# Patient Record
Sex: MALE | Race: BLACK OR AFRICAN AMERICAN | NOT HISPANIC OR LATINO | Employment: OTHER | URBAN - METROPOLITAN AREA
[De-identification: names, ages, dates, MRNs, and addresses within clinical notes are randomized per-mention and may not be internally consistent; named-entity substitution may affect disease eponyms.]

---

## 2017-02-13 ENCOUNTER — LAB VISIT (OUTPATIENT)
Dept: LAB | Facility: HOSPITAL | Age: 67
End: 2017-02-13
Attending: FAMILY MEDICINE
Payer: MEDICARE

## 2017-02-13 ENCOUNTER — OFFICE VISIT (OUTPATIENT)
Dept: FAMILY MEDICINE | Facility: CLINIC | Age: 67
End: 2017-02-13
Payer: MEDICARE

## 2017-02-13 VITALS
WEIGHT: 214.5 LBS | RESPIRATION RATE: 16 BRPM | OXYGEN SATURATION: 97 % | BODY MASS INDEX: 31.77 KG/M2 | SYSTOLIC BLOOD PRESSURE: 140 MMHG | HEART RATE: 53 BPM | HEIGHT: 69 IN | DIASTOLIC BLOOD PRESSURE: 82 MMHG | TEMPERATURE: 98 F

## 2017-02-13 DIAGNOSIS — I10 ESSENTIAL HYPERTENSION: Chronic | ICD-10-CM

## 2017-02-13 DIAGNOSIS — S46.002A ROTATOR CUFF INJURY, LEFT, INITIAL ENCOUNTER: Primary | ICD-10-CM

## 2017-02-13 DIAGNOSIS — Z11.59 NEED FOR HEPATITIS C SCREENING TEST: ICD-10-CM

## 2017-02-13 DIAGNOSIS — Z23 NEED FOR IMMUNIZATION AGAINST INFLUENZA: ICD-10-CM

## 2017-02-13 DIAGNOSIS — Z23 NEED FOR PROPHYLACTIC VACCINATION WITH STREPTOCOCCUS PNEUMONIAE (PNEUMOCOCCUS) AND INFLUENZA VACCINES: ICD-10-CM

## 2017-02-13 DIAGNOSIS — I48.0 PAROXYSMAL ATRIAL FIBRILLATION: Chronic | ICD-10-CM

## 2017-02-13 LAB
ALBUMIN SERPL BCP-MCNC: 4 G/DL
ALP SERPL-CCNC: 55 U/L
ALT SERPL W/O P-5'-P-CCNC: 29 U/L
ANION GAP SERPL CALC-SCNC: 8 MMOL/L
AST SERPL-CCNC: 32 U/L
BILIRUB SERPL-MCNC: 0.4 MG/DL
BUN SERPL-MCNC: 15 MG/DL
CALCIUM SERPL-MCNC: 9.2 MG/DL
CHLORIDE SERPL-SCNC: 105 MMOL/L
CHOLEST/HDLC SERPL: 2.9 {RATIO}
CO2 SERPL-SCNC: 28 MMOL/L
CREAT SERPL-MCNC: 1 MG/DL
EST. GFR  (AFRICAN AMERICAN): >60 ML/MIN/1.73 M^2
EST. GFR  (NON AFRICAN AMERICAN): >60 ML/MIN/1.73 M^2
GLUCOSE SERPL-MCNC: 102 MG/DL
HDL/CHOLESTEROL RATIO: 34.9 %
HDLC SERPL-MCNC: 172 MG/DL
HDLC SERPL-MCNC: 60 MG/DL
LDLC SERPL CALC-MCNC: 98.6 MG/DL
NONHDLC SERPL-MCNC: 112 MG/DL
POTASSIUM SERPL-SCNC: 4 MMOL/L
PROT SERPL-MCNC: 7.3 G/DL
SODIUM SERPL-SCNC: 141 MMOL/L
TRIGL SERPL-MCNC: 67 MG/DL

## 2017-02-13 PROCEDURE — 80053 COMPREHEN METABOLIC PANEL: CPT

## 2017-02-13 PROCEDURE — 99214 OFFICE O/P EST MOD 30 MIN: CPT | Mod: S$PBB,,, | Performed by: FAMILY MEDICINE

## 2017-02-13 PROCEDURE — 36415 COLL VENOUS BLD VENIPUNCTURE: CPT | Mod: PO

## 2017-02-13 PROCEDURE — 80061 LIPID PANEL: CPT

## 2017-02-13 PROCEDURE — 99999 PR PBB SHADOW E&M-EST. PATIENT-LVL III: CPT | Mod: PBBFAC,,, | Performed by: FAMILY MEDICINE

## 2017-02-13 PROCEDURE — 86803 HEPATITIS C AB TEST: CPT

## 2017-02-13 RX ORDER — FLECAINIDE ACETATE 100 MG/1
TABLET ORAL
COMMUNITY
Start: 2017-01-23 | End: 2017-02-13

## 2017-02-13 RX ORDER — HYDROCODONE BITARTRATE AND ACETAMINOPHEN 5; 325 MG/1; MG/1
1 TABLET ORAL EVERY 6 HOURS PRN
Qty: 45 TABLET | Refills: 0 | Status: SHIPPED | OUTPATIENT
Start: 2017-02-13 | End: 2017-08-09

## 2017-02-13 NOTE — PROGRESS NOTES
Flu given &.VIS given. Tolerated injection well.Patient instructed to wait 15 minutes for monitoring. Pneumococcal vaccine 13 given tolerated well VIS given instructed to wait x 15 min for monitoring.

## 2017-02-13 NOTE — PROGRESS NOTES
Chief Complaint   Patient presents with    Shoulder Pain     L shoulder x 4 months       HPI  Omar Pacheco is a 66 y.o. male with multiple medical diagnoses as listed in the medical history and problem list that presents for evaluation of 6 mos of left shoulder pain. No known injury but he did hear a click and feel a sharp pain while reaching during some housework. He was seen in December by another provider at this clinic and had x rays done. No numbness or tingling. He takes ultracet but this does not help the pain.     PAST MEDICAL HISTORY:  Past Medical History   Diagnosis Date    *Atrial fibrillation     Benign hypertrophy of prostate     Degenerative disc disease     GERD (gastroesophageal reflux disease)     Hx of colonic polyps     Hypertension     Pilonidal cyst 1971       PAST SURGICAL HISTORY:  Past Surgical History   Procedure Laterality Date    Cyst removal  1971     coccyx    Knee surgery  1989     right    Colonoscopy N/A 12/7/2016     Procedure: COLONOSCOPY;  Surgeon: Sumeet Lundy MD;  Location: [address];  Service: Endoscopy;  Laterality: N/A;  OK to hold Pradaxa 2 days prior to procedure per Dr Jones/see note dated 11/15/16/svn       SOCIAL HISTORY:  Social History     Social History    Marital status:      Spouse name: N/A    Number of children: N/A    Years of education: N/A     Occupational History    Not on file.     Social History Main Topics    Smoking status: Former Smoker     Years: 2.00     Types: Cigarettes     Quit date: 9/26/1975    Smokeless tobacco: Never Used    Alcohol use 1.8 oz/week     3 Glasses of wine, 0 Standard drinks or equivalent per week    Drug use: No    Sexual activity: Not on file     Other Topics Concern    Not on file     Social History Narrative       FAMILY HISTORY:  No family history on file.    ALLERGIES AND MEDICATIONS: updated and reviewed.  Review of patient's allergies indicates:  No Known Allergies  Current  "Outpatient Prescriptions   Medication Sig Dispense Refill    ammonium lactate (LAC-HYDRIN) 12 % lotion Apply topically 2 (two) times daily. 225 g 3    dabigatran etexilate (PRADAXA) 150 mg Cap Take 1 capsule (150 mg total) by mouth 2 (two) times daily. "Do NOT break, chew, or open capsules." 180 capsule 3    flecainide (TAMBOCOR) 50 MG Tab Take 100 mg by mouth every 12 (twelve) hours.      hydrochlorothiazide (HYDRODIURIL) 25 MG tablet Take 1 tablet (25 mg total) by mouth once daily. 90 tablet 3    methocarbamol (ROBAXIN) 500 MG Tab Take 500 mg by mouth 4 (four) times daily as needed.      mirtazapine (REMERON) 30 MG tablet Take 30 mg by mouth every evening.      omeprazole (PRILOSEC) 20 MG capsule Take 1 capsule (20 mg total) by mouth once daily. 90 capsule 3    terazosin (HYTRIN) 10 MG capsule Take 1 capsule (10 mg total) by mouth every evening. 90 capsule 3    VITAMIN B COMPLEX (B COMPLETE ORAL) Take by mouth once daily.      hydrocodone-acetaminophen 5-325mg (NORCO) 5-325 mg per tablet Take 1 tablet by mouth every 6 (six) hours as needed. 45 tablet 0    metoprolol succinate (TOPROL-XL) 50 MG 24 hr tablet Take 1 tablet (50 mg total) by mouth once daily. 90 tablet 3     No current facility-administered medications for this visit.        ROS  Review of Systems   Constitutional: Negative for chills, fatigue, fever and unexpected weight change.   HENT: Negative for ear pain, postnasal drip, rhinorrhea, sinus pressure and sore throat.    Eyes: Negative for photophobia and visual disturbance.   Respiratory: Negative for apnea, cough, chest tightness, shortness of breath and wheezing.    Cardiovascular: Negative for chest pain and palpitations.   Gastrointestinal: Negative for abdominal pain, blood in stool, constipation, diarrhea, nausea and vomiting.   Genitourinary: Negative for difficulty urinating.   Musculoskeletal: Positive for arthralgias and myalgias. Negative for joint swelling.   Skin: Negative for " "rash.   Neurological: Negative for facial asymmetry, speech difficulty, weakness, numbness and headaches.   Psychiatric/Behavioral: Negative for dysphoric mood.       Physical Exam  Vitals:    02/13/17 1024   BP: (!) 140/82   Pulse: (!) 53   Resp: 16   Temp: 97.5 °F (36.4 °C)    Body mass index is 31.68 kg/(m^2).  Weight: 97.3 kg (214 lb 8.1 oz)   Height: 5' 9" (175.3 cm)     Physical Exam   Constitutional: He is oriented to person, place, and time. He appears well-developed and well-nourished.   HENT:   Head: Normocephalic and atraumatic.   Eyes: EOM are normal.   Musculoskeletal:   C-spine exam, no bony TTP, no limit ROM, spurling negative    Right shoulder-pain over AC joint, positive hanson-monica test, pain with abduction past 90 degrees  DTRs symmetric   Neurological: He is alert and oriented to person, place, and time.   Skin: Skin is warm and dry. No rash noted.   Psychiatric: He has a normal mood and affect. His behavior is normal.   Nursing note and vitals reviewed.      Health Maintenance       Date Due Completion Date    Hepatitis C Screening 1950 ---    TETANUS VACCINE 12/13/1966 ---    Zoster Vaccine 12/13/2008 ---    Lipid Panel 6/14/2011 6/14/2006    Pneumococcal (65+) (1 of 2 - PCV13) 12/13/2013 ---    Abdominal Aortic Aneurysm Screening 12/13/2013 6/18/2012 (Done)    Override on 6/18/2012: Done ([facility]-CT Abdomen Pelvis With/Without-The abdominal aorta is normal in course and calibur without significant atherosclerotic calcifications.)    Influenza Vaccine 8/1/2016 ---    Colonoscopy 12/7/2019 12/7/2016            ASSESSMENT     1. Rotator cuff injury, left, initial encounter    2. Paroxysmal atrial fibrillation    3. Essential hypertension    4. Need for hepatitis C screening test    5. Need for immunization against influenza    6. Need for prophylactic vaccination with Streptococcus pneumoniae (Pneumococcus) and Influenza vaccines        PLAN:     Rotator cuff injury, left, initial " encounter  -increase to norco as he states the ultracet does not relieve his pain, discussed that this is not intended for long term use  Recommend orthopedic eval, may need MRI vs PT  -     Ambulatory referral to Orthopedics  -     hydrocodone-acetaminophen 5-325mg (NORCO) 5-325 mg per tablet; Take 1 tablet by mouth every 6 (six) hours as needed.  Dispense: 45 tablet; Refill: 0    Paroxysmal atrial fibrillation  -This is a chronic medical condition that is stable under the current regimen. Continue to monitor and we will make medication adjustments as needed.       Essential hypertension  -likely elevated due to pain  -     Lipid panel; Future; Expected date: 2/13/17  -     Comprehensive metabolic panel; Future; Expected date: 2/13/17    Need for hepatitis C screening test  -     Hepatitis C antibody; Future; Expected date: 2/13/17    Need for immunization against influenza  -     Influenza - High Dose (65+) (PF) (IM)    Need for prophylactic vaccination with Streptococcus pneumoniae (Pneumococcus) and Influenza vaccines  -     Pneumococcal Conjugate Vaccine (13 Valent) (IM)              Donya Goode MD  02/13/2017 11:07 AM        Return in about 3 months (around 5/13/2017) for Follow up.

## 2017-02-13 NOTE — MR AVS SNAPSHOT
"    [facility]  [address]  [place] [address]  Phone: [phone]  Fax: [phone]                  Omar Pacheco   2017 10:20 AM   Office Visit    Description:  Male : 1950   Provider:  Donya Goode MD   Department:  [facility]           Reason for Visit     Shoulder Pain           Diagnoses this Visit        Comments    Rotator cuff injury, left, initial encounter    -  Primary     Paroxysmal atrial fibrillation         Essential hypertension         Need for hepatitis C screening test         Need for immunization against influenza         Need for prophylactic vaccination with Streptococcus pneumoniae (Pneumococcus) and Influenza vaccines                To Do List           Goals (5 Years of Data)     None      Follow-Up and Disposition     Return in about 3 months (around 2017) for Follow up.      [facility] On [facility]     [facility] On Call Nurse Care Line -  Assistance  Registered nurses in the [facility] On Call Center provide clinical advisement, health education, appointment booking, and other advisory services.  Call for this free service at [phone].             Medications           Message regarding Medications     Verify the changes and/or additions to your medication regime listed below are the same as discussed with your clinician today.  If any of these changes or additions are incorrect, please notify your healthcare provider.             Verify that the below list of medications is an accurate representation of the medications you are currently taking.  If none reported, the list may be blank. If incorrect, please contact your healthcare provider. Carry this list with you in case of emergency.           Current Medications     ammonium lactate (LAC-HYDRIN) 12 % lotion Apply topically 2 (two) times daily.    dabigatran etexilate (PRADAXA) 150 mg Cap Take 1 capsule (150 mg total) by mouth 2 (two) times daily. "Do NOT break, chew, or " "open capsules."    flecainide (TAMBOCOR) 50 MG Tab Take 100 mg by mouth every 12 (twelve) hours.    hydrochlorothiazide (HYDRODIURIL) 25 MG tablet Take 1 tablet (25 mg total) by mouth once daily.    methocarbamol (ROBAXIN) 500 MG Tab Take 500 mg by mouth 4 (four) times daily as needed.    mirtazapine (REMERON) 30 MG tablet Take 30 mg by mouth every evening.    omeprazole (PRILOSEC) 20 MG capsule Take 1 capsule (20 mg total) by mouth once daily.    terazosin (HYTRIN) 10 MG capsule Take 1 capsule (10 mg total) by mouth every evening.    tramadol-acetaminophen 37.5-325 mg (ULTRACET) 37.5-325 mg Tab TAKE ONE TABLET BY MOUTH EVERY SIX HOURS AS NEEDED FOR PAIN    VITAMIN B COMPLEX (B COMPLETE ORAL) Take by mouth once daily.    metoprolol succinate (TOPROL-XL) 50 MG 24 hr tablet Take 1 tablet (50 mg total) by mouth once daily.           Clinical Reference Information           Your Vitals Were     BP Pulse Temp Resp Height Weight    140/82 (BP Location: Left arm, Patient Position: Sitting, BP Method: Manual) 53 97.5 °F (36.4 °C) (Oral) 16 5' 9" (1.753 m) 97.3 kg (214 lb 8.1 oz)    SpO2 BMI             97% 31.68 kg/m2         Blood Pressure          Most Recent Value    BP  (!)  140/82      Allergies as of 2/13/2017     No Known Allergies      Immunizations Administered on Date of Encounter - 2/13/2017     Name Date Dose VIS Date Route    Influenza - High Dose  Incomplete 0.5 mL 8/7/2015 Intramuscular    Pneumococcal Conjugate - 13 Valent  Incomplete 0.5 mL 11/5/2015 Intramuscular      Orders Placed During Today's Visit      Normal Orders This Visit    Ambulatory referral to Orthopedics     Influenza - High Dose (65+) (PF) (IM)     Pneumococcal Conjugate Vaccine (13 Valent) (IM)     Future Labs/Procedures Expected by Expires    Comprehensive metabolic panel  2/13/2017 2/13/2018    Hepatitis C antibody  2/13/2017 4/14/2018    Lipid panel  2/13/2017 4/14/2018      Language Assistance Services     ATTENTION: Language " assistance services are available, free of charge. Please call [phone].      ATENCIÓN: Si habla anibal, tiene a jackson disposición servicios gratuitos de asistencia lingüística. Llame al [phone].     CHÚ Ý: N?u b?n nói Ti?ng Vi?t, có các d?ch v? h? tr? ngôn ng? mi?n phí dành cho b?n. G?i s? [phone].         [facility] complies with applicable Federal civil rights laws and does not discriminate on the basis of race, color, national origin, age, disability, or sex.

## 2017-02-14 LAB — HCV AB SERPL QL IA: NEGATIVE

## 2017-02-15 ENCOUNTER — TELEPHONE (OUTPATIENT)
Dept: FAMILY MEDICINE | Facility: CLINIC | Age: 67
End: 2017-02-15

## 2017-02-16 ENCOUNTER — PATIENT MESSAGE (OUTPATIENT)
Dept: FAMILY MEDICINE | Facility: CLINIC | Age: 67
End: 2017-02-16

## 2017-02-16 DIAGNOSIS — I10 ESSENTIAL HYPERTENSION: ICD-10-CM

## 2017-02-16 DIAGNOSIS — I48.20 CHRONIC ATRIAL FIBRILLATION: ICD-10-CM

## 2017-02-16 RX ORDER — METOPROLOL SUCCINATE 50 MG/1
50 TABLET, EXTENDED RELEASE ORAL DAILY
Qty: 90 TABLET | Refills: 2 | Status: SHIPPED | OUTPATIENT
Start: 2017-02-16 | End: 2017-02-20 | Stop reason: SDUPTHER

## 2017-02-16 RX ORDER — DABIGATRAN ETEXILATE 150 MG/1
150 CAPSULE ORAL 2 TIMES DAILY
Qty: 180 CAPSULE | Refills: 2 | Status: SHIPPED | OUTPATIENT
Start: 2017-02-16 | End: 2017-02-20 | Stop reason: SDUPTHER

## 2017-02-16 NOTE — TELEPHONE ENCOUNTER
----- Message from Carisa Vega sent at 2/16/2017  9:27 AM CST -----  Contact: self  Refill : dabigatran etexilate (PRADAXA) 150 mg Cap             metoprolol succinate (TOPROL-XL) 50 MG 24 hr tablet        Please call [phone]

## 2017-02-20 DIAGNOSIS — I48.20 CHRONIC ATRIAL FIBRILLATION: ICD-10-CM

## 2017-02-20 DIAGNOSIS — I10 ESSENTIAL HYPERTENSION: ICD-10-CM

## 2017-02-20 DIAGNOSIS — Z87.19 HISTORY OF GASTROESOPHAGEAL REFLUX (GERD): ICD-10-CM

## 2017-02-20 RX ORDER — DABIGATRAN ETEXILATE 150 MG/1
150 CAPSULE ORAL 2 TIMES DAILY
Qty: 180 CAPSULE | Refills: 2 | Status: SHIPPED | OUTPATIENT
Start: 2017-02-20 | End: 2018-01-25 | Stop reason: SDUPTHER

## 2017-02-20 RX ORDER — OMEPRAZOLE 20 MG/1
20 CAPSULE, DELAYED RELEASE ORAL DAILY
Qty: 90 CAPSULE | Refills: 3 | Status: SHIPPED | OUTPATIENT
Start: 2017-02-20 | End: 2017-10-16 | Stop reason: SDUPTHER

## 2017-02-20 RX ORDER — METOPROLOL SUCCINATE 50 MG/1
50 TABLET, EXTENDED RELEASE ORAL DAILY
Qty: 90 TABLET | Refills: 2 | Status: SHIPPED | OUTPATIENT
Start: 2017-02-20 | End: 2017-12-01 | Stop reason: SDUPTHER

## 2017-02-20 NOTE — TELEPHONE ENCOUNTER
----- Message from Jannet Estes sent at 2/20/2017 10:55 AM CST -----  Contact: self  Refill:    omeprazole (PRILOSEC) 20 MG capsule  metoprolol succinate (TOPROL-XL) 50 MG 24 hr tablet  dabigatran etexilate (PRADAXA) 150 mg Cap    Pt is requesting paper scripts for medications.  Please call pt at  once ready for .  Pt states he has requested this 3 times.    Thanks

## 2017-02-21 ENCOUNTER — OFFICE VISIT (OUTPATIENT)
Dept: ORTHOPEDICS | Facility: CLINIC | Age: 67
End: 2017-02-21
Payer: MEDICARE

## 2017-02-21 ENCOUNTER — PATIENT MESSAGE (OUTPATIENT)
Dept: FAMILY MEDICINE | Facility: CLINIC | Age: 67
End: 2017-02-21

## 2017-02-21 VITALS
BODY MASS INDEX: 31.77 KG/M2 | DIASTOLIC BLOOD PRESSURE: 80 MMHG | RESPIRATION RATE: 16 BRPM | SYSTOLIC BLOOD PRESSURE: 120 MMHG | HEIGHT: 69 IN | HEART RATE: 60 BPM | WEIGHT: 214.5 LBS

## 2017-02-21 DIAGNOSIS — M25.511 CHRONIC RIGHT SHOULDER PAIN: Primary | ICD-10-CM

## 2017-02-21 DIAGNOSIS — G89.29 CHRONIC RIGHT SHOULDER PAIN: Primary | ICD-10-CM

## 2017-02-21 PROCEDURE — 20610 DRAIN/INJ JOINT/BURSA W/O US: CPT | Mod: S$PBB,RT,, | Performed by: PHYSICIAN ASSISTANT

## 2017-02-21 PROCEDURE — 99999 PR PBB SHADOW E&M-EST. PATIENT-LVL IV: CPT | Mod: PBBFAC,,, | Performed by: PHYSICIAN ASSISTANT

## 2017-02-21 PROCEDURE — 20610 DRAIN/INJ JOINT/BURSA W/O US: CPT | Mod: PBBFAC | Performed by: PHYSICIAN ASSISTANT

## 2017-02-21 PROCEDURE — 99213 OFFICE O/P EST LOW 20 MIN: CPT | Mod: S$PBB,25,, | Performed by: PHYSICIAN ASSISTANT

## 2017-02-21 PROCEDURE — 99214 OFFICE O/P EST MOD 30 MIN: CPT | Mod: PBBFAC | Performed by: PHYSICIAN ASSISTANT

## 2017-02-21 RX ORDER — BETAMETHASONE SODIUM PHOSPHATE AND BETAMETHASONE ACETATE 3; 3 MG/ML; MG/ML
6 INJECTION, SUSPENSION INTRA-ARTICULAR; INTRALESIONAL; INTRAMUSCULAR; SOFT TISSUE
Status: COMPLETED | OUTPATIENT
Start: 2017-02-21 | End: 2017-02-21

## 2017-02-21 RX ADMIN — BETAMETHASONE ACETATE AND BETAMETHASONE SODIUM PHOSPHATE 6 MG: 3; 3 INJECTION, SUSPENSION INTRA-ARTICULAR; INTRALESIONAL; INTRAMUSCULAR; SOFT TISSUE at 10:02

## 2017-02-21 NOTE — LETTER
February 21, 2017      Donya Goode MD  [address] [facility] [address]  [place] [address]           [facility]  [address]  [place] [address]  Phone: [phone]          Patient: Omar Pacheco   MR Number: [id number]   YOB: 1950   Date of Visit: 2/21/2017       Dear Dr. Donya Godoe:    Thank you for referring Omar Pacheco to me for evaluation. Attached you will find relevant portions of my assessment and plan of care.    If you have questions, please do not hesitate to call me. I look forward to following Omar Pacheco along with you.    Sincerely,    Manny Posada PA-C    Enclosure  CC:  No Recipients    If you would like to receive this communication electronically, please contact [email or web address] or [phone] to request more information on [facility] access.    For providers and/or their staff who would like to refer a patient to [facility], please contact us through our one-stop-shop provider referral line, Juan Hartman, at [phone].    If you feel you have received this communication in error or would no longer like to receive these types of communications, please e-mail [email or web address]

## 2017-02-21 NOTE — PROGRESS NOTES
"  SUBJECTIVE:     Chief Complaint & History of Present Illness:  Omar Pacheco is a  Established  patient 66 y.o. male who is seen here today with a complaint of    Chief Complaint   Patient presents with    Left Shoulder - Pain, Injury    .  He is patient well known to us was last seen treated in the clinic 9/26/2016 by Dr. Johnson for right knee arthritic pain.  His concerns today revolve around pain and tenderness in the left shoulder for the past 6 months.  He does not remember a specific trauma or injury to the shoulder but was involved in some overhead lifting and work.  He has not taken any specific medications or treatments for his shoulder and feels that the symptoms have progressed the point he has great difficulty in turning his steering we'll or any activities higher than shoulder height  On a scale of 1-10, with 10 being worst pain imaginable, he rates this pain as 5 on good days and 7 on bad days.  he describes the pain as tender and sore.    Review of patient's allergies indicates:  No Known Allergies      Current Outpatient Prescriptions   Medication Sig Dispense Refill    ammonium lactate (LAC-HYDRIN) 12 % lotion Apply topically 2 (two) times daily. 225 g 3    dabigatran etexilate (PRADAXA) 150 mg Cap Take 1 capsule (150 mg total) by mouth 2 (two) times daily. "Do NOT break, chew, or open capsules." 180 capsule 2    flecainide (TAMBOCOR) 50 MG Tab Take 100 mg by mouth every 12 (twelve) hours.      hydrochlorothiazide (HYDRODIURIL) 25 MG tablet Take 1 tablet (25 mg total) by mouth once daily. 90 tablet 3    hydrocodone-acetaminophen 5-325mg (NORCO) 5-325 mg per tablet Take 1 tablet by mouth every 6 (six) hours as needed. 45 tablet 0    methocarbamol (ROBAXIN) 500 MG Tab Take 500 mg by mouth 4 (four) times daily as needed.      metoprolol succinate (TOPROL-XL) 50 MG 24 hr tablet Take 1 tablet (50 mg total) by mouth once daily. 90 tablet 2    mirtazapine (REMERON) 30 MG tablet Take 30 mg " by mouth every evening.      omeprazole (PRILOSEC) 20 MG capsule Take 1 capsule (20 mg total) by mouth once daily. 90 capsule 3    terazosin (HYTRIN) 10 MG capsule Take 1 capsule (10 mg total) by mouth every evening. 90 capsule 3    VITAMIN B COMPLEX (B COMPLETE ORAL) Take by mouth once daily.       Current Facility-Administered Medications   Medication Dose Route Frequency Provider Last Rate Last Dose    betamethasone acetate-betamethasone sodium phosphate injection 6 mg  6 mg Intra-articular 1 time in Clinic/HOD Manny Posada PA-C           Past Medical History   Diagnosis Date    *Atrial fibrillation     Benign hypertrophy of prostate     Degenerative disc disease     GERD (gastroesophageal reflux disease)     Hx of colonic polyps     Hypertension     Pilonidal cyst 1971       Past Surgical History   Procedure Laterality Date    Cyst removal  1971     coccyx    Knee surgery  1989     right    Colonoscopy N/A 12/7/2016     Procedure: COLONOSCOPY;  Surgeon: Sumeet Lundy MD;  Location: [facility] ([address] [place] [address]);  Service: Endoscopy;  Laterality: N/A;  OK to hold Pradaxa 2 days prior to procedure per Dr Jones/see note dated 11/15/16/svn       Vital Signs (Most Recent)  Vitals:    02/21/17 0911   BP: 120/80   Pulse: 60   Resp: 16       Review of Systems:  ROS:  Constitutional: no fever or chills  Eyes: no visual changes  ENT: no nasal congestion or sore throat  Respiratory: no cough or shortness of breath  Cardiovascular: no chest pain or palpitations, Positive for A. fib  Gastrointestinal: no nausea or vomiting, tolerating diet, Positive for GERD  Genitourinary: no hematuria or dysuria  Integument/Breast: no rash or pruritis  Hematologic/Lymphatic: no easy bruising or lymphadenopathy  Musculoskeletal: positive for arthralgias, back pain, neck pain and stiff joints, negative for bone pain and muscle weakness  Neurological: no seizures or tremors  Behavioral/Psych: no auditory or visual  "hallucinations  Endocrine: no heat or cold intolerance      OBJECTIVE:     PHYSICAL EXAM:  Height: 5' 9" (175.3 cm) Weight: 97.3 kg (214 lb 8.1 oz), General Appearance: Well nourished, well developed, in no acute distress.  Neurological: Mood & affect are normal.  Shoulder exam: left  Tenderness: AC joint, lateral acromial  ROM: forward flexion 100 / 100, extension 90/90, full abduction 100 / 100, abduction - glenohumeral 70 / 70, external rotation 50/50, internal rotation mid sagittal line  Shoulder Strength: biceps 5/5, triceps 5/5, abduction 5/5 with pain, adduction 5/5, external rotation 5/5 with shoulder at side, flexion 5/5, and extension 5/5 with pain  negative for tenderness about the glenohumeral joint, positive for tenderness over the acromioclavicular joint and positive for impingement sign  Stability tests: anterior apprehension test negative and posterior apprehension test negative  Special Tests:Arreguin' test: negative, Cross-chest abduction: negative and Speed's test: negative                     RADIOGRAPHS:  X-rays from previous visit reviewed by me today demonstrate mild arthritic changes throughout the shoulder gently} joint with some early osteophytic spurring noted    ASSESSMENT/PLAN:     Plan: We discussed with the patient at length all the different treatment options available for his left shoulder including anti-inflammatories, acetaminophen, rest, ice, Physical therapy to include strengthening exercise, occasional cortisone injections for temporary relief, arthroscopic surgical repair, and finally shoulder arthroplasty.   We'll proceed with therapeutic diagnostic cortisone injection shoulder  Therapy for strength and range of motion, dry needling      The injection site was identified and the skin was prepared with an ETOH solution. The    left  shoulder was injected with 1 ml of Celestone and 5 ml Lidocaine under sterile technique. Omar Pacheco tolerated the procedure well, he was " advised to rest the  shoulder  today, ice and support. he did receive immediate relief of the pain in and about his  shoulder  he was told this would be short lived and is secondary to the lidocaine. he may have an increase in his discomfort tonight followed by steady improvement over the next several days. It may take 1-3 weeks following the injection to get the full benefit of the medication.  I will see him back in 3- weeks. Sooner if he has any problems or concerns.

## 2017-02-22 ENCOUNTER — TELEPHONE (OUTPATIENT)
Dept: UROLOGY | Facility: CLINIC | Age: 67
End: 2017-02-22

## 2017-02-22 NOTE — TELEPHONE ENCOUNTER
----- Message from Kaycee Riley sent at 2/22/2017  7:57 AM CST -----  Contact: self  Appointment Request From: Omar Pacheco         With Provider: Earnest Martinez Jr, MD [[facility]]        Would Accept With:Only the person I've selected        Preferred Date Range: From 2/20/2017 To 3/20/2017        Preferred Times: Any        Reason for visit: Request an Appt        Comments:    Blood work for PSA was completed at [facility] on 2/20/2017 my PSA was elevated=4.45 it was recommended that I see a urologist. My last [facility] was [facility] in 2015.

## 2017-03-11 ENCOUNTER — PATIENT MESSAGE (OUTPATIENT)
Dept: ORTHOPEDICS | Facility: CLINIC | Age: 67
End: 2017-03-11

## 2017-03-13 ENCOUNTER — OFFICE VISIT (OUTPATIENT)
Dept: UROLOGY | Facility: CLINIC | Age: 67
End: 2017-03-13
Payer: MEDICARE

## 2017-03-13 VITALS
HEIGHT: 69 IN | WEIGHT: 213.88 LBS | HEART RATE: 63 BPM | DIASTOLIC BLOOD PRESSURE: 90 MMHG | BODY MASS INDEX: 31.68 KG/M2 | SYSTOLIC BLOOD PRESSURE: 152 MMHG

## 2017-03-13 DIAGNOSIS — N13.8 BPH WITH URINARY OBSTRUCTION: Primary | ICD-10-CM

## 2017-03-13 DIAGNOSIS — N40.1 BPH WITH URINARY OBSTRUCTION: Primary | ICD-10-CM

## 2017-03-13 DIAGNOSIS — R97.20 ELEVATED PSA: ICD-10-CM

## 2017-03-13 PROCEDURE — 99999 PR PBB SHADOW E&M-EST. PATIENT-LVL III: CPT | Mod: PBBFAC,,, | Performed by: UROLOGY

## 2017-03-13 PROCEDURE — 99213 OFFICE O/P EST LOW 20 MIN: CPT | Mod: S$PBB,,, | Performed by: UROLOGY

## 2017-03-13 PROCEDURE — 99213 OFFICE O/P EST LOW 20 MIN: CPT | Mod: PBBFAC | Performed by: UROLOGY

## 2017-03-13 NOTE — PROGRESS NOTES
Subjective:       Patient ID: Omar Pacheco is a 66 y.o. male.    Chief Complaint: elevated psa (pt come to clinic today with a outside report of psa level 4.45 done @[facility].)    HPI patient is to PSA of 4.4 from CVA.  He has lower urinary tract symptoms no irritative symptoms.  He's had an elevated PSA in the past and a negative truss with biopsy past.  He takes terrors Zosyn.  He is not taking finasteride    Past Medical History:   Diagnosis Date    *Atrial fibrillation     Benign hypertrophy of prostate     Degenerative disc disease     GERD (gastroesophageal reflux disease)     Hx of colonic polyps     Hypertension     Pilonidal cyst 1971       Past Surgical History:   Procedure Laterality Date    COLONOSCOPY N/A 12/7/2016    Procedure: COLONOSCOPY;  Surgeon: Sumeet Lundy MD;  Location: [facility] ([address] [place] [address]);  Service: Endoscopy;  Laterality: N/A;  OK to hold Pradaxa 2 days prior to procedure per Dr Jones/see note dated 11/15/16/svn    CYST REMOVAL  1971    coccyx    KNEE SURGERY  1989    right       History reviewed. No pertinent family history.    Social History     Social History    Marital status:      Spouse name: N/A    Number of children: N/A    Years of education: N/A     Occupational History    Not on file.     Social History Main Topics    Smoking status: Former Smoker     Years: 2.00     Types: Cigarettes     Quit date: 9/26/1975    Smokeless tobacco: Never Used    Alcohol use 1.8 oz/week     3 Glasses of wine, 0 Standard drinks or equivalent per week    Drug use: No    Sexual activity: Not on file     Other Topics Concern    Not on file     Social History Narrative       Allergies:  Review of patient's allergies indicates no known allergies.    Medications:    Current Outpatient Prescriptions:     ammonium lactate (LAC-HYDRIN) 12 % lotion, Apply topically 2 (two) times daily., Disp: 225 g, Rfl: 3    dabigatran etexilate (PRADAXA)  "150 mg Cap, Take 1 capsule (150 mg total) by mouth 2 (two) times daily. "Do NOT break, chew, or open capsules.", Disp: 180 capsule, Rfl: 2    flecainide (TAMBOCOR) 50 MG Tab, Take 100 mg by mouth every 12 (twelve) hours., Disp: , Rfl:     hydrochlorothiazide (HYDRODIURIL) 25 MG tablet, Take 1 tablet (25 mg total) by mouth once daily., Disp: 90 tablet, Rfl: 3    hydrocodone-acetaminophen 5-325mg (NORCO) 5-325 mg per tablet, Take 1 tablet by mouth every 6 (six) hours as needed., Disp: 45 tablet, Rfl: 0    methocarbamol (ROBAXIN) 500 MG Tab, Take 500 mg by mouth 4 (four) times daily as needed., Disp: , Rfl:     mirtazapine (REMERON) 30 MG tablet, Take 30 mg by mouth every evening., Disp: , Rfl:     omeprazole (PRILOSEC) 20 MG capsule, Take 1 capsule (20 mg total) by mouth once daily., Disp: 90 capsule, Rfl: 3    terazosin (HYTRIN) 10 MG capsule, Take 1 capsule (10 mg total) by mouth every evening., Disp: 90 capsule, Rfl: 3    VITAMIN B COMPLEX (B COMPLETE ORAL), Take by mouth once daily., Disp: , Rfl:     metoprolol succinate (TOPROL-XL) 50 MG 24 hr tablet, Take 1 tablet (50 mg total) by mouth once daily., Disp: 90 tablet, Rfl: 2    Review of Systems   Constitutional: Negative for activity change, appetite change, chills, diaphoresis, fatigue, fever and unexpected weight change.   HENT: Negative for congestion, dental problem, hearing loss, mouth sores, postnasal drip, rhinorrhea, sinus pressure and trouble swallowing.    Eyes: Negative for pain, discharge and itching.   Respiratory: Negative for apnea, cough, choking, chest tightness, shortness of breath and wheezing.    Cardiovascular: Negative for chest pain, palpitations and leg swelling.   Gastrointestinal: Negative for abdominal distention, abdominal pain, anal bleeding, blood in stool, constipation, diarrhea, nausea, rectal pain and vomiting.   Endocrine: Negative for polydipsia and polyuria.   Genitourinary: Positive for decreased urine volume. " Negative for difficulty urinating, discharge, dysuria, enuresis, flank pain, frequency, genital sores, hematuria, penile pain, penile swelling, scrotal swelling, testicular pain and urgency.   Musculoskeletal: Negative for arthralgias, back pain and myalgias.   Skin: Negative for color change, rash and wound.   Neurological: Negative for dizziness, syncope, speech difficulty, light-headedness and headaches.   Hematological: Negative for adenopathy. Does not bruise/bleed easily.   Psychiatric/Behavioral: Negative for behavioral problems, confusion, hallucinations and sleep disturbance.       Objective:      Physical Exam   Constitutional: He appears well-developed.   HENT:   Head: Normocephalic.   Cardiovascular: Normal rate.    Pulmonary/Chest: Effort normal.   Abdominal: Soft.   Genitourinary: Prostate normal.   Genitourinary Comments: 35 g b9   Neurological: He is alert.   Skin: Skin is warm.     Psychiatric: He has a normal mood and affect.       Assessment:       1. BPH with urinary obstruction    2. Elevated PSA        Plan:       Omar was seen today for elevated psa.    Diagnoses and all orders for this visit:    BPH with urinary obstruction  -     Prostate Specific Antigen, Diagnostic; Future    Elevated PSA     return to clinic 6 months with PSA

## 2017-03-13 NOTE — MR AVS SNAPSHOT
"    [facility] Urolog 4th Floor  [address]  [facility] [address]  Phone: [phone]                  Omar Pacheco   3/13/2017 9:00 AM   Office Visit    Description:  Male : 1950   Provider:  Earnest Martinez Jr., MD   Department:  [facility] - Urolog 4th Floor           Reason for Visit     elevated psa           Diagnoses this Visit        Comments    BPH with urinary obstruction    -  Primary     Elevated PSA                To Do List           Future Appointments        Provider Department Dept Phone    3/14/2017 9:00 AM Manny Posada PA-C [facility] Orthoped[facility] [phone]    3/22/2017 9:00 AM Matthew Hernandez MD [facility] Cardiology [phone]      Goals (5 Years of Data)     None      [facility]     [facility] Nurse Care Line -  Assistance  Registered nurses in the [facility] Center provide clinical advisement, health education, appointment booking, and other advisory services.  Call for this free service at [phone].             Medications           Message regarding Medications     Verify the changes and/or additions to your medication regime listed below are the same as discussed with your clinician today.  If any of these changes or additions are incorrect, please notify your healthcare provider.             Verify that the below list of medications is an accurate representation of the medications you are currently taking.  If none reported, the list may be blank. If incorrect, please contact your healthcare provider. Carry this list with you in case of emergency.           Current Medications     ammonium lactate (LAC-HYDRIN) 12 % lotion Apply topically 2 (two) times daily.    dabigatran etexilate (PRADAXA) 150 mg Cap Take 1 capsule (150 mg total) by mouth 2 (two) times daily. "Do NOT break, chew, or open capsules."    flecainide (TAMBOCOR) 50 MG Tab Take 100 mg by mouth every 12 (twelve) hours.    hydrochlorothiazide (HYDRODIURIL) 25 MG " "tablet Take 1 tablet (25 mg total) by mouth once daily.    hydrocodone-acetaminophen 5-325mg (NORCO) 5-325 mg per tablet Take 1 tablet by mouth every 6 (six) hours as needed.    methocarbamol (ROBAXIN) 500 MG Tab Take 500 mg by mouth 4 (four) times daily as needed.    mirtazapine (REMERON) 30 MG tablet Take 30 mg by mouth every evening.    omeprazole (PRILOSEC) 20 MG capsule Take 1 capsule (20 mg total) by mouth once daily.    terazosin (HYTRIN) 10 MG capsule Take 1 capsule (10 mg total) by mouth every evening.    VITAMIN B COMPLEX (B COMPLETE ORAL) Take by mouth once daily.    metoprolol succinate (TOPROL-XL) 50 MG 24 hr tablet Take 1 tablet (50 mg total) by mouth once daily.           Clinical Reference Information           Your Vitals Were     BP Pulse Height Weight BMI    152/90 63 5' 9" (1.753 m) 97 kg (213 lb 13.5 oz) 31.58 kg/m2      Blood Pressure          Most Recent Value    BP  (!)  152/90      Allergies as of 3/13/2017     No Known Allergies      Immunizations Administered on Date of Encounter - 3/13/2017     None      Orders Placed During Today's Visit     Future Labs/Procedures Expected by Expires    Prostate Specific Antigen, Diagnostic  3/13/2018 3/13/2018      Language Assistance Services     ATTENTION: Language assistance services are available, free of charge. Please call [phone].      ATENCIÓN: Si habla español, tiene a jackson disposición servicios gratuitos de asistencia lingüística. Llame al [phone].     ESTELA Ý: N?u b?n nói Ti?ng Vi?t, có các d?ch v? h? tr? ngôn ng? mi?n phí dành cho b?n. G?i s? [phone].         [facility] - Urology 4th Floor complies with applicable Federal civil rights laws and does not discriminate on the basis of race, color, national origin, age, disability, or sex.        "

## 2017-03-22 ENCOUNTER — OFFICE VISIT (OUTPATIENT)
Dept: CARDIOLOGY | Facility: CLINIC | Age: 67
End: 2017-03-22
Payer: MEDICARE

## 2017-03-22 VITALS
BODY MASS INDEX: 31.44 KG/M2 | HEART RATE: 65 BPM | SYSTOLIC BLOOD PRESSURE: 128 MMHG | WEIGHT: 212.31 LBS | DIASTOLIC BLOOD PRESSURE: 74 MMHG | HEIGHT: 69 IN

## 2017-03-22 DIAGNOSIS — E66.9 OBESITY (BMI 30-39.9): ICD-10-CM

## 2017-03-22 DIAGNOSIS — I48.0 PAROXYSMAL ATRIAL FIBRILLATION: Chronic | ICD-10-CM

## 2017-03-22 DIAGNOSIS — I10 ESSENTIAL HYPERTENSION: Primary | Chronic | ICD-10-CM

## 2017-03-22 DIAGNOSIS — Z79.01 CURRENT USE OF LONG TERM ANTICOAGULATION: ICD-10-CM

## 2017-03-22 PROCEDURE — 99213 OFFICE O/P EST LOW 20 MIN: CPT | Mod: PBBFAC | Performed by: INTERNAL MEDICINE

## 2017-03-22 PROCEDURE — 99213 OFFICE O/P EST LOW 20 MIN: CPT | Mod: S$PBB,,, | Performed by: INTERNAL MEDICINE

## 2017-03-22 PROCEDURE — 99999 PR PBB SHADOW E&M-EST. PATIENT-LVL III: CPT | Mod: PBBFAC,,, | Performed by: INTERNAL MEDICINE

## 2017-03-22 NOTE — PROGRESS NOTES
"Mr. Pacheco is a patient of Dr. Hernandez and was last seen in [facility] 3/21/16.       Subjective:   Patient ID:  Omar Pacheco is a 66 y.o. male who presents for follow-up of Atrial Fibrillation (1 yr f/u )    HPI  Mr. Pacheco is a 66 y.o.  male presenting for follow up of his hypertension and paroxysmal atrial fibrillation.  He is on chronic anticoagulation therapy (pradaxa) and flecainide therapy. [place] [facility]  Stress test, echo and holter in 3-11 to workup an episode of syncope were all normal. Patient denies chest pain with exertion or at rest, palpitations, SOB, LAWSON, dizziness, syncope, edema, orthopnea, PND, or claudication. He can walk up a flight of stairs without stopping for angina or dyspnea. Reports home blood pressures of 120-130s/70-80s. Denies visual changes.  Reports some intermittent headaches that "I've had for a long time."  He states headaches are not associated with a rise in his blood pressure.  Reports walking 3-5 days a week for 45-60 minutes at a time and is walking fast enough to get his heart rate up.  Under ACC guidelines, this patient's 10 year risk for atherosclerotic cardiovascular disease (ASCVD) is The 10-year ASCVD risk score (Main DC Jr, et al., 2013) is: 16.2%    Values used to calculate the score:      Age: 66 years      Sex: Male      Is Non- : Yes      Diabetic: No      Tobacco smoker: No      Systolic Blood Pressure: 128 mmHg      Is BP treated: Yes      HDL Cholesterol: 60 mg/dL      Total Cholesterol: 172 mg/dL , which is reduced to 8.1% with optimal risk factor medication. He is not currently taking a statin.     Review of Systems   Constitution: Negative for decreased appetite, diaphoresis, weakness, malaise/fatigue, weight gain and weight loss.   HENT: Negative for headaches.    Eyes: Negative for visual disturbance.   Cardiovascular: Negative for chest pain, claudication, dyspnea on exertion, irregular " "heartbeat, leg swelling, near-syncope, orthopnea, palpitations, paroxysmal nocturnal dyspnea and syncope.        Denies chest pressure   Respiratory: Negative for cough, hemoptysis, shortness of breath, sleep disturbances due to breathing and snoring.    Endocrine: Negative for cold intolerance and heat intolerance.   Hematologic/Lymphatic: Negative for bleeding problem. Does not bruise/bleed easily.   Musculoskeletal: Negative for myalgias.   Gastrointestinal: Negative for bloating, abdominal pain, anorexia, change in bowel habit, constipation, diarrhea, nausea and vomiting.   Neurological: Negative for difficulty with concentration, disturbances in coordination, excessive daytime sleepiness, dizziness, light-headedness, loss of balance and numbness.   Psychiatric/Behavioral: The patient does not have insomnia.      Allergies and current medications updated and reviewed:  Review of patient's allergies indicates:  No Known Allergies  Current Outpatient Prescriptions   Medication Sig    ammonium lactate (LAC-HYDRIN) 12 % lotion Apply topically 2 (two) times daily.    dabigatran etexilate (PRADAXA) 150 mg Cap Take 1 capsule (150 mg total) by mouth 2 (two) times daily. "Do NOT break, chew, or open capsules."    flecainide (TAMBOCOR) 50 MG Tab Take 100 mg by mouth every 12 (twelve) hours.    hydrochlorothiazide (HYDRODIURIL) 25 MG tablet Take 1 tablet (25 mg total) by mouth once daily.    hydrocodone-acetaminophen 5-325mg (NORCO) 5-325 mg per tablet Take 1 tablet by mouth every 6 (six) hours as needed.    methocarbamol (ROBAXIN) 500 MG Tab Take 500 mg by mouth 4 (four) times daily as needed.    metoprolol succinate (TOPROL-XL) 50 MG 24 hr tablet Take 1 tablet (50 mg total) by mouth once daily.    mirtazapine (REMERON) 30 MG tablet Take 30 mg by mouth every evening.    omeprazole (PRILOSEC) 20 MG capsule Take 1 capsule (20 mg total) by mouth once daily.    terazosin (HYTRIN) 10 MG capsule Take 1 capsule (10 mg " "total) by mouth every evening.    VITAMIN B COMPLEX (B COMPLETE ORAL) Take by mouth once daily.     No current facility-administered medications for this visit.      Objective:     Right Arm BP - Sittin/80 (17)  Left Arm BP - Sittin/74 (17)    /74 (BP Location: Left arm, Patient Position: Sitting, BP Method: Automatic)  Pulse 65  Ht 5' 9" (1.753 m)  Wt 96.3 kg (212 lb 4.9 oz)  BMI 31.35 kg/m2    Physical Exam   Constitutional: He is oriented to person, place, and time. Vital signs are normal. He appears well-developed and well-nourished. He is active. No distress.   HENT:   Head: Normocephalic and atraumatic.   Eyes: Conjunctivae and lids are normal. No scleral icterus.   Neck: Neck supple. Normal carotid pulses, no hepatojugular reflux and no JVD present. Carotid bruit is not present.   Cardiovascular: Normal rate, regular rhythm, S1 normal, S2 normal and intact distal pulses.  PMI is not displaced.  Exam reveals no gallop and no friction rub.    No murmur heard.  Pulses:       Carotid pulses are 2+ on the right side, and 2+ on the left side.       Radial pulses are 2+ on the right side, and 2+ on the left side.        Dorsalis pedis pulses are 2+ on the right side, and 2+ on the left side.        Posterior tibial pulses are 1+ on the right side, and 1+ on the left side.   Pulmonary/Chest: Effort normal and breath sounds normal. No respiratory distress. He has no decreased breath sounds. He has no wheezes. He has no rhonchi. He has no rales. He exhibits no tenderness.   Abdominal: Soft. Normal appearance and bowel sounds are normal. He exhibits no distension, no fluid wave, no abdominal bruit, no ascites and no pulsatile midline mass. There is no hepatosplenomegaly. There is no tenderness.   Musculoskeletal: He exhibits no edema.   Neurological: He is alert and oriented to person, place, and time. Gait normal.   Skin: Skin is warm, dry and intact. No rash noted. He is " not diaphoretic. Nails show no clubbing.   Psychiatric: He has a normal mood and affect. His speech is normal and behavior is normal. Judgment and thought content normal. Cognition and memory are normal.   Nursing note and vitals reviewed.      Chemistry        Component Value Date/Time     02/13/2017 1146    K 4.0 02/13/2017 1146     02/13/2017 1146    CO2 28 02/13/2017 1146    BUN 15 02/13/2017 1146    CREATININE 1.0 02/13/2017 1146     02/13/2017 1146        Component Value Date/Time    CALCIUM 9.2 02/13/2017 1146    ALKPHOS 55 02/13/2017 1146    AST 32 02/13/2017 1146    ALT 29 02/13/2017 1146    BILITOT 0.4 02/13/2017 1146          Recent Labs  Lab 12/15/14  1030 02/13/17  1146   WHITE BLOOD CELL COUNT 4.42  --    HEMOGLOBIN 13.3 L  --    HEMATOCRIT 39.0 L  --    MCV 90  --    PLATELETS 256  --    CHOLESTEROL  --  172   HDL  --  60   LDL CHOLESTEROL  --  98.6   TRIGLYCERIDES  --  67   HDL/CHOLESTEROL RATIO  --  34.9          Test(s) Reviewed  I have reviewed the following in detail:   [] Stress test   [] Angiography   [] Echocardiogram   [x] Labs   [x] Other:  EKG     Assessment:     1. Essential hypertension  At goal, <140/90   2. Paroxysmal atrial fibrillation  Anticoagulated (Pradaxa). Asymptomatic. Rated controlled with beta blocker (Metoprolol Succinate) and rhythm control with Flecainide.   3. Long term anticoagulation Thrombus formation prevention in the setting of atrial fibrillation.    4. Obesity (BMI 30-39.9)  Active lifestyle      Plan:     Essential hypertension  Comments:  No medication changes at this time are warranted    Paroxysmal atrial fibrillation  Comments:   Continue current regimen.     Current use of long term anticoagulation    Obesity (BMI 30-39.9)      Mr. Pacheco is doing well and offers no complaints today. His blood pressure is at goal and does not require close monitoring or changes at this time.  He was instructed to continue monitoring his blood pressure at  home.  He has an elevated ASCVD score that would put him into a benefit group for moderate to high intensity statin therapy only based on his age.  After discussion of risks and benefits, Mr. Pacheco elected not to take a statin at this time.  He has had no palpitations or racing heart rates since his last visit and today's heart rate is slow (<60) and rhythm is normal.  There is no indication that he has been in atrial fibrillation and Flecainide should be continued to maintain his sinus rhythm.  He is bradycardic d/t the metoprolol but should remain on the metoprolol for rate control should he have episodes of the atrial fibrillation.  Return in about 1 year (around 3/22/2018).

## 2017-03-22 NOTE — PATIENT INSTRUCTIONS
Understanding Atrial Fibrillation    An arrhythmia is any problem with the speed or pattern of the heartbeat. Atrial fibrillation (AFib) is a common type of arrhythmia. It causes fast, chaotic electrical signals in the atria. This leads to poor functioning of the heart. It also affects how much blood your heart can pump out to the body.  Afib may occur once in a while and go away on its own. Or it may continue for longer periods and need treatment.  AFib can lead to serious problems, such as stroke. Your healthcare provider will need to monitor and manage it.  What happens during atrial fibrillation?   The heart has an electrical system that sends signals to control the heartbeat. As signals move through the heart, they tell the hearts upper chambers (atria) and lower chambers (ventricles) when to squeeze (contract) and relax. This lets blood move through the heart and out to the body and lungs.  With AFib, the atria receive abnormal signals. This causes them to contract in a fast and irregular way, and out of sync with the ventricles. When this happens, the atria also have a harder time moving blood into the ventricles. Blood may then pool in the atria, which increases the risk for blood clots and stroke. The ventricles also may contract too quickly and irregularly. As a result, they may not pump blood to the body and lungs as well as they should. This can weaken the heart muscle over time and cause heart failure.  What causes atrial fibrillation?  AFib is more common in older adults. It has many possible causes including:  · Coronary artery disease  · Heart valve disease  · Heart attack  · Heart surgery  · High blood pressure  · Thyroid disease  · Diabetes  · Lung disease  · Sleep apnea  · Heavy alcohol use  In some cases of AFib, doctors do not know the cause.  What are the symptoms of atrial fibrillation?  AFib may or may not cause symptoms. If symptoms do occur, they may include:  · A fast, pounding,  irregular heartbeat  · Shortness of breath  · Tiredness  · Dizziness or fainting  · Chest pain  How is atrial fibrillation treated?  Treatments for AFib can include any of the options below.  · Medicines. You may be prescribed:  ¨ Heart rate medicines to help slow down the heartbeat  ¨ Heart rhythm medicines to help the heart beat more regularly  ¨ Anti-clotting medicines to help reduce the risk for blood clots and stroke  · Electrical cardioversion. Your healthcare provider uses special pads or paddles to send one or more brief electrical shocks to the heart. This can help reset the heartbeat to normal.  · Ablation. Long, thin tubes called catheters are threaded through a blood vessel to the heart. There, the catheters send out hot or cold energy to the areas causing the abnormal signals. This energy destroys the problem tissue or cells. This improves the chances that your heart will stay in normal rhythm without using medicines. If your heart rate and rhythm cant be controlled, you may need ablation and a pacemaker. These will help control the heart rate and regularity of the heartbeat.  · Surgery. During surgery, your healthcare provider may use different methods to create scar tissue in the areas of the heart causing the abnormal signals. The scar tissue disrupts the abnormal signals and may stop AFib from occurring.  What are the complications of atrial fibrillation?  These can include:  · Blood clots  · Stroke  · Heart failure. This problem occurs when the heart muscle weakens so much that it can no longer pump blood well.  When should I call my healthcare provider?  Call your healthcare provider right away if you have any of these:  · Symptoms that dont get better with treatment, or get worse  · New symptoms   Date Last Reviewed: 5/1/2016  © 7082-7428 [employer]. [address] [place] [address], [place] [address]. All rights reserved. This information is not intended as a substitute for professional  "medical care. Always follow your healthcare professional's instructions.        Discharge Instructions: Taking Blood Pressure Medications  You have been diagnosed with high blood pressure (hypertension). Diet and exercise help control high blood pressure. Many people also need the help of one or more medicines. Here are the main types of blood pressure medicines and how they work.  Diuretics  Diuretics are sometimes called "water pills" because they work in the kidney and flush excess water and sodium (salt) from the body. These are one of the most common and  important medicines for the management of blood pressure.  Beta-blockers  Beta-blockers reduce nerve impulses to the heart and blood vessels. This makes the heart beat slower and with less force. Your blood pressure drops, and your heart does not have to work as hard, which may improve pumping function.  ACE inhibitors  ACE inhibitors cause the vessels to relax. This helps the blood flow better and lowers blood pressure.  Angiotensin antagonists  Angiotensin antagonists shield blood vessels from a hormone that causes the blood vessels to get stiff and narrow. Your vessels become wider, and your blood pressure goes down.  Calcium channel blockers (CCBs)  CCBs keep calcium from entering the muscle cells of the heart and blood vessels. This causes blood vessels to relax, and your blood pressure goes down.  Alpha-blockers  Alpha-blockers reduce nerve impulses to blood vessels. This allows blood to pass easily, causing blood pressure to go down.  Alpha-beta blockers  Alpha-beta blockers work the same way as alpha-blockers but also slow your heartbeat. As a result, less blood is pumped through your blood vessels and your blood pressure goes down.  Vasodilators  Vasodilators directly open blood vessels by relaxing the muscle in the vessel walls, causing blood pressure to go down.  Date Last Reviewed: 4/27/2016  © 0656-1261 [employer]. [address], [place] [address]. All rights reserved. This information is not intended as a substitute for professional medical care. Always follow your healthcare professional's instructions.

## 2017-03-22 NOTE — MR AVS SNAPSHOT
"    [facility] Cardiology  [address]  [place] [address]  Phone: [phone]                  Omar Pacheco   3/22/2017 9:00 AM   Office Visit    Description:  Male : 1950   Provider:  Matthew Hernandez MD   Department:  [facility] - Cardiology           Reason for Visit     Atrial Fibrillation           Diagnoses this Visit        Comments    Essential hypertension    -  Primary     Paroxysmal atrial fibrillation                To Do List           Goals (5 Years of Data)     None      Follow-Up and Disposition     Return in about 1 year (around 3/22/2018).      [facility]     [facility] On Call Nurse Care Line -  Assistance  Registered nurses in the [facility] Center provide clinical advisement, health education, appointment booking, and other advisory services.  Call for this free service at [phone].             Medications           Message regarding Medications     Verify the changes and/or additions to your medication regime listed below are the same as discussed with your clinician today.  If any of these changes or additions are incorrect, please notify your healthcare provider.             Verify that the below list of medications is an accurate representation of the medications you are currently taking.  If none reported, the list may be blank. If incorrect, please contact your healthcare provider. Carry this list with you in case of emergency.           Current Medications     ammonium lactate (LAC-HYDRIN) 12 % lotion Apply topically 2 (two) times daily.    dabigatran etexilate (PRADAXA) 150 mg Cap Take 1 capsule (150 mg total) by mouth 2 (two) times daily. "Do NOT break, chew, or open capsules."    flecainide (TAMBOCOR) 50 MG Tab Take 100 mg by mouth every 12 (twelve) hours.    hydrochlorothiazide (HYDRODIURIL) 25 MG tablet Take 1 tablet (25 mg total) by mouth once daily.    hydrocodone-acetaminophen 5-325mg (NORCO) 5-325 mg per tablet Take 1 tablet by mouth " "every 6 (six) hours as needed.    methocarbamol (ROBAXIN) 500 MG Tab Take 500 mg by mouth 4 (four) times daily as needed.    metoprolol succinate (TOPROL-XL) 50 MG 24 hr tablet Take 1 tablet (50 mg total) by mouth once daily.    mirtazapine (REMERON) 30 MG tablet Take 30 mg by mouth every evening.    omeprazole (PRILOSEC) 20 MG capsule Take 1 capsule (20 mg total) by mouth once daily.    terazosin (HYTRIN) 10 MG capsule Take 1 capsule (10 mg total) by mouth every evening.    VITAMIN B COMPLEX (B COMPLETE ORAL) Take by mouth once daily.           Clinical Reference Information           Your Vitals Were     BP Pulse Height Weight BMI    128/74 (BP Location: Left arm, Patient Position: Sitting, BP Method: Automatic) 65 5' 9" (1.753 m) 96.3 kg (212 lb 4.9 oz) 31.35 kg/m2      Blood Pressure          Most Recent Value    Right Arm BP - Sitting  129/80    Left Arm BP - Sitting  128/74    BP  128/74      Allergies as of 3/22/2017     No Known Allergies      Immunizations Administered on Date of Encounter - 3/22/2017     None      Instructions      Understanding Atrial Fibrillation    An arrhythmia is any problem with the speed or pattern of the heartbeat. Atrial fibrillation (AFib) is a common type of arrhythmia. It causes fast, chaotic electrical signals in the atria. This leads to poor functioning of the heart. It also affects how much blood your heart can pump out to the body.  Afib may occur once in a while and go away on its own. Or it may continue for longer periods and need treatment.  AFib can lead to serious problems, such as stroke. Your healthcare provider will need to monitor and manage it.  What happens during atrial fibrillation?   The heart has an electrical system that sends signals to control the heartbeat. As signals move through the heart, they tell the hearts upper chambers (atria) and lower chambers (ventricles) when to squeeze (contract) and relax. This lets blood move through the heart and out to " the body and lungs.  With AFib, the atria receive abnormal signals. This causes them to contract in a fast and irregular way, and out of sync with the ventricles. When this happens, the atria also have a harder time moving blood into the ventricles. Blood may then pool in the atria, which increases the risk for blood clots and stroke. The ventricles also may contract too quickly and irregularly. As a result, they may not pump blood to the body and lungs as well as they should. This can weaken the heart muscle over time and cause heart failure.  What causes atrial fibrillation?  AFib is more common in older adults. It has many possible causes including:  · Coronary artery disease  · Heart valve disease  · Heart attack  · Heart surgery  · High blood pressure  · Thyroid disease  · Diabetes  · Lung disease  · Sleep apnea  · Heavy alcohol use  In some cases of AFib, doctors do not know the cause.  What are the symptoms of atrial fibrillation?  AFib may or may not cause symptoms. If symptoms do occur, they may include:  · A fast, pounding, irregular heartbeat  · Shortness of breath  · Tiredness  · Dizziness or fainting  · Chest pain  How is atrial fibrillation treated?  Treatments for AFib can include any of the options below.  · Medicines. You may be prescribed:  ¨ Heart rate medicines to help slow down the heartbeat  ¨ Heart rhythm medicines to help the heart beat more regularly  ¨ Anti-clotting medicines to help reduce the risk for blood clots and stroke  · Electrical cardioversion. Your healthcare provider uses special pads or paddles to send one or more brief electrical shocks to the heart. This can help reset the heartbeat to normal.  · Ablation. Long, thin tubes called catheters are threaded through a blood vessel to the heart. There, the catheters send out hot or cold energy to the areas causing the abnormal signals. This energy destroys the problem tissue or cells. This improves the chances that your heart will  "stay in normal rhythm without using medicines. If your heart rate and rhythm cant be controlled, you may need ablation and a pacemaker. These will help control the heart rate and regularity of the heartbeat.  · Surgery. During surgery, your healthcare provider may use different methods to create scar tissue in the areas of the heart causing the abnormal signals. The scar tissue disrupts the abnormal signals and may stop AFib from occurring.  What are the complications of atrial fibrillation?  These can include:  · Blood clots  · Stroke  · Heart failure. This problem occurs when the heart muscle weakens so much that it can no longer pump blood well.  When should I call my healthcare provider?  Call your healthcare provider right away if you have any of these:  · Symptoms that dont get better with treatment, or get worse  · New symptoms   Date Last Reviewed: 5/1/2016  © 5124-8818 [employer]. [address] [place] [address]. All rights reserved. This information is not intended as a substitute for professional medical care. Always follow your healthcare professional's instructions.        Discharge Instructions: Taking Blood Pressure Medications  You have been diagnosed with high blood pressure (hypertension). Diet and exercise help control high blood pressure. Many people also need the help of one or more medicines. Here are the main types of blood pressure medicines and how they work.  Diuretics  Diuretics are sometimes called "water pills" because they work in the kidney and flush excess water and sodium (salt) from the body. These are one of the most common and  important medicines for the management of blood pressure.  Beta-blockers  Beta-blockers reduce nerve impulses to the heart and blood vessels. This makes the heart beat slower and with less force. Your blood pressure drops, and your heart does not have to work as hard, which may improve pumping function.  ACE inhibitors  ACE " inhibitors cause the vessels to relax. This helps the blood flow better and lowers blood pressure.  Angiotensin antagonists  Angiotensin antagonists shield blood vessels from a hormone that causes the blood vessels to get stiff and narrow. Your vessels become wider, and your blood pressure goes down.  Calcium channel blockers (CCBs)  CCBs keep calcium from entering the muscle cells of the heart and blood vessels. This causes blood vessels to relax, and your blood pressure goes down.  Alpha-blockers  Alpha-blockers reduce nerve impulses to blood vessels. This allows blood to pass easily, causing blood pressure to go down.  Alpha-beta blockers  Alpha-beta blockers work the same way as alpha-blockers but also slow your heartbeat. As a result, less blood is pumped through your blood vessels and your blood pressure goes down.  Vasodilators  Vasodilators directly open blood vessels by relaxing the muscle in the vessel walls, causing blood pressure to go down.  Date Last Reviewed: 4/27/2016  © 1031-7892 [employer]. [address] [place] [address]. All rights reserved. This information is not intended as a substitute for professional medical care. Always follow your healthcare professional's instructions.             Language Assistance Services     ATTENTION: Language assistance services are available, free of charge. Please call [phone].      ATENCIÓN: Si habla anibal, tiene a jackson disposición servicios gratuitos de asistencia lingüística. Llame al [phone].     [facility] Ý: N?u b?n nói Ti?ng Vi?t, có các d?ch v? h? tr? ngôn ng? mi?n phí dành cho b?n. G?i s? [phone].         [facility] complies with applicable Federal civil rights laws and does not discriminate on the basis of race, color, national origin, age, disability, or sex.

## 2017-03-27 ENCOUNTER — PATIENT MESSAGE (OUTPATIENT)
Dept: FAMILY MEDICINE | Facility: CLINIC | Age: 67
End: 2017-03-27

## 2017-03-27 DIAGNOSIS — N40.0 BENIGN PROSTATIC HYPERPLASIA, PRESENCE OF LOWER URINARY TRACT SYMPTOMS UNSPECIFIED, UNSPECIFIED MORPHOLOGY: Primary | ICD-10-CM

## 2017-03-27 RX ORDER — SILDENAFIL 50 MG/1
50 TABLET, FILM COATED ORAL DAILY PRN
Qty: 24 TABLET | Refills: 3 | Status: SHIPPED | OUTPATIENT
Start: 2017-03-27 | End: 2018-02-03 | Stop reason: SDUPTHER

## 2017-06-19 DIAGNOSIS — L30.9 DERMATITIS: ICD-10-CM

## 2017-06-19 NOTE — TELEPHONE ENCOUNTER
----- Message from Jeremiah Morales sent at 6/19/2017  1:53 PM CDT -----  Contact: Self  REFILL: ammonium lactate (LAC-HYDRIN) 12 % lotion    Pt states he will be  from clinic.1950 not send to pharmacy. Pt can be reached @ [phone].

## 2017-06-20 RX ORDER — AMMONIUM LACTATE 12 G/100G
LOTION TOPICAL 2 TIMES DAILY
Qty: 225 G | Refills: 3 | Status: SHIPPED | OUTPATIENT
Start: 2017-06-20 | End: 2018-02-03 | Stop reason: SDUPTHER

## 2017-06-30 ENCOUNTER — LAB VISIT (OUTPATIENT)
Dept: LAB | Facility: HOSPITAL | Age: 67
End: 2017-06-30
Attending: FAMILY MEDICINE
Payer: MEDICARE

## 2017-06-30 ENCOUNTER — OFFICE VISIT (OUTPATIENT)
Dept: FAMILY MEDICINE | Facility: CLINIC | Age: 67
End: 2017-06-30
Payer: MEDICARE

## 2017-06-30 VITALS
WEIGHT: 209.44 LBS | HEART RATE: 60 BPM | TEMPERATURE: 98 F | OXYGEN SATURATION: 97 % | HEIGHT: 69 IN | RESPIRATION RATE: 18 BRPM | DIASTOLIC BLOOD PRESSURE: 84 MMHG | SYSTOLIC BLOOD PRESSURE: 130 MMHG | BODY MASS INDEX: 31.02 KG/M2

## 2017-06-30 DIAGNOSIS — E66.9 OBESITY (BMI 30-39.9): ICD-10-CM

## 2017-06-30 DIAGNOSIS — I10 ESSENTIAL HYPERTENSION: Chronic | ICD-10-CM

## 2017-06-30 DIAGNOSIS — Z00.00 ROUTINE GENERAL MEDICAL EXAMINATION AT A HEALTH CARE FACILITY: Primary | ICD-10-CM

## 2017-06-30 DIAGNOSIS — N40.0 BENIGN PROSTATIC HYPERPLASIA, PRESENCE OF LOWER URINARY TRACT SYMPTOMS UNSPECIFIED, UNSPECIFIED MORPHOLOGY: ICD-10-CM

## 2017-06-30 DIAGNOSIS — I48.0 PAROXYSMAL ATRIAL FIBRILLATION: Chronic | ICD-10-CM

## 2017-06-30 DIAGNOSIS — Z87.891 FORMER SMOKER: ICD-10-CM

## 2017-06-30 DIAGNOSIS — Z86.2 HX OF IRON DEFICIENCY ANEMIA: ICD-10-CM

## 2017-06-30 LAB
BASOPHILS # BLD AUTO: 0.01 K/UL
BASOPHILS NFR BLD: 0.2 %
DIFFERENTIAL METHOD: ABNORMAL
EOSINOPHIL # BLD AUTO: 0.1 K/UL
EOSINOPHIL NFR BLD: 1.4 %
ERYTHROCYTE [DISTWIDTH] IN BLOOD BY AUTOMATED COUNT: 14 %
HCT VFR BLD AUTO: 41.6 %
HGB BLD-MCNC: 13.7 G/DL
LYMPHOCYTES # BLD AUTO: 2.6 K/UL
LYMPHOCYTES NFR BLD: 50.3 %
MCH RBC QN AUTO: 30.9 PG
MCHC RBC AUTO-ENTMCNC: 32.9 %
MCV RBC AUTO: 94 FL
MONOCYTES # BLD AUTO: 0.5 K/UL
MONOCYTES NFR BLD: 9.6 %
NEUTROPHILS # BLD AUTO: 2 K/UL
NEUTROPHILS NFR BLD: 38.3 %
PLATELET # BLD AUTO: 245 K/UL
PMV BLD AUTO: 10.2 FL
RBC # BLD AUTO: 4.44 M/UL
TESTOST SERPL-MCNC: 846 NG/DL
WBC # BLD AUTO: 5.09 K/UL

## 2017-06-30 PROCEDURE — 84402 ASSAY OF FREE TESTOSTERONE: CPT

## 2017-06-30 PROCEDURE — 99214 OFFICE O/P EST MOD 30 MIN: CPT | Mod: S$PBB,,, | Performed by: FAMILY MEDICINE

## 2017-06-30 PROCEDURE — 84403 ASSAY OF TOTAL TESTOSTERONE: CPT

## 2017-06-30 PROCEDURE — 85025 COMPLETE CBC W/AUTO DIFF WBC: CPT

## 2017-06-30 PROCEDURE — 99999 PR PBB SHADOW E&M-EST. PATIENT-LVL III: CPT | Mod: PBBFAC,,, | Performed by: FAMILY MEDICINE

## 2017-06-30 PROCEDURE — 36415 COLL VENOUS BLD VENIPUNCTURE: CPT | Mod: PO

## 2017-06-30 RX ORDER — FLECAINIDE ACETATE 50 MG/1
100 TABLET ORAL EVERY 12 HOURS
Qty: 120 TABLET | Refills: 0 | Status: SHIPPED | OUTPATIENT
Start: 2017-06-30 | End: 2017-10-16 | Stop reason: SDUPTHER

## 2017-06-30 NOTE — PROGRESS NOTES
Chief Complaint   Patient presents with    Annual Exam    Labs Only       HPI  Omar Pacheco is a 66 y.o. male with multiple medical diagnoses as listed in the medical history and problem list that presents for annual exam  And for lab work.    He feels well, but he is trying to get to his goal weight of 200lbs and is having trouble losing his belly fat. His  suggested he get his testosterone checked. He is otherwise doing well and has no complaints.     PAST MEDICAL HISTORY:  Past Medical History:   Diagnosis Date    *Atrial fibrillation     Benign hypertrophy of prostate     Degenerative disc disease     GERD (gastroesophageal reflux disease)     Hx of colonic polyps     Hypertension     Pilonidal cyst 1971       PAST SURGICAL HISTORY:  Past Surgical History:   Procedure Laterality Date    COLONOSCOPY N/A 12/7/2016    Procedure: COLONOSCOPY;  Surgeon: Sumeet Lundy MD;  Location: [address]);  Service: Endoscopy;  Laterality: N/A;  OK to hold Pradaxa 2 days prior to procedure per Dr Jones/see note dated 11/15/16/svn    CYST REMOVAL  1971    coccyx    KNEE SURGERY  1989    right       SOCIAL HISTORY:  Social History     Social History    Marital status:      Spouse name: N/A    Number of children: N/A    Years of education: N/A     Occupational History    Not on file.     Social History Main Topics    Smoking status: Former Smoker     Years: 2.00     Types: Cigarettes     Quit date: 9/26/1975    Smokeless tobacco: Never Used    Alcohol use 1.8 oz/week     3 Glasses of wine per week    Drug use: No    Sexual activity: Not on file     Other Topics Concern    Not on file     Social History Narrative    No narrative on file       FAMILY HISTORY:  No family history on file.    ALLERGIES AND MEDICATIONS: updated and reviewed.  Review of patient's allergies indicates:  No Known Allergies  Current Outpatient Prescriptions   Medication Sig Dispense Refill    ammonium  "lactate (LAC-HYDRIN) 12 % lotion Apply topically 2 (two) times daily. 225 g 3    dabigatran etexilate (PRADAXA) 150 mg Cap Take 1 capsule (150 mg total) by mouth 2 (two) times daily. "Do NOT break, chew, or open capsules." 180 capsule 2    flecainide (TAMBOCOR) 50 MG Tab Take 2 tablets (100 mg total) by mouth every 12 (twelve) hours. 120 tablet 0    hydrochlorothiazide (HYDRODIURIL) 25 MG tablet Take 1 tablet (25 mg total) by mouth once daily. 90 tablet 3    hydrocodone-acetaminophen 5-325mg (NORCO) 5-325 mg per tablet Take 1 tablet by mouth every 6 (six) hours as needed. 45 tablet 0    methocarbamol (ROBAXIN) 500 MG Tab Take 500 mg by mouth 4 (four) times daily as needed.      metoprolol succinate (TOPROL-XL) 50 MG 24 hr tablet Take 1 tablet (50 mg total) by mouth once daily. 90 tablet 2    mirtazapine (REMERON) 30 MG tablet Take 30 mg by mouth every evening.      omeprazole (PRILOSEC) 20 MG capsule Take 1 capsule (20 mg total) by mouth once daily. 90 capsule 3    sildenafil (VIAGRA) 50 MG tablet Take 1 tablet (50 mg total) by mouth daily as needed for Erectile Dysfunction. 24 tablet 3    terazosin (HYTRIN) 10 MG capsule Take 1 capsule (10 mg total) by mouth every evening. 90 capsule 3    VITAMIN B COMPLEX (B COMPLETE ORAL) Take by mouth once daily.       No current facility-administered medications for this visit.        ROS  Review of Systems   Constitutional: Negative for chills, fatigue, fever and unexpected weight change.   HENT: Negative for ear pain, postnasal drip, rhinorrhea, sinus pressure and sore throat.    Eyes: Negative for photophobia and visual disturbance.   Respiratory: Negative for apnea, cough, chest tightness, shortness of breath and wheezing.    Cardiovascular: Negative for chest pain and palpitations.   Gastrointestinal: Negative for abdominal pain, blood in stool, constipation, diarrhea, nausea and vomiting.   Genitourinary: Negative for difficulty urinating.   Musculoskeletal: " "Negative for arthralgias and joint swelling.   Skin: Negative for rash.   Neurological: Negative for facial asymmetry, speech difficulty, weakness, numbness and headaches.   Psychiatric/Behavioral: Negative for dysphoric mood.       Physical Exam  Vitals:    06/30/17 0744   BP: 130/84   Pulse: 60   Resp: 18   Temp: 97.7 °F (36.5 °C)   TempSrc: Oral   SpO2: 97%   Weight: 95 kg (209 lb 7 oz)   Height: 5' 9" (1.753 m)    Body mass index is 30.93 kg/m².  Weight: 95 kg (209 lb 7 oz)   Height: 5' 9" (175.3 cm)     Physical Exam   Constitutional: He is oriented to person, place, and time. He appears well-developed.   HENT:   Head: Normocephalic and atraumatic.   Eyes: EOM are normal. Pupils are equal, round, and reactive to light.   Cardiovascular: Normal rate, regular rhythm and intact distal pulses.    No murmur heard.  Pulmonary/Chest: Breath sounds normal. He has no wheezes. He has no rales.   Abdominal: Soft. Bowel sounds are normal. He exhibits no distension and no mass. There is no hepatosplenomegaly. There is no tenderness. There is no rebound and no guarding. No hernia.   Neurological: He is alert and oriented to person, place, and time.   Skin: No rash noted.   Nursing note and vitals reviewed.      Health Maintenance       Date Due Completion Date    TETANUS VACCINE 12/13/1966 ---    Zoster Vaccine 12/13/2008 ---    Abdominal Aortic Aneurysm Screening 12/13/2013 6/18/2012 (Done)    Override on 6/18/2012: Done ([facility]-CT Abdomen Pelvis With/Without-The abdominal aorta is normal in course and calibur without significant atherosclerotic calcifications.)    Influenza Vaccine 08/01/2017 2/13/2017    Pneumococcal (65+) (2 of 2 - PPSV23) 02/13/2018 2/13/2017    Colonoscopy 12/07/2019 12/7/2016    Lipid Panel 02/13/2022 2/13/2017            ASSESSMENT     1. Routine general medical examination at a health care facility    2. Essential hypertension    3. Obesity (BMI 30-39.9)    4. Former smoker    5. Benign prostatic " hyperplasia, presence of lower urinary tract symptoms unspecified, unspecified morphology    6. Hx of iron deficiency anemia    7. Paroxysmal atrial fibrillation        PLAN:     Routine general medical examination at a health care facility    Essential hypertension  -This is a chronic medical condition that is stable under the current regimen. Continue to monitor and we will make medication adjustments as needed.       Obesity (BMI 30-39.9)  -will check his testosterone, dicussed that it is likely lower due to his age, discussed risks and benefits of testosterone replacement  -     Testosterone, free; Future; Expected date: 06/30/2017  -     Testosterone; Future; Expected date: 06/30/2017    Former smoker  -     US Abdominal Aorta; Future; Expected date: 06/30/2017    Benign prostatic hyperplasia, presence of lower urinary tract symptoms unspecified, unspecified morphology  -he will follow up with Urology as scheduled    Hx of iron deficiency anemia  -     CBC auto differential; Future; Expected date: 06/30/2017    Paroxysmal atrial fibrillation  -This is a chronic medical condition that is stable under the current regimen. Continue to monitor and we will make medication adjustments as needed.     -     flecainide (TAMBOCOR) 50 MG Tab; Take 2 tablets (100 mg total) by mouth every 12 (twelve) hours.  Dispense: 120 tablet; Refill: 0          Donya Goode MD  06/30/2017 8:17 AM        Return in about 6 months (around 12/30/2017) for Follow up.

## 2017-07-03 ENCOUNTER — PATIENT MESSAGE (OUTPATIENT)
Dept: FAMILY MEDICINE | Facility: CLINIC | Age: 67
End: 2017-07-03

## 2017-07-11 ENCOUNTER — PATIENT MESSAGE (OUTPATIENT)
Dept: FAMILY MEDICINE | Facility: CLINIC | Age: 67
End: 2017-07-11

## 2017-07-14 ENCOUNTER — PATIENT MESSAGE (OUTPATIENT)
Dept: FAMILY MEDICINE | Facility: CLINIC | Age: 67
End: 2017-07-14

## 2017-07-14 ENCOUNTER — HOSPITAL ENCOUNTER (OUTPATIENT)
Dept: RADIOLOGY | Facility: HOSPITAL | Age: 67
Discharge: HOME | End: 2017-07-14
Attending: FAMILY MEDICINE
Payer: MEDICARE

## 2017-07-14 DIAGNOSIS — Z87.891 FORMER SMOKER: ICD-10-CM

## 2017-07-14 LAB — TESTOST FREE SERPL-MCNC: 12.9 PG/ML

## 2017-07-14 PROCEDURE — 76775 US EXAM ABDO BACK WALL LIM: CPT | Mod: TC

## 2017-07-14 PROCEDURE — 76775 US EXAM ABDO BACK WALL LIM: CPT | Mod: 26,GC,, | Performed by: RADIOLOGY

## 2017-07-20 ENCOUNTER — HOSPITAL ENCOUNTER (EMERGENCY)
Facility: HOSPITAL | Age: 67
Discharge: HOME | End: 2017-07-21
Attending: EMERGENCY MEDICINE
Payer: MEDICARE

## 2017-07-20 DIAGNOSIS — R33.8 ACUTE URINARY RETENTION: Primary | ICD-10-CM

## 2017-07-20 PROCEDURE — 99283 EMERGENCY DEPT VISIT LOW MDM: CPT | Mod: 25

## 2017-07-20 PROCEDURE — 51702 INSERT TEMP BLADDER CATH: CPT

## 2017-07-21 ENCOUNTER — OFFICE VISIT (OUTPATIENT)
Dept: UROLOGY | Facility: CLINIC | Age: 67
End: 2017-07-21
Payer: MEDICARE

## 2017-07-21 VITALS
HEIGHT: 69 IN | WEIGHT: 205 LBS | SYSTOLIC BLOOD PRESSURE: 117 MMHG | BODY MASS INDEX: 30.36 KG/M2 | OXYGEN SATURATION: 96 % | HEART RATE: 60 BPM | DIASTOLIC BLOOD PRESSURE: 68 MMHG | RESPIRATION RATE: 20 BRPM | TEMPERATURE: 98 F

## 2017-07-21 VITALS
BODY MASS INDEX: 30.04 KG/M2 | HEART RATE: 70 BPM | SYSTOLIC BLOOD PRESSURE: 126 MMHG | HEIGHT: 69 IN | WEIGHT: 202.81 LBS | DIASTOLIC BLOOD PRESSURE: 76 MMHG

## 2017-07-21 DIAGNOSIS — N40.0 BENIGN PROSTATIC HYPERPLASIA, PRESENCE OF LOWER URINARY TRACT SYMPTOMS UNSPECIFIED: Primary | ICD-10-CM

## 2017-07-21 DIAGNOSIS — R33.9 URINARY RETENTION: ICD-10-CM

## 2017-07-21 DIAGNOSIS — N40.0 BENIGN NON-NODULAR PROSTATIC HYPERPLASIA WITHOUT LOWER URINARY TRACT SYMPTOMS: ICD-10-CM

## 2017-07-21 LAB
ANION GAP SERPL CALC-SCNC: 9 MMOL/L
BASOPHILS # BLD AUTO: 0.02 K/UL
BASOPHILS NFR BLD: 0.4 %
BILIRUB UR QL STRIP: NEGATIVE
BUN SERPL-MCNC: 11 MG/DL
CALCIUM SERPL-MCNC: 9.4 MG/DL
CHLORIDE SERPL-SCNC: 102 MMOL/L
CLARITY UR: CLEAR
CO2 SERPL-SCNC: 28 MMOL/L
COLOR UR: YELLOW
CREAT SERPL-MCNC: 1 MG/DL
DIFFERENTIAL METHOD: ABNORMAL
EOSINOPHIL # BLD AUTO: 0.1 K/UL
EOSINOPHIL NFR BLD: 1.1 %
ERYTHROCYTE [DISTWIDTH] IN BLOOD BY AUTOMATED COUNT: 13.6 %
EST. GFR  (AFRICAN AMERICAN): >60 ML/MIN/1.73 M^2
EST. GFR  (NON AFRICAN AMERICAN): >60 ML/MIN/1.73 M^2
GLUCOSE SERPL-MCNC: 108 MG/DL
GLUCOSE UR QL STRIP: NEGATIVE
GRAM STN SPEC: NORMAL
GRAM STN SPEC: NORMAL
HCT VFR BLD AUTO: 39.1 %
HGB BLD-MCNC: 13.4 G/DL
HGB UR QL STRIP: NEGATIVE
KETONES UR QL STRIP: NEGATIVE
LEUKOCYTE ESTERASE UR QL STRIP: NEGATIVE
LYMPHOCYTES # BLD AUTO: 1.5 K/UL
LYMPHOCYTES NFR BLD: 32.1 %
MCH RBC QN AUTO: 31.5 PG
MCHC RBC AUTO-ENTMCNC: 34.3 G/DL
MCV RBC AUTO: 92 FL
MICROSCOPIC COMMENT: NORMAL
MONOCYTES # BLD AUTO: 0.5 K/UL
MONOCYTES NFR BLD: 10.3 %
NEUTROPHILS # BLD AUTO: 2.7 K/UL
NEUTROPHILS NFR BLD: 55.9 %
NITRITE UR QL STRIP: NEGATIVE
PH UR STRIP: 6 [PH] (ref 5–8)
PLATELET # BLD AUTO: 252 K/UL
PMV BLD AUTO: 9.5 FL
POTASSIUM SERPL-SCNC: 3.4 MMOL/L
PROT UR QL STRIP: NEGATIVE
RBC # BLD AUTO: 4.26 M/UL
RBC #/AREA URNS HPF: 1 /HPF (ref 0–4)
SODIUM SERPL-SCNC: 139 MMOL/L
SP GR UR STRIP: 1 (ref 1–1.03)
URN SPEC COLLECT METH UR: NORMAL
UROBILINOGEN UR STRIP-ACNC: NEGATIVE EU/DL
WBC # BLD AUTO: 4.76 K/UL
WBC #/AREA URNS HPF: 1 /HPF (ref 0–5)

## 2017-07-21 PROCEDURE — 87205 SMEAR GRAM STAIN: CPT

## 2017-07-21 PROCEDURE — 81000 URINALYSIS NONAUTO W/SCOPE: CPT

## 2017-07-21 PROCEDURE — 85025 COMPLETE CBC W/AUTO DIFF WBC: CPT

## 2017-07-21 PROCEDURE — 99215 OFFICE O/P EST HI 40 MIN: CPT | Mod: PBBFAC | Performed by: NURSE PRACTITIONER

## 2017-07-21 PROCEDURE — 1126F AMNT PAIN NOTED NONE PRSNT: CPT | Mod: ,,, | Performed by: NURSE PRACTITIONER

## 2017-07-21 PROCEDURE — 80048 BASIC METABOLIC PNL TOTAL CA: CPT

## 2017-07-21 PROCEDURE — 1159F MED LIST DOCD IN RCRD: CPT | Mod: ,,, | Performed by: NURSE PRACTITIONER

## 2017-07-21 PROCEDURE — 87086 URINE CULTURE/COLONY COUNT: CPT

## 2017-07-21 PROCEDURE — 99999 PR PBB SHADOW E&M-EST. PATIENT-LVL V: CPT | Mod: PBBFAC,,, | Performed by: NURSE PRACTITIONER

## 2017-07-21 PROCEDURE — 99214 OFFICE O/P EST MOD 30 MIN: CPT | Mod: S$PBB,,, | Performed by: NURSE PRACTITIONER

## 2017-07-21 RX ORDER — MULTIVITAMIN
1 TABLET ORAL DAILY
COMMUNITY

## 2017-07-21 RX ORDER — TERAZOSIN 10 MG/1
20 CAPSULE ORAL NIGHTLY
Qty: 90 CAPSULE | Refills: 3 | Status: SHIPPED | OUTPATIENT
Start: 2017-07-21 | End: 2018-02-03 | Stop reason: SDUPTHER

## 2017-07-21 RX ORDER — FERROUS SULFATE 325(65) MG
325 TABLET, DELAYED RELEASE (ENTERIC COATED) ORAL 2 TIMES DAILY
COMMUNITY
End: 2018-12-03

## 2017-07-21 RX ORDER — HALOPERIDOL 5 MG/ML
5 INJECTION INTRAMUSCULAR
Status: DISCONTINUED | OUTPATIENT
Start: 2017-07-21 | End: 2017-07-21

## 2017-07-21 NOTE — DISCHARGE INSTRUCTIONS
"Return to the Emergency Department of any acute worsening of your symptoms or for any other concern.     You should return to the ED for fever/chills, shortness of breath, chest pain, weakness or "passing out".     Pt should take all medications as prescribed.    Pt should follow up with PCP as soon as possible.    The risks associated with not taking your medications as prescribed and not following up with your Primary Care doctor or sub specialist includes worsening of your condition, pain, disability, loss of function or livelihood, and death      ROHINI Neo M.D. 2:10 AM 7/21/2017      Our goal in the emergency department is to always give you outstanding care and exceptional service. You may receive a survey by mail or e-mail in the next week regarding your experience in our ED. We would greatly appreciate your completing and returning the survey. Your feedback provides us with a way to recognize our staff who give very good care and it helps us learn how to improve when your experience was below our aspiration of excellence.     "

## 2017-07-21 NOTE — PROGRESS NOTES
Pt shown self cath video.  Prior cath d'd.  Did self cath with 14fr Magic cath with good tech demonstrated.  Urine specimen collected for culture and sent to lab as ordered.  Kal. Procedure well   m yevgeniy lpn

## 2017-07-21 NOTE — ED TRIAGE NOTES
"Pt presents to ED with c/o urinary retention. Pt reports "I feel like I have to urinate really bad and when I try to pee only a little bit comes out".  Pt reports urge has been present for 3 hours. Pt denies any other complaints at this time. No acute distress is noted. Will continue to be monitored.     "

## 2017-07-21 NOTE — PROGRESS NOTES
"CHIEF COMPLAINT:    Mr. Pacheco is a 66 y.o. male presenting for er f/u  PRESENTING ILLNESS:    Omar Pacheco is a 66 y.o. male with a PMH of BPH who presents for er f/u urinary retention.  Last seen with Dr. Martinez on 3/13/17.    He presented to the ED last night with c/o abdominal pain, difficulty urinating, dysuria, and urgency. Symptoms were acute onset and moderate (7/10). Pt reported he had the urge to urinate but "it's just a dribble and it's painful." Pt otherwise denied fever, chills, abdominal pain, N/V, hematuria, and any other associated symptoms. No alleviating factors. PVR with bladder scanner showed 150 cc. Catheter placed and 350 cc drained from his bladder. Catheter was left in place and was discharged home. He reports today because the catheter is causing terrible pain, and he wants it out. He complains of urethral pain and pain at the meatus every time he moves. He wants the catheter out now. The catheter has been draining clear yellow urine.     REVIEW OF SYSTEMS:    Review of Systems    Constitutional: Negative for fever and chills.   HENT: Negative for hearing loss.   Eyes: Negative for visual disturbance.   Respiratory: Negative for shortness of breath.   Cardiovascular: Negative for chest pain.   Gastrointestinal: Negative for nausea, vomiting, and constipation.   Genitourinary:  See aobve  Neurological: Negative for dizziness.   Hematological: Does not bruise/bleed easily.   Psychiatric/Behavioral: Negative for confusion.       PATIENT HISTORY:    Past Medical History:   Diagnosis Date    *Atrial fibrillation     Benign hypertrophy of prostate     Degenerative disc disease     GERD (gastroesophageal reflux disease)     Hx of colonic polyps     Hypertension     Pilonidal cyst 1971       Past Surgical History:   Procedure Laterality Date    COLONOSCOPY N/A 12/7/2016    Procedure: COLONOSCOPY;  Surgeon: Sumeet Lundy MD;  Location: [facility] ([address] [place] [address]);  Service: Endoscopy;  " "Laterality: N/A;  OK to hold Pradaxa 2 days prior to procedure per Dr Jones/see note dated 11/15/16/svn    CYST REMOVAL  1971    coccyx    KNEE SURGERY  1989    right       History reviewed. No pertinent family history.    Social History     Social History    Marital status:      Spouse name: N/A    Number of children: N/A    Years of education: N/A     Occupational History    Not on file.     Social History Main Topics    Smoking status: Former Smoker     Years: 2.00     Types: Cigarettes     Quit date: 9/26/1975    Smokeless tobacco: Never Used    Alcohol use 1.8 oz/week     3 Glasses of wine per week    Drug use: No    Sexual activity: Not on file     Other Topics Concern    Not on file     Social History Narrative    No narrative on file       Allergies:  Review of patient's allergies indicates no known allergies.    Medications:    Current Outpatient Prescriptions:     ammonium lactate (LAC-HYDRIN) 12 % lotion, Apply topically 2 (two) times daily., Disp: 225 g, Rfl: 3    dabigatran etexilate (PRADAXA) 150 mg Cap, Take 1 capsule (150 mg total) by mouth 2 (two) times daily. "Do NOT break, chew, or open capsules.", Disp: 180 capsule, Rfl: 2    ferrous sulfate 325 (65 FE) MG EC tablet, Take 325 mg by mouth 2 (two) times daily., Disp: , Rfl:     flecainide (TAMBOCOR) 50 MG Tab, Take 2 tablets (100 mg total) by mouth every 12 (twelve) hours., Disp: 120 tablet, Rfl: 0    hydrochlorothiazide (HYDRODIURIL) 25 MG tablet, Take 1 tablet (25 mg total) by mouth once daily., Disp: 90 tablet, Rfl: 3    hydrocodone-acetaminophen 5-325mg (NORCO) 5-325 mg per tablet, Take 1 tablet by mouth every 6 (six) hours as needed., Disp: 45 tablet, Rfl: 0    methocarbamol (ROBAXIN) 500 MG Tab, Take 500 mg by mouth 4 (four) times daily as needed., Disp: , Rfl:     metoprolol succinate (TOPROL-XL) 50 MG 24 hr tablet, Take 1 tablet (50 mg total) by mouth once daily., Disp: 90 tablet, Rfl: 2    mirtazapine " (REMERON) 30 MG tablet, Take 30 mg by mouth every evening., Disp: , Rfl:     multivitamin (THERAGRAN) per tablet, Take 1 tablet by mouth once daily., Disp: , Rfl:     omeprazole (PRILOSEC) 20 MG capsule, Take 1 capsule (20 mg total) by mouth once daily., Disp: 90 capsule, Rfl: 3    sildenafil (VIAGRA) 50 MG tablet, Take 1 tablet (50 mg total) by mouth daily as needed for Erectile Dysfunction., Disp: 24 tablet, Rfl: 3    terazosin (HYTRIN) 10 MG capsule, Take 2 capsules (20 mg total) by mouth every evening., Disp: 90 capsule, Rfl: 3    VITAMIN B COMPLEX (B COMPLETE ORAL), Take by mouth once daily., Disp: , Rfl:   No current facility-administered medications for this visit.     PHYSICAL EXAMINATION:    Constitutional: He is oriented to person, place, and time. He appears well-developed and well-nourished.  He is in no apparent distress.    Neck: No tracheal deviation present.     Cardiovascular: Normal rate.      Pulmonary/Chest: Effort normal. No respiratory distress.     Abdominal:  He exhibits no distension.  There is no CVA tenderness.     Lymphadenopathy:        Right: No supraclavicular adenopathy present.        Left: No supraclavicular adenopathy present.     Neurological: He is alert and oriented to person, place, and time.     Skin: Skin is warm and dry.     Extremities: No pitting edema noted in lower extremities bilaterally    Psych: Cooperative with normal affect.    Physical Exam      LABS:    Lab Results   Component Value Date    PSA 4.09 (H) 01/07/2013    PSA 1.8 02/05/2007    PSADIAG 3.2 06/17/2015    PSADIAG 4.6 (H) 12/15/2014       IMPRESSION:    Encounter Diagnoses   Name Primary?    Benign prostatic hyperplasia, presence of lower urinary tract symptoms unspecified Yes    Urinary retention     Benign non-nodular prostatic hyperplasia without lower urinary tract symptoms          PLAN:  -Discussed urinary retention and causes.   -Discussed the importance of keeping the catheter intact or  doing CIC daily. He states he wants the catheter out and will do CIC.   -Catheter removed with Nurse Ryan.   -Educated him on CIC and he demonstrated without difficulties for Nurse Ryan.   -Continue CIC 4 x daily w/ 14 fr straight tip and prn indefinitely.   -Increased Hytrin to 20 mg daily.   -RTC for f/u with Dr. Martinez on 8/9/17 or prn.     I encouraged him or any of his family members to call or email me with questions and/or concerns.    I spent 25 minutes with the patient of which more than half was spent in coordinating the patient's care as well as in direct consultation with the patient in regards to our treatment and plan.      DESTINY KingC

## 2017-07-21 NOTE — PATIENT INSTRUCTIONS
Azo for burning with urination.       Discharge Instructions: Self-Catheterization for Men  Your doctor has prescribed self-catheterization for you because you are having trouble urinating naturally. This problem can be caused by injury, disease, infection, or other conditions. Self-catheterization simply means inserting a clean catheter (a thin, flexible tube) into the bladder to empty urine. This helps you empty your bladder when it wont empty by itself or empty all the way. You were shown in the hospital how to perform self-catheterization. The steps below should help you remember how to do it properly.     Lubricate catheter       Insert catheter       Empty urine      Gather Your Supplies  You will need the following:  · Soap and warm water or a moist towelette  · Clean catheter  · Water-soluble lubricating jelly (not Vaseline or other petroleum jelly)  · Toilet or basin  Get Ready  · Wash your hands and your penis. Use warm soapy water. You can also use a moist towelette.  · Lubricate the catheter with the water-soluble lubricating jelly.  ¨ Lubricate 2 to 4 inches of the catheter tip.  ¨ Place the other end of the catheter over the toilet or basin.  Empty Your Bladder  · Insert the catheter.  ¨ Grasp the tip of your penis, holding your penis horizontally from your body..  ¨ Slowly insert the catheter into your urethra (urinary tract). If it doesnt go in, do this: Take a deep breath and bear down as if you're trying to urinate.  ¨ If you feel a sharp pain, remove the catheter; then try again.  · Empty your bladder.  ¨ When the urine starts to flow, stop inserting the catheter.  ¨ Slightly lower your penis.  ¨ When the urine stops flowing, slowly remove the catheter.  Clean Up  · Wash the catheter in mild soap and water.  · Rinse the catheter well.  · Run water through the catheter. Then let it air-dry.  · Wash your hands. If you used a basin, wash it out.  Follow-Up  Make a follow-up appointment as  directed by our staff.  When to Call Your Doctor  Call your doctor right away if you have any of the following:  · Fever above 100.4°F (38°C) or chills  · Burning in the urinary tract, penis, or pubic area  · Nausea and vomiting  · Aching in the lower back  · Cloudy urine; sediment or mucus in the urine  · Bloody (pink or red) or foul-smelling urine   Date Last Reviewed: 9/29/2014 © 2000-2016 [employer]. [address] [place] [address]. All rights reserved. This information is not intended as a substitute for professional medical care. Always follow your healthcare professional's instructions.

## 2017-07-21 NOTE — ED PROVIDER NOTES
"Encounter Date: 7/20/2017    SCRIBE #1 NOTE: I, Cookie Adilson, am scribing for, and in the presence of,  Guillermo Noe MD. I have scribed the following portions of the note - Other sections scribed: HPI, ROS.       History     Chief Complaint   Patient presents with    Urinary Retention     "Only a little bit of urine coming out and its painful" x 3 hrs.     CC: Difficulty Urinating     HPI: 66 year old male with BPH, A-fib, DDD, GERD, HTN, and hx of colonic polyps presents to the ED c/o difficulty urinating, dysuria, and urgency x 3 hours. Symptoms are acute onset and moderate (7/10). Pt reports he has the urge to urinate but "it's just a dribble and it's painful." Pt is scheduled to see urologist on 8/9/17 for routine follow-up of BPH. Pt otherwise denies fever, chills, abdominal pain, N/V, hematuria, and any other associated symptoms. No alleviating factors.      The history is provided by the patient. No  was used.     Review of patient's allergies indicates:  No Known Allergies  Past Medical History:   Diagnosis Date    *Atrial fibrillation     Benign hypertrophy of prostate     Degenerative disc disease     GERD (gastroesophageal reflux disease)     Hx of colonic polyps     Hypertension     Pilonidal cyst 1971     Past Surgical History:   Procedure Laterality Date    COLONOSCOPY N/A 12/7/2016    Procedure: COLONOSCOPY;  Surgeon: Sumeet Lundy MD;  Location: [address];  Service: Endoscopy;  Laterality: N/A;  OK to hold Pradaxa 2 days prior to procedure per Dr Jones/see note dated 11/15/16/svn    CYST REMOVAL  1971    coccyx    KNEE SURGERY  1989    right     History reviewed. No pertinent family history.  Social History   Substance Use Topics    Smoking status: Former Smoker     Years: 2.00     Types: Cigarettes     Quit date: 9/26/1975    Smokeless tobacco: Never Used    Alcohol use 1.8 oz/week     3 Glasses of wine per week     Review of Systems "   Constitutional: Negative for chills, diaphoresis and fever.   HENT: Negative for ear pain and sore throat.    Eyes: Negative for photophobia and visual disturbance.   Respiratory: Negative for cough and shortness of breath.    Cardiovascular: Negative for chest pain.   Gastrointestinal: Negative for abdominal pain, diarrhea, nausea and vomiting.   Genitourinary: Positive for difficulty urinating, dysuria and urgency. Negative for hematuria.   Musculoskeletal: Negative for back pain.   Skin: Negative for rash.   Neurological: Negative for headaches.       Physical Exam     Initial Vitals [07/20/17 2340]   BP Pulse Resp Temp SpO2   119/76 63 18 98.4 °F (36.9 °C) 96 %      MAP       90.33         Physical Exam    Vitals reviewed.  Constitutional: He appears well-developed and well-nourished.   HENT:   Head: Normocephalic and atraumatic.   Eyes: EOM are normal. Pupils are equal, round, and reactive to light.   Neck: Normal range of motion. Neck supple.   Cardiovascular: Normal rate, regular rhythm, normal heart sounds and intact distal pulses.   Pulmonary/Chest: Breath sounds normal. No respiratory distress. He has no wheezes. He has no rhonchi. He has no rales.   Abdominal: Soft. Bowel sounds are normal. There is tenderness in the suprapubic area. There is no rigidity, no rebound, no guarding and no CVA tenderness.   Musculoskeletal: Normal range of motion.   Neurological: He is alert and oriented to person, place, and time.   Skin: Skin is warm and dry.   Psychiatric: He has a normal mood and affect.         ED Course   Procedures  Labs Reviewed   BASIC METABOLIC PANEL - Abnormal; Notable for the following:        Result Value    Potassium 3.4 (*)     All other components within normal limits   CBC W/ AUTO DIFFERENTIAL - Abnormal; Notable for the following:     RBC 4.26 (*)     Hemoglobin 13.4 (*)     Hematocrit 39.1 (*)     MCH 31.5 (*)     All other components within normal limits   GRAM STAIN   URINALYSIS    URINALYSIS MICROSCOPIC             Medical Decision Making:   History:   Old Medical Records: I decided to obtain old medical records.  Initial Assessment:   Medical decision-making:    The patient received a medical screening exam. If performed, the EKG was independently evaluated by me and is pending final cardiology evaluation.  If performed, all radiographic studies were independently evaluated by me and are pending final radiology evaluation. If labs were ordered, they were reviewed. Vital signs are independently assessed by me.  If performed, the pulse oximetry was independently evaluated by me.  I decided to obtain the patient's past medical record.  If available, I reviewed the patient's past medical record, including most recent labs and radiology reports.     the patient presented with inability to urinate and dysuria.  Urinalysis does not reveal sign of urinary tract infection or pyelonephritis.  Patient has good renal function.  No fever or leukocytosis to suggest infectious process or sepsis.    Initial bladder scan revealed over 150 cc of urine.  This is a post void residual.  Patient was able to produce maybe 5 cc of urine.  A Watkins catheter was placed and over 350 cc of clear urine was collected and the Watkins catheter.  Patient has a history of BPH.  We will keep the Watkins catheter in place and have the patient follow-up with urology.  Care instructions were given as well as return precautions.    The results and physical exam findings were reviewed with the patient. Pt agrees with assessment, disposition and treatment plan and has no further questions or complaints at this time.    ROHINI Noe M.D. 2:14 AM 7/21/2017              Scribe Attestation:   Scribe #1: I performed the above scribed service and the documentation accurately describes the services I performed. I attest to the accuracy of the note.    Attending Attestation:           Physician Attestation for Scribe:  Physician  Attestation Statement for Scribe #1: I, Guillermo Noe MD, reviewed documentation, as scribed by Cookie De Anda in my presence, and it is both accurate and complete.                 ED Course     Clinical Impression:   The encounter diagnosis was Acute urinary retention.                           Guillermo Noe MD  07/21/17 0214

## 2017-07-21 NOTE — ED NOTES
Patient educated on ricardo care, patient states understanding on how to empty and care for ricardo cath.

## 2017-07-22 LAB — BACTERIA UR CULT: NO GROWTH

## 2017-07-25 ENCOUNTER — TELEPHONE (OUTPATIENT)
Dept: UROLOGY | Facility: CLINIC | Age: 67
End: 2017-07-25

## 2017-07-25 NOTE — TELEPHONE ENCOUNTER
Pt states he is urinating on his own and does not want [facility] to send anymore catheters. Called placed to [facility]

## 2017-07-25 NOTE — TELEPHONE ENCOUNTER
----- Message from Sangeeta Richard sent at 7/25/2017  8:44 AM CDT -----  Contact: pt [phone]  Pt is asking to speak with the nurse regarding him not needing catheters. Please call pt.

## 2017-08-01 ENCOUNTER — LAB VISIT (OUTPATIENT)
Dept: LAB | Facility: HOSPITAL | Age: 67
End: 2017-08-01
Attending: UROLOGY
Payer: MEDICARE

## 2017-08-01 DIAGNOSIS — N13.8 BPH WITH URINARY OBSTRUCTION: ICD-10-CM

## 2017-08-01 DIAGNOSIS — N40.1 BPH WITH URINARY OBSTRUCTION: ICD-10-CM

## 2017-08-01 LAB — COMPLEXED PSA SERPL-MCNC: 4.4 NG/ML

## 2017-08-01 PROCEDURE — 36415 COLL VENOUS BLD VENIPUNCTURE: CPT | Mod: PO

## 2017-08-01 PROCEDURE — 84153 ASSAY OF PSA TOTAL: CPT

## 2017-08-03 ENCOUNTER — TELEPHONE (OUTPATIENT)
Dept: FAMILY MEDICINE | Facility: CLINIC | Age: 67
End: 2017-08-03

## 2017-08-03 NOTE — TELEPHONE ENCOUNTER
Advised pt that Dr Goode did change the coding on the previous bill & to call back if he has anymore questions.

## 2017-08-03 NOTE — TELEPHONE ENCOUNTER
----- Message from Ashlyn Quintero sent at 8/3/2017 11:38 AM CDT -----  Contact: self  Patient is calling regarding coding from visit - 6-30-17 . He states he spoke with billing & was told it was how the visit was coded .     [phone]    LL

## 2017-08-03 NOTE — TELEPHONE ENCOUNTER
Patient called to say that he received a bill for $104.46 for his office visit on 6/30/17 because it was coded wrong the code that was put in was #11417 which was a prevented code. Wanted to see if it could be changed

## 2017-08-08 ENCOUNTER — PATIENT MESSAGE (OUTPATIENT)
Dept: FAMILY MEDICINE | Facility: CLINIC | Age: 67
End: 2017-08-08

## 2017-08-08 DIAGNOSIS — N52.9 ERECTILE DYSFUNCTION, UNSPECIFIED ERECTILE DYSFUNCTION TYPE: Primary | ICD-10-CM

## 2017-08-09 ENCOUNTER — OFFICE VISIT (OUTPATIENT)
Dept: UROLOGY | Facility: CLINIC | Age: 67
End: 2017-08-09
Payer: MEDICARE

## 2017-08-09 ENCOUNTER — PATIENT MESSAGE (OUTPATIENT)
Dept: FAMILY MEDICINE | Facility: CLINIC | Age: 67
End: 2017-08-09

## 2017-08-09 VITALS
DIASTOLIC BLOOD PRESSURE: 72 MMHG | SYSTOLIC BLOOD PRESSURE: 110 MMHG | BODY MASS INDEX: 30.08 KG/M2 | WEIGHT: 203.06 LBS | HEIGHT: 69 IN | HEART RATE: 65 BPM

## 2017-08-09 DIAGNOSIS — N13.8 BPH WITH URINARY OBSTRUCTION: Primary | ICD-10-CM

## 2017-08-09 DIAGNOSIS — N40.1 BPH WITH URINARY OBSTRUCTION: Primary | ICD-10-CM

## 2017-08-09 PROCEDURE — 3074F SYST BP LT 130 MM HG: CPT | Mod: ,,, | Performed by: UROLOGY

## 2017-08-09 PROCEDURE — 99999 PR PBB SHADOW E&M-EST. PATIENT-LVL III: CPT | Mod: PBBFAC,,, | Performed by: UROLOGY

## 2017-08-09 PROCEDURE — 1126F AMNT PAIN NOTED NONE PRSNT: CPT | Mod: ,,, | Performed by: UROLOGY

## 2017-08-09 PROCEDURE — 99213 OFFICE O/P EST LOW 20 MIN: CPT | Mod: S$PBB,,, | Performed by: UROLOGY

## 2017-08-09 PROCEDURE — 1159F MED LIST DOCD IN RCRD: CPT | Mod: ,,, | Performed by: UROLOGY

## 2017-08-09 PROCEDURE — 99213 OFFICE O/P EST LOW 20 MIN: CPT | Mod: PBBFAC | Performed by: UROLOGY

## 2017-08-09 PROCEDURE — 3078F DIAST BP <80 MM HG: CPT | Mod: ,,, | Performed by: UROLOGY

## 2017-08-09 RX ORDER — TADALAFIL 20 MG/1
20 TABLET ORAL DAILY
Qty: 24 TABLET | Refills: 3 | Status: SHIPPED | OUTPATIENT
Start: 2017-08-09 | End: 2018-01-31

## 2017-08-09 NOTE — PROGRESS NOTES
Subjective:       Patient ID: Omar Pacheco is a 66 y.o. male.    Chief Complaint: bph with urinary obstruction. (pt state he follow up [facility]  2 weeks ago urinary retention on today vist he better last psa was 08/01/2017 4.4. they increase dose on prostate medication.)    HPI patient was seen recently by NORMAN Ness for BPH and urinary retention.  He was doing CIC but is terrors Zosyn was increased to 20 mg a day and he is voiding well on his own.  His postvoid residual is 0 and urine is clear unfortunately he has periods of sounds like postural hypotension and weakness.  His blood pressure is 110/70 today and he feels well but I'm going to back him off of the terrors Zosyn.  He is in the  and evidently they will give him tamsulosin.  We discussed pros and cons of reason therapy versus laser etc. and he is interested in that.    Past Medical History:   Diagnosis Date    *Atrial fibrillation     Benign hypertrophy of prostate     Degenerative disc disease     GERD (gastroesophageal reflux disease)     Hx of colonic polyps     Hypertension     Pilonidal cyst 1971       Past Surgical History:   Procedure Laterality Date    COLONOSCOPY N/A 12/7/2016    Procedure: COLONOSCOPY;  Surgeon: Sumeet Lundy MD;  Location: [address]);  Service: Endoscopy;  Laterality: N/A;  OK to hold Pradaxa 2 days prior to procedure per Dr Jones/see note dated 11/15/16/svn    CYST REMOVAL  1971    coccyx    KNEE SURGERY  1989    right       History reviewed. No pertinent family history.    Social History     Social History    Marital status:      Spouse name: N/A    Number of children: N/A    Years of education: N/A     Occupational History    Not on file.     Social History Main Topics    Smoking status: Former Smoker     Years: 2.00     Types: Cigarettes     Quit date: 9/26/1975    Smokeless tobacco: Never Used    Alcohol use 1.8 oz/week     3 Glasses of wine per week    Drug use: No     "Sexual activity: Not on file     Other Topics Concern    Not on file     Social History Narrative    No narrative on file       Allergies:  Review of patient's allergies indicates no known allergies.    Medications:    Current Outpatient Prescriptions:     ammonium lactate (LAC-HYDRIN) 12 % lotion, Apply topically 2 (two) times daily., Disp: 225 g, Rfl: 3    ascorbic acid, vitamin C, (VITAMIN C) 100 MG tablet, Take 100 mg by mouth once daily., Disp: , Rfl:     dabigatran etexilate (PRADAXA) 150 mg Cap, Take 1 capsule (150 mg total) by mouth 2 (two) times daily. "Do NOT break, chew, or open capsules.", Disp: 180 capsule, Rfl: 2    ferrous sulfate 325 (65 FE) MG EC tablet, Take 325 mg by mouth 2 (two) times daily., Disp: , Rfl:     hydrochlorothiazide (HYDRODIURIL) 25 MG tablet, Take 1 tablet (25 mg total) by mouth once daily., Disp: 90 tablet, Rfl: 3    methocarbamol (ROBAXIN) 500 MG Tab, Take 500 mg by mouth 4 (four) times daily as needed., Disp: , Rfl:     metoprolol succinate (TOPROL-XL) 50 MG 24 hr tablet, Take 1 tablet (50 mg total) by mouth once daily., Disp: 90 tablet, Rfl: 2    mirtazapine (REMERON) 30 MG tablet, Take 30 mg by mouth every evening., Disp: , Rfl:     multivitamin (THERAGRAN) per tablet, Take 1 tablet by mouth once daily., Disp: , Rfl:     omeprazole (PRILOSEC) 20 MG capsule, Take 1 capsule (20 mg total) by mouth once daily., Disp: 90 capsule, Rfl: 3    terazosin (HYTRIN) 10 MG capsule, Take 2 capsules (20 mg total) by mouth every evening., Disp: 90 capsule, Rfl: 3    VITAMIN B COMPLEX (B COMPLETE ORAL), Take by mouth once daily., Disp: , Rfl:     flecainide (TAMBOCOR) 50 MG Tab, Take 2 tablets (100 mg total) by mouth every 12 (twelve) hours., Disp: 120 tablet, Rfl: 0    sildenafil (VIAGRA) 50 MG tablet, Take 1 tablet (50 mg total) by mouth daily as needed for Erectile Dysfunction., Disp: 24 tablet, Rfl: 3    Review of Systems   Constitutional: Negative for activity change, " appetite change, chills, diaphoresis, fatigue, fever and unexpected weight change.   HENT: Negative for congestion, dental problem, hearing loss, mouth sores, postnasal drip, rhinorrhea, sinus pressure and trouble swallowing.    Eyes: Negative for pain, discharge and itching.   Respiratory: Negative for apnea, cough, choking, chest tightness, shortness of breath and wheezing.    Cardiovascular: Negative for chest pain, palpitations and leg swelling.   Gastrointestinal: Negative for abdominal distention, abdominal pain, anal bleeding, blood in stool, constipation, diarrhea, nausea, rectal pain and vomiting.   Endocrine: Negative for polydipsia and polyuria.   Genitourinary: Positive for decreased urine volume. Negative for discharge, dysuria, enuresis, flank pain, frequency, genital sores, hematuria, penile pain, penile swelling, scrotal swelling, testicular pain and urgency.   Musculoskeletal: Negative for arthralgias, back pain and myalgias.   Skin: Negative for color change, rash and wound.   Neurological: Negative for dizziness, syncope, speech difficulty, light-headedness and headaches.   Hematological: Negative for adenopathy. Does not bruise/bleed easily.   Psychiatric/Behavioral: Negative for behavioral problems, confusion, hallucinations and sleep disturbance.       Objective:      Physical Exam   Genitourinary:   Genitourinary Comments: 40 g benign PSA is normal age-adjusted in stable       Assessment:       1. BPH with urinary obstruction        Plan:       Omar was seen today for bph with urinary obstruction..    Diagnoses and all orders for this visit:    BPH with urinary obstruction  -     US Retroperitoneal Complete (Kidney and; Future  -     Cystoscopy; Future  -     Transrectal ultrasound; Future        consider reason therapy or other appropriate therapy depending on results of above

## 2017-08-14 ENCOUNTER — PATIENT MESSAGE (OUTPATIENT)
Dept: FAMILY MEDICINE | Facility: CLINIC | Age: 67
End: 2017-08-14

## 2017-08-16 ENCOUNTER — PATIENT MESSAGE (OUTPATIENT)
Dept: FAMILY MEDICINE | Facility: CLINIC | Age: 67
End: 2017-08-16

## 2017-08-16 ENCOUNTER — TELEPHONE (OUTPATIENT)
Dept: FAMILY MEDICINE | Facility: CLINIC | Age: 67
End: 2017-08-16

## 2017-08-24 ENCOUNTER — PATIENT MESSAGE (OUTPATIENT)
Dept: FAMILY MEDICINE | Facility: CLINIC | Age: 67
End: 2017-08-24

## 2017-09-05 ENCOUNTER — PATIENT MESSAGE (OUTPATIENT)
Dept: FAMILY MEDICINE | Facility: CLINIC | Age: 67
End: 2017-09-05

## 2017-09-13 NOTE — TELEPHONE ENCOUNTER
Patient notified of information in Dr. Goode's [employer] message dated 9/5/2017 and that his information would be re-faxed in the meantime.  Patient also informed that his forms are available for pickup when he comes back.  Verbalized understanding and states that he will likely schedule an OV to discuss the Flomax with Dr. Goode when he returns to [facility].

## 2017-10-13 ENCOUNTER — PATIENT MESSAGE (OUTPATIENT)
Dept: FAMILY MEDICINE | Facility: CLINIC | Age: 67
End: 2017-10-13

## 2017-10-13 DIAGNOSIS — N52.9 ERECTILE DYSFUNCTION, UNSPECIFIED ERECTILE DYSFUNCTION TYPE: Primary | ICD-10-CM

## 2017-10-15 ENCOUNTER — PATIENT MESSAGE (OUTPATIENT)
Dept: FAMILY MEDICINE | Facility: CLINIC | Age: 67
End: 2017-10-15

## 2017-10-16 ENCOUNTER — TELEPHONE (OUTPATIENT)
Dept: UROLOGY | Facility: CLINIC | Age: 67
End: 2017-10-16

## 2017-10-16 DIAGNOSIS — I48.0 PAROXYSMAL ATRIAL FIBRILLATION: Chronic | ICD-10-CM

## 2017-10-16 DIAGNOSIS — Z87.19 HISTORY OF GASTROESOPHAGEAL REFLUX (GERD): ICD-10-CM

## 2017-10-16 DIAGNOSIS — N40.0 BENIGN NON-NODULAR PROSTATIC HYPERPLASIA WITHOUT LOWER URINARY TRACT SYMPTOMS: ICD-10-CM

## 2017-10-16 RX ORDER — FLECAINIDE ACETATE 50 MG/1
100 TABLET ORAL EVERY 12 HOURS
Qty: 120 TABLET | Refills: 0 | Status: SHIPPED | OUTPATIENT
Start: 2017-10-16 | End: 2017-10-17 | Stop reason: SDUPTHER

## 2017-10-16 RX ORDER — HYDROCHLOROTHIAZIDE 25 MG/1
25 TABLET ORAL DAILY
Qty: 90 TABLET | Refills: 3 | Status: SHIPPED | OUTPATIENT
Start: 2017-10-16 | End: 2017-10-17 | Stop reason: SDUPTHER

## 2017-10-16 RX ORDER — OMEPRAZOLE 20 MG/1
20 CAPSULE, DELAYED RELEASE ORAL DAILY
Qty: 90 CAPSULE | Refills: 3 | Status: SHIPPED | OUTPATIENT
Start: 2017-10-16 | End: 2017-10-17 | Stop reason: SDUPTHER

## 2017-10-16 NOTE — TELEPHONE ENCOUNTER
Called to notify him that rx for terzosin would be refilled. He wants to pick it up from Dr. Goode in [facility]. I offered to send it to a pharmacy but he wants to  from Dr. Goode. No order sent to the pharmacy. He states he will call Dr. Goode's office.

## 2017-10-17 ENCOUNTER — TELEPHONE (OUTPATIENT)
Dept: FAMILY MEDICINE | Facility: CLINIC | Age: 67
End: 2017-10-17

## 2017-10-17 DIAGNOSIS — I48.0 PAROXYSMAL ATRIAL FIBRILLATION: Chronic | ICD-10-CM

## 2017-10-17 DIAGNOSIS — Z87.19 HISTORY OF GASTROESOPHAGEAL REFLUX (GERD): ICD-10-CM

## 2017-10-17 DIAGNOSIS — N40.0 BENIGN NON-NODULAR PROSTATIC HYPERPLASIA WITHOUT LOWER URINARY TRACT SYMPTOMS: ICD-10-CM

## 2017-10-17 RX ORDER — OMEPRAZOLE 20 MG/1
20 CAPSULE, DELAYED RELEASE ORAL DAILY
Qty: 90 CAPSULE | Refills: 1 | Status: SHIPPED | OUTPATIENT
Start: 2017-10-17 | End: 2018-12-03 | Stop reason: SDUPTHER

## 2017-10-17 RX ORDER — TERAZOSIN 10 MG/1
20 CAPSULE ORAL NIGHTLY
Qty: 90 CAPSULE | Refills: 1 | OUTPATIENT
Start: 2017-10-17

## 2017-10-17 RX ORDER — HYDROCHLOROTHIAZIDE 25 MG/1
25 TABLET ORAL DAILY
Qty: 90 TABLET | Refills: 1 | Status: SHIPPED | OUTPATIENT
Start: 2017-10-17 | End: 2018-01-31

## 2017-10-17 RX ORDER — FLECAINIDE ACETATE 50 MG/1
100 TABLET ORAL EVERY 12 HOURS
Qty: 120 TABLET | Refills: 1 | Status: SHIPPED | OUTPATIENT
Start: 2017-10-17 | End: 2017-12-05 | Stop reason: SDUPTHER

## 2017-10-17 NOTE — TELEPHONE ENCOUNTER
----- Message from Anne Beal sent at 10/17/2017  9:15 AM CDT -----  Contact: self  Pt called in regards to:  flecainide (TAMBOCOR) 50 MG Tab  hydrochlorothiazide (HYDRODIURIL) 25 MG tablet  omeprazole (PRILOSEC) 20 MG capsule  terazosin (HYTRIN) 10 MG capsule    Please call and advise at [phone] when scripts are ready for pickup.    Thanks-

## 2017-10-17 NOTE — TELEPHONE ENCOUNTER
He needs to get the terazosin from his urologist, the other medications have been refilled and are ready for

## 2017-10-23 ENCOUNTER — HOSPITAL ENCOUNTER (OUTPATIENT)
Dept: UROLOGY | Facility: HOSPITAL | Age: 67
Discharge: HOME | End: 2017-10-23
Attending: UROLOGY
Payer: MEDICARE

## 2017-10-23 ENCOUNTER — HOSPITAL ENCOUNTER (OUTPATIENT)
Dept: RADIOLOGY | Facility: HOSPITAL | Age: 67
Discharge: HOME | End: 2017-10-23
Attending: UROLOGY
Payer: MEDICARE

## 2017-10-23 ENCOUNTER — HOSPITAL ENCOUNTER (OUTPATIENT)
Dept: UROLOGY | Facility: HOSPITAL | Age: 67
Discharge: HOME | End: 2017-10-23
Attending: UROLOGY | Admitting: UROLOGY
Payer: MEDICARE

## 2017-10-23 ENCOUNTER — PATIENT MESSAGE (OUTPATIENT)
Dept: UROLOGY | Facility: CLINIC | Age: 67
End: 2017-10-23

## 2017-10-23 VITALS
HEIGHT: 69 IN | HEART RATE: 55 BPM | WEIGHT: 206.56 LBS | DIASTOLIC BLOOD PRESSURE: 80 MMHG | TEMPERATURE: 98 F | SYSTOLIC BLOOD PRESSURE: 157 MMHG | RESPIRATION RATE: 18 BRPM | BODY MASS INDEX: 30.59 KG/M2

## 2017-10-23 DIAGNOSIS — N40.0 BENIGN PROSTATIC HYPERPLASIA, UNSPECIFIED WHETHER LOWER URINARY TRACT SYMPTOMS PRESENT: Primary | ICD-10-CM

## 2017-10-23 DIAGNOSIS — N40.1 BPH WITH URINARY OBSTRUCTION: ICD-10-CM

## 2017-10-23 DIAGNOSIS — N13.8 BPH WITH URINARY OBSTRUCTION: ICD-10-CM

## 2017-10-23 DIAGNOSIS — N40.0 BENIGN NON-NODULAR PROSTATIC HYPERPLASIA WITHOUT LOWER URINARY TRACT SYMPTOMS: ICD-10-CM

## 2017-10-23 PROCEDURE — 76872 US TRANSRECTAL: CPT | Mod: 26,,, | Performed by: UROLOGY

## 2017-10-23 PROCEDURE — 99499 UNLISTED E&M SERVICE: CPT | Mod: ,,, | Performed by: UROLOGY

## 2017-10-23 PROCEDURE — 52000 CYSTOURETHROSCOPY: CPT | Mod: ,,, | Performed by: UROLOGY

## 2017-10-23 PROCEDURE — 52000 CYSTOURETHROSCOPY: CPT

## 2017-10-23 PROCEDURE — 76770 US EXAM ABDO BACK WALL COMP: CPT | Mod: TC

## 2017-10-23 PROCEDURE — 76872 US TRANSRECTAL: CPT

## 2017-10-23 PROCEDURE — 76770 US EXAM ABDO BACK WALL COMP: CPT | Mod: 26,GC,, | Performed by: RADIOLOGY

## 2017-10-23 RX ORDER — LIDOCAINE HYDROCHLORIDE 20 MG/ML
JELLY TOPICAL ONCE
Status: COMPLETED | OUTPATIENT
Start: 2017-10-23 | End: 2017-10-23

## 2017-10-23 RX ORDER — TERAZOSIN 10 MG/1
10 CAPSULE ORAL NIGHTLY
Qty: 30 CAPSULE | Refills: 11 | Status: SHIPPED | OUTPATIENT
Start: 2017-10-23 | End: 2018-02-03 | Stop reason: SDUPTHER

## 2017-10-23 RX ORDER — TERAZOSIN 10 MG/1
10 CAPSULE ORAL NIGHTLY
Qty: 30 CAPSULE | Refills: 11 | Status: SHIPPED | OUTPATIENT
Start: 2017-10-23 | End: 2018-09-24 | Stop reason: SDUPTHER

## 2017-10-23 RX ADMIN — LIDOCAINE HYDROCHLORIDE 20 ML: 20 JELLY TOPICAL at 02:10

## 2017-10-23 RX ADMIN — LIDOCAINE HYDROCHLORIDE 10 ML: 20 JELLY TOPICAL at 02:10

## 2017-10-23 NOTE — PATIENT INSTRUCTIONS
What to Expect After a Cystoscopy  For the next 24-48 hours, you may feel a mild burning when you urinate. This burning is normal and expected. Drink plenty of water to dilute the urine to help relieve the burning sensation. You may also see a small amount of blood in your urine after the procedure.    Unless you are already taking antibiotics, you may be given an antibiotic after the test to prevent infection.    Signs and Symptoms to Report  Call the [facility] at [phone] if you develop any of the following:  · Fever of 101 degrees or higher  · Chills or persistent bleeding  · Inability to urinate

## 2017-10-24 RX ORDER — TERAZOSIN 10 MG/1
10 CAPSULE ORAL NIGHTLY
Qty: 30 CAPSULE | Refills: 11 | Status: SHIPPED | OUTPATIENT
Start: 2017-10-24 | End: 2018-02-03 | Stop reason: SDUPTHER

## 2017-10-24 NOTE — OP NOTE
DATE OF PROCEDURE:  10/23/2017    PREOPERATIVE DIAGNOSIS:  BPH with outlet obstruction.    POSTOPERATIVE DIAGNOSIS:  BPH with outlet obstruction.    OPERATION PERFORMED:  Flexible cystoscopy.    PROCEDURE IN DETAIL:  The patient was prepped and draped in supine position.  He   had normal upper tracts.  Xylocaine jelly was instilled per urethra.  The   anterior urethra was normal.  Prostatic urethra was 4.5 cm with lateral lobe   enlargement and a high riding median bar, but no evidence of a ball valve median   lobe.  There were grade II trabeculations.  Both ureteral orifices were normal   in size, shape, and position with clear efflux.  There were no stones, tumors,   foreign bodies, or active infections noted.    IMPRESSION:  BPH with outlet obstruction.    RECOMMENDATIONS:  The patient is voiding well on Hytrin 10 mg p.o. daily.  He is   emptying his bladder and he is very happy with his stream.  I canceled the TRUS   without biopsy today and the patient will continue taking Hytrin.  We will see   him back in six months with a PVR.      SARWAT  dd: 10/23/2017 15:10:53 (CDT)  td: 10/23/2017 15:34:33 (CDT)  Doc ID   #[id number]  Job ID #[id number]    CC:

## 2017-10-24 NOTE — OP NOTE
DATE OF PROCEDURE:  10/23/2017    PREOPERATIVE DIAGNOSIS:  BPH with outlet obstruction.    POSTOPERATIVE DIAGNOSIS:  BPH with outlet obstruction.    OPERATION PERFORMED:  Canceled prostate ultrasound.    PROCEDURE IN DETAIL:  This patient underwent cystoscopy, demonstrating BPH with   outlet obstruction.  He has been voiding well on Hytrin 10 mg a day.  Therefore,   it was felt that TRUS without biopsy was not indicated.  This will be   rescheduled at a later date on a p.r.n. basis.      SARWAT  dd: 10/23/2017 15:03:23 (CDT)  td: 10/23/2017 15:49:37 (CDT)  Doc ID   #[id number]  Job ID #[id number]    CC:

## 2017-10-28 ENCOUNTER — PATIENT MESSAGE (OUTPATIENT)
Dept: FAMILY MEDICINE | Facility: CLINIC | Age: 67
End: 2017-10-28

## 2017-12-01 ENCOUNTER — PATIENT MESSAGE (OUTPATIENT)
Dept: FAMILY MEDICINE | Facility: CLINIC | Age: 67
End: 2017-12-01

## 2017-12-01 DIAGNOSIS — I10 ESSENTIAL HYPERTENSION: ICD-10-CM

## 2017-12-01 RX ORDER — METOPROLOL SUCCINATE 50 MG/1
50 TABLET, EXTENDED RELEASE ORAL DAILY
Qty: 90 TABLET | Refills: 2 | Status: SHIPPED | OUTPATIENT
Start: 2017-12-01 | End: 2017-12-12 | Stop reason: SDUPTHER

## 2017-12-05 DIAGNOSIS — I48.0 PAROXYSMAL ATRIAL FIBRILLATION: Chronic | ICD-10-CM

## 2017-12-05 RX ORDER — FLECAINIDE ACETATE 50 MG/1
100 TABLET ORAL EVERY 12 HOURS
Qty: 120 TABLET | Refills: 1 | Status: SHIPPED | OUTPATIENT
Start: 2017-12-05 | End: 2018-02-16

## 2017-12-12 DIAGNOSIS — I10 ESSENTIAL HYPERTENSION: ICD-10-CM

## 2017-12-12 RX ORDER — METOPROLOL SUCCINATE 50 MG/1
50 TABLET, EXTENDED RELEASE ORAL DAILY
Qty: 90 TABLET | Refills: 2 | Status: SHIPPED | OUTPATIENT
Start: 2017-12-12 | End: 2018-02-03 | Stop reason: SDUPTHER

## 2018-01-24 ENCOUNTER — OFFICE VISIT (OUTPATIENT)
Dept: URGENT CARE | Facility: CLINIC | Age: 68
End: 2018-01-24
Payer: MEDICARE

## 2018-01-24 VITALS
DIASTOLIC BLOOD PRESSURE: 73 MMHG | HEART RATE: 66 BPM | SYSTOLIC BLOOD PRESSURE: 126 MMHG | OXYGEN SATURATION: 96 % | HEIGHT: 69 IN | BODY MASS INDEX: 29.62 KG/M2 | TEMPERATURE: 97 F | WEIGHT: 200 LBS

## 2018-01-24 DIAGNOSIS — M54.42 LEFT-SIDED LOW BACK PAIN WITH LEFT-SIDED SCIATICA, UNSPECIFIED CHRONICITY: Primary | ICD-10-CM

## 2018-01-24 PROCEDURE — 99214 OFFICE O/P EST MOD 30 MIN: CPT | Mod: 25,S$GLB,, | Performed by: NURSE PRACTITIONER

## 2018-01-24 PROCEDURE — 96372 THER/PROPH/DIAG INJ SC/IM: CPT | Mod: S$GLB,,, | Performed by: FAMILY MEDICINE

## 2018-01-24 RX ORDER — KETOROLAC TROMETHAMINE 30 MG/ML
30 INJECTION, SOLUTION INTRAMUSCULAR; INTRAVENOUS ONCE
Qty: 1 ML | Refills: 0
Start: 2018-01-24 | End: 2018-01-24

## 2018-01-24 RX ORDER — BETAMETHASONE SODIUM PHOSPHATE AND BETAMETHASONE ACETATE 3; 3 MG/ML; MG/ML
6 INJECTION, SUSPENSION INTRA-ARTICULAR; INTRALESIONAL; INTRAMUSCULAR; SOFT TISSUE
Status: COMPLETED | OUTPATIENT
Start: 2018-01-24 | End: 2018-01-24

## 2018-01-24 RX ORDER — METHYLPREDNISOLONE 4 MG/1
4 TABLET ORAL DAILY
Qty: 1 PACKAGE | Refills: 0 | Status: SHIPPED | OUTPATIENT
Start: 2018-01-24 | End: 2018-01-30

## 2018-01-24 RX ORDER — TIZANIDINE 4 MG/1
4 TABLET ORAL 2 TIMES DAILY PRN
Qty: 20 TABLET | Refills: 0 | Status: SHIPPED | OUTPATIENT
Start: 2018-01-24 | End: 2018-01-31

## 2018-01-24 RX ORDER — NAPROXEN 500 MG/1
500 TABLET ORAL 2 TIMES DAILY WITH MEALS
Qty: 30 TABLET | Refills: 0 | Status: SHIPPED | OUTPATIENT
Start: 2018-01-24 | End: 2018-12-03

## 2018-01-24 RX ADMIN — BETAMETHASONE SODIUM PHOSPHATE AND BETAMETHASONE ACETATE 6 MG: 3; 3 INJECTION, SUSPENSION INTRA-ARTICULAR; INTRALESIONAL; INTRAMUSCULAR; SOFT TISSUE at 01:01

## 2018-01-24 NOTE — PROGRESS NOTES
"Subjective:       Patient ID: Omar Pacheco is a 67 y.o. male.    Vitals:  height is 5' 9" (1.753 m) and weight is 90.7 kg (200 lb). His temperature is 97.1 °F (36.2 °C). His blood pressure is 126/73 and his pulse is 66. His oxygen saturation is 96%.     Chief Complaint: Flank Pain    Flank Pain   This is a new problem. The current episode started in the past 7 days. The problem occurs constantly. The pain is present in the sacro-iliac and gluteal. The quality of the pain is described as shooting and stabbing. The pain radiates to the left knee and left thigh. The pain is at a severity of 7/10. The pain is moderate. The pain is the same all the time. The symptoms are aggravated by sitting and standing. Stiffness is present all day. Pertinent negatives include no abdominal pain, bladder incontinence, bowel incontinence, dysuria or numbness. Treatments tried: tylenol 1000 mg. The treatment provided no relief.     Review of Systems   Constitution: Negative for malaise/fatigue.   Skin: Negative for color change and rash.   Musculoskeletal: Positive for back pain and muscle cramps. Negative for muscle weakness and stiffness.        Left side siatica pain   Gastrointestinal: Negative for abdominal pain and bowel incontinence.   Genitourinary: Positive for flank pain. Negative for bladder incontinence, dysuria, hematuria and urgency.   Neurological: Negative for disturbances in coordination and numbness.   All other systems reviewed and are negative.      Objective:      Physical Exam   Constitutional: He is oriented to person, place, and time. Vital signs are normal. He appears well-developed and well-nourished. He is active and cooperative. No distress.   HENT:   Head: Normocephalic and atraumatic.   Mouth/Throat: Mucous membranes are normal.   Eyes: Conjunctivae and lids are normal.   Neck: Trachea normal, normal range of motion, full passive range of motion without pain and phonation normal. Neck supple. "   Cardiovascular: Normal rate, regular rhythm and normal pulses.    Pulmonary/Chest: Effort normal.   Abdominal: Soft. Normal appearance and bowel sounds are normal. He exhibits no abdominal bruit, no pulsatile midline mass and no mass.   Musculoskeletal: He exhibits no edema or deformity.        Lumbar back: He exhibits pain and spasm.        Back:    Neurological: He is alert and oriented to person, place, and time. He has normal strength and normal reflexes. No sensory deficit.   Skin: Skin is warm, dry and intact. He is not diaphoretic.   Psychiatric: He has a normal mood and affect. His speech is normal and behavior is normal. Judgment and thought content normal. Cognition and memory are normal.   Nursing note and vitals reviewed.      Assessment:       1. Left-sided low back pain with left-sided sciatica, unspecified chronicity        Plan:       Patient Instructions     Follow up with Orthopedics if symptoms do not improve      Sciatica    Sciatica is a condition that causes pain in the lower back that spreads down into the buttock, hip, and leg. Sometimes the leg pain can happen without any back pain. Sciatica happens when a spinal nerve is irritated or has pressure put on it as comes out of the spinal canal in the lower back. This most often happens when a bulge or rupture of a nearby spinal disk presses on the nerve. Sciatica can also be caused by a narrowing of the spinal canal (spinal stenosis) or spasm of the muscle in the buttocks that the sciatic nerve passes through (pyriform muscle). Sciatica is also called lumbar radiculopathy.  Sciatica may begin after a sudden twisting or bending force, such as in a car accident. Or it can happen after a simple awkward movement. In either case, muscle spasm often also happens. Muscle spasm makes the pain worse.  A healthcare provider makes a diagnosis of sciatica from your symptoms and a physical exam. Unless you had an injury from a car accident or fall, you  usually wont have X-rays taken at this time. This is because the nerves and disks in your back cant be seen on an X-ray. If the provider sees signs of a compressed nerve, you will need to schedule an MRI scan as an outpatient. Signs of a compressed nerve include loss of strength in a leg.  Most sciatica gets better with medicine, exercise, and physical therapy. If your symptoms continue after at least 3 months of medical treatment, you may need surgery or injections to your lower back.  Home care  Follow these tips when caring for yourself at home:  · You may need to stay in bed the first few days. But as soon as possible, begin sitting up or walking. This will help you avoid problems that come from staying in bed for long periods.  · When in bed, try to find a position that is comfortable. A firm mattress is best. Try lying flat on your back with pillows under your knees. You can also try lying on your side with your knees bent up toward your chest and a pillow between your knees.  · Avoid sitting for long periods. This puts more stress on your lower back than standing or walking.  · Use heat from a hot shower, hot bath, or heating pad to help ease pain. Massage can also help. You can also try using an ice pack. You can make your own ice pack by putting ice cubes in a plastic bag. Wrap the bag in a thin towel. Try both heat and cold to see which works best. Use the method that feels best for 20 minutes several times a day.  · You may use acetaminophen or ibuprofen to ease pain, unless another pain medicine was prescribed. Note: If you have chronic liver or kidney disease, talk with your healthcare provider before taking these medicines. Also talk with your provider if youve had a stomach ulcer or gastrointestinal bleeding.  · Use safe lifting methods. Dont lift anything heavier than 15 pounds until all of the pain is gone.  Follow-up care  Follow up with your healthcare provider, or as advised. You may need  physical therapy or additional tests.  If X-rays were taken, a radiologist will look at them. You will be told of any new findings that may affect your care.  When to seek medical advice  Call your healthcare provider right away if any of these occur:  · Pain gets worse even after taking prescribed medicine  · Weakness or numbness in 1 or both legs or hips  · Numbness in your groin or genital area  · You cant control your bowel or bladder  · Fever  · Redness or swelling over your back or spine   Date Last Reviewed: 8/1/2016 © 2000-2017 [employer]. [address] [place] [id number]. All rights reserved. This information is not intended as a substitute for professional medical care. Always follow your healthcare professional's instructions.              Left-sided low back pain with left-sided sciatica, unspecified chronicity    Other orders  -     ketorolac (TORADOL) 30 mg/mL (1 mL) injection; Inject 30 mg into the muscle once.  Dispense: 1 mL; Refill: 0  -     betamethasone acetate-betamethasone sodium phosphate injection 6 mg; Inject 1 mL (6 mg total) into the muscle one time.  -     methylPREDNISolone (MEDROL DOSEPACK) 4 mg tablet; Take 1 tablet (4 mg total) by mouth once daily. use as directed  Dispense: 1 Package; Refill: 0  -     naproxen (NAPROSYN) 500 MG tablet; Take 1 tablet (500 mg total) by mouth 2 (two) times daily with meals.  Dispense: 30 tablet; Refill: 0  -     tiZANidine (ZANAFLEX) 4 MG tablet; Take 1 tablet (4 mg total) by mouth 2 (two) times daily as needed.  Dispense: 20 tablet; Refill: 0

## 2018-01-24 NOTE — PATIENT INSTRUCTIONS
Follow up with Orthopedics if symptoms do not improve      Sciatica    Sciatica is a condition that causes pain in the lower back that spreads down into the buttock, hip, and leg. Sometimes the leg pain can happen without any back pain. Sciatica happens when a spinal nerve is irritated or has pressure put on it as comes out of the spinal canal in the lower back. This most often happens when a bulge or rupture of a nearby spinal disk presses on the nerve. Sciatica can also be caused by a narrowing of the spinal canal (spinal stenosis) or spasm of the muscle in the buttocks that the sciatic nerve passes through (pyriform muscle). Sciatica is also called lumbar radiculopathy.  Sciatica may begin after a sudden twisting or bending force, such as in a car accident. Or it can happen after a simple awkward movement. In either case, muscle spasm often also happens. Muscle spasm makes the pain worse.  A healthcare provider makes a diagnosis of sciatica from your symptoms and a physical exam. Unless you had an injury from a car accident or fall, you usually wont have X-rays taken at this time. This is because the nerves and disks in your back cant be seen on an X-ray. If the provider sees signs of a compressed nerve, you will need to schedule an MRI scan as an outpatient. Signs of a compressed nerve include loss of strength in a leg.  Most sciatica gets better with medicine, exercise, and physical therapy. If your symptoms continue after at least 3 months of medical treatment, you may need surgery or injections to your lower back.  Home care  Follow these tips when caring for yourself at home:  · You may need to stay in bed the first few days. But as soon as possible, begin sitting up or walking. This will help you avoid problems that come from staying in bed for long periods.  · When in bed, try to find a position that is comfortable. A firm mattress is best. Try lying flat on your back with pillows under your knees. You  can also try lying on your side with your knees bent up toward your chest and a pillow between your knees.  · Avoid sitting for long periods. This puts more stress on your lower back than standing or walking.  · Use heat from a hot shower, hot bath, or heating pad to help ease pain. Massage can also help. You can also try using an ice pack. You can make your own ice pack by putting ice cubes in a plastic bag. Wrap the bag in a thin towel. Try both heat and cold to see which works best. Use the method that feels best for 20 minutes several times a day.  · You may use acetaminophen or ibuprofen to ease pain, unless another pain medicine was prescribed. Note: If you have chronic liver or kidney disease, talk with your healthcare provider before taking these medicines. Also talk with your provider if youve had a stomach ulcer or gastrointestinal bleeding.  · Use safe lifting methods. Dont lift anything heavier than 15 pounds until all of the pain is gone.  Follow-up care  Follow up with your healthcare provider, or as advised. You may need physical therapy or additional tests.  If X-rays were taken, a radiologist will look at them. You will be told of any new findings that may affect your care.  When to seek medical advice  Call your healthcare provider right away if any of these occur:  · Pain gets worse even after taking prescribed medicine  · Weakness or numbness in 1 or both legs or hips  · Numbness in your groin or genital area  · You cant control your bowel or bladder  · Fever  · Redness or swelling over your back or spine   Date Last Reviewed: 8/1/2016  © 6438-5616 [employer]. [address] [place] [address], [place] [address]. All rights reserved. This information is not intended as a substitute for professional medical care. Always follow your healthcare professional's instructions.

## 2018-01-25 DIAGNOSIS — I48.20 CHRONIC ATRIAL FIBRILLATION: ICD-10-CM

## 2018-01-25 NOTE — TELEPHONE ENCOUNTER
----- Message from Savita Squires sent at 1/24/2018 11:20 AM CST -----  Contact: [phone]  Refill:dabigatran etexilate (PRADAXA) 150 mg Cap Please call pt at your earliest convenience.  Thanks !

## 2018-01-26 RX ORDER — DABIGATRAN ETEXILATE 150 MG/1
150 CAPSULE ORAL 2 TIMES DAILY
Qty: 180 CAPSULE | Refills: 2 | Status: SHIPPED | OUTPATIENT
Start: 2018-01-26 | End: 2018-01-29 | Stop reason: SDUPTHER

## 2018-01-29 DIAGNOSIS — I48.20 CHRONIC ATRIAL FIBRILLATION: ICD-10-CM

## 2018-01-30 ENCOUNTER — PATIENT MESSAGE (OUTPATIENT)
Dept: CARDIOLOGY | Facility: CLINIC | Age: 68
End: 2018-01-30

## 2018-01-30 ENCOUNTER — TELEPHONE (OUTPATIENT)
Dept: FAMILY MEDICINE | Facility: CLINIC | Age: 68
End: 2018-01-30

## 2018-01-30 DIAGNOSIS — I48.20 CHRONIC ATRIAL FIBRILLATION: ICD-10-CM

## 2018-01-30 RX ORDER — DABIGATRAN ETEXILATE 150 MG/1
150 CAPSULE ORAL 2 TIMES DAILY
Qty: 180 CAPSULE | Refills: 2 | Status: SHIPPED | OUTPATIENT
Start: 2018-01-30 | End: 2019-11-08 | Stop reason: SDUPTHER

## 2018-01-30 RX ORDER — DABIGATRAN ETEXILATE 150 MG/1
150 CAPSULE ORAL 2 TIMES DAILY
Qty: 180 CAPSULE | Refills: 2 | Status: CANCELLED | OUTPATIENT
Start: 2018-01-30

## 2018-01-30 NOTE — TELEPHONE ENCOUNTER
----- Message from Anne Beal sent at 1/30/2018 11:20 AM CST -----  Contact: self  Contact pt and advise if script is ready for . dabigatran etexilate (PRADAXA) 150 mg Cap. Contact pt at [phone].    Thanks-

## 2018-01-31 ENCOUNTER — OFFICE VISIT (OUTPATIENT)
Dept: UROLOGY | Facility: CLINIC | Age: 68
End: 2018-01-31
Payer: MEDICARE

## 2018-01-31 ENCOUNTER — TELEPHONE (OUTPATIENT)
Dept: UROLOGY | Facility: CLINIC | Age: 68
End: 2018-01-31

## 2018-01-31 VITALS
SYSTOLIC BLOOD PRESSURE: 141 MMHG | DIASTOLIC BLOOD PRESSURE: 75 MMHG | BODY MASS INDEX: 30.96 KG/M2 | HEART RATE: 56 BPM | HEIGHT: 69 IN | WEIGHT: 209 LBS

## 2018-01-31 DIAGNOSIS — N13.8 BPH WITH URINARY OBSTRUCTION: Primary | ICD-10-CM

## 2018-01-31 DIAGNOSIS — N40.1 BPH WITH URINARY OBSTRUCTION: Primary | ICD-10-CM

## 2018-01-31 DIAGNOSIS — N40.0 BENIGN PROSTATIC HYPERPLASIA, UNSPECIFIED WHETHER LOWER URINARY TRACT SYMPTOMS PRESENT: Primary | ICD-10-CM

## 2018-01-31 PROCEDURE — 99213 OFFICE O/P EST LOW 20 MIN: CPT | Mod: S$PBB,,, | Performed by: UROLOGY

## 2018-01-31 PROCEDURE — 99213 OFFICE O/P EST LOW 20 MIN: CPT | Mod: PBBFAC | Performed by: UROLOGY

## 2018-01-31 PROCEDURE — 1159F MED LIST DOCD IN RCRD: CPT | Mod: ,,, | Performed by: UROLOGY

## 2018-01-31 PROCEDURE — 99999 PR PBB SHADOW E&M-EST. PATIENT-LVL III: CPT | Mod: PBBFAC,,, | Performed by: UROLOGY

## 2018-01-31 PROCEDURE — 1126F AMNT PAIN NOTED NONE PRSNT: CPT | Mod: ,,, | Performed by: UROLOGY

## 2018-01-31 NOTE — PROGRESS NOTES
Subjective:       Patient ID: Omar Pacheco is a 67 y.o. male.    Chief Complaint: bph with urinary obstruction (pt rtc today to discuss poss surgery with dr.prats wilson.)    HPI patient is here to schedule reason.  He has a 4.5 cm obstructing prostate with lateral lobes.  Risks and benefits were carefully discussed.  He understands that he may have no improvement.  For several weeks and that it may take several months to see full improvement.  He understands the risks of the other procedures including erectile dysfunction and incontinence however these have not been reported with reason.  He understands that he will need to have a Watkins catheter for several days after the procedure    Past Medical History:   Diagnosis Date    *Atrial fibrillation     Benign hypertrophy of prostate     Degenerative disc disease     GERD (gastroesophageal reflux disease)     Hx of colonic polyps     Hypertension     Pilonidal cyst 1971       Past Surgical History:   Procedure Laterality Date    COLONOSCOPY N/A 12/7/2016    Procedure: COLONOSCOPY;  Surgeon: Sumeet Lundy MD;  Location: [address]);  Service: Endoscopy;  Laterality: N/A;  OK to hold Pradaxa 2 days prior to procedure per Dr Jones/see note dated 11/15/16/svn    CYST REMOVAL  1971    coccyx    KNEE SURGERY  1989    right       History reviewed. No pertinent family history.    Social History     Social History    Marital status:      Spouse name: N/A    Number of children: N/A    Years of education: N/A     Occupational History    Not on file.     Social History Main Topics    Smoking status: Former Smoker     Years: 2.00     Types: Cigarettes     Quit date: 9/26/1975    Smokeless tobacco: Never Used    Alcohol use 1.8 oz/week     3 Glasses of wine per week    Drug use: No    Sexual activity: Not on file     Other Topics Concern    Not on file     Social History Narrative    No narrative on file       Allergies:  Patient has no  "known allergies.    Medications:    Current Outpatient Prescriptions:     ammonium lactate (LAC-HYDRIN) 12 % lotion, Apply topically 2 (two) times daily., Disp: 225 g, Rfl: 3    ascorbic acid, vitamin C, (VITAMIN C) 100 MG tablet, Take 100 mg by mouth once daily., Disp: , Rfl:     dabigatran etexilate (PRADAXA) 150 mg Cap, Take 1 capsule (150 mg total) by mouth 2 (two) times daily. "Do NOT break, chew, or open capsules.", Disp: 180 capsule, Rfl: 2    ferrous sulfate 325 (65 FE) MG EC tablet, Take 325 mg by mouth 2 (two) times daily., Disp: , Rfl:     methocarbamol (ROBAXIN) 500 MG Tab, Take 500 mg by mouth 4 (four) times daily as needed., Disp: , Rfl:     metoprolol succinate (TOPROL-XL) 50 MG 24 hr tablet, Take 1 tablet (50 mg total) by mouth once daily., Disp: 90 tablet, Rfl: 2    mirtazapine (REMERON) 30 MG tablet, Take 30 mg by mouth every evening., Disp: , Rfl:     multivitamin (THERAGRAN) per tablet, Take 1 tablet by mouth once daily., Disp: , Rfl:     omeprazole (PRILOSEC) 20 MG capsule, Take 1 capsule (20 mg total) by mouth once daily., Disp: 90 capsule, Rfl: 1    sildenafil (VIAGRA) 50 MG tablet, Take 1 tablet (50 mg total) by mouth daily as needed for Erectile Dysfunction., Disp: 24 tablet, Rfl: 3    terazosin (HYTRIN) 10 MG capsule, Take 2 capsules (20 mg total) by mouth every evening., Disp: 90 capsule, Rfl: 3    terazosin (HYTRIN) 10 MG capsule, Take 1 capsule (10 mg total) by mouth every evening., Disp: 30 capsule, Rfl: 11    flecainide (TAMBOCOR) 50 MG Tab, Take 2 tablets (100 mg total) by mouth every 12 (twelve) hours., Disp: 120 tablet, Rfl: 1    naproxen (NAPROSYN) 500 MG tablet, Take 1 tablet (500 mg total) by mouth 2 (two) times daily with meals., Disp: 30 tablet, Rfl: 0    terazosin (HYTRIN) 10 MG capsule, Take 1 capsule (10 mg total) by mouth every evening., Disp: 30 capsule, Rfl: 11    terazosin (HYTRIN) 10 MG capsule, Take 1 capsule (10 mg total) by mouth every evening., Disp: " 30 capsule, Rfl: 11    VITAMIN B COMPLEX (B COMPLETE ORAL), Take by mouth once daily., Disp: , Rfl:     Review of Systems   Constitutional: Negative for activity change, appetite change, chills, diaphoresis, fatigue, fever and unexpected weight change.   HENT: Negative for congestion, dental problem, hearing loss, mouth sores, postnasal drip, rhinorrhea, sinus pressure and trouble swallowing.    Eyes: Negative for pain, discharge and itching.   Respiratory: Negative for apnea, cough, choking, chest tightness, shortness of breath and wheezing.    Cardiovascular: Negative for chest pain, palpitations and leg swelling.   Gastrointestinal: Negative for abdominal distention, abdominal pain, anal bleeding, blood in stool, constipation, diarrhea, nausea, rectal pain and vomiting.   Endocrine: Negative for polydipsia and polyuria.   Genitourinary: Positive for decreased urine volume, difficulty urinating and urgency. Negative for discharge, dysuria, enuresis, flank pain, frequency, genital sores, hematuria, penile pain, penile swelling, scrotal swelling and testicular pain.   Musculoskeletal: Negative for arthralgias, back pain and myalgias.   Skin: Negative for color change, rash and wound.   Neurological: Negative for dizziness, syncope, speech difficulty, light-headedness and headaches.   Hematological: Negative for adenopathy. Does not bruise/bleed easily.   Psychiatric/Behavioral: Negative for behavioral problems, confusion, hallucinations and sleep disturbance.       Objective:      Physical Exam   Constitutional: He appears well-developed.   HENT:   Head: Normocephalic.   Cardiovascular: Normal rate.    Pulmonary/Chest: Effort normal.   Abdominal: Soft.   Genitourinary: Prostate normal.   Neurological: He is alert.   Skin: Skin is warm.     Psychiatric: He has a normal mood and affect.       Assessment:       1. BPH with urinary obstruction        Plan:       Omar was seen today for bph with urinary  obstruction.    Diagnoses and all orders for this visit:    BPH with urinary obstruction     schedule reason

## 2018-02-02 ENCOUNTER — PATIENT MESSAGE (OUTPATIENT)
Dept: UROLOGY | Facility: CLINIC | Age: 68
End: 2018-02-02

## 2018-02-03 ENCOUNTER — OFFICE VISIT (OUTPATIENT)
Dept: FAMILY MEDICINE | Facility: CLINIC | Age: 68
End: 2018-02-03
Payer: MEDICARE

## 2018-02-03 VITALS
BODY MASS INDEX: 30.36 KG/M2 | SYSTOLIC BLOOD PRESSURE: 134 MMHG | TEMPERATURE: 99 F | HEIGHT: 69 IN | OXYGEN SATURATION: 96 % | DIASTOLIC BLOOD PRESSURE: 74 MMHG | HEART RATE: 94 BPM | WEIGHT: 205 LBS

## 2018-02-03 DIAGNOSIS — L30.9 DERMATITIS: ICD-10-CM

## 2018-02-03 DIAGNOSIS — M51.37 DEGENERATION OF LUMBAR OR LUMBOSACRAL INTERVERTEBRAL DISC: ICD-10-CM

## 2018-02-03 DIAGNOSIS — M62.830 MUSCLE SPASM OF BACK: ICD-10-CM

## 2018-02-03 DIAGNOSIS — I10 ESSENTIAL HYPERTENSION: ICD-10-CM

## 2018-02-03 DIAGNOSIS — I48.0 PAROXYSMAL ATRIAL FIBRILLATION: Primary | Chronic | ICD-10-CM

## 2018-02-03 DIAGNOSIS — M48.061 SPINAL STENOSIS OF LUMBAR REGION WITHOUT NEUROGENIC CLAUDICATION: ICD-10-CM

## 2018-02-03 DIAGNOSIS — N52.9 ERECTILE DYSFUNCTION, UNSPECIFIED ERECTILE DYSFUNCTION TYPE: ICD-10-CM

## 2018-02-03 PROCEDURE — 99214 OFFICE O/P EST MOD 30 MIN: CPT | Mod: PBBFAC,PO | Performed by: FAMILY MEDICINE

## 2018-02-03 PROCEDURE — 1125F AMNT PAIN NOTED PAIN PRSNT: CPT | Mod: ,,, | Performed by: FAMILY MEDICINE

## 2018-02-03 PROCEDURE — 1159F MED LIST DOCD IN RCRD: CPT | Mod: ,,, | Performed by: FAMILY MEDICINE

## 2018-02-03 PROCEDURE — 99214 OFFICE O/P EST MOD 30 MIN: CPT | Mod: S$PBB,,, | Performed by: FAMILY MEDICINE

## 2018-02-03 PROCEDURE — 99999 PR PBB SHADOW E&M-EST. PATIENT-LVL IV: CPT | Mod: PBBFAC,,, | Performed by: FAMILY MEDICINE

## 2018-02-03 RX ORDER — SILDENAFIL 50 MG/1
50 TABLET, FILM COATED ORAL DAILY PRN
Qty: 24 TABLET | Refills: 5 | Status: SHIPPED | OUTPATIENT
Start: 2018-02-03 | End: 2018-02-03 | Stop reason: SDUPTHER

## 2018-02-03 RX ORDER — METHOCARBAMOL 500 MG/1
1000 TABLET, FILM COATED ORAL NIGHTLY PRN
Qty: 60 TABLET | Refills: 5 | Status: SHIPPED | OUTPATIENT
Start: 2018-02-03 | End: 2018-02-03 | Stop reason: SDUPTHER

## 2018-02-03 RX ORDER — HYDROCODONE BITARTRATE AND ACETAMINOPHEN 5; 325 MG/1; MG/1
1 TABLET ORAL EVERY 6 HOURS PRN
Qty: 45 TABLET | Refills: 0 | Status: SHIPPED | OUTPATIENT
Start: 2018-02-03 | End: 2018-02-28

## 2018-02-03 RX ORDER — METOPROLOL SUCCINATE 50 MG/1
50 TABLET, EXTENDED RELEASE ORAL DAILY
Qty: 90 TABLET | Refills: 2 | Status: SHIPPED | OUTPATIENT
Start: 2018-02-03 | End: 2019-03-13 | Stop reason: SDUPTHER

## 2018-02-03 RX ORDER — METOPROLOL SUCCINATE 50 MG/1
50 TABLET, EXTENDED RELEASE ORAL DAILY
Qty: 90 TABLET | Refills: 2 | Status: SHIPPED | OUTPATIENT
Start: 2018-02-03 | End: 2018-02-03 | Stop reason: SDUPTHER

## 2018-02-03 RX ORDER — SILDENAFIL 50 MG/1
50 TABLET, FILM COATED ORAL DAILY PRN
Qty: 24 TABLET | Refills: 5 | Status: SHIPPED | OUTPATIENT
Start: 2018-02-03 | End: 2018-10-23

## 2018-02-03 RX ORDER — AMMONIUM LACTATE 12 G/100G
LOTION TOPICAL 2 TIMES DAILY
Qty: 225 G | Refills: 5 | Status: SHIPPED | OUTPATIENT
Start: 2018-02-03 | End: 2018-02-03 | Stop reason: SDUPTHER

## 2018-02-03 RX ORDER — METHOCARBAMOL 500 MG/1
1000 TABLET, FILM COATED ORAL NIGHTLY PRN
Qty: 60 TABLET | Refills: 5 | Status: SHIPPED | OUTPATIENT
Start: 2018-02-03 | End: 2020-03-23 | Stop reason: SDUPTHER

## 2018-02-03 RX ORDER — METHYLPREDNISOLONE 4 MG/1
TABLET ORAL
Qty: 1 PACKAGE | Refills: 0 | Status: SHIPPED | OUTPATIENT
Start: 2018-02-03 | End: 2018-04-02 | Stop reason: ALTCHOICE

## 2018-02-03 RX ORDER — HYDROCODONE BITARTRATE AND ACETAMINOPHEN 5; 325 MG/1; MG/1
1 TABLET ORAL EVERY 6 HOURS PRN
Qty: 45 TABLET | Refills: 0 | Status: SHIPPED | OUTPATIENT
Start: 2018-02-03 | End: 2018-02-03 | Stop reason: SDUPTHER

## 2018-02-03 RX ORDER — AMMONIUM LACTATE 12 G/100G
LOTION TOPICAL 2 TIMES DAILY
Qty: 225 G | Refills: 5 | Status: SHIPPED | OUTPATIENT
Start: 2018-02-03 | End: 2018-12-03 | Stop reason: SDUPTHER

## 2018-02-03 NOTE — PROGRESS NOTES
Chief Complaint   Patient presents with    Follow-up       HPI  Omar Pacheco is a 67 y.o. male with multiple medical diagnoses as listed in the medical history and problem list that presents for follow-up for low back pain that occurred after a trip to [facility]. He was seen in their Urgent Care and given hydrocodone. He was seen 1/24 in our urgent care for recurrence of back pain after fixing a friend's water heater. He was treated with medication, steroids and is set up for an MRI. He is having numbness in his leg but his pain is better. [facility].    PAST MEDICAL HISTORY:  Past Medical History:   Diagnosis Date    *Atrial fibrillation     Benign hypertrophy of prostate     Degenerative disc disease     GERD (gastroesophageal reflux disease)     Hx of colonic polyps     Hypertension     Pilonidal cyst 1971       PAST SURGICAL HISTORY:  Past Surgical History:   Procedure Laterality Date    COLONOSCOPY N/A 12/7/2016    Procedure: COLONOSCOPY;  Surgeon: Sumeet Lundy MD;  Location: [address]);  Service: Endoscopy;  Laterality: N/A;  OK to hold Pradaxa 2 days prior to procedure per Dr Jones/see note dated 11/15/16/svn    CYST REMOVAL  1971    coccyx    KNEE SURGERY  1989    right       SOCIAL HISTORY:  Social History     Social History    Marital status:      Spouse name: N/A    Number of children: N/A    Years of education: N/A     Occupational History    Not on file.     Social History Main Topics    Smoking status: Former Smoker     Years: 2.00     Types: Cigarettes     Quit date: 9/26/1975    Smokeless tobacco: Never Used    Alcohol use 1.8 oz/week     3 Glasses of wine per week    Drug use: No    Sexual activity: Not on file     Other Topics Concern    Not on file     Social History Narrative    No narrative on file       FAMILY HISTORY:  History reviewed. No pertinent family history.    ALLERGIES AND MEDICATIONS: updated and  "reviewed.  Review of patient's allergies indicates:  No Known Allergies  Current Outpatient Prescriptions   Medication Sig Dispense Refill    ammonium lactate (LAC-HYDRIN) 12 % lotion Apply topically 2 (two) times daily. 225 g 5    ascorbic acid, vitamin C, (VITAMIN C) 100 MG tablet Take 100 mg by mouth once daily.      dabigatran etexilate (PRADAXA) 150 mg Cap Take 1 capsule (150 mg total) by mouth 2 (two) times daily. "Do NOT break, chew, or open capsules." 180 capsule 2    ferrous sulfate 325 (65 FE) MG EC tablet Take 325 mg by mouth 2 (two) times daily.      methocarbamol (ROBAXIN) 500 MG Tab Take 2 tablets (1,000 mg total) by mouth nightly as needed. 60 tablet 5    metoprolol succinate (TOPROL-XL) 50 MG 24 hr tablet Take 1 tablet (50 mg total) by mouth once daily. 90 tablet 2    mirtazapine (REMERON) 30 MG tablet Take 30 mg by mouth every evening.      multivitamin (THERAGRAN) per tablet Take 1 tablet by mouth once daily.      omeprazole (PRILOSEC) 20 MG capsule Take 1 capsule (20 mg total) by mouth once daily. 90 capsule 1    sildenafil (VIAGRA) 50 MG tablet Take 1 tablet (50 mg total) by mouth daily as needed for Erectile Dysfunction. 24 tablet 5    terazosin (HYTRIN) 10 MG capsule Take 1 capsule (10 mg total) by mouth every evening. 30 capsule 11    VITAMIN B COMPLEX (B COMPLETE ORAL) Take by mouth once daily.      flecainide (TAMBOCOR) 50 MG Tab Take 2 tablets (100 mg total) by mouth every 12 (twelve) hours. 120 tablet 1    hydrocodone-acetaminophen 5-325mg (NORCO) 5-325 mg per tablet Take 1 tablet by mouth every 6 (six) hours as needed. 45 tablet 0    methylPREDNISolone (MEDROL DOSEPACK) 4 mg tablet use as directed 1 Package 0    naproxen (NAPROSYN) 500 MG tablet Take 1 tablet (500 mg total) by mouth 2 (two) times daily with meals. 30 tablet 0     No current facility-administered medications for this visit.        ROS  Review of Systems   Constitutional: Negative for chills, fatigue, fever " "and unexpected weight change.   HENT: Negative for ear pain, postnasal drip, rhinorrhea, sinus pressure and sore throat.    Eyes: Negative for photophobia and visual disturbance.   Respiratory: Negative for apnea, cough, chest tightness, shortness of breath and wheezing.    Cardiovascular: Negative for chest pain and palpitations.   Gastrointestinal: Negative for abdominal pain, blood in stool, constipation, diarrhea, nausea and vomiting.   Genitourinary: Negative for difficulty urinating.   Musculoskeletal: Positive for arthralgias and back pain. Negative for joint swelling.   Skin: Negative for rash.   Neurological: Negative for facial asymmetry, speech difficulty, weakness, numbness and headaches.   Psychiatric/Behavioral: Negative for dysphoric mood.       Physical Exam  Vitals:    02/03/18 0948   BP: 134/74   Pulse: 94   Temp: 98.7 °F (37.1 °C)   TempSrc: Oral   SpO2: 96%   Weight: 93 kg (205 lb 0.4 oz)   Height: 5' 9" (1.753 m)    Body mass index is 30.28 kg/m².  Weight: 93 kg (205 lb 0.4 oz)   Height: 5' 9" (175.3 cm)     Physical Exam   Constitutional: He is oriented to person, place, and time. He appears well-developed and well-nourished.   HENT:   Head: Normocephalic and atraumatic.   Eyes: EOM are normal.   Musculoskeletal:        Arms:  Neurological: He is alert and oriented to person, place, and time.   Skin: Skin is warm and dry. No rash noted.   Psychiatric: He has a normal mood and affect. His behavior is normal.   Nursing note and vitals reviewed.      Health Maintenance       Date Due Completion Date    TETANUS VACCINE 12/13/1966 ---    Zoster Vaccine 12/13/2008 ---    Influenza Vaccine 08/01/2017 2/13/2017    Pneumococcal (65+) (2 of 2 - PPSV23) 02/13/2018 2/13/2017    Colonoscopy 12/07/2019 12/7/2016    Lipid Panel 02/13/2022 2/13/2017            ASSESSMENT     1. Paroxysmal atrial fibrillation    2. Degeneration of lumbar or lumbosacral intervertebral disc    3. Spinal stenosis of lumbar region " without neurogenic claudication    4. Muscle spasm of back    5. Essential hypertension    6. Dermatitis    7. Erectile dysfunction, unspecified erectile dysfunction type        PLAN:     Problem List Items Addressed This Visit        Neuro    Degeneration of lumbar or lumbosacral intervertebral disc  -he is considering surgery and [facility]    Relevant Medications    methylPREDNISolone (MEDROL DOSEPACK) 4 mg tablet    hydrocodone-acetaminophen 5-325mg (NORCO) 5-325 mg per tablet    methocarbamol (ROBAXIN) 500 MG Tab    Lumbar spinal stenosis  -will give norco and medrol in case of a flare he is still in significant pain    Relevant Medications    methylPREDNISolone (MEDROL DOSEPACK) 4 mg tablet    hydrocodone-acetaminophen 5-325mg (NORCO) 5-325 mg per tablet    methocarbamol (ROBAXIN) 500 MG Tab       Cardiac/Vascular    Atrial fibrillation - paroxysmal - Primary (Chronic)  -This is a chronic medical condition that is stable under the current regimen. Continue to monitor and we will make medication adjustments as needed.       Hypertension (Chronic)  -Bp stable off of HCTZ, continue current regimen    Relevant Medications    metoprolol succinate (TOPROL-XL) 50 MG 24 hr tablet       Orthopedic    Muscle spasm of back  --he is considering surgery and is [facility]    Relevant Medications    methylPREDNISolone (MEDROL DOSEPACK) 4 mg tablet    hydrocodone-acetaminophen 5-325mg (NORCO) 5-325 mg per tablet      Other Visit Diagnoses     Dermatitis      -This is a chronic medical condition that is stable under the current regimen. Continue to monitor and we will make medication adjustments as needed.       Relevant Medications    ammonium lactate (LAC-HYDRIN) 12 % lotion    Erectile dysfunction, unspecified erectile dysfunction type     -This is a chronic medical condition that is stable under the current regimen. Continue to monitor and we will make medication adjustments as  needed.        Relevant Medications    sildenafil (VIAGRA) 50 MG tablet            Donya Goode MD  02/03/2018 10:05 AM        Follow-up in about 6 months (around 8/3/2018) for Follow up.

## 2018-02-05 ENCOUNTER — TELEPHONE (OUTPATIENT)
Dept: UROLOGY | Facility: CLINIC | Age: 68
End: 2018-02-05

## 2018-02-05 ENCOUNTER — ANESTHESIA EVENT (OUTPATIENT)
Dept: SURGERY | Facility: HOSPITAL | Age: 68
End: 2018-02-05
Payer: MEDICARE

## 2018-02-05 DIAGNOSIS — Z01.818 PREOPERATIVE TESTING: Primary | ICD-10-CM

## 2018-02-05 NOTE — PRE ADMISSION SCREENING
Anesthesia Assessment: Preoperative EQUATION    Planned Procedure: Procedure(s) (LRB):  DESTRUCTION-PROSTATE-TRANSURETHRAL (N/A)  Requested Anesthesia Type:General  Surgeon: Earnest Martinez Jr., MD  Service: Urology  Known or anticipated Date of Surgery:2/16/2018    Surgeon notes: reviewed    Electronic QUestionnaire Assessment completed via nurse interview with patient.        NO AQ    Triage considerations:     The patient has no apparent active cardiac condition (No unstable coronary Syndrome such as severe unstable angina or recent [<1 month] myocardial infarction, decompensated CHF, severe valvular   disease or significant arrhythmia)    Previous anesthesia records:GETA, MAC and No problems   2016 colonoscopy  Airway/Jaw/Neck:  Airway Findings: Mouth Opening: Normal Tongue: Normal  General Airway Assessment: Adult  Mallampati: II  Improves to II with phonation.  TM Distance: Normal, at least 6 cm       Last PCP note: within 1 month , within [facility]   Subspecialty notes: Cardiology: General    Other important co-morbidities: HX A fib, HTN, GERD     Tests already available:  Available tests,  6-12 months ago , within [facility] . 7/21/17 CBC, BMP. 2013 EKG.            Instructions given. (See in Nurse's note)    Optimization:  Anesthesia Preop Clinic Assessment  Indicated-not required for this procedure        Plan:    Testing:  Hematology Profile, BMP and EKG     Patient  has previously scheduled Medical Appointment: none    Navigation: Tests Scheduled. Am of surgery                             Straight Line to surgery.

## 2018-02-05 NOTE — TELEPHONE ENCOUNTER
Per Dr Hernandez he states patient can hold blood thinner as necessary for procedure as of 1/31/18.

## 2018-02-05 NOTE — ANESTHESIA PREPROCEDURE EVALUATION
Pre Admission Screening  Vaishali Desir RN      []Hide copied text  Anesthesia Assessment: Preoperative EQUATION     Planned Procedure: Procedure(s) (LRB):  DESTRUCTION-PROSTATE-TRANSURETHRAL (N/A)  Requested Anesthesia Type:General  Surgeon: Earnest Martinez Jr., MD  Service: Urology  Known or anticipated Date of Surgery:2/16/2018     Surgeon notes: reviewed     Electronic QUestionnaire Assessment completed via nurse interview with patient.         NO AQ     Triage considerations:      The patient has no apparent active cardiac condition (No unstable coronary Syndrome such as severe unstable angina or recent [<1 month] myocardial infarction, decompensated CHF, severe valvular   disease or significant arrhythmia)     Previous anesthesia records:GETA, MAC and No problems   2016 colonoscopy  Airway/Jaw/Neck:  Airway Findings: Mouth Opening: Normal Tongue: Normal  General Airway Assessment: Adult  Mallampati: II  Improves to II with phonation.  TM Distance: Normal, at least 6 cm        Last PCP note: within 1 month , within [facility]   Subspecialty notes: Cardiology: General     Other important co-morbidities: HX A fib, HTN, GERD     Tests already available:  Available tests,  6-12 months ago , within [facility] . 7/21/17 CBC, BMP. 2013 EKG.                            Instructions given. (See in Nurse's note)     Optimization:  Anesthesia Preop Clinic Assessment  Indicated-not required for this procedure                   Plan:    Testing:  Hematology Profile, BMP and EKG                           Patient  has previously scheduled Medical Appointment: none     Navigation: Tests Scheduled. Am of surgery                                                  Straight Line to surgery.                             Electronically signed by Vaishali Desir RN at 2/5/2018 11:13 AM        Pre-admit on 2/16/2018            Detailed Report                                                                                                                        02/05/2018  Omar Pacheco is a 67 y.o., male.    Anesthesia Evaluation    I have reviewed the Patient Summary Reports.    I have reviewed the Nursing Notes.   I have reviewed the Medications.   Steroids Taken In Past Year: Prednisone    Review of Systems  Anesthesia Hx:  No problems with previous Anesthesia  History of prior surgery of interest to airway management or planning: Previous anesthesia: MAC  2016 colonoscopy with MAC.  Procedure performed at an [facility] Facility. Denies Family Hx of Anesthesia complications.   Denies Personal Hx of Anesthesia complications.   Social:  Former Smoker    Hematology/Oncology:  Hematology Normal   Oncology Normal     EENT/Dental:EENT/Dental Normal   Cardiovascular:    Denies Angina.  Functional Capacity good / => 4 METS  Hypertension , Well Controlled on Rx  Disorder of Cardiac Rhythm, Atrial Fibrillation, controlled on medical Rx    Pulmonary:  Pulmonary Normal  Denies Shortness of breath.  Denies Recent URI.    Renal/:   BPH    Hepatic/GI:   GERD, well controlled    Musculoskeletal:   Arthritis   Spine Disorders:  Lumbar Spine Disorders, Lumbar Disc Disease   Neurological:  Pain Etiology/Diagnosis, Arthritis, Low Back Pain    Endocrine:  Metabolic Disorders, Obesity / BMI > 30      Physical Exam  General:  Well nourished    Airway/Jaw/Neck:  Airway Findings: Mouth Opening: Normal Tongue: Normal  Limited neck extension 2/2 pain  General Airway Assessment: Possible difficult intubation  Mallampati: III  Improves to II with phonation.  TM Distance: Normal, at least 6 cm  Jaw/Neck Findings:  Micrognathia: Negative Neck ROM: Extension Decreased, Mod.      Dental:  Dental Findings: upper partial dentures   Chest/Lungs:  Chest/Lungs Findings: Clear to auscultation, Normal Respiratory Rate     Heart/Vascular:  Heart Findings: Rate: Normal  Rhythm: Regular Rhythm  Sounds: Normal  Heart murmur: negative    Abdomen:  Abdomen Findings:  Normal, Nontender, Soft        Mental Status:  Mental Status Findings:  Cooperative, Alert and Oriented         Anesthesia Plan  Type of Anesthesia, risks & benefits discussed:  Anesthesia Type:  MAC, general  Patient's Preference:   Intra-op Monitoring Plan:   Intra-op Monitoring Plan Comments:   Post Op Pain Control Plan:   Post Op Pain Control Plan Comments:   Induction:   IV  Beta Blocker:  Patient is not currently on a Beta-Blocker (No further documentation required).       Informed Consent: Patient understands risks and agrees with Anesthesia plan.  Questions answered. Anesthesia consent signed with patient.  ASA Score: 2     Day of Surgery Review of History & Physical:    H&P update referred to the surgeon.         Ready For Surgery From Anesthesia Perspective.

## 2018-02-14 ENCOUNTER — TELEPHONE (OUTPATIENT)
Dept: UROLOGY | Facility: CLINIC | Age: 68
End: 2018-02-14

## 2018-02-14 NOTE — TELEPHONE ENCOUNTER
Called pt to [id number]  arrival time for procedure. Gave pt NPO instructions and gave pt opportunity to ask questions. Pt verbalized understanding.

## 2018-02-16 ENCOUNTER — HOSPITAL ENCOUNTER (OUTPATIENT)
Facility: HOSPITAL | Age: 68
Discharge: HOME | End: 2018-02-16
Attending: UROLOGY | Admitting: UROLOGY
Payer: MEDICARE

## 2018-02-16 ENCOUNTER — ANESTHESIA (OUTPATIENT)
Dept: SURGERY | Facility: HOSPITAL | Age: 68
End: 2018-02-16
Payer: MEDICARE

## 2018-02-16 VITALS
WEIGHT: 205 LBS | HEIGHT: 69 IN | BODY MASS INDEX: 30.36 KG/M2 | RESPIRATION RATE: 18 BRPM | SYSTOLIC BLOOD PRESSURE: 137 MMHG | HEART RATE: 52 BPM | OXYGEN SATURATION: 97 % | DIASTOLIC BLOOD PRESSURE: 68 MMHG | TEMPERATURE: 98 F

## 2018-02-16 DIAGNOSIS — N13.8 BPH WITH OBSTRUCTION/LOWER URINARY TRACT SYMPTOMS: Primary | ICD-10-CM

## 2018-02-16 DIAGNOSIS — N40.1 BPH WITH OBSTRUCTION/LOWER URINARY TRACT SYMPTOMS: Primary | ICD-10-CM

## 2018-02-16 DIAGNOSIS — Z01.818 PREOPERATIVE TESTING: ICD-10-CM

## 2018-02-16 LAB
ANION GAP SERPL CALC-SCNC: 6 MMOL/L
BUN SERPL-MCNC: 10 MG/DL
CALCIUM SERPL-MCNC: 9 MG/DL
CHLORIDE SERPL-SCNC: 105 MMOL/L
CO2 SERPL-SCNC: 28 MMOL/L
CREAT SERPL-MCNC: 0.9 MG/DL
ERYTHROCYTE [DISTWIDTH] IN BLOOD BY AUTOMATED COUNT: 13.2 %
EST. GFR  (AFRICAN AMERICAN): >60 ML/MIN/1.73 M^2
EST. GFR  (NON AFRICAN AMERICAN): >60 ML/MIN/1.73 M^2
GLUCOSE SERPL-MCNC: 109 MG/DL
HCT VFR BLD AUTO: 37.5 %
HGB BLD-MCNC: 12.7 G/DL
MCH RBC QN AUTO: 30.8 PG
MCHC RBC AUTO-ENTMCNC: 33.9 G/DL
MCV RBC AUTO: 91 FL
PLATELET # BLD AUTO: 194 K/UL
PMV BLD AUTO: 9.1 FL
POTASSIUM SERPL-SCNC: 4 MMOL/L
RBC # BLD AUTO: 4.13 M/UL
SODIUM SERPL-SCNC: 139 MMOL/L
WBC # BLD AUTO: 5.51 K/UL

## 2018-02-16 PROCEDURE — 37000009 HC ANESTHESIA EA ADD 15 MINS: Performed by: UROLOGY

## 2018-02-16 PROCEDURE — 25000003 PHARM REV CODE 250: Performed by: STUDENT IN AN ORGANIZED HEALTH CARE EDUCATION/TRAINING PROGRAM

## 2018-02-16 PROCEDURE — 53852 PROSTATIC RF THERMOTX: CPT | Mod: GC,,, | Performed by: UROLOGY

## 2018-02-16 PROCEDURE — D9220A PRA ANESTHESIA: Mod: ANES,,, | Performed by: ANESTHESIOLOGY

## 2018-02-16 PROCEDURE — 93005 ELECTROCARDIOGRAM TRACING: CPT

## 2018-02-16 PROCEDURE — 37000008 HC ANESTHESIA 1ST 15 MINUTES: Performed by: UROLOGY

## 2018-02-16 PROCEDURE — 71000033 HC RECOVERY, INTIAL HOUR: Performed by: UROLOGY

## 2018-02-16 PROCEDURE — 63600175 PHARM REV CODE 636 W HCPCS: Performed by: STUDENT IN AN ORGANIZED HEALTH CARE EDUCATION/TRAINING PROGRAM

## 2018-02-16 PROCEDURE — 71000039 HC RECOVERY, EACH ADD'L HOUR: Performed by: UROLOGY

## 2018-02-16 PROCEDURE — 63600175 PHARM REV CODE 636 W HCPCS: Performed by: ANESTHESIOLOGY

## 2018-02-16 PROCEDURE — 71000015 HC POSTOP RECOV 1ST HR: Performed by: UROLOGY

## 2018-02-16 PROCEDURE — 80048 BASIC METABOLIC PNL TOTAL CA: CPT

## 2018-02-16 PROCEDURE — 85027 COMPLETE CBC AUTOMATED: CPT

## 2018-02-16 PROCEDURE — 93010 ELECTROCARDIOGRAM REPORT: CPT | Mod: ,,, | Performed by: INTERNAL MEDICINE

## 2018-02-16 PROCEDURE — 63600175 PHARM REV CODE 636 W HCPCS: Performed by: NURSE ANESTHETIST, CERTIFIED REGISTERED

## 2018-02-16 PROCEDURE — 27202354

## 2018-02-16 PROCEDURE — 25000003 PHARM REV CODE 250: Performed by: UROLOGY

## 2018-02-16 PROCEDURE — 36000707: Performed by: UROLOGY

## 2018-02-16 PROCEDURE — 36000706: Performed by: UROLOGY

## 2018-02-16 PROCEDURE — 63600175 PHARM REV CODE 636 W HCPCS

## 2018-02-16 PROCEDURE — D9220A PRA ANESTHESIA: Mod: CRNA,,, | Performed by: NURSE ANESTHETIST, CERTIFIED REGISTERED

## 2018-02-16 PROCEDURE — 27202355

## 2018-02-16 RX ORDER — OXYBUTYNIN CHLORIDE 5 MG/1
5 TABLET ORAL 3 TIMES DAILY
Qty: 21 TABLET | Refills: 0 | Status: SHIPPED | OUTPATIENT
Start: 2018-02-16 | End: 2018-04-02

## 2018-02-16 RX ORDER — PHENAZOPYRIDINE HYDROCHLORIDE 200 MG/1
TABLET, FILM COATED ORAL
Status: DISCONTINUED
Start: 2018-02-16 | End: 2018-02-16 | Stop reason: HOSPADM

## 2018-02-16 RX ORDER — LIDOCAINE HCL/PF 100 MG/5ML
SYRINGE (ML) INTRAVENOUS
Status: DISCONTINUED | OUTPATIENT
Start: 2018-02-16 | End: 2018-02-16

## 2018-02-16 RX ORDER — PHENAZOPYRIDINE HYDROCHLORIDE 200 MG/1
200 TABLET, FILM COATED ORAL ONCE
Status: COMPLETED | OUTPATIENT
Start: 2018-02-16 | End: 2018-02-16

## 2018-02-16 RX ORDER — ONDANSETRON 2 MG/ML
INJECTION INTRAMUSCULAR; INTRAVENOUS
Status: DISCONTINUED | OUTPATIENT
Start: 2018-02-16 | End: 2018-02-16

## 2018-02-16 RX ORDER — FENTANYL CITRATE 50 UG/ML
INJECTION, SOLUTION INTRAMUSCULAR; INTRAVENOUS
Status: COMPLETED
Start: 2018-02-16 | End: 2018-02-16

## 2018-02-16 RX ORDER — OXYCODONE AND ACETAMINOPHEN 5; 325 MG/1; MG/1
1 TABLET ORAL EVERY 4 HOURS PRN
Qty: 21 TABLET | Refills: 0 | Status: SHIPPED | OUTPATIENT
Start: 2018-02-16 | End: 2018-04-02

## 2018-02-16 RX ORDER — FENTANYL CITRATE 50 UG/ML
100 INJECTION, SOLUTION INTRAMUSCULAR; INTRAVENOUS ONCE
Status: COMPLETED | OUTPATIENT
Start: 2018-02-16 | End: 2018-02-16

## 2018-02-16 RX ORDER — CEFAZOLIN SODIUM 1 G/3ML
2 INJECTION, POWDER, FOR SOLUTION INTRAMUSCULAR; INTRAVENOUS
Status: COMPLETED | OUTPATIENT
Start: 2018-02-16 | End: 2018-02-16

## 2018-02-16 RX ORDER — SODIUM CHLORIDE 9 MG/ML
INJECTION, SOLUTION INTRAVENOUS CONTINUOUS
Status: DISCONTINUED | OUTPATIENT
Start: 2018-02-16 | End: 2018-02-16 | Stop reason: HOSPADM

## 2018-02-16 RX ORDER — OXYCODONE AND ACETAMINOPHEN 5; 325 MG/1; MG/1
1 TABLET ORAL EVERY 4 HOURS PRN
Status: DISCONTINUED | OUTPATIENT
Start: 2018-02-16 | End: 2018-02-16 | Stop reason: HOSPADM

## 2018-02-16 RX ORDER — PROPOFOL 10 MG/ML
VIAL (ML) INTRAVENOUS
Status: DISCONTINUED | OUTPATIENT
Start: 2018-02-16 | End: 2018-02-16

## 2018-02-16 RX ORDER — OXYBUTYNIN CHLORIDE 5 MG/1
5 TABLET ORAL 3 TIMES DAILY
Status: DISCONTINUED | OUTPATIENT
Start: 2018-02-16 | End: 2018-02-16 | Stop reason: HOSPADM

## 2018-02-16 RX ORDER — LIDOCAINE HYDROCHLORIDE 20 MG/ML
JELLY TOPICAL ONCE
Status: DISCONTINUED | OUTPATIENT
Start: 2018-02-16 | End: 2018-02-16 | Stop reason: HOSPADM

## 2018-02-16 RX ORDER — PHENAZOPYRIDINE HYDROCHLORIDE 100 MG/1
100 TABLET, FILM COATED ORAL 3 TIMES DAILY
Qty: 21 TABLET | Refills: 0 | Status: SHIPPED | OUTPATIENT
Start: 2018-02-16 | End: 2018-02-23

## 2018-02-16 RX ORDER — SULFAMETHOXAZOLE AND TRIMETHOPRIM 400; 80 MG/1; MG/1
1 TABLET ORAL 2 TIMES DAILY
Qty: 10 TABLET | Refills: 0 | Status: SHIPPED | OUTPATIENT
Start: 2018-02-16 | End: 2021-01-27

## 2018-02-16 RX ORDER — FENTANYL CITRATE 50 UG/ML
50 INJECTION, SOLUTION INTRAMUSCULAR; INTRAVENOUS ONCE
Status: COMPLETED | OUTPATIENT
Start: 2018-02-16 | End: 2018-02-16

## 2018-02-16 RX ADMIN — CEFAZOLIN 2 G: 330 INJECTION, POWDER, FOR SOLUTION INTRAMUSCULAR; INTRAVENOUS at 02:02

## 2018-02-16 RX ADMIN — SODIUM CHLORIDE: 0.9 INJECTION, SOLUTION INTRAVENOUS at 01:02

## 2018-02-16 RX ADMIN — OXYCODONE HYDROCHLORIDE AND ACETAMINOPHEN 1 TABLET: 5; 325 TABLET ORAL at 02:02

## 2018-02-16 RX ADMIN — FENTANYL CITRATE 50 MCG: 50 INJECTION, SOLUTION INTRAMUSCULAR; INTRAVENOUS at 02:02

## 2018-02-16 RX ADMIN — LIDOCAINE HYDROCHLORIDE 50 MG: 20 INJECTION, SOLUTION INTRAVENOUS at 02:02

## 2018-02-16 RX ADMIN — ONDANSETRON 4 MG: 2 INJECTION INTRAMUSCULAR; INTRAVENOUS at 02:02

## 2018-02-16 RX ADMIN — OXYBUTYNIN CHLORIDE 5 MG: 5 TABLET ORAL at 02:02

## 2018-02-16 RX ADMIN — PHENAZOPYRIDINE HYDROCHLORIDE 200 MG: 200 TABLET ORAL at 02:02

## 2018-02-16 RX ADMIN — FENTANYL CITRATE 50 MCG: 50 INJECTION, SOLUTION INTRAMUSCULAR; INTRAVENOUS at 03:02

## 2018-02-16 RX ADMIN — PROPOFOL 200 MG: 10 INJECTION, EMULSION INTRAVENOUS at 02:02

## 2018-02-16 NOTE — INTERVAL H&P NOTE
The patient has been examined and the H&P has been reviewed:    I concur with the findings and no changes have occurred since H&P was written.   Urine dip 3+ blood, trace leukocytes, and negative nitrites    Anesthesia/Surgery risks, benefits and alternative options discussed and understood by patient/family.          Active Hospital Problems    Diagnosis  POA    BPH with obstruction/lower urinary tract symptoms [N40.1, N13.8]  Yes      Resolved Hospital Problems    Diagnosis Date Resolved POA   No resolved problems to display.

## 2018-02-16 NOTE — ANESTHESIA POSTPROCEDURE EVALUATION
"Anesthesia Post Evaluation    Patient: Omar Pacheco    Procedure(s) Performed: Procedure(s) (LRB):  DESTRUCTION-PROSTATE-TRANSURETHRAL (N/A)    Final Anesthesia Type: general  Patient location during evaluation: PACU  Patient participation: Yes- Able to Participate  Level of consciousness: awake and alert and oriented  Post-procedure vital signs: reviewed and stable  Pain management: adequate  Airway patency: patent  PONV status at discharge: No PONV  Anesthetic complications: no      Cardiovascular status: blood pressure returned to baseline and hemodynamically stable  Respiratory status: unassisted, spontaneous ventilation and room air  Hydration status: euvolemic  Follow-up not needed.        Visit Vitals  BP (!) 145/73   Pulse (!) 58   Temp 36.6 °C (97.9 °F) (Temporal)   Resp 18   Ht 5' 9" (1.753 m)   Wt 93 kg (205 lb)   SpO2 95%   BMI 30.27 kg/m²       Pain/Robb Score: Pain Assessment Performed: Yes (2/16/2018  2:32 PM)  Presence of Pain: non-verbal indicators absent (2/16/2018  2:32 PM)  Pain Rating Prior to Med Admin: 8 (2/16/2018  3:23 PM)  Robb Score: 10 (2/16/2018  3:15 PM)      "

## 2018-02-16 NOTE — TRANSFER OF CARE
"Anesthesia Transfer of Care Note    Patient: Omar Pacheco    Procedure(s) Performed: Procedure(s) (LRB):  DESTRUCTION-PROSTATE-TRANSURETHRAL (N/A)    Patient location: PACU    Anesthesia Type: general    Transport from OR: Transported from OR on 6-10 L/min O2 by face mask with adequate spontaneous ventilation    Post pain: adequate analgesia    Post assessment: no apparent anesthetic complications    Post vital signs: stable    Level of consciousness: sedated    Nausea/Vomiting: no nausea/vomiting    Complications: none    Transfer of care protocol was followed      Last vitals:   Visit Vitals  BP (!) 153/72 (BP Location: Right arm, Patient Position: Lying)   Pulse (!) 53   Temp 36.6 °C (97.9 °F) (Oral)   Resp 18   Ht 5' 9" (1.753 m)   Wt 93 kg (205 lb)   SpO2 96%   BMI 30.27 kg/m²     "

## 2018-02-16 NOTE — DISCHARGE SUMMARY
"[facility]  Discharge Note  Short Stay    Admit Date: 2/16/2018    Discharge Date and Time: 02/16/2018 2:35 PM     Attending Physician: Earnest Martinez Jr., MD     Discharge Provider: Edmond Santos    Diagnoses:  Active Hospital Problems    Diagnosis  POA    BPH with obstruction/lower urinary tract symptoms [N40.1, N13.8]  Yes    Obesity (BMI 30-39.9) [E66.9]  Yes    Spinal stenosis, lumbar region, without neurogenic claudication [M48.061]  Yes    Atrial fibrillation - paroxysmal [I48.91]  Yes     Chronic    Hypertension [I10]  Yes     Chronic    GERD (gastroesophageal reflux disease) [K21.9]  Yes    BPH (benign prostatic hyperplasia) [N40.0]  Yes      Resolved Hospital Problems    Diagnosis Date Resolved POA   No resolved problems to display.       Discharged Condition: good    Hospital Course: Patient was admitted for a rezum procedure and tolerated the procedure well with no complications.    Final Diagnoses: Same as principal problem.    Disposition: Home or Self Care    Follow up/Patient Instructions:    Medications:  Reconciled Home Medications:   Current Discharge Medication List      START taking these medications    Details   oxybutynin (DITROPAN) 5 MG Tab Take 1 tablet (5 mg total) by mouth 3 (three) times daily.  Qty: 21 tablet, Refills: 0      oxyCODONE-acetaminophen (PERCOCET) 5-325 mg per tablet Take 1 tablet by mouth every 4 (four) hours as needed for Pain.  Qty: 21 tablet, Refills: 0      phenazopyridine (PYRIDIUM) 100 MG tablet Take 1 tablet (100 mg total) by mouth 3 (three) times daily.  Qty: 21 tablet, Refills: 0      sulfamethoxazole-trimethoprim 400-80mg (BACTRIM,SEPTRA) 400-80 mg per tablet Take 1 tablet by mouth 2 (two) times daily.  Qty: 10 tablet, Refills: 0         CONTINUE these medications which have NOT CHANGED    Details   dabigatran etexilate (PRADAXA) 150 mg Cap Take 1 capsule (150 mg total) by mouth 2 (two) times daily. "Do NOT break, chew, or open capsules."  Qty: " 180 capsule, Refills: 2    Associated Diagnoses: Chronic atrial fibrillation      ferrous sulfate 325 (65 FE) MG EC tablet Take 325 mg by mouth 2 (two) times daily.      flecainide (TAMBOCOR) 50 MG Tab Take 2 tablets (100 mg total) by mouth every 12 (twelve) hours.  Qty: 120 tablet, Refills: 1    Associated Diagnoses: Paroxysmal atrial fibrillation      methocarbamol (ROBAXIN) 500 MG Tab Take 2 tablets (1,000 mg total) by mouth nightly as needed.  Qty: 60 tablet, Refills: 5    Associated Diagnoses: Degeneration of lumbar or lumbosacral intervertebral disc; Spinal stenosis of lumbar region without neurogenic claudication      methylPREDNISolone (MEDROL DOSEPACK) 4 mg tablet use as directed  Qty: 1 Package, Refills: 0    Associated Diagnoses: Degeneration of lumbar or lumbosacral intervertebral disc; Spinal stenosis of lumbar region without neurogenic claudication; Muscle spasm of back      metoprolol succinate (TOPROL-XL) 50 MG 24 hr tablet Take 1 tablet (50 mg total) by mouth once daily.  Qty: 90 tablet, Refills: 2    Associated Diagnoses: Essential hypertension      multivitamin (THERAGRAN) per tablet Take 1 tablet by mouth once daily.      naproxen (NAPROSYN) 500 MG tablet Take 1 tablet (500 mg total) by mouth 2 (two) times daily with meals.  Qty: 30 tablet, Refills: 0      omeprazole (PRILOSEC) 20 MG capsule Take 1 capsule (20 mg total) by mouth once daily.  Qty: 90 capsule, Refills: 1    Associated Diagnoses: History of gastroesophageal reflux (GERD)      sildenafil (VIAGRA) 50 MG tablet Take 1 tablet (50 mg total) by mouth daily as needed for Erectile Dysfunction.  Qty: 24 tablet, Refills: 5    Associated Diagnoses: Erectile dysfunction, unspecified erectile dysfunction type      terazosin (HYTRIN) 10 MG capsule Take 1 capsule (10 mg total) by mouth every evening.  Qty: 30 capsule, Refills: 11      VITAMIN B COMPLEX (B COMPLETE ORAL) Take by mouth once daily.      ammonium lactate (LAC-HYDRIN) 12 % lotion Apply  topically 2 (two) times daily.  Qty: 225 g, Refills: 5    Associated Diagnoses: Dermatitis      ascorbic acid, vitamin C, (VITAMIN C) 100 MG tablet Take 100 mg by mouth once daily.      hydrocodone-acetaminophen 5-325mg (NORCO) 5-325 mg per tablet Take 1 tablet by mouth every 6 (six) hours as needed.  Qty: 45 tablet, Refills: 0    Associated Diagnoses: Degeneration of lumbar or lumbosacral intervertebral disc; Spinal stenosis of lumbar region without neurogenic claudication; Muscle spasm of back      mirtazapine (REMERON) 30 MG tablet Take 30 mg by mouth every evening.             Discharge Procedure Orders  Diet Adult Regular     Activity as tolerated     Call MD for:  temperature >100.4     Call MD for:  persistent nausea and vomiting or diarrhea     Call MD for:  severe uncontrolled pain     Call MD for:  difficulty breathing or increased cough     Call MD for:  severe persistent headache     Call MD for:  persistent dizziness, light-headedness, or visual disturbances     Call MD for:  increased confusion or weakness     No dressing needed       Follow-up Information     Earnest Martinez Jr, MD On 2/19/2018.    Specialty:  Urology  Why:  post op rezum, ricardo pull  Contact information:  [address] [facility] [address]  [phone]                   Discharge Procedure Orders (must include Diet, Follow-up, Activity):    Discharge Procedure Orders (must include Diet, Follow-up, Activity)  Diet Adult Regular     Activity as tolerated     Call MD for:  temperature >100.4     Call MD for:  persistent nausea and vomiting or diarrhea     Call MD for:  severe uncontrolled pain     Call MD for:  difficulty breathing or increased cough     Call MD for:  severe persistent headache     Call MD for:  persistent dizziness, light-headedness, or visual disturbances     Call MD for:  increased confusion or weakness     No dressing needed

## 2018-02-16 NOTE — ANESTHESIA RELEASE NOTE
"Anesthesia Release from PACU Note    Patient: Omar Pacheco    Procedure(s) Performed: Procedure(s) (LRB):  DESTRUCTION-PROSTATE-TRANSURETHRAL (N/A)    Anesthesia type: general    Post pain: Adequate analgesia    Post assessment: no apparent anesthetic complications, tolerated procedure well and no evidence of recall    Last Vitals:   Visit Vitals  BP (!) 145/73   Pulse (!) 58   Temp 36.6 °C (97.9 °F) (Temporal)   Resp 18   Ht 5' 9" (1.753 m)   Wt 93 kg (205 lb)   SpO2 95%   BMI 30.27 kg/m²       Post vital signs: stable    Level of consciousness: awake, alert  and oriented    Nausea/Vomiting: no nausea/no vomiting    Complications: none    Airway Patency: patent    Respiratory: unassisted, spontaneous ventilation, room air    Cardiovascular: stable and blood pressure at baseline    Hydration: euvolemic  "

## 2018-02-16 NOTE — PLAN OF CARE
Discharge instructions and prescription given to patient and daughter. Catheter care instructions and supplies provided.  Verbalized understanding. Patient stable, tolerating fluids. No complaints at this time. Patient adequate for discharge.

## 2018-02-16 NOTE — H&P (VIEW-ONLY)
Subjective:       Patient ID: Omar Pacheco is a 67 y.o. male.    Chief Complaint: bph with urinary obstruction (pt rtc today to discuss poss surgery with dr.prats wilson.)    HPI patient is here to schedule reason.  He has a 4.5 cm obstructing prostate with lateral lobes.  Risks and benefits were carefully discussed.  He understands that he may have no improvement.  For several weeks and that it may take several months to see full improvement.  He understands the risks of the other procedures including erectile dysfunction and incontinence however these have not been reported with reason.  He understands that he will need to have a Watkins catheter for several days after the procedure    Past Medical History:   Diagnosis Date    *Atrial fibrillation     Benign hypertrophy of prostate     Degenerative disc disease     GERD (gastroesophageal reflux disease)     Hx of colonic polyps     Hypertension     Pilonidal cyst 1971       Past Surgical History:   Procedure Laterality Date    COLONOSCOPY N/A 12/7/2016    Procedure: COLONOSCOPY;  Surgeon: Sumeet Lundy MD;  Location: [address]);  Service: Endoscopy;  Laterality: N/A;  OK to hold Pradaxa 2 days prior to procedure per Dr Jones/see note dated 11/15/16/svn    CYST REMOVAL  1971    coccyx    KNEE SURGERY  1989    right       History reviewed. No pertinent family history.    Social History     Social History    Marital status:      Spouse name: N/A    Number of children: N/A    Years of education: N/A     Occupational History    Not on file.     Social History Main Topics    Smoking status: Former Smoker     Years: 2.00     Types: Cigarettes     Quit date: 9/26/1975    Smokeless tobacco: Never Used    Alcohol use 1.8 oz/week     3 Glasses of wine per week    Drug use: No    Sexual activity: Not on file     Other Topics Concern    Not on file     Social History Narrative    No narrative on file       Allergies:  Patient has no  "known allergies.    Medications:    Current Outpatient Prescriptions:     ammonium lactate (LAC-HYDRIN) 12 % lotion, Apply topically 2 (two) times daily., Disp: 225 g, Rfl: 3    ascorbic acid, vitamin C, (VITAMIN C) 100 MG tablet, Take 100 mg by mouth once daily., Disp: , Rfl:     dabigatran etexilate (PRADAXA) 150 mg Cap, Take 1 capsule (150 mg total) by mouth 2 (two) times daily. "Do NOT break, chew, or open capsules.", Disp: 180 capsule, Rfl: 2    ferrous sulfate 325 (65 FE) MG EC tablet, Take 325 mg by mouth 2 (two) times daily., Disp: , Rfl:     methocarbamol (ROBAXIN) 500 MG Tab, Take 500 mg by mouth 4 (four) times daily as needed., Disp: , Rfl:     metoprolol succinate (TOPROL-XL) 50 MG 24 hr tablet, Take 1 tablet (50 mg total) by mouth once daily., Disp: 90 tablet, Rfl: 2    mirtazapine (REMERON) 30 MG tablet, Take 30 mg by mouth every evening., Disp: , Rfl:     multivitamin (THERAGRAN) per tablet, Take 1 tablet by mouth once daily., Disp: , Rfl:     omeprazole (PRILOSEC) 20 MG capsule, Take 1 capsule (20 mg total) by mouth once daily., Disp: 90 capsule, Rfl: 1    sildenafil (VIAGRA) 50 MG tablet, Take 1 tablet (50 mg total) by mouth daily as needed for Erectile Dysfunction., Disp: 24 tablet, Rfl: 3    terazosin (HYTRIN) 10 MG capsule, Take 2 capsules (20 mg total) by mouth every evening., Disp: 90 capsule, Rfl: 3    terazosin (HYTRIN) 10 MG capsule, Take 1 capsule (10 mg total) by mouth every evening., Disp: 30 capsule, Rfl: 11    flecainide (TAMBOCOR) 50 MG Tab, Take 2 tablets (100 mg total) by mouth every 12 (twelve) hours., Disp: 120 tablet, Rfl: 1    naproxen (NAPROSYN) 500 MG tablet, Take 1 tablet (500 mg total) by mouth 2 (two) times daily with meals., Disp: 30 tablet, Rfl: 0    terazosin (HYTRIN) 10 MG capsule, Take 1 capsule (10 mg total) by mouth every evening., Disp: 30 capsule, Rfl: 11    terazosin (HYTRIN) 10 MG capsule, Take 1 capsule (10 mg total) by mouth every evening., Disp: " 30 capsule, Rfl: 11    VITAMIN B COMPLEX (B COMPLETE ORAL), Take by mouth once daily., Disp: , Rfl:     Review of Systems   Constitutional: Negative for activity change, appetite change, chills, diaphoresis, fatigue, fever and unexpected weight change.   HENT: Negative for congestion, dental problem, hearing loss, mouth sores, postnasal drip, rhinorrhea, sinus pressure and trouble swallowing.    Eyes: Negative for pain, discharge and itching.   Respiratory: Negative for apnea, cough, choking, chest tightness, shortness of breath and wheezing.    Cardiovascular: Negative for chest pain, palpitations and leg swelling.   Gastrointestinal: Negative for abdominal distention, abdominal pain, anal bleeding, blood in stool, constipation, diarrhea, nausea, rectal pain and vomiting.   Endocrine: Negative for polydipsia and polyuria.   Genitourinary: Positive for decreased urine volume, difficulty urinating and urgency. Negative for discharge, dysuria, enuresis, flank pain, frequency, genital sores, hematuria, penile pain, penile swelling, scrotal swelling and testicular pain.   Musculoskeletal: Negative for arthralgias, back pain and myalgias.   Skin: Negative for color change, rash and wound.   Neurological: Negative for dizziness, syncope, speech difficulty, light-headedness and headaches.   Hematological: Negative for adenopathy. Does not bruise/bleed easily.   Psychiatric/Behavioral: Negative for behavioral problems, confusion, hallucinations and sleep disturbance.       Objective:      Physical Exam   Constitutional: He appears well-developed.   HENT:   Head: Normocephalic.   Cardiovascular: Normal rate.    Pulmonary/Chest: Effort normal.   Abdominal: Soft.   Genitourinary: Prostate normal.   Neurological: He is alert.   Skin: Skin is warm.     Psychiatric: He has a normal mood and affect.       Assessment:       1. BPH with urinary obstruction        Plan:       Omar was seen today for bph with urinary  obstruction.    Diagnoses and all orders for this visit:    BPH with urinary obstruction     schedule reason

## 2018-02-16 NOTE — OP NOTE
[facility]  Operative Note     Date: 02/16/2018     Pre-Op Diagnosis: BPH with obstructive urinary symptoms    Patient Active Problem List   Diagnosis    GERD (gastroesophageal reflux disease)    BPH (benign prostatic hyperplasia)    Atrial fibrillation - paroxysmal    Hypertension    Nuclear sclerosis - Both Eyes    HNP (herniated nucleus pulposus), lumbar    Degeneration of lumbar or lumbosacral intervertebral disc    Spinal stenosis, lumbar region, without neurogenic claudication    Displacement of lumbar intervertebral disc without myelopathy    Muscle spasm of back    Lumbar spinal stenosis    Osteoarthritis of spine with radiculopathy, lumbar region    Obesity (BMI 30-39.9)    BPH with obstruction/lower urinary tract symptoms        Post-Op Diagnosis: same     Procedure(s) Performed:   1. Transurethral destruction of prostate; radiofrequency thermotherapy      Specimen(s): none     Staff Surgeon: Earnest Martinez MD     Assistant Surgeon: Edmond Santos MD     Anesthesia:  General LMA anesthesia     Indications: Omar Pacheco is a 67 y.o. male with BPH presenting for surgical intervention.         Findings:    1. Bilobar hyperplasia with mild median lobe enlargement     Estimated Blood Loss: minimal     Drains:   1.  18 Fr ricardo catheter     Procedure in detail:  After informed consent was obtained and all questions were answered, the patient was brought to the operating suite and placed in the lithotomy position. General anesthesia was administered. SCDs were applied and working prior to induction of anesthesia. That patient was then prepped and draped in the usual sterile fashion. Time out was performed and preoperative antibiotics were confirmed.       We began the case by inserting the Rezum scope into the bladder. No urethral strictures were seen and scope entered easily.     Next, we examined the prostate and found bilobar hyperplasia with mild median lobe enlargement. RF  was then used to create thermal energy to selectively ablate prostate tissue 1 cm from the bladder neck to the proximal end of the veru spaced 1 cm apart.      5 total treatments were given. Specifically 2 treatments were given in each to 3 and 9 o'clock positions, and 1 treatment at 6 o'clock.     At the completion of the procedure the device was removed. There was a careful check that there were no clots in the bladder or injury to the bladder, prostate or urethra.      18 fr ricardo catheter was placed with 10 mL of sterile water in the balloon     The patient tolerated the procedure well and was transferred to the PACU in stable condition.       Disposition:  The patient will be discharged home with 5 days of antibiotic therapy, 1 week of pyridium, 1 week of ditropan, 2 weeks of NSAIDS, and pain medications. He will follow up with Dr. Martinez in clinic in 3 days to have his ricardo catheter removed.

## 2018-02-16 NOTE — DISCHARGE INSTRUCTIONS
Home Care Instructions  SELF-CARE OF  INDWELLING CATHETERS    PATIENT EDUCATION:  1. Wash hands before and after handling the catheter.  2. Wash around urinary opening daily, taking care to avoid pulling on the catheter during cleansing.  3. Drink 8-12 glasses of fluids daily; increase fluid intake if urine becomes dark and concentrated.  4. Wipe all connecting junctions with alcohol or betadine before changing from leg-bag drainage to  overnight bag drainage.  5. Keep the drainage bag at a lower level than the bladder; do not place the bag on your chair.  6. Avoid letting the bag lay on its side as urine may flow back into the drainage tube.  7. Usually the catheter is not changed except when blocked or a malfunction occurs.  **MALE**  If you are uncircumcised, pull skin back over glans (head) of penis. Cleanse from the catheter outward to  include entire glans. After cleansing return foreskin to its normal position. Continue with cleansing the rest  of your genitalia, without returning to this glans area.  BATHING: You may take a shower. Do not soak in a tub.  FOR EMERGENCIES:  If any unusual problems or difficulties occur, contact Dr. Martinez  or the resident at [phone] (page ) or at the Clinic office.

## 2018-02-18 ENCOUNTER — PATIENT MESSAGE (OUTPATIENT)
Dept: UROLOGY | Facility: CLINIC | Age: 68
End: 2018-02-18

## 2018-02-19 ENCOUNTER — OFFICE VISIT (OUTPATIENT)
Dept: UROLOGY | Facility: CLINIC | Age: 68
End: 2018-02-19
Payer: MEDICARE

## 2018-02-19 ENCOUNTER — HOSPITAL ENCOUNTER (EMERGENCY)
Facility: HOSPITAL | Age: 68
Discharge: HOME | End: 2018-02-19
Attending: FAMILY MEDICINE
Payer: MEDICARE

## 2018-02-19 ENCOUNTER — TELEPHONE (OUTPATIENT)
Dept: UROLOGY | Facility: CLINIC | Age: 68
End: 2018-02-19

## 2018-02-19 VITALS
TEMPERATURE: 98 F | WEIGHT: 205 LBS | BODY MASS INDEX: 30.36 KG/M2 | HEART RATE: 48 BPM | SYSTOLIC BLOOD PRESSURE: 128 MMHG | OXYGEN SATURATION: 95 % | HEIGHT: 69 IN | DIASTOLIC BLOOD PRESSURE: 60 MMHG | RESPIRATION RATE: 16 BRPM

## 2018-02-19 VITALS
DIASTOLIC BLOOD PRESSURE: 67 MMHG | HEIGHT: 69 IN | BODY MASS INDEX: 30.33 KG/M2 | SYSTOLIC BLOOD PRESSURE: 116 MMHG | HEART RATE: 54 BPM | WEIGHT: 204.81 LBS

## 2018-02-19 DIAGNOSIS — R33.9 URINARY RETENTION: Primary | ICD-10-CM

## 2018-02-19 DIAGNOSIS — Z98.890 POST-OPERATIVE STATE: Primary | ICD-10-CM

## 2018-02-19 LAB
BACTERIA #/AREA URNS AUTO: ABNORMAL /HPF
BILIRUB UR QL STRIP: NEGATIVE
CLARITY UR REFRACT.AUTO: CLEAR
COLOR UR AUTO: ABNORMAL
GLUCOSE UR QL STRIP: NEGATIVE
HGB UR QL STRIP: ABNORMAL
HYALINE CASTS UR QL AUTO: 1 /LPF
KETONES UR QL STRIP: NEGATIVE
LEUKOCYTE ESTERASE UR QL STRIP: NEGATIVE
MICROSCOPIC COMMENT: ABNORMAL
NITRITE UR QL STRIP: POSITIVE
PH UR STRIP: 7 [PH] (ref 5–8)
PROT UR QL STRIP: NEGATIVE
RBC #/AREA URNS AUTO: 58 /HPF (ref 0–4)
SP GR UR STRIP: 1 (ref 1–1.03)
SQUAMOUS #/AREA URNS AUTO: 0 /HPF
URN SPEC COLLECT METH UR: ABNORMAL
UROBILINOGEN UR STRIP-ACNC: 4 EU/DL
WBC #/AREA URNS AUTO: 1 /HPF (ref 0–5)

## 2018-02-19 PROCEDURE — 81001 URINALYSIS AUTO W/SCOPE: CPT

## 2018-02-19 PROCEDURE — 99024 POSTOP FOLLOW-UP VISIT: CPT | Mod: POP,,, | Performed by: UROLOGY

## 2018-02-19 PROCEDURE — 99283 EMERGENCY DEPT VISIT LOW MDM: CPT | Mod: ,,,

## 2018-02-19 PROCEDURE — 87086 URINE CULTURE/COLONY COUNT: CPT

## 2018-02-19 PROCEDURE — 51702 INSERT TEMP BLADDER CATH: CPT

## 2018-02-19 PROCEDURE — 99999 PR PBB SHADOW E&M-EST. PATIENT-LVL III: CPT | Mod: PBBFAC,,, | Performed by: UROLOGY

## 2018-02-19 PROCEDURE — 99213 OFFICE O/P EST LOW 20 MIN: CPT | Mod: PBBFAC,25 | Performed by: UROLOGY

## 2018-02-19 PROCEDURE — 99283 EMERGENCY DEPT VISIT LOW MDM: CPT | Mod: 25,27

## 2018-02-19 NOTE — TELEPHONE ENCOUNTER
----- Message from Sangeeta Richard sent at 2/19/2018  3:44 PM CST -----  Contact: pt [phone]  Pt states he had a ricardo removed this morning at 8 am and is now unable to void, only drips and painful. Please call pt

## 2018-02-19 NOTE — PROGRESS NOTES
Subjective:       Patient ID: Omar Pacheco is a 67 y.o. male.    Chief Complaint: Post-op Evaluation (f/u to d/c ricardo catheter.)    HPI patient is status post Rezum on last Friday.  He had a good weekend with minimal bladder spasms.  His urine is grossly clear he is here for voiding trial    Past Medical History:   Diagnosis Date    *Atrial fibrillation     Benign hypertrophy of prostate     Degenerative disc disease     GERD (gastroesophageal reflux disease)     Hx of colonic polyps     Hypertension     Pilonidal cyst 1971       Past Surgical History:   Procedure Laterality Date    COLONOSCOPY N/A 12/7/2016    Procedure: COLONOSCOPY;  Surgeon: Sumeet Lundy MD;  Location: [facility] ([address] [place] [address]);  Service: Endoscopy;  Laterality: N/A;  OK to hold Pradaxa 2 days prior to procedure per Dr Jones/see note dated 11/15/16/svn    CYST REMOVAL  1971    coccyx    KNEE SURGERY  1989    right       History reviewed. No pertinent family history.    Social History     Social History    Marital status:      Spouse name: N/A    Number of children: N/A    Years of education: N/A     Occupational History    Not on file.     Social History Main Topics    Smoking status: Former Smoker     Years: 2.00     Types: Cigarettes     Quit date: 9/26/1975    Smokeless tobacco: Never Used    Alcohol use 1.8 oz/week     3 Glasses of wine per week      Comment: weekly    Drug use: No    Sexual activity: Not on file     Other Topics Concern    Not on file     Social History Narrative    No narrative on file       Allergies:  Patient has no known allergies.    Medications:    Current Outpatient Prescriptions:     ammonium lactate (LAC-HYDRIN) 12 % lotion, Apply topically 2 (two) times daily., Disp: 225 g, Rfl: 5    ascorbic acid, vitamin C, (VITAMIN C) 100 MG tablet, Take 100 mg by mouth once daily., Disp: , Rfl:     dabigatran etexilate (PRADAXA) 150 mg Cap, Take 1 capsule (150 mg total) by mouth 2 (two) times  "daily. "Do NOT break, chew, or open capsules.", Disp: 180 capsule, Rfl: 2    ferrous sulfate 325 (65 FE) MG EC tablet, Take 325 mg by mouth 2 (two) times daily., Disp: , Rfl:     hydrocodone-acetaminophen 5-325mg (NORCO) 5-325 mg per tablet, Take 1 tablet by mouth every 6 (six) hours as needed., Disp: 45 tablet, Rfl: 0    methocarbamol (ROBAXIN) 500 MG Tab, Take 2 tablets (1,000 mg total) by mouth nightly as needed., Disp: 60 tablet, Rfl: 5    methylPREDNISolone (MEDROL DOSEPACK) 4 mg tablet, use as directed, Disp: 1 Package, Rfl: 0    metoprolol succinate (TOPROL-XL) 50 MG 24 hr tablet, Take 1 tablet (50 mg total) by mouth once daily., Disp: 90 tablet, Rfl: 2    mirtazapine (REMERON) 30 MG tablet, Take 30 mg by mouth every evening., Disp: , Rfl:     multivitamin (THERAGRAN) per tablet, Take 1 tablet by mouth once daily., Disp: , Rfl:     naproxen (NAPROSYN) 500 MG tablet, Take 1 tablet (500 mg total) by mouth 2 (two) times daily with meals., Disp: 30 tablet, Rfl: 0    omeprazole (PRILOSEC) 20 MG capsule, Take 1 capsule (20 mg total) by mouth once daily., Disp: 90 capsule, Rfl: 1    oxybutynin (DITROPAN) 5 MG Tab, Take 1 tablet (5 mg total) by mouth 3 (three) times daily., Disp: 21 tablet, Rfl: 0    oxyCODONE-acetaminophen (PERCOCET) 5-325 mg per tablet, Take 1 tablet by mouth every 4 (four) hours as needed for Pain., Disp: 21 tablet, Rfl: 0    phenazopyridine (PYRIDIUM) 100 MG tablet, Take 1 tablet (100 mg total) by mouth 3 (three) times daily., Disp: 21 tablet, Rfl: 0    sildenafil (VIAGRA) 50 MG tablet, Take 1 tablet (50 mg total) by mouth daily as needed for Erectile Dysfunction., Disp: 24 tablet, Rfl: 5    sulfamethoxazole-trimethoprim 400-80mg (BACTRIM,SEPTRA) 400-80 mg per tablet, Take 1 tablet by mouth 2 (two) times daily., Disp: 10 tablet, Rfl: 0    terazosin (HYTRIN) 10 MG capsule, Take 1 capsule (10 mg total) by mouth every evening., Disp: 30 capsule, Rfl: 11    VITAMIN B COMPLEX (B " COMPLETE ORAL), Take by mouth once daily., Disp: , Rfl:     flecainide (TAMBOCOR) 50 MG Tab, Take 2 tablets (100 mg total) by mouth every 12 (twelve) hours., Disp: 120 tablet, Rfl: 1    Review of Systems   Constitutional: Negative for activity change, appetite change, chills, diaphoresis, fatigue, fever and unexpected weight change.   HENT: Negative for congestion, dental problem, hearing loss, mouth sores, postnasal drip, rhinorrhea, sinus pressure and trouble swallowing.    Eyes: Negative for pain, discharge and itching.   Respiratory: Negative for apnea, cough, choking, chest tightness, shortness of breath and wheezing.    Cardiovascular: Negative for chest pain, palpitations and leg swelling.   Gastrointestinal: Negative for abdominal distention, abdominal pain, anal bleeding, blood in stool, constipation, diarrhea, nausea, rectal pain and vomiting.   Endocrine: Negative for polydipsia and polyuria.   Genitourinary: Negative for decreased urine volume, difficulty urinating, discharge, dysuria, enuresis, flank pain, frequency, genital sores, hematuria, penile pain, penile swelling, scrotal swelling, testicular pain and urgency.   Musculoskeletal: Negative for arthralgias, back pain and myalgias.   Skin: Negative for color change, rash and wound.   Neurological: Negative for dizziness, syncope, speech difficulty, light-headedness and headaches.   Hematological: Negative for adenopathy. Does not bruise/bleed easily.   Psychiatric/Behavioral: Negative for behavioral problems, confusion, hallucinations and sleep disturbance.       Objective:      Physical Exam   Constitutional: He appears well-developed.   HENT:   Head: Normocephalic.   Cardiovascular: Normal rate.    Pulmonary/Chest: Effort normal.   Abdominal: Soft.   Genitourinary: Prostate normal.   Neurological: He is alert.   Skin: Skin is warm.     Psychiatric: He has a normal mood and affect.       Assessment:       1. Post-operative state        Plan:        Omar was seen today for post-op evaluation.    Diagnoses and all orders for this visit:    Post-operative state    sachin had voided trial which he passed.  He has a good stream while voiding.  He is back on his anticoagulation. E jose call me when necessary clots or any problems.  I will se him back in 1 month ith an AUA symptom score

## 2018-02-19 NOTE — ED NOTES
Watkins catheter secured with a stat lock.  Tolerated well.  At this point the catheter has drained 800ml dark orange urine.

## 2018-02-19 NOTE — ED TRIAGE NOTES
Presents to ER with complaint of urinary retention and pain in his low abdomen from not being able to void since this am.  Bladder scan results equal 841ml in bladder at this time.    Pt identifiers checked and correct  LOC: The patient is awake, alert, aware of environment with an appropriate affect. Oriented x3, speaking appropriately  APPEARANCE: Pt resting comfortably, in no acute distress, pt is clean and well groomed, clothing properly fastened  SKIN: Skin warm, dry and intact, normal skin turgor, moist mucus membranes  RESPIRATORY: Airway is open and patent, respirations are spontaneous, even and unlabored, normal effort and rate  MUSCULOSKELETAL: No obvious deformities.

## 2018-02-19 NOTE — ED PROVIDER NOTES
Encounter Date: 2/19/2018    SCRIBE #1 NOTE: I, Matthew Roman, am scribing for, and in the presence of,  Dr. Gonzalez . I have scribed the following portions of the note - Other sections scribed: Partial MDM.       History     Chief Complaint   Patient presents with    Urinary Retention     States had a urology procedure on Friday and had the cathether removed this AM. States was initially able to urinate but now he states he feels as though he has to go but is not able to.      67-year-old male with medical history of A. fib, on long-term anticoagulation, BPH and hypertension presents to the ED with urinary retention.  Patient states he had a rezum procedure on Friday and discharged with ricardo cath.  He was seen by Dr. Man's office this morning where he had catheter taken out and passed his voiding test.  He states when he got home he was able to void once and then had difficulty.  Patient currently on Pyridium and Bactrim DS..  Patient denies hematuria, passing clots, fever, chills, nausea, vomiting, changes in bowel, weakness, syncope, back pain, flank pain.          Review of patient's allergies indicates:  No Known Allergies  Past Medical History:   Diagnosis Date    *Atrial fibrillation     Benign hypertrophy of prostate     Degenerative disc disease     GERD (gastroesophageal reflux disease)     Hx of colonic polyps     Hypertension     Pilonidal cyst 1971     Past Surgical History:   Procedure Laterality Date    COLONOSCOPY N/A 12/7/2016    Procedure: COLONOSCOPY;  Surgeon: Sumeet Lundy MD;  Location: [address];  Service: Endoscopy;  Laterality: N/A;  OK to hold Pradaxa 2 days prior to procedure per Dr Jones/see note dated 11/15/16/svn    CYST REMOVAL  1971    coccyx    KNEE SURGERY  1989    right     History reviewed. No pertinent family history.  Social History   Substance Use Topics    Smoking status: Former Smoker     Years: 2.00     Types: Cigarettes     Quit date: 9/26/1975     Smokeless tobacco: Never Used    Alcohol use 1.8 oz/week     3 Glasses of wine per week      Comment: weekly     Review of Systems   Constitutional: Negative for diaphoresis, fatigue and fever.   HENT: Negative for congestion and sore throat.    Eyes: Negative for visual disturbance.   Respiratory: Negative for cough and shortness of breath.    Cardiovascular: Negative for chest pain and leg swelling.   Gastrointestinal: Positive for abdominal distention and abdominal pain. Negative for nausea and vomiting.   Genitourinary: Positive for difficulty urinating. Negative for dysuria and hematuria.   Musculoskeletal: Negative for back pain and neck pain.   Skin: Negative for rash.   Neurological: Negative for syncope, weakness, light-headedness and headaches.   Psychiatric/Behavioral: The patient is not nervous/anxious.        Physical Exam     Initial Vitals [02/19/18 1640]   BP Pulse Resp Temp SpO2   (!) 165/76 60 18 98 °F (36.7 °C) 97 %      MAP       105.67         Physical Exam    Vitals reviewed.  Constitutional: He appears well-developed and well-nourished. He is not diaphoretic. No distress.   HENT:   Head: Normocephalic and atraumatic.   Nose: Nose normal.   Mouth/Throat: Oropharynx is clear and moist. No oropharyngeal exudate.   Eyes: Conjunctivae and EOM are normal. Pupils are equal, round, and reactive to light.   Neck: Normal range of motion. Neck supple. No thyromegaly present.   Cardiovascular: Normal rate and regular rhythm.   Pulmonary/Chest: Breath sounds normal. No respiratory distress. He has no wheezes. He exhibits no tenderness.   Abdominal: Soft. Bowel sounds are normal. He exhibits distension. There is tenderness in the suprapubic area. There is no rigidity, no rebound, no guarding and no CVA tenderness. No hernia.   Musculoskeletal: Normal range of motion. He exhibits no tenderness.   Lymphadenopathy:     He has no cervical adenopathy.   Neurological: He is alert and oriented to person, place,  and time. He has normal strength. No sensory deficit.   Skin: Skin is warm and dry. Capillary refill takes less than 2 seconds. No rash noted.   Psychiatric: He has a normal mood and affect.         ED Course   Procedures  Labs Reviewed   URINALYSIS, REFLEX TO URINE CULTURE - Abnormal; Notable for the following:        Result Value    Occult Blood UA 3+ (*)     Nitrite, UA Positive (*)     All other components within normal limits    Narrative:     Preferred Collection Type->Urine, Clean Catch   URINALYSIS MICROSCOPIC - Abnormal; Notable for the following:     RBC, UA 58 (*)     All other components within normal limits    Narrative:     Preferred Collection Type->Urine, Clean Catch   CULTURE, URINE             Medical Decision Making:   History:   Old Medical Records: I decided to obtain old medical records.  Old Records Summarized: records from clinic visits.  Initial Assessment:   67-year-old male with medical history of A. fib, on long-term anticoagulation, BPH and hypertension presents to the ED with urinary retention. rezum procedure on 2/16, ricardo cath placed. Removed ricardo today and passed void test. Around 2p, patient had difficulty voiding.  Differential Diagnosis:   DDX includes but is not limited to UTI, urinary retention, procedure complication, obstruction.  Clinical Tests:   Lab Tests: Ordered and Reviewed  ED Management:  Nurse Priyanka states she had no trouble placing ricardo cath. Will get UA. Suprapubic pain and abdominal pain/tenderness resolved after ricardo placed and draining.    UA is nitrite positive, patient currenlty on bactrim. Will hold off on treating in ED and send off culture for sensitivities. Patient will follow up in urology clinic in the next 1-2 days. Discharged to home in stable condition, return to ED warnings given, follow up and patient care instructions given.      I have discussed the treatment and management of this patient with my supervisory physician, and we agree on the plan  of care.               Scribe Attestation:   Scribe #1: I performed the above scribed service and the documentation accurately describes the services I performed. I attest to the accuracy of the note.               Clinical Impression:   The encounter diagnosis was Urinary retention.    Disposition:   Disposition: Discharged  Condition: Stable                        Kaye Yang PA-C  02/19/18 1821

## 2018-02-19 NOTE — ED NOTES
Catheter drainage bag converted to a leg bag.  Patient instructed on how to empty leg bag along with a demonstration.  He verbalized 100% understanding of instructions.

## 2018-02-20 LAB — BACTERIA UR CULT: NO GROWTH

## 2018-02-20 NOTE — DISCHARGE INSTRUCTIONS
Continue Bactrim as prescribed. Please call urology clinic first thing in the morning for appointment. Return to ED if develop fever, passing clots or greatly diminished urine output.

## 2018-02-21 ENCOUNTER — TELEPHONE (OUTPATIENT)
Dept: UROLOGY | Facility: CLINIC | Age: 68
End: 2018-02-21

## 2018-02-21 NOTE — TELEPHONE ENCOUNTER
"Pt informed that he can do whatever he wants. He was told to stop activity if it causes pain. He was also advised to take care with activities to avoid anything that may cause the catheter to get "caught" and pull the catheter out. Pt verbalizes understanding.    "

## 2018-02-21 NOTE — TELEPHONE ENCOUNTER
----- Message from Marilyn Zamora MA sent at 2/21/2018  1:37 PM CST -----  Contact: self  [phone]  States he has a catheter and needs to know how active can he be.  Can I cut my grass?  Wash my car?

## 2018-02-22 ENCOUNTER — PATIENT MESSAGE (OUTPATIENT)
Dept: UROLOGY | Facility: CLINIC | Age: 68
End: 2018-02-22

## 2018-03-01 ENCOUNTER — OFFICE VISIT (OUTPATIENT)
Dept: UROLOGY | Facility: CLINIC | Age: 68
End: 2018-03-01
Payer: MEDICARE

## 2018-03-01 VITALS
SYSTOLIC BLOOD PRESSURE: 136 MMHG | WEIGHT: 200 LBS | HEIGHT: 69 IN | HEART RATE: 54 BPM | DIASTOLIC BLOOD PRESSURE: 82 MMHG | BODY MASS INDEX: 29.62 KG/M2

## 2018-03-01 DIAGNOSIS — Z98.890 POST-OPERATIVE STATE: Primary | ICD-10-CM

## 2018-03-01 PROCEDURE — 99999 PR PBB SHADOW E&M-EST. PATIENT-LVL III: CPT | Mod: PBBFAC,,, | Performed by: UROLOGY

## 2018-03-01 PROCEDURE — 99024 POSTOP FOLLOW-UP VISIT: CPT | Mod: POP,,, | Performed by: UROLOGY

## 2018-03-01 PROCEDURE — 99213 OFFICE O/P EST LOW 20 MIN: CPT | Mod: PBBFAC | Performed by: UROLOGY

## 2018-03-01 RX ORDER — FLECAINIDE ACETATE 100 MG/1
50 TABLET ORAL EVERY 12 HOURS
COMMUNITY
Start: 2018-02-22 | End: 2018-04-02 | Stop reason: SDUPTHER

## 2018-03-01 NOTE — PROGRESS NOTES
Subjective:       Patient ID: Omar Pacheco is a 67 y.o. male.    Chief Complaint: Post-op Evaluation (voiding trial) and Penis Pain    HPI patient is here for a voiding trial.  He was cleaning his penis with Hibiclens that he has an area on the glans that looks denuded superficially of some epithelium.  It does not look like fixed drug eruption.  He still taking is terrors Zosyn and is back on his Pradaxa.    Past Medical History:   Diagnosis Date    *Atrial fibrillation     Benign hypertrophy of prostate     Degenerative disc disease     GERD (gastroesophageal reflux disease)     Hx of colonic polyps     Hypertension     Pilonidal cyst 1971       Past Surgical History:   Procedure Laterality Date    COLONOSCOPY N/A 12/7/2016    Procedure: COLONOSCOPY;  Surgeon: Sumeet Lundy MD;  Location: [facility] ([address] [place] [address]);  Service: Endoscopy;  Laterality: N/A;  OK to hold Pradaxa 2 days prior to procedure per Dr Jones/see note dated 11/15/16/svn    CYST REMOVAL  1971    coccyx    KNEE SURGERY  1989    right       History reviewed. No pertinent family history.    Social History     Social History    Marital status:      Spouse name: N/A    Number of children: N/A    Years of education: N/A     Occupational History    Not on file.     Social History Main Topics    Smoking status: Former Smoker     Years: 2.00     Types: Cigarettes     Quit date: 9/26/1975    Smokeless tobacco: Never Used    Alcohol use 1.8 oz/week     3 Glasses of wine per week      Comment: weekly    Drug use: No    Sexual activity: Not Currently     Other Topics Concern    Not on file     Social History Narrative    No narrative on file       Allergies:  Patient has no known allergies.    Medications:    Current Outpatient Prescriptions:     ammonium lactate (LAC-HYDRIN) 12 % lotion, Apply topically 2 (two) times daily., Disp: 225 g, Rfl: 5    ascorbic acid, vitamin C, (VITAMIN C) 100 MG tablet, Take 100 mg by mouth once  "daily., Disp: , Rfl:     dabigatran etexilate (PRADAXA) 150 mg Cap, Take 1 capsule (150 mg total) by mouth 2 (two) times daily. "Do NOT break, chew, or open capsules.", Disp: 180 capsule, Rfl: 2    ferrous sulfate 325 (65 FE) MG EC tablet, Take 325 mg by mouth 2 (two) times daily., Disp: , Rfl:     flecainide (TAMBOCOR) 100 MG Tab, , Disp: , Rfl:     methocarbamol (ROBAXIN) 500 MG Tab, Take 2 tablets (1,000 mg total) by mouth nightly as needed., Disp: 60 tablet, Rfl: 5    methylPREDNISolone (MEDROL DOSEPACK) 4 mg tablet, use as directed, Disp: 1 Package, Rfl: 0    metoprolol succinate (TOPROL-XL) 50 MG 24 hr tablet, Take 1 tablet (50 mg total) by mouth once daily., Disp: 90 tablet, Rfl: 2    mirtazapine (REMERON) 30 MG tablet, Take 30 mg by mouth every evening., Disp: , Rfl:     multivitamin (THERAGRAN) per tablet, Take 1 tablet by mouth once daily., Disp: , Rfl:     naproxen (NAPROSYN) 500 MG tablet, Take 1 tablet (500 mg total) by mouth 2 (two) times daily with meals., Disp: 30 tablet, Rfl: 0    omeprazole (PRILOSEC) 20 MG capsule, Take 1 capsule (20 mg total) by mouth once daily., Disp: 90 capsule, Rfl: 1    oxybutynin (DITROPAN) 5 MG Tab, Take 1 tablet (5 mg total) by mouth 3 (three) times daily., Disp: 21 tablet, Rfl: 0    oxyCODONE-acetaminophen (PERCOCET) 5-325 mg per tablet, Take 1 tablet by mouth every 4 (four) hours as needed for Pain., Disp: 21 tablet, Rfl: 0    sildenafil (VIAGRA) 50 MG tablet, Take 1 tablet (50 mg total) by mouth daily as needed for Erectile Dysfunction., Disp: 24 tablet, Rfl: 5    terazosin (HYTRIN) 10 MG capsule, Take 1 capsule (10 mg total) by mouth every evening., Disp: 30 capsule, Rfl: 11    VITAMIN B COMPLEX (B COMPLETE ORAL), Take by mouth once daily., Disp: , Rfl:     Review of Systems   Constitutional: Negative for activity change, appetite change, chills, diaphoresis, fatigue, fever and unexpected weight change.   HENT: Negative for congestion, dental problem, " hearing loss, mouth sores, postnasal drip, rhinorrhea, sinus pressure and trouble swallowing.    Eyes: Negative for pain, discharge and itching.   Respiratory: Negative for apnea, cough, choking, chest tightness, shortness of breath and wheezing.    Cardiovascular: Negative for chest pain, palpitations and leg swelling.   Gastrointestinal: Negative for abdominal distention, abdominal pain, anal bleeding, blood in stool, constipation, diarrhea, nausea, rectal pain and vomiting.   Endocrine: Negative for polydipsia and polyuria.   Genitourinary: Negative for decreased urine volume, difficulty urinating, discharge, dysuria, enuresis, flank pain, frequency, genital sores, hematuria, penile pain, penile swelling, scrotal swelling, testicular pain and urgency.   Musculoskeletal: Negative for arthralgias, back pain and myalgias.   Skin: Negative for color change, rash and wound.   Neurological: Negative for dizziness, syncope, speech difficulty, light-headedness and headaches.   Hematological: Negative for adenopathy. Does not bruise/bleed easily.   Psychiatric/Behavioral: Negative for behavioral problems, confusion, hallucinations and sleep disturbance.       Objective:      Physical Exam   Constitutional: He appears well-developed.   HENT:   Head: Normocephalic.   Cardiovascular: Normal rate.    Pulmonary/Chest: Effort normal.   Abdominal: Soft.   Genitourinary:   Genitourinary Comments: Denuded skin on the glans anteriorly superficial.  No purulent discharge and meatus looks fine along with the rest of his penis.   Neurological: He is alert.   Skin: Skin is warm.     Psychiatric: He has a normal mood and affect.       Assessment:       1. Post-operative state        Plan:       Omar was seen today for post-op evaluation and penis pain.    Diagnoses and all orders for this visit:    Post-operative state     patient passed his voiding trial and will use Neosporin twice a day to the glans.  We'll have him come in  Monday for PVR and then I will see him back in 1 month with a symptom score and PVR

## 2018-03-05 ENCOUNTER — OFFICE VISIT (OUTPATIENT)
Dept: UROLOGY | Facility: CLINIC | Age: 68
End: 2018-03-05
Payer: MEDICARE

## 2018-03-05 VITALS
BODY MASS INDEX: 29.06 KG/M2 | HEART RATE: 50 BPM | DIASTOLIC BLOOD PRESSURE: 80 MMHG | WEIGHT: 196.19 LBS | HEIGHT: 69 IN | SYSTOLIC BLOOD PRESSURE: 149 MMHG

## 2018-03-05 DIAGNOSIS — R31.0 GROSS HEMATURIA: ICD-10-CM

## 2018-03-05 DIAGNOSIS — Z98.890 POST-OPERATIVE STATE: Primary | ICD-10-CM

## 2018-03-05 LAB
BILIRUB SERPL-MCNC: NORMAL MG/DL
BLOOD URINE, POC: 250
COLOR, POC UA: YELLOW
GLUCOSE UR QL STRIP: NORMAL
KETONES UR QL STRIP: NORMAL
LEUKOCYTE ESTERASE URINE, POC: NORMAL
NITRITE, POC UA: NORMAL
PH, POC UA: 5
PROTEIN, POC: NORMAL
SPECIFIC GRAVITY, POC UA: 1.01
UROBILINOGEN, POC UA: NORMAL

## 2018-03-05 PROCEDURE — 81002 URINALYSIS NONAUTO W/O SCOPE: CPT | Mod: PBBFAC | Performed by: NURSE PRACTITIONER

## 2018-03-05 PROCEDURE — 99214 OFFICE O/P EST MOD 30 MIN: CPT | Mod: PBBFAC,25 | Performed by: NURSE PRACTITIONER

## 2018-03-05 PROCEDURE — 99999 PR PBB SHADOW E&M-EST. PATIENT-LVL IV: CPT | Mod: PBBFAC,,, | Performed by: NURSE PRACTITIONER

## 2018-03-05 PROCEDURE — 99024 POSTOP FOLLOW-UP VISIT: CPT | Mod: POP,,, | Performed by: NURSE PRACTITIONER

## 2018-03-05 PROCEDURE — 51798 US URINE CAPACITY MEASURE: CPT | Mod: PBBFAC | Performed by: NURSE PRACTITIONER

## 2018-03-05 NOTE — PROGRESS NOTES
Subjective:       Patient ID: Omar Pacheco is a 67 y.o. male.    Chief Complaint: Post-op Evaluation (s/p Rezum) and Benign Prostatic Hypertrophy    Omar Pacheco is a 67 y.o. male with BPH presenting for surgical intervention.      02/16/2018 Procedure(s) Performed with Dr. Martinez'  :   1. Transurethral destruction of prostate; radiofrequency thermotherapy    Findings:    1. Bilobar hyperplasia with mild median lobe enlargement    Last clinic visit was 03/01/2018 with Dr. Martinez.  Seen for VT after issue with UR and requiring ricardo placement 02/19/2018    Today he states urinating well.  He has concern with seeing blood when he begins urination.  It is clearing; no dysuria or pain.          Past Medical History:  No date: *Atrial fibrillation  No date: Benign hypertrophy of prostate  No date: Degenerative disc disease  No date: GERD (gastroesophageal reflux disease)  No date: Hx of colonic polyps  No date: Hypertension  1971: Pilonidal cyst    Past Surgical History:  12/7/2016: COLONOSCOPY N/A      Comment: Procedure: COLONOSCOPY;  Surgeon: Sumeet Lundy MD;  Location: [address]);                 Service: Endoscopy;  Laterality: N/A;  OK to                hold Pradaxa 2 days prior to procedure per Dr Jones/see note dated 11/15/16/iesha  1971: CYST REMOVAL      Comment: coccyx  1989: KNEE SURGERY      Comment: right    History reviewed.  No pertinent family history.      Social History    Marital status:              Spouse name:                       Years of education:                 Number of children:               Occupational History    None on file    Social History Main Topics    Smoking status: Former Smoker                                                                Packs/day: 0.00      Years: 2.00           Types: Cigarettes       Quit date: 9/26/1975    Smokeless tobacco: Never Used                        Alcohol use: Yes           1.8 oz/week        "Glasses of wine: 3 per week       Comment: weekly    Drug use: No              Sexual activity: Not Currently        Other Topics            Concern    None on file    Social History Narrative    None on file        Allergies:  Patient has no known allergies.    Medications:  Current Outpatient Prescriptions:   ammonium lactate (LAC-HYDRIN) 12 % lotion, Apply topically 2 (two) times daily., Disp: 225 g, Rfl: 5  ascorbic acid, vitamin C, (VITAMIN C) 100 MG tablet, Take 100 mg by mouth once daily., Disp: , Rfl:   dabigatran etexilate (PRADAXA) 150 mg Cap, Take 1 capsule (150 mg total) by mouth 2 (two) times daily. "Do NOT break, chew, or open capsules.", Disp: 180 capsule, Rfl: 2  ferrous sulfate 325 (65 FE) MG EC tablet, Take 325 mg by mouth 2 (two) times daily., Disp: , Rfl:   flecainide (TAMBOCOR) 100 MG Tab, , Disp: , Rfl:   methocarbamol (ROBAXIN) 500 MG Tab, Take 2 tablets (1,000 mg total) by mouth nightly as needed., Disp: 60 tablet, Rfl: 5  methylPREDNISolone (MEDROL DOSEPACK) 4 mg tablet, use as directed, Disp: 1 Package, Rfl: 0  metoprolol succinate (TOPROL-XL) 50 MG 24 hr tablet, Take 1 tablet (50 mg total) by mouth once daily., Disp: 90 tablet, Rfl: 2  mirtazapine (REMERON) 30 MG tablet, Take 30 mg by mouth every evening., Disp: , Rfl:   multivitamin (THERAGRAN) per tablet, Take 1 tablet by mouth once daily., Disp: , Rfl:   naproxen (NAPROSYN) 500 MG tablet, Take 1 tablet (500 mg total) by mouth 2 (two) times daily with meals., Disp: 30 tablet, Rfl: 0  omeprazole (PRILOSEC) 20 MG capsule, Take 1 capsule (20 mg total) by mouth once daily., Disp: 90 capsule, Rfl: 1  oxyCODONE-acetaminophen (PERCOCET) 5-325 mg per tablet, Take 1 tablet by mouth every 4 (four) hours as needed for Pain., Disp: 21 tablet, Rfl: 0  sildenafil (VIAGRA) 50 MG tablet, Take 1 tablet (50 mg total) by mouth daily as needed for Erectile Dysfunction., Disp: 24 tablet, Rfl: 5  terazosin (HYTRIN) 10 MG capsule, Take 1 capsule " (10 mg total) by mouth every evening., Disp: 30 capsule, Rfl: 11  VITAMIN B COMPLEX (B COMPLETE ORAL), Take by mouth once daily., Disp: , Rfl:   oxybutynin (DITROPAN) 5 MG Tab, Take 1 tablet (5 mg total) by mouth 3 (three) times daily., Disp: 21 tablet, Rfl: 0    \      Review of Systems   Constitutional: Negative for chills and fever.   Genitourinary: Positive for hematuria. Negative for difficulty urinating, dysuria, flank pain, penile pain, penile swelling, scrotal swelling and testicular pain.        He is urinating well         Objective:      Physical Exam   Nursing note and vitals reviewed.  Constitutional: He is oriented to person, place, and time. Vital signs are normal. He appears well-developed and well-nourished. He is active and cooperative.  Non-toxic appearance. He does not have a sickly appearance.   Urine dipped clear of infection  PVR in the office today by the nurse was 13.       HENT:   Head: Normocephalic and atraumatic.   Right Ear: External ear normal.   Left Ear: External ear normal.   Nose: Nose normal.   Mouth/Throat: Mucous membranes are normal.   Eyes: Conjunctivae and lids are normal. No scleral icterus.   Neck: Trachea normal, normal range of motion and full passive range of motion without pain. Neck supple. No JVD present. No tracheal deviation present.   Cardiovascular: Normal rate, S1 normal and S2 normal.    Pulmonary/Chest: Effort normal. No respiratory distress. He exhibits no tenderness.   Abdominal: Soft. Normal appearance and bowel sounds are normal. There is no hepatosplenomegaly. There is no tenderness. There is no CVA tenderness.   Musculoskeletal: Normal range of motion.   Neurological: He is alert and oriented to person, place, and time. He has normal strength.   Skin: Skin is warm, dry and intact.     Psychiatric: He has a normal mood and affect. His behavior is normal. Judgment and thought content normal.       Assessment:       1. Post-operative state    2. Gross  hematuria        Plan:         I spent 20 minutes with the patient of which more than half was spent in direct consultation with the patient in regards to our treatment and plan.    Education and recommendations of today's plan of care including home remedies.  Reassurance;  We discussed the post op expectations.  He voiced understanding.

## 2018-03-19 ENCOUNTER — OFFICE VISIT (OUTPATIENT)
Dept: UROLOGY | Facility: CLINIC | Age: 68
End: 2018-03-19
Payer: MEDICARE

## 2018-03-19 VITALS
SYSTOLIC BLOOD PRESSURE: 131 MMHG | WEIGHT: 201.06 LBS | HEART RATE: 54 BPM | DIASTOLIC BLOOD PRESSURE: 76 MMHG | HEIGHT: 69 IN | BODY MASS INDEX: 29.78 KG/M2

## 2018-03-19 DIAGNOSIS — Z98.890 POST-OPERATIVE STATE: Primary | ICD-10-CM

## 2018-03-19 PROCEDURE — 99213 OFFICE O/P EST LOW 20 MIN: CPT | Mod: PBBFAC | Performed by: UROLOGY

## 2018-03-19 PROCEDURE — 99024 POSTOP FOLLOW-UP VISIT: CPT | Mod: POP,,, | Performed by: UROLOGY

## 2018-03-19 PROCEDURE — 99999 PR PBB SHADOW E&M-EST. PATIENT-LVL III: CPT | Mod: PBBFAC,,, | Performed by: UROLOGY

## 2018-03-19 NOTE — PROGRESS NOTES
Subjective:       Patient ID: Omar Pacheco is a 67 y.o. male.    Chief Complaint: Post-op Evaluation (1month follow up pt state he doing well )    HPI patient is one month status post resuming therapy.  He has minimal urgency and  Stream.  His AUA symptom score is 1.  His urine shows a few red blood cells.  He is very happy    Past Medical History:   Diagnosis Date    *Atrial fibrillation     Benign hypertrophy of prostate     Degenerative disc disease     GERD (gastroesophageal reflux disease)     Hx of colonic polyps     Hypertension     Pilonidal cyst 1971       Past Surgical History:   Procedure Laterality Date    COLONOSCOPY N/A 12/7/2016    Procedure: COLONOSCOPY;  Surgeon: Sumeet Lundy MD;  Location: [facility] ([address] [place] [address]);  Service: Endoscopy;  Laterality: N/A;  OK to hold Pradaxa 2 days prior to procedure per Dr Jones/see note dated 11/15/16/svn    CYST REMOVAL  1971    coccyx    KNEE SURGERY  1989    right       History reviewed. No pertinent family history.    Social History     Social History    Marital status:      Spouse name: N/A    Number of children: N/A    Years of education: N/A     Occupational History    Not on file.     Social History Main Topics    Smoking status: Former Smoker     Years: 2.00     Types: Cigarettes     Quit date: 9/26/1975    Smokeless tobacco: Never Used    Alcohol use 1.8 oz/week     3 Glasses of wine per week      Comment: weekly    Drug use: No    Sexual activity: Not Currently     Other Topics Concern    Not on file     Social History Narrative    No narrative on file       Allergies:  Patient has no known allergies.    Medications:    Current Outpatient Prescriptions:     ammonium lactate (LAC-HYDRIN) 12 % lotion, Apply topically 2 (two) times daily., Disp: 225 g, Rfl: 5    ascorbic acid, vitamin C, (VITAMIN C) 100 MG tablet, Take 100 mg by mouth once daily., Disp: , Rfl:     dabigatran etexilate (PRADAXA) 150 mg Cap, Take 1 capsule  "(150 mg total) by mouth 2 (two) times daily. "Do NOT break, chew, or open capsules.", Disp: 180 capsule, Rfl: 2    ferrous sulfate 325 (65 FE) MG EC tablet, Take 325 mg by mouth 2 (two) times daily., Disp: , Rfl:     flecainide (TAMBOCOR) 100 MG Tab, , Disp: , Rfl:     methocarbamol (ROBAXIN) 500 MG Tab, Take 2 tablets (1,000 mg total) by mouth nightly as needed., Disp: 60 tablet, Rfl: 5    methylPREDNISolone (MEDROL DOSEPACK) 4 mg tablet, use as directed, Disp: 1 Package, Rfl: 0    metoprolol succinate (TOPROL-XL) 50 MG 24 hr tablet, Take 1 tablet (50 mg total) by mouth once daily., Disp: 90 tablet, Rfl: 2    mirtazapine (REMERON) 30 MG tablet, Take 30 mg by mouth every evening., Disp: , Rfl:     multivitamin (THERAGRAN) per tablet, Take 1 tablet by mouth once daily., Disp: , Rfl:     naproxen (NAPROSYN) 500 MG tablet, Take 1 tablet (500 mg total) by mouth 2 (two) times daily with meals., Disp: 30 tablet, Rfl: 0    omeprazole (PRILOSEC) 20 MG capsule, Take 1 capsule (20 mg total) by mouth once daily., Disp: 90 capsule, Rfl: 1    oxyCODONE-acetaminophen (PERCOCET) 5-325 mg per tablet, Take 1 tablet by mouth every 4 (four) hours as needed for Pain., Disp: 21 tablet, Rfl: 0    sildenafil (VIAGRA) 50 MG tablet, Take 1 tablet (50 mg total) by mouth daily as needed for Erectile Dysfunction., Disp: 24 tablet, Rfl: 5    terazosin (HYTRIN) 10 MG capsule, Take 1 capsule (10 mg total) by mouth every evening., Disp: 30 capsule, Rfl: 11    VITAMIN B COMPLEX (B COMPLETE ORAL), Take by mouth once daily., Disp: , Rfl:     oxybutynin (DITROPAN) 5 MG Tab, Take 1 tablet (5 mg total) by mouth 3 (three) times daily., Disp: 21 tablet, Rfl: 0    Review of Systems   Constitutional: Negative for activity change, appetite change, chills, diaphoresis, fatigue, fever and unexpected weight change.   HENT: Negative for congestion, dental problem, hearing loss, mouth sores, postnasal drip, rhinorrhea, sinus pressure and trouble " swallowing.    Eyes: Negative for pain, discharge and itching.   Respiratory: Negative for apnea, cough, choking, chest tightness, shortness of breath and wheezing.    Cardiovascular: Negative for chest pain, palpitations and leg swelling.   Gastrointestinal: Negative for abdominal distention, abdominal pain, anal bleeding, blood in stool, constipation, diarrhea, nausea, rectal pain and vomiting.   Endocrine: Negative for polydipsia and polyuria.   Genitourinary: Negative for decreased urine volume, difficulty urinating, discharge, dysuria, enuresis, flank pain, frequency, genital sores, hematuria, penile pain, penile swelling, scrotal swelling, testicular pain and urgency.   Musculoskeletal: Negative for arthralgias, back pain and myalgias.   Skin: Negative for color change, rash and wound.   Neurological: Negative for dizziness, syncope, speech difficulty, light-headedness and headaches.   Hematological: Negative for adenopathy. Does not bruise/bleed easily.   Psychiatric/Behavioral: Negative for behavioral problems, confusion, hallucinations and sleep disturbance.       Objective:      Physical Exam   Constitutional: He appears well-developed.   HENT:   Head: Normocephalic.   Cardiovascular: Normal rate.    Pulmonary/Chest: Effort normal.   Abdominal: Soft.   Neurological: He is alert.   Skin: Skin is warm.     Psychiatric: He has a normal mood and affect.       Assessment:       1. Post-operative state        Plan:       Omar was seen today for post-op evaluation.    Diagnoses and all orders for this visit:    Post-operative state     patient is doing well postop  Resume  Return to clinic 2 months for urinalysis

## 2018-04-02 ENCOUNTER — OFFICE VISIT (OUTPATIENT)
Dept: CARDIOLOGY | Facility: CLINIC | Age: 68
End: 2018-04-02
Payer: MEDICARE

## 2018-04-02 VITALS
DIASTOLIC BLOOD PRESSURE: 67 MMHG | HEART RATE: 54 BPM | BODY MASS INDEX: 30.33 KG/M2 | HEIGHT: 69 IN | WEIGHT: 204.81 LBS | SYSTOLIC BLOOD PRESSURE: 148 MMHG

## 2018-04-02 DIAGNOSIS — I48.0 PAROXYSMAL ATRIAL FIBRILLATION: Primary | Chronic | ICD-10-CM

## 2018-04-02 DIAGNOSIS — I10 ESSENTIAL HYPERTENSION: Chronic | ICD-10-CM

## 2018-04-02 PROCEDURE — 99213 OFFICE O/P EST LOW 20 MIN: CPT | Mod: PBBFAC | Performed by: INTERNAL MEDICINE

## 2018-04-02 PROCEDURE — 99999 PR PBB SHADOW E&M-EST. PATIENT-LVL III: CPT | Mod: PBBFAC,,, | Performed by: INTERNAL MEDICINE

## 2018-04-02 PROCEDURE — 99214 OFFICE O/P EST MOD 30 MIN: CPT | Mod: S$PBB,,, | Performed by: INTERNAL MEDICINE

## 2018-04-02 RX ORDER — FLECAINIDE ACETATE 50 MG/1
50 TABLET ORAL EVERY 12 HOURS
Qty: 180 TABLET | Refills: 3 | Status: SHIPPED | OUTPATIENT
Start: 2018-04-02 | End: 2018-12-03 | Stop reason: SDUPTHER

## 2018-04-02 NOTE — PROGRESS NOTES
"Subjective:   Patient ID:  Omar Pacheco is a 67 y.o. male who presents for follow-up of Essential hypertension (1 yr fu)      HPI:   Mr. Pacheco is here for f/u for HTN and paroxysmal afib on chronic AC (pradaxa) and flecainide therapy.  [facility].  Mr. Pacheco denies any exertional chest discomfort, exertional dyspnea, palpitations or TIA's.  Stress test, echo and holter in 3-11 to workup an episode of syncope were all normal.  He is still exercising ~ 3 days/week but is limited by back/knee arthritis- treadmill 30-40 minutes.  He denies any cardiac sxs and can easily achieve 5 METS without any difficulty.  He denies any abnormal bleeding.    [facility].  CHADS 1.      Home bps 110-130    Vitals:    04/02/18 0849   BP: (!) 148/67   BP Location: Left arm   Patient Position: Sitting   BP Method: X-Large (Automatic)   Pulse: (!) 54   Weight: 92.9 kg (204 lb 12.9 oz)   Height: 5' 9" (1.753 m)     Body mass index is 30.24 kg/m².  CrCl cannot be calculated (Patient's most recent lab result is older than the maximum 7 days allowed.).    Lab Results   Component Value Date     02/16/2018    K 4.0 02/16/2018     02/16/2018    CO2 28 02/16/2018    BUN 10 02/16/2018    CREATININE 0.9 02/16/2018     02/16/2018    MG 2.3 06/14/2006    AST 32 02/13/2017    ALT 29 02/13/2017    ALBUMIN 4.0 02/13/2017    PROT 7.3 02/13/2017    BILITOT 0.4 02/13/2017    WBC 5.51 02/16/2018    HGB 12.7 (L) 02/16/2018    HCT 37.5 (L) 02/16/2018    MCV 91 02/16/2018     02/16/2018    INR 1.2 03/12/2012    INR 2.0 (H) 02/29/2012    PSA 4.09 (H) 01/07/2013    TSH 0.94 06/14/2006    CHOL 172 02/13/2017    HDL 60 02/13/2017    LDLCALC 98.6 02/13/2017    TRIG 67 02/13/2017       Current Outpatient Prescriptions   Medication Sig    ammonium lactate (LAC-HYDRIN) 12 % lotion Apply topically 2 (two) times daily.    ascorbic acid, vitamin C, (VITAMIN C) 100 MG tablet Take 100 mg by " "mouth once daily.    dabigatran etexilate (PRADAXA) 150 mg Cap Take 1 capsule (150 mg total) by mouth 2 (two) times daily. "Do NOT break, chew, or open capsules."    ferrous sulfate 325 (65 FE) MG EC tablet Take 325 mg by mouth 2 (two) times daily.    flecainide (TAMBOCOR) 50 MG Tab Take 1 tablet (50 mg total) by mouth every 12 (twelve) hours.    methocarbamol (ROBAXIN) 500 MG Tab Take 2 tablets (1,000 mg total) by mouth nightly as needed.    metoprolol succinate (TOPROL-XL) 50 MG 24 hr tablet Take 1 tablet (50 mg total) by mouth once daily.    mirtazapine (REMERON) 30 MG tablet Take 30 mg by mouth every evening.    multivitamin (THERAGRAN) per tablet Take 1 tablet by mouth once daily.    naproxen (NAPROSYN) 500 MG tablet Take 1 tablet (500 mg total) by mouth 2 (two) times daily with meals.    omeprazole (PRILOSEC) 20 MG capsule Take 1 capsule (20 mg total) by mouth once daily.    sildenafil (VIAGRA) 50 MG tablet Take 1 tablet (50 mg total) by mouth daily as needed for Erectile Dysfunction.    terazosin (HYTRIN) 10 MG capsule Take 1 capsule (10 mg total) by mouth every evening.    VITAMIN B COMPLEX (B COMPLETE ORAL) Take by mouth once daily.     No current facility-administered medications for this visit.        Review of Systems   Constitution: Negative for decreased appetite, weakness, malaise/fatigue, weight gain and weight loss.   Eyes: Negative for visual disturbance.   Cardiovascular: Negative for chest pain, claudication, dyspnea on exertion, irregular heartbeat, orthopnea, palpitations, paroxysmal nocturnal dyspnea and syncope.   Respiratory: Negative for cough, shortness of breath and snoring.    Skin: Negative for rash.   Musculoskeletal: Negative for arthritis, muscle cramps, muscle weakness and myalgias.   Gastrointestinal: Negative for abdominal pain, anorexia, change in bowel habit and nausea.   Genitourinary: Negative for dysuria and frequency.   Neurological: Negative for excessive " daytime sleepiness, dizziness, headaches, loss of balance and numbness.   Psychiatric/Behavioral: Negative for depression.       Objective:   Physical Exam   Constitutional: He is oriented to person, place, and time. He appears well-developed and well-nourished.   HENT:   Head: Normocephalic and atraumatic.   Eyes: Pupils are equal, round, and reactive to light.   Neck: Normal range of motion. Neck supple.   Cardiovascular: Normal rate, regular rhythm, normal heart sounds, intact distal pulses and normal pulses.  Exam reveals no gallop.    No murmur heard.  Pulmonary/Chest: Effort normal and breath sounds normal.   Abdominal: Soft. Bowel sounds are normal. There is no hepatosplenomegaly. There is no tenderness.   Musculoskeletal: Normal range of motion.   Neurological: He is alert and oriented to person, place, and time.   Skin: Skin is warm and dry.   Psychiatric: He has a normal mood and affect. His speech is normal and behavior is normal. Judgment and thought content normal.       Assessment:     1. Paroxysmal atrial fibrillation    2. Essential hypertension        Plan:   From a cardiac standpoint, pt is doing well and is clinically stable. Pts lipid profile and BP's are at goal. Pt does not require any cardiac testing at this time

## 2018-05-21 ENCOUNTER — OFFICE VISIT (OUTPATIENT)
Dept: UROLOGY | Facility: CLINIC | Age: 68
End: 2018-05-21
Payer: MEDICARE

## 2018-05-21 ENCOUNTER — PATIENT MESSAGE (OUTPATIENT)
Dept: UROLOGY | Facility: CLINIC | Age: 68
End: 2018-05-21

## 2018-05-21 VITALS
HEIGHT: 69 IN | DIASTOLIC BLOOD PRESSURE: 75 MMHG | SYSTOLIC BLOOD PRESSURE: 126 MMHG | HEART RATE: 60 BPM | BODY MASS INDEX: 32.3 KG/M2 | WEIGHT: 218.06 LBS

## 2018-05-21 DIAGNOSIS — Z98.890 POST-OPERATIVE STATE: ICD-10-CM

## 2018-05-21 DIAGNOSIS — N52.9 ERECTILE DYSFUNCTION, UNSPECIFIED ERECTILE DYSFUNCTION TYPE: Primary | ICD-10-CM

## 2018-05-21 PROCEDURE — 99213 OFFICE O/P EST LOW 20 MIN: CPT | Mod: PBBFAC | Performed by: UROLOGY

## 2018-05-21 PROCEDURE — 99999 PR PBB SHADOW E&M-EST. PATIENT-LVL III: CPT | Mod: PBBFAC,,, | Performed by: UROLOGY

## 2018-05-21 PROCEDURE — 99024 POSTOP FOLLOW-UP VISIT: CPT | Mod: POP,,, | Performed by: UROLOGY

## 2018-05-21 RX ORDER — PAPAVERINE HYDROCHLORIDE 30 MG/ML
INJECTION INTRAMUSCULAR; INTRAVENOUS
Qty: 2 ML | Refills: 0 | Status: SHIPPED | OUTPATIENT
Start: 2018-05-21 | End: 2018-05-30 | Stop reason: SDUPTHER

## 2018-05-21 NOTE — PROGRESS NOTES
Subjective:       Patient ID: Omar Pacheco is a 67 y.o. male.    Chief Complaint: Post-op Evaluation (2mth from rezum/no complaints)    HPI patient is 2 months status post rezum therapy.  He has minimal urgency and  Stream.  His AUA symptom score is 2.  His urine dipstick today is negative.  He is very happy. PVR is 0ml    He would like to try ICI for his worsening ED, viagra is no longer working and he is not satisfied with JUVENCIO.    Past Medical History:   Diagnosis Date    *Atrial fibrillation     Benign hypertrophy of prostate     Degenerative disc disease     GERD (gastroesophageal reflux disease)     Hx of colonic polyps     Hypertension     Pilonidal cyst 1971       Past Surgical History:   Procedure Laterality Date    COLONOSCOPY N/A 12/7/2016    Procedure: COLONOSCOPY;  Surgeon: Sumeet Lundy MD;  Location: [facility] ([address] [place] [address]);  Service: Endoscopy;  Laterality: N/A;  OK to hold Pradaxa 2 days prior to procedure per Dr Jones/see note dated 11/15/16/svn    CYST REMOVAL  1971    coccyx    KNEE SURGERY  1989    right       History reviewed. No pertinent family history.    Social History     Social History    Marital status:      Spouse name: N/A    Number of children: N/A    Years of education: N/A     Occupational History    Not on file.     Social History Main Topics    Smoking status: Former Smoker     Years: 2.00     Types: Cigarettes     Quit date: 9/26/1975    Smokeless tobacco: Never Used    Alcohol use 1.8 oz/week     3 Glasses of wine per week      Comment: weekly    Drug use: No    Sexual activity: Not Currently     Other Topics Concern    Not on file     Social History Narrative    No narrative on file       Allergies:  Patient has no known allergies.    Medications:    Current Outpatient Prescriptions:     ammonium lactate (LAC-HYDRIN) 12 % lotion, Apply topically 2 (two) times daily., Disp: 225 g, Rfl: 5    ascorbic acid, vitamin C, (VITAMIN C) 100 MG tablet, Take  "100 mg by mouth once daily., Disp: , Rfl:     dabigatran etexilate (PRADAXA) 150 mg Cap, Take 1 capsule (150 mg total) by mouth 2 (two) times daily. "Do NOT break, chew, or open capsules.", Disp: 180 capsule, Rfl: 2    ferrous sulfate 325 (65 FE) MG EC tablet, Take 325 mg by mouth 2 (two) times daily., Disp: , Rfl:     flecainide (TAMBOCOR) 50 MG Tab, Take 1 tablet (50 mg total) by mouth every 12 (twelve) hours., Disp: 180 tablet, Rfl: 3    methocarbamol (ROBAXIN) 500 MG Tab, Take 2 tablets (1,000 mg total) by mouth nightly as needed., Disp: 60 tablet, Rfl: 5    metoprolol succinate (TOPROL-XL) 50 MG 24 hr tablet, Take 1 tablet (50 mg total) by mouth once daily., Disp: 90 tablet, Rfl: 2    mirtazapine (REMERON) 30 MG tablet, Take 30 mg by mouth every evening., Disp: , Rfl:     multivitamin (THERAGRAN) per tablet, Take 1 tablet by mouth once daily., Disp: , Rfl:     naproxen (NAPROSYN) 500 MG tablet, Take 1 tablet (500 mg total) by mouth 2 (two) times daily with meals., Disp: 30 tablet, Rfl: 0    omeprazole (PRILOSEC) 20 MG capsule, Take 1 capsule (20 mg total) by mouth once daily., Disp: 90 capsule, Rfl: 1    sildenafil (VIAGRA) 50 MG tablet, Take 1 tablet (50 mg total) by mouth daily as needed for Erectile Dysfunction., Disp: 24 tablet, Rfl: 5    terazosin (HYTRIN) 10 MG capsule, Take 1 capsule (10 mg total) by mouth every evening., Disp: 30 capsule, Rfl: 11    VITAMIN B COMPLEX (B COMPLETE ORAL), Take by mouth once daily., Disp: , Rfl:     Review of Systems   Constitutional: Negative for activity change, appetite change, chills, diaphoresis, fatigue, fever and unexpected weight change.   HENT: Negative for congestion, dental problem, hearing loss, mouth sores, postnasal drip, rhinorrhea, sinus pressure and trouble swallowing.    Eyes: Negative for pain, discharge and itching.   Respiratory: Negative for apnea, cough, choking, chest tightness, shortness of breath and wheezing.    Cardiovascular: " Negative for chest pain, palpitations and leg swelling.   Gastrointestinal: Negative for abdominal distention, abdominal pain, anal bleeding, blood in stool, constipation, diarrhea, nausea, rectal pain and vomiting.   Endocrine: Negative for polydipsia and polyuria.   Genitourinary: Negative for decreased urine volume, difficulty urinating, discharge, dysuria, enuresis, flank pain, frequency, genital sores, hematuria, penile pain, penile swelling, scrotal swelling, testicular pain and urgency.   Musculoskeletal: Negative for arthralgias, back pain and myalgias.   Skin: Negative for color change, rash and wound.   Neurological: Negative for dizziness, syncope, speech difficulty, light-headedness and headaches.   Hematological: Negative for adenopathy. Does not bruise/bleed easily.   Psychiatric/Behavioral: Negative for behavioral problems, confusion, hallucinations and sleep disturbance.       Objective:      Physical Exam   Constitutional: He appears well-developed.   HENT:   Head: Normocephalic.   Cardiovascular: Normal rate.    Pulmonary/Chest: Effort normal.   Abdominal: Soft.   Neurological: He is alert.   Skin: Skin is warm.     Psychiatric: He has a normal mood and affect.       Assessment:       No diagnosis found.    Plan:       There are no diagnoses linked to this encounter. patient is doing well postop  Rezum.  Set up ICI dosing with Dominga, Rx for test dose Trimix given.  Return to clinic in 1 year

## 2018-05-30 ENCOUNTER — OFFICE VISIT (OUTPATIENT)
Dept: UROLOGY | Facility: CLINIC | Age: 68
End: 2018-05-30
Payer: MEDICARE

## 2018-05-30 VITALS
DIASTOLIC BLOOD PRESSURE: 83 MMHG | HEART RATE: 69 BPM | HEIGHT: 69 IN | WEIGHT: 206.56 LBS | SYSTOLIC BLOOD PRESSURE: 154 MMHG | BODY MASS INDEX: 30.59 KG/M2

## 2018-05-30 DIAGNOSIS — N52.9 ERECTILE DYSFUNCTION, UNSPECIFIED ERECTILE DYSFUNCTION TYPE: Primary | ICD-10-CM

## 2018-05-30 DIAGNOSIS — I10 ESSENTIAL HYPERTENSION: Chronic | ICD-10-CM

## 2018-05-30 PROCEDURE — 99214 OFFICE O/P EST MOD 30 MIN: CPT | Mod: S$PBB,25,, | Performed by: NURSE PRACTITIONER

## 2018-05-30 PROCEDURE — 54235 NJX CORPORA CAVERNOSA RX AGT: CPT | Mod: PBBFAC | Performed by: NURSE PRACTITIONER

## 2018-05-30 PROCEDURE — 54235 NJX CORPORA CAVERNOSA RX AGT: CPT | Mod: S$PBB,,, | Performed by: NURSE PRACTITIONER

## 2018-05-30 PROCEDURE — 99214 OFFICE O/P EST MOD 30 MIN: CPT | Mod: PBBFAC | Performed by: NURSE PRACTITIONER

## 2018-05-30 PROCEDURE — 99999 PR PBB SHADOW E&M-EST. PATIENT-LVL IV: CPT | Mod: PBBFAC,,, | Performed by: NURSE PRACTITIONER

## 2018-05-30 RX ORDER — PAPAVERINE HYDROCHLORIDE 30 MG/ML
INJECTION INTRAMUSCULAR; INTRAVENOUS
Qty: 5 ML | Refills: 11 | Status: SHIPPED | OUTPATIENT
Start: 2018-05-30 | End: 2018-09-24 | Stop reason: SDUPTHER

## 2018-05-30 RX ORDER — SYRINGE,SAFETY WITH NEEDLE,1ML 25GX1"
SYRINGE (EA) MISCELLANEOUS
Qty: 90 EACH | Refills: 0 | Status: SHIPPED | OUTPATIENT
Start: 2018-05-30 | End: 2022-10-11

## 2018-05-30 NOTE — PROGRESS NOTES
Subjective:       Patient ID: Omar Pacheco is a 67 y.o. male.    Chief Complaint: Other (pep teaching)    HPI patient presents today for ICI teaching. He presents with meds.  Last seen with Dr. Martinez on 5/21/18.    He would like to try ICI for his worsening ED, viagra is no longer working and he is not satisfied with JUVENCIO.     He is status post rezum therapy.  He has minimal urgency and good stream.  His AUA symptom score is 2.  He is very happy with urination.       Past Medical History:   Diagnosis Date    *Atrial fibrillation     Benign hypertrophy of prostate     Degenerative disc disease     GERD (gastroesophageal reflux disease)     Hx of colonic polyps     Hypertension     Pilonidal cyst 1971       Past Surgical History:   Procedure Laterality Date    COLONOSCOPY N/A 12/7/2016    Procedure: COLONOSCOPY;  Surgeon: Sumeet Lundy MD;  Location: [facility] ([address] [place] [address]);  Service: Endoscopy;  Laterality: N/A;  OK to hold Pradaxa 2 days prior to procedure per Dr Jones/see note dated 11/15/16/svn    CYST REMOVAL  1971    coccyx    KNEE SURGERY  1989    right       No family history on file.    Social History     Social History    Marital status:      Spouse name: N/A    Number of children: N/A    Years of education: N/A     Occupational History    Not on file.     Social History Main Topics    Smoking status: Former Smoker     Years: 2.00     Types: Cigarettes     Quit date: 9/26/1975    Smokeless tobacco: Never Used    Alcohol use 1.8 oz/week     3 Glasses of wine per week      Comment: weekly    Drug use: No    Sexual activity: Not Currently     Other Topics Concern    Not on file     Social History Narrative    No narrative on file       Allergies:  Patient has no known allergies.    Medications:    Current Outpatient Prescriptions:     ammonium lactate (LAC-HYDRIN) 12 % lotion, Apply topically 2 (two) times daily., Disp: 225 g, Rfl: 5    ascorbic acid, vitamin C, (VITAMIN C) 100  "MG tablet, Take 100 mg by mouth once daily., Disp: , Rfl:     dabigatran etexilate (PRADAXA) 150 mg Cap, Take 1 capsule (150 mg total) by mouth 2 (two) times daily. "Do NOT break, chew, or open capsules.", Disp: 180 capsule, Rfl: 2    ferrous sulfate 325 (65 FE) MG EC tablet, Take 325 mg by mouth 2 (two) times daily., Disp: , Rfl:     flecainide (TAMBOCOR) 50 MG Tab, Take 1 tablet (50 mg total) by mouth every 12 (twelve) hours., Disp: 180 tablet, Rfl: 3    methocarbamol (ROBAXIN) 500 MG Tab, Take 2 tablets (1,000 mg total) by mouth nightly as needed., Disp: 60 tablet, Rfl: 5    metoprolol succinate (TOPROL-XL) 50 MG 24 hr tablet, Take 1 tablet (50 mg total) by mouth once daily., Disp: 90 tablet, Rfl: 2    mirtazapine (REMERON) 30 MG tablet, Take 30 mg by mouth every evening., Disp: , Rfl:     multivitamin (THERAGRAN) per tablet, Take 1 tablet by mouth once daily., Disp: , Rfl:     naproxen (NAPROSYN) 500 MG tablet, Take 1 tablet (500 mg total) by mouth 2 (two) times daily with meals., Disp: 30 tablet, Rfl: 0    omeprazole (PRILOSEC) 20 MG capsule, Take 1 capsule (20 mg total) by mouth once daily., Disp: 90 capsule, Rfl: 1    papaverine 30 mg/mL injection, ADD: Phentolamine 10mg/ml ADD: PGE1 100 mcg  Sig: Inject as directed into lateral aspect of penis, Disp: 5 mL, Rfl: 11    sildenafil (VIAGRA) 50 MG tablet, Take 1 tablet (50 mg total) by mouth daily as needed for Erectile Dysfunction., Disp: 24 tablet, Rfl: 5    terazosin (HYTRIN) 10 MG capsule, Take 1 capsule (10 mg total) by mouth every evening., Disp: 30 capsule, Rfl: 11    VITAMIN B COMPLEX (B COMPLETE ORAL), Take by mouth once daily., Disp: , Rfl:     insulin syringe-needle U-100 (EASY COMFORT INSULIN SYRINGE) 1 mL 31 gauge x 5/16 Syrg, Inject 1 time daily prn ED, Disp: 90 each, Rfl: 0    Review of Systems   Constitutional: Negative for activity change, appetite change, chills, diaphoresis, fatigue, fever and unexpected weight change.   HENT: " Negative for congestion, dental problem, hearing loss, mouth sores, postnasal drip, rhinorrhea, sinus pressure and trouble swallowing.    Eyes: Negative for pain, discharge and itching.   Respiratory: Negative for apnea, cough, choking, chest tightness, shortness of breath and wheezing.    Cardiovascular: Negative for chest pain, palpitations and leg swelling.   Gastrointestinal: Negative for abdominal distention, abdominal pain, anal bleeding, blood in stool, constipation, diarrhea, nausea, rectal pain and vomiting.   Endocrine: Negative for polydipsia and polyuria.   Genitourinary: Negative for decreased urine volume, difficulty urinating, discharge, dysuria, enuresis, flank pain, frequency, genital sores, hematuria, penile pain, penile swelling, scrotal swelling, testicular pain and urgency.   Musculoskeletal: Negative for arthralgias, back pain and myalgias.   Skin: Negative for color change, rash and wound.   Neurological: Negative for dizziness, syncope, speech difficulty, light-headedness and headaches.   Hematological: Negative for adenopathy. Does not bruise/bleed easily.   Psychiatric/Behavioral: Negative for behavioral problems, confusion, hallucinations and sleep disturbance.       Objective:      Physical Exam   Constitutional: He appears well-developed.   HENT:   Head: Normocephalic.   Cardiovascular: Normal rate.    Pulmonary/Chest: Effort normal.   Abdominal: Soft.   Genitourinary: Circumcised. No phimosis, paraphimosis, hypospadias, penile erythema or penile tenderness. No discharge found.   Neurological: He is alert.   Skin: Skin is warm.     Psychiatric: He has a normal mood and affect.       Assessment:       1. Erectile dysfunction, unspecified erectile dysfunction type    2. Essential hypertension        Plan:       Omar was seen today for other.    Diagnoses and all orders for this visit:    Erectile dysfunction, unspecified erectile dysfunction type    Essential hypertension       We  discussed ED and the contributing factors. We reviewed his personal factors that contribute to ED. Patient was educated on ED treatments. We discussed PDE-5 inhibitors, JUVENCIO, Muse, and IPP.    He is here today for the Intracorporal injection/ICI education and first injection.  Educational materials were supplied.    ICI is an injectable medication that consists of three different medications to produce an erection. It is known as a Trimix Compound or PEP. The medication is a compound medication and is only mixed up at certain pharmacies known as a compound pharmacy. The medication has to be stored in the refrigerator. Improper storage decreases the effectiveness of the medication.     He was educated that it is an injection. With any injection there is risk for possible pain at the injection site as well as risk for an infection. Clean technique must be followed to help prevent infection. Proper needle disposable is necessary. He voices understanding.    The injection is best given when standing up and the penis is positioned perpendicular to the body. The urethral opening should be facing away from the body. Injection is always given on the lateral or side of the penis. Never give the injection to the top of the penis to avoid possible trauma to arteries and veins. Never give the injection to the bottom of the penis to avoid trauma to the urethra. He should alternate the injection sites to avoid the build up of scar tissue due to multiple injections.    This medication can increase the risk of erections lasting longer than 4 hours. This is known as a priapism and is a medical emergency. This requires immediate medical attention. This is main reason we give the first dose in the office today. We start at low dose and see how well the patients reacts. We can increase the medication if needed depending on the reaction the patient receives today. We discussed the fine line between enough medication for penetration and  too much leading to a priapism. He voices understanding.    He witnessed me draw up 15 units. He elena up 15 units and I injected him in the left lateral aspect of the penis. Within 10 minutes he did not achieve an erection. He witnessed me draw up 20 units and I injected him in the right lateral aspect of the penis. Within 20 minutes he reported an erection 7/10.  He was pleased. He was instructed to keep the dosage at 35 units. If noticing a decrease in reaction he can increase the dosage by 5 units or 0.05ml. He was also given a refill of the Rx.     If have any questions, please give me a call. Educational materials were given to patient and all questions were answered. He voiced understanding and left the office without a priapism. Follow up needed once a year.     I spent over 45 minutes with the patient. Over 50% of the visit was spent in counseling.

## 2018-05-30 NOTE — PATIENT INSTRUCTIONS
Penile Self-Injection Procedure  Self-injection is a good option if you have erectile dysfunction (ED). You insert a tiny needle into your penis and inject a medicine. This helps your penis get hard and stay that way long enough for sex. And sex and orgasm will feel as good as always. You may be nervous about doing self-injection at first. But with practice, it will get easier. Your healthcare provider will show you how to do self-injection the first time.  Talk to your doctor about any medicines you take and any medical problems you have.      Preparing for injection  · Wash your hands well with soap and water.  · Prepare the medicine (if needed).  · Sit or  a comfortable position in a warm, well-lit room. If you need to, sit or  front of a mirror.  · Find an injection site on one side of your penis, in a place with no visible veins. (Dont inject into the top, bottom, or head of the penis.)  · Clean the injection site with an alcohol swab. Grasp the head of your penis firmly with your thumb and forefinger (dont just pinch the skin). Stretch the penis so the skin on the shaft is taut.  Injecting the medicine  · Rest your penis against your inner thigh and pull it gently toward your knee. Dont twist or rotate it. This way youll be sure to inject the medicine into the spot you chose and cleaned before.  · Hold the syringe between your thumb and fingers, like youre holding a pen. Rest your forearm on your thigh for support.  · Insert the needle at a 90° angle (perpendicular) to the shaft. Do this quickly to reduce discomfort. (The needle should go in easily. If it doesnt, stop right away.)  · Move your thumb to the plunger. Press down to inject the medicine, counting to 5.  · Remove the needle and dispose of it safely.  Gaining an erection  · Apply pressure to the injection site for a few minutes. This prevents swelling and bruising and helps spread the medicine.   · Stand up. This may help your  erection develop. Foreplay often helps, too.  · Your penis should become firm within 10 to 20 minutes. The erection will last long enough for sex, and maybe longer.  When to seek medical care  An erection that lasts longer than 3 to 4  hours  · Bleeding or bruising  · Severe pain  · Scarring or curvature of the penis   Date Last Reviewed: 1/7/2017  © 4462-8229 [employer]. [address] [place] [address], [place] [address]. All rights reserved. This information is not intended as a substitute for professional medical care. Always follow your healthcare professional's instructions.

## 2018-07-25 ENCOUNTER — PATIENT MESSAGE (OUTPATIENT)
Dept: UROLOGY | Facility: CLINIC | Age: 68
End: 2018-07-25

## 2018-07-26 RX ORDER — PAPAVERINE HYDROCHLORIDE 30 MG/ML
INJECTION INTRAMUSCULAR; INTRAVENOUS
Qty: 2 ML | Refills: 11 | Status: SHIPPED | OUTPATIENT
Start: 2018-07-26 | End: 2018-09-04 | Stop reason: SDUPTHER

## 2018-09-04 DIAGNOSIS — N52.1 TYPE 2 DIABETES MELLITUS WITH CIRCULATORY DISORDER CAUSING ERECTILE DYSFUNCTION: Primary | ICD-10-CM

## 2018-09-04 DIAGNOSIS — E11.59 TYPE 2 DIABETES MELLITUS WITH CIRCULATORY DISORDER CAUSING ERECTILE DYSFUNCTION: Primary | ICD-10-CM

## 2018-09-04 RX ORDER — PAPAVERINE HYDROCHLORIDE 30 MG/ML
INJECTION INTRAMUSCULAR; INTRAVENOUS
Qty: 2 ML | Refills: 11 | Status: SHIPPED | OUTPATIENT
Start: 2018-09-04 | End: 2018-11-09 | Stop reason: SDUPTHER

## 2018-09-23 ENCOUNTER — PATIENT MESSAGE (OUTPATIENT)
Dept: UROLOGY | Facility: CLINIC | Age: 68
End: 2018-09-23

## 2018-09-24 DIAGNOSIS — N52.9 ERECTILE DYSFUNCTION, UNSPECIFIED ERECTILE DYSFUNCTION TYPE: ICD-10-CM

## 2018-09-24 RX ORDER — PAPAVERINE HYDROCHLORIDE 30 MG/ML
INJECTION INTRAMUSCULAR; INTRAVENOUS
Qty: 5 ML | Refills: 11 | Status: SHIPPED | OUTPATIENT
Start: 2018-09-24 | End: 2018-11-09 | Stop reason: SDUPTHER

## 2018-09-24 RX ORDER — TERAZOSIN 10 MG/1
10 CAPSULE ORAL NIGHTLY
Qty: 30 CAPSULE | Refills: 11 | Status: SHIPPED | OUTPATIENT
Start: 2018-09-24 | End: 2018-10-01 | Stop reason: SDUPTHER

## 2018-09-24 NOTE — TELEPHONE ENCOUNTER
Spoke with patient and informed him that all of his meds were refilled except for the Hytrin that needs to be refilled by his Urologist. Patient verbalized understanding. No further questions or concerns at this time.  
Stretcher

## 2018-09-25 ENCOUNTER — PATIENT MESSAGE (OUTPATIENT)
Dept: FAMILY MEDICINE | Facility: CLINIC | Age: 68
End: 2018-09-25

## 2018-09-25 RX ORDER — TADALAFIL 20 MG/1
20 TABLET ORAL DAILY
Qty: 30 TABLET | Refills: 0 | Status: SHIPPED | OUTPATIENT
Start: 2018-09-25 | End: 2018-09-25 | Stop reason: SDUPTHER

## 2018-09-25 RX ORDER — TADALAFIL 20 MG/1
20 TABLET ORAL DAILY
Qty: 30 TABLET | Refills: 0 | Status: SHIPPED | OUTPATIENT
Start: 2018-09-25 | End: 2018-09-26 | Stop reason: SDUPTHER

## 2018-09-25 NOTE — TELEPHONE ENCOUNTER
----- Message from Carisa Vega sent at 9/25/2018  3:56 PM CDT -----  Contact: Self   Pt would like med below to be sent to the [facility] in [place] on [address]. [phone]          tadalafil (CIALIS) 20 MG Tab

## 2018-09-27 ENCOUNTER — TELEPHONE (OUTPATIENT)
Dept: UROLOGY | Facility: CLINIC | Age: 68
End: 2018-09-27

## 2018-09-27 RX ORDER — TADALAFIL 20 MG/1
20 TABLET ORAL DAILY
Qty: 30 TABLET | Refills: 0 | Status: SHIPPED | OUTPATIENT
Start: 2018-09-27 | End: 2018-12-03 | Stop reason: SDUPTHER

## 2018-09-27 NOTE — TELEPHONE ENCOUNTER
----- Message from Awilda Barbour sent at 9/27/2018  9:34 AM CDT -----  Contact: Self  The pt is requesting a call back to discuss his prescription. No other information was provided.

## 2018-09-27 NOTE — TELEPHONE ENCOUNTER
Hellen BRODY Jr Staff   Caller: self [phone] (Today, 10:44 AM)             joana - calling re his rx for terazosin - please call patient at [phone]

## 2018-10-01 ENCOUNTER — TELEPHONE (OUTPATIENT)
Dept: UROLOGY | Facility: CLINIC | Age: 68
End: 2018-10-01

## 2018-10-01 RX ORDER — TERAZOSIN 10 MG/1
10 CAPSULE ORAL NIGHTLY
Qty: 30 CAPSULE | Refills: 11 | Status: SHIPPED | OUTPATIENT
Start: 2018-10-01 | End: 2019-10-16 | Stop reason: SDUPTHER

## 2018-10-01 NOTE — TELEPHONE ENCOUNTER
----- Message from Azucena Cordoba LPN sent at 9/27/2018 11:30 AM CDT -----  Contact: self [phone]  Pt states [place]) he is out of his meds. I called in 15 day supply to [facility]. He would like the hytrin rx printed and sent to [place] address [place]. Please call him on Monday   ----- Message -----  From: Azucena Cordoba LPN  Sent: 9/27/2018  11:30 AM  To: Azucena Cordoba LPN        ----- Message -----  From: Hellen Mcrae  Sent: 9/27/2018  10:44 AM  To: Juan BRODY Jr Staff    juan - calling re his rx for terazosin - please call patient at [phone]

## 2018-10-22 ENCOUNTER — PATIENT MESSAGE (OUTPATIENT)
Dept: FAMILY MEDICINE | Facility: CLINIC | Age: 68
End: 2018-10-22

## 2018-10-23 RX ORDER — SILDENAFIL 100 MG/1
100 TABLET, FILM COATED ORAL DAILY PRN
Qty: 30 TABLET | Refills: 1 | Status: SHIPPED | OUTPATIENT
Start: 2018-10-23 | End: 2019-01-08 | Stop reason: SDUPTHER

## 2018-11-08 ENCOUNTER — PATIENT MESSAGE (OUTPATIENT)
Dept: UROLOGY | Facility: CLINIC | Age: 68
End: 2018-11-08

## 2018-11-09 ENCOUNTER — TELEPHONE (OUTPATIENT)
Dept: UROLOGY | Facility: CLINIC | Age: 68
End: 2018-11-09

## 2018-11-09 DIAGNOSIS — N52.9 ERECTILE DYSFUNCTION, UNSPECIFIED ERECTILE DYSFUNCTION TYPE: ICD-10-CM

## 2018-11-09 RX ORDER — PAPAVERINE HYDROCHLORIDE 30 MG/ML
INJECTION INTRAMUSCULAR; INTRAVENOUS
Qty: 10 ML | Refills: 11 | Status: SHIPPED | OUTPATIENT
Start: 2018-11-09 | End: 2019-01-02 | Stop reason: SDUPTHER

## 2018-11-09 NOTE — TELEPHONE ENCOUNTER
Requesting new mixture of Trimix 30-1-20  Sending to [facility] to compare prices  He aware of risks with mixture

## 2018-12-03 ENCOUNTER — PATIENT MESSAGE (OUTPATIENT)
Dept: ADMINISTRATIVE | Facility: OTHER | Age: 68
End: 2018-12-03

## 2018-12-03 ENCOUNTER — OFFICE VISIT (OUTPATIENT)
Dept: FAMILY MEDICINE | Facility: CLINIC | Age: 68
End: 2018-12-03
Payer: MEDICARE

## 2018-12-03 VITALS
TEMPERATURE: 98 F | WEIGHT: 201 LBS | OXYGEN SATURATION: 96 % | SYSTOLIC BLOOD PRESSURE: 152 MMHG | HEIGHT: 69 IN | BODY MASS INDEX: 29.77 KG/M2 | DIASTOLIC BLOOD PRESSURE: 80 MMHG | HEART RATE: 54 BPM | RESPIRATION RATE: 18 BRPM

## 2018-12-03 DIAGNOSIS — N52.8 OTHER MALE ERECTILE DYSFUNCTION: ICD-10-CM

## 2018-12-03 DIAGNOSIS — I10 ESSENTIAL HYPERTENSION: Chronic | ICD-10-CM

## 2018-12-03 DIAGNOSIS — I48.0 PAROXYSMAL ATRIAL FIBRILLATION: Chronic | ICD-10-CM

## 2018-12-03 DIAGNOSIS — N63.0 BREAST MASS IN MALE: Primary | ICD-10-CM

## 2018-12-03 DIAGNOSIS — L30.9 DERMATITIS: ICD-10-CM

## 2018-12-03 DIAGNOSIS — Z87.19 HISTORY OF GASTROESOPHAGEAL REFLUX (GERD): ICD-10-CM

## 2018-12-03 PROCEDURE — 99214 OFFICE O/P EST MOD 30 MIN: CPT | Mod: S$PBB,,, | Performed by: FAMILY MEDICINE

## 2018-12-03 PROCEDURE — 99214 OFFICE O/P EST MOD 30 MIN: CPT | Mod: PBBFAC,PO | Performed by: FAMILY MEDICINE

## 2018-12-03 PROCEDURE — 99999 PR PBB SHADOW E&M-EST. PATIENT-LVL IV: CPT | Mod: PBBFAC,,, | Performed by: FAMILY MEDICINE

## 2018-12-03 RX ORDER — FLECAINIDE ACETATE 50 MG/1
50 TABLET ORAL EVERY 12 HOURS
Qty: 180 TABLET | Refills: 3 | Status: SHIPPED | OUTPATIENT
Start: 2018-12-03 | End: 2019-08-26 | Stop reason: SDUPTHER

## 2018-12-03 RX ORDER — TADALAFIL 20 MG/1
20 TABLET ORAL DAILY
Qty: 30 TABLET | Refills: 0 | Status: SHIPPED | OUTPATIENT
Start: 2018-12-03 | End: 2019-03-13

## 2018-12-03 RX ORDER — OMEPRAZOLE 20 MG/1
20 CAPSULE, DELAYED RELEASE ORAL DAILY
Qty: 90 CAPSULE | Refills: 3 | Status: SHIPPED | OUTPATIENT
Start: 2018-12-03 | End: 2019-08-26 | Stop reason: SDUPTHER

## 2018-12-03 RX ORDER — AMMONIUM LACTATE 12 G/100G
LOTION TOPICAL 2 TIMES DAILY
Qty: 225 G | Refills: 5 | Status: SHIPPED | OUTPATIENT
Start: 2018-12-03 | End: 2019-06-05 | Stop reason: SDUPTHER

## 2018-12-04 ENCOUNTER — PATIENT MESSAGE (OUTPATIENT)
Dept: ADMINISTRATIVE | Facility: OTHER | Age: 68
End: 2018-12-04

## 2018-12-05 ENCOUNTER — TELEPHONE (OUTPATIENT)
Dept: FAMILY MEDICINE | Facility: CLINIC | Age: 68
End: 2018-12-05

## 2018-12-05 NOTE — TELEPHONE ENCOUNTER
"Attempted to schedule patient with a Breast Surgeon, first available appointment is 2/24/18. A message has been sent to the staff "high priority" to contact the patient for appointment.  "

## 2018-12-13 ENCOUNTER — PES CALL (OUTPATIENT)
Dept: ADMINISTRATIVE | Facility: CLINIC | Age: 68
End: 2018-12-13

## 2018-12-17 ENCOUNTER — PATIENT OUTREACH (OUTPATIENT)
Dept: OTHER | Facility: OTHER | Age: 68
End: 2018-12-17

## 2018-12-17 NOTE — LETTER
Sherrill Tovar, [place]  [address] [place] [address]     Dear Omar Pacheco,    Welcome to the [facility]!           My name is Sherrill Tovar PharmD and I am your dedicated Digital Medicine clinician.  As an expert in medication management, I will help ensure that the medications you are taking continue to provide you with the intended benefits.        I am Karmen Herbert and I will be your health  for the duration of the program.  My  job is to help you identify lifestyle changes to improve your blood pressure control.  We will talk about nutrition, exercise, and other ways that you may be able to adjust your current habits to better your health. Together, we will work to improve your overall health and encourage you to meet your goals for a healthier lifestyle.    What we expect from YOU:    You will need to take blood pressure readings multiple times a week and no less than one reading per week.   It is important that you take your measurements at different times during the day, when possible.     What you should expect from your Digital Medicine Care Team:   We will provide you with education about high blood pressure, including lifestyle changes that could help you to control your blood pressure.   We will review your weekly readings and provide you with monthly blood pressure progress reports after you have been in the program for more than 30 days.   We will send monthly progress reports on your blood pressure control to your physician so they can follow along with your progress as well.    You will be able to reach me by phone at [phone] or through your [facility] account by clicking my name under Care Team on the right side of the home screen.    I look forward to working with you to achieve your blood pressure goals!    Sincerely,  Sherrill Tovar PharmD  Your personal clinician    Please visit  [email or web address] to learn more about high blood pressure and what you can do lower your blood pressure.                                                                                           Omar Pacheco  [address] [facility] [address]

## 2018-12-21 NOTE — PROGRESS NOTES
2nd attempt for enrollment call. Left voicemail. Sent My Chart message.            Last 5 Patient Entered Readings                                      Current 30 Day Average: 146/81     Recent Readings 12/20/2018 12/20/2018 12/20/2018 12/20/2018 12/20/2018    SBP (mmHg) 147 158 159 132 147    DBP (mmHg) 76 80 79 86 86    Pulse 59 54 54 52 55

## 2018-12-30 ENCOUNTER — PATIENT MESSAGE (OUTPATIENT)
Dept: UROLOGY | Facility: CLINIC | Age: 68
End: 2018-12-30

## 2019-01-02 DIAGNOSIS — N52.9 ERECTILE DYSFUNCTION, UNSPECIFIED ERECTILE DYSFUNCTION TYPE: ICD-10-CM

## 2019-01-02 RX ORDER — PAPAVERINE HYDROCHLORIDE 30 MG/ML
INJECTION INTRAMUSCULAR; INTRAVENOUS
Qty: 10 ML | Refills: 11 | Status: SHIPPED | OUTPATIENT
Start: 2019-01-02 | End: 2019-02-13 | Stop reason: SDUPTHER

## 2019-01-02 NOTE — PROGRESS NOTES
Digital Medicine Enrollment Call    Introduced Mr. Omar Pacheco to Digital Medicine.     Discussed program expectations and requirements.    Introduced digital medicine care team.     Reviewed the importance of self-monitoring for digital medicine participation.    Discussed proper blood pressure technique.      Reviewed that the Digital Medicine team is not available for emergencies and instructed the patient to call 911 or [facility] ([phone] or [phone]) if one arises.            Last 5 Patient Entered Readings                                      Current 30 Day Average: 145/79     Recent Readings 1/2/2019 1/1/2019 1/1/2019 1/1/2019 1/1/2019    SBP (mmHg) 147 127 138 99 161    DBP (mmHg) 76 69 66 64 86    Pulse 52 57 59 58 53

## 2019-01-03 ENCOUNTER — PATIENT MESSAGE (OUTPATIENT)
Dept: UROLOGY | Facility: CLINIC | Age: 69
End: 2019-01-03

## 2019-01-03 ENCOUNTER — HOSPITAL ENCOUNTER (OUTPATIENT)
Dept: RADIOLOGY | Facility: HOSPITAL | Age: 69
Discharge: HOME | End: 2019-01-03
Attending: FAMILY MEDICINE
Payer: MEDICARE

## 2019-01-03 ENCOUNTER — PATIENT MESSAGE (OUTPATIENT)
Dept: FAMILY MEDICINE | Facility: CLINIC | Age: 69
End: 2019-01-03

## 2019-01-03 VITALS — HEIGHT: 69 IN | WEIGHT: 201 LBS | BODY MASS INDEX: 29.77 KG/M2

## 2019-01-03 DIAGNOSIS — N63.42 SUBAREOLAR MASS OF LEFT BREAST: ICD-10-CM

## 2019-01-03 DIAGNOSIS — N63.0 BREAST MASS IN MALE: ICD-10-CM

## 2019-01-03 PROCEDURE — 77066 DX MAMMO INCL CAD BI: CPT | Mod: 26,,, | Performed by: RADIOLOGY

## 2019-01-03 PROCEDURE — 76642 ULTRASOUND BREAST LIMITED: CPT | Mod: TC,LT

## 2019-01-03 PROCEDURE — 77062 MAMMO DIGITAL DIAGNOSTIC BILAT WITH TOMOSYNTHESIS_CAD: ICD-10-PCS | Mod: 26,,, | Performed by: RADIOLOGY

## 2019-01-03 PROCEDURE — 76642 US BREAST LEFT LIMITED: ICD-10-PCS | Mod: 26,LT,, | Performed by: RADIOLOGY

## 2019-01-03 PROCEDURE — 77062 BREAST TOMOSYNTHESIS BI: CPT | Mod: 26,,, | Performed by: RADIOLOGY

## 2019-01-03 PROCEDURE — 76642 ULTRASOUND BREAST LIMITED: CPT | Mod: 26,LT,, | Performed by: RADIOLOGY

## 2019-01-03 PROCEDURE — 77066 MAMMO DIGITAL DIAGNOSTIC BILAT WITH TOMOSYNTHESIS_CAD: ICD-10-PCS | Mod: 26,,, | Performed by: RADIOLOGY

## 2019-01-03 PROCEDURE — 77062 BREAST TOMOSYNTHESIS BI: CPT | Mod: TC

## 2019-01-08 RX ORDER — SILDENAFIL 100 MG/1
100 TABLET, FILM COATED ORAL DAILY PRN
Qty: 30 TABLET | Refills: 1 | Status: SHIPPED | OUTPATIENT
Start: 2019-01-08 | End: 2019-01-10 | Stop reason: SDUPTHER

## 2019-01-08 NOTE — TELEPHONE ENCOUNTER
----- Message from Elodia Doyle sent at 1/8/2019 12:11 PM CST -----  Contact: self [phone]  Requesting a refill on sildenafil (VIAGRA) 100 MG tablet  .pt is requesting to  th Rx from the

## 2019-01-10 ENCOUNTER — PATIENT OUTREACH (OUTPATIENT)
Dept: OTHER | Facility: OTHER | Age: 69
End: 2019-01-10

## 2019-01-10 ENCOUNTER — TELEPHONE (OUTPATIENT)
Dept: FAMILY MEDICINE | Facility: CLINIC | Age: 69
End: 2019-01-10

## 2019-01-10 RX ORDER — SILDENAFIL 100 MG/1
100 TABLET, FILM COATED ORAL DAILY PRN
Qty: 30 TABLET | Refills: 1 | Status: SHIPPED | OUTPATIENT
Start: 2019-01-10 | End: 2019-02-12 | Stop reason: SDUPTHER

## 2019-01-10 RX ORDER — SILDENAFIL 100 MG/1
100 TABLET, FILM COATED ORAL DAILY PRN
Qty: 30 TABLET | Refills: 1 | Status: SHIPPED | OUTPATIENT
Start: 2019-01-10 | End: 2019-01-10

## 2019-01-10 NOTE — TELEPHONE ENCOUNTER
----- Message from Marilyn Trejo sent at 1/10/2019 12:09 PM CST -----  Contact: Self/ [phone]  Pt calling upset, says he asked office to print out RX for [sildenafil (VIAGRA) 100 MG tablet] so he may . Does not want any medications sent to any pharmacy. Please call when ready for . Thank you.

## 2019-01-10 NOTE — PROGRESS NOTES
"Last 5 Patient Entered Readings                                      Current 30 Day Average: 141/76     Recent Readings 1/10/2019 1/9/2019 1/9/2019 1/8/2019 1/8/2019    SBP (mmHg) 137 154 131 140 144    DBP (mmHg) 75 76 70 72 75    Pulse 59 59 65 58 56        Digital Medicine: Health  Introduction    Introduced Mr. Omar Pacheco to Digital Medicine. Discussed health  role and recommended lifestyle modifications.    Lifestyle Assessment:  Current Dietary Habits (i.e. low sodium, food labels, dining out): Patient reports that he tries to limit his salt intake. Patient confirms understanding salts effect on his BP. Will continue to work with patient to reduce sodium intake.     Exercise: Patient reports going to the gym to use the treadmill. He was going 5 days a week before the holidays, but is now down to 3 days. He plans to work back up to 5 days. Review exercise goal of 150 minutes per week, as recommended by AHA.     Alcohol/Tobacco: Patient reports drinking alcohol.     Medication Adherence: has been compliant with the medicaiton regimen. Mr. Pacheco feels that he can "trick" his BP by taking a few deep breaths before starting the device. Reviewed proper technique for BP monitoring. Advised patient to wait at least 45 minutes after taking antihypertensive medications before checking his BP. Patient states that he has only been waiting abotu 15, and will wait longer going forward.     Patient states that he feels his BP has been "looking pretty good", other than a few spikes every now and then. He attributes the spikes to having "an extra Chaitanya Neely or extra cup of coffee".     Reviewed AHA/AACE recommendations:  Limit sodium intake to <2000mg/day  Perform 150 minutes of physical activity per week    Reviewed the importance of self-monitoring, medication adherence, and that the health  can be used as a resource for lifestyle modifications to help reduce or maintain a healthy " lifestyle.  Reviewed that the Digital Medicine team is not available for emergencies and instructed the patient to call 911 or [facility] ([phone] or [phone]) if one arises.

## 2019-01-24 ENCOUNTER — PATIENT OUTREACH (OUTPATIENT)
Dept: OTHER | Facility: OTHER | Age: 69
End: 2019-01-24

## 2019-01-24 ENCOUNTER — PATIENT MESSAGE (OUTPATIENT)
Dept: UROLOGY | Facility: CLINIC | Age: 69
End: 2019-01-24

## 2019-01-24 NOTE — PROGRESS NOTES
"Last 5 Patient Entered Readings                                      Current 30 Day Average: 140/73     Recent Readings 1/28/2019 1/27/2019 1/27/2019 1/26/2019 1/26/2019    SBP (mmHg) 179 133 146 144 148    DBP (mmHg) 92 73 79 70 78    Pulse 66 60 59 53 56        Called patient to introduce him to the Hypertension Digital Medicine Program.     Mr. Pacheco' BP average is above goal. His BP started to trend up around 1/19/19. He states that he also noticed an upward trend around the last [place]' game. He has not been eating healthfully and has been drinking a little more as he was in [place] visiting [facility]. He is returning home today and will work harder on low salt diet and exercise to get BP better controlled. Mr. Pacheco stated "I don't want any increase in my medications or any additional medications because I want to get off of these blood pressure medications." Explained my role and the goal of the HDMP to get his BP < 130/80. Encouraged him to work on lifestyle modifications to achieve this, but also explained that if he is doing all he can from that standpoint and BP remains above goal, we will have to discuss medication changes. He verbalized understanding and thanked me for allowing him to work on his goals of reducing BP through lifestyle changes.     Reviewed patient's medications and verified allergies on file.   Hypertension Medications             metoprolol succinate (TOPROL-XL) 50 MG 24 hr tablet Take 1 tablet (50 mg total) by mouth once daily.    terazosin (HYTRIN) 10 MG capsule Take 1 capsule (10 mg total) by mouth every evening.        Explained that we expect him to obtain several blood pressures/week at random times of day. Also asked that the BP be taken at least 1 hour after taking BP medications.     Explained that our goal is to get his BP to consistently below 130/80mmHg.     Patient and I agreed that the patient will take his BP daily to every other day at varying times of the day. "     Emailed patient link to [facility] webpage as well as my direct phone number in case he has any questions.

## 2019-02-07 ENCOUNTER — PATIENT OUTREACH (OUTPATIENT)
Dept: OTHER | Facility: OTHER | Age: 69
End: 2019-02-07

## 2019-02-07 NOTE — PROGRESS NOTES
"Last 5 Patient Entered Readings                                      Current 30 Day Average: 141/74     Recent Readings 2/7/2019 2/7/2019 2/6/2019 2/5/2019 2/5/2019    SBP (mmHg) 151 137 143 139 107    DBP (mmHg) 77 91 63 89 59    Pulse 68 59 66 81 72        Digital Medicine: Health  Follow Up    Lifestyle Modifications:    1.Dietary Modifications (Sodium intake <2,000mg/day, food labels, dining out): Mr. Pacheco is trying to make small changes to his diet. He states that he does not eat bread or drink beer. He will have wine on occasion, and "can't give up chocolate". He prepares most meals at home, and does not add salt to anything. He was following [employer]ystem diet, but stopped about 2 weeks ago. Encouraged patient to start looking for lower sodium ingredients, and be aware of hidden salt. He will try to be more cautious.    2.Physical Activity: Patient reports exercising at the gym during the week.     3.Medication Therapy: Patient has been compliant with the medication regimen.    4.Patient has the following medication side effects/concerns: None  (Frequency/Alleviating factors/Precipitating factors, etc.)     Follow up with Mr. Omar Pacheco completed. Mr. Pacheco is doing okay. He reports that he is trying to maintain healthy lifestyle modifications in order to improve his BP. Patient states that he notices that his BP is lower "after having sex". Encouraged patient to focus on sodium intake to help avoid spikes in BP. He will continue to monitor, and thanks me for calling. No further questions or concerns. Will continue to follow up to achieve health goals.      "

## 2019-02-12 ENCOUNTER — PATIENT MESSAGE (OUTPATIENT)
Dept: UROLOGY | Facility: CLINIC | Age: 69
End: 2019-02-12

## 2019-02-12 ENCOUNTER — PATIENT MESSAGE (OUTPATIENT)
Dept: FAMILY MEDICINE | Facility: CLINIC | Age: 69
End: 2019-02-12

## 2019-02-13 ENCOUNTER — PATIENT MESSAGE (OUTPATIENT)
Dept: CARDIOLOGY | Facility: CLINIC | Age: 69
End: 2019-02-13

## 2019-02-13 ENCOUNTER — PATIENT MESSAGE (OUTPATIENT)
Dept: UROLOGY | Facility: CLINIC | Age: 69
End: 2019-02-13

## 2019-02-13 ENCOUNTER — PES CALL (OUTPATIENT)
Dept: ADMINISTRATIVE | Facility: CLINIC | Age: 69
End: 2019-02-13

## 2019-02-13 DIAGNOSIS — N52.9 ERECTILE DYSFUNCTION, UNSPECIFIED ERECTILE DYSFUNCTION TYPE: ICD-10-CM

## 2019-02-13 RX ORDER — SILDENAFIL 100 MG/1
100 TABLET, FILM COATED ORAL DAILY PRN
Qty: 30 TABLET | Refills: 0 | Status: SHIPPED | OUTPATIENT
Start: 2019-02-13 | End: 2019-06-05 | Stop reason: SDUPTHER

## 2019-02-13 RX ORDER — PAPAVERINE HYDROCHLORIDE 30 MG/ML
INJECTION INTRAMUSCULAR; INTRAVENOUS
Qty: 10 ML | Refills: 11 | Status: SHIPPED | OUTPATIENT
Start: 2019-02-13 | End: 2019-09-11 | Stop reason: SDUPTHER

## 2019-02-26 ENCOUNTER — PATIENT OUTREACH (OUTPATIENT)
Dept: OTHER | Facility: OTHER | Age: 69
End: 2019-02-26

## 2019-02-26 NOTE — PROGRESS NOTES
Last 5 Patient Entered Readings                                      Current 30 Day Average: 139/72     Recent Readings 2/25/2019 2/23/2019 2/22/2019 2/20/2019 2/19/2019    SBP (mmHg) 136 125 127 150 124    DBP (mmHg) 63 77 59 71 68    Pulse 61 70 66 74 74        Mr. Pacheco' BP is above goal. He would like to start paying attention and keeping track to things that may be affecting his BP, like diet and activity. He declines any medication changes today.     Patient's BP average is above goal of <130/80.     Will continue to monitor regularly. Will follow up in 4-6 weeks, sooner if BP begins to trend upward or downward.    Patient has my contact information and knows to call with any concerns or clinical changes.     Current HTN regimen:  Hypertension Medications             metoprolol succinate (TOPROL-XL) 50 MG 24 hr tablet Take 1 tablet (50 mg total) by mouth once daily.    terazosin (HYTRIN) 10 MG capsule Take 1 capsule (10 mg total) by mouth every evening.

## 2019-03-08 ENCOUNTER — PATIENT OUTREACH (OUTPATIENT)
Dept: OTHER | Facility: OTHER | Age: 69
End: 2019-03-08

## 2019-03-08 NOTE — PROGRESS NOTES
Last 5 Patient Entered Readings                                      Current 30 Day Average: 140/69     Recent Readings 3/3/2019 3/1/2019 2/28/2019 2/26/2019 2/25/2019    SBP (mmHg) 145 149 139 144 136    DBP (mmHg) 69 56 66 61 63    Pulse 64 72 67 77 61        Digital Medicine: Health  Follow Up    Lifestyle Modifications:    1.Dietary Modifications (Sodium intake <2,000mg/day, food labels, dining out): Patient reports that he knows what he needs to do, he just need to do. Encouraged patient to start making small, realistic changes to help with limiting his salt intake (I.e. Not adding salt to foods, and avoiding high sodium foods in restaurants). Patient asked if skipping meals could cause elevated BP. Patient has busy days, and will sometimes only eat breakfast and dinner. Explained how sodium effects BP.    2.Physical Activity: Encouraged patient to continue exercise regimen, and walk on days that he is not in the gym.    3.Medication Therapy: Patient has been compliant with the medication regimen.    4.Patient has the following medication side effects/concerns: None  (Frequency/Alleviating factors/Precipitating factors, etc.)     Follow up with Mr. Omar Pacheco completed. Mr. Pacheco is doing okay. He has no further questions or concerns. Will continue to follow up to achieve health goals.

## 2019-03-13 ENCOUNTER — OFFICE VISIT (OUTPATIENT)
Dept: FAMILY MEDICINE | Facility: CLINIC | Age: 69
End: 2019-03-13
Payer: MEDICARE

## 2019-03-13 VITALS
TEMPERATURE: 98 F | HEIGHT: 69 IN | DIASTOLIC BLOOD PRESSURE: 74 MMHG | BODY MASS INDEX: 29.62 KG/M2 | HEART RATE: 60 BPM | OXYGEN SATURATION: 96 % | WEIGHT: 200 LBS | SYSTOLIC BLOOD PRESSURE: 126 MMHG

## 2019-03-13 DIAGNOSIS — K21.9 GASTROESOPHAGEAL REFLUX DISEASE, ESOPHAGITIS PRESENCE NOT SPECIFIED: ICD-10-CM

## 2019-03-13 DIAGNOSIS — J01.90 ACUTE RHINOSINUSITIS: Primary | ICD-10-CM

## 2019-03-13 DIAGNOSIS — I48.0 PAROXYSMAL ATRIAL FIBRILLATION: Chronic | ICD-10-CM

## 2019-03-13 DIAGNOSIS — I10 ESSENTIAL HYPERTENSION: ICD-10-CM

## 2019-03-13 PROCEDURE — 99214 OFFICE O/P EST MOD 30 MIN: CPT | Mod: S$PBB,,, | Performed by: NURSE PRACTITIONER

## 2019-03-13 PROCEDURE — 99999 PR PBB SHADOW E&M-EST. PATIENT-LVL V: CPT | Mod: PBBFAC,,, | Performed by: NURSE PRACTITIONER

## 2019-03-13 PROCEDURE — 99215 OFFICE O/P EST HI 40 MIN: CPT | Mod: PBBFAC,PO | Performed by: NURSE PRACTITIONER

## 2019-03-13 PROCEDURE — 99214 PR OFFICE/OUTPT VISIT, EST, LEVL IV, 30-39 MIN: ICD-10-PCS | Mod: S$PBB,,, | Performed by: NURSE PRACTITIONER

## 2019-03-13 PROCEDURE — 99999 PR PBB SHADOW E&M-EST. PATIENT-LVL V: ICD-10-PCS | Mod: PBBFAC,,, | Performed by: NURSE PRACTITIONER

## 2019-03-13 RX ORDER — AZELASTINE 1 MG/ML
1 SPRAY, METERED NASAL 2 TIMES DAILY
Qty: 30 ML | Refills: 0 | Status: SHIPPED | OUTPATIENT
Start: 2019-03-13 | End: 2019-12-16 | Stop reason: SDUPTHER

## 2019-03-13 RX ORDER — BENZONATATE 200 MG/1
200 CAPSULE ORAL 3 TIMES DAILY PRN
Qty: 30 CAPSULE | Refills: 0 | Status: SHIPPED | OUTPATIENT
Start: 2019-03-13 | End: 2019-03-23

## 2019-03-13 RX ORDER — METOPROLOL SUCCINATE 50 MG/1
50 TABLET, EXTENDED RELEASE ORAL DAILY
Qty: 90 TABLET | Refills: 2 | Status: SHIPPED | OUTPATIENT
Start: 2019-03-13 | End: 2019-08-26 | Stop reason: SDUPTHER

## 2019-03-13 NOTE — PROGRESS NOTES
History of Present Illness   Omar Pacheco is a 68 y.o. man with medical history as listed below who presents today for evaluation of intermittent cough x1 month. He reports a mostly dry cough with some morning production. He has also noticed some nasal congestion with post nasal drip. He denies sinus pressure, ear pain, headaches, wheezing, shortness of breath, or swelling to lower extremities. He has had no fevers or chills. He will drink a hot toddy in the evening which helps to soothe the cough. He has not tried OTC medication for symptoms. He has no additional complaints and is otherwise healthy on today's visit.    Past Medical History:   Diagnosis Date    *Atrial fibrillation     Benign hypertrophy of prostate     Degenerative disc disease     GERD (gastroesophageal reflux disease)     Hx of colonic polyps     Hypertension     Pilonidal cyst 1971       Past Surgical History:   Procedure Laterality Date    COLONOSCOPY N/A 12/7/2016    Performed by [facility]et [facility]y [facility] at [facility] END[facility] (4TH FLR)    COLONOSCOPY N/A 12/11/2013    Performed by Zeke L[facility]pez [facility] at [facility]O    CYST REMOVAL  1971    coccyx    DESTRUCTION-PROSTATE-TRANSURETHRAL N/A 2/16/2018    Performed by Earnest Martinez Jr., MD at [facility] OR 1ST FLR    ESOPHAGOGASTRODUODENOSCOPY (EGD) N/A 3/24/2015    Performed by Seferino Warner MD at [facility] ENDO    KNEE SURGERY  1989    right       Social History     Socioeconomic History    Marital status:      Spouse name: None    Number of children: None    Years of education: None    Highest education level: None   Social Needs    Financial resource strain: None    Food insecurity - worry: None    Food insecurity - inability: None    Transportation needs - medical: None    Transportation needs - non-medical: None   Occupational History    None   Tobacco Use    Smoking status: Former Smoker     Years: 2.00     Types: Cigarettes     Last attempt to quit: 9/26/1975     Years since  "quittin.4    Smokeless tobacco: Never Used   Substance and Sexual Activity    Alcohol use: Yes     Alcohol/week: 1.8 oz     Types: 3 Glasses of wine per week     Comment: weekly    Drug use: No    Sexual activity: Not Currently   Other Topics Concern    None   Social History Narrative    None       Family History   Problem Relation Age of Onset    Breast cancer Mother     Breast cancer Sister     Ovarian cancer Other        Review of Systems  Review of Systems   Constitutional: Negative for chills, fever and malaise/fatigue.   HENT: Positive for congestion and sore throat. Negative for ear discharge, ear pain and sinus pain.    Eyes: Negative for discharge and redness.   Respiratory: Positive for cough. Negative for sputum production, shortness of breath and wheezing.    Cardiovascular: Negative for chest pain, palpitations, orthopnea and leg swelling.   Gastrointestinal: Negative for nausea and vomiting.     A complete review of systems was otherwise negative.    Physical Exam  /74 (BP Location: Right arm, Patient Position: Sitting, BP Method: Medium (Manual))   Pulse 60   Temp 97.9 °F (36.6 °C) (Oral)   Ht 5' 9" (1.753 m)   Wt 90.7 kg (200 lb)   SpO2 96%   BMI 29.53 kg/m²   General appearance: alert, appears stated age, cooperative and no distress  Eyes: negative findings: lids and lashes normal and conjunctivae and sclerae normal  Ears: normal TM's and external ear canals both ears and bilateral middle ear effusion  Nose: clear discharge, mild congestion, turbinates red, swollen, no sinus tenderness  Throat: lips, mucosa, and tongue normal; teeth and gums normal and minimal clear post nasal drainage  Lungs: clear to auscultation bilaterally  Heart: regular rate and rhythm, S1, S2 normal, no murmur, click, rub or gallop  Lymph nodes: Cervical, supraclavicular, and axillary nodes normal.  Neurologic: Grossly normal    Assessment/Plan  Acute rhinosinusitis  Viral vs allergy " mediated.  Astelin BID.  Tessalon PRN cough.  Rest and stay well hydrated.  RTC PRN.  -     azelastine (ASTELIN) 137 mcg (0.1 %) nasal spray; 1 spray (137 mcg total) by Nasal route 2 (two) times daily.  Dispense: 30 mL; Refill: 0  -     benzonatate (TESSALON) 200 MG capsule; Take 1 capsule (200 mg total) by mouth 3 (three) times daily as needed.  Dispense: 30 capsule; Refill: 0    Essential hypertension  The current medical regimen is effective;  continue present plan and medications.  -     metoprolol succinate (TOPROL-XL) 50 MG 24 hr tablet; Take 1 tablet (50 mg total) by mouth once daily.  Dispense: 90 tablet; Refill: 2    Paroxysmal atrial fibrillation  The current medical regimen is effective;  continue present plan and medications.    Gastroesophageal reflux disease, esophagitis presence not specified  The current medical regimen is effective;  continue present plan and medications.    Patient has verbalized understanding and is in agreement with plan of care.    Follow-up in about 3 months (around 6/13/2019), or if symptoms worsen or fail to improve, for follow-up with Dr. Goode.

## 2019-03-13 NOTE — LETTER
March 13, 2019      [facility]  [address] [facility]  [place] [address]  Phone: [phone]  Fax: [phone]       Patient: Omar Pacheco   YOB: 1950  Date of Visit: 03/13/2019    To Whom It May Concern:    Jose Maria Pacheco  was at [facility] on 03/13/2019. She may return to work/school on 03/14/2019 with no restrictions. If you have any questions or concerns, or if I can be of further assistance, please do not hesitate to contact me.    Sincerely,      Arline Evans NP

## 2019-03-28 ENCOUNTER — TELEPHONE (OUTPATIENT)
Dept: FAMILY MEDICINE | Facility: CLINIC | Age: 69
End: 2019-03-28

## 2019-03-28 DIAGNOSIS — R05.3 PERSISTENT COUGH FOR 3 WEEKS OR LONGER: Primary | ICD-10-CM

## 2019-03-28 NOTE — TELEPHONE ENCOUNTER
----- Message from Florindamarine Garcia sent at 3/28/2019  2:20 PM CDT -----  Contact: Self   Type:  Needs Medical Advice    Who Called: Self     Symptoms (please be specific):  Persistent Cough    How long has patient had these symptoms:  2 weeks or longer     Pharmacy name and phone #: patient says he  his scripts    Would the patient rather a call back or a response via [facility]? Call     Best Call Back Number: [phone]    Additional Information: patient says he was in to see Arline and he still has the same cough. He does not want to come in he would like to speak with the doctor.

## 2019-03-28 NOTE — TELEPHONE ENCOUNTER
Spoke with pt,pt stated that his cough is getting worse no change. Seen Cristina 03/13/19 would like to know if he could get some other meds to help get rid of the cough.Please Advise

## 2019-04-01 NOTE — TELEPHONE ENCOUNTER
Recommend that we get a chest x-ray as his cough has been present now >4-6 weeks. Orders placed, please schedule.    Thanks!  AMADOU Bowen

## 2019-04-04 ENCOUNTER — OFFICE VISIT (OUTPATIENT)
Dept: CARDIOLOGY | Facility: CLINIC | Age: 69
End: 2019-04-04
Payer: MEDICARE

## 2019-04-04 VITALS
DIASTOLIC BLOOD PRESSURE: 65 MMHG | HEART RATE: 70 BPM | BODY MASS INDEX: 29.68 KG/M2 | SYSTOLIC BLOOD PRESSURE: 144 MMHG | WEIGHT: 200.38 LBS | HEIGHT: 69 IN

## 2019-04-04 DIAGNOSIS — I48.0 PAROXYSMAL ATRIAL FIBRILLATION: Primary | Chronic | ICD-10-CM

## 2019-04-04 DIAGNOSIS — I10 ESSENTIAL HYPERTENSION: Chronic | ICD-10-CM

## 2019-04-04 PROCEDURE — 99213 OFFICE O/P EST LOW 20 MIN: CPT | Mod: PBBFAC | Performed by: INTERNAL MEDICINE

## 2019-04-04 PROCEDURE — 99999 PR PBB SHADOW E&M-EST. PATIENT-LVL III: CPT | Mod: PBBFAC,,, | Performed by: INTERNAL MEDICINE

## 2019-04-04 PROCEDURE — 99214 OFFICE O/P EST MOD 30 MIN: CPT | Mod: S$PBB,,, | Performed by: INTERNAL MEDICINE

## 2019-04-04 PROCEDURE — 99214 PR OFFICE/OUTPT VISIT, EST, LEVL IV, 30-39 MIN: ICD-10-PCS | Mod: S$PBB,,, | Performed by: INTERNAL MEDICINE

## 2019-04-04 PROCEDURE — 99999 PR PBB SHADOW E&M-EST. PATIENT-LVL III: ICD-10-PCS | Mod: PBBFAC,,, | Performed by: INTERNAL MEDICINE

## 2019-04-04 RX ORDER — HYDROCODONE BITARTRATE AND ACETAMINOPHEN 5; 325 MG/1; MG/1
1 TABLET ORAL EVERY 4 HOURS PRN
COMMUNITY
Start: 2019-04-01 | End: 2020-01-13

## 2019-04-04 RX ORDER — BENZONATATE 100 MG/1
100 CAPSULE ORAL 3 TIMES DAILY PRN
COMMUNITY
Start: 2019-04-01 | End: 2019-06-05

## 2019-04-04 NOTE — PROGRESS NOTES
"Subjective:   Patient ID:  Omar Pacheco is a 68 y.o. male who presents for follow-up of Paroxysmal atrial fibrillation (1 yr fu)      HPI:   Mr. Pacheco is here for f/u for HTN and paroxysmal afib on chronic AC (pradaxa) and flecainide therapy.  [facility].  Mr. Pacheco denies any exertional chest discomfort, exertional dyspnea, palpitations or TIA's.  Stress test, echo and holter in 3-11 to workup an episode of syncope were all normal.  He is still exercising ~ 3 days/week but is limited by back/knee arthritis- treadmill 30-40 minutes.  He denies any cardiac sxs and can easily achieve 5 METS without any difficulty.  He denies any abnormal bleeding.    [facility].  CHADS 1      Mr. Pacheco' BP is above goal. Has ERVIN but not using CPAP.         Vitals:    04/04/19 0928   BP: (!) 144/65   BP Location: Left arm   Patient Position: Sitting   BP Method: X-Large (Automatic)   Pulse: 70   Weight: 90.9 kg (200 lb 6.4 oz)   Height: 5' 9" (1.753 m)     Body mass index is 29.59 kg/m².  CrCl cannot be calculated (Patient's most recent lab result is older than the maximum 7 days allowed.).    Lab Results   Component Value Date     02/16/2018    K 4.0 02/16/2018     02/16/2018    CO2 28 02/16/2018    BUN 10 02/16/2018    CREATININE 0.9 02/16/2018     02/16/2018    MG 2.3 06/14/2006    AST 32 02/13/2017    ALT 29 02/13/2017    ALBUMIN 4.0 02/13/2017    PROT 7.3 02/13/2017    BILITOT 0.4 02/13/2017    WBC 5.51 02/16/2018    HGB 12.7 (L) 02/16/2018    HCT 37.5 (L) 02/16/2018    MCV 91 02/16/2018     02/16/2018    INR 1.2 03/12/2012    INR 2.0 (H) 02/29/2012    PSA 4.09 (H) 01/07/2013    TSH 0.94 06/14/2006    CHOL 172 02/13/2017    HDL 60 02/13/2017    LDLCALC 98.6 02/13/2017    TRIG 67 02/13/2017       Current Outpatient Medications   Medication Sig    ammonium lactate (LAC-HYDRIN) 12 % lotion Apply topically 2 (two) times daily.    azelastine (ASTELIN) " "137 mcg (0.1 %) nasal spray 1 spray (137 mcg total) by Nasal route 2 (two) times daily.    benzonatate (TESSALON) 100 MG capsule 100 mg 3 (three) times daily as needed.     cyanocobalamin (VITAMIN B-12) 1000 MCG tablet Take 100 mcg by mouth once daily.    dabigatran etexilate (PRADAXA) 150 mg Cap Take 1 capsule (150 mg total) by mouth 2 (two) times daily. "Do NOT break, chew, or open capsules."    flecainide (TAMBOCOR) 50 MG Tab Take 1 tablet (50 mg total) by mouth every 12 (twelve) hours.    HYDROcodone-acetaminophen (NORCO) 5-325 mg per tablet 1 tablet every 4 (four) hours as needed.     insulin syringe-needle U-100 (EASY COMFORT INSULIN SYRINGE) 1 mL 31 gauge x 5/16 Syrg Inject 1 time daily prn ED    methocarbamol (ROBAXIN) 500 MG Tab Take 2 tablets (1,000 mg total) by mouth nightly as needed.    metoprolol succinate (TOPROL-XL) 50 MG 24 hr tablet Take 1 tablet (50 mg total) by mouth once daily.    mirtazapine (REMERON) 30 MG tablet Take 30 mg by mouth every evening.    multivitamin (THERAGRAN) per tablet Take 1 tablet by mouth once daily.    omeprazole (PRILOSEC) 20 MG capsule Take 1 capsule (20 mg total) by mouth once daily.    papaverine 30 mg/mL injection Papaverine 60mg  ADD: Phentolamine 20mg/ml   ADD: PGE1 200 mcg   20/60/2 Mixture  Sig: Inject as directed into lateral aspect of penis    sildenafil (VIAGRA) 100 MG tablet Take 1 tablet (100 mg total) by mouth daily as needed for Erectile Dysfunction.    terazosin (HYTRIN) 10 MG capsule Take 1 capsule (10 mg total) by mouth every evening.    VITAMIN B COMPLEX (B COMPLETE ORAL) Take by mouth once daily.     No current facility-administered medications for this visit.        Review of Systems   Constitution: Negative for decreased appetite, malaise/fatigue, weight gain and weight loss.   Eyes: Negative for visual disturbance.   Cardiovascular: Negative for chest pain, claudication, dyspnea on exertion, irregular heartbeat, orthopnea, " palpitations, paroxysmal nocturnal dyspnea and syncope.   Respiratory: Negative for cough, shortness of breath and snoring.    Skin: Negative for rash.   Musculoskeletal: Negative for arthritis, muscle cramps, muscle weakness and myalgias.   Gastrointestinal: Negative for abdominal pain, anorexia, change in bowel habit and nausea.   Genitourinary: Negative for dysuria and frequency.   Neurological: Negative for excessive daytime sleepiness, dizziness, headaches, loss of balance, numbness and weakness.   Psychiatric/Behavioral: Negative for depression.       Objective:   Physical Exam   Constitutional: He is oriented to person, place, and time. He appears well-developed and well-nourished.   HENT:   Head: Normocephalic and atraumatic.   Eyes: Pupils are equal, round, and reactive to light.   Neck: Normal range of motion. Neck supple.   Cardiovascular: Normal rate, regular rhythm, normal heart sounds, intact distal pulses and normal pulses. Exam reveals no gallop and no friction rub.   No murmur heard.  Pulmonary/Chest: Effort normal and breath sounds normal.   Abdominal: Soft. Bowel sounds are normal. There is no hepatosplenomegaly. There is no tenderness.   Musculoskeletal: Normal range of motion.   Neurological: He is alert and oriented to person, place, and time.   Skin: Skin is warm and dry.   Psychiatric: He has a normal mood and affect. His speech is normal and behavior is normal. Judgment and thought content normal.       Assessment:     1. Paroxysmal atrial fibrillation    2. Essential hypertension        Plan:   Has ERVIN but hasn't been using CPAP. Will have pt restart and follow BP through digital medicine.     From a cardiac standpoint, pt is doing well and is clinically stable. Pts lipid profile at goal. Pt does not require any cardiac testing at this time.  [facility].    No orders of the defined types were placed in this encounter.

## 2019-04-10 ENCOUNTER — PATIENT OUTREACH (OUTPATIENT)
Dept: OTHER | Facility: OTHER | Age: 69
End: 2019-04-10

## 2019-04-10 NOTE — PROGRESS NOTES
Last 5 Patient Entered Readings                                      Current 30 Day Average: 138/71     Recent Readings 5/3/2019 5/3/2019 5/2/2019 5/2/2019 4/30/2019    SBP (mmHg) 162 161 108 152 123    DBP (mmHg) 84 81 52 74 61    Pulse 58 57 78 70 62        Mr. Pacheco' BP continues to fluctuate. He states he follows a low salt diet, exercises at the gym 4-5 times/week. He states he has been drinking more lately, but not on a daily basis. Explained that this can cause BP to be higher. He also has ERVIN, but does not wear CPAP due to the mask being uncomfortable. Encouraged he follow up with [facility] to discuss options for CPAP masks as going untreated can cause BP to be higher also. He continues to decline medication changes.     Per 30 day average, 138/71 mmHg, patient's BP is not at goal.     Will continue to monitor regularly. Will follow up in 2-3 weeks, sooner if BP begins to trend upward or downward.    Asked patient to call or message with questions or concerns.     Current HTN regimen:  Hypertension Medications             metoprolol succinate (TOPROL-XL) 50 MG 24 hr tablet Take 1 tablet (50 mg total) by mouth once daily.    terazosin (HYTRIN) 10 MG capsule Take 1 capsule (10 mg total) by mouth every evening.

## 2019-04-17 ENCOUNTER — PATIENT OUTREACH (OUTPATIENT)
Dept: OTHER | Facility: OTHER | Age: 69
End: 2019-04-17

## 2019-04-17 NOTE — PROGRESS NOTES
Last 5 Patient Entered Readings                                      Current 30 Day Average: 135/78     Recent Readings 4/15/2019 4/10/2019 4/2/2019 4/2/2019 3/30/2019    SBP (mmHg) 119 140 130 166 146    DBP (mmHg) 70 79 67 71 81    Pulse 149 77 63 71 65        Digital Medicine: Health  Follow Up    Left voicemail to follow up with Mr. Omar Pacheco.  Current BP average 135/78 mmHg is not at goal, <130/80 mmHg.  Will follow up in 2 weeks.

## 2019-05-02 NOTE — PROGRESS NOTES
Last 5 Patient Entered Readings                                      Current 30 Day Average: 138/70     Recent Readings 4/30/2019 4/30/2019 4/22/2019 4/21/2019 4/17/2019    SBP (mmHg) 123 145 146 160 148    DBP (mmHg) 61 72 69 73 71    Pulse 62 63 78 58 67        Digital Medicine: Health  Follow Up    Unable to leave a voicemail to follow up with Mr. Omar Pacheco.  Current BP average 138/70 mmHg is not at goal, <130/80 mmHg.  Will follow up in 1 week.

## 2019-05-16 NOTE — PROGRESS NOTES
"Last 5 Patient Entered Readings                                      Current 30 Day Average: 143/72     Recent Readings 5/10/2019 5/10/2019 5/9/2019 5/8/2019 5/8/2019    SBP (mmHg) 145 156 144 147 170    DBP (mmHg) 70 80 72 67 89    Pulse 66 60 62 69 61        Digital Medicine: Health  Follow Up    Lifestyle Modifications:    1.Dietary Modifications (Sodium intake <2,000mg/day, food labels, dining out): Patient confirms that he has been following a low salt diet, and has been trying to eat the right foods. He attributes spike in BP on 5/8 to dietary indiscretions when dining out.     2.Physical Activity: Mr. Pacheco is exercising in the gym 4-5 days per week.     3.Medication Therapy: Patient has been compliant with the medication regimen.    4.Patient has the following medication side effects/concerns: None    (Frequency/Alleviating factors/Precipitating factors, etc.)     Follow up with Mr. Omar Pacheco completed. Mr. Pacheco is doing okay. He feels that his BP has been "pretty good" for the most part. Encouraged him to submit a new reading this week. Praise and encouragement provided to the patient for maintaining healthy dietary habits and activity overall. He thanks me for calling. No further questions or concerns. Will continue to follow up to achieve health goals.      "

## 2019-05-23 ENCOUNTER — OFFICE VISIT (OUTPATIENT)
Dept: UROLOGY | Facility: CLINIC | Age: 69
End: 2019-05-23
Payer: MEDICARE

## 2019-05-23 VITALS
HEART RATE: 60 BPM | DIASTOLIC BLOOD PRESSURE: 77 MMHG | WEIGHT: 198.88 LBS | SYSTOLIC BLOOD PRESSURE: 126 MMHG | BODY MASS INDEX: 29.46 KG/M2 | HEIGHT: 69 IN

## 2019-05-23 DIAGNOSIS — N40.1 BPH WITH URINARY OBSTRUCTION: Primary | ICD-10-CM

## 2019-05-23 DIAGNOSIS — N13.8 BPH WITH URINARY OBSTRUCTION: Primary | ICD-10-CM

## 2019-05-23 PROCEDURE — 99213 PR OFFICE/OUTPT VISIT, EST, LEVL III, 20-29 MIN: ICD-10-PCS | Mod: S$PBB,,, | Performed by: UROLOGY

## 2019-05-23 PROCEDURE — 99213 OFFICE O/P EST LOW 20 MIN: CPT | Mod: S$PBB,,, | Performed by: UROLOGY

## 2019-05-23 PROCEDURE — 99999 PR PBB SHADOW E&M-EST. PATIENT-LVL III: CPT | Mod: PBBFAC,,, | Performed by: UROLOGY

## 2019-05-23 PROCEDURE — 99999 PR PBB SHADOW E&M-EST. PATIENT-LVL III: ICD-10-PCS | Mod: PBBFAC,,, | Performed by: UROLOGY

## 2019-05-23 PROCEDURE — 99213 OFFICE O/P EST LOW 20 MIN: CPT | Mod: PBBFAC | Performed by: UROLOGY

## 2019-05-23 NOTE — PROGRESS NOTES
Subjective:       Patient ID: Omar Pacheco is a 68 y.o. male.    Chief Complaint: Erectile Dysfunction    HPI    Omar Pacheco is a 68 y.o. male with PMHx of BPH and HTN who presents today for his annual prostate evaluation. He had REZUM done on 18 and has an improved stream since then. No urological complaints at this time.     Past Medical History:   Diagnosis Date    *Atrial fibrillation     Benign hypertrophy of prostate     Degenerative disc disease     GERD (gastroesophageal reflux disease)     Hx of colonic polyps     Hypertension     Pilonidal cyst        Past Surgical History:   Procedure Laterality Date    COLONOSCOPY N/A 2016    Performed by [facility]et [facility]y [facility] at [facility] ENDO (4TH FLR)    COLONOSCOPY N/A 2013    Performed by Zeke L[facility]pez MD at [facility] ENDO    CYST REMOVAL      coccyx    DESTRUCTION-PROSTATE-TRANSURETHRAL N/A 2018    Performed by Earnest Martinez Jr., MD at [facility] OR 1ST FLR    ESOPHAGOGASTRODUODENOSCOPY (EGD) N/A 3/24/2015    Performed by Seferino Warner MD at [facility] ENDO    KNEE SURGERY      right       Family History   Problem Relation Age of Onset    Breast cancer Mother     Breast cancer Sister     Ovarian cancer Other        Social History     Socioeconomic History    Marital status:      Spouse name: Not on file    Number of children: Not on file    Years of education: Not on file    Highest education level: Not on file   Occupational History    Not on file   Social Needs    Financial resource strain: Not on file    Food insecurity:     Worry: Not on file     Inability: Not on file    Transportation needs:     Medical: Not on file     Non-medical: Not on file   Tobacco Use    Smoking status: Former Smoker     Years: 2.00     Types: Cigarettes     Last attempt to quit: 1975     Years since quittin.6    Smokeless tobacco: Never Used   Substance and Sexual Activity    Alcohol use: Yes     Alcohol/week: 1.8 oz  "    Types: 3 Glasses of wine per week     Comment: weekly    Drug use: No    Sexual activity: Not Currently   Lifestyle    Physical activity:     Days per week: Not on file     Minutes per session: Not on file    Stress: Not on file   Relationships    Social connections:     Talks on phone: Not on file     Gets together: Not on file     Attends Scientology service: Not on file     Active member of club or organization: Not on file     Attends meetings of clubs or organizations: Not on file     Relationship status: Not on file   Other Topics Concern    Not on file   Social History Narrative    Not on file       Allergies:  Patient has no known allergies.    Medications:    Current Outpatient Medications:     ammonium lactate (LAC-HYDRIN) 12 % lotion, Apply topically 2 (two) times daily., Disp: 225 g, Rfl: 5    azelastine (ASTELIN) 137 mcg (0.1 %) nasal spray, 1 spray (137 mcg total) by Nasal route 2 (two) times daily., Disp: 30 mL, Rfl: 0    benzonatate (TESSALON) 100 MG capsule, 100 mg 3 (three) times daily as needed. , Disp: , Rfl:     cyanocobalamin (VITAMIN B-12) 1000 MCG tablet, Take 100 mcg by mouth once daily., Disp: , Rfl:     dabigatran etexilate (PRADAXA) 150 mg Cap, Take 1 capsule (150 mg total) by mouth 2 (two) times daily. "Do NOT break, chew, or open capsules.", Disp: 180 capsule, Rfl: 2    flecainide (TAMBOCOR) 50 MG Tab, Take 1 tablet (50 mg total) by mouth every 12 (twelve) hours., Disp: 180 tablet, Rfl: 3    HYDROcodone-acetaminophen (NORCO) 5-325 mg per tablet, 1 tablet every 4 (four) hours as needed. , Disp: , Rfl:     insulin syringe-needle U-100 (EASY COMFORT INSULIN SYRINGE) 1 mL 31 gauge x 5/16 Syrg, Inject 1 time daily prn ED, Disp: 90 each, Rfl: 0    methocarbamol (ROBAXIN) 500 MG Tab, Take 2 tablets (1,000 mg total) by mouth nightly as needed., Disp: 60 tablet, Rfl: 5    metoprolol succinate (TOPROL-XL) 50 MG 24 hr tablet, Take 1 tablet (50 mg total) by mouth once daily., " Disp: 90 tablet, Rfl: 2    mirtazapine (REMERON) 30 MG tablet, Take 30 mg by mouth every evening., Disp: , Rfl:     multivitamin (THERAGRAN) per tablet, Take 1 tablet by mouth once daily., Disp: , Rfl:     omeprazole (PRILOSEC) 20 MG capsule, Take 1 capsule (20 mg total) by mouth once daily., Disp: 90 capsule, Rfl: 3    papaverine 30 mg/mL injection, Papaverine 60mg ADD: Phentolamine 20mg/ml  ADD: PGE1 200 mcg  20/60/2 Mixture Sig: Inject as directed into lateral aspect of penis, Disp: 10 mL, Rfl: 11    sildenafil (VIAGRA) 100 MG tablet, Take 1 tablet (100 mg total) by mouth daily as needed for Erectile Dysfunction., Disp: 30 tablet, Rfl: 0    terazosin (HYTRIN) 10 MG capsule, Take 1 capsule (10 mg total) by mouth every evening., Disp: 30 capsule, Rfl: 11    VITAMIN B COMPLEX (B COMPLETE ORAL), Take by mouth once daily., Disp: , Rfl:     Review of Systems   Constitutional: Negative for activity change, appetite change, chills, diaphoresis, fatigue, fever and unexpected weight change.   HENT: Negative for congestion, dental problem, hearing loss, mouth sores, postnasal drip, rhinorrhea, sinus pressure and trouble swallowing.    Eyes: Negative for pain, discharge and itching.   Respiratory: Negative for apnea, cough, choking, chest tightness, shortness of breath and wheezing.    Cardiovascular: Negative for chest pain, palpitations and leg swelling.   Gastrointestinal: Negative for abdominal distention, abdominal pain, anal bleeding, blood in stool, constipation, diarrhea, nausea, rectal pain and vomiting.   Endocrine: Negative for polydipsia and polyuria.   Genitourinary: Negative for decreased urine volume, difficulty urinating, discharge, dysuria, enuresis, flank pain, frequency, genital sores, hematuria, penile pain, penile swelling and scrotal swelling.   Musculoskeletal: Negative for arthralgias, back pain and myalgias.   Skin: Negative for color change, rash and wound.   Neurological: Negative for  dizziness, syncope, speech difficulty, light-headedness and headaches.   Hematological: Negative for adenopathy. Does not bruise/bleed easily.   Psychiatric/Behavioral: Negative for behavioral problems, confusion and sleep disturbance.       Objective:      Physical Exam   Constitutional: He appears well-developed.   HENT:   Head: Normocephalic.   Neck: Neck supple.   Cardiovascular: Normal rate.    Pulmonary/Chest: Effort normal.   Abdominal: Soft.   Genitourinary:   Genitourinary Comments: Prostate was smooth without nodularity. No rectal masses. 30 grams. External hemorrhoids were present. Normal perineum.     Neurological: He is alert.   Skin: Skin is warm.     Psychiatric: He has a normal mood and affect.       Assessment:       1. BPH with urinary obstruction        Plan:       Omar was seen today for erectile dysfunction.    Diagnoses and all orders for this visit:    BPH with urinary obstruction          RTC 1 year for SHALOM.    Du CELAYA, am acting as a scribe on this patient encounter in the presence and under the supervision of Dr. Martinez.    05/23/2019 8:32 AM    IDr. Martinez, personally performed the services described in this documentation.   All medical record entries made by the scribe were at my direction and in my presence.   I have reviewed the chart and agree that the record is accurate and complete.   Earnest Martinez MD.  8:34 AM 05/23/2019

## 2019-05-30 ENCOUNTER — PATIENT OUTREACH (OUTPATIENT)
Dept: OTHER | Facility: OTHER | Age: 69
End: 2019-05-30

## 2019-05-30 NOTE — PROGRESS NOTES
Last 5 Patient Entered Readings                                      Current 30 Day Average: 143/73     Recent Readings 5/27/2019 5/26/2019 5/24/2019 5/20/2019 5/18/2019    SBP (mmHg) 138 147 156 145 149    DBP (mmHg) 60 74 86 75 66    Pulse 66 63 64 69 65        Mr. Pacheco continues to exercise regularly. He also says he follows a low salt diet. He continues to decline starting any additional medications, as his goal is to stop all BP medications. Tried to explain to him the benefit of adding a low dose of another medication (valsartan), but he politely refuses. He saw the urologist last week and BP was 126/77. Advised his bring his BP monitor to his upcoming visit with Dr. Goode to confirm home readings are accurate. Will route this note to Dr. Goode.     Per 30 day average, 143/73 mmHg, patient's BP is not at goal.     Will continue to monitor regularly. Will follow up in 2-3 weeks, sooner if BP begins to trend upward or downward.    Asked patient to call or message with questions or concerns.     Current HTN regimen:  Hypertension Medications             metoprolol succinate (TOPROL-XL) 50 MG 24 hr tablet Take 1 tablet (50 mg total) by mouth once daily.    terazosin (HYTRIN) 10 MG capsule Take 1 capsule (10 mg total) by mouth every evening.

## 2019-06-05 ENCOUNTER — OFFICE VISIT (OUTPATIENT)
Dept: FAMILY MEDICINE | Facility: CLINIC | Age: 69
End: 2019-06-05
Payer: MEDICARE

## 2019-06-05 VITALS
RESPIRATION RATE: 18 BRPM | WEIGHT: 201.63 LBS | TEMPERATURE: 98 F | HEART RATE: 58 BPM | HEIGHT: 69 IN | OXYGEN SATURATION: 96 % | SYSTOLIC BLOOD PRESSURE: 118 MMHG | BODY MASS INDEX: 29.86 KG/M2 | DIASTOLIC BLOOD PRESSURE: 78 MMHG

## 2019-06-05 DIAGNOSIS — M48.061 SPINAL STENOSIS, LUMBAR REGION, WITHOUT NEUROGENIC CLAUDICATION: ICD-10-CM

## 2019-06-05 DIAGNOSIS — L30.9 DERMATITIS: ICD-10-CM

## 2019-06-05 DIAGNOSIS — N40.0 BENIGN PROSTATIC HYPERPLASIA, UNSPECIFIED WHETHER LOWER URINARY TRACT SYMPTOMS PRESENT: ICD-10-CM

## 2019-06-05 DIAGNOSIS — I48.0 PAROXYSMAL ATRIAL FIBRILLATION: Chronic | ICD-10-CM

## 2019-06-05 DIAGNOSIS — I10 ESSENTIAL HYPERTENSION: Primary | Chronic | ICD-10-CM

## 2019-06-05 DIAGNOSIS — N52.8 OTHER MALE ERECTILE DYSFUNCTION: ICD-10-CM

## 2019-06-05 PROCEDURE — 99999 PR PBB SHADOW E&M-EST. PATIENT-LVL III: ICD-10-PCS | Mod: PBBFAC,,, | Performed by: FAMILY MEDICINE

## 2019-06-05 PROCEDURE — 99214 PR OFFICE/OUTPT VISIT, EST, LEVL IV, 30-39 MIN: ICD-10-PCS | Mod: S$PBB,,, | Performed by: FAMILY MEDICINE

## 2019-06-05 PROCEDURE — 99999 PR PBB SHADOW E&M-EST. PATIENT-LVL III: CPT | Mod: PBBFAC,,, | Performed by: FAMILY MEDICINE

## 2019-06-05 PROCEDURE — 99213 OFFICE O/P EST LOW 20 MIN: CPT | Mod: PBBFAC,PO | Performed by: FAMILY MEDICINE

## 2019-06-05 PROCEDURE — 99214 OFFICE O/P EST MOD 30 MIN: CPT | Mod: S$PBB,,, | Performed by: FAMILY MEDICINE

## 2019-06-05 RX ORDER — AMMONIUM LACTATE 12 G/100G
LOTION TOPICAL 2 TIMES DAILY
Qty: 225 G | Refills: 5 | OUTPATIENT
Start: 2019-06-05 | End: 2019-06-05 | Stop reason: SDUPTHER

## 2019-06-05 RX ORDER — AMMONIUM LACTATE 12 G/100G
LOTION TOPICAL 2 TIMES DAILY
Qty: 225 G | Refills: 5 | Status: SHIPPED | OUTPATIENT
Start: 2019-06-05 | End: 2019-06-05 | Stop reason: SDUPTHER

## 2019-06-05 RX ORDER — AMMONIUM LACTATE 12 G/100G
LOTION TOPICAL 2 TIMES DAILY
Qty: 225 G | Refills: 11 | Status: SHIPPED | OUTPATIENT
Start: 2019-06-05 | End: 2019-06-05 | Stop reason: SDUPTHER

## 2019-06-05 RX ORDER — AMMONIUM LACTATE 12 G/100G
LOTION TOPICAL 2 TIMES DAILY
Qty: 225 G | Refills: 11 | Status: SHIPPED | OUTPATIENT
Start: 2019-06-05 | End: 2020-03-23 | Stop reason: SDUPTHER

## 2019-06-05 RX ORDER — SILDENAFIL 100 MG/1
100 TABLET, FILM COATED ORAL DAILY PRN
Qty: 30 TABLET | Refills: 11 | Status: SHIPPED | OUTPATIENT
Start: 2019-06-05 | End: 2019-06-05 | Stop reason: SDUPTHER

## 2019-06-05 RX ORDER — SILDENAFIL 100 MG/1
100 TABLET, FILM COATED ORAL DAILY PRN
Qty: 30 TABLET | Refills: 11 | OUTPATIENT
Start: 2019-06-05 | End: 2019-06-05 | Stop reason: SDUPTHER

## 2019-06-05 RX ORDER — SILDENAFIL 100 MG/1
100 TABLET, FILM COATED ORAL DAILY PRN
Qty: 30 TABLET | Refills: 11 | Status: SHIPPED | OUTPATIENT
Start: 2019-06-05 | End: 2020-03-23 | Stop reason: SDUPTHER

## 2019-06-05 NOTE — PROGRESS NOTES
Chief Complaint   Patient presents with    Annual Exam       HPI  Omar Pacheco is a 68 y.o. male with multiple medical diagnoses as listed in the medical history and problem list that presents for annual exam . He is also following up on his HTN and atrial fibrillation.    He has been working w/ digital medicine and does not want to increase his medication. He has been checking his pressure randomly and is not sure if this is before or after he takes his medication. He feels well.     He has been following up w/ pain mgmt and he has been offered epidural injection but he is not sure if he wants to take this.    PAST MEDICAL HISTORY:  Past Medical History:   Diagnosis Date    *Atrial fibrillation     Benign hypertrophy of prostate     Degenerative disc disease     GERD (gastroesophageal reflux disease)     Hx of colonic polyps     Hypertension     Pilonidal cyst 1971       PAST SURGICAL HISTORY:  Past Surgical History:   Procedure Laterality Date    COLONOSCOPY N/A 12/7/2016    Performed by [facility] at [facility] ENDO (4TH FLR)    COLONOSCOPY N/A 12/11/2013    [facility] by Zeke L[facility] at [facility] END[facility]    CYST REMOVAL  1971    coccyx    DESTRUCTION-PROSTATE-TRANSURETHRAL N/A 2/16/2018    Performed by Earnest Martinez Jr., MD at [facility] OR 1ST FLR    ESOPHAGOGASTRODUODENOSCOPY (EGD) N/A 3/24/2015    Performed by Seferino Warner MD at [facility] ENDO    KNEE SURGERY  1989    right       SOCIAL HISTORY:  Social History     Socioeconomic History    Marital status:      Spouse name: Not on file    Number of children: Not on file    Years of education: Not on file    Highest education level: Not on file   Occupational History    Not on file   Social Needs    Financial resource strain: Not on file    Food insecurity:     Worry: Not on file     Inability: Not on file    Transportation needs:     Medical: Not on file     Non-medical: Not on file   Tobacco Use    Smoking status: Former Smoker      "Years: 2.00     Types: Cigarettes     Last attempt to quit: 1975     Years since quittin.7    Smokeless tobacco: Never Used   Substance and Sexual Activity    Alcohol use: Yes     Alcohol/week: 1.8 oz     Types: 3 Glasses of wine per week     Comment: weekly    Drug use: No    Sexual activity: Not Currently   Lifestyle    Physical activity:     Days per week: Not on file     Minutes per session: Not on file    Stress: Not on file   Relationships    Social connections:     Talks on phone: Not on file     Gets together: Not on file     Attends Restorationist service: Not on file     Active member of club or organization: Not on file     Attends meetings of clubs or organizations: Not on file     Relationship status: Not on file   Other Topics Concern    Not on file   Social History Narrative    Not on file       FAMILY HISTORY:  Family History   Problem Relation Age of Onset    Breast cancer Mother     Breast cancer Sister     Ovarian cancer Other        ALLERGIES AND MEDICATIONS: updated and reviewed.  Review of patient's allergies indicates:  No Known Allergies  Current Outpatient Medications   Medication Sig Dispense Refill    ammonium lactate (LAC-HYDRIN) 12 % lotion Apply topically 2 (two) times daily. 225 g 11    azelastine (ASTELIN) 137 mcg (0.1 %) nasal spray 1 spray (137 mcg total) by Nasal route 2 (two) times daily. 30 mL 0    cyanocobalamin (VITAMIN B-12) 1000 MCG tablet Take 100 mcg by mouth once daily.      dabigatran etexilate (PRADAXA) 150 mg Cap Take 1 capsule (150 mg total) by mouth 2 (two) times daily. "Do NOT break, chew, or open capsules." 180 capsule 2    flecainide (TAMBOCOR) 50 MG Tab Take 1 tablet (50 mg total) by mouth every 12 (twelve) hours. 180 tablet 3    insulin syringe-needle U-100 (EASY COMFORT INSULIN SYRINGE) 1 mL 31 gauge x 5/16 Syrg Inject 1 time daily prn ED 90 each 0    methocarbamol (ROBAXIN) 500 MG Tab Take 2 tablets (1,000 mg total) by mouth nightly as " needed. 60 tablet 5    metoprolol succinate (TOPROL-XL) 50 MG 24 hr tablet Take 1 tablet (50 mg total) by mouth once daily. 90 tablet 2    mirtazapine (REMERON) 30 MG tablet Take 30 mg by mouth every evening.      multivitamin (THERAGRAN) per tablet Take 1 tablet by mouth once daily.      omeprazole (PRILOSEC) 20 MG capsule Take 1 capsule (20 mg total) by mouth once daily. 90 capsule 3    papaverine 30 mg/mL injection Papaverine 60mg  ADD: Phentolamine 20mg/ml   ADD: PGE1 200 mcg   20/60/2 Mixture  Sig: Inject as directed into lateral aspect of penis 10 mL 11    sildenafil (VIAGRA) 100 MG tablet Take 1 tablet (100 mg total) by mouth daily as needed for Erectile Dysfunction. 30 tablet 11    terazosin (HYTRIN) 10 MG capsule Take 1 capsule (10 mg total) by mouth every evening. 30 capsule 11    VITAMIN B COMPLEX (B COMPLETE ORAL) Take by mouth once daily.      HYDROcodone-acetaminophen (NORCO) 5-325 mg per tablet 1 tablet every 4 (four) hours as needed.        No current facility-administered medications for this visit.        ROS  Review of Systems   Constitutional: Negative for chills, fatigue, fever and unexpected weight change.   HENT: Negative for ear pain, postnasal drip, rhinorrhea, sinus pressure and sore throat.    Eyes: Negative for photophobia and visual disturbance.   Respiratory: Negative for apnea, cough, chest tightness, shortness of breath and wheezing.    Cardiovascular: Negative for chest pain and palpitations.   Gastrointestinal: Negative for abdominal pain, blood in stool, constipation, diarrhea, nausea and vomiting.   Genitourinary: Negative for difficulty urinating.   Musculoskeletal: Positive for arthralgias and back pain. Negative for joint swelling.   Skin: Negative for rash.   Neurological: Negative for facial asymmetry, speech difficulty, weakness, numbness and headaches.   Psychiatric/Behavioral: Negative for dysphoric mood.       Physical Exam  Vitals:    06/05/19 0759   BP: 118/78  "  Pulse: (!) 58   Resp: 18   Temp: 97.7 °F (36.5 °C)   TempSrc: Oral   SpO2: 96%   Weight: 91.4 kg (201 lb 9.8 oz)   Height: 5' 9" (1.753 m)    Body mass index is 29.77 kg/m².  Weight: 91.4 kg (201 lb 9.8 oz)   Height: 5' 9" (175.3 cm)     Physical Exam   Constitutional: He is oriented to person, place, and time. He appears well-developed.   HENT:   Head: Normocephalic and atraumatic.   Eyes: Pupils are equal, round, and reactive to light. EOM are normal.   Neck: No thyromegaly present.   Cardiovascular: Normal rate, regular rhythm and intact distal pulses.   No murmur heard.  Pulmonary/Chest: Breath sounds normal. He has no wheezes. He has no rales.   Abdominal: Soft. Bowel sounds are normal. He exhibits no distension and no mass. There is no hepatosplenomegaly. There is no tenderness. There is no rebound and no guarding. No hernia.   Lymphadenopathy:     He has no cervical adenopathy.   Neurological: He is alert and oriented to person, place, and time.   Skin: No rash noted.   Nursing note and vitals reviewed.      Health Maintenance       Date Due Completion Date    TETANUS VACCINE 12/13/1966 ---    Shingles Vaccine (1 of 2) 12/13/1998 ---    Pneumococcal Vaccine (65+ Low/Medium Risk) (2 of 2 - PPSV23) 02/13/2018 2/13/2017    Influenza Vaccine 08/01/2019 11/5/2018    Override on 10/10/2017: Done (at [facility])    Colonoscopy 12/07/2019 12/7/2016    Lipid Panel 02/13/2022 2/13/2017          Health maintenance reviewed and addressed as ordered      ASSESSMENT     1. Essential hypertension    2. Dermatitis    3. Spinal stenosis, lumbar region, without neurogenic claudication    4. Paroxysmal atrial fibrillation    5. Benign prostatic hyperplasia, unspecified whether lower urinary tract symptoms present    6. Other male erectile dysfunction        PLAN:     Problem List Items Addressed This Visit        Neuro    Spinal stenosis, lumbar region, without neurogenic claudication  -f/u w/ pain mgmt, recommend they refill his " medication       Cardiac/Vascular    Atrial fibrillation - paroxysmal (Chronic)  -stable on current regimen, on flecainide, [facility] f/[facility]    Hypertension - Primary (Chronic)  -controlled, however digital medicine pressures are much higher  -recommend checking after he takes his medication, at least one hour later    Relevant Orders    CBC auto differential    Comprehensive metabolic panel    Lipid panel       Renal/    BPH (benign prostatic hyperplasia)  -has seen Urology and will f/u with them    Other male erectile dysfunction  -continue f/u w/ urology      Other Visit Diagnoses     Dermatitis      -stable on current regimen    Relevant Medications    ammonium lactate (LAC-HYDRIN) 12 % lotion            Donya Goode MD  06/05/2019 8:12 AM        Follow up in about 1 year (around 6/5/2020) for Follow up.

## 2019-06-21 ENCOUNTER — PATIENT OUTREACH (OUTPATIENT)
Dept: OTHER | Facility: OTHER | Age: 69
End: 2019-06-21

## 2019-06-21 NOTE — PROGRESS NOTES
Last 5 Patient Entered Readings                                      Current 30 Day Average: 144/69     Recent Readings 6/20/2019 6/18/2019 6/15/2019 6/11/2019 6/10/2019    SBP (mmHg) 140 157 142 149 133    DBP (mmHg) 50 80 62 64 77    Pulse 61 61 54 57 70          Digital Medicine: Health  Follow Up    Lifestyle Modifications:    1.Dietary Modifications (Sodium intake <2,000mg/day, food labels, dining out): Patient states that he continues to stay away from salt.     2.Physical Activity: Mr. Pacheco continues to exercise in the gym.     3.Medication Therapy: Patient has been compliant with the medication regimen.    4.Patient has the following medication side effects/concerns: None.   (Frequency/Alleviating factors/Precipitating factors, etc.)     Follow up with Mr. Omar Pacheco completed. Mr. Pacheco is doing okay. He states that he has been feeling under the weather for the past two days (maybe sinus related). He will continue to monitor his blood pressure. No further questions or concerns. Will continue to follow up to achieve health goals.

## 2019-06-24 ENCOUNTER — LAB VISIT (OUTPATIENT)
Dept: LAB | Facility: HOSPITAL | Age: 69
End: 2019-06-24
Attending: FAMILY MEDICINE
Payer: MEDICARE

## 2019-06-24 DIAGNOSIS — I10 ESSENTIAL HYPERTENSION: Chronic | ICD-10-CM

## 2019-06-24 LAB
ALBUMIN SERPL BCP-MCNC: 3.3 G/DL (ref 3.5–5.2)
ALP SERPL-CCNC: 54 U/L (ref 55–135)
ALT SERPL W/O P-5'-P-CCNC: 47 U/L (ref 10–44)
ANION GAP SERPL CALC-SCNC: 5 MMOL/L (ref 8–16)
AST SERPL-CCNC: 37 U/L (ref 10–40)
BASOPHILS # BLD AUTO: 0.03 K/UL (ref 0–0.2)
BASOPHILS NFR BLD: 0.7 % (ref 0–1.9)
BILIRUB SERPL-MCNC: 0.5 MG/DL (ref 0.1–1)
BUN SERPL-MCNC: 11 MG/DL (ref 8–23)
CALCIUM SERPL-MCNC: 9.6 MG/DL (ref 8.7–10.5)
CHLORIDE SERPL-SCNC: 103 MMOL/L (ref 95–110)
CHOLEST SERPL-MCNC: 131 MG/DL (ref 120–199)
CHOLEST/HDLC SERPL: 2.3 {RATIO} (ref 2–5)
CO2 SERPL-SCNC: 32 MMOL/L (ref 23–29)
CREAT SERPL-MCNC: 1.1 MG/DL (ref 0.5–1.4)
DIFFERENTIAL METHOD: ABNORMAL
EOSINOPHIL # BLD AUTO: 0.2 K/UL (ref 0–0.5)
EOSINOPHIL NFR BLD: 3.5 % (ref 0–8)
ERYTHROCYTE [DISTWIDTH] IN BLOOD BY AUTOMATED COUNT: 13.6 % (ref 11.5–14.5)
EST. GFR  (AFRICAN AMERICAN): >60 ML/MIN/1.73 M^2
EST. GFR  (NON AFRICAN AMERICAN): >60 ML/MIN/1.73 M^2
GLUCOSE SERPL-MCNC: 97 MG/DL (ref 70–110)
HCT VFR BLD AUTO: 39.3 % (ref 40–54)
HDLC SERPL-MCNC: 58 MG/DL (ref 40–75)
HDLC SERPL: 44.3 % (ref 20–50)
HGB BLD-MCNC: 12.6 G/DL (ref 14–18)
IMM GRANULOCYTES # BLD AUTO: 0.01 K/UL (ref 0–0.04)
IMM GRANULOCYTES NFR BLD AUTO: 0.2 % (ref 0–0.5)
LDLC SERPL CALC-MCNC: 65 MG/DL (ref 63–159)
LYMPHOCYTES # BLD AUTO: 1.9 K/UL (ref 1–4.8)
LYMPHOCYTES NFR BLD: 41.3 % (ref 18–48)
MCH RBC QN AUTO: 30.7 PG (ref 27–31)
MCHC RBC AUTO-ENTMCNC: 32.1 G/DL (ref 32–36)
MCV RBC AUTO: 96 FL (ref 82–98)
MONOCYTES # BLD AUTO: 0.6 K/UL (ref 0.3–1)
MONOCYTES NFR BLD: 13.6 % (ref 4–15)
NEUTROPHILS # BLD AUTO: 1.9 K/UL (ref 1.8–7.7)
NEUTROPHILS NFR BLD: 40.7 % (ref 38–73)
NONHDLC SERPL-MCNC: 73 MG/DL
NRBC BLD-RTO: 0 /100 WBC
PLATELET # BLD AUTO: 262 K/UL (ref 150–350)
PMV BLD AUTO: 9.9 FL (ref 9.2–12.9)
POTASSIUM SERPL-SCNC: 4.1 MMOL/L (ref 3.5–5.1)
PROT SERPL-MCNC: 6.8 G/DL (ref 6–8.4)
RBC # BLD AUTO: 4.1 M/UL (ref 4.6–6.2)
SODIUM SERPL-SCNC: 140 MMOL/L (ref 136–145)
TRIGL SERPL-MCNC: 40 MG/DL (ref 30–150)
WBC # BLD AUTO: 4.55 K/UL (ref 3.9–12.7)

## 2019-06-24 PROCEDURE — 36415 COLL VENOUS BLD VENIPUNCTURE: CPT | Mod: PO

## 2019-06-24 PROCEDURE — 85025 COMPLETE CBC W/AUTO DIFF WBC: CPT

## 2019-06-24 PROCEDURE — 80053 COMPREHEN METABOLIC PANEL: CPT

## 2019-06-24 PROCEDURE — 80061 LIPID PANEL: CPT

## 2019-07-11 ENCOUNTER — PATIENT OUTREACH (OUTPATIENT)
Dept: OTHER | Facility: OTHER | Age: 69
End: 2019-07-11

## 2019-07-11 NOTE — PROGRESS NOTES
Last 5 Patient Entered Readings                                      Current 30 Day Average: 142/68     Recent Readings 7/5/2019 7/3/2019 7/2/2019 6/27/2019 6/20/2019    SBP (mmHg) 136 116 164 134 140    DBP (mmHg) 73 72 84 56 50    Pulse 69 53 61 69 61        Mr. Pacheco is in [place] and forgot his BP monitor at home. He did have his monitor compared to a manual reading at his visit with Dr. Goode a few weeks ago. He states the readings were similar. He acknowledges that BP is above goal, but still does not want to make any medication changes. He plans to start doing more aerobic exercise. He also wants to lose more weight. Advised he work with his health  on these goals to prevent requiring a medication addition.     Per 30 day average, 142/68 mmHg, patient's BP is not at goal.     Will continue to monitor regularly. Will follow up in 2-3 weeks, sooner if BP begins to trend upward or downward.    Asked patient to call or message with questions or concerns.     Current HTN regimen:  Hypertension Medications             metoprolol succinate (TOPROL-XL) 50 MG 24 hr tablet Take 1 tablet (50 mg total) by mouth once daily.    terazosin (HYTRIN) 10 MG capsule Take 1 capsule (10 mg total) by mouth every evening.

## 2019-07-24 ENCOUNTER — PATIENT OUTREACH (OUTPATIENT)
Dept: OTHER | Facility: OTHER | Age: 69
End: 2019-07-24

## 2019-07-24 NOTE — PROGRESS NOTES
Last 5 Patient Entered Readings                                      Current 30 Day Average: 138/72     Recent Readings 7/24/2019 7/23/2019 7/5/2019 7/3/2019 7/2/2019    SBP (mmHg) 139 139 136 116 164    DBP (mmHg) 76 76 73 72 84    Pulse 64 60 69 53 61        Digital Medicine: Health  Follow Up    Lifestyle Modifications:    1.Dietary Modifications (Sodium intake <2,000mg/day, food labels, dining out): Patient states that he is getting back on track with healthier eating habits. Encouraged sodium restriction.     2.Physical Activity: Patient has resumed exercise in the gym since returning home from [place]. He also did some yard work today. Praise and encouragement provided.     3.Medication Therapy: Patient has been compliant with the medication regimen.    4.Patient has the following medication side effects/concerns: None  (Frequency/Alleviating factors/Precipitating factors, etc.)     Follow up with Mr. Omar Pacheco completed. Mr. Pacheco is doing okay. He was surprised to see his BP readings were the exact same over the past few days. He will monitor more frequently since returning home from [place]. He had a great time while away. No further questions or concerns today. Will continue to follow up to achieve health goals.

## 2019-08-20 DIAGNOSIS — M25.562 LEFT KNEE PAIN, UNSPECIFIED CHRONICITY: Primary | ICD-10-CM

## 2019-08-26 DIAGNOSIS — Z87.19 HISTORY OF GASTROESOPHAGEAL REFLUX (GERD): ICD-10-CM

## 2019-08-26 DIAGNOSIS — I48.0 PAROXYSMAL ATRIAL FIBRILLATION: Chronic | ICD-10-CM

## 2019-08-26 DIAGNOSIS — L30.9 DERMATITIS: ICD-10-CM

## 2019-08-26 DIAGNOSIS — I10 ESSENTIAL HYPERTENSION: ICD-10-CM

## 2019-08-26 RX ORDER — OMEPRAZOLE 20 MG/1
20 CAPSULE, DELAYED RELEASE ORAL DAILY
Qty: 90 CAPSULE | Refills: 3 | Status: SHIPPED | OUTPATIENT
Start: 2019-08-26 | End: 2020-03-23 | Stop reason: SDUPTHER

## 2019-08-26 RX ORDER — OMEPRAZOLE 20 MG/1
20 CAPSULE, DELAYED RELEASE ORAL DAILY
Qty: 90 CAPSULE | Refills: 3 | Status: CANCELLED | OUTPATIENT
Start: 2019-08-26

## 2019-08-26 RX ORDER — SILDENAFIL 100 MG/1
100 TABLET, FILM COATED ORAL DAILY PRN
Qty: 30 TABLET | Refills: 11 | Status: CANCELLED | OUTPATIENT
Start: 2019-08-26 | End: 2020-08-25

## 2019-08-26 RX ORDER — METOPROLOL SUCCINATE 50 MG/1
50 TABLET, EXTENDED RELEASE ORAL DAILY
Qty: 90 TABLET | Refills: 2 | Status: SHIPPED | OUTPATIENT
Start: 2019-08-26 | End: 2020-03-23 | Stop reason: SDUPTHER

## 2019-08-26 RX ORDER — FLECAINIDE ACETATE 50 MG/1
50 TABLET ORAL EVERY 12 HOURS
Qty: 180 TABLET | Refills: 3 | Status: CANCELLED | OUTPATIENT
Start: 2019-08-26

## 2019-08-26 RX ORDER — FLECAINIDE ACETATE 50 MG/1
50 TABLET ORAL EVERY 12 HOURS
Qty: 180 TABLET | Refills: 3 | Status: SHIPPED | OUTPATIENT
Start: 2019-08-26 | End: 2019-10-15

## 2019-08-26 RX ORDER — AMMONIUM LACTATE 12 G/100G
LOTION TOPICAL 2 TIMES DAILY
Qty: 225 G | Refills: 11 | Status: CANCELLED | OUTPATIENT
Start: 2019-08-26

## 2019-08-27 NOTE — TELEPHONE ENCOUNTER
----- Message from Tierney Cheney sent at 8/27/2019 10:01 AM CDT -----  Contact: Patient ph [phone]  Type: Patient Call Back    Who called: Patient    What is the request in detail: Patient is calling to find out if RXs are ready to be picked up? Please call.     Would the patient rather a call back or a response via [facility]? Call back    Best call back number: [phone]

## 2019-08-27 NOTE — TELEPHONE ENCOUNTER
Pt. Notified his prescriptions for 3 of his medications are ready for . 2 of his medications have refills at the pharmacy. Pt. Was notified of this.

## 2019-09-02 ENCOUNTER — PATIENT MESSAGE (OUTPATIENT)
Dept: UROLOGY | Facility: CLINIC | Age: 69
End: 2019-09-02

## 2019-09-03 ENCOUNTER — PATIENT MESSAGE (OUTPATIENT)
Dept: UROLOGY | Facility: CLINIC | Age: 69
End: 2019-09-03

## 2019-09-05 ENCOUNTER — PATIENT OUTREACH (OUTPATIENT)
Dept: OTHER | Facility: OTHER | Age: 69
End: 2019-09-05

## 2019-09-05 NOTE — PROGRESS NOTES
Last 5 Patient Entered Readings                                      Current 30 Day Average: 143/76     Recent Readings 9/4/2019 9/4/2019 8/27/2019 8/22/2019 8/22/2019    SBP (mmHg) 145 143 131 150 160    DBP (mmHg) 73 73 63 71 77    Pulse 68 59 61 56 64        Digital Medicine: Health  Follow Up    Left voicemail to follow up with Mr. Omar Pacheco.  Current BP average 143/76 mmHg is not at goal, <130/80 mmHg.  Will follow up in 1 week.

## 2019-09-11 ENCOUNTER — OFFICE VISIT (OUTPATIENT)
Dept: UROLOGY | Facility: CLINIC | Age: 69
End: 2019-09-11
Payer: MEDICARE

## 2019-09-11 VITALS
WEIGHT: 198.44 LBS | HEART RATE: 53 BPM | BODY MASS INDEX: 29.39 KG/M2 | HEIGHT: 69 IN | DIASTOLIC BLOOD PRESSURE: 70 MMHG | SYSTOLIC BLOOD PRESSURE: 150 MMHG

## 2019-09-11 DIAGNOSIS — N52.01 ERECTILE DYSFUNCTION DUE TO ARTERIAL INSUFFICIENCY: Primary | ICD-10-CM

## 2019-09-11 DIAGNOSIS — N52.9 ERECTILE DYSFUNCTION, UNSPECIFIED ERECTILE DYSFUNCTION TYPE: ICD-10-CM

## 2019-09-11 PROCEDURE — 99214 OFFICE O/P EST MOD 30 MIN: CPT | Mod: S$PBB,,, | Performed by: NURSE PRACTITIONER

## 2019-09-11 PROCEDURE — 99213 OFFICE O/P EST LOW 20 MIN: CPT | Mod: PBBFAC | Performed by: NURSE PRACTITIONER

## 2019-09-11 PROCEDURE — 99999 PR PBB SHADOW E&M-EST. PATIENT-LVL III: CPT | Mod: PBBFAC,,, | Performed by: NURSE PRACTITIONER

## 2019-09-11 PROCEDURE — 99999 PR PBB SHADOW E&M-EST. PATIENT-LVL III: ICD-10-PCS | Mod: PBBFAC,,, | Performed by: NURSE PRACTITIONER

## 2019-09-11 PROCEDURE — 99214 PR OFFICE/OUTPT VISIT, EST, LEVL IV, 30-39 MIN: ICD-10-PCS | Mod: S$PBB,,, | Performed by: NURSE PRACTITIONER

## 2019-09-11 RX ORDER — FLECAINIDE ACETATE 100 MG/1
TABLET ORAL
COMMUNITY
Start: 2019-08-28 | End: 2019-10-15

## 2019-09-11 RX ORDER — PAPAVERINE HYDROCHLORIDE 30 MG/ML
INJECTION INTRAMUSCULAR; INTRAVENOUS
Qty: 10 ML | Refills: 11 | Status: SHIPPED | OUTPATIENT
Start: 2019-09-11 | End: 2019-09-20 | Stop reason: SDUPTHER

## 2019-09-11 NOTE — PROGRESS NOTES
CHIEF COMPLAINT:    Mr. Pacheco is a 68 y.o. male presenting for f/u ED, and refill of Trimix.    PRESENTING ILLNESS:    Omar Pacheco is a 68 y.o. male new patient to me (records of past medical, family and social history personally reviewed by me), w/ h/o HTN, BPH s/p Rezum w/ Dr. Martinez 2/16/18 w/ Dr. Martinez.    Pt last seen in clinic 5/23/19 w/ Dr. Martinez for ED, and BPH.    Today pt returns to clinic requesting refill for Trimix (30mg/ml papaverine/3mg/ml phentolamine/60mcg/ml PGE1) from [facility]. Reports has had current vial for more than 3 months. Having mixed results, currently upt to 90 units. Denies pain w/ injections, development of nodules/lumps/bumps, curvature of penis. Denies irritative/obstructive urinary sxs, GH, dysuria.    REVIEW OF SYSTEMS:    Omar Pacheco denies headache, blurred vision, fever, nausea, vomiting, chills, abdominal pain, pelvic pain, flank pain, bleeding per rectum, cough, SOB, recent loss of consciousness, recent mental status changes, seizures, dizziness, or upper or lower extremity weakness.    PATIENT HISTORY:    Past Medical History:   Diagnosis Date    *Atrial fibrillation     Benign hypertrophy of prostate     Degenerative disc disease     GERD (gastroesophageal reflux disease)     Hx of colonic polyps     Hypertension     Pilonidal cyst 1971       Past Surgical History:   Procedure Laterality Date    COLONOSCOPY N/A 12/7/2016    Performed by [facility]umeet Lundy MD at [facility] ENDO (4TH FLR)    COLONOSCOPY N/A 12/11/2013    Performed by Zeke López MD at [facility] ENDO    CYST REMOVAL  1971    coccyx    DESTRUCTION-PROSTATE-TRANSURETHRAL N/A 2/16/2018    Performed by Earnest Martinez Jr., MD at [facility] OR 1ST FLR    ESOPHAGOGASTRODUODENOSCOPY (EGD) N/A 3/24/2015    Performed by Seferino Warner MD at [facility] ENDO    KNEE SURGERY  1989    right       Family History   Problem Relation Age of Onset    Breast cancer Mother     Breast cancer Sister     Ovarian cancer Other   "      Social History     Socioeconomic History    Marital status:      Spouse name: Not on file    Number of children: Not on file    Years of education: Not on file    Highest education level: Not on file   Occupational History    Not on file   Social Needs    Financial resource strain: Not on file    Food insecurity:     Worry: Not on file     Inability: Not on file    Transportation needs:     Medical: Not on file     Non-medical: Not on file   Tobacco Use    Smoking status: Former Smoker     Years: 2.00     Types: Cigarettes     Last attempt to quit: 1975     Years since quittin.9    Smokeless tobacco: Never Used   Substance and Sexual Activity    Alcohol use: Yes     Alcohol/week: 1.8 oz     Types: 3 Glasses of wine per week     Comment: weekly    Drug use: No    Sexual activity: Not Currently   Lifestyle    Physical activity:     Days per week: Not on file     Minutes per session: Not on file    Stress: Not on file   Relationships    Social connections:     Talks on phone: Not on file     Gets together: Not on file     Attends Cheondoism service: Not on file     Active member of club or organization: Not on file     Attends meetings of clubs or organizations: Not on file     Relationship status: Not on file   Other Topics Concern    Not on file   Social History Narrative    Not on file       Allergies:  Patient has no known allergies.    Medications:    Current Outpatient Medications:     ammonium lactate (LAC-HYDRIN) 12 % lotion, Apply topically 2 (two) times daily., Disp: 225 g, Rfl: 11    azelastine (ASTELIN) 137 mcg (0.1 %) nasal spray, 1 spray (137 mcg total) by Nasal route 2 (two) times daily., Disp: 30 mL, Rfl: 0    cyanocobalamin (VITAMIN B-12) 1000 MCG tablet, Take 100 mcg by mouth once daily., Disp: , Rfl:     dabigatran etexilate (PRADAXA) 150 mg Cap, Take 1 capsule (150 mg total) by mouth 2 (two) times daily. "Do NOT break, chew, or open capsules.", Disp: 180 " capsule, Rfl: 2    flecainide (TAMBOCOR) 100 MG Tab, , Disp: , Rfl:     flecainide (TAMBOCOR) 50 MG Tab, Take 1 tablet (50 mg total) by mouth every 12 (twelve) hours., Disp: 180 tablet, Rfl: 3    HYDROcodone-acetaminophen (NORCO) 5-325 mg per tablet, 1 tablet every 4 (four) hours as needed. , Disp: , Rfl:     insulin syringe-needle U-100 (EASY COMFORT INSULIN SYRINGE) 1 mL 31 gauge x 5/16 Syrg, Inject 1 time daily prn ED, Disp: 90 each, Rfl: 0    methocarbamol (ROBAXIN) 500 MG Tab, Take 2 tablets (1,000 mg total) by mouth nightly as needed., Disp: 60 tablet, Rfl: 5    metoprolol succinate (TOPROL-XL) 50 MG 24 hr tablet, Take 1 tablet (50 mg total) by mouth once daily., Disp: 90 tablet, Rfl: 2    mirtazapine (REMERON) 30 MG tablet, Take 30 mg by mouth every evening., Disp: , Rfl:     multivitamin (THERAGRAN) per tablet, Take 1 tablet by mouth once daily., Disp: , Rfl:     omeprazole (PRILOSEC) 20 MG capsule, Take 1 capsule (20 mg total) by mouth once daily., Disp: 90 capsule, Rfl: 3    papaverine 30 mg/mL injection, ADD: Phentolamine 3 mg/ml ADD: PGE1 60 mcg. 30/3/60. Sig: Inject as directed into lateral aspect of penis, Disp: 10 mL, Rfl: 11    sildenafil (VIAGRA) 100 MG tablet, Take 1 tablet (100 mg total) by mouth daily as needed for Erectile Dysfunction., Disp: 30 tablet, Rfl: 11    terazosin (HYTRIN) 10 MG capsule, Take 1 capsule (10 mg total) by mouth every evening., Disp: 30 capsule, Rfl: 11    VITAMIN B COMPLEX (B COMPLETE ORAL), Take by mouth once daily., Disp: , Rfl:     PHYSICAL EXAMINATION:    The patient generally appears in good health, is appropriately interactive, and is in no apparent distress.     Eyes: anicteric sclerae, moist conjunctivae; no lid-lag; PERRLA     HENT: Atraumatic; oropharynx clear with moist mucous membranes and no mucosal ulcerations;normal hard and soft palate. No evidence of lymphadenopathy.    Neck: Trachea midline.  No thyromegaly.    Musculoskeletal: No abnormal  gait.    Skin: No lesions.    Mental: Cooperative with normal affect.  Is oriented to time, place, and person.    Neuro: Grossly intact.    Chest: Normal inspiratory effort.   No accessory muscles.  No audible wheezes.  Respirations symmetric on inspiration and expiration.    Heart: Regular rhythm.      Abdomen:  Soft, non-tender. No masses or organomegaly. Bladder is not palpable. No evidence of flank discomfort. No evidence of inguinal hernia.    Penis is uncircumcised. No penile discharge. Meatus w/o stenosis or lesions. Phallus unremarkable. No scrotal enlargement/edema/swelling/lesions. Epididymis unremarkable bilaterally. Testicles normal w/o nodules or tenderness. No inguinal hernias or lymphadenopathy.    Extremities: No clubbing, cyanosis, or edema.    LABS:    Lab Results   Component Value Date    CREATININE 1.1 06/24/2019    CREATININE 0.9 02/16/2018    CREATININE 1.0 07/21/2017       Lab Results   Component Value Date    PSA 4.09 (H) 01/07/2013    PSA 1.8 02/05/2007    PSADIAG 4.4 (H) 08/01/2017    PSADIAG 3.2 06/17/2015    PSADIAG 4.6 (H) 12/15/2014       No results found for: HGBA1C    Lab Results   Component Value Date    TOTALTESTOST 846 06/30/2017      Lab Results   Component Value Date    LABURIN No growth 02/19/2018    LABURIN No growth 07/21/2017    LABURIN No growth 12/15/2014     IMPRESSION:     Erectile dysfunction due to arterial insufficiency  -     papaverine 30 mg/mL injection; ADD: Phentolamine 3 mg/ml ADD: PGE1 60 mcg. 30/3/60. Sig: Inject as directed into lateral aspect of penis  Dispense: 10 mL; Refill: 11    Erectile dysfunction, unspecified erectile dysfunction type      PLAN:    I spent 25 minutes with the patient of which more than half was spent in direct consultation with the patient in regards to our treatment and plan.    Discussed and reviewed intracavernosal injections using Trimix (PGE1/papaverine/phentolamine). Refilled Rx. Start w/ 45 units w/ new vial rather than 90  units. Discussed and reviewed proper use and potential adverse effects (bleeding, pain, formation of scar tissue/nodules, development of penile curvature). The medication has to be stored in the refrigerator. Improper storage decreases the effectiveness of the medication. Discussed and asked pt to return demonstrate how he injects w/ capped syringe. Pt demonstrated injecting in ventral aspect of penile shaft rather than lateral aspect. Corrected placement and where to injection, and pt return demonstrated where to and where not to inject including the dorsal and ventral aspects and glans, and avoiding the veins. Discussed may increase/decrease 5 units/24 hrs to desired rigidity (%) initially (within first week), once reached appropriate dose/desired erectile rigidity (enough for intercourse/masturbation), use Trimix 3-4 days/week. Discussed and reviewed the importance of avoiding overdosing due to risk of priapism. Do not exceed maximum of 90 units w/o contacting clinic. Discussed and advised to report to local emergency dept for erections lasting for more than 4 hours.     Education sheets provided.    Follow up in about 2 months (around 11/11/2019) for ED.    Pt expressed understanding and agree w/ plan.

## 2019-09-11 NOTE — PATIENT INSTRUCTIONS
Penile Self-Injection Procedure  Self-injection is a good option if you have erectile dysfunction (ED). You insert a tiny needle into your penis and inject a medicine. This helps your penis get hard and stay that way long enough for sex. And sex and orgasm will feel as good as always. You may be nervous about doing self-injection at first. But with practice, it will get easier. Your healthcare provider will show you how to do self-injection the first time.  Talk to your doctor about any medicines you take and any medical problems you have.      Preparing for injection  · Wash your hands well with soap and water.  · Prepare the medicine (if needed).  · Sit or  a comfortable position in a warm, well-lit room. If you need to, sit or  front of a mirror.  · Find an injection site on one side of your penis, in a place with no visible veins. (Dont inject into the top, bottom, or head of the penis.)  · Clean the injection site with an alcohol swab. Grasp the head of your penis firmly with your thumb and forefinger (dont just pinch the skin). Stretch the penis so the skin on the shaft is taut.  Injecting the medicine  · Rest your penis against your inner thigh and pull it gently toward your knee. Dont twist or rotate it. This way youll be sure to inject the medicine into the spot you chose and cleaned before.  · Hold the syringe between your thumb and fingers, like youre holding a pen. Rest your forearm on your thigh for support.  · Insert the needle at a 90° angle (perpendicular) to the shaft. Do this quickly to reduce discomfort. (The needle should go in easily. If it doesnt, stop right away.)  · Move your thumb to the plunger. Press down to inject the medicine, counting to 5.  · Remove the needle and dispose of it safely.  Gaining an erection  · Apply pressure to the injection site for a few minutes. This prevents swelling and bruising and helps spread the medicine.   · Stand up. This may help your  erection develop. Foreplay often helps, too.  · Your penis should become firm within 10 to 20 minutes. The erection will last long enough for sex, and maybe longer.  When to seek medical care  An erection that lasts longer than 3 to 4  hours  · Bleeding or bruising  · Severe pain  · Scarring or curvature of the penis   Date Last Reviewed: 1/7/2017  © 7318-8442 [employer]. [address] [place] [address], [place] [address]. All rights reserved. This information is not intended as a substitute for professional medical care. Always follow your healthcare professional's instructions.        Penile Self-Injection: Notes and Precautions     Man and healthcare provider sitting across from one another, talking.   Penile self-injection is a simple technique that may improve your sex life. Some men even find that self-injection leads to an increase in natural erections. If you have questions or concerns about self-injection or erectile dysfunction (ED), talk with your healthcare provider. The information on this sheet will help you get the best results.  Notes about penile self-injection  · You may feel a mild burning during injection. This is OK. But if you feel pressure or severe pain, stop the injection. There may be a problem with the injection site.  · Only inject the medicine on the side of your penis. It may not work if injected elsewhere.  · To prevent scarring, inject in a different spot each time.  · Dont use this treatment if you have a bleeding disorder or any risk of infection.  · Get medical help right away if your erection lasts longer than 3 to 4 hours.  Work with your healthcare provider  Ask how often you can safely repeat injections, as well as any other questions you have. You and your healthcare provider will talk about follow-up exams and how to get supplies. If the medicine doesnt work or stops working over time, tell your healthcare provider.     When to call your healthcare provider  Call your  healthcare provider right away if any of these occur:  · An erection that lasts longer than 3 to 4  hours  · Bleeding or bruising  · Severe pain  · Scarring or curvature of the penis   Date Last Reviewed: 1/1/2017 © 2000-2017 [employer]. [address] [place] [address]. All rights reserved. This information is not intended as a substitute for professional medical care. Always follow your healthcare professional's instructions.        Understanding Erectile Dysfunction    Erectile dysfunction (ED) is a problem getting an erection firm enough or keeping it long enough for intercourse. The problem can happen to any man at any age. But health problems that can lead to ED become more common as a man ages. Up to half of men over age 40 experience ED at some point.  Causes of ED  ED can have many causes. Most are physical. Some are emotional issues. Often, a combination of causes is involved. Causes of ED may include:  · Medical conditions such as diabetes or depression  · Smoking tobacco or marijuana  · Drinking too much alcohol  · Side effects of medications  · Injury to nerves or blood vessels  · Emotional issues such as stress or relationship problems  ED can be treated  Prescription medications for ED are available. They help many men who try them. Depending upon the cause of the ED, though, medications may not be enough. In these cases, other treatment options are available. These include erectile aids and surgery. Your health care provider can tell you more about the treatment that is right for you. And new treatments for ED are being studied. No matter what the treatment you decide on, stay in touch with your doctor. If your symptoms persist, he or she may be able to adjust your current treatment or try something new.  Date Last Reviewed: 1/1/2017 © 2000-2017 [employer]. [address] [place] [address]. All rights reserved. This information is not intended as a  substitute for professional medical care. Always follow your healthcare professional's instructions.

## 2019-09-17 ENCOUNTER — PATIENT OUTREACH (OUTPATIENT)
Dept: OTHER | Facility: OTHER | Age: 69
End: 2019-09-17
Payer: MEDICARE

## 2019-09-19 ENCOUNTER — PATIENT MESSAGE (OUTPATIENT)
Dept: UROLOGY | Facility: CLINIC | Age: 69
End: 2019-09-19

## 2019-09-20 DIAGNOSIS — N52.01 ERECTILE DYSFUNCTION DUE TO ARTERIAL INSUFFICIENCY: Primary | ICD-10-CM

## 2019-09-20 RX ORDER — PAPAVERINE HYDROCHLORIDE 30 MG/ML
INJECTION INTRAMUSCULAR; INTRAVENOUS
Qty: 10 ML | Refills: 11 | Status: SHIPPED | OUTPATIENT
Start: 2019-09-20 | End: 2020-01-13 | Stop reason: SDUPTHER

## 2019-09-23 ENCOUNTER — OFFICE VISIT (OUTPATIENT)
Dept: FAMILY MEDICINE | Facility: CLINIC | Age: 69
End: 2019-09-23
Payer: MEDICARE

## 2019-09-23 VITALS
TEMPERATURE: 99 F | OXYGEN SATURATION: 98 % | DIASTOLIC BLOOD PRESSURE: 70 MMHG | BODY MASS INDEX: 29.84 KG/M2 | SYSTOLIC BLOOD PRESSURE: 120 MMHG | HEART RATE: 60 BPM | WEIGHT: 201.5 LBS | HEIGHT: 69 IN

## 2019-09-23 DIAGNOSIS — Z00.00 ENCOUNTER FOR PREVENTIVE HEALTH EXAMINATION: Primary | ICD-10-CM

## 2019-09-23 DIAGNOSIS — I10 ESSENTIAL HYPERTENSION: Chronic | ICD-10-CM

## 2019-09-23 DIAGNOSIS — Z23 NEED FOR PROPHYLACTIC VACCINATION AGAINST STREPTOCOCCUS PNEUMONIAE (PNEUMOCOCCUS) AND INFLUENZA: ICD-10-CM

## 2019-09-23 DIAGNOSIS — Z23 NEED FOR IMMUNIZATION AGAINST INFLUENZA: ICD-10-CM

## 2019-09-23 DIAGNOSIS — I48.0 PAROXYSMAL ATRIAL FIBRILLATION: Chronic | ICD-10-CM

## 2019-09-23 PROCEDURE — 99999 PR PBB SHADOW E&M-EST. PATIENT-LVL V: CPT | Mod: PBBFAC,,, | Performed by: NURSE PRACTITIONER

## 2019-09-23 PROCEDURE — 99999 PR PBB SHADOW E&M-EST. PATIENT-LVL V: ICD-10-PCS | Mod: PBBFAC,,, | Performed by: NURSE PRACTITIONER

## 2019-09-23 PROCEDURE — 90662 IIV NO PRSV INCREASED AG IM: CPT | Mod: PBBFAC,PO

## 2019-09-23 PROCEDURE — G0009 ADMIN PNEUMOCOCCAL VACCINE: HCPCS | Mod: PBBFAC,PO

## 2019-09-23 PROCEDURE — 99215 OFFICE O/P EST HI 40 MIN: CPT | Mod: PBBFAC,PO,25 | Performed by: NURSE PRACTITIONER

## 2019-09-23 PROCEDURE — G0439 PR MEDICARE ANNUAL WELLNESS SUBSEQUENT VISIT: ICD-10-PCS | Mod: S$GLB,,, | Performed by: NURSE PRACTITIONER

## 2019-09-23 PROCEDURE — G0439 PPPS, SUBSEQ VISIT: HCPCS | Mod: S$GLB,,, | Performed by: NURSE PRACTITIONER

## 2019-09-23 RX ORDER — METOPROLOL SUCCINATE 50 MG/1
TABLET, EXTENDED RELEASE ORAL
COMMUNITY
End: 2020-04-07

## 2019-09-23 RX ORDER — DABIGATRAN ETEXILATE 150 MG/1
CAPSULE ORAL
COMMUNITY
End: 2019-11-04 | Stop reason: SDUPTHER

## 2019-09-23 RX ORDER — SILDENAFIL 100 MG/1
TABLET, FILM COATED ORAL
COMMUNITY
End: 2020-04-07

## 2019-09-23 RX ORDER — FLECAINIDE ACETATE 100 MG/1
TABLET ORAL
COMMUNITY
End: 2019-10-15

## 2019-09-23 RX ORDER — AMMONIUM LACTATE 12 G/100G
LOTION TOPICAL
COMMUNITY
End: 2020-07-29 | Stop reason: SDUPTHER

## 2019-09-23 RX ORDER — TADALAFIL 20 MG/1
TABLET ORAL
COMMUNITY
End: 2019-11-15

## 2019-09-23 NOTE — PATIENT INSTRUCTIONS
Counseling and Referral of Other Preventative  (Italic type indicates deductible and co-insurance are waived)    Patient Name: Omar Pacheco  Today's Date: 9/23/2019    Health Maintenance       Date Due Completion Date    TETANUS VACCINE 12/13/1966 ---    Shingles Vaccine (1 of 2) 12/13/1998 ---    Pneumococcal Vaccine (65+ Low/Medium Risk) (2 of 2 - PPSV23) 02/13/2018 2/13/2017    Influenza Vaccine (1) 09/01/2019 11/5/2018    Colonoscopy 12/07/2019 12/7/2016    Lipid Panel 06/24/2024 6/24/2019        No orders of the defined types were placed in this encounter.    The following information is provided to all patients.  This information is to help you find resources for any of the problems found today that may be affecting your health:                Living healthy guide: www.[facility].[place].gov      Understanding Diabetes: www.diabetes.org      Eating healthy: www.cdc.gov/healthyweight      CDC home safety checklist: www.cdc.gov/steadi/patient.html      Agency on Aging: www.goea.[place].gov      Alcoholics anonymous (AA): www.aa.org      Physical Activity: www.john.nih.gov/nm6fhaf      Tobacco use: www.quitwithusla.org

## 2019-09-23 NOTE — PROGRESS NOTES
Injections x 2 administered as ordered.  Tolerated well.  Told to wait in clinic for 15 mins.  Patient verbalized understanding.

## 2019-09-24 NOTE — PROGRESS NOTES
Digital Medicine: Health  Follow-Up    Mr. Pacheco is doing okay. He was in the clinic yesterday and reports his BP was 120/70 mmHg.    The history is provided by the patient.     Follow Up  Follow-up reason(s): reading review and goal follow-up            Diet:   He has the following dietary restrictions: low sodium diet    Mr. Pacheco is limiting his salt intake and trying to eat healthy overall. He does admit to an occasional meal out and a heavier drinking day with friends. He attributes this to spikes in his BP. Encouraged patient to continue to limit salt intake.     Physical Activity:   When asked if exercising, patient responded: yes4 day(s) a week.      Mr. Pacheco is exercising in the gym 3-5 days a week.     SDOH - Deferred    INTERVENTION(S)  encouragement/support    There are no preventive care reminders to display for this patient.    Last 5 Patient Entered Readings                                      Current 30 Day Average: 141/71     Recent Readings 9/16/2019 9/15/2019 9/15/2019 9/7/2019 9/4/2019    SBP (mmHg) 135 143 146 149 145    DBP (mmHg) 49 70 81 85 73    Pulse 60 74 59 49 68

## 2019-09-24 NOTE — PROGRESS NOTES
"Omar Pacheco presented for an initial Medicare AWV today. The following components were reviewed and updated:    · Medical history  · Family History  · Social history  · Allergies and Current Medications  · Health Risk Assessment  · Health Maintenance  · Care Team    **See Completed Assessments for Annual Wellness visit with in the encounter summary    The following assessments were completed:  · Depression Screening  · Cognitive function Screening  · Timed Get Up Test  · Whisper Test    Vitals:    09/23/19 0754   BP: 120/70   BP Location: Right arm   Patient Position: Sitting   BP Method: Medium (Manual)   Pulse: 60   Temp: 98.6 °F (37 °C)   TempSrc: Oral   SpO2: 98%   Weight: 91.4 kg (201 lb 8 oz)   Height: 5' 9" (1.753 m)     Body mass index is 29.76 kg/m².   ]          Diagnoses and health risks identified today and associated recommendations/orders:  1. Encounter for preventive health examination  Education provided about preventive health examinations and procedures; discussed patient's health concerns, holistically addressed patient's health plan.        2. Need for immunization against influenza  - Flu Vaccine - High Dose (PF) (65+)    3. Need for prophylactic vaccination against Streptococcus pneumoniae (pneumococcus) and influenza  - Pneumococcal polysaccharide vaccine 23-valent greater than or equal to 1yo subcutaneous/IM    4. Paroxysmal atrial fibrillation  The current medical regimen is effective;  continue present plan and medications.      5. Essential hypertension  The current medical regimen is effective;  continue present plan and medications.        Provided Omar with a 5-10 year written screening schedule and personal prevention plan. Recommendations were developed using the USPSTF age appropriate recommendations. Education, counseling, and referrals were provided as needed.  After Visit Summary printed and given to patient which includes a list of additional screenings\tests " needed.    No follow-ups on file.      Norah Caldera NP

## 2019-10-10 ENCOUNTER — PATIENT MESSAGE (OUTPATIENT)
Dept: FAMILY MEDICINE | Facility: CLINIC | Age: 69
End: 2019-10-10

## 2019-10-10 DIAGNOSIS — I48.0 PAROXYSMAL ATRIAL FIBRILLATION: Chronic | ICD-10-CM

## 2019-10-11 RX ORDER — FLECAINIDE ACETATE 50 MG/1
50 TABLET ORAL EVERY 12 HOURS
Qty: 180 TABLET | Refills: 3 | OUTPATIENT
Start: 2019-10-11

## 2019-10-15 ENCOUNTER — TELEPHONE (OUTPATIENT)
Dept: CARDIOLOGY | Facility: CLINIC | Age: 69
End: 2019-10-15

## 2019-10-15 ENCOUNTER — PATIENT MESSAGE (OUTPATIENT)
Dept: UROLOGY | Facility: CLINIC | Age: 69
End: 2019-10-15

## 2019-10-15 DIAGNOSIS — I48.0 PAROXYSMAL ATRIAL FIBRILLATION: Chronic | ICD-10-CM

## 2019-10-15 RX ORDER — FLECAINIDE ACETATE 50 MG/1
50 TABLET ORAL EVERY 12 HOURS
Qty: 180 TABLET | Refills: 3 | OUTPATIENT
Start: 2019-10-15 | End: 2019-10-15 | Stop reason: SDUPTHER

## 2019-10-15 RX ORDER — FLECAINIDE ACETATE 100 MG/1
100 TABLET ORAL DAILY
Qty: 30 TABLET | Refills: 3 | Status: CANCELLED | OUTPATIENT
Start: 2019-10-15

## 2019-10-15 RX ORDER — FLECAINIDE ACETATE 50 MG/1
50 TABLET ORAL EVERY 12 HOURS
Qty: 180 TABLET | Refills: 3 | Status: SHIPPED | OUTPATIENT
Start: 2019-10-15 | End: 2019-11-13 | Stop reason: SDUPTHER

## 2019-10-15 RX ORDER — FLECAINIDE ACETATE 50 MG/1
50 TABLET ORAL EVERY 12 HOURS
Qty: 180 TABLET | Refills: 3 | OUTPATIENT
Start: 2019-10-15

## 2019-10-15 NOTE — TELEPHONE ENCOUNTER
----- Message from Ailyn Barber MA sent at 10/15/2019  2:21 PM CDT -----  Contact: self  Pt is calling to get a refill for Amiodarone 100 mg 1 tab bid. Pt.iIs leaving to go out of town on Thurs.10/17. Wants to  [facility]. Please call pt. Last visit 4-4-19

## 2019-10-15 NOTE — TELEPHONE ENCOUNTER
Confirmed with patient currently taking 50mg BID and is requesting Flecainide be refilled. Requests be printed so can take to fill at  [facility]. Patient going out of town Thursday. Med order pended and routed to Dr. Hernandez for review.

## 2019-10-15 NOTE — TELEPHONE ENCOUNTER
----- Message from Lily Avelar MA sent at 10/15/2019  9:45 AM CDT -----  Contact: pt      ----- Message -----  From: Jaci Guido  Sent: 10/15/2019   9:23 AM CDT  To: Mary MENENDE[facility] Staff    Type:  RX Refill Request    Who Called: pt   Refill or New Rx refill  RX Name and Strength:flecanide 100 mg  How is the patient currently taking it? (ex. 1XDay):2xday  Is this a 30 day or 90 day RX:90  Preferred Pharmacy with phone number:  Local or Mail Order  Ordering Provider Dr Hernandez  Would the patient rather a call back or a response via [facility]?   Best Call Back Number:  Additional Information:  Pt wants to come into office to  RX going out town

## 2019-10-15 NOTE — TELEPHONE ENCOUNTER
See previous chart note, I don't prescribe this for him, he needs to call his cardiologist    Donya Goode MD

## 2019-10-15 NOTE — TELEPHONE ENCOUNTER
----- Message from Nohemi Hewitt sent at 10/15/2019  8:25 AM CDT -----  Contact: Self/  [phone]  Type: RX Refill Request    Who Called:   Patient    Refill or New Rx:  Refill    RX Name and Strength:  flecainide (TAMBOCOR) 100 MG Tab    Preferred Pharmacy with phone number:      Local or Mail Order:  Local    Ordering Provider:  Russel    Would the patient rather a call back or a response via [facility]?   Call back     Best Call Back Number:   [phone]    Additional Information:   Patient need to  prescription today.  He's leaving to go out of town Thursday.  Thank you

## 2019-10-18 RX ORDER — TERAZOSIN 10 MG/1
10 CAPSULE ORAL NIGHTLY
Qty: 30 CAPSULE | Refills: 11 | Status: SHIPPED | OUTPATIENT
Start: 2019-10-18 | End: 2020-10-17

## 2019-11-04 RX ORDER — DABIGATRAN ETEXILATE 150 MG/1
CAPSULE ORAL
Qty: 180 CAPSULE | Refills: 3 | OUTPATIENT
Start: 2019-11-04 | End: 2020-04-07

## 2019-11-08 DIAGNOSIS — I48.20 CHRONIC ATRIAL FIBRILLATION: ICD-10-CM

## 2019-11-08 RX ORDER — DABIGATRAN ETEXILATE 150 MG/1
150 CAPSULE ORAL 2 TIMES DAILY
Qty: 180 CAPSULE | Refills: 3 | OUTPATIENT
Start: 2019-11-08 | End: 2019-11-13 | Stop reason: SDUPTHER

## 2019-11-12 ENCOUNTER — PATIENT OUTREACH (OUTPATIENT)
Dept: ADMINISTRATIVE | Facility: OTHER | Age: 69
End: 2019-11-12

## 2019-11-13 DIAGNOSIS — I48.20 CHRONIC ATRIAL FIBRILLATION: ICD-10-CM

## 2019-11-13 DIAGNOSIS — I48.0 PAROXYSMAL ATRIAL FIBRILLATION: Chronic | ICD-10-CM

## 2019-11-14 RX ORDER — FLECAINIDE ACETATE 50 MG/1
50 TABLET ORAL EVERY 12 HOURS
Qty: 180 TABLET | Refills: 3 | Status: SHIPPED | OUTPATIENT
Start: 2019-11-14 | End: 2020-04-01 | Stop reason: SDUPTHER

## 2019-11-14 RX ORDER — DABIGATRAN ETEXILATE 150 MG/1
150 CAPSULE ORAL 2 TIMES DAILY
Qty: 180 CAPSULE | Refills: 3 | Status: SHIPPED | OUTPATIENT
Start: 2019-11-14 | End: 2020-04-01 | Stop reason: SDUPTHER

## 2019-11-14 NOTE — PROGRESS NOTES
"CHIEF COMPLAINT:    Mr. Pacheco is a 68 y.o. male presenting for f/u ED.    PRESENTING ILLNESS:    Omar Pacheco is a 68 y.o. male w/ h/o HTN, BPH, s/p Rezum w/ Dr. Martinez 2/16/18 w/ Dr. Martinez, and ED    Pt last seen in clinic 9/11/19 for ED, and ICI re-education.    Today pt returns to clinic for f/u ED. Reports since renewal of Rx from [facility], and corrected ICI technique has been getting consistent results. Reports "very happy and satisfied." Using Trimix 80 units (30mg/ml papaverine/3mg/ml phentolamine/60mcg/ml PGE1), which lasted ~2 hrs, had mild dull ache after ejaculation. Acknowledges he may have used too much. Denies pain w/ injections, but notes <30 degree penile curvature to R, reports has had for many yrs, denies pain/nodules. Not bothered by mild curvature. Denies irritative/obstructive urinary sxs, GH, dysuria. Continue to take terazosin, despite s/p Rezum.    REVIEW OF SYSTEMS:    Omar Pacheco denies headache, blurred vision, fever, nausea, vomiting, chills, abdominal pain, pelvic pain, flank pain, bleeding per rectum, cough, SOB, recent loss of consciousness, recent mental status changes, seizures, dizziness, or upper or lower extremity weakness.    PATIENT HISTORY:    Past Medical History:   Diagnosis Date    *Atrial fibrillation     Benign hypertrophy of prostate     Degenerative disc disease     GERD (gastroesophageal reflux disease)     Hx of colonic polyps     Hypertension     Pilonidal cyst 1971       Past Surgical History:   Procedure Laterality Date    COLONOSCOPY N/A 12/7/2016    Procedure: COLONOSCOPY;  Surgeon: Sumeet Lundy MD;  Location: [facility] ([address] [place] [address]);  Service: Endoscopy;  Laterality: N/A;  OK to hold Pradaxa 2 days prior to procedure per Dr Jones/see note dated 11/15/16/svn    CYST REMOVAL  1971    coccyx    KNEE SURGERY  1989    right       Family History   Problem Relation Age of Onset    Breast cancer Mother     Breast cancer Sister     Ovarian cancer " Other        Social History     Socioeconomic History    Marital status:      Spouse name: Not on file    Number of children: Not on file    Years of education: Not on file    Highest education level: Not on file   Occupational History    Not on file   Social Needs    Financial resource strain: Not on file    Food insecurity:     Worry: Not on file     Inability: Not on file    Transportation needs:     Medical: Not on file     Non-medical: Not on file   Tobacco Use    Smoking status: Former Smoker     Years: 2.00     Types: Cigarettes     Last attempt to quit: 1975     Years since quittin.1    Smokeless tobacco: Never Used   Substance and Sexual Activity    Alcohol use: Yes     Alcohol/week: 3.0 standard drinks     Types: 3 Glasses of wine per week     Comment: weekly    Drug use: No    Sexual activity: Not Currently   Lifestyle    Physical activity:     Days per week: Not on file     Minutes per session: Not on file    Stress: Not on file   Relationships    Social connections:     Talks on phone: Not on file     Gets together: Not on file     Attends Anabaptism service: Not on file     Active member of club or organization: Not on file     Attends meetings of clubs or organizations: Not on file     Relationship status: Not on file   Other Topics Concern    Not on file   Social History Narrative    Not on file       Allergies:  Patient has no known allergies.    Medications:    Current Outpatient Medications:     ammonium lactate (AMLACTIN) 12 % lotion, AmLactin 12 % lotion, Disp: , Rfl:     ammonium lactate (LAC-HYDRIN) 12 % lotion, Apply topically 2 (two) times daily., Disp: 225 g, Rfl: 11    azelastine (ASTELIN) 137 mcg (0.1 %) nasal spray, 1 spray (137 mcg total) by Nasal route 2 (two) times daily., Disp: 30 mL, Rfl: 0    cyanocobalamin (VITAMIN B-12) 1000 MCG tablet, Take 100 mcg by mouth once daily., Disp: , Rfl:     dabigatran etexilate (PRADAXA) 150 mg Cap, Pradaxa 150  "mg capsule, Disp: 180 capsule, Rfl: 3    dabigatran etexilate (PRADAXA) 150 mg Cap, Take 1 capsule (150 mg total) by mouth 2 (two) times daily. "Do NOT break, chew, or open capsules.", Disp: 180 capsule, Rfl: 3    flecainide (TAMBOCOR) 50 MG Tab, Take 1 tablet (50 mg total) by mouth every 12 (twelve) hours., Disp: 180 tablet, Rfl: 3    HYDROcodone-acetaminophen (NORCO) 5-325 mg per tablet, 1 tablet every 4 (four) hours as needed. , Disp: , Rfl:     insulin syringe-needle U-100 (EASY COMFORT INSULIN SYRINGE) 1 mL 31 gauge x 5/16 Syrg, Inject 1 time daily prn ED, Disp: 90 each, Rfl: 0    methocarbamol (ROBAXIN) 500 MG Tab, Take 2 tablets (1,000 mg total) by mouth nightly as needed., Disp: 60 tablet, Rfl: 5    metoprolol succinate (TOPROL-XL) 50 MG 24 hr tablet, Take 1 tablet (50 mg total) by mouth once daily., Disp: 90 tablet, Rfl: 2    metoprolol succinate (TOPROL-XL) 50 MG 24 hr tablet, metoprolol succinate ER 50 mg tablet,extended release 24 hr, Disp: , Rfl:     mirtazapine (REMERON) 30 MG tablet, Take 30 mg by mouth every evening., Disp: , Rfl:     multivitamin (THERAGRAN) per tablet, Take 1 tablet by mouth once daily., Disp: , Rfl:     omeprazole (PRILOSEC) 20 MG capsule, Take 1 capsule (20 mg total) by mouth once daily., Disp: 90 capsule, Rfl: 3    papaverine 30 mg/mL injection, ADD: Phentolamine 3 mg/ml ADD: PGE1 60 mcg. 30/3/60. Sig: Inject as directed into lateral aspect of penis, Disp: 10 mL, Rfl: 11    sildenafil (VIAGRA) 100 MG tablet, Take 1 tablet (100 mg total) by mouth daily as needed for Erectile Dysfunction., Disp: 30 tablet, Rfl: 11    sildenafil (VIAGRA) 100 MG tablet, Viagra 100 mg tablet, Disp: , Rfl:     terazosin (HYTRIN) 10 MG capsule, Take 1 capsule (10 mg total) by mouth every evening., Disp: 30 capsule, Rfl: 11    VITAMIN B COMPLEX (B COMPLETE ORAL), Take by mouth once daily., Disp: , Rfl:     PHYSICAL EXAMINATION:    The patient generally appears in good health, is " appropriately interactive, and is in no apparent distress.     Eyes: anicteric sclerae, moist conjunctivae; no lid-lag; PERRLA     HENT: Atraumatic; oropharynx clear with moist mucous membranes and no mucosal ulcerations;normal hard and soft palate. No evidence of lymphadenopathy.    Neck: Trachea midline.  No thyromegaly.    Musculoskeletal: No abnormal gait.    Skin: No lesions.    Mental: Cooperative with normal affect.  Is oriented to time, place, and person.    Neuro: Grossly intact.    Chest: Normal inspiratory effort.   No accessory muscles.  No audible wheezes.  Respirations symmetric on inspiration and expiration.    Heart: Regular rhythm.    Extremities: No clubbing, cyanosis, or edema.    LABS:    Lab Results   Component Value Date    CREATININE 1.1 06/24/2019    CREATININE 0.9 02/16/2018    CREATININE 1.0 07/21/2017       Lab Results   Component Value Date    PSA 4.09 (H) 01/07/2013    PSA 1.8 02/05/2007    PSADIAG 4.4 (H) 08/01/2017    PSADIAG 3.2 06/17/2015    PSADIAG 4.6 (H) 12/15/2014       No results found for: HGBA1C    Lab Results   Component Value Date    TOTALTESTOST 846 06/30/2017      Lab Results   Component Value Date    LABURIN No growth 02/19/2018    LABURIN No growth 07/21/2017    LABURIN No growth 12/15/2014     IMPRESSION:     Erectile dysfunction due to arterial insufficiency    Peyronie's disease      PLAN:    I spent 25 minutes with the patient of which more than half was spent in direct consultation with the patient in regards to our treatment and plan.    Discussed and reviewed intracavernosal injections using Trimix (PGE1/papaverine/phentolamine). Discussed importance of not to overdose.  Discussed may decrease dose to 60-70 units or less, just enough rigidity to engage in intercourse and masturbation. Discussed and reviewed proper use and potential adverse effects (bleeding, pain, formation of scar tissue/nodules, development of penile curvature). Reviewed correct placement and  where to inject, and pt return demonstrated where to and where not to inject including the dorsal and ventral aspects and glans, and avoiding the veins. Do not exceed maximum of 80 units w/o contacting clinic. Discussed and advised to report to local emergency dept for erections lasting for more than 4 hours.    Discussed and reviewed Peyronie's. Pt not bothered, and will continue to monitor.    Education sheets provided.    Follow up in about 1 year (around 11/15/2020) for ED.    Pt expressed understanding and agree w/ plan.

## 2019-11-15 ENCOUNTER — OFFICE VISIT (OUTPATIENT)
Dept: UROLOGY | Facility: CLINIC | Age: 69
End: 2019-11-15
Payer: MEDICARE

## 2019-11-15 VITALS
SYSTOLIC BLOOD PRESSURE: 143 MMHG | WEIGHT: 202 LBS | DIASTOLIC BLOOD PRESSURE: 76 MMHG | HEIGHT: 69 IN | HEART RATE: 67 BPM | BODY MASS INDEX: 29.92 KG/M2

## 2019-11-15 DIAGNOSIS — N52.01 ERECTILE DYSFUNCTION DUE TO ARTERIAL INSUFFICIENCY: Primary | ICD-10-CM

## 2019-11-15 DIAGNOSIS — N48.6 PEYRONIE'S DISEASE: ICD-10-CM

## 2019-11-15 PROCEDURE — 99999 PR PBB SHADOW E&M-EST. PATIENT-LVL IV: CPT | Mod: PBBFAC,,, | Performed by: NURSE PRACTITIONER

## 2019-11-15 PROCEDURE — 99214 OFFICE O/P EST MOD 30 MIN: CPT | Mod: PBBFAC | Performed by: NURSE PRACTITIONER

## 2019-11-15 PROCEDURE — 99214 OFFICE O/P EST MOD 30 MIN: CPT | Mod: S$PBB,,, | Performed by: NURSE PRACTITIONER

## 2019-11-15 PROCEDURE — 99214 PR OFFICE/OUTPT VISIT, EST, LEVL IV, 30-39 MIN: ICD-10-PCS | Mod: S$PBB,,, | Performed by: NURSE PRACTITIONER

## 2019-11-15 PROCEDURE — 99999 PR PBB SHADOW E&M-EST. PATIENT-LVL IV: ICD-10-PCS | Mod: PBBFAC,,, | Performed by: NURSE PRACTITIONER

## 2019-11-15 NOTE — PATIENT INSTRUCTIONS
Penile Self-Injection Procedure  Self-injection is a good option if you have erectile dysfunction (ED). You insert a tiny needle into your penis and inject a medicine. This helps your penis get hard and stay that way long enough for sex. And sex and orgasm will feel as good as always. You may be nervous about doing self-injection at first. But with practice, it will get easier. Your healthcare provider will show you how to do self-injection the first time.  Talk to your doctor about any medicines you take and any medical problems you have.      Preparing for injection  · Wash your hands well with soap and water.  · Prepare the medicine (if needed).  · Sit or  a comfortable position in a warm, well-lit room. If you need to, sit or  front of a mirror.  · Find an injection site on one side of your penis, in a place with no visible veins. (Dont inject into the top, bottom, or head of the penis.)  · Clean the injection site with an alcohol swab. Grasp the head of your penis firmly with your thumb and forefinger (dont just pinch the skin). Stretch the penis so the skin on the shaft is taut.  Injecting the medicine  · Rest your penis against your inner thigh and pull it gently toward your knee. Dont twist or rotate it. This way youll be sure to inject the medicine into the spot you chose and cleaned before.  · Hold the syringe between your thumb and fingers, like youre holding a pen. Rest your forearm on your thigh for support.  · Insert the needle at a 90° angle (perpendicular) to the shaft. Do this quickly to reduce discomfort. (The needle should go in easily. If it doesnt, stop right away.)  · Move your thumb to the plunger. Press down to inject the medicine, counting to 5.  · Remove the needle and dispose of it safely.  Gaining an erection  · Apply pressure to the injection site for a few minutes. This prevents swelling and bruising and helps spread the medicine.   · Stand up. This may help your  erection develop. Foreplay often helps, too.  · Your penis should become firm within 10 to 20 minutes. The erection will last long enough for sex, and maybe longer.  When to seek medical care  An erection that lasts longer than 3 to 4  hours  · Bleeding or bruising  · Severe pain  · Scarring or curvature of the penis   Date Last Reviewed: 1/7/2017  © 3063-3422 [employer]. [address] [place] [address], [place] [address]. All rights reserved. This information is not intended as a substitute for professional medical care. Always follow your healthcare professional's instructions.        Penile Self-Injection: Notes and Precautions     Man and healthcare provider sitting across from one another, talking.   Penile self-injection is a simple technique that may improve your sex life. Some men even find that self-injection leads to an increase in natural erections. If you have questions or concerns about self-injection or erectile dysfunction (ED), talk with your healthcare provider. The information on this sheet will help you get the best results.  Notes about penile self-injection  · You may feel a mild burning during injection. This is OK. But if you feel pressure or severe pain, stop the injection. There may be a problem with the injection site.  · Only inject the medicine on the side of your penis. It may not work if injected elsewhere.  · To prevent scarring, inject in a different spot each time.  · Dont use this treatment if you have a bleeding disorder or any risk of infection.  · Get medical help right away if your erection lasts longer than 3 to 4 hours.  Work with your healthcare provider  Ask how often you can safely repeat injections, as well as any other questions you have. You and your healthcare provider will talk about follow-up exams and how to get supplies. If the medicine doesnt work or stops working over time, tell your healthcare provider.     When to call your healthcare provider  Call your  healthcare provider right away if any of these occur:  · An erection that lasts longer than 3 to 4  hours  · Bleeding or bruising  · Severe pain  · Scarring or curvature of the penis   Date Last Reviewed: 1/1/2017 © 2000-2017 [employer]. [address] [place] [address]. All rights reserved. This information is not intended as a substitute for professional medical care. Always follow your healthcare professional's instructions.        Understanding Erectile Dysfunction    Erectile dysfunction (ED) is a problem getting an erection firm enough or keeping it long enough for intercourse. The problem can happen to any man at any age. But health problems that can lead to ED become more common as a man ages. Up to half of men over age 40 experience ED at some point.  Causes of ED  ED can have many causes. Most are physical. Some are emotional issues. Often, a combination of causes is involved. Causes of ED may include:  · Medical conditions such as diabetes or depression  · Smoking tobacco or marijuana  · Drinking too much alcohol  · Side effects of medications  · Injury to nerves or blood vessels  · Emotional issues such as stress or relationship problems  ED can be treated  Prescription medications for ED are available. They help many men who try them. Depending upon the cause of the ED, though, medications may not be enough. In these cases, other treatment options are available. These include erectile aids and surgery. Your health care provider can tell you more about the treatment that is right for you. And new treatments for ED are being studied. No matter what the treatment you decide on, stay in touch with your doctor. If your symptoms persist, he or she may be able to adjust your current treatment or try something new.  Date Last Reviewed: 1/1/2017 © 2000-2017 [employer]. [address] [place] [address]. All rights reserved. This information is not intended as a  substitute for professional medical care. Always follow your healthcare professional's instructions.

## 2019-11-15 NOTE — LETTER
November 15, 2019      DR. Luca Cochran  [address] [place] [id number]           [facility]  [address]  [facility] [address]  Phone: [phone]          Patient: Omar Pacheco   MR Number: [id number]   YOB: 1950   Date of Visit: 11/15/2019       Dear Luca Cochran:    Thank you for referring Omar Pacheco to me for evaluation. Attached you will find relevant portions of my assessment and plan of care.    If you have questions, please do not hesitate to call me. I look forward to following Omar Pacheco along with you.    Sincerely,    Peter Curiel, DNP    Enclosure  CC:  No Recipients    If you would like to receive this communication electronically, please contact [email or web address] or [phone] to request more information on [employer] access.    For providers and/or their staff who would like to refer a patient to [facility], please contact us through our one-stop-shop provider referral line, Juan Hartman, at [phone].    If you feel you have received this communication in error or would no longer like to receive these types of communications, please e-mail [email or web address]

## 2019-11-18 ENCOUNTER — PATIENT MESSAGE (OUTPATIENT)
Dept: CARDIOLOGY | Facility: CLINIC | Age: 69
End: 2019-11-18

## 2019-11-26 ENCOUNTER — PATIENT OUTREACH (OUTPATIENT)
Dept: OTHER | Facility: OTHER | Age: 69
End: 2019-11-26

## 2019-12-16 DIAGNOSIS — J01.90 ACUTE RHINOSINUSITIS: ICD-10-CM

## 2019-12-18 RX ORDER — AZELASTINE 1 MG/ML
1 SPRAY, METERED NASAL 2 TIMES DAILY
Qty: 30 ML | Refills: 0 | Status: SHIPPED | OUTPATIENT
Start: 2019-12-18 | End: 2019-12-30 | Stop reason: SDUPTHER

## 2019-12-30 DIAGNOSIS — J01.90 ACUTE RHINOSINUSITIS: ICD-10-CM

## 2019-12-30 RX ORDER — AZELASTINE 1 MG/ML
1 SPRAY, METERED NASAL 2 TIMES DAILY
Qty: 30 ML | Refills: 0 | Status: SHIPPED | OUTPATIENT
Start: 2019-12-30 | End: 2020-09-11 | Stop reason: SDUPTHER

## 2020-01-08 NOTE — PROGRESS NOTES
Digital Medicine: Health  Follow-Up    Left voicemail to follow up with Mr. Omar Pacheco.  Current BP average 144/75 mmHg is not at goal, <130/80 mmHg.  Will follow up in 1 week.          Intervention/Plan    There are no preventive care reminders to display for this patient.    Last 5 Patient Entered Readings                                      Current 30 Day Average: 144/75     Recent Readings 1/1/2020 1/1/2020 12/22/2019 12/22/2019 12/14/2019    SBP (mmHg) 142 138 150 176 132    DBP (mmHg) 65 83 78 90 63    Pulse 61 60 59 66 60                  Screenings    SDOH

## 2020-01-12 ENCOUNTER — PATIENT OUTREACH (OUTPATIENT)
Dept: ADMINISTRATIVE | Facility: OTHER | Age: 70
End: 2020-01-12

## 2020-01-13 ENCOUNTER — LAB VISIT (OUTPATIENT)
Dept: LAB | Facility: HOSPITAL | Age: 70
End: 2020-01-13
Payer: MEDICARE

## 2020-01-13 ENCOUNTER — TELEPHONE (OUTPATIENT)
Dept: UROLOGY | Facility: CLINIC | Age: 70
End: 2020-01-13

## 2020-01-13 ENCOUNTER — OFFICE VISIT (OUTPATIENT)
Dept: UROLOGY | Facility: CLINIC | Age: 70
End: 2020-01-13
Payer: MEDICARE

## 2020-01-13 VITALS
WEIGHT: 203.25 LBS | BODY MASS INDEX: 30.1 KG/M2 | HEIGHT: 69 IN | HEART RATE: 49 BPM | SYSTOLIC BLOOD PRESSURE: 160 MMHG | DIASTOLIC BLOOD PRESSURE: 87 MMHG

## 2020-01-13 DIAGNOSIS — Z12.5 SCREENING FOR PROSTATE CANCER: ICD-10-CM

## 2020-01-13 DIAGNOSIS — R97.20 ELEVATED PSA: ICD-10-CM

## 2020-01-13 DIAGNOSIS — R35.1 NOCTURIA: ICD-10-CM

## 2020-01-13 DIAGNOSIS — N40.1 BPH WITH URINARY OBSTRUCTION: ICD-10-CM

## 2020-01-13 DIAGNOSIS — N52.01 ERECTILE DYSFUNCTION DUE TO ARTERIAL INSUFFICIENCY: Primary | ICD-10-CM

## 2020-01-13 DIAGNOSIS — N13.8 BPH WITH URINARY OBSTRUCTION: ICD-10-CM

## 2020-01-13 DIAGNOSIS — N48.6 PEYRONIE'S DISEASE: ICD-10-CM

## 2020-01-13 DIAGNOSIS — R97.20 RISING PSA LEVEL: Primary | ICD-10-CM

## 2020-01-13 LAB
BILIRUB SERPL-MCNC: NORMAL MG/DL
BLOOD URINE, POC: NORMAL
COLOR, POC UA: NORMAL
COMPLEXED PSA SERPL-MCNC: 4.7 NG/ML (ref 0–4)
GLUCOSE UR QL STRIP: NORMAL
KETONES UR QL STRIP: NORMAL
LEUKOCYTE ESTERASE URINE, POC: NORMAL
NITRITE, POC UA: NORMAL
PH, POC UA: 5
POC RESIDUAL URINE VOLUME: 13 ML (ref 0–100)
PROTEIN, POC: NORMAL
SPECIFIC GRAVITY, POC UA: 1.01
UROBILINOGEN, POC UA: NORMAL

## 2020-01-13 PROCEDURE — 1159F MED LIST DOCD IN RCRD: CPT | Mod: ,,, | Performed by: NURSE PRACTITIONER

## 2020-01-13 PROCEDURE — 1159F PR MEDICATION LIST DOCUMENTED IN MEDICAL RECORD: ICD-10-PCS | Mod: ,,, | Performed by: NURSE PRACTITIONER

## 2020-01-13 PROCEDURE — 84153 ASSAY OF PSA TOTAL: CPT

## 2020-01-13 PROCEDURE — 51798 US URINE CAPACITY MEASURE: CPT | Mod: PBBFAC | Performed by: NURSE PRACTITIONER

## 2020-01-13 PROCEDURE — 99999 PR PBB SHADOW E&M-EST. PATIENT-LVL III: CPT | Mod: PBBFAC,,, | Performed by: NURSE PRACTITIONER

## 2020-01-13 PROCEDURE — 1126F AMNT PAIN NOTED NONE PRSNT: CPT | Mod: ,,, | Performed by: NURSE PRACTITIONER

## 2020-01-13 PROCEDURE — 36415 COLL VENOUS BLD VENIPUNCTURE: CPT

## 2020-01-13 PROCEDURE — 99214 OFFICE O/P EST MOD 30 MIN: CPT | Mod: S$PBB,,, | Performed by: NURSE PRACTITIONER

## 2020-01-13 PROCEDURE — 1126F PR PAIN SEVERITY QUANTIFIED, NO PAIN PRESENT: ICD-10-PCS | Mod: ,,, | Performed by: NURSE PRACTITIONER

## 2020-01-13 PROCEDURE — 81002 URINALYSIS NONAUTO W/O SCOPE: CPT | Mod: PBBFAC | Performed by: NURSE PRACTITIONER

## 2020-01-13 PROCEDURE — 99999 PR PBB SHADOW E&M-EST. PATIENT-LVL III: ICD-10-PCS | Mod: PBBFAC,,, | Performed by: NURSE PRACTITIONER

## 2020-01-13 PROCEDURE — 99214 PR OFFICE/OUTPT VISIT, EST, LEVL IV, 30-39 MIN: ICD-10-PCS | Mod: S$PBB,,, | Performed by: NURSE PRACTITIONER

## 2020-01-13 PROCEDURE — 99213 OFFICE O/P EST LOW 20 MIN: CPT | Mod: PBBFAC,25 | Performed by: NURSE PRACTITIONER

## 2020-01-13 RX ORDER — PAPAVERINE HYDROCHLORIDE 30 MG/ML
INJECTION INTRAMUSCULAR; INTRAVENOUS
Qty: 10 ML | Refills: 11 | Status: SHIPPED | OUTPATIENT
Start: 2020-01-13 | End: 2021-09-15 | Stop reason: SDUPTHER

## 2020-01-13 NOTE — PATIENT INSTRUCTIONS
Penile Self-Injection Procedure  Self-injection is a good option if you have erectile dysfunction (ED). You insert a tiny needle into your penis and inject a medicine. This helps your penis get hard and stay that way long enough for sex. And sex and orgasm will feel as good as always. You may be nervous about doing self-injection at first. But with practice, it will get easier. Your healthcare provider will show you how to do self-injection the first time.  Talk to your doctor about any medicines you take and any medical problems you have.      Preparing for injection  · Wash your hands well with soap and water.  · Prepare the medicine (if needed).  · Sit or  a comfortable position in a warm, well-lit room. If you need to, sit or  front of a mirror.  · Find an injection site on one side of your penis, in a place with no visible veins. (Dont inject into the top, bottom, or head of the penis.)  · Clean the injection site with an alcohol swab. Grasp the head of your penis firmly with your thumb and forefinger (dont just pinch the skin). Stretch the penis so the skin on the shaft is taut.  Injecting the medicine  · Rest your penis against your inner thigh and pull it gently toward your knee. Dont twist or rotate it. This way youll be sure to inject the medicine into the spot you chose and cleaned before.  · Hold the syringe between your thumb and fingers, like youre holding a pen. Rest your forearm on your thigh for support.  · Insert the needle at a 90° angle (perpendicular) to the shaft. Do this quickly to reduce discomfort. (The needle should go in easily. If it doesnt, stop right away.)  · Move your thumb to the plunger. Press down to inject the medicine, counting to 5.  · Remove the needle and dispose of it safely.  Gaining an erection  · Apply pressure to the injection site for a few minutes. This prevents swelling and bruising and helps spread the medicine.   · Stand up. This may help your  erection develop. Foreplay often helps, too.  · Your penis should become firm within 10 to 20 minutes. The erection will last long enough for sex, and maybe longer.  When to seek medical care  An erection that lasts longer than 3 to 4  hours  · Bleeding or bruising  · Severe pain  · Scarring or curvature of the penis   Date Last Reviewed: 1/7/2017  © 1441-4314 [employer]. [address] [place] [address], [place] [address]. All rights reserved. This information is not intended as a substitute for professional medical care. Always follow your healthcare professional's instructions.        Penile Self-Injection: Notes and Precautions     Man and healthcare provider sitting across from one another, talking.   Penile self-injection is a simple technique that may improve your sex life. Some men even find that self-injection leads to an increase in natural erections. If you have questions or concerns about self-injection or erectile dysfunction (ED), talk with your healthcare provider. The information on this sheet will help you get the best results.  Notes about penile self-injection  · You may feel a mild burning during injection. This is OK. But if you feel pressure or severe pain, stop the injection. There may be a problem with the injection site.  · Only inject the medicine on the side of your penis. It may not work if injected elsewhere.  · To prevent scarring, inject in a different spot each time.  · Dont use this treatment if you have a bleeding disorder or any risk of infection.  · Get medical help right away if your erection lasts longer than 3 to 4 hours.  Work with your healthcare provider  Ask how often you can safely repeat injections, as well as any other questions you have. You and your healthcare provider will talk about follow-up exams and how to get supplies. If the medicine doesnt work or stops working over time, tell your healthcare provider.     When to call your healthcare provider  Call your  healthcare provider right away if any of these occur:  · An erection that lasts longer than 3 to 4  hours  · Bleeding or bruising  · Severe pain  · Scarring or curvature of the penis   Date Last Reviewed: 1/1/2017 © 2000-2017 [employer]. [address] [place] [address]. All rights reserved. This information is not intended as a substitute for professional medical care. Always follow your healthcare professional's instructions.        Understanding Erectile Dysfunction    Erectile dysfunction (ED) is a problem getting an erection firm enough or keeping it long enough for intercourse. The problem can happen to any man at any age. But health problems that can lead to ED become more common as a man ages. Up to half of men over age 40 experience ED at some point.  Causes of ED  ED can have many causes. Most are physical. Some are emotional issues. Often, a combination of causes is involved. Causes of ED may include:  · Medical conditions such as diabetes or depression  · Smoking tobacco or marijuana  · Drinking too much alcohol  · Side effects of medications  · Injury to nerves or blood vessels  · Emotional issues such as stress or relationship problems  ED can be treated  Prescription medications for ED are available. They help many men who try them. Depending upon the cause of the ED, though, medications may not be enough. In these cases, other treatment options are available. These include erectile aids and surgery. Your health care provider can tell you more about the treatment that is right for you. And new treatments for ED are being studied. No matter what the treatment you decide on, stay in touch with your doctor. If your symptoms persist, he or she may be able to adjust your current treatment or try something new.  Date Last Reviewed: 1/1/2017 © 2000-2017 [employer]. [address] [place] [address]. All rights reserved. This information is not intended as a  substitute for professional medical care. Always follow your healthcare professional's instructions.        What Is Peyronie Disease?    A slight natural curve to the erect penis is usually normal. But curvature that causes pain and difficulty with intercourse is a problem. The development of painful curvature is called Peyronie disease. Peyronie disease is due to scar tissue (plaque) that forms inside the penis.  Your penile anatomy  The shaft (body) of the penis consists of the corpora cavernosa, 2 columns of spongy tissue. During an erection, this tissue fills with blood, swells, and becomes rigid, creating an erection. A dense sheath of elastic tissue called the tunica surrounds the corpora. This tissue stretches as the penis becomes erect.  Painful curvature  Peyronie disease occurs when a plaque (scar) develops on the fibrous sheath of tissue surrounding the corpora. The scar can form on any part of the penis, but often is found on the top or bottom. The scarred area of the tunica loses its elasticity, so it doesnt stretch when the corpora swells. Because the tunica doesnt stretch in that area, the erect penis curves in the direction of the scar.  Possible causes  No one is sure just what causes the plaque. It may be the result of an injury to the erect penis or a blow to the groin. The plaque may occur because of a problem with your immune system. One thing is certain, however, that Peyronie disease is not caused by sexually transmitted diseases and is not cancer.   Symptoms of peyronie disease  · Curvature of the penis during erection (which may interfere with intercourse)  · Pain during erection  · Soft erections  · Shortening or narrowing of the penis  A hard area usually felt below the skin of the penis in the area of the plaque.  Date Last Reviewed: 1/1/2017  © 4604-7295 [employer]. [address] [place] [address], [place] [address]. All rights reserved. This information is not intended as a  substitute for professional medical care. Always follow your healthcare professional's instructions.        Treating Peyronie Disease    Peyronie disease occurs when the penis curves during an erection. This is most often due to a plaque (scar) that forms inside the penis. In some men, the plaque shrinks and disappears on its own, without treatment. If treatment is needed, the main goal is to relieve pain and make the penis straight enough for sex. Pain is caused by an erection and subsequent bending of the penis. Peyronie disease is not contagious or caused by any transmittable disease. There are different kinds of treatment. The success of these different treatments varies from man to man.  Medicine  Medicine is usually tried for 1 to 2 years before other treatments are done. For some men, the disease will go away during this time. Medicine may help reduce pain. It may also help soften and shrink the plaque in the penis. Some medicines may be taken by mouth. And some may be rubbed right on the penis. Others may be injected into the plaque. Medicines that treat erectile dysfunction may help with some of the problems of Peyronie disease. But they will not treat the curvature or pain. Your doctor will discuss all your options and possible side effects with you.  Additional treatment options may include ultrasound therapy, radiation therapy, shockwave therapy, or iontophoresis which is the delivery of a cream through the skin by using low level electrical current.  Surgery  Surgery is used in cases that cant be treated by other means. It may also be done for a severe curve in the penis. It may also be done for severe pain that does not stop. Options for surgery include:  · An incision in the plaque to release tension. Part of the plaque is removed and replaced with a graft.  · Making the penis shorter. This is done on the opposite side of the plaque. It can cancel out the curve.  · Implanting of a device  (prosthesis). This can straighten the penis and make it rigid enough for sex.  Your doctor can discuss the risks and benefits of these treatments with you. Be sure to ask questions. Consider all of your options before you choose surgery.  Peyronie disease is hard to cure. Counseling may help you cope with the effects of the disease. It may help you and your partner find ways to deal with it.   Date Last Reviewed: 12/1/2016  © 0788-9143 [employer]. [address] [place] [id number]. All rights reserved. This information is not intended as a substitute for professional medical care. Always follow your healthcare professional's instructions.

## 2020-01-13 NOTE — TELEPHONE ENCOUNTER
Spoke w/ pt.  Discussed and reviewed today's PSA, and past PSA values.  Discussed and reviewed potential etiologies for elevated PSA, including but not limited to BPH.  Discussed age adjusted PSA.  Discussed and reviewed prostate CA screening w/ PSA, and SHALOM. Reviewed benefits, potential burdens, risks and/or harms related to screening.  Discussed specialized testing.  Discussed and reviewed TRUS bx, benefits, potential burdens/risks/adv effects.  Implemented shared decision making.  Pt elects to repeat PSA w/ specialized testing in 6 months.  Advised to avoid ejaculation/bike riding/prolonged sitting 5-7 days prior to testing.

## 2020-01-13 NOTE — PROGRESS NOTES
CHIEF COMPLAINT:    Mr. Pacheco is a 69 y.o. male presenting for f/u ED, and Peyronie's.    PRESENTING ILLNESS:    Omar Pacheco is a 69 y.o. male w/ h/o HTN, BPH, s/p Rezum w/ Dr. Martinez 2/16/18 w/ Dr. Martinez, Peyronie's, and ED.    Pt last seen in clinic 11/15/19 for ED, and Peyronie's. At that time pt was very satisfied w/ ICI w/ Trimix. Pt elected to continue observation, as he is not bothered by his Peronie's.    Today pt returns to clinic for worsening of penile curvature (curvature to R) since last visit (2 months ago), reports new onset as he continue ICI w/ Trimix, although he is very satisfied w/ Trimix and ICI using 80 units (30mg/ml papaverine/3mg/ml phentolamine/60mcg/ml PGE1), feels that it may be contributing to his penile curvature, now curvature >30 degrees, ~45 degrees. Able to have intercourse. Also notes dull ache after ICI.  Reports feels bladder is not emptying well since last visit, he continues to take terazosin. Nocturia 3/night, endorses fluid prior to bedtime, 12oz tea, wine at dinner time, and water. Denies GH, dysuria.    UA dip in clinic today unremarkable.    PVR in clinic today: 12mL.    REVIEW OF SYSTEMS:    Omar Pacheco denies headache, blurred vision, fever, nausea, vomiting, chills, abdominal pain, pelvic pain, flank pain, bleeding per rectum, cough, SOB, recent loss of consciousness, recent mental status changes, seizures, dizziness, or upper or lower extremity weakness.    PATIENT HISTORY:    Past Medical History:   Diagnosis Date    *Atrial fibrillation     Benign hypertrophy of prostate     Degenerative disc disease     GERD (gastroesophageal reflux disease)     Hx of colonic polyps     Hypertension     Pilonidal cyst 1971       Past Surgical History:   Procedure Laterality Date    COLONOSCOPY N/A 12/7/2016    Procedure: COLONOSCOPY;  Surgeon: Sumeet Lundy MD;  Location: [address];  Service: Endoscopy;  Laterality: N/A;  OK to hold Pradaxa 2 days prior  to procedure per Dr Jones/see note dated 11/15/16/teen    CYST REMOVAL  1971    coccyx    KNEE SURGERY  1989    right       Family History   Problem Relation Age of Onset    Breast cancer Mother     Breast cancer Sister     Ovarian cancer Other        Social History     Socioeconomic History    Marital status:      Spouse name: Not on file    Number of children: Not on file    Years of education: Not on file    Highest education level: Not on file   Occupational History    Not on file   Social Needs    Financial resource strain: Not on file    Food insecurity:     Worry: Not on file     Inability: Not on file    Transportation needs:     Medical: Not on file     Non-medical: Not on file   Tobacco Use    Smoking status: Former Smoker     Years: 2.00     Types: Cigarettes     Last attempt to quit: 1975     Years since quittin.3    Smokeless tobacco: Never Used   Substance and Sexual Activity    Alcohol use: Yes     Alcohol/week: 3.0 standard drinks     Types: 3 Glasses of wine per week     Comment: weekly    Drug use: No    Sexual activity: Not Currently   Lifestyle    Physical activity:     Days per week: Not on file     Minutes per session: Not on file    Stress: Not on file   Relationships    Social connections:     Talks on phone: Not on file     Gets together: Not on file     Attends Religion service: Not on file     Active member of club or organization: Not on file     Attends meetings of clubs or organizations: Not on file     Relationship status: Not on file   Other Topics Concern    Not on file   Social History Narrative    Not on file       Allergies:  Patient has no known allergies.    Medications:    Current Outpatient Medications:     ammonium lactate (AMLACTIN) 12 % lotion, AmLactin 12 % lotion, Disp: , Rfl:     ammonium lactate (LAC-HYDRIN) 12 % lotion, Apply topically 2 (two) times daily., Disp: 225 g, Rfl: 11    azelastine (ASTELIN) 137 mcg (0.1 %) nasal  "spray, 1 spray (137 mcg total) by Nasal route 2 (two) times daily., Disp: 30 mL, Rfl: 0    cyanocobalamin (VITAMIN B-12) 1000 MCG tablet, Take 100 mcg by mouth once daily., Disp: , Rfl:     dabigatran etexilate (PRADAXA) 150 mg Cap, Pradaxa 150 mg capsule, Disp: 180 capsule, Rfl: 3    dabigatran etexilate (PRADAXA) 150 mg Cap, Take 1 capsule (150 mg total) by mouth 2 (two) times daily. "Do NOT break, chew, or open capsules.", Disp: 180 capsule, Rfl: 3    flecainide (TAMBOCOR) 50 MG Tab, Take 1 tablet (50 mg total) by mouth every 12 (twelve) hours., Disp: 180 tablet, Rfl: 3    insulin syringe-needle U-100 (EASY COMFORT INSULIN SYRINGE) 1 mL 31 gauge x 5/16 Syrg, Inject 1 time daily prn ED, Disp: 90 each, Rfl: 0    methocarbamol (ROBAXIN) 500 MG Tab, Take 2 tablets (1,000 mg total) by mouth nightly as needed., Disp: 60 tablet, Rfl: 5    metoprolol succinate (TOPROL-XL) 50 MG 24 hr tablet, Take 1 tablet (50 mg total) by mouth once daily., Disp: 90 tablet, Rfl: 2    metoprolol succinate (TOPROL-XL) 50 MG 24 hr tablet, metoprolol succinate ER 50 mg tablet,extended release 24 hr, Disp: , Rfl:     mirtazapine (REMERON) 30 MG tablet, Take 30 mg by mouth every evening., Disp: , Rfl:     multivitamin (THERAGRAN) per tablet, Take 1 tablet by mouth once daily., Disp: , Rfl:     omeprazole (PRILOSEC) 20 MG capsule, Take 1 capsule (20 mg total) by mouth once daily., Disp: 90 capsule, Rfl: 3    papaverine 30 mg/mL injection, ADD: Phentolamine 3 mg/ml ADD: PGE1 60 mcg. 30/3/60. Sig: Inject as directed into lateral aspect of penis, Disp: 10 mL, Rfl: 11    sildenafil (VIAGRA) 100 MG tablet, Take 1 tablet (100 mg total) by mouth daily as needed for Erectile Dysfunction., Disp: 30 tablet, Rfl: 11    sildenafil (VIAGRA) 100 MG tablet, Viagra 100 mg tablet, Disp: , Rfl:     terazosin (HYTRIN) 10 MG capsule, Take 1 capsule (10 mg total) by mouth every evening., Disp: 30 capsule, Rfl: 11    VITAMIN B COMPLEX (B COMPLETE " ORAL), Take by mouth once daily., Disp: , Rfl:     PHYSICAL EXAMINATION:    The patient generally appears in good health, is appropriately interactive, and is in no apparent distress.     Eyes: anicteric sclerae, moist conjunctivae; no lid-lag; PERRLA     HENT: Atraumatic; oropharynx clear with moist mucous membranes and no mucosal ulcerations;normal hard and soft palate. No evidence of lymphadenopathy.    Neck: Trachea midline.  No thyromegaly.    Musculoskeletal: No abnormal gait.    Skin: No lesions.    Mental: Cooperative with normal affect.  Is oriented to time, place, and person.    Neuro: Grossly intact.    Chest: Normal inspiratory effort.   No accessory muscles.  No audible wheezes.  Respirations symmetric on inspiration and expiration.    The prostate is smooth, symmetrical, without nodule or induration. The seminal vesicles were normal and nonpalpable. The prostate was not tender or boggy. Rectal tone was normal no rectal mass was palpable.    Heart: Regular rhythm.    Extremities: No clubbing, cyanosis, or edema.    LABS:    Lab Results   Component Value Date    CREATININE 1.1 06/24/2019    CREATININE 0.9 02/16/2018    CREATININE 1.0 07/21/2017       Lab Results   Component Value Date    PSA 4.09 (H) 01/07/2013    PSA 1.8 02/05/2007    PSADIAG 4.4 (H) 08/01/2017    PSADIAG 3.2 06/17/2015    PSADIAG 4.6 (H) 12/15/2014     No results found for: HGBA1C    Lab Results   Component Value Date    TOTALTESTOST 846 06/30/2017      Lab Results   Component Value Date    LABURIN No growth 02/19/2018    LABURIN No growth 07/21/2017    LABURIN No growth 12/15/2014     IMPRESSION:     Screening for prostate cancer  -     Prostate Specific Antigen, Diagnostic; Future; Expected date: 01/13/2020    Peyronie's disease    Erectile dysfunction due to arterial insufficiency    BPH with urinary obstruction  -     POCT urine dipstick without microscope  -     POCT Bladder Scan  -     Prostate Specific Antigen, Diagnostic;  Future; Expected date: 01/13/2020    Nocturia  -     Prostate Specific Antigen, Diagnostic; Future; Expected date: 01/13/2020      PLAN:    I spent 25 minutes with the patient of which more than half was spent in direct consultation with the patient in regards to our treatment and plan.    Discussed and reviewed intracavernosal injections using Trimix (PGE1/papaverine/phentolamine). Discussed and reviewed proper use and potential adverse effects (bleeding, pain, formation of scar tissue/nodules, development of penile curvature).  Discussed 80 units is large volume, which may be contributing to discomfort after intercourse/ejaculation, discussed potentially decreasing volume, by modifying Trimix to (30/6/60 mix). Pt amenable to plan. Discussed w/ pharmacist at [facility], agree w/ strategy. Recommend starting at 40 units, and may increase by 5 units 3-4x/wk. Enough rigidity to engage in intercourse and masturbation. Reviewed correct placement and where to inject, and pt return demonstrated where to and where not to inject including the dorsal and ventral aspects and glans, and avoiding the veins. Advised to avoid overdosing. Discussed and advised to report to local emergency dept for erections lasting for more than 4 hours.    Discussed and reviewed Peyronie's. Reviewed potential etiologies, including but not limited to ICI. Discussed acute, and chronic phase. Discussed tx options for acute, and chronic phase. Pt interested in trialing Trental, however, I do not recommend, as pt is on Pradaxa, which may increase bleeding risk. Pt is comfortable w/ observation at this time. He is not interested in surgical intervention.    Discussed and recommend limiting/avoiding fluids, especially caffeine and alcohol 4 hrs prior to bedtime to reduce episodes of nocturia.    Discussed and reviewed PVR in clinic today. Reassured emptying appropriately. May continue w/ terazosin as rx'd.     Discussed and reviewed past PSA. Discussed and  reviewed prostate CA screening w/ PSA, and SHALOM. Discussed age adjusted PSA. Reviewed benefits, potential burdens, risks and/or harms related to screening. Pt amenable to continue w/ updating PSA today.    Education sheets provided.    Follow up in about 6 months (around 7/13/2020) for ED.    Pt expressed understanding and agree w/ plan.

## 2020-01-15 ENCOUNTER — PATIENT OUTREACH (OUTPATIENT)
Dept: OTHER | Facility: OTHER | Age: 70
End: 2020-01-15

## 2020-01-15 NOTE — PROGRESS NOTES
Digital Medicine: Health  Follow-Up    Left voicemail to follow up with Mr. Omar Pacheco.  Current BP average 146/77 mmHg is not at goal, <130/80 mmHg.  Will follow up in 1 week.          Intervention/Plan    There are no preventive care reminders to display for this patient.    Last 5 Patient Entered Readings                                      Current 30 Day Average: 146/77     Recent Readings 1/13/2020 1/11/2020 1/11/2020 1/10/2020 1/9/2020    SBP (mmHg) 169 140 135 145 127    DBP (mmHg) 79 76 81 75 62    Pulse 57 55 59 74 88                  Screenings    SDOH

## 2020-01-22 NOTE — PROGRESS NOTES
Digital Medicine: Health  Follow-Up    Left voicemail to follow up with Mr. Omar Pacheco.  Current BP average 143/75 mmHg is not at goal, <130/80 mmHg.  Will follow up in 1 week.          Intervention/Plan    There are no preventive care reminders to display for this patient.    Last 5 Patient Entered Readings                                      Current 30 Day Average: 143/75     Recent Readings 1/17/2020 1/17/2020 1/17/2020 1/17/2020 1/16/2020    SBP (mmHg) 131 154 157 146 145    DBP (mmHg) 68 89 78 76 73    Pulse 68 76 54 59 53                  Screenings    SDOH

## 2020-01-29 NOTE — PROGRESS NOTES
Digital Medicine: Health  Follow-Up    Left voicemail to follow up with Mr. Pacheco.  Current BP avg 142/75    Will follow up in 1 wk.          Intervention/Plan    There are no preventive care reminders to display for this patient.    Last 5 Patient Entered Readings                                      Current 30 Day Average: 142/75     Recent Readings 1/29/2020 1/28/2020 1/17/2020 1/17/2020 1/17/2020    SBP (mmHg) 148 131 131 154 157    DBP (mmHg) 75 68 68 89 78    Pulse 60 68 68 76 54                  Screenings    SDOH

## 2020-02-05 NOTE — PROGRESS NOTES
Digital Medicine: Health  Follow-Up    Follow up with Mr. Omar Pacheco.     Pt is feeling well. He had a [facility] and was prescribed Amlodipine 2.5mg QD.     He mentioned not submitting BP readings for a while since he thought that his monitor wasn't working. After bringing it to the [facility] they discovered that it was out of battery. Advised patient to charge the monitor every 30 days and reviewed BP monitoring technique.        The history is provided by the patient.     Follow Up  Follow-up reason(s): reading review and goal follow-up      Hypertension         INTERVENTION(S)  recommended diet modifications, reviewed monitoring technique and encouragement/support    PLAN  continue monitoring    No question or concerns at this time.  Follow up completed.       There are no preventive care reminders to display for this patient.    Last 5 Patient Entered Readings                                      Current 30 Day Average: 144/74     Recent Readings 2/1/2020 1/29/2020 1/28/2020 1/17/2020 1/17/2020    SBP (mmHg) 164 148 131 131 154    DBP (mmHg) 65 75 68 68 89    Pulse 57 60 68 68 76                      Diet Screening   No change to diet.      The patient drinks 64 ounces of water per day.    He has the following dietary restrictions: none    Barriers to a Healthy Diet: lack of cooking skills    Pt reports not cooking much and rely's on [employer] for dinner. Reviewed sodium content on [employer] Jose Armando and effects on BP . AHA recommendations given to patient to limit sodium intake <2,000mg/day. Suggested becoming familiar with reading nutrition labels and encouraged dietary compliance.     Physical Activity Screening   When asked if exercising, patient responded: yes    He exercises for 60 minutes per day 3 day(s) a week.      Patient participates in the following activities: swimming/water aerobics and weights    He identified the following barriers to  physical activity: no barriers to being active    Pt reports being consistent with his exercise routine.     Congratulated him on commitment and dedication . Encouraged him to keep up the good work.     Medication Adherence Screening       Patient reports adherence to medication regimen daily and denies missed doses.      Tobacco and Alcohol Screening       Pt noted having a glass of wine 1x/wk and a shot of Chaitanya Neely 2x/wk.       SDOH

## 2020-03-23 DIAGNOSIS — M48.061 SPINAL STENOSIS OF LUMBAR REGION WITHOUT NEUROGENIC CLAUDICATION: ICD-10-CM

## 2020-03-23 DIAGNOSIS — M51.37 DEGENERATION OF LUMBAR OR LUMBOSACRAL INTERVERTEBRAL DISC: ICD-10-CM

## 2020-03-23 DIAGNOSIS — Z87.19 HISTORY OF GASTROESOPHAGEAL REFLUX (GERD): ICD-10-CM

## 2020-03-23 DIAGNOSIS — L30.9 DERMATITIS: ICD-10-CM

## 2020-03-23 DIAGNOSIS — I10 ESSENTIAL HYPERTENSION: ICD-10-CM

## 2020-03-24 RX ORDER — OMEPRAZOLE 20 MG/1
20 CAPSULE, DELAYED RELEASE ORAL DAILY
Qty: 90 CAPSULE | Refills: 3 | Status: SHIPPED | OUTPATIENT
Start: 2020-03-24 | End: 2020-09-21 | Stop reason: SDUPTHER

## 2020-03-24 RX ORDER — METOPROLOL SUCCINATE 50 MG/1
50 TABLET, EXTENDED RELEASE ORAL DAILY
Qty: 90 TABLET | Refills: 2 | Status: SHIPPED | OUTPATIENT
Start: 2020-03-24 | End: 2020-04-07

## 2020-03-24 RX ORDER — METHOCARBAMOL 500 MG/1
1000 TABLET, FILM COATED ORAL NIGHTLY PRN
Qty: 90 TABLET | Refills: 1 | Status: SHIPPED | OUTPATIENT
Start: 2020-03-24 | End: 2020-06-22

## 2020-03-24 RX ORDER — SILDENAFIL 100 MG/1
100 TABLET, FILM COATED ORAL DAILY PRN
Qty: 30 TABLET | Refills: 11 | Status: SHIPPED | OUTPATIENT
Start: 2020-03-24 | End: 2020-04-07

## 2020-03-24 RX ORDER — AMMONIUM LACTATE 12 G/100G
LOTION TOPICAL 2 TIMES DAILY
Qty: 225 G | Refills: 11 | Status: SHIPPED | OUTPATIENT
Start: 2020-03-24 | End: 2020-06-22

## 2020-03-31 ENCOUNTER — PATIENT MESSAGE (OUTPATIENT)
Dept: FAMILY MEDICINE | Facility: CLINIC | Age: 70
End: 2020-03-31

## 2020-03-31 ENCOUNTER — PATIENT MESSAGE (OUTPATIENT)
Dept: UROLOGY | Facility: CLINIC | Age: 70
End: 2020-03-31

## 2020-03-31 ENCOUNTER — PATIENT MESSAGE (OUTPATIENT)
Dept: CARDIOLOGY | Facility: CLINIC | Age: 70
End: 2020-03-31

## 2020-04-01 ENCOUNTER — PATIENT OUTREACH (OUTPATIENT)
Dept: OTHER | Facility: OTHER | Age: 70
End: 2020-04-01

## 2020-04-01 ENCOUNTER — TELEPHONE (OUTPATIENT)
Dept: UROLOGY | Facility: CLINIC | Age: 70
End: 2020-04-01

## 2020-04-01 ENCOUNTER — PATIENT MESSAGE (OUTPATIENT)
Dept: CARDIOLOGY | Facility: CLINIC | Age: 70
End: 2020-04-01

## 2020-04-01 DIAGNOSIS — I48.20 CHRONIC ATRIAL FIBRILLATION: ICD-10-CM

## 2020-04-01 DIAGNOSIS — I48.0 PAROXYSMAL ATRIAL FIBRILLATION: Chronic | ICD-10-CM

## 2020-04-01 RX ORDER — TERAZOSIN 10 MG/1
10 CAPSULE ORAL NIGHTLY
Qty: 90 CAPSULE | Refills: 3 | Status: SHIPPED | OUTPATIENT
Start: 2020-04-01 | End: 2021-04-16 | Stop reason: SDUPTHER

## 2020-04-01 RX ORDER — TERAZOSIN 10 MG/1
10 CAPSULE ORAL NIGHTLY
Qty: 90 CAPSULE | Refills: 3 | Status: SHIPPED | OUTPATIENT
Start: 2020-04-01 | End: 2020-09-21

## 2020-04-01 RX ORDER — FLECAINIDE ACETATE 50 MG/1
50 TABLET ORAL EVERY 12 HOURS
Qty: 180 TABLET | Refills: 3 | Status: SHIPPED | OUTPATIENT
Start: 2020-04-01 | End: 2020-04-07 | Stop reason: SDUPTHER

## 2020-04-01 RX ORDER — DABIGATRAN ETEXILATE 150 MG/1
150 CAPSULE ORAL 2 TIMES DAILY
Qty: 180 CAPSULE | Refills: 3 | Status: SHIPPED | OUTPATIENT
Start: 2020-04-01 | End: 2020-04-07

## 2020-04-01 NOTE — PROGRESS NOTES
Digital Medicine: Health  Follow-Up    The history is provided by the patient.     Follow Up  Follow-up reason(s): reading review      Readings are trending up Mr. Pacheco is doing okay. Patient said he thinks he knows why BP has been elevated, but did not wish to discuss in detail. He said that he's working on getting it back down.    Patient reports that he is limiting contact with people to avoid exposure to COVID-19. He has been working on small projects around the house to keep himself busy. However, today he said he's just laying on the couch.    He thanked me for calling, and denies questions or concerns. Encouraged patient to continue to monitor.       INTERVENTION(S)  encouragement/support and denied questions      There are no preventive care reminders to display for this patient.    Last 5 Patient Entered Readings                                      Current 30 Day Average: 138/74     Recent Readings 3/30/2020 3/30/2020 3/29/2020 3/18/2020 3/11/2020    SBP (mmHg) 139 158 149 134 125    DBP (mmHg) 84 77 81 66 68    Pulse 68 58 60 71 77            Diet Screening       Patient has been eating at home more since not being able to dine out.     Physical Activity Screening   When asked if exercising, patient responded: yesHis level of intensity when exercising is low.    Patient participates in the following activities: outdoor activities and yard/housework

## 2020-04-05 ENCOUNTER — PATIENT OUTREACH (OUTPATIENT)
Dept: ADMINISTRATIVE | Facility: OTHER | Age: 70
End: 2020-04-05

## 2020-04-05 ENCOUNTER — PATIENT MESSAGE (OUTPATIENT)
Dept: CARDIOLOGY | Facility: CLINIC | Age: 70
End: 2020-04-05

## 2020-04-07 ENCOUNTER — OFFICE VISIT (OUTPATIENT)
Dept: CARDIOLOGY | Facility: CLINIC | Age: 70
End: 2020-04-07
Payer: MEDICARE

## 2020-04-07 DIAGNOSIS — I48.20 CHRONIC ATRIAL FIBRILLATION: ICD-10-CM

## 2020-04-07 DIAGNOSIS — I48.0 PAROXYSMAL ATRIAL FIBRILLATION: Chronic | ICD-10-CM

## 2020-04-07 DIAGNOSIS — I10 ESSENTIAL HYPERTENSION: Primary | Chronic | ICD-10-CM

## 2020-04-07 PROCEDURE — 99443 PR PHYSICIAN TELEPHONE EVALUATION 21-30 MIN: CPT | Mod: 95,,, | Performed by: INTERNAL MEDICINE

## 2020-04-07 PROCEDURE — 99443 PR PHYSICIAN TELEPHONE EVALUATION 21-30 MIN: ICD-10-PCS | Mod: 95,,, | Performed by: INTERNAL MEDICINE

## 2020-04-07 RX ORDER — FLECAINIDE ACETATE 50 MG/1
50 TABLET ORAL EVERY 12 HOURS
Qty: 180 TABLET | Refills: 3 | Status: SHIPPED | OUTPATIENT
Start: 2020-04-07 | End: 2020-11-06 | Stop reason: SDUPTHER

## 2020-04-07 RX ORDER — METOPROLOL SUCCINATE 50 MG/1
50 TABLET, EXTENDED RELEASE ORAL DAILY
Qty: 90 TABLET | Refills: 3 | Status: SHIPPED | OUTPATIENT
Start: 2020-04-07 | End: 2021-04-19 | Stop reason: SDUPTHER

## 2020-04-07 RX ORDER — SILDENAFIL 100 MG/1
100 TABLET, FILM COATED ORAL
Qty: 10 TABLET | Refills: 11 | Status: SHIPPED | OUTPATIENT
Start: 2020-04-07 | End: 2020-06-02 | Stop reason: SDUPTHER

## 2020-04-07 RX ORDER — DABIGATRAN ETEXILATE 150 MG/1
150 CAPSULE ORAL 2 TIMES DAILY
Qty: 180 CAPSULE | Refills: 3 | Status: SHIPPED | OUTPATIENT
Start: 2020-04-07 | End: 2020-11-24 | Stop reason: SDUPTHER

## 2020-04-07 NOTE — PROGRESS NOTES
Subjective:   Patient ID:  Omar Pacheco is a 69 y.o. male who presents for follow-up of No chief complaint on file.      HPI:   Mr. Pacheco is here for f/u for HTN and paroxysmal afib on chronic AC (pradaxa) and flecainide therapy.  [facility]  Mr. Pacheco denies any exertional chest discomfort, exertional dyspnea, palpitations or TIA's.  Stress test, echo and holter in 3-11 to workup an episode of syncope were all normal.  He is still exercising ~ 3 days/week but is limited by back/knee arthritis- treadmill 30-40 minutes.  He denies any cardiac sxs and can easily achieve 5 METS without any difficulty.  He denies any abnormal bleeding.    [facility].  NOVADS 1    Has ERVIN but not using CPAP.       There were no vitals filed for this visit.  There is no height or weight on file to calculate BMI.  CrCl cannot be calculated (Patient's most recent lab result is older than the maximum 7 days allowed.).    Lab Results   Component Value Date     06/24/2019    K 4.1 06/24/2019     06/24/2019    CO2 32 (H) 06/24/2019    BUN 11 06/24/2019    CREATININE 1.1 06/24/2019    GLU 97 06/24/2019    MG 2.3 06/14/2006    AST 37 06/24/2019    ALT 47 (H) 06/24/2019    ALBUMIN 3.3 (L) 06/24/2019    PROT 6.8 06/24/2019    BILITOT 0.5 06/24/2019    WBC 4.55 06/24/2019    HGB 12.6 (L) 06/24/2019    HCT 39.3 (L) 06/24/2019    MCV 96 06/24/2019     06/24/2019    INR 1.2 03/12/2012    INR 2.0 (H) 02/29/2012    PSA 4.09 (H) 01/07/2013    TSH 0.94 06/14/2006    CHOL 131 06/24/2019    HDL 58 06/24/2019    LDLCALC 65.0 06/24/2019    TRIG 40 06/24/2019       Current Outpatient Medications   Medication Sig    ammonium lactate (AMLACTIN) 12 % lotion AmLactin 12 % lotion    ammonium lactate (LAC-HYDRIN) 12 % lotion Apply topically 2 (two) times daily.    azelastine (ASTELIN) 137 mcg (0.1 %) nasal spray 1 spray (137 mcg total) by Nasal route 2 (two) times daily.    cyanocobalamin  "(VITAMIN B-12) 1000 MCG tablet Take 100 mcg by mouth once daily.    dabigatran etexilate (PRADAXA) 150 mg Cap Take 1 capsule (150 mg total) by mouth 2 (two) times daily. "Do NOT break, chew, or open capsules."    flecainide (TAMBOCOR) 50 MG Tab Take 1 tablet (50 mg total) by mouth every 12 (twelve) hours.    insulin syringe-needle U-100 (EASY COMFORT INSULIN SYRINGE) 1 mL 31 gauge x 5/16 Syrg Inject 1 time daily prn ED    methocarbamoL (ROBAXIN) 500 MG Tab Take 2 tablets (1,000 mg total) by mouth nightly as needed.    metoprolol succinate (TOPROL-XL) 50 MG 24 hr tablet Take 1 tablet (50 mg total) by mouth once daily.    mirtazapine (REMERON) 30 MG tablet Take 30 mg by mouth every evening.    multivitamin (THERAGRAN) per tablet Take 1 tablet by mouth once daily.    omeprazole (PRILOSEC) 20 MG capsule Take 1 capsule (20 mg total) by mouth once daily.    papaverine 30 mg/mL injection ADD: Phentolamine 6 mg/ml ADD: PGE1 60 mcg. 30/6/60. Sig: Start w/ 40 units. Inject as directed into proximal lateral aspect of penis.    sildenafiL (VIAGRA) 100 MG tablet Take 1 tablet (100 mg total) by mouth as needed for Erectile Dysfunction (1 tablet 1 hr prior to intercourse.).    terazosin (HYTRIN) 10 MG capsule Take 1 capsule (10 mg total) by mouth every evening.    terazosin (HYTRIN) 10 MG capsule Take 1 capsule (10 mg total) by mouth every evening.    terazosin (HYTRIN) 10 MG capsule Take 1 capsule (10 mg total) by mouth every evening.    VITAMIN B COMPLEX (B COMPLETE ORAL) Take by mouth once daily.     No current facility-administered medications for this visit.        Review of Systems   Constitution: Negative for decreased appetite, malaise/fatigue, weight gain and weight loss.   Eyes: Negative for visual disturbance.   Cardiovascular: Negative for chest pain, claudication, dyspnea on exertion, irregular heartbeat, orthopnea, palpitations, paroxysmal nocturnal dyspnea and syncope.   Respiratory: Negative for " cough, shortness of breath and snoring.    Skin: Negative for rash.   Musculoskeletal: Positive for back pain. Negative for arthritis, muscle cramps, muscle weakness and myalgias.   Gastrointestinal: Negative for abdominal pain, anorexia, change in bowel habit and nausea.   Genitourinary: Negative for dysuria and frequency.   Neurological: Negative for excessive daytime sleepiness, dizziness, headaches, loss of balance, numbness and weakness.   Psychiatric/Behavioral: Negative for depression.       Objective:   Physical Exam    Assessment:     1. Essential hypertension    2. Paroxysmal atrial fibrillation    3. Chronic atrial fibrillation        Plan:   From a cardiac standpoint, pt is doing well and is clinically stable. Pts lipid profile and BP's are at goal. Pt does not require any cardiac testing at this time    Labs in June can be postponed due to COVID.  [facility]     Hard copy flecainine and pradaxa, metoprolol.  90 days and 3 refills. viagra too      The patient location is: home  The chief complaint leading to consultation is: afib, HTN  Visit type: Virtual visit with synchronous audio  Total time spent with patient: 30 mintues  Each patient to whom he or she provides medical services by telemedicine is:  (1) informed of the relationship between the physician and patient and the respective role of any other health care provider with respect to management of the patient; and (2) notified that he or she may decline to receive medical services by telemedicine and may withdraw from such care at any time.          No orders of the defined types were placed in this encounter.

## 2020-04-08 ENCOUNTER — PATIENT MESSAGE (OUTPATIENT)
Dept: FAMILY MEDICINE | Facility: CLINIC | Age: 70
End: 2020-04-08

## 2020-05-01 ENCOUNTER — PATIENT OUTREACH (OUTPATIENT)
Dept: OTHER | Facility: OTHER | Age: 70
End: 2020-05-01

## 2020-05-01 NOTE — PROGRESS NOTES
"Digital Medicine: Health  Follow-Up    The history is provided by the patient.     Follow Up  Follow-up reason(s): reading review      Readings are trending up due to lifestyle change.    Mr. Pacheco is doing okay. Patient has noticed upward trend in BP, but cannot specifically say what he thinks is causing the higher readings. Patient explained "that there is just a lot going on right now, and maybe its stress?" Patient denies changes to diet, specifically, increased sodium consumption.     Mr. Pacheco has not charged his device in a while. Encouraged patient to charge device overnight and resume monitoring tomorrow. Patient is complaint with current medication regimen.       INTERVENTION(S)  encouragement/support and denied questions    PLAN  continue monitoring      There are no preventive care reminders to display for this patient.    Last 5 Patient Entered Readings                                      Current 30 Day Average: 144/80     Recent Readings 4/21/2020 4/20/2020 4/20/2020 4/15/2020 4/15/2020    SBP (mmHg) 150 154 152 141 167    DBP (mmHg) 85 86 72 82 92    Pulse 61 73 68 60 57            Diet Screening   No change to diet.      Physical Activity Screening   No change to exercise routine.        Medication Adherence Screening   He did not miss a dose this month.        "

## 2020-06-02 RX ORDER — SILDENAFIL 100 MG/1
100 TABLET, FILM COATED ORAL
Qty: 10 TABLET | Refills: 11 | Status: SHIPPED | OUTPATIENT
Start: 2020-06-02 | End: 2021-09-28 | Stop reason: SDUPTHER

## 2020-06-29 PROCEDURE — 99454 REM MNTR PHYSIOL PARAM 16-30: CPT | Mod: PBBFAC,PO | Performed by: FAMILY MEDICINE

## 2020-07-28 DIAGNOSIS — L30.9 DERMATITIS: Primary | ICD-10-CM

## 2020-07-28 DIAGNOSIS — J01.90 ACUTE RHINOSINUSITIS: ICD-10-CM

## 2020-07-28 NOTE — TELEPHONE ENCOUNTER
----- Message from Cydney Valadez sent at 7/28/2020  3:00 PM CDT -----  Regarding: REFILL  Contact: Patient  Type: RX Refill Request    Who Called:  Patient    Have you contacted your pharmacy: no    Refill or New Rx: Refill     RX Name and Strength: ammonium lactate (AMLACTIN) 12 % lotion    Preferred Pharmacy with phone number: pt wants prescription mailed to his home     Local or Mail Order:Local     Ordering Provider: Dr. Goode     Would the patient rather a call back or a response via [facility]? Call back     Best Call Back Number: [phone]    Pt is requesting a 3 or 6 month supply

## 2020-07-29 RX ORDER — AMMONIUM LACTATE 12 G/100G
LOTION TOPICAL
Qty: 396 G | Refills: 5 | Status: SHIPPED | OUTPATIENT
Start: 2020-07-29 | End: 2020-09-21 | Stop reason: SDUPTHER

## 2020-07-29 RX ORDER — AZELASTINE 1 MG/ML
1 SPRAY, METERED NASAL 2 TIMES DAILY
Qty: 30 ML | Refills: 5 | Status: CANCELLED | OUTPATIENT
Start: 2020-07-29 | End: 2021-07-29

## 2020-08-05 ENCOUNTER — PATIENT OUTREACH (OUTPATIENT)
Dept: OTHER | Facility: OTHER | Age: 70
End: 2020-08-05

## 2020-08-05 NOTE — PROGRESS NOTES
"Digital Medicine: Health  Follow-Up    The history is provided by the patient.             Reason for review: Blood pressure not at goal        Topics Covered on Call: physical activity and Diet    Additional Follow-up details: Mr. Pacheco is feeling well. He feels his BP is staying "within a good range" and "knows why" it spikes.     Overall, he is doing okay. He had to cancel a trip to [place] where is children live. They were not comfortable with him traveling during the pandemic. Mr. Pacheco said that the most important thing for him to do now is to "try to stay safe". Support provided.         Diet-Change      Dietary Improvements:Patient has been eating more home cooked meals since not being comfortable enough to dine out. He said he will  take out on occasion.     Dietary Indiscretions:          Physical Activity-Change      He added Riding his bike to His physical activity routine.    He removed Not going to the gym from His physical activity.    Additional physical activity details: Mr. Pacheco purchased a bike to ride since he isn't comfortable exercising in the gym. He said that he wishes that he could do more, but this is "good for now. It's fun and good exercise". Encouragement provided.       Medication Adherence-Medication Adherence not addressed.      Substance, Sleep, Stress-Not assessed      Continue current diet/physical activity routine.       Addressed any questions or concerns and patient has my contact information if needed prior to next outreach. Patient verbalizes understanding.      Explained the importance of self-monitoring and medication adherence. Encouraged the patient to communicate with their health  for lifestyle modifications to help improve or maintain a healthy lifestyle.            There are no preventive care reminders to display for this patient.    Last 5 Patient Entered Readings                                      Current 30 Day Average: 137/78     Recent " Readings 7/31/2020 7/31/2020 7/31/2020 7/27/2020 7/27/2020    SBP (mmHg) 134 147 139 121 142    DBP (mmHg) 75 84 78 75 70    Pulse 90 95 97 105 110

## 2020-08-12 ENCOUNTER — PATIENT OUTREACH (OUTPATIENT)
Dept: ADMINISTRATIVE | Facility: HOSPITAL | Age: 70
End: 2020-08-12

## 2020-08-21 ENCOUNTER — TELEPHONE (OUTPATIENT)
Dept: FAMILY MEDICINE | Facility: CLINIC | Age: 70
End: 2020-08-21

## 2020-08-21 NOTE — TELEPHONE ENCOUNTER
Patient requesting refill of metoprolol stating he sent a message to this office 4 days ago for a prescription to be printed for pickup.  Informed that there are no recent messages noted for him in his chart.  Patient informed that a printed refill of metoprolol along with refills for Tambocor and Pradaxa, all for a 12-month supply, were approved by Dr. Hernandez on 4/7/2020 but he states he did not receive these prescriptions.  Advised to contact Dr. Hernandez's office to obtain these prescriptions.  Verbalized understanding.

## 2020-08-24 ENCOUNTER — PES CALL (OUTPATIENT)
Dept: ADMINISTRATIVE | Facility: CLINIC | Age: 70
End: 2020-08-24

## 2020-09-06 ENCOUNTER — PATIENT MESSAGE (OUTPATIENT)
Dept: FAMILY MEDICINE | Facility: CLINIC | Age: 70
End: 2020-09-06

## 2020-09-06 DIAGNOSIS — M25.561 RIGHT KNEE PAIN, UNSPECIFIED CHRONICITY: Primary | ICD-10-CM

## 2020-09-11 ENCOUNTER — TELEPHONE (OUTPATIENT)
Dept: FAMILY MEDICINE | Facility: CLINIC | Age: 70
End: 2020-09-11

## 2020-09-11 ENCOUNTER — OFFICE VISIT (OUTPATIENT)
Dept: FAMILY MEDICINE | Facility: CLINIC | Age: 70
End: 2020-09-11
Payer: MEDICARE

## 2020-09-11 VITALS
HEART RATE: 65 BPM | HEIGHT: 69 IN | SYSTOLIC BLOOD PRESSURE: 142 MMHG | TEMPERATURE: 98 F | OXYGEN SATURATION: 96 % | BODY MASS INDEX: 32 KG/M2 | WEIGHT: 216.06 LBS | DIASTOLIC BLOOD PRESSURE: 80 MMHG

## 2020-09-11 DIAGNOSIS — J01.90 ACUTE RHINOSINUSITIS: ICD-10-CM

## 2020-09-11 DIAGNOSIS — H61.21 CERUMEN DEBRIS ON TYMPANIC MEMBRANE OF RIGHT EAR: ICD-10-CM

## 2020-09-11 DIAGNOSIS — K21.9 GASTROESOPHAGEAL REFLUX DISEASE, ESOPHAGITIS PRESENCE NOT SPECIFIED: ICD-10-CM

## 2020-09-11 DIAGNOSIS — E66.9 OBESITY (BMI 30-39.9): ICD-10-CM

## 2020-09-11 DIAGNOSIS — Z23 FLU VACCINE NEED: Primary | ICD-10-CM

## 2020-09-11 DIAGNOSIS — I10 ESSENTIAL HYPERTENSION: Chronic | ICD-10-CM

## 2020-09-11 DIAGNOSIS — H91.91 HEARING LOSS OF RIGHT EAR, UNSPECIFIED HEARING LOSS TYPE: ICD-10-CM

## 2020-09-11 PROCEDURE — 99215 PR OFFICE/OUTPT VISIT, EST, LEVL V, 40-54 MIN: ICD-10-PCS | Mod: S$PBB,,, | Performed by: NURSE PRACTITIONER

## 2020-09-11 PROCEDURE — 99215 OFFICE O/P EST HI 40 MIN: CPT | Mod: PBBFAC,PO | Performed by: NURSE PRACTITIONER

## 2020-09-11 PROCEDURE — G0008 ADMIN INFLUENZA VIRUS VAC: HCPCS | Mod: PBBFAC

## 2020-09-11 PROCEDURE — 99215 OFFICE O/P EST HI 40 MIN: CPT | Mod: S$PBB,,, | Performed by: NURSE PRACTITIONER

## 2020-09-11 PROCEDURE — 99999 PR PBB SHADOW E&M-EST. PATIENT-LVL V: ICD-10-PCS | Mod: PBBFAC,,, | Performed by: NURSE PRACTITIONER

## 2020-09-11 PROCEDURE — 90694 VACC AIIV4 NO PRSRV 0.5ML IM: CPT | Mod: PBBFAC,PO

## 2020-09-11 PROCEDURE — 99999 PR PBB SHADOW E&M-EST. PATIENT-LVL V: CPT | Mod: PBBFAC,,, | Performed by: NURSE PRACTITIONER

## 2020-09-11 RX ORDER — AZELASTINE 1 MG/ML
1 SPRAY, METERED NASAL 2 TIMES DAILY
Qty: 30 ML | Refills: 0 | Status: SHIPPED | OUTPATIENT
Start: 2020-09-11 | End: 2022-10-11

## 2020-09-11 NOTE — PATIENT INSTRUCTIONS
Azelastine nasal spray  What is this medicine?  AZELASTINE (a ZEL as teen) nasal spray is a histamine blocker. It helps to relieve itching, running and stuffiness in the nose. This medicine is used to treat nasal symptoms from allergies and other irritants.  How should I use this medicine?  This medicine is for use only in the nose. Follow the directions on the prescription label. Do not use more often than directed. Make sure that you are using your inhaler correctly. Ask you doctor or health care provider if you have any questions.  Talk to your pediatrician regarding the use of this medicine in children. While some products may be prescribed for children as young as 6 months for selected conditions, precautions do apply.  What side effects may I notice from receiving this medicine?  Side effects that you should report to your doctor or health care professional as soon as possible:  · allergic reactions like skin rash, itching or hives, swelling of the face, lips, or tongue  · breathing problems  · fast heartbeat  · high blood pressure  · infection  Side effects that usually do not require medical attention (report to your doctor or health care professional if they continue or are bothersome):  · bitter taste  · cough  · feeling tired  · headache  · larger appetite or weight gain  · nose or throat irritation  · nosebleed  · sneezing  What may interact with this medicine?  · cimetidine  · other antihistamines  What if I miss a dose?  If you miss a dose, use it as soon as you can. If it is almost time for your next dose, use only that dose. Do not use double or extra doses.  Where should I keep my medicine?  Keep out of the reach of children.  Store upright and tightly closed at room temperature between 20 and 25 degrees C (68 and 77 degrees F). Do not freeze. Throw away any unused medicine after the expiration date or after 200 sprays, whichever comes first.  What should I tell my health care provider before I  take this medicine?  They need to know if you have any of these conditions:  · any other medical problems  · an unusual or allergic reaction to azelastine, other nasal sprays, medicines, foods, dyes, or preservatives  · pregnant or trying to become pregnant  · breast-feeding  What should I watch for while using this medicine?  Tell your doctor or health care professional if your symptoms do not improve. Do not use extra medicine. Do not spray in your eyes.  This medicine may make you feel confused, dizzy or lightheaded. Drinking alcohol or taking medicine that causes drowsiness can make this worse. Do not drive, use machinery, or do anything that needs mental alertness until you know how this medicine affects you.  NOTE:This sheet is a summary. It may not cover all possible information. If you have questions about this medicine, talk to your doctor, pharmacist, or health care provider. Copyright© 2017 [employer]

## 2020-09-11 NOTE — PROGRESS NOTES
______________________________________________________________________    Chief Complaint  Chief Complaint   Patient presents with    Hearing Problem     right ear       HPI  Omar Pacheco is a 69 y.o. male with multiple medical diagnoses as listed in the medical history and problem list that presents for intermittent right ear hearing loss.  This patient is new to me. Last seen in primary care 06/29/2020    Intermittent hearing loss x4 days. Sometimes he can't hear out of the right ear at all and sometimes sounds are low out of the same ear. He reports no issues with the left ear. He experiences mild pain. Denies physical trauma to the ear. Since new symptoms of hearing loss he had been using OTC ear wax  and Qtips to clean his ears. He stopped using ear  2 days ago, 09/09/2020. Reports intermittent tinnitis.  Denies drainage out of the ear of any sort. Reports rhinorrea, runnyeyes and congestion x 6 months  intermittently due to allergies. Denies past ear infections.     Denies use of illicit drugs and cigarette use. Drinks alcohol socially but no abuse.     In the past he reports his daughters told him they think he's been experiencing hearing loss years ago since being deployed to [place]. He deployed to [place] in 2003. He confirms he had been ignoring the issue but now things are getting worse.     Denies difficulty swallowing, syncope, dizziness, vertigo , SOB, chest pain, fever, chills, or night sweats.         PAST MEDICAL HISTORY:  Past Medical History:   Diagnosis Date    *Atrial fibrillation     Benign hypertrophy of prostate     Degenerative disc disease     GERD (gastroesophageal reflux disease)     Hx of colonic polyps     Hypertension     Pilonidal cyst 1971       PAST SURGICAL HISTORY:  Past Surgical History:   Procedure Laterality Date    COLONOSCOPY N/A 12/7/2016    Procedure: COLONOSCOPY;  Surgeon: Sumeet Lundy MD;  Location: [address]);  Service: Endoscopy;   Laterality: N/A;  OK to hold Pradaxa 2 days prior to procedure per Dr Jones/see note dated 11/15/16/svn    CYST REMOVAL  1971    coccyx    KNEE SURGERY  1989    right       SOCIAL HISTORY:  Social History     Socioeconomic History    Marital status:      Spouse name: Not on file    Number of children: Not on file    Years of education: Not on file    Highest education level: Not on file   Occupational History    Not on file   Social Needs    Financial resource strain: Not on file    Food insecurity     Worry: Not on file     Inability: Not on file    Transportation needs     Medical: Not on file     Non-medical: Not on file   Tobacco Use    Smoking status: Former Smoker     Years: 2.00     Types: Cigarettes     Quit date: 1975     Years since quittin.9    Smokeless tobacco: Never Used   Substance and Sexual Activity    Alcohol use: Yes     Alcohol/week: 3.0 standard drinks     Types: 3 Glasses of wine per week     Comment: weekly    Drug use: No    Sexual activity: Not Currently   Lifestyle    Physical activity     Days per week: Not on file     Minutes per session: Not on file    Stress: Not on file   Relationships    Social connections     Talks on phone: Not on file     Gets together: Not on file     Attends Gnosticism service: Not on file     Active member of club or organization: Not on file     Attends meetings of clubs or organizations: Not on file     Relationship status: Not on file   Other Topics Concern    Not on file   Social History Narrative    Not on file       FAMILY HISTORY:  Family History   Problem Relation Age of Onset    Breast cancer Mother     Breast cancer Sister     Ovarian cancer Other        ALLERGIES AND MEDICATIONS: updated and reviewed.  Review of patient's allergies indicates:  No Known Allergies  Current Outpatient Medications   Medication Sig Dispense Refill    ammonium lactate (AMLACTIN) 12 % lotion AmLactin 12 % lotion 396 g 5     "azelastine (ASTELIN) 137 mcg (0.1 %) nasal spray 1 spray (137 mcg total) by Nasal route 2 (two) times daily. 30 mL 0    cyanocobalamin (VITAMIN B-12) 1000 MCG tablet Take 100 mcg by mouth once daily.      dabigatran etexilate (PRADAXA) 150 mg Cap Take 1 capsule (150 mg total) by mouth 2 (two) times daily. "Do NOT break, chew, or open capsules." 180 capsule 3    flecainide (TAMBOCOR) 50 MG Tab Take 1 tablet (50 mg total) by mouth every 12 (twelve) hours. 180 tablet 3    insulin syringe-needle U-100 (EASY COMFORT INSULIN SYRINGE) 1 mL 31 gauge x 5/16 Syrg Inject 1 time daily prn ED 90 each 0    metoprolol succinate (TOPROL-XL) 50 MG 24 hr tablet Take 1 tablet (50 mg total) by mouth once daily. 90 tablet 3    mirtazapine (REMERON) 30 MG tablet Take 30 mg by mouth every evening.      multivitamin (THERAGRAN) per tablet Take 1 tablet by mouth once daily.      omeprazole (PRILOSEC) 20 MG capsule Take 1 capsule (20 mg total) by mouth once daily. 90 capsule 3    papaverine 30 mg/mL injection ADD: Phentolamine 6 mg/ml ADD: PGE1 60 mcg. 30/6/60. Sig: Start w/ 40 units. Inject as directed into proximal lateral aspect of penis. 10 mL 11    sildenafiL (VIAGRA) 100 MG tablet Take 1 tablet (100 mg total) by mouth as needed for Erectile Dysfunction (1 tablet 1 hr prior to intercourse.). 10 tablet 11    terazosin (HYTRIN) 10 MG capsule Take 1 capsule (10 mg total) by mouth every evening. 30 capsule 11    terazosin (HYTRIN) 10 MG capsule Take 1 capsule (10 mg total) by mouth every evening. 90 capsule 3    terazosin (HYTRIN) 10 MG capsule Take 1 capsule (10 mg total) by mouth every evening. 90 capsule 3    VITAMIN B COMPLEX (B COMPLETE ORAL) Take by mouth once daily.       No current facility-administered medications for this visit.          ROS  Review of Systems   Constitutional: Negative for appetite change, chills, fatigue and fever.   HENT: Positive for hearing loss, rhinorrhea and tinnitus. Negative for congestion, " "ear pain, postnasal drip, sinus pressure, sinus pain, sneezing and sore throat.    Respiratory: Negative for cough and shortness of breath.    Cardiovascular: Negative for chest pain and palpitations.   Gastrointestinal: Negative for abdominal pain, constipation, diarrhea, nausea and vomiting.   Genitourinary: Negative for difficulty urinating and dysuria.   Musculoskeletal: Negative for arthralgias.   Neurological: Negative for headaches.           Physical Exam  Vitals:    09/11/20 1014   BP: (!) 142/80   Pulse: 65   Temp: 97.8 °F (36.6 °C)   TempSrc: Oral   SpO2: 96%   Weight: 98 kg (216 lb 0.8 oz)   Height: 5' 9" (1.753 m)    Body mass index is 31.91 kg/m².  Weight: 98 kg (216 lb 0.8 oz)   Height: 5' 9" (175.3 cm)   Physical Exam  Constitutional:       General: He is not in acute distress.     Appearance: Normal appearance. He is well-developed.   HENT:      Head: Normocephalic and atraumatic.      Right Ear: There is impacted cerumen.      Left Ear: There is impacted cerumen.      Nose: Nose normal.      Mouth/Throat:      Mouth: Mucous membranes are moist.      Pharynx: No oropharyngeal exudate.   Eyes:      General:         Right eye: No discharge.         Left eye: No discharge.      Pupils: Pupils are equal, round, and reactive to light.   Neck:      Musculoskeletal: Neck supple.   Cardiovascular:      Rate and Rhythm: Normal rate and regular rhythm.      Pulses: Normal pulses.      Heart sounds: Normal heart sounds. No murmur. No friction rub. No gallop.    Pulmonary:      Effort: Pulmonary effort is normal. No respiratory distress.      Breath sounds: No stridor. No wheezing or rhonchi.   Abdominal:      General: Bowel sounds are normal. There is no distension.      Palpations: Abdomen is soft. There is no mass.      Tenderness: There is no abdominal tenderness.      Hernia: No hernia is present.   Lymphadenopathy:      Cervical: No cervical adenopathy.   Skin:     General: Skin is warm and dry. "   Neurological:      General: No focal deficit present.      Mental Status: He is alert and oriented to person, place, and time. Mental status is at baseline.   Psychiatric:         Mood and Affect: Mood normal.           Health Maintenance       Date Due Completion Date    TETANUS VACCINE 12/13/1966 ---    Shingles Vaccine (1 of 2) 12/13/1998 ---    Influenza Vaccine (1) 08/01/2020 9/23/2019    Colorectal Cancer Screening 12/07/2021 12/7/2016    Lipid Panel 06/24/2024 6/24/2019            Assessment & Plan  Problem List Items Addressed This Visit        Cardiac/Vascular    Hypertension (Chronic)  -Continue current regimen       Endocrine    Obesity (BMI 30-39.9)  -Continue current regimen       GI     GERD  -Continue current regimen      Other Visit Diagnoses     Flu vaccine need    -  Primary    Relevant Orders    Influenza (FLUAD) - Quadrivalent (Adjuvanted) *Preferred* (65+) (PF) (Completed)    Hearing loss of right ear, unspecified hearing loss type      -Consult to ENT for cerumen impaction cleaning.  -If hearing loss remains and ENT is not able to intervene patient may require consult to neurology   -Unable to visualize full TM, portions that I did see were intact   -Unable to be certain if otitis media is the cause  -Advised patient to report to ER or urgent care if he experiences fever, chills, body ahces and increased ear fullness/ear pain/facial pain/headaches     Relevant Orders    Ambulatory referral/consult to ENT    Acute rhinosinusitis      -1 Spray in each nostril twice daily for relief then use as needed there after     Relevant Medications    azelastine (ASTELIN) 137 mcg (0.1 %) nasal spray    Cerumen debris on tympanic membrane of right ear     -Consult to ENT for cerumen impaction cleaning.  -If hearing loss remains and ENT is not able to intervene patient may require consult to neurology     Relevant Orders    Ambulatory referral/consult to ENT            Health Maintenance   1. Flu vaccine  given     Follow-up: Follow up if symptoms worsen or fail to improve.

## 2020-09-11 NOTE — TELEPHONE ENCOUNTER
----- Message from Maureen Garcia sent at 9/11/2020  9:22 AM CDT -----  Regarding: Return Call  Contact: Patient  Type:  Patient Returning Call    Who Called: Patient    Who Left Message for Patient: Aracelis    Does the patient know what this is regarding?:Yes     Would the patient rather a call back or a response via [facility]? CALL    Best Call Back Number: [phone] (Cut Off)     Additional Information: N/A

## 2020-09-11 NOTE — TELEPHONE ENCOUNTER
----- Message from Alvina Reyes sent at 9/11/2020  8:41 AM CDT -----  Contact: Self [phone]  Type:  Same Day Appointment Request    Caller is requesting a same day appointment.  Caller declined first available   appointment listed below.      Name of Caller: Self     When is the first available appointment? 9/21    Symptoms: can not hear out right ear    Would the patient rather a call back or a response via [facility]? Call back    Best Call Back Number: [phone]

## 2020-09-14 ENCOUNTER — PATIENT OUTREACH (OUTPATIENT)
Dept: OTHER | Facility: OTHER | Age: 70
End: 2020-09-14

## 2020-09-21 ENCOUNTER — OFFICE VISIT (OUTPATIENT)
Dept: FAMILY MEDICINE | Facility: CLINIC | Age: 70
End: 2020-09-21
Payer: MEDICARE

## 2020-09-21 VITALS
SYSTOLIC BLOOD PRESSURE: 158 MMHG | BODY MASS INDEX: 32.44 KG/M2 | OXYGEN SATURATION: 97 % | HEIGHT: 69 IN | TEMPERATURE: 98 F | HEART RATE: 59 BPM | WEIGHT: 219 LBS | DIASTOLIC BLOOD PRESSURE: 90 MMHG

## 2020-09-21 DIAGNOSIS — Z00.00 ROUTINE GENERAL MEDICAL EXAMINATION AT A HEALTH CARE FACILITY: Primary | ICD-10-CM

## 2020-09-21 DIAGNOSIS — Z13.6 ENCOUNTER FOR LIPID SCREENING FOR CARDIOVASCULAR DISEASE: ICD-10-CM

## 2020-09-21 DIAGNOSIS — J30.9 ALLERGIC RHINITIS, UNSPECIFIED SEASONALITY, UNSPECIFIED TRIGGER: ICD-10-CM

## 2020-09-21 DIAGNOSIS — Z87.19 HISTORY OF GASTROESOPHAGEAL REFLUX (GERD): ICD-10-CM

## 2020-09-21 DIAGNOSIS — I10 ESSENTIAL HYPERTENSION: ICD-10-CM

## 2020-09-21 DIAGNOSIS — I48.0 PAROXYSMAL ATRIAL FIBRILLATION: Chronic | ICD-10-CM

## 2020-09-21 DIAGNOSIS — Z13.220 ENCOUNTER FOR LIPID SCREENING FOR CARDIOVASCULAR DISEASE: ICD-10-CM

## 2020-09-21 DIAGNOSIS — L30.9 DERMATITIS: ICD-10-CM

## 2020-09-21 PROCEDURE — 99999 PR PBB SHADOW E&M-EST. PATIENT-LVL V: ICD-10-PCS | Mod: PBBFAC,,, | Performed by: FAMILY MEDICINE

## 2020-09-21 PROCEDURE — 99999 PR PBB SHADOW E&M-EST. PATIENT-LVL V: CPT | Mod: PBBFAC,,, | Performed by: FAMILY MEDICINE

## 2020-09-21 PROCEDURE — 99397 PR PREVENTIVE VISIT,EST,65 & OVER: ICD-10-PCS | Mod: S$PBB,,, | Performed by: FAMILY MEDICINE

## 2020-09-21 PROCEDURE — 99397 PER PM REEVAL EST PAT 65+ YR: CPT | Mod: S$PBB,,, | Performed by: FAMILY MEDICINE

## 2020-09-21 PROCEDURE — 99215 OFFICE O/P EST HI 40 MIN: CPT | Mod: PBBFAC,PO | Performed by: FAMILY MEDICINE

## 2020-09-21 RX ORDER — AMMONIUM LACTATE 12 G/100G
LOTION TOPICAL
Qty: 396 G | Refills: 5 | Status: SHIPPED | OUTPATIENT
Start: 2020-09-21 | End: 2021-09-24 | Stop reason: SDUPTHER

## 2020-09-21 RX ORDER — LEVOCETIRIZINE DIHYDROCHLORIDE 5 MG/1
5 TABLET, FILM COATED ORAL NIGHTLY
Qty: 90 TABLET | Refills: 3 | Status: SHIPPED | OUTPATIENT
Start: 2020-09-21 | End: 2022-03-03 | Stop reason: SDUPTHER

## 2020-09-21 RX ORDER — OMEPRAZOLE 20 MG/1
20 CAPSULE, DELAYED RELEASE ORAL DAILY
Qty: 90 CAPSULE | Refills: 3 | Status: SHIPPED | OUTPATIENT
Start: 2020-09-21 | End: 2021-12-12 | Stop reason: SDUPTHER

## 2020-09-22 ENCOUNTER — LAB VISIT (OUTPATIENT)
Dept: LAB | Facility: HOSPITAL | Age: 70
End: 2020-09-22
Attending: FAMILY MEDICINE
Payer: MEDICARE

## 2020-09-22 DIAGNOSIS — I10 ESSENTIAL HYPERTENSION: ICD-10-CM

## 2020-09-22 DIAGNOSIS — Z13.6 ENCOUNTER FOR LIPID SCREENING FOR CARDIOVASCULAR DISEASE: ICD-10-CM

## 2020-09-22 DIAGNOSIS — Z13.220 ENCOUNTER FOR LIPID SCREENING FOR CARDIOVASCULAR DISEASE: ICD-10-CM

## 2020-09-22 LAB
BASOPHILS # BLD AUTO: 0.03 K/UL (ref 0–0.2)
BASOPHILS NFR BLD: 0.6 % (ref 0–1.9)
DIFFERENTIAL METHOD: ABNORMAL
EOSINOPHIL # BLD AUTO: 0.2 K/UL (ref 0–0.5)
EOSINOPHIL NFR BLD: 3.5 % (ref 0–8)
ERYTHROCYTE [DISTWIDTH] IN BLOOD BY AUTOMATED COUNT: 13.3 % (ref 11.5–14.5)
HCT VFR BLD AUTO: 42 % (ref 40–54)
HGB BLD-MCNC: 13.4 G/DL (ref 14–18)
IMM GRANULOCYTES # BLD AUTO: 0.01 K/UL (ref 0–0.04)
IMM GRANULOCYTES NFR BLD AUTO: 0.2 % (ref 0–0.5)
LYMPHOCYTES # BLD AUTO: 2.1 K/UL (ref 1–4.8)
LYMPHOCYTES NFR BLD: 42.2 % (ref 18–48)
MCH RBC QN AUTO: 30.9 PG (ref 27–31)
MCHC RBC AUTO-ENTMCNC: 31.9 G/DL (ref 32–36)
MCV RBC AUTO: 97 FL (ref 82–98)
MONOCYTES # BLD AUTO: 0.6 K/UL (ref 0.3–1)
MONOCYTES NFR BLD: 11.7 % (ref 4–15)
NEUTROPHILS # BLD AUTO: 2 K/UL (ref 1.8–7.7)
NEUTROPHILS NFR BLD: 41.8 % (ref 38–73)
NRBC BLD-RTO: 0 /100 WBC
PLATELET # BLD AUTO: 247 K/UL (ref 150–350)
PMV BLD AUTO: 10.1 FL (ref 9.2–12.9)
RBC # BLD AUTO: 4.34 M/UL (ref 4.6–6.2)
WBC # BLD AUTO: 4.88 K/UL (ref 3.9–12.7)

## 2020-09-22 PROCEDURE — 85025 COMPLETE CBC W/AUTO DIFF WBC: CPT

## 2020-09-22 PROCEDURE — 36415 COLL VENOUS BLD VENIPUNCTURE: CPT | Mod: PO

## 2020-09-22 PROCEDURE — 80061 LIPID PANEL: CPT

## 2020-09-22 PROCEDURE — 80053 COMPREHEN METABOLIC PANEL: CPT

## 2020-09-23 LAB
ALBUMIN SERPL BCP-MCNC: 3.8 G/DL (ref 3.5–5.2)
ALP SERPL-CCNC: 57 U/L (ref 55–135)
ALT SERPL W/O P-5'-P-CCNC: 22 U/L (ref 10–44)
ANION GAP SERPL CALC-SCNC: 10 MMOL/L (ref 8–16)
AST SERPL-CCNC: 26 U/L (ref 10–40)
BILIRUB SERPL-MCNC: 0.4 MG/DL (ref 0.1–1)
BUN SERPL-MCNC: 12 MG/DL (ref 8–23)
CALCIUM SERPL-MCNC: 8.9 MG/DL (ref 8.7–10.5)
CHLORIDE SERPL-SCNC: 104 MMOL/L (ref 95–110)
CHOLEST SERPL-MCNC: 139 MG/DL (ref 120–199)
CHOLEST/HDLC SERPL: 2.5 {RATIO} (ref 2–5)
CO2 SERPL-SCNC: 25 MMOL/L (ref 23–29)
CREAT SERPL-MCNC: 1.1 MG/DL (ref 0.5–1.4)
EST. GFR  (AFRICAN AMERICAN): >60 ML/MIN/1.73 M^2
EST. GFR  (NON AFRICAN AMERICAN): >60 ML/MIN/1.73 M^2
GLUCOSE SERPL-MCNC: 102 MG/DL (ref 70–110)
HDLC SERPL-MCNC: 56 MG/DL (ref 40–75)
HDLC SERPL: 40.3 % (ref 20–50)
LDLC SERPL CALC-MCNC: 74.8 MG/DL (ref 63–159)
NONHDLC SERPL-MCNC: 83 MG/DL
POTASSIUM SERPL-SCNC: 4.5 MMOL/L (ref 3.5–5.1)
PROT SERPL-MCNC: 6.8 G/DL (ref 6–8.4)
SODIUM SERPL-SCNC: 139 MMOL/L (ref 136–145)
TRIGL SERPL-MCNC: 41 MG/DL (ref 30–150)

## 2020-09-23 NOTE — PROGRESS NOTES
1.  Normal sodium, potassium, liver and kidney function.  2. Cholesterol level normal.   3. Your anemia has improved from last year    Donya Goode MD

## 2020-10-01 NOTE — PROGRESS NOTES
"Digital Medicine: Health  Follow-Up    The history is provided by the patient.             Reason for review: Blood pressure not at goal        Topics Covered on Call: physical activity and Diet    Additional Follow-up details: Mr. Pacheco is doing okay. Patient commented on "ups and downs" in BP. Patient attributes recent spikes in to watching a football game. Reviewed monitoring technique and encouraged patient to rest and be calm prior to monitoring.           Diet-no change to diet    No change to diet.  Patient reports eating or drinking the following: Patient dines out on occasion. Tries to limit salt.       Physical Activity-Change  3 day(s) a week.     He added exercising at home to His physical activity routine.    He removed not attending to [employer] from His physical activity.  Medication Adherence-Medication Adherence not addressed.      Substance, Sleep, Stress-Not assessed      Additional monitoring needed.  Reviewed Device Techniques.     Addressed patient questions and patient has my contact information if needed prior to next outreach. Patient verbalizes understanding.      Explained the importance of self-monitoring and medication adherence. Encouraged the patient to communicate with their health  for lifestyle modifications to help improve or maintain a healthy lifestyle.            There are no preventive care reminders to display for this patient.    Last 5 Patient Entered Readings                                      Current 30 Day Average: 142/73     Recent Readings 9/24/2020 9/20/2020 9/20/2020 9/20/2020 9/14/2020    SBP (mmHg) 130 167 177 184 104    DBP (mmHg) 69 81 84 97 61    Pulse 57 64 63 62 101               "

## 2020-10-19 ENCOUNTER — PATIENT MESSAGE (OUTPATIENT)
Dept: UROLOGY | Facility: CLINIC | Age: 70
End: 2020-10-19

## 2020-11-06 ENCOUNTER — PATIENT MESSAGE (OUTPATIENT)
Dept: CARDIOLOGY | Facility: CLINIC | Age: 70
End: 2020-11-06

## 2020-11-06 ENCOUNTER — PATIENT OUTREACH (OUTPATIENT)
Dept: ADMINISTRATIVE | Facility: OTHER | Age: 70
End: 2020-11-06

## 2020-11-06 ENCOUNTER — LAB VISIT (OUTPATIENT)
Dept: SURGERY | Facility: CLINIC | Age: 70
End: 2020-11-06
Payer: MEDICARE

## 2020-11-06 ENCOUNTER — OFFICE VISIT (OUTPATIENT)
Dept: ELECTROPHYSIOLOGY | Facility: CLINIC | Age: 70
End: 2020-11-06
Payer: MEDICARE

## 2020-11-06 ENCOUNTER — PATIENT MESSAGE (OUTPATIENT)
Dept: ELECTROPHYSIOLOGY | Facility: CLINIC | Age: 70
End: 2020-11-06

## 2020-11-06 ENCOUNTER — OFFICE VISIT (OUTPATIENT)
Dept: CARDIOLOGY | Facility: CLINIC | Age: 70
End: 2020-11-06
Payer: MEDICARE

## 2020-11-06 ENCOUNTER — PATIENT MESSAGE (OUTPATIENT)
Dept: FAMILY MEDICINE | Facility: CLINIC | Age: 70
End: 2020-11-06

## 2020-11-06 ENCOUNTER — TELEPHONE (OUTPATIENT)
Dept: ELECTROPHYSIOLOGY | Facility: CLINIC | Age: 70
End: 2020-11-06

## 2020-11-06 ENCOUNTER — HOSPITAL ENCOUNTER (OUTPATIENT)
Dept: CARDIOLOGY | Facility: CLINIC | Age: 70
Discharge: HOME | End: 2020-11-06
Payer: MEDICARE

## 2020-11-06 VITALS
HEIGHT: 69 IN | WEIGHT: 218.69 LBS | DIASTOLIC BLOOD PRESSURE: 94 MMHG | HEART RATE: 81 BPM | HEIGHT: 69 IN | BODY MASS INDEX: 32.39 KG/M2 | OXYGEN SATURATION: 96 % | DIASTOLIC BLOOD PRESSURE: 94 MMHG | SYSTOLIC BLOOD PRESSURE: 132 MMHG | WEIGHT: 219.38 LBS | BODY MASS INDEX: 32.49 KG/M2 | SYSTOLIC BLOOD PRESSURE: 132 MMHG | HEART RATE: 81 BPM | OXYGEN SATURATION: 96 %

## 2020-11-06 DIAGNOSIS — I48.0 PAROXYSMAL ATRIAL FIBRILLATION: Primary | Chronic | ICD-10-CM

## 2020-11-06 DIAGNOSIS — I10 ESSENTIAL HYPERTENSION: Chronic | ICD-10-CM

## 2020-11-06 DIAGNOSIS — L30.9 DERMATITIS: Primary | ICD-10-CM

## 2020-11-06 DIAGNOSIS — G47.33 OBSTRUCTIVE SLEEP APNEA SYNDROME: ICD-10-CM

## 2020-11-06 DIAGNOSIS — I48.11 LONGSTANDING PERSISTENT ATRIAL FIBRILLATION: Primary | Chronic | ICD-10-CM

## 2020-11-06 DIAGNOSIS — I48.0 PAROXYSMAL ATRIAL FIBRILLATION: Chronic | ICD-10-CM

## 2020-11-06 DIAGNOSIS — G47.33 OSA (OBSTRUCTIVE SLEEP APNEA): ICD-10-CM

## 2020-11-06 DIAGNOSIS — Z01.818 PRE-OP TESTING: ICD-10-CM

## 2020-11-06 DIAGNOSIS — I48.19 PERSISTENT ATRIAL FIBRILLATION: Primary | ICD-10-CM

## 2020-11-06 DIAGNOSIS — I10 ESSENTIAL HYPERTENSION: Primary | Chronic | ICD-10-CM

## 2020-11-06 PROCEDURE — 99999 PR PBB SHADOW E&M-EST. PATIENT-LVL V: ICD-10-PCS | Mod: PBBFAC,,, | Performed by: PHYSICIAN ASSISTANT

## 2020-11-06 PROCEDURE — 93010 ELECTROCARDIOGRAM REPORT: CPT | Mod: S$PBB,,, | Performed by: INTERNAL MEDICINE

## 2020-11-06 PROCEDURE — U0003 INFECTIOUS AGENT DETECTION BY NUCLEIC ACID (DNA OR RNA); SEVERE ACUTE RESPIRATORY SYNDROME CORONAVIRUS 2 (SARS-COV-2) (CORONAVIRUS DISEASE [COVID-19]), AMPLIFIED PROBE TECHNIQUE, MAKING USE OF HIGH THROUGHPUT TECHNOLOGIES AS DESCRIBED BY CMS-2020-01-R: HCPCS

## 2020-11-06 PROCEDURE — 99215 OFFICE O/P EST HI 40 MIN: CPT | Mod: PBBFAC,27 | Performed by: PHYSICIAN ASSISTANT

## 2020-11-06 PROCEDURE — 99205 OFFICE O/P NEW HI 60 MIN: CPT | Mod: S$PBB,,, | Performed by: INTERNAL MEDICINE

## 2020-11-06 PROCEDURE — 93005 ELECTROCARDIOGRAM TRACING: CPT | Mod: PBBFAC | Performed by: INTERNAL MEDICINE

## 2020-11-06 PROCEDURE — 99999 PR PBB SHADOW E&M-EST. PATIENT-LVL IV: ICD-10-PCS | Mod: PBBFAC,,, | Performed by: INTERNAL MEDICINE

## 2020-11-06 PROCEDURE — 99205 PR OFFICE/OUTPT VISIT, NEW, LEVL V, 60-74 MIN: ICD-10-PCS | Mod: S$PBB,,, | Performed by: INTERNAL MEDICINE

## 2020-11-06 PROCEDURE — 93010 EKG 12-LEAD: ICD-10-PCS | Mod: S$PBB,,, | Performed by: INTERNAL MEDICINE

## 2020-11-06 PROCEDURE — 99999 PR PBB SHADOW E&M-EST. PATIENT-LVL V: CPT | Mod: PBBFAC,,, | Performed by: PHYSICIAN ASSISTANT

## 2020-11-06 PROCEDURE — 99999 PR PBB SHADOW E&M-EST. PATIENT-LVL IV: CPT | Mod: PBBFAC,,, | Performed by: INTERNAL MEDICINE

## 2020-11-06 PROCEDURE — 99214 OFFICE O/P EST MOD 30 MIN: CPT | Mod: PBBFAC,25 | Performed by: INTERNAL MEDICINE

## 2020-11-06 PROCEDURE — 99214 OFFICE O/P EST MOD 30 MIN: CPT | Mod: S$PBB,,, | Performed by: PHYSICIAN ASSISTANT

## 2020-11-06 PROCEDURE — 99214 PR OFFICE/OUTPT VISIT, EST, LEVL IV, 30-39 MIN: ICD-10-PCS | Mod: S$PBB,,, | Performed by: PHYSICIAN ASSISTANT

## 2020-11-06 RX ORDER — TRIAMCINOLONE ACETONIDE 1 MG/G
CREAM TOPICAL 2 TIMES DAILY
Qty: 45 G | Refills: 0 | Status: SHIPPED | OUTPATIENT
Start: 2020-11-06 | End: 2021-05-11 | Stop reason: SDUPTHER

## 2020-11-06 RX ORDER — FLECAINIDE ACETATE 100 MG/1
100 TABLET ORAL EVERY 12 HOURS
Qty: 180 TABLET | Refills: 3 | Status: SHIPPED | OUTPATIENT
Start: 2020-11-06 | End: 2021-11-22 | Stop reason: SDUPTHER

## 2020-11-06 NOTE — PROGRESS NOTES
Patient Instructions  MENG (Transesophageal Echocardiogram) with Cardioversion     Pre-Procedure Testing:          Important Medication Information:    · You may take your usual morning medications with a sip of water on the day of your procedure.         Day of Procedure:    2020 at 10:00 AM - MENG / Cardioversion     [facility] -   [address] [place] [address]  Please report to Cardiology Waiting Room (Same Day Surgery) on the 3rd floor of the Hospital,     · Do not eat or drink anything after 12 midnight on the night before your procedure.  · Please follow important medication instructions as listed above.   · Please do not wear makeup, especially mascara, when arriving for your procedureou will be going home after your procedure.  · You will need someone to drive you home from the hospital due to anesthesia.  · All patients/visitors are asked to arrive with a mask and keep the mask on throughout their visit  · All persons entering will be screened for fever and respiratory symptoms  · Patients may have one adult support person pre and post procedure  · Support person may remain with the patient prior to their procedure and then must wait in a socially distant manner until a member of the medical team provides an update at the conclusion of the procedure  · If you are spending the night, your support person must follow the visitation hours (8am-6pm)  · Your support person should provide the staff with a valid cell phone number so that he or she can receive automated updates      Post Procedure Testin2020 at 9:15 AM     An EKG has been scheduled for you at : [facility]  You will have a post procedural EKG one week after your cardioversion to assess your heart rhythm and rate.       Directions to Cardiology Waiting Room  If you park in the Parking Garage:  Take elevators to the 2nd floor  Walk up ramp and turn right by Gold Elevators  Take elevator to the 3rd  floor  Upon exiting the elevator, turn away from the clinic areas  Walk long tapia around to front of hospital to area with windows overlooking [address]  Check in at Reception Desk  OR  If family is dropping you off:  Have them drop you off at the front of the Hospital  (Near the ER, where all the flags are hung).  Take the E elevators to the 3rd floor.  Check in at the Reception Desk in the waiting room.      Any need to reschedule or cancel procedures, or any questions regarding your procedures should be addressed directly with the Arrhythmia Department Nurses at the following phone number: [phone].

## 2020-11-06 NOTE — TELEPHONE ENCOUNTER
Please let him know I have printed a script for a cream he can use he can come pick it up    Donya Goode MD

## 2020-11-06 NOTE — PROGRESS NOTES
Requested updates within Care Everywhere.  Patient's chart was reviewed for overdue BRIAN topics.  Immunizations reconciled.

## 2020-11-06 NOTE — PROGRESS NOTES
General Cardiology Clinic Note  Reason for Visit: Palpitations  Last Clinic Visit: 4/7/2020 with Dr Hernandez    HPI:   Omar Pacheco is a 69 y.o. male with PAfib on Flecainide and Pradaxa and HTN who presents for palpitations.     HPI:   Mr. Pacheco is here for palpitations. He was dx with Afib in 2006. Underwent DCCV and started on Flecainide and Pradaxa with no recurrence until now. He states 4 days ago, he began having symptoms of fluttering heartbeats and occasional dizziness. He checks his BP and HR regularly at home, and his normal HR is in 50-60s. About 3-4 weeks ago, he started having random readings with fast heart rates. For the past four days, his heart rate has been staying at 100-120. He has not missed any doses of Flecainide or Pradaxa.      Otherwise feeling okay and continuing to do 1.5 hours of aerobic exercise multiple days a week without issue. He drinks a couple servings of alcohol a week with no recent increase. No change in caffeine intake. He has a past history of sleep apnea but stopped wearing his CPAP years ago. He is not sure if he snores anymore. At the time of his original dx, he was a heavy drinker. He states he started taking an OTC male enhancement medication Extenze which he is worried caused the Afib. Otherwise denies CP, SOB, pedal edema, orthopnea, PND, syncope.     His EKG today shows Afib with RVR .     ROS:    Constitution: Negative for decreased appetite, diaphoresis, fever, malaise/fatigue, weight gain and weight loss.   HENT: Negative for congestion, nosebleeds and sore throat.    Eyes: Negative for blurred vision, vision loss in left eye, vision loss in right eye and visual disturbance.  Cardiovascular: Negative for chest pain, claudication, dyspnea on exertion, leg swelling, near-syncope, orthopnea, paroxysmal nocturnal dyspnea and syncope. Positive for palpitations  Respiratory: Negative for cough, hemoptysis, snoring, shortness of breath and wheezing..   "  Hematologic/Lymphatic: Negative for bleeding problem. Does not bruise/bleed easily.   Endocrine: Negative for polyuria  Musculoskeletal: Negative for muscle cramps and myalgias.   Gastrointestinal: Negative for abdominal pain, change in bowel habit, diarrhea, heartburn, hematemesis, hematochezia, melena, nausea and vomiting.   Neurological: Negative for extremity weakness or numbness, headaches and light-headedness. Positive for dizziness  Psychiatric/Behavioral: Negative for depression.   Allergic/Immunologic: Negative for hives.   PMH:     Past Medical History:   Diagnosis Date    *Atrial fibrillation     Benign hypertrophy of prostate     Degenerative disc disease     GERD (gastroesophageal reflux disease)     Hx of colonic polyps     Hypertension     Pilonidal cyst 1971     Past Surgical History:   Procedure Laterality Date    COLONOSCOPY N/A 12/7/2016    Procedure: COLONOSCOPY;  Surgeon: Sumeet Lundy MD;  Location: [address];  Service: Endoscopy;  Laterality: N/A;  OK to hold Pradaxa 2 days prior to procedure per Dr Jones/see note dated 11/15/16/svn    CYST REMOVAL  1971    coccyx    KNEE SURGERY  1989    right     Allergies:   Review of patient's allergies indicates:  No Known Allergies  Medications:     Current Outpatient Medications on File Prior to Visit   Medication Sig Dispense Refill    ammonium lactate (AMLACTIN) 12 % lotion AmLactin 12 % lotion 396 g 5    azelastine (ASTELIN) 137 mcg (0.1 %) nasal spray 1 spray (137 mcg total) by Nasal route 2 (two) times daily. 30 mL 0    cyanocobalamin (VITAMIN B-12) 1000 MCG tablet Take 100 mcg by mouth once daily.      dabigatran etexilate (PRADAXA) 150 mg Cap Take 1 capsule (150 mg total) by mouth 2 (two) times daily. "Do NOT break, chew, or open capsules." 180 capsule 3    flecainide (TAMBOCOR) 50 MG Tab Take 1 tablet (50 mg total) by mouth every 12 (twelve) hours. 180 tablet 3    insulin syringe-needle U-100 (EASY COMFORT INSULIN " "SYRINGE) 1 mL 31 gauge x 5/16 Syrg Inject 1 time daily prn ED 90 each 0    levocetirizine (XYZAL) 5 MG tablet Take 1 tablet (5 mg total) by mouth every evening. 90 tablet 3    metoprolol succinate (TOPROL-XL) 50 MG 24 hr tablet Take 1 tablet (50 mg total) by mouth once daily. 90 tablet 3    mirtazapine (REMERON) 30 MG tablet Take 30 mg by mouth every evening.      multivitamin (THERAGRAN) per tablet Take 1 tablet by mouth once daily.      omeprazole (PRILOSEC) 20 MG capsule Take 1 capsule (20 mg total) by mouth once daily. 90 capsule 3    papaverine 30 mg/mL injection ADD: Phentolamine 6 mg/ml ADD: PGE1 60 mcg. 60. Sig: Start w/ 40 units. Inject as directed into proximal lateral aspect of penis. 10 mL 11    sildenafiL (VIAGRA) 100 MG tablet Take 1 tablet (100 mg total) by mouth as needed for Erectile Dysfunction (1 tablet 1 hr prior to intercourse.). 10 tablet 11    terazosin (HYTRIN) 10 MG capsule Take 1 capsule (10 mg total) by mouth every evening. 90 capsule 3    triamcinolone acetonide 0.1% (KENALOG) 0.1 % cream Apply topically 2 (two) times daily. 45 g 0    VITAMIN B COMPLEX (B COMPLETE ORAL) Take by mouth once daily.       No current facility-administered medications on file prior to visit.      Social History:     Social History     Tobacco Use    Smoking status: Former Smoker     Years: 2.00     Types: Cigarettes     Quit date: 1975     Years since quittin.1    Smokeless tobacco: Never Used   Substance Use Topics    Alcohol use: Yes     Alcohol/week: 3.0 standard drinks     Types: 3 Glasses of wine per week     Comment: weekly     Family History:     Family History   Problem Relation Age of Onset    Breast cancer Mother     Breast cancer Sister     Ovarian cancer Other      Physical Exam:   BP (!) 132/94 (BP Location: Left arm, Patient Position: Sitting, BP Method: Large (Automatic))   Pulse 81   Ht 5' 9" (1.753 m)   Wt 99.2 kg (218 lb 11.1 oz)   SpO2 96%   BMI 32.30 kg/m² "      Physical Exam   Constitutional: He is oriented to person, place, and time and well-developed, well-nourished, and in no distress.   HENT:   Head: Normocephalic and atraumatic.   Eyes: Conjunctivae and EOM are normal. No scleral icterus.   Neck: Neck supple. No JVD present. No thyromegaly present.   Cardiovascular: Normal heart sounds and intact distal pulses. An irregularly irregular rhythm present. Tachycardia present. Exam reveals no gallop and no friction rub.   No murmur heard.  Pulmonary/Chest: Effort normal and breath sounds normal. He has no wheezes. He has no rales. He exhibits no tenderness.   Abdominal: Soft. Bowel sounds are normal. He exhibits no distension. There is no abdominal tenderness.   Musculoskeletal:         General: No edema.   Neurological: He is alert and oriented to person, place, and time.   Skin: Skin is warm and dry. No rash noted. No cyanosis or erythema. No pallor. Nails show no clubbing.   Psychiatric: Affect normal.   Nursing note and vitals reviewed.       Labs:     Lab Results   Component Value Date     09/22/2020    K 4.5 09/22/2020     09/22/2020    CO2 25 09/22/2020    BUN 12 09/22/2020    CREATININE 1.1 09/22/2020    ANIONGAP 10 09/22/2020     No results found for: HGBA1C  No results found for: BNP, BNPTRIAGEBLO Lab Results   Component Value Date    WBC 4.88 09/22/2020    HGB 13.4 (L) 09/22/2020    HCT 42.0 09/22/2020     09/22/2020    GRAN 2.0 09/22/2020    GRAN 41.8 09/22/2020     Lab Results   Component Value Date    CHOL 139 09/22/2020    HDL 56 09/22/2020    LDLCALC 74.8 09/22/2020    TRIG 41 09/22/2020          Imaging:   No recent cardiac imaging. Last echo in 2011.     EKG 11/6/2020: Afib with RVR,      Assessment:     1. Paroxysmal atrial fibrillation    2. Essential hypertension    3. Obstructive sleep apnea syndrome      Plan:     Atrial fibrillation with RVR  -Asymptomatic since 2006. Now with palpitations and -120 for past 4  days.   -Continue Flecainide and Pradaxa. Refer to EP.     History of sleep apnea  -Not on CPAP. Has not been evaluated for years. Will refer for sleep study. Discussed correlation between untreated ERVIN and Afib.     Hypertension   -Controlled    Follow up as scheduled. This patient was discussed with Dr Stallworth.     Signed:  Melinda Sam PA-C  Cardiology   [phone] - Interventional  [phone] - General  11/6/2020 1:08 PM

## 2020-11-06 NOTE — PROGRESS NOTES
Subjective:    Patient ID:  Omar Pacheco is a 69 y.o. male who presents for evaluation of PAF    HPI   69 y.o. M  PAF -- one episode in 2006; none until 2020!  HTN on meds  ERVIN, pending re-evaluation for CPAP    2006, had AF. Underwent MENG/DCCV. Started on flecainide 50 bid and pradaxa.  He's had no problems until 4 days ago; developed palpitations and dizziness again.  He's able to do cardio exercise 3-4x/week without problems.  He's 100% compliant with his Pradaxa.    My interpretation of today's ECG is AF    Review of Systems   Constitution: Negative. Negative for malaise/fatigue.   HENT: Negative.  Negative for ear pain and tinnitus.    Eyes: Negative for blurred vision.   Cardiovascular: Positive for palpitations. Negative for chest pain, dyspnea on exertion, near-syncope and syncope.   Respiratory: Negative.  Negative for shortness of breath.    Endocrine: Negative.  Negative for polyuria.   Hematologic/Lymphatic: Does not bruise/bleed easily.   Skin: Negative.  Negative for rash.   Musculoskeletal: Negative.  Negative for joint pain and muscle weakness.   Gastrointestinal: Negative.  Negative for abdominal pain and change in bowel habit.   Genitourinary: Negative for frequency.   Neurological: Positive for light-headedness. Negative for dizziness and weakness.   Psychiatric/Behavioral: Negative.  Negative for depression. The patient is not nervous/anxious.    Allergic/Immunologic: Negative for environmental allergies.        Objective:    Physical Exam   Constitutional: He is oriented to person, place, and time. He appears well-developed and well-nourished.   HENT:   Head: Normocephalic and atraumatic.   Eyes: Conjunctivae, EOM and lids are normal. No scleral icterus.   Neck: Normal range of motion. No JVD present. No tracheal deviation present. No thyromegaly present.   Cardiovascular: Normal rate and normal heart sounds. An irregularly irregular rhythm present.   Pulses:       Radial pulses are 2+ on  the right side and 2+ on the left side.   Pulmonary/Chest: Effort normal. No accessory muscle usage. No tachypnea. No respiratory distress. He has no wheezes.   Abdominal: He exhibits no distension. There is no hepatosplenomegaly.   Musculoskeletal: Normal range of motion.         General: No edema.   Neurological: He is alert and oriented to person, place, and time. He has normal reflexes. He exhibits normal muscle tone.   Skin: Skin is warm and dry. No rash noted.   Psychiatric: He has a normal mood and affect. His behavior is normal.   Nursing note and vitals reviewed.        Assessment:       1. Essential hypertension    2. Paroxysmal atrial fibrillation    3. ERVIN (obstructive sleep apnea)         Plan:       Rare PAF.    Increase flecainide to 100 bid.  Return on Monday. If still in AF, then MENG/DCCV.    I discussed with patient risks, indications, benefits, and alternatives of the planned procedure. All questions were answered. Patient understands and wishes to proceed.

## 2020-11-06 NOTE — LETTER
November 6, 2020      Melinda Sam PA-C  [address] [facility] [address]           [id number]  [address]  [facility] [address]  Phone: [phone]  Fax: [phone]          Patient: Omar Pacheco   MR Number: [id number]   YOB: 1950   Date of Visit: 11/6/2020       Dear Melinda Sam:    Thank you for referring Omar Pacheco to me for evaluation. Attached you will find relevant portions of my assessment and plan of care.    If you have questions, please do not hesitate to call me. I look forward to following Omar Pacheco along with you.    Sincerely,    Yasir Parikh MD    Enclosure  CC:  No Recipients    If you would like to receive this communication electronically, please contact [email or web address] or [phone] to request more information on [employer] Link access.    For providers and/or their staff who would like to refer a patient to [facility], please contact us through our one-stop-shop provider referral line, [facility], at [phone].    If you feel you have received this communication in error or would no longer like to receive these types of communications, please e-mail [email or web address]

## 2020-11-07 LAB — SARS-COV-2 RNA RESP QL NAA+PROBE: NOT DETECTED

## 2020-11-08 PROBLEM — E66.01 MORBID (SEVERE) OBESITY DUE TO EXCESS CALORIES: Chronic | Status: ACTIVE | Noted: 2017-03-22

## 2020-11-09 ENCOUNTER — HOSPITAL ENCOUNTER (OUTPATIENT)
Facility: HOSPITAL | Age: 70
Discharge: HOME | End: 2020-11-09
Attending: INTERNAL MEDICINE | Admitting: INTERNAL MEDICINE
Payer: MEDICARE

## 2020-11-09 DIAGNOSIS — I48.0 PAROXYSMAL ATRIAL FIBRILLATION: Primary | Chronic | ICD-10-CM

## 2020-11-09 DIAGNOSIS — I48.19 PERSISTENT ATRIAL FIBRILLATION: ICD-10-CM

## 2020-11-09 PROCEDURE — 99220 PR INITIAL OBSERVATION CARE,LEVL III: ICD-10-PCS | Mod: ,,, | Performed by: INTERNAL MEDICINE

## 2020-11-09 PROCEDURE — 93005 ELECTROCARDIOGRAM TRACING: CPT

## 2020-11-09 PROCEDURE — 99220 PR INITIAL OBSERVATION CARE,LEVL III: CPT | Mod: ,,, | Performed by: INTERNAL MEDICINE

## 2020-11-09 PROCEDURE — 93010 EKG 12-LEAD: ICD-10-PCS | Mod: ,,, | Performed by: INTERNAL MEDICINE

## 2020-11-09 PROCEDURE — 93010 ELECTROCARDIOGRAM REPORT: CPT | Mod: ,,, | Performed by: INTERNAL MEDICINE

## 2020-11-09 RX ORDER — HYDROMORPHONE HYDROCHLORIDE 1 MG/ML
0.2 INJECTION, SOLUTION INTRAMUSCULAR; INTRAVENOUS; SUBCUTANEOUS EVERY 5 MIN PRN
Status: CANCELLED | OUTPATIENT
Start: 2020-11-09

## 2020-11-09 RX ORDER — DIPHENHYDRAMINE HYDROCHLORIDE 50 MG/ML
25 INJECTION INTRAMUSCULAR; INTRAVENOUS EVERY 6 HOURS PRN
Status: CANCELLED | OUTPATIENT
Start: 2020-11-09

## 2020-11-09 RX ORDER — ONDANSETRON 2 MG/ML
4 INJECTION INTRAMUSCULAR; INTRAVENOUS ONCE AS NEEDED
Status: CANCELLED | OUTPATIENT
Start: 2020-11-09 | End: 2032-04-07

## 2020-11-09 RX ORDER — FENTANYL CITRATE 50 UG/ML
25 INJECTION, SOLUTION INTRAMUSCULAR; INTRAVENOUS EVERY 5 MIN PRN
Status: CANCELLED | OUTPATIENT
Start: 2020-11-09

## 2020-11-09 RX ORDER — SODIUM CHLORIDE 0.9 % (FLUSH) 0.9 %
3 SYRINGE (ML) INJECTION
Status: CANCELLED | OUTPATIENT
Start: 2020-11-09

## 2020-11-09 NOTE — HOSPITAL COURSE
ECG on admit demonstrated sinus rhythm.  MENG/DCCV cancelled.   Instructed to continue current medication regimen and to continue monitoring HR.  If Mr. Pacheco becomes symptomatic or notes irregular HR, instructed to notify arrhythmia department.  Plan to follow up with Siena Raymond in 6 months. Plan of care and discharge instructions discussed. All questions answered. Mr. Pacheco was discharged home in stable condition.

## 2020-11-09 NOTE — SUBJECTIVE & OBJECTIVE
"Past Medical History:   Diagnosis Date    *Atrial fibrillation     Benign hypertrophy of prostate     Degenerative disc disease     GERD (gastroesophageal reflux disease)     Hx of colonic polyps     Hypertension     Pilonidal cyst 1971       Past Surgical History:   Procedure Laterality Date    COLONOSCOPY N/A 12/7/2016    Procedure: COLONOSCOPY;  Surgeon: Sumeet Lundy MD;  Location: [address]);  Service: Endoscopy;  Laterality: N/A;  OK to hold Pradaxa 2 days prior to procedure per Dr Jones/see note dated 11/15/16/svn    CYST REMOVAL  1971    coccyx    KNEE SURGERY  1989    right       Review of patient's allergies indicates:  No Known Allergies    No current facility-administered medications on file prior to encounter.      Current Outpatient Medications on File Prior to Encounter   Medication Sig    ammonium lactate (AMLACTIN) 12 % lotion AmLactin 12 % lotion    azelastine (ASTELIN) 137 mcg (0.1 %) nasal spray 1 spray (137 mcg total) by Nasal route 2 (two) times daily.    cyanocobalamin (VITAMIN B-12) 1000 MCG tablet Take 100 mcg by mouth once daily.    dabigatran etexilate (PRADAXA) 150 mg Cap Take 1 capsule (150 mg total) by mouth 2 (two) times daily. "Do NOT break, chew, or open capsules."    flecainide (TAMBOCOR) 100 MG Tab Take 1 tablet (100 mg total) by mouth every 12 (twelve) hours.    insulin syringe-needle U-100 (EASY COMFORT INSULIN SYRINGE) 1 mL 31 gauge x 5/16 Syrg Inject 1 time daily prn ED    levocetirizine (XYZAL) 5 MG tablet Take 1 tablet (5 mg total) by mouth every evening.    metoprolol succinate (TOPROL-XL) 50 MG 24 hr tablet Take 1 tablet (50 mg total) by mouth once daily.    mirtazapine (REMERON) 30 MG tablet Take 30 mg by mouth every evening.    multivitamin (THERAGRAN) per tablet Take 1 tablet by mouth once daily.    omeprazole (PRILOSEC) 20 MG capsule Take 1 capsule (20 mg total) by mouth once daily.    papaverine 30 mg/mL injection ADD: Phentolamine 6 " mg/ml ADD: PGE1 60 mcg. 60. Sig: Start w/ 40 units. Inject as directed into proximal lateral aspect of penis.    sildenafiL (VIAGRA) 100 MG tablet Take 1 tablet (100 mg total) by mouth as needed for Erectile Dysfunction (1 tablet 1 hr prior to intercourse.).    terazosin (HYTRIN) 10 MG capsule Take 1 capsule (10 mg total) by mouth every evening.    triamcinolone acetonide 0.1% (KENALOG) 0.1 % cream Apply topically 2 (two) times daily.    VITAMIN B COMPLEX (B COMPLETE ORAL) Take by mouth once daily.     Family History     Problem Relation (Age of Onset)    Breast cancer Mother, Sister    Ovarian cancer Other        Tobacco Use    Smoking status: Former Smoker     Years: 2.00     Types: Cigarettes     Quit date: 1975     Years since quittin.1    Smokeless tobacco: Never Used   Substance and Sexual Activity    Alcohol use: Yes     Alcohol/week: 3.0 standard drinks     Types: 3 Glasses of wine per week     Comment: weekly    Drug use: No    Sexual activity: Not Currently     Review of Systems   Constitution: Negative for fever and malaise/fatigue.   Cardiovascular: Negative for chest pain, irregular heartbeat and palpitations.   Hematologic/Lymphatic: Negative for bleeding problem.   Neurological: Negative for dizziness and light-headedness.   Psychiatric/Behavioral: Negative for altered mental status. The patient is not nervous/anxious.      Objective:     Vital Signs (Most Recent):    Vital Signs (24h Range):             There is no height or weight on file to calculate BMI.            Physical Exam   Constitutional: He is oriented to person, place, and time. Vital signs are normal. He appears well-developed and well-nourished.   Cardiovascular: Normal rate, regular rhythm, S1 normal, S2 normal, normal heart sounds and intact distal pulses.   Pulmonary/Chest: Effort normal and breath sounds normal.   Abdominal: Soft.   Musculoskeletal: Normal range of motion.         General: No edema.    Neurological: He is alert and oriented to person, place, and time. He has normal strength.   Skin: Skin is warm, dry and intact.   Vitals reviewed.

## 2020-11-09 NOTE — H&P
[facility] - Short Stay Cardiac Unit  Cardiac Electrophysiology  History and Physical     Admission Date: 11/9/2020  Code Status: No Order   Attending Provider: Yasir Parikh MD   Principal Problem:<principal problem not specified>    Subjective:     Chief Complaint:  pAF     HPI:  Omar Pacheco presents to short stay cardiac unit on 11/09/2020 for planned MENG guided DCCV with Dr. Parikh.     Mr. Pacheco is a 69 y.o. male with history of HTN and pAF.     Initially diagnosed with Afib in 2006. Underwent DCCV and started on Flecainide and Pradaxa with no recurrence until now. He began having symptoms of fluttering heartbeats and occasional dizziness last week. He checks his BP and HR regularly at home, and his normal HR is in 50-60s. About 4 weeks ago, he started having random readings with fast heart rates. During clinic evaluation last week he reported persistently elevated -120. ECG revealing of AF with RVR.   He has not missed any doses of Flecainide or Pradaxa. Following visit with Dr. Parikh on 11/06/20, increased Flecainide to 100 mg bid with plan to proceed with MENG/DCCV.     Review of most recent labs demonstrate normal renal function, Cr 1.1.     Today, Mr. Pacheco reports feeling well.  He notes that his chest fluttering sensation subsided this morning. He in active in the digital HTN program. Review of vitals demonstrates persistently elevated -120 since 11/05/20, but HR in 60s this morning.   He been compliant with Pradaxa and Flecainide.       EKG:   My independent visualization of most recent EKG is sinus rhythm at 65 bpm    Past Medical History:   Diagnosis Date    *Atrial fibrillation     Benign hypertrophy of prostate     Degenerative disc disease     GERD (gastroesophageal reflux disease)     Hx of colonic polyps     Hypertension     Pilonidal cyst 1971       Past Surgical History:   Procedure Laterality Date    COLONOSCOPY N/A 12/7/2016    Procedure: COLONOSCOPY;  " Surgeon: Sumeet Lundy MD;  Location: [facility] ([address] [place] [address]);  Service: Endoscopy;  Laterality: N/A;  OK to hold Pradaxa 2 days prior to procedure per Dr Jones/see note dated 11/15/16/svn    CYST REMOVAL  1971    coccyx    KNEE SURGERY  1989    right       Review of patient's allergies indicates:  No Known Allergies    No current facility-administered medications on file prior to encounter.      Current Outpatient Medications on File Prior to Encounter   Medication Sig    ammonium lactate (AMLACTIN) 12 % lotion AmLactin 12 % lotion    azelastine (ASTELIN) 137 mcg (0.1 %) nasal spray 1 spray (137 mcg total) by Nasal route 2 (two) times daily.    cyanocobalamin (VITAMIN B-12) 1000 MCG tablet Take 100 mcg by mouth once daily.    dabigatran etexilate (PRADAXA) 150 mg Cap Take 1 capsule (150 mg total) by mouth 2 (two) times daily. "Do NOT break, chew, or open capsules."    flecainide (TAMBOCOR) 100 MG Tab Take 1 tablet (100 mg total) by mouth every 12 (twelve) hours.    insulin syringe-needle U-100 (EASY COMFORT INSULIN SYRINGE) 1 mL 31 gauge x 5/16 Syrg Inject 1 time daily prn ED    levocetirizine (XYZAL) 5 MG tablet Take 1 tablet (5 mg total) by mouth every evening.    metoprolol succinate (TOPROL-XL) 50 MG 24 hr tablet Take 1 tablet (50 mg total) by mouth once daily.    mirtazapine (REMERON) 30 MG tablet Take 30 mg by mouth every evening.    multivitamin (THERAGRAN) per tablet Take 1 tablet by mouth once daily.    omeprazole (PRILOSEC) 20 MG capsule Take 1 capsule (20 mg total) by mouth once daily.    papaverine 30 mg/mL injection ADD: Phentolamine 6 mg/ml ADD: PGE1 60 mcg. 30/6/60. Sig: Start w/ 40 units. Inject as directed into proximal lateral aspect of penis.    sildenafiL (VIAGRA) 100 MG tablet Take 1 tablet (100 mg total) by mouth as needed for Erectile Dysfunction (1 tablet 1 hr prior to intercourse.).    terazosin (HYTRIN) 10 MG capsule Take 1 capsule (10 mg total) by mouth every evening.    " triamcinolone acetonide 0.1% (KENALOG) 0.1 % cream Apply topically 2 (two) times daily.    VITAMIN B COMPLEX (B COMPLETE ORAL) Take by mouth once daily.     Family History     Problem Relation (Age of Onset)    Breast cancer Mother, Sister    Ovarian cancer Other        Tobacco Use    Smoking status: Former Smoker     Years: 2.00     Types: Cigarettes     Quit date: 1975     Years since quittin.1    Smokeless tobacco: Never Used   Substance and Sexual Activity    Alcohol use: Yes     Alcohol/week: 3.0 standard drinks     Types: 3 Glasses of wine per week     Comment: weekly    Drug use: No    Sexual activity: Not Currently     Review of Systems   Constitution: Negative for fever and malaise/fatigue.   Cardiovascular: Negative for chest pain, irregular heartbeat and palpitations.   Hematologic/Lymphatic: Negative for bleeding problem.   Neurological: Negative for dizziness and light-headedness.   Psychiatric/Behavioral: Negative for altered mental status. The patient is not nervous/anxious.      Objective:     Vital Signs (Most Recent):    Vital Signs (24h Range):             There is no height or weight on file to calculate BMI.            Physical Exam   Constitutional: He is oriented to person, place, and time. Vital signs are normal. He appears well-developed and well-nourished.   Cardiovascular: Normal rate, regular rhythm, S1 normal, S2 normal, normal heart sounds and intact distal pulses.   Pulmonary/Chest: Effort normal and breath sounds normal.   Abdominal: Soft.   Musculoskeletal: Normal range of motion.         General: No edema.   Neurological: He is alert and oriented to person, place, and time. He has normal strength.   Skin: Skin is warm, dry and intact.   Vitals reviewed.      Assessment and Plan:     Atrial fibrillation - paroxysmal  ECG demonstrates sinus rhythm. MENG/DCCV cancelled.           Kaye Traylor NP  Cardiac Electrophysiology  [facility] - Short Stay Cardiac  Unit

## 2020-11-09 NOTE — ASSESSMENT & PLAN NOTE
ECG demonstrated sinus rhythm. MENG/DCCV cancelled.     Plan:  - Continue PTA medications  - Follow up with Siena Raymond in 6 months  - Instructed to notify arrhythmia nurses/clinic if symptoms return or HR becomes irregular   - Discharge plans/instructions discussed with patient who verbalized understanding and agreement of plans of care. No further questions or concerns  voiced at this time.

## 2020-11-09 NOTE — DISCHARGE SUMMARY
[facility] - Short Stay Cardiac Unit  Cardiac Electrophysiology  Discharge Summary      Patient Name: Omar Pacheco  MRN: [id number]  Admission Date: 11/9/2020  Hospital Length of Stay: 0 days  Discharge Date and Time:  11/09/2020 10:08 AM  Attending Physician: No att. providers found    Discharging Provider: Kaye Traylor NP  Primary Care Physician: Donya Goode MD    HPI:   Omar Pacheco presents to short stay cardiac unit on 11/09/2020 for planned MENG guided DCCV with Dr. Parikh.     Mr. Pacheco is a 69 y.o. male with history of HTN and pAF.     Initially diagnosed with Afib in 2006. Underwent DCCV and started on Flecainide and Pradaxa with no recurrence until now. He began having symptoms of fluttering heartbeats and occasional dizziness last week. He checks his BP and HR regularly at home, and his normal HR is in 50-60s. About 4 weeks ago, he started having random readings with fast heart rates. During clinic evaluation last week he reported persistently elevated -120. ECG revealing of AF with RVR.   He has not missed any doses of Flecainide or Pradaxa. Following visit with Dr. Parikh on 11/06/20, increased Flecainide to 100 mg bid with plan to proceed with MENG/DCCV.     Review of most recent labs demonstrate normal renal function, Cr 1.1.     Today, Mr. Pacheco reports feeling well.  He notes that his chest fluttering sensation subsided this morning. He in active in the digital HTN program. Review of vitals demonstrates persistently elevated -120 since 11/05/20, but HR in 60s this morning.   He been compliant with Pradaxa and Flecainide.       EKG:   My independent visualization of most recent EKG is sinus rhythm at 65 bpm    Procedure(s) (LRB):  CARDIOVERSION (N/A)  ECHOCARDIOGRAM, TRANSESOPHAGEAL (N/A)     Indwelling Lines/Drains at time of discharge:  Lines/Drains/Airways     Drain                 Urethral Catheter 07/21/17 0032 Coude 16 Fr. 1207 days         Urethral Catheter  02/16/18 1420 Straight-tip 996 days         Urethral Catheter 02/19/18 1658 Latex 993 days            Hospital Course:  ECG on admit demonstrated sinus rhythm.  MENG/DCCV cancelled.   Instructed to continue current medication regimen and to continue monitoring HR.  If Mr. Pacheco becomes symptomatic or notes irregular HR, instructed to notify arrhythmia department.  Plan to follow up with Siena Raymond in 6 months. Plan of care and discharge instructions discussed. All questions answered. Mr. Pacheco was discharged home in stable condition.       Pending Diagnostic Studies:     None          Final Active Diagnoses:    Diagnosis Date Noted POA    Atrial fibrillation - paroxysmal [I48.91] 03/08/2013 Yes     Chronic      Problems Resolved During this Admission:     Atrial fibrillation - paroxysmal  ECG demonstrated sinus rhythm. MENG/DCCV cancelled.     Plan:  - Continue PTA medications  - Follow up with Siena Raymond in 6 months  - Instructed to notify arrhythmia nurses/clinic if symptoms return or HR becomes irregular   - Discharge plans/instructions discussed with patient who verbalized understanding and agreement of plans of care. No further questions or concerns  voiced at this time.       Discharged Condition: good    Disposition: Home or Self Care    Follow Up:  Follow-up Information     Siena Raymond NP In 6 months.    Specialty: Cardiology  Contact information:  [address] [place] [address] [id number] [phone]                 Patient Instructions:      Diet Adult Regular     Notify your health care provider if you experience any of the following:  difficulty breathing or increased cough     Notify your health care provider if you experience any of the following:  persistent dizziness, light-headedness, or visual disturbances     Activity as tolerated     Medications:  Reconciled Home Medications:      Medication List      CONTINUE taking these medications    ammonium lactate 12 %  "lotion  Commonly known as: AMLACTIN  AmLactin 12 % lotion     azelastine 137 mcg (0.1 %) nasal spray  Commonly known as: ASTELIN  1 spray (137 mcg total) by Nasal route 2 (two) times daily.     B COMPLETE ORAL  Take by mouth once daily.     dabigatran etexilate 150 mg Cap  Commonly known as: PRADAXA  Take 1 capsule (150 mg total) by mouth 2 (two) times daily. "Do NOT break, chew, or open capsules."     flecainide 100 MG Tab  Commonly known as: TAMBOCOR  Take 1 tablet (100 mg total) by mouth every 12 (twelve) hours.     insulin syringe-needle U-100 1 mL 31 gauge x 5/16 Syrg  Commonly known as: EASY COMFORT INSULIN SYRINGE  Inject 1 time daily prn ED     levocetirizine 5 MG tablet  Commonly known as: XYZAL  Take 1 tablet (5 mg total) by mouth every evening.     metoprolol succinate 50 MG 24 hr tablet  Commonly known as: TOPROL-XL  Take 1 tablet (50 mg total) by mouth once daily.     mirtazapine 30 MG tablet  Commonly known as: REMERON  Take 30 mg by mouth every evening.     multivitamin per tablet  Commonly known as: THERAGRAN  Take 1 tablet by mouth once daily.     omeprazole 20 MG capsule  Commonly known as: PRILOSEC  Take 1 capsule (20 mg total) by mouth once daily.     papaverine 30 mg/mL injection  ADD: Phentolamine 6 mg/ml ADD: PGE1 60 mcg. 30/6/60. Sig: Start w/ 40 units. Inject as directed into proximal lateral aspect of penis.     sildenafiL 100 MG tablet  Commonly known as: VIAGRA  Take 1 tablet (100 mg total) by mouth as needed for Erectile Dysfunction (1 tablet 1 hr prior to intercourse.).     terazosin 10 MG capsule  Commonly known as: HYTRIN  Take 1 capsule (10 mg total) by mouth every evening.     triamcinolone acetonide 0.1% 0.1 % cream  Commonly known as: KENALOG  Apply topically 2 (two) times daily.     VITAMIN B-12 1000 MCG tablet  Generic drug: cyanocobalamin  Take 100 mcg by mouth once daily.            Time spent on the discharge of patient: 15 minutes    Kaye Traylor NP  Cardiac " Electrophysiology  [facility] Short Stay Cardiac Unit

## 2020-11-15 ENCOUNTER — PATIENT MESSAGE (OUTPATIENT)
Dept: ELECTROPHYSIOLOGY | Facility: CLINIC | Age: 70
End: 2020-11-15

## 2020-11-15 ENCOUNTER — PATIENT MESSAGE (OUTPATIENT)
Dept: FAMILY MEDICINE | Facility: CLINIC | Age: 70
End: 2020-11-15

## 2020-11-16 ENCOUNTER — PATIENT MESSAGE (OUTPATIENT)
Dept: FAMILY MEDICINE | Facility: CLINIC | Age: 70
End: 2020-11-16

## 2020-11-18 ENCOUNTER — PATIENT MESSAGE (OUTPATIENT)
Dept: UROLOGY | Facility: CLINIC | Age: 70
End: 2020-11-18

## 2020-11-18 DIAGNOSIS — N13.8 BPH WITH URINARY OBSTRUCTION: Primary | ICD-10-CM

## 2020-11-18 DIAGNOSIS — I48.19 PERSISTENT ATRIAL FIBRILLATION: Primary | ICD-10-CM

## 2020-11-18 DIAGNOSIS — N52.01 ERECTILE DYSFUNCTION DUE TO ARTERIAL INSUFFICIENCY: ICD-10-CM

## 2020-11-18 DIAGNOSIS — N40.1 BPH WITH URINARY OBSTRUCTION: Primary | ICD-10-CM

## 2020-11-18 RX ORDER — TADALAFIL 20 MG/1
20 TABLET ORAL DAILY PRN
Qty: 10 TABLET | Refills: 5 | Status: SHIPPED | OUTPATIENT
Start: 2020-11-18 | End: 2021-01-27

## 2020-11-18 NOTE — TELEPHONE ENCOUNTER
Last clinic visit was 01/2020.   Taking Viagra and ICI therapy  Requesting Cialis 20mg  Rx was mailed to his house.

## 2020-11-20 ENCOUNTER — NURSE TRIAGE (OUTPATIENT)
Dept: ADMINISTRATIVE | Facility: CLINIC | Age: 70
End: 2020-11-20

## 2020-11-20 NOTE — TELEPHONE ENCOUNTER
"Covid-19 symptom tracker call back attempted. No contact made. Left VM message. Will retry in 15 mins    Second call back, patient received a post procedure (11/9) symptom tracker text regarding new symptoms. Patient does have a productive cough, was dry in first few days. Denies fever, SOB, N/V/D, chest pain or pressure. Patient has spoken to PCP and received a RX for cough but states "I just told her that I thought I had a cold. I forgot to mention that I had a procedure recently." Routed message to PCP for follow up if needed. Advised patient to go to ED if cough worsens or if he experiences SOB, chest pain or fever.     Reason for Disposition   Message left on unidentified voice mail. Phone number verified.    Additional Information   Negative: Caller has already spoken with the PCP (or office), and has no further questions   Negative: Caller has already spoken with another triager and has no further questions   Negative: Caller has already spoken with another triager or PCP (or office), and has further questions and triager able to answer questions.   Negative: Busy signal.  First attempt to contact caller.  Follow-up call scheduled within 15 minutes.   Negative: No answer.  First attempt to contact caller.  Follow-up call scheduled within 15 minutes.   Negative: Message left on identified voicemail    Protocols used: NO CONTACT OR DUPLICATE CONTACT CALL-A-OH      "

## 2020-11-22 ENCOUNTER — PATIENT MESSAGE (OUTPATIENT)
Dept: FAMILY MEDICINE | Facility: CLINIC | Age: 70
End: 2020-11-22

## 2020-11-22 DIAGNOSIS — Z20.822 EXPOSURE TO COVID-19 VIRUS: Primary | ICD-10-CM

## 2020-11-23 NOTE — TELEPHONE ENCOUNTER
Can we call pt, what was the phone call about?   loooks like he had cardioversion 11/9, a few weeks ago.  Is someone contacting him because he was exposed to covid?   Is he having any symptoms?  Does not look like he has upcoming procedure.  I will order covid swab but if he has no known contacts and no symptoms, an acceptable alternative is to watch and wait. And quarantine.

## 2020-11-23 NOTE — TELEPHONE ENCOUNTER
Below information given to patient, verbalized   understanding. Patient states he has not had contact and no symptoms

## 2020-11-24 ENCOUNTER — TELEPHONE (OUTPATIENT)
Dept: FAMILY MEDICINE | Facility: CLINIC | Age: 70
End: 2020-11-24

## 2020-11-24 ENCOUNTER — OFFICE VISIT (OUTPATIENT)
Dept: CARDIOLOGY | Facility: CLINIC | Age: 70
End: 2020-11-24
Payer: MEDICARE

## 2020-11-24 VITALS
HEART RATE: 57 BPM | HEIGHT: 69 IN | SYSTOLIC BLOOD PRESSURE: 139 MMHG | DIASTOLIC BLOOD PRESSURE: 70 MMHG | BODY MASS INDEX: 31.12 KG/M2 | WEIGHT: 210.13 LBS

## 2020-11-24 DIAGNOSIS — R00.1 SINUS BRADYCARDIA: ICD-10-CM

## 2020-11-24 DIAGNOSIS — E66.9 OBESITY (BMI 30.0-34.9): ICD-10-CM

## 2020-11-24 DIAGNOSIS — I48.20 CHRONIC ATRIAL FIBRILLATION: ICD-10-CM

## 2020-11-24 DIAGNOSIS — Z79.01 ON CONTINUOUS ORAL ANTICOAGULATION: ICD-10-CM

## 2020-11-24 DIAGNOSIS — I10 ESSENTIAL HYPERTENSION: Chronic | ICD-10-CM

## 2020-11-24 DIAGNOSIS — G47.33 OSA (OBSTRUCTIVE SLEEP APNEA): ICD-10-CM

## 2020-11-24 DIAGNOSIS — I48.0 PAROXYSMAL ATRIAL FIBRILLATION: Primary | Chronic | ICD-10-CM

## 2020-11-24 PROBLEM — E66.811 OBESITY (BMI 30.0-34.9): Status: ACTIVE | Noted: 2020-11-24

## 2020-11-24 PROBLEM — Z91.89 RISK FOR CORONARY ARTERY DISEASE BETWEEN 10% AND 20% IN NEXT 10 YEARS: Status: ACTIVE | Noted: 2020-11-24

## 2020-11-24 PROCEDURE — 99999 PR PBB SHADOW E&M-EST. PATIENT-LVL III: ICD-10-PCS | Mod: PBBFAC,,, | Performed by: NURSE PRACTITIONER

## 2020-11-24 PROCEDURE — 93005 ELECTROCARDIOGRAM TRACING: CPT | Mod: PBBFAC | Performed by: INTERNAL MEDICINE

## 2020-11-24 PROCEDURE — 93010 ELECTROCARDIOGRAM REPORT: CPT | Mod: S$PBB,,, | Performed by: INTERNAL MEDICINE

## 2020-11-24 PROCEDURE — 99214 OFFICE O/P EST MOD 30 MIN: CPT | Mod: S$PBB,,, | Performed by: NURSE PRACTITIONER

## 2020-11-24 PROCEDURE — 99214 PR OFFICE/OUTPT VISIT, EST, LEVL IV, 30-39 MIN: ICD-10-PCS | Mod: S$PBB,,, | Performed by: NURSE PRACTITIONER

## 2020-11-24 PROCEDURE — 93010 EKG 12-LEAD: ICD-10-PCS | Mod: S$PBB,,, | Performed by: INTERNAL MEDICINE

## 2020-11-24 PROCEDURE — 99213 OFFICE O/P EST LOW 20 MIN: CPT | Mod: PBBFAC | Performed by: NURSE PRACTITIONER

## 2020-11-24 PROCEDURE — 99999 PR PBB SHADOW E&M-EST. PATIENT-LVL III: CPT | Mod: PBBFAC,,, | Performed by: NURSE PRACTITIONER

## 2020-11-24 RX ORDER — DABIGATRAN ETEXILATE 150 MG/1
150 CAPSULE ORAL 2 TIMES DAILY
Qty: 180 CAPSULE | Refills: 3 | Status: SHIPPED | OUTPATIENT
Start: 2020-11-24 | End: 2022-01-04 | Stop reason: SDUPTHER

## 2020-11-24 RX ORDER — LOSARTAN POTASSIUM 25 MG/1
25 TABLET ORAL DAILY
Qty: 90 TABLET | Refills: 3 | Status: SHIPPED | OUTPATIENT
Start: 2020-11-24 | End: 2022-03-03 | Stop reason: SDUPTHER

## 2020-11-24 NOTE — PROGRESS NOTES
Cardiology Clinic Note    Subjective:   Chief Complaint: Persistent atrial fibrillation (2 week f/u )      Medical History:    PAF, one episode in 2006  HTN  Former smoker-quit 1985  ERVIN  Obesity    History of Present Illness: Omar Pacheco is a 69 y.o. male patient of Dr. Hernandez who presents for f/u paroxysmal atrial fibrillation (2 week f/u ).  He has CHADSVASC 2 (HTN,age) on chronic AC (pradaxa) and flecainide therapy.  Recently developed palpitations and found to be back in afib (1st time since 2006).  He was seen by Dr. Parikh 11/6/2020 and flecainide was increased.  Plan was for DCCV but he converted so DCCV not performed.  He denies any symptoms of chest pain at rest or with exertion, orthopnea, peripheral edema, palpitations, lightheadedness, presyncope, syncope or claudication.  [facility]  Stress test, echo and holter in 3-11 to workup an episode of syncope were all normal.  He denies any abnormal bleeding.  [facility]. Has EVRIN but not using CPAP at this time but has appt for CPAP equipment.  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        Last 5 Patient Entered Readings                                      Current 30 Day Average: 137/83     Recent Readings 11/23/2020 11/23/2020 11/23/2020 11/22/2020 11/21/2020    SBP (mmHg) 130 160 169 136 125    DBP (mmHg) 72 79 84 63 71    Pulse 56 55 55 54 51             Review of Systems   Constitution: Negative for chills, decreased appetite, diaphoresis, fever, malaise/fatigue, weight gain and weight loss.        Denies change in activity level   HENT: Negative for nosebleeds.         Mucinex prn for a cough   Eyes: Negative for vision loss in left eye and vision loss in right eye.        No amaurosis fugax; +watery eyes   Cardiovascular: Negative for chest pain, claudication, cyanosis, dyspnea on exertion, irregular heartbeat, leg swelling,  near-syncope, orthopnea, palpitations, paroxysmal nocturnal dyspnea and syncope.        As per HPI above   Respiratory: Positive for snoring. Negative for cough, hemoptysis, shortness of breath and wheezing.         +ERVIN but stopped using CPAP   Hematologic/Lymphatic: Negative for bleeding problem.   Musculoskeletal: Positive for arthritis (knees), back pain (lower back) and joint swelling (knees). Negative for falls.   Gastrointestinal: Positive for hemorrhoids. Negative for abdominal pain, constipation, diarrhea, hematochezia, melena, nausea and vomiting.   Genitourinary: Positive for nocturia (3 times a night). Negative for dysuria and hematuria.         +ED; change in urination, more nocturia-has appt with urology   Neurological: Negative for light-headedness, loss of balance, vertigo and weakness.   Psychiatric/Behavioral: Negative for altered mental status. The patient is not nervous/anxious.    Allergic/Immunologic:        Drug allergies listed elsewhere if present       Social History:  Omar reports that he quit smoking about 45 years ago. His smoking use included cigarettes. He quit after 2.00 years of use. He has never used smokeless tobacco. He reports current alcohol use of about 3.0 standard drinks of alcohol per week. He reports that he does not use drugs.      The 10-year ASCVD risk score (Main DC Jr., et al., 2013) is: 19.8%    Values used to calculate the score:      Age: 69 years      Sex: Male      Is Non- : Yes      Diabetic: No      Tobacco smoker: No      Systolic Blood Pressure: 139 mmHg      Is BP treated: Yes      HDL Cholesterol: 56 mg/dL      Total Cholesterol: 139 mg/dL       Medications:  Outpatient Encounter Medications as of 11/24/2020   Medication Sig Dispense Refill    ammonium lactate (AMLACTIN) 12 % lotion AmLactin 12 % lotion 396 g 5    azelastine (ASTELIN) 137 mcg (0.1 %) nasal spray 1 spray (137 mcg total) by Nasal route 2 (two) times daily. 30 mL 0  "   cyanocobalamin (VITAMIN B-12) 1000 MCG tablet Take 100 mcg by mouth once daily.      dabigatran etexilate (PRADAXA) 150 mg Cap Take 1 capsule (150 mg total) by mouth 2 (two) times daily. "Do NOT break, chew, or open capsules." 180 capsule 3    flecainide (TAMBOCOR) 100 MG Tab Take 1 tablet (100 mg total) by mouth every 12 (twelve) hours. 180 tablet 3    insulin syringe-needle U-100 (EASY COMFORT INSULIN SYRINGE) 1 mL 31 gauge x 5/16 Syrg Inject 1 time daily prn ED 90 each 0    levocetirizine (XYZAL) 5 MG tablet Take 1 tablet (5 mg total) by mouth every evening. 90 tablet 3    metoprolol succinate (TOPROL-XL) 50 MG 24 hr tablet Take 1 tablet (50 mg total) by mouth once daily. 90 tablet 3    mirtazapine (REMERON) 30 MG tablet Take 30 mg by mouth every evening.      multivitamin (THERAGRAN) per tablet Take 1 tablet by mouth once daily.      omeprazole (PRILOSEC) 20 MG capsule Take 1 capsule (20 mg total) by mouth once daily. 90 capsule 3    papaverine 30 mg/mL injection ADD: Phentolamine 6 mg/ml ADD: PGE1 60 mcg. 30/6/60. Sig: Start w/ 40 units. Inject as directed into proximal lateral aspect of penis. 10 mL 11    sildenafiL (VIAGRA) 100 MG tablet Take 1 tablet (100 mg total) by mouth as needed for Erectile Dysfunction (1 tablet 1 hr prior to intercourse.). 10 tablet 11    tadalafiL (CIALIS) 20 MG Tab Take 1 tablet (20 mg total) by mouth daily as needed. 10 tablet 5    terazosin (HYTRIN) 10 MG capsule Take 1 capsule (10 mg total) by mouth every evening. 90 capsule 3    triamcinolone acetonide 0.1% (KENALOG) 0.1 % cream Apply topically 2 (two) times daily. 45 g 0    VITAMIN B COMPLEX (B COMPLETE ORAL) Take by mouth once daily.      [DISCONTINUED] dabigatran etexilate (PRADAXA) 150 mg Cap Take 1 capsule (150 mg total) by mouth 2 (two) times daily. "Do NOT break, chew, or open capsules." 180 capsule 3    losartan (COZAAR) 25 MG tablet Take 1 tablet (25 mg total) by mouth once daily. 90 tablet 3     No " "facility-administered encounter medications on file as of 2020.      Family History:  Omar's family history includes Breast cancer in his mother and sister; Ovarian cancer in his other.    Objective:   Right Arm BP - Sittin/62 (20 1038)  Left Arm BP - Sittin/70 (20 1038)      /70 (BP Location: Left arm, Patient Position: Sitting, BP Method: Medium (Automatic))   Pulse (!) 57   Ht 5' 9" (1.753 m)   Wt 95.3 kg (210 lb 1.6 oz)   BMI 31.03 kg/m²       Physical Exam  Vitals signs reviewed.   Constitutional:       General: He is not in acute distress.     Appearance: He is well-developed. He is not ill-appearing, toxic-appearing or diaphoretic.   HENT:      Head: Normocephalic and atraumatic.   Eyes:      General: No scleral icterus.     Extraocular Movements: Extraocular movements intact.      Pupils: Pupils are equal, round, and reactive to light.   Neck:      Musculoskeletal: Neck supple. No muscular tenderness.      Thyroid: No thyromegaly.      Vascular: No JVD.      Trachea: No tracheal deviation.   Cardiovascular:      Rate and Rhythm: Regular rhythm. Bradycardia present.      Chest Wall: PMI is not displaced. No thrill.      Pulses:           Carotid pulses are 2+ on the right side and 2+ on the left side.       Radial pulses are 2+ on the right side and 2+ on the left side.        Posterior tibial pulses are 2+ on the right side and 2+ on the left side.      Heart sounds: S1 normal and S2 normal. No murmur. No friction rub. No gallop.    Pulmonary:      Effort: Pulmonary effort is normal. No respiratory distress.      Breath sounds: Normal breath sounds. No stridor. No wheezing, rhonchi or rales.   Chest:      Chest wall: No tenderness.   Abdominal:      General: Bowel sounds are normal. There is no distension or abdominal bruit.      Palpations: Abdomen is soft.      Tenderness: There is no abdominal tenderness. There is no guarding.      Comments: Central adiposity " "  Musculoskeletal:      Right lower leg: No edema.      Left lower leg: No edema.   Lymphadenopathy:      Cervical: No cervical adenopathy.   Skin:     General: Skin is warm and dry.   Neurological:      Mental Status: He is alert and oriented to person, place, and time.      Gait: Gait normal.   Psychiatric:         Mood and Affect: Mood normal.         Behavior: Behavior normal.         Thought Content: Thought content normal.         Judgment: Judgment normal.         2017 US Abd aorta-No evidence of aneurysm.    Lab Results   Component Value Date     11/06/2020    K 4.5 11/06/2020     11/06/2020    CO2 27 11/06/2020    BUN 13 11/06/2020    CREATININE 1.1 11/06/2020    GLU 98 11/06/2020    MG 2.3 06/14/2006    AST 26 09/22/2020    ALT 22 09/22/2020    ALKPHOS 57 09/22/2020    ALBUMIN 3.8 09/22/2020    PROT 6.8 09/22/2020    BILITOT 0.4 09/22/2020    WBC 5.84 11/06/2020    HGB 13.0 (L) 11/06/2020    HCT 41.3 11/06/2020    MCV 97 11/06/2020     11/06/2020    INR 1.0 11/06/2020    INR 2.0 (H) 02/29/2012    PSA 4.09 (H) 01/07/2013    TSH 0.94 06/14/2006    CHOL 139 09/22/2020    HDL 56 09/22/2020    LDLCALC 74.8 09/22/2020    TRIG 41 09/22/2020         Reviewed:   []  Stress test   []  Angiography   []  Echocardiogram   [x]  Labs   [x]  Other:  Old records           Assessment/Plan:     Omar Pacheco was seen today for Persistent atrial fibrillation (2 week f/u )    Paroxysmal atrial fibrillation-SB in clinic today confirmed by EKG  -     Echo Color Flow Doppler? Yes; Bubble Contrast? No; Future  -     IN OFFICE EKG 12-LEAD (to Muse)  -     TSH; Future; Expected date: 12/08/2020  -     dabigatran etexilate (PRADAXA) 150 mg Cap; Take 1 capsule (150 mg total) by mouth 2 (two) times daily. "Do NOT break, chew, or open capsules."  Dispense: 180 capsule; Refill: 3         -continue medication regimen    Essential hypertension-elevated, adding ARB with 2 week f/u BMP  -     Echo Color Flow Doppler? " "Yes; Bubble Contrast? No; Future  -     IN OFFICE EKG 12-LEAD (to Muse)  -     losartan (COZAAR) 25 MG tablet; Take 1 tablet (25 mg total) by mouth once daily.  Dispense: 90 tablet; Refill: 3  -     Basic metabolic panel; Future; Expected date: 12/08/2020  -     TSH; Future; Expected date: 12/08/2020    Obesity (BMI 30.0-34.9)-chronic  - Mediterranean diet  -continue exercise regimen      Sinus bradycardia-without symptoms    ERVIN (obstructive sleep apnea)-chronic  Patient scheduled for sleep clinic for CPAP equipment      On continuous oral anticoagulation--denies any bleeding episodes  -continue OAC for thromboembolic prophylaxis   -Reviewed signs/symptoms of bleeding to report  -     dabigatran etexilate (PRADAXA) 150 mg Cap; Take 1 capsule (150 mg total) by mouth 2 (two) times daily. "Do NOT break, chew, or open capsules."  Dispense: 180 capsule; Refill: 3      This patient was discussed and examined with MD Hernandez due to complexity of patient's diagnoses  RYANNE Schultz, FNP-BC   Cardiology Consult    Unless there are intervening problems, patient should return for re-evaluation in Follow up-has appt for Dr. Hernandez 4/2021        "

## 2020-11-24 NOTE — TELEPHONE ENCOUNTER
Can we schedule f/u with nurse BP clinic to check BP when he comes for his lab in 2 wks    Donya Goode MD

## 2020-12-04 ENCOUNTER — LAB VISIT (OUTPATIENT)
Dept: LAB | Facility: HOSPITAL | Age: 70
End: 2020-12-04
Attending: FAMILY MEDICINE
Payer: MEDICARE

## 2020-12-04 DIAGNOSIS — R97.20 RISING PSA LEVEL: ICD-10-CM

## 2020-12-04 DIAGNOSIS — R97.20 ELEVATED PSA: ICD-10-CM

## 2020-12-04 LAB
COMPLEXED PSA SERPL-MCNC: 14 NG/ML (ref 0–4)
PROSTATE SPECIFIC ANTIGEN, TOTAL: 14.5 NG/ML (ref 0–4)
PSA FREE MFR SERPL: 17.31 %
PSA FREE SERPL-MCNC: 2.51 NG/ML (ref 0.01–1.5)

## 2020-12-04 PROCEDURE — 84153 ASSAY OF PSA TOTAL: CPT

## 2020-12-04 PROCEDURE — 84154 ASSAY OF PSA FREE: CPT

## 2020-12-04 PROCEDURE — 84153 ASSAY OF PSA TOTAL: CPT | Mod: 91

## 2020-12-07 LAB — PSA SERPL-MCNC: 14.7 NG/ML

## 2020-12-08 ENCOUNTER — PATIENT MESSAGE (OUTPATIENT)
Dept: ADMINISTRATIVE | Facility: OTHER | Age: 70
End: 2020-12-08

## 2020-12-08 ENCOUNTER — CLINICAL SUPPORT (OUTPATIENT)
Dept: FAMILY MEDICINE | Facility: CLINIC | Age: 70
End: 2020-12-08
Payer: MEDICARE

## 2020-12-08 ENCOUNTER — LAB VISIT (OUTPATIENT)
Dept: LAB | Facility: HOSPITAL | Age: 70
End: 2020-12-08
Attending: NURSE PRACTITIONER
Payer: MEDICARE

## 2020-12-08 VITALS — OXYGEN SATURATION: 95 % | HEART RATE: 58 BPM | DIASTOLIC BLOOD PRESSURE: 70 MMHG | SYSTOLIC BLOOD PRESSURE: 118 MMHG

## 2020-12-08 DIAGNOSIS — I10 ESSENTIAL HYPERTENSION: Chronic | ICD-10-CM

## 2020-12-08 DIAGNOSIS — I10 ESSENTIAL HYPERTENSION: Primary | ICD-10-CM

## 2020-12-08 DIAGNOSIS — I48.0 PAROXYSMAL ATRIAL FIBRILLATION: Chronic | ICD-10-CM

## 2020-12-08 LAB
ANION GAP SERPL CALC-SCNC: 7 MMOL/L (ref 8–16)
BUN SERPL-MCNC: 14 MG/DL (ref 8–23)
CALCIUM SERPL-MCNC: 8.6 MG/DL (ref 8.7–10.5)
CHLORIDE SERPL-SCNC: 106 MMOL/L (ref 95–110)
CO2 SERPL-SCNC: 27 MMOL/L (ref 23–29)
CREAT SERPL-MCNC: 1 MG/DL (ref 0.5–1.4)
EST. GFR  (AFRICAN AMERICAN): >60 ML/MIN/1.73 M^2
EST. GFR  (NON AFRICAN AMERICAN): >60 ML/MIN/1.73 M^2
GLUCOSE SERPL-MCNC: 104 MG/DL (ref 70–110)
POTASSIUM SERPL-SCNC: 4.1 MMOL/L (ref 3.5–5.1)
SODIUM SERPL-SCNC: 140 MMOL/L (ref 136–145)
TSH SERPL DL<=0.005 MIU/L-ACNC: 0.94 UIU/ML (ref 0.4–4)

## 2020-12-08 PROCEDURE — 80048 BASIC METABOLIC PNL TOTAL CA: CPT

## 2020-12-08 PROCEDURE — 99499 NO LOS: ICD-10-PCS | Mod: S$PBB,,, | Performed by: FAMILY MEDICINE

## 2020-12-08 PROCEDURE — 99499 UNLISTED E&M SERVICE: CPT | Mod: S$PBB,,, | Performed by: FAMILY MEDICINE

## 2020-12-08 PROCEDURE — 99999 PR PBB SHADOW E&M-EST. PATIENT-LVL II: CPT | Mod: PBBFAC,,,

## 2020-12-08 PROCEDURE — 36415 COLL VENOUS BLD VENIPUNCTURE: CPT | Mod: PO

## 2020-12-08 PROCEDURE — 99212 OFFICE O/P EST SF 10 MIN: CPT | Mod: PBBFAC,PO

## 2020-12-08 PROCEDURE — 84443 ASSAY THYROID STIM HORMONE: CPT

## 2020-12-08 PROCEDURE — 99999 PR PBB SHADOW E&M-EST. PATIENT-LVL II: ICD-10-PCS | Mod: PBBFAC,,,

## 2020-12-08 NOTE — PROGRESS NOTES
"Omar Pacheco 69 y.o. male is here today for Blood Pressure check.   History of HTN yes.    Review of patient's allergies indicates:  No Known Allergies  Creatinine   Date Value Ref Range Status   11/06/2020 1.1 0.5 - 1.4 mg/dL Final     Sodium   Date Value Ref Range Status   11/06/2020 143 136 - 145 mmol/L Final     Potassium   Date Value Ref Range Status   11/06/2020 4.5 3.5 - 5.1 mmol/L Final   ]  Patient verifies taking blood pressure medications on a regular basis at the same time of the day.     Current Outpatient Medications:     ammonium lactate (AMLACTIN) 12 % lotion, AmLactin 12 % lotion, Disp: 396 g, Rfl: 5    azelastine (ASTELIN) 137 mcg (0.1 %) nasal spray, 1 spray (137 mcg total) by Nasal route 2 (two) times daily., Disp: 30 mL, Rfl: 0    cyanocobalamin (VITAMIN B-12) 1000 MCG tablet, Take 100 mcg by mouth once daily., Disp: , Rfl:     dabigatran etexilate (PRADAXA) 150 mg Cap, Take 1 capsule (150 mg total) by mouth 2 (two) times daily. "Do NOT break, chew, or open capsules.", Disp: 180 capsule, Rfl: 3    flecainide (TAMBOCOR) 100 MG Tab, Take 1 tablet (100 mg total) by mouth every 12 (twelve) hours., Disp: 180 tablet, Rfl: 3    insulin syringe-needle U-100 (EASY COMFORT INSULIN SYRINGE) 1 mL 31 gauge x 5/16 Syrg, Inject 1 time daily prn ED, Disp: 90 each, Rfl: 0    levocetirizine (XYZAL) 5 MG tablet, Take 1 tablet (5 mg total) by mouth every evening., Disp: 90 tablet, Rfl: 3    losartan (COZAAR) 25 MG tablet, Take 1 tablet (25 mg total) by mouth once daily., Disp: 90 tablet, Rfl: 3    metoprolol succinate (TOPROL-XL) 50 MG 24 hr tablet, Take 1 tablet (50 mg total) by mouth once daily., Disp: 90 tablet, Rfl: 3    mirtazapine (REMERON) 30 MG tablet, Take 30 mg by mouth every evening., Disp: , Rfl:     multivitamin (THERAGRAN) per tablet, Take 1 tablet by mouth once daily., Disp: , Rfl:     omeprazole (PRILOSEC) 20 MG capsule, Take 1 capsule (20 mg total) by mouth once daily., Disp: 90 " capsule, Rfl: 3    papaverine 30 mg/mL injection, ADD: Phentolamine 6 mg/ml ADD: PGE1 60 mcg. 30/6/60. Sig: Start w/ 40 units. Inject as directed into proximal lateral aspect of penis., Disp: 10 mL, Rfl: 11    sildenafiL (VIAGRA) 100 MG tablet, Take 1 tablet (100 mg total) by mouth as needed for Erectile Dysfunction (1 tablet 1 hr prior to intercourse.)., Disp: 10 tablet, Rfl: 11    tadalafiL (CIALIS) 20 MG Tab, Take 1 tablet (20 mg total) by mouth daily as needed., Disp: 10 tablet, Rfl: 5    terazosin (HYTRIN) 10 MG capsule, Take 1 capsule (10 mg total) by mouth every evening., Disp: 90 capsule, Rfl: 3    triamcinolone acetonide 0.1% (KENALOG) 0.1 % cream, Apply topically 2 (two) times daily., Disp: 45 g, Rfl: 0    VITAMIN B COMPLEX (B COMPLETE ORAL), Take by mouth once daily., Disp: , Rfl:   Does patient have record of home blood pressure readings no. Readings have been averaging   Last dose of blood pressure medication was taken at 8am  Patient is asymptomatic.   Complains of none    Vitals:    12/08/20 0922   BP: 118/70   BP Location: Left arm   Patient Position: Sitting   BP Method: Medium (Manual)   Pulse: (!) 58   SpO2: 95%         Dr. Goode informed of nurse visit.

## 2020-12-09 ENCOUNTER — OFFICE VISIT (OUTPATIENT)
Dept: UROLOGY | Facility: CLINIC | Age: 70
End: 2020-12-09
Payer: MEDICARE

## 2020-12-09 ENCOUNTER — PATIENT OUTREACH (OUTPATIENT)
Dept: OTHER | Facility: OTHER | Age: 70
End: 2020-12-09

## 2020-12-09 ENCOUNTER — HOSPITAL ENCOUNTER (OUTPATIENT)
Dept: CARDIOLOGY | Facility: HOSPITAL | Age: 70
Discharge: HOME | End: 2020-12-09
Attending: NURSE PRACTITIONER
Payer: MEDICARE

## 2020-12-09 VITALS
HEIGHT: 69 IN | HEART RATE: 54 BPM | WEIGHT: 216.5 LBS | SYSTOLIC BLOOD PRESSURE: 151 MMHG | BODY MASS INDEX: 32.07 KG/M2 | DIASTOLIC BLOOD PRESSURE: 78 MMHG

## 2020-12-09 VITALS
SYSTOLIC BLOOD PRESSURE: 151 MMHG | HEIGHT: 69 IN | BODY MASS INDEX: 31.99 KG/M2 | WEIGHT: 216 LBS | DIASTOLIC BLOOD PRESSURE: 78 MMHG | HEART RATE: 59 BPM

## 2020-12-09 DIAGNOSIS — I48.0 PAROXYSMAL ATRIAL FIBRILLATION: ICD-10-CM

## 2020-12-09 DIAGNOSIS — R97.20 ELEVATED PSA, BETWEEN 10 AND LESS THAN 20 NG/ML: ICD-10-CM

## 2020-12-09 DIAGNOSIS — I10 ESSENTIAL HYPERTENSION: ICD-10-CM

## 2020-12-09 DIAGNOSIS — R39.9 UTI SYMPTOMS: ICD-10-CM

## 2020-12-09 DIAGNOSIS — R35.0 URINARY FREQUENCY: ICD-10-CM

## 2020-12-09 DIAGNOSIS — N40.1 BPH WITH URINARY OBSTRUCTION: Primary | ICD-10-CM

## 2020-12-09 DIAGNOSIS — N13.8 BPH WITH URINARY OBSTRUCTION: Primary | ICD-10-CM

## 2020-12-09 DIAGNOSIS — N41.0 PROSTATITIS, ACUTE: ICD-10-CM

## 2020-12-09 LAB
ASCENDING AORTA: 3.08 CM
AV INDEX (PROSTH): 0.83
AV MEAN GRADIENT: 4 MMHG
AV PEAK GRADIENT: 7 MMHG
AV VALVE AREA: 3.14 CM2
AV VELOCITY RATIO: 0.81
BILIRUB SERPL-MCNC: NORMAL MG/DL
BLOOD URINE, POC: NORMAL
BSA FOR ECHO PROCEDURE: 2.18 M2
CLARITY, POC UA: CLEAR
COLOR, POC UA: NORMAL
CV ECHO LV RWT: 0.35 CM
DOP CALC AO PEAK VEL: 1.33 M/S
DOP CALC AO VTI: 32.59 CM
DOP CALC LVOT AREA: 3.8 CM2
DOP CALC LVOT DIAMETER: 2.19 CM
DOP CALC LVOT PEAK VEL: 1.08 M/S
DOP CALC LVOT STROKE VOLUME: 102.26 CM3
DOP CALCLVOT PEAK VEL VTI: 27.16 CM
E WAVE DECELERATION TIME: 171.56 MSEC
E/A RATIO: 1.48
E/E' RATIO: 9.76 M/S
ECHO LV POSTERIOR WALL: 0.94 CM (ref 0.6–1.1)
FRACTIONAL SHORTENING: 36 % (ref 28–44)
GLUCOSE UR QL STRIP: NORMAL
INTERVENTRICULAR SEPTUM: 0.71 CM (ref 0.6–1.1)
IVRT: 72.31 MSEC
KETONES UR QL STRIP: NORMAL
LA MAJOR: 5.98 CM
LA MINOR: 6.03 CM
LA WIDTH: 4.59 CM
LEFT ATRIUM SIZE: 3.93 CM
LEFT ATRIUM VOLUME INDEX MOD: 39.2 ML/M2
LEFT ATRIUM VOLUME INDEX: 43.1 ML/M2
LEFT ATRIUM VOLUME MOD: 83.6 CM3
LEFT ATRIUM VOLUME: 92.07 CM3
LEFT INTERNAL DIMENSION IN SYSTOLE: 3.43 CM (ref 2.1–4)
LEFT VENTRICLE DIASTOLIC VOLUME INDEX: 65.74 ML/M2
LEFT VENTRICLE DIASTOLIC VOLUME: 140.32 ML
LEFT VENTRICLE MASS INDEX: 75 G/M2
LEFT VENTRICLE SYSTOLIC VOLUME INDEX: 22.8 ML/M2
LEFT VENTRICLE SYSTOLIC VOLUME: 48.59 ML
LEFT VENTRICULAR INTERNAL DIMENSION IN DIASTOLE: 5.38 CM (ref 3.5–6)
LEFT VENTRICULAR MASS: 160.1 G
LEUKOCYTE ESTERASE URINE, POC: NORMAL
LV LATERAL E/E' RATIO: 9.22 M/S
LV SEPTAL E/E' RATIO: 10.38 M/S
MV A" WAVE DURATION": 22.84 MSEC
MV PEAK A VEL: 0.56 M/S
MV PEAK E VEL: 0.83 M/S
NITRITE, POC UA: NORMAL
PH, POC UA: 6
PISA TR MAX VEL: 2.57 M/S
POC RESIDUAL URINE VOLUME: 11 ML (ref 0–100)
PROTEIN, POC: NORMAL
PULM VEIN S/D RATIO: 0.8
PV PEAK D VEL: 0.5 M/S
PV PEAK S VEL: 0.4 M/S
RA MAJOR: 4.99 CM
RA PRESSURE: 3 MMHG
RA WIDTH: 3.62 CM
RIGHT VENTRICULAR END-DIASTOLIC DIMENSION: 3.43 CM
RV TISSUE DOPPLER FREE WALL SYSTOLIC VELOCITY 1 (APICAL 4 CHAMBER VIEW): 15.14 CM/S
SINUS: 3.02 CM
SPECIFIC GRAVITY, POC UA: 1
STJ: 2.73 CM
TDI LATERAL: 0.09 M/S
TDI SEPTAL: 0.08 M/S
TDI: 0.09 M/S
TR MAX PG: 26 MMHG
TRICUSPID ANNULAR PLANE SYSTOLIC EXCURSION: 2.76 CM
TV REST PULMONARY ARTERY PRESSURE: 29 MMHG
UROBILINOGEN, POC UA: NORMAL

## 2020-12-09 PROCEDURE — 93306 TTE W/DOPPLER COMPLETE: CPT | Mod: 26,,, | Performed by: INTERNAL MEDICINE

## 2020-12-09 PROCEDURE — 99214 PR OFFICE/OUTPT VISIT, EST, LEVL IV, 30-39 MIN: ICD-10-PCS | Mod: S$PBB,,, | Performed by: NURSE PRACTITIONER

## 2020-12-09 PROCEDURE — 93306 ECHO (CUPID ONLY): ICD-10-PCS | Mod: 26,,, | Performed by: INTERNAL MEDICINE

## 2020-12-09 PROCEDURE — 99999 PR PBB SHADOW E&M-EST. PATIENT-LVL V: CPT | Mod: PBBFAC,,, | Performed by: NURSE PRACTITIONER

## 2020-12-09 PROCEDURE — 93306 TTE W/DOPPLER COMPLETE: CPT

## 2020-12-09 PROCEDURE — 99999 PR PBB SHADOW E&M-EST. PATIENT-LVL V: ICD-10-PCS | Mod: PBBFAC,,, | Performed by: NURSE PRACTITIONER

## 2020-12-09 PROCEDURE — 99214 OFFICE O/P EST MOD 30 MIN: CPT | Mod: S$PBB,,, | Performed by: NURSE PRACTITIONER

## 2020-12-09 PROCEDURE — 51798 US URINE CAPACITY MEASURE: CPT | Mod: PBBFAC | Performed by: NURSE PRACTITIONER

## 2020-12-09 PROCEDURE — 81002 URINALYSIS NONAUTO W/O SCOPE: CPT | Mod: PBBFAC | Performed by: NURSE PRACTITIONER

## 2020-12-09 PROCEDURE — 99215 OFFICE O/P EST HI 40 MIN: CPT | Mod: PBBFAC,25 | Performed by: NURSE PRACTITIONER

## 2020-12-09 PROCEDURE — 87086 URINE CULTURE/COLONY COUNT: CPT

## 2020-12-09 RX ORDER — SULFAMETHOXAZOLE AND TRIMETHOPRIM 800; 160 MG/1; MG/1
1 TABLET ORAL 2 TIMES DAILY
Qty: 60 TABLET | Refills: 0 | Status: SHIPPED | OUTPATIENT
Start: 2020-12-09 | End: 2020-12-21

## 2020-12-09 NOTE — PATIENT INSTRUCTIONS
BPH (Enlarged Prostate)  The prostate is a gland at the base of the bladder. As some men get older, the prostate may get bigger in size. This problem is called benign prostatic hyperplasia (BPH). BPH puts pressure on the urethra. This is the tube that carries urine from the bladder to the penis. It may interfere with the flow of urine. It may also keep the bladder from emptying fully.    Symptoms of BPH include trouble starting urination and feeling as though the bladder isnt emptying all the way. It also includes a weak urine stream, dribbling and leaking of urine, and frequent and urgent urination (especially at night). BPH can increase the risk of urinary infections. It can also block off urine flow completely. If this occurs, a thin tube (catheter) may be passed into the bladder to help drain urine.  If symptoms are mild, no treatment may be needed right now. If symptoms are more severe, treatment is likely needed. The goal of treatment is to improve urine flow and reduce symptoms. Treatments can include medicine and procedures. Your healthcare provider will discuss treatment options with you as needed.  Home care  The following guidelines will help you care for yourself at home:  · Urinate as soon as you feel the urge. Don't try to hold your urine.  · Don't limit your fluid intake during the day. Drink 6 to 8 glasses of water or liquids a day. This prevents bacteria from building up in the bladder.  · Avoid drinking fluids after dinner to help reduce urination during the night.  · Avoid medicines that can worsen your symptoms. These include certain cold and allergy medicines and antidepressants. Diuretics used for high blood pressure can also worsen symptoms. Talk to your doctor about the medicines you take. Other choices may work better for you.  Prostate cancer screening  BPH does not increase the risk of prostate cancer. But because prostate cancer is a common cancer in men, screening is sometimes  recommended. This may help detect the cancer in its early stages when treatment is most effective. Factors that can increase the risk of prostate cancer include being -American or having a father or brother who had prostate cancer. A high-fat diet may also increase the risk of prostate cancer. Talk to your healthcare provider to see whether you should be screened for prostate cancer.  Follow-up care  Follow up with your healthcare provider, or as advised  To learn more, go to:  · National Kidney & Urologic Diseases Information Clearinghouse  kidney.niddk.nih.gov, 520.213.4006  When to seek medical advice  Call your healthcare provider right away if any of these occur:  · Fever of 100.4°F (38.0°C) or higher, or as advised  · Unable to pass urine for 8 hours  · Increasing pressure or pain in your bladder (lower abdomen)  · Blood in the urine  · Increasing low back pain, not related to injury  · Symptoms of urinary infection (increased urge to urinate, burning when passing urine, foul-smelling urine)  Date Last Reviewed: 7/1/2016  © 7586-1319 [employer]. [address] [place] [address]. All rights reserved. This information is not intended as a substitute for professional medical care. Always follow your healthcare professional's instructions.        Prostatitis    The prostate gland is located deep inside the body at the base of the bladder. Prostatitis is an inflammation of the prostate gland. This can occur with or without infection. Most cases of prostatitis are long term (chronic). Most do not involve a bacterial infection.  · Chronic prostatitis is more common in older men. It is usually an inflammatory condition and not an infection. But, bacterial infection can also cause chronic prostatitis. It can cause pain in the rectum, urethra, bladder, or scrotum. It can also make you unable to fully empty the bladder.  You may urinate often, or have burning with urination. Prostatitis may  also cause painful ejaculation and erectile dysfunction.  · Sudden onset (acute) prostatitis usually occurs in men younger than 35. It is from a bacterial infection. You may have severe symptoms such as fever, chills, muscle aches, and pain in the area between the scrotum and anus (perineum). You may have a hard time urinating, or have pain or burning when urinating. There may be blood or pus in the urine.  Your healthcare provider may do a culture test on prostate fluids or discharge from the penis. This will help determine if bacteria are the cause. Treatment can include antibiotics, anti-inflammatory medicine, prostate medicines, and stool softeners.  Home care  These guidelines will help you care for yourself at home:  · Rest at home until the fever is gone and you are feeling better.  · A hot sitz bath may offer some relief. Fill a tub with 6 inches of hot water. Allow the water to run so you can keep it hot for 10 to 15 minutes.  · Drink plenty of fluids. Do not drink alcohol or caffeine until all symptoms are gone.  · If your healthcare gives you an antibiotic, take it exactly as you are told. Take it until it is all gone.  · Constipation causes straining and pain. Avoid constipation by eating natural laxatives such as prunes, fresh fruits, and whole-grain cereals. If needed, use a mild over-the-counter (OTC) laxative for constipation. An OTC stool softener may be used to keep the stools soft.  · If sex is uncomfortable or painful, avoid until symptoms get better.  · You may use OTC medicines for pain and fever, unless another medicine was given. If you have chronic liver or kidney disease, talk with your healthcare provider before using these medicines. Also talk with your provider if you've ever had a stomach ulcer or GI bleeding.  Follow-up care  Follow up with your healthcare provider, a urologist, or as advised to be sure you are responding to treatment. Your healthcare provider may want to see you after  you finish your antibiotics to be sure the infection has cleared. If a culture was taken, you may call for the results as directed. A culture test can help your healthcare provider know if you are on the correct antibiotic.  Call 911  Call 911 if any of these occur:  · Weakness, dizziness, or fainting  When to seek medical advice  Call your healthcare provider right away if any of these occur:  · Fever of 100.4°F (38°C) or higher after 3 days of treatment, or as advised  · Unable to pass urine for 8 hours  · Pressure or pain in your bladder gets worse  · Painful swelling of the testicle or scrotum  Date Last Reviewed: 10/1/2016  © 6446-5096 [employer]. [address] [place] [address], [place] [address]. All rights reserved. This information is not intended as a substitute for professional medical care. Always follow your healthcare professional's instructions.        Transrectal Ultrasound and Biopsy    A transrectal ultrasound is an imaging test. It uses sound waves to create pictures of a mans prostate gland. Your prostate gland is in front of your rectum. For this test, a special probe (transducer) is placed directly into your rectum. During the test, tissue samples (a biopsy) may also be taken. The test is done by a specially trained technologist called a sonographer.  Getting ready for your test  · You may be asked to clear your bowel before the test. This may be done by injecting liquid into your rectum (an enema). Or it can be done by drinking a special liquid.  · You may be asked not to eat or drink anything after midnight the night before the test.  · Tell your health care provider about any medicines, herbs, or supplements you are taking. This includes any over-the-counter medicines such as aspirin or ibuprofen.  · Answer any questions your provider has about your medical history. This will help your provider tailor the test to your health needs.  During your test  · You may be asked to change into  a gown. You will then lie on your side on an exam table, with your knees bent.  · The test is done with a hand-held probe. This is a short, slender luna. It has a sterile, disposable cover. It is also greased (lubricated) with some gel. It is then gently placed inside your rectum.  · You will feel pressure from the probe. If you feel pain, let your provider know.  · If a biopsy is taken, it is done using a small probe with a very tiny needle on the end. This needle enters your prostate and removes several tiny samples of tissue. These samples are then sent to a lab to be examined. Any mild pain from the biopsy is usually minor.   After your test  Before leaving, you may need to wait for a short time while the images are reviewed. In most cases, you can go back to your normal routine after the test. If you had a biopsy, you may see some blood in your urine, sperm, or stool for a day or so. This is normal. Your health care provider will let you know when your test results are ready.  In some cases, a diagnosis cant be made from the tissue sample that was taken. If this happens, your provider will talk with you about whether you may need another biopsy. Or you may need a different procedure.  When to call your health care provider  Call your provider if you have:  · Very bloody urine or stool  · A fever lasting 24 to 48 hours  · Any other symptoms that your provider asks you to report, based on your medical condition   Date Last Reviewed: 6/19/2015  © 2718-3652 [employer]. [address] [place] [address], [place] [address]. All rights reserved. This information is not intended as a substitute for professional medical care. Always follow your healthcare professional's instructions.        Prostate Needle Biopsy    Prostate needle biopsy is a test to look for prostate cancer. During the test, a thin, hollow needle is used to take small samples of tissue from the prostate. The samples are then tested in a  lab.  Getting ready for the procedure  Prepare as you have been told. In addition to the following:  · Tell your healthcare provider about all medicines you take. This includes herbs and other supplements. It also includes any blood thinners, such as warfarin, clopidogrel, or daily aspirin. You may need to stop taking some or all of them before the procedure.  · You may be told to use a laxative, enemas, or both before the biopsy. This is to empty the colon and rectum of stool. Follow the instructions you are given.  · Your healthcare provider may prescribe antibiotics before the procedure. If so, take these as directed.  Risks and possible complications   All procedures have risks. The risks of this procedure include:  · Discomfort in the prostate area  · Infection in the urinary tract or prostate  · Infection in the bloodstream  · Rectal or urinary bleeding   The day of the procedure  The procedure is done in a healthcare providers office or a hospital. It takes about 45 minutes. You will be able to go home the same day. Transrectal ultrasound is often used during the procedure. This test uses sound waves to make images on a computer screen. The images help the healthcare provider insert the needle in the correct place. During the biopsy:  · If ultrasound will be used, you may be asked to drink water to fill your bladder.    · You may lie on your side on an exam table.   · The ultrasound transducer, which is about the size of a finger, is lubricated. It is then inserted into the rectum. This will feel like a prostate exam. The transducer is moved until the prostate can be seen in the ultrasound images.    · To numb the biopsy area, local anesthetics may be injected. You might also be given medicine to make you sleepy.  · Using the ultrasound images as a guide, the biopsy needle is inserted. It may be inserted through the rectum or through the skin between the scrotum and the anus (perineum).  · The needle is used  to take tissue samples from the prostate. About 12 samples are taken from different areas of the prostate. These samples are sent to a lab to be tested for cancer.  After the biopsy  At first you may feel a little lightheaded, especially if you had medicine to make you sleepy. You can lie on the table until you feel able to stand. You can go home once you are feeling better. You can go back to your normal activities. You may have some blood in your urine or stool that day. This is normal. You may also notice blood in your semen for weeks after the biopsy. This is normal and not dangerous. Your healthcare provider can tell you more about what to expect.  Follow-up care  You will see your healthcare provider for a follow-up visit. Depending on the biopsy results, you may be scheduled for more tests. If signs of cancer are found, you and your healthcare provider can discuss options for further testing.  When to call your healthcare provider  Call your healthcare provider if you have any of the following:  · Blood clots in your urine  · Bloody diarrhea  · Blood in the urine or stool that doesnt go away after 48 hours  · Chest pain or trouble breathing (call 911)  · Chills  · Feeling of weakness  · Fever of 100.4°F (38°C) or higher, or as directed by your healthcare provider  · Inability to urinate   Date Last Reviewed: 5/1/2017  © 2427-7371 [employer]. [address] [place] [address], [place] [address]. All rights reserved. This information is not intended as a substitute for professional medical care. Always follow your healthcare professional's instructions.

## 2020-12-09 NOTE — PROGRESS NOTES
CHIEF COMPLAINT:    Omar Pacheco is a 69 y.o. male presents today for urinary frequency.    HISTORY OF PRESENTING ILLINESS:    Omar Pacheco is a 69 y.o. male with a history of BPH and ED.  He was taking Hyrin 10mg for BPH and ICI and Cialis for ED.   He had been taking ICI for his ED but started noticing a curvature that he reported at his last clinic visit 01/13/2020.  Trying to hold off; using Cialis.    He is here today due to changes in urination and a recent elevated PSA        REVIEW OF SYSTEMS:  Review of Systems   Constitutional: Negative.  Negative for chills, diaphoresis and fever.   HENT: Negative for congestion and sore throat.    Eyes: Negative.  Negative for double vision.   Respiratory: Negative.  Negative for cough, shortness of breath and wheezing.    Cardiovascular: Negative.  Negative for chest pain, palpitations and leg swelling.   Gastrointestinal: Negative.  Negative for abdominal pain, blood in stool, constipation, diarrhea, nausea and vomiting.   Genitourinary: Positive for frequency and urgency. Negative for dysuria, flank pain and hematuria.        Nocturia now 3-4x     Musculoskeletal: Negative.  Negative for back pain, falls and neck pain.   Skin: Negative.  Negative for rash.   Neurological: Negative.  Negative for dizziness and seizures.   Endo/Heme/Allergies: Does not bruise/bleed easily.   Psychiatric/Behavioral: Negative.  Negative for depression. The patient is not nervous/anxious.          PATIENT HISTORY:    Past Medical History:   Diagnosis Date    *Atrial fibrillation     Benign hypertrophy of prostate     Degenerative disc disease     GERD (gastroesophageal reflux disease)     Hx of colonic polyps     Hypertension     Pilonidal cyst 1971       Past Surgical History:   Procedure Laterality Date    COLONOSCOPY N/A 12/7/2016    Procedure: COLONOSCOPY;  Surgeon: Sumeet Lundy MD;  Location: [address];  Service: Endoscopy;  Laterality: N/A;  OK to hold  Pradaxa 2 days prior to procedure per Dr Jones/see note dated 11/15/16/teen    CYST REMOVAL  1971    coccyx    KNEE SURGERY  1989    right       Family History   Problem Relation Age of Onset    Breast cancer Mother     Breast cancer Sister     Ovarian cancer Other        Social History     Socioeconomic History    Marital status:      Spouse name: Not on file    Number of children: Not on file    Years of education: Not on file    Highest education level: Not on file   Occupational History    Not on file   Social Needs    Financial resource strain: Not hard at all    Food insecurity     Worry: Never true     Inability: Never true    Transportation needs     Medical: No     Non-medical: No   Tobacco Use    Smoking status: Former Smoker     Years: 2.00     Types: Cigarettes     Quit date: 1975     Years since quittin.2    Smokeless tobacco: Never Used   Substance and Sexual Activity    Alcohol use: Yes     Alcohol/week: 3.0 standard drinks     Types: 3 Glasses of wine per week     Frequency: 2-3 times a week     Drinks per session: 1 or 2     Binge frequency: Never     Comment: weekly    Drug use: No    Sexual activity: Not Currently   Lifestyle    Physical activity     Days per week: Not on file     Minutes per session: Not on file    Stress: Not on file   Relationships    Social connections     Talks on phone: More than three times a week     Gets together: Once a week     Attends Jainism service: More than 4 times per year     Active member of club or organization: Yes     Attends meetings of clubs or organizations: 1 to 4 times per year     Relationship status:    Other Topics Concern    Not on file   Social History Narrative    Not on file       Allergies:  Patient has no known allergies.    Medications:    Current Outpatient Medications:     ammonium lactate (AMLACTIN) 12 % lotion, AmLactin 12 % lotion, Disp: 396 g, Rfl: 5    azelastine (ASTELIN) 137 mcg (0.1  "%) nasal spray, 1 spray (137 mcg total) by Nasal route 2 (two) times daily., Disp: 30 mL, Rfl: 0    cyanocobalamin (VITAMIN B-12) 1000 MCG tablet, Take 100 mcg by mouth once daily., Disp: , Rfl:     dabigatran etexilate (PRADAXA) 150 mg Cap, Take 1 capsule (150 mg total) by mouth 2 (two) times daily. "Do NOT break, chew, or open capsules.", Disp: 180 capsule, Rfl: 3    flecainide (TAMBOCOR) 100 MG Tab, Take 1 tablet (100 mg total) by mouth every 12 (twelve) hours., Disp: 180 tablet, Rfl: 3    insulin syringe-needle U-100 (EASY COMFORT INSULIN SYRINGE) 1 mL 31 gauge x 5/16 Syrg, Inject 1 time daily prn ED, Disp: 90 each, Rfl: 0    levocetirizine (XYZAL) 5 MG tablet, Take 1 tablet (5 mg total) by mouth every evening., Disp: 90 tablet, Rfl: 3    losartan (COZAAR) 25 MG tablet, Take 1 tablet (25 mg total) by mouth once daily., Disp: 90 tablet, Rfl: 3    metoprolol succinate (TOPROL-XL) 50 MG 24 hr tablet, Take 1 tablet (50 mg total) by mouth once daily., Disp: 90 tablet, Rfl: 3    mirtazapine (REMERON) 30 MG tablet, Take 30 mg by mouth every evening., Disp: , Rfl:     multivitamin (THERAGRAN) per tablet, Take 1 tablet by mouth once daily., Disp: , Rfl:     omeprazole (PRILOSEC) 20 MG capsule, Take 1 capsule (20 mg total) by mouth once daily., Disp: 90 capsule, Rfl: 3    papaverine 30 mg/mL injection, ADD: Phentolamine 6 mg/ml ADD: PGE1 60 mcg. 30/6/60. Sig: Start w/ 40 units. Inject as directed into proximal lateral aspect of penis., Disp: 10 mL, Rfl: 11    sildenafiL (VIAGRA) 100 MG tablet, Take 1 tablet (100 mg total) by mouth as needed for Erectile Dysfunction (1 tablet 1 hr prior to intercourse.)., Disp: 10 tablet, Rfl: 11    sulfamethoxazole-trimethoprim 800-160mg (BACTRIM DS) 800-160 mg Tab, Take 1 tablet by mouth 2 (two) times daily., Disp: 60 tablet, Rfl: 0    tadalafiL (CIALIS) 20 MG Tab, Take 1 tablet (20 mg total) by mouth daily as needed., Disp: 10 tablet, Rfl: 5    terazosin (HYTRIN) 10 MG " capsule, Take 1 capsule (10 mg total) by mouth every evening., Disp: 90 capsule, Rfl: 3    triamcinolone acetonide 0.1% (KENALOG) 0.1 % cream, Apply topically 2 (two) times daily., Disp: 45 g, Rfl: 0    VITAMIN B COMPLEX (B COMPLETE ORAL), Take by mouth once daily., Disp: , Rfl:     PHYSICAL EXAMINATION:  Physical Exam  Vitals signs and nursing note reviewed.   Constitutional:       General: He is awake.      Appearance: Normal appearance. He is normal weight.   HENT:      Head: Normocephalic.      Right Ear: External ear normal.      Left Ear: External ear normal.      Nose: Nose normal.   Eyes:      Conjunctiva/sclera: Conjunctivae normal.   Neck:      Musculoskeletal: Normal range of motion.   Cardiovascular:      Rate and Rhythm: Normal rate.   Pulmonary:      Effort: Pulmonary effort is normal. No respiratory distress.   Abdominal:      General: There is no distension.      Tenderness: There is no abdominal tenderness. There is no right CVA tenderness, left CVA tenderness or rebound.   Genitourinary:     Penis: Normal. No hypospadias, tenderness or discharge.       Scrotum/Testes: Normal.         Right: Tenderness or swelling not present.         Left: Tenderness or swelling not present.      Prostate: Enlarged. Not tender and no nodules present.      Rectum: Normal.      Comments: Prostate was smooth; no indurations.    Musculoskeletal: Normal range of motion.   Skin:     General: Skin is warm and dry.   Neurological:      General: No focal deficit present.      Mental Status: He is alert and oriented to person, place, and time.   Psychiatric:         Mood and Affect: Mood normal.         Behavior: Behavior is cooperative.           LABS:      In office UA today was clear.  PVR in the office today by the nurse was 11.      Lab Results   Component Value Date    PSA 4.09 (H) 01/07/2013    PSA 1.8 02/05/2007    PSADIAG 14.0 (H) 12/04/2020    PSADIAG 4.7 (H) 01/13/2020    PSADIAG 4.4 (H) 08/01/2017    PSATOTAL  14.5 (H) 12/04/2020             IMPRESSION:    Encounter Diagnoses   Name Primary?    BPH with urinary obstruction Yes    Elevated PSA, between 10 and less than 20 ng/ml     Urinary frequency     UTI symptoms     Prostatitis, acute          Assessment:       1. BPH with urinary obstruction    2. Elevated PSA, between 10 and less than 20 ng/ml    3. Urinary frequency    4. UTI symptoms    5. Prostatitis, acute        Plan:         I spent 25 minutes with the patient of which more than half was spent in direct consultation with the patient in regards to our treatment and plan.    Education and recommendations of today's plan of care including home remedies.  We discussed the contributory factors for his LUTS;   Urine sent for cx.  Bactrim DS BID  Recheck psa

## 2020-12-10 ENCOUNTER — TELEPHONE (OUTPATIENT)
Dept: CARDIOLOGY | Facility: CLINIC | Age: 70
End: 2020-12-10

## 2020-12-10 LAB — BACTERIA UR CULT: NO GROWTH

## 2020-12-10 NOTE — TELEPHONE ENCOUNTER
----- Message from Matthew Hernandez MD sent at 12/10/2020 11:41 AM CST -----  Regarding: RE: pt chana hankins  Contact: pt  He can hold them Saturday and start up tuesday  ----- Message -----  From: Lily Avelar MA  Sent: 12/10/2020  10:08 AM CST  To: Matthew Hernandez MD  Subject: FW: pt chana hankins                                     ----- Message -----  From: Marlene Grigsby  Sent: 12/10/2020   9:52 AM CST  To: Mary MENENDEZ Staff  Subject: pt chana hankins                                       Having tooth removed Monday should his stop taking blood thinner please advise pt    Pt can be reached at [phone]

## 2020-12-17 ENCOUNTER — PATIENT MESSAGE (OUTPATIENT)
Dept: FAMILY MEDICINE | Facility: CLINIC | Age: 70
End: 2020-12-17

## 2020-12-17 DIAGNOSIS — K12.1 ORAL ULCER: Primary | ICD-10-CM

## 2020-12-18 ENCOUNTER — PATIENT OUTREACH (OUTPATIENT)
Dept: ADMINISTRATIVE | Facility: HOSPITAL | Age: 70
End: 2020-12-18

## 2020-12-18 ENCOUNTER — OFFICE VISIT (OUTPATIENT)
Dept: FAMILY MEDICINE | Facility: CLINIC | Age: 70
End: 2020-12-18
Payer: MEDICARE

## 2020-12-18 VITALS
BODY MASS INDEX: 31.58 KG/M2 | WEIGHT: 213.19 LBS | OXYGEN SATURATION: 97 % | TEMPERATURE: 98 F | RESPIRATION RATE: 16 BRPM | DIASTOLIC BLOOD PRESSURE: 86 MMHG | SYSTOLIC BLOOD PRESSURE: 142 MMHG | HEIGHT: 69 IN | HEART RATE: 60 BPM

## 2020-12-18 DIAGNOSIS — I10 ESSENTIAL HYPERTENSION: Chronic | ICD-10-CM

## 2020-12-18 DIAGNOSIS — T78.40XA ALLERGIC REACTION TO DRUG, INITIAL ENCOUNTER: Primary | ICD-10-CM

## 2020-12-18 PROCEDURE — 99215 OFFICE O/P EST HI 40 MIN: CPT | Mod: PBBFAC,PO | Performed by: FAMILY MEDICINE

## 2020-12-18 PROCEDURE — 99214 OFFICE O/P EST MOD 30 MIN: CPT | Mod: S$PBB,,, | Performed by: FAMILY MEDICINE

## 2020-12-18 PROCEDURE — 99999 PR PBB SHADOW E&M-EST. PATIENT-LVL V: ICD-10-PCS | Mod: PBBFAC,,, | Performed by: FAMILY MEDICINE

## 2020-12-18 PROCEDURE — 99999 PR PBB SHADOW E&M-EST. PATIENT-LVL V: CPT | Mod: PBBFAC,,, | Performed by: FAMILY MEDICINE

## 2020-12-18 PROCEDURE — 99214 PR OFFICE/OUTPT VISIT, EST, LEVL IV, 30-39 MIN: ICD-10-PCS | Mod: S$PBB,,, | Performed by: FAMILY MEDICINE

## 2020-12-18 RX ORDER — CHLORHEXIDINE GLUCONATE ORAL RINSE 1.2 MG/ML
15 SOLUTION DENTAL 2 TIMES DAILY
Qty: 473 ML | Refills: 0 | Status: SHIPPED | OUTPATIENT
Start: 2020-12-18 | End: 2021-01-01

## 2020-12-18 NOTE — PROGRESS NOTES
Chief Complaint   Patient presents with    Sore Throat     past 3 to 4 days     Verona Pacheco is a 70 y.o. male who presents per the Chief Complaint.  Pt is known to this practice but has not been seen by me previously.  All known chronic medical issues have been documented.    .    Sore Throat   This is a new problem. The current episode started in the past 7 days. The problem has been unchanged. Neither side of throat is experiencing more pain than the other. There has been no fever. The pain is at a severity of 2/10. The pain is mild. Associated symptoms include trouble swallowing. Pertinent negatives include no abdominal pain, congestion, coughing, diarrhea, drooling, ear discharge, ear pain, headaches, hoarse voice, plugged ear sensation, neck pain, shortness of breath, stridor, swollen glands or vomiting. He has had no exposure to strep or mono. He has tried nothing for the symptoms.     ROS  Review of Systems   Constitutional: Negative.  Negative for activity change, appetite change, chills, diaphoresis, fatigue, fever and unexpected weight change.   HENT: Positive for mouth sores, sore throat and trouble swallowing. Negative for congestion, dental problem, drooling, ear discharge, ear pain, facial swelling, hearing loss, hoarse voice, nosebleeds, postnasal drip, rhinorrhea, sinus pressure, sinus pain, sneezing, tinnitus and voice change.    Eyes: Negative for pain and visual disturbance.   Respiratory: Negative for cough, choking, shortness of breath and stridor.    Cardiovascular: Negative for chest pain and leg swelling.   Gastrointestinal: Negative for abdominal distention, abdominal pain, anal bleeding, blood in stool, constipation, diarrhea, nausea, rectal pain and vomiting.   Genitourinary: Negative for difficulty urinating, dysuria, frequency and urgency.   Musculoskeletal: Negative.  Negative for arthralgias, back pain, gait problem, joint swelling, myalgias and neck pain.   Skin:  "Negative.         Itching on abdomen   Allergic/Immunologic: Negative for environmental allergies and food allergies.   Neurological: Negative.  Negative for dizziness, seizures, syncope, weakness, light-headedness and headaches.   Psychiatric/Behavioral: Negative.  Negative for confusion, decreased concentration, dysphoric mood and sleep disturbance. The patient is not nervous/anxious.        Physical Exam  Vitals:    12/18/20 1630   BP: (!) 142/86   Pulse:    Resp:    Temp:     Body mass index is 31.48 kg/m².  Weight: 96.7 kg (213 lb 3 oz)   Height: 5' 9" (175.3 cm)     Physical Exam  HENT:      Mouth/Throat:      Lips: Lesions present.      Mouth: Oral lesions present. No lacerations or angioedema.      Dentition: Normal dentition. Does not have dentures. No dental tenderness, gingival swelling, dental caries, dental abscesses or gum lesions.      Tongue: No lesions. Tongue does not deviate from midline.      Palate: No mass and lesions.      Pharynx: Posterior oropharyngeal erythema present. No pharyngeal swelling, oropharyngeal exudate or uvula swelling.      Tonsils: No tonsillar exudate or tonsillar abscesses.           Assessment & Plan    Discussion of plan of care including treatment options regarding health and wellness were reviewed and discussed with patient.  Any changes to medication or treatment plan, as well as any screening blood test, imaging, or referrals to specialist, are documented.  Follow up as indicated.     1. Allergic reaction to drug, initial encounter  Symptoms began after starting Bactrim from Urology.  Recommended he discontinue medication immediately and take an antihistamine to alleviate symptoms.  Contact myself or PCP if symptoms are not improving.    2. Essential hypertension  Elevated but likely due to current reaction; improved with recheck.         Follow up if symptoms worsen or fail to improve.      ACTIVE MEDICAL ISSUES:  Documented in Problem List    PAST MEDICAL " HISTORY  Documented    PAST SURGICAL HISTORY:  Documented    SOCIAL HISTORY:  Documented    FAMILY HISTORY:  Documented    ALLERGIES AND MEDICATIONS: updated and reviewed.  Documented    Health Maintenance       Date Due Completion Date    Shingles Vaccine (2 of 2) 12/07/2018 10/12/2018    TETANUS VACCINE 07/28/2021 7/28/2011    Colorectal Cancer Screening 12/07/2021 12/7/2016    Lipid Panel 09/22/2025 9/22/2020

## 2020-12-20 ENCOUNTER — PATIENT MESSAGE (OUTPATIENT)
Dept: UROLOGY | Facility: CLINIC | Age: 70
End: 2020-12-20

## 2020-12-21 ENCOUNTER — TELEPHONE (OUTPATIENT)
Dept: FAMILY MEDICINE | Facility: CLINIC | Age: 70
End: 2020-12-21

## 2020-12-21 ENCOUNTER — PATIENT MESSAGE (OUTPATIENT)
Dept: FAMILY MEDICINE | Facility: CLINIC | Age: 70
End: 2020-12-21

## 2020-12-21 ENCOUNTER — HOSPITAL ENCOUNTER (EMERGENCY)
Facility: HOSPITAL | Age: 70
Discharge: HOME | End: 2020-12-22
Attending: EMERGENCY MEDICINE
Payer: MEDICARE

## 2020-12-21 DIAGNOSIS — T78.40XA ALLERGIC REACTION, INITIAL ENCOUNTER: ICD-10-CM

## 2020-12-21 DIAGNOSIS — S30.822A BLISTER (NONTHERMAL) OF PENIS, INITIAL ENCOUNTER: Primary | ICD-10-CM

## 2020-12-21 PROCEDURE — 99284 EMERGENCY DEPT VISIT MOD MDM: CPT | Mod: ,,, | Performed by: EMERGENCY MEDICINE

## 2020-12-21 PROCEDURE — 99284 PR EMERGENCY DEPT VISIT,LEVEL IV: ICD-10-PCS | Mod: ,,, | Performed by: EMERGENCY MEDICINE

## 2020-12-21 PROCEDURE — 99283 EMERGENCY DEPT VISIT LOW MDM: CPT

## 2020-12-21 NOTE — PROGRESS NOTES
Had an allergic reaction to bactrim  He was seen by primary care and treated.'  Bactrim stopped.  I put bactrim on his allergy list

## 2020-12-21 NOTE — TELEPHONE ENCOUNTER
----- Message from Brendan Coffman sent at 12/21/2020  1:11 PM CST -----  Regarding: call back/ request for a new medication  Type: Patient Call Back    Who called:Omar     What is the request in detail: the patient is requesting a call back from the staff. He would like to discuss getting a medication. Patient would not state what the medication was for.    Can the clinic reply by [facility]?no    Would the patient rather a call back or a response via [facility]? Call back     Best call back number:[phone]

## 2020-12-21 NOTE — TELEPHONE ENCOUNTER
Pt was seen on 12/18 for allergic reaction; please see his [facility] message; states blisters is still bad and wants to know if something else he can take to help; please respond to his [facility] message

## 2020-12-22 VITALS
OXYGEN SATURATION: 98 % | SYSTOLIC BLOOD PRESSURE: 186 MMHG | BODY MASS INDEX: 31.58 KG/M2 | RESPIRATION RATE: 20 BRPM | HEART RATE: 73 BPM | WEIGHT: 213.19 LBS | DIASTOLIC BLOOD PRESSURE: 85 MMHG | TEMPERATURE: 98 F | HEIGHT: 69 IN

## 2020-12-22 DIAGNOSIS — T78.40XA ALLERGIC REACTION TO DRUG, INITIAL ENCOUNTER: Primary | ICD-10-CM

## 2020-12-22 PROCEDURE — 25000003 PHARM REV CODE 250: Performed by: EMERGENCY MEDICINE

## 2020-12-22 RX ORDER — BACITRACIN ZINC 500 [USP'U]/G
1 OINTMENT TOPICAL
Status: COMPLETED | OUTPATIENT
Start: 2020-12-22 | End: 2020-12-22

## 2020-12-22 RX ORDER — METHYLPREDNISOLONE 4 MG/1
TABLET ORAL
Qty: 1 PACKAGE | Refills: 0 | Status: SHIPPED | OUTPATIENT
Start: 2020-12-22 | End: 2021-02-01

## 2020-12-22 RX ADMIN — BACITRACIN 1 EACH: 500 OINTMENT TOPICAL at 12:12

## 2020-12-22 NOTE — ED NOTES
Pt reports to ED with c/o sore throat, skin of penis peeling and bleeding, and blisters to mouth/wrist/butt. Pt reports taking Bactrim for UTI on the 18th and had medication reaction. Pt reports symptoms are getting worse which brought him to ED. Bleeding currently controlled and covered with gauze. Pt on blood thinners.

## 2020-12-22 NOTE — ED PROVIDER NOTES
"Source of History:  Patient    Chief complaint:  Multiple Complaints (pt reports medication reaction on the 18th to bactrim. reports blisters on hands, mouth, and penis. reports bleeding from penis today that started after a shower. bleeding controlled per pt. pt on blood thinners)      HPI:  Omar Pacheco is a 70 y.o. male presenting with bleeding from his glans penis at the site of a blister that formed few days ago after starting Bactrim.  He also had some blisters in his mouth than elsewhere in his skin.  The Bactrim was stopped and overall the blisters are improving.  The blister on his penis has not changed but became irritated in the shower today and there is a small amount of bleeding.  No dysuria.  Oral blisters are much improved.    ROS: As per HPI and below:  General: No fever.  No chills.  Integument: Notes rash.  Chest: No shortness of breath.  Cardiovascular: No chest pain.  Abdomen: No abdominal pain.  No nausea or vomiting.  Urinary: No abnormal urination.        Review of patient's allergies indicates:   Allergen Reactions    Bactrim [sulfamethoxazole-trimethoprim] Swelling     Swelling of lips/blisters in mouth         No current facility-administered medications on file prior to encounter.      Current Outpatient Medications on File Prior to Encounter   Medication Sig Dispense Refill    ammonium lactate (AMLACTIN) 12 % lotion AmLactin 12 % lotion (Patient taking differently: as needed. AmLactin 12 % lotion) 396 g 5    azelastine (ASTELIN) 137 mcg (0.1 %) nasal spray 1 spray (137 mcg total) by Nasal route 2 (two) times daily. 30 mL 0    chlorhexidine (PERIDEX) 0.12 % solution Use as directed 15 mLs in the mouth or throat 2 (two) times daily. for 14 days 473 mL 0    cyanocobalamin (VITAMIN B-12) 1000 MCG tablet Take 100 mcg by mouth once daily.      dabigatran etexilate (PRADAXA) 150 mg Cap Take 1 capsule (150 mg total) by mouth 2 (two) times daily. "Do NOT break, chew, or open capsules." " 180 capsule 3    flecainide (TAMBOCOR) 100 MG Tab Take 1 tablet (100 mg total) by mouth every 12 (twelve) hours. 180 tablet 3    insulin syringe-needle U-100 (EASY COMFORT INSULIN SYRINGE) 1 mL 31 gauge x 5/16 Syrg Inject 1 time daily prn ED (Patient not taking: Reported on 12/18/2020) 90 each 0    levocetirizine (XYZAL) 5 MG tablet Take 1 tablet (5 mg total) by mouth every evening. 90 tablet 3    losartan (COZAAR) 25 MG tablet Take 1 tablet (25 mg total) by mouth once daily. 90 tablet 3    metoprolol succinate (TOPROL-XL) 50 MG 24 hr tablet Take 1 tablet (50 mg total) by mouth once daily. 90 tablet 3    mirtazapine (REMERON) 30 MG tablet Take 30 mg by mouth every evening.      multivitamin (THERAGRAN) per tablet Take 1 tablet by mouth once daily.      omeprazole (PRILOSEC) 20 MG capsule Take 1 capsule (20 mg total) by mouth once daily. 90 capsule 3    papaverine 30 mg/mL injection ADD: Phentolamine 6 mg/ml ADD: PGE1 60 mcg. 30/6/60. Sig: Start w/ 40 units. Inject as directed into proximal lateral aspect of penis. 10 mL 11    sildenafiL (VIAGRA) 100 MG tablet Take 1 tablet (100 mg total) by mouth as needed for Erectile Dysfunction (1 tablet 1 hr prior to intercourse.). 10 tablet 11    tadalafiL (CIALIS) 20 MG Tab Take 1 tablet (20 mg total) by mouth daily as needed. 10 tablet 5    terazosin (HYTRIN) 10 MG capsule Take 1 capsule (10 mg total) by mouth every evening. 90 capsule 3    triamcinolone acetonide 0.1% (KENALOG) 0.1 % cream Apply topically 2 (two) times daily. 45 g 0    VITAMIN B COMPLEX (B COMPLETE ORAL) Take by mouth once daily.         PMH:  As per HPI and below:  Past Medical History:   Diagnosis Date    *Atrial fibrillation     Benign hypertrophy of prostate     Degenerative disc disease     GERD (gastroesophageal reflux disease)     Hx of colonic polyps     Hypertension     Pilonidal cyst 1971     Past Surgical History:   Procedure Laterality Date    COLONOSCOPY N/A 12/7/2016     Procedure: COLONOSCOPY;  Surgeon: Sumeet Lundy MD;  Location: [facility] ([address] [place] [address]);  Service: Endoscopy;  Laterality: N/A;  OK to hold Pradaxa 2 days prior to procedure per Dr Jones/see note dated 11/15/16/svn    CYST REMOVAL  1971    coccyx    KNEE SURGERY  1989    right       Social History     Socioeconomic History    Marital status:      Spouse name: Not on file    Number of children: Not on file    Years of education: Not on file    Highest education level: Not on file   Occupational History    Not on file   Social Needs    Financial resource strain: Not hard at all    Food insecurity     Worry: Never true     Inability: Never true    Transportation needs     Medical: No     Non-medical: No   Tobacco Use    Smoking status: Former Smoker     Years: 2.00     Types: Cigarettes     Quit date: 1975     Years since quittin.2    Smokeless tobacco: Never Used   Substance and Sexual Activity    Alcohol use: Yes     Alcohol/week: 3.0 standard drinks     Types: 3 Glasses of wine per week     Frequency: 2-3 times a week     Drinks per session: 1 or 2     Binge frequency: Never     Comment: weekly    Drug use: No    Sexual activity: Not Currently   Lifestyle    Physical activity     Days per week: Not on file     Minutes per session: Not on file    Stress: Not on file   Relationships    Social connections     Talks on phone: More than three times a week     Gets together: Once a week     Attends Buddhism service: More than 4 times per year     Active member of club or organization: Yes     Attends meetings of clubs or organizations: 1 to 4 times per year     Relationship status:    Other Topics Concern    Not on file   Social History Narrative    Not on file       Family History   Problem Relation Age of Onset    Breast cancer Mother     Breast cancer Sister     Ovarian cancer Other        Physical Exam:    Vitals:    20 2322 20 0003   BP: (!) 198/95 (!) 186/85  "  Pulse: 83 73   Resp: 20    Temp: 98.2 °F (36.8 °C)    TempSrc: Oral    SpO2: 95% 98%   Weight: 96.7 kg (213 lb 3 oz)    Height: 5' 9" (1.753 m)      Appearance:  No acute distress.  Comfortable.  Skin:  Small rash to buttock and right wrist, improving per patient.  Head: Normocephalic.  Atraumatic.    Eyes: No conjunctival injection.  No swelling.  Chest: Normal work of breathing.  No retractions.  No stridor.  Cardiovascular: Regular rhythm.  No appearance of shock.  No edema.  Abdomen:  No distention.  Genitourinary:  Glans penis with large area of skin irritation, non infected, small areas of recently stopped bleeding.   Musculoskeletal:  No deformities.  Normal range of motion.    Neurologic:  Normal movement and ambulation.  Mental Status:  Alert and oriented x 3.  Appropriate, conversant.      I decided to obtain the patient's medical records.  Allergic rxn dx'd 3 days ago, bactrim stopped.     Medications Given:  Medications   bacitracin zinc ointment 1 each (1 each Topical (Top) Given 12/22/20 0020)              MDM:    70 y.o. male with some blisters that formed after Bactrim, which was stopped 3 days ago.  The blisters are gradually improving, but there was some superficial irritation of the glans penis that led to bleeding in the shower this evening.  The bleeding has currently stopped.  The glans does not appear infected.  I see no balanitis.  There is no dysuria.  We are going to treat supportively with Bactrim and a dressing and have him follow-up with his urologist.    Diagnostic Impression:    1. Blister (nonthermal) of penis, initial encounter    2. Allergic reaction, initial encounter         ED Disposition Condition    Discharge Stable        ED Prescriptions     None        Follow-up Information     Follow up With Specialties Details Why Contact Info    Yoel Richard MD Urology Schedule an appointment as soon as possible for a visit   [address] [place] [address]" [phone]  [phone]                           Sumeet Tucker MD  12/22/20 0021

## 2020-12-22 NOTE — DISCHARGE INSTRUCTIONS
Apply bacitracin ointment (over the counter) as needed  Dress with nonadherent dressing  Return for worsening issues

## 2020-12-27 ENCOUNTER — PATIENT MESSAGE (OUTPATIENT)
Dept: FAMILY MEDICINE | Facility: CLINIC | Age: 70
End: 2020-12-27

## 2020-12-28 ENCOUNTER — PATIENT MESSAGE (OUTPATIENT)
Dept: FAMILY MEDICINE | Facility: CLINIC | Age: 70
End: 2020-12-28

## 2020-12-29 ENCOUNTER — PATIENT MESSAGE (OUTPATIENT)
Dept: UROLOGY | Facility: CLINIC | Age: 70
End: 2020-12-29

## 2021-01-02 ENCOUNTER — PATIENT MESSAGE (OUTPATIENT)
Dept: UROLOGY | Facility: CLINIC | Age: 71
End: 2021-01-02

## 2021-01-04 ENCOUNTER — OFFICE VISIT (OUTPATIENT)
Dept: UROLOGY | Facility: CLINIC | Age: 71
End: 2021-01-04
Payer: MEDICARE

## 2021-01-04 ENCOUNTER — OFFICE VISIT (OUTPATIENT)
Dept: DERMATOLOGY | Facility: CLINIC | Age: 71
End: 2021-01-04
Payer: MEDICARE

## 2021-01-04 VITALS
BODY MASS INDEX: 31.58 KG/M2 | SYSTOLIC BLOOD PRESSURE: 158 MMHG | WEIGHT: 213.19 LBS | HEIGHT: 69 IN | HEART RATE: 60 BPM | DIASTOLIC BLOOD PRESSURE: 80 MMHG

## 2021-01-04 DIAGNOSIS — N48.5 PENILE ULCER: Primary | ICD-10-CM

## 2021-01-04 DIAGNOSIS — Z88.9 HISTORY OF ADVERSE DRUG REACTION: ICD-10-CM

## 2021-01-04 DIAGNOSIS — L27.0 DERMATITIS DUE TO DRUG REACTION: Primary | ICD-10-CM

## 2021-01-04 DIAGNOSIS — R21: ICD-10-CM

## 2021-01-04 PROCEDURE — 99203 OFFICE O/P NEW LOW 30 MIN: CPT | Mod: S$PBB,,, | Performed by: DERMATOLOGY

## 2021-01-04 PROCEDURE — 99203 PR OFFICE/OUTPT VISIT, NEW, LEVL III, 30-44 MIN: ICD-10-PCS | Mod: S$PBB,,, | Performed by: DERMATOLOGY

## 2021-01-04 PROCEDURE — 99214 OFFICE O/P EST MOD 30 MIN: CPT | Mod: PBBFAC,27 | Performed by: DERMATOLOGY

## 2021-01-04 PROCEDURE — 87101 SKIN FUNGI CULTURE: CPT

## 2021-01-04 PROCEDURE — 99213 OFFICE O/P EST LOW 20 MIN: CPT | Mod: S$PBB,,, | Performed by: PHYSICIAN ASSISTANT

## 2021-01-04 PROCEDURE — 99999 PR PBB SHADOW E&M-EST. PATIENT-LVL IV: CPT | Mod: PBBFAC,,, | Performed by: PHYSICIAN ASSISTANT

## 2021-01-04 PROCEDURE — 87070 CULTURE OTHR SPECIMN AEROBIC: CPT

## 2021-01-04 PROCEDURE — 99999 PR PBB SHADOW E&M-EST. PATIENT-LVL IV: CPT | Mod: PBBFAC,,, | Performed by: DERMATOLOGY

## 2021-01-04 PROCEDURE — 99999 PR PBB SHADOW E&M-EST. PATIENT-LVL IV: ICD-10-PCS | Mod: PBBFAC,,, | Performed by: PHYSICIAN ASSISTANT

## 2021-01-04 PROCEDURE — 99213 PR OFFICE/OUTPT VISIT, EST, LEVL III, 20-29 MIN: ICD-10-PCS | Mod: S$PBB,,, | Performed by: PHYSICIAN ASSISTANT

## 2021-01-04 PROCEDURE — 99999 PR PBB SHADOW E&M-EST. PATIENT-LVL IV: ICD-10-PCS | Mod: PBBFAC,,, | Performed by: DERMATOLOGY

## 2021-01-04 PROCEDURE — 99214 OFFICE O/P EST MOD 30 MIN: CPT | Mod: PBBFAC | Performed by: PHYSICIAN ASSISTANT

## 2021-01-07 ENCOUNTER — OFFICE VISIT (OUTPATIENT)
Dept: WOUND CARE | Facility: CLINIC | Age: 71
End: 2021-01-07
Payer: MEDICARE

## 2021-01-07 VITALS
DIASTOLIC BLOOD PRESSURE: 74 MMHG | BODY MASS INDEX: 31.94 KG/M2 | SYSTOLIC BLOOD PRESSURE: 137 MMHG | HEART RATE: 66 BPM | WEIGHT: 215.63 LBS | HEIGHT: 69 IN | TEMPERATURE: 97 F

## 2021-01-07 DIAGNOSIS — S31.20XA OPEN WOUND OF PENIS, INITIAL ENCOUNTER: Primary | ICD-10-CM

## 2021-01-07 PROCEDURE — 99213 PR OFFICE/OUTPT VISIT, EST, LEVL III, 20-29 MIN: ICD-10-PCS | Mod: S$PBB,ICN,, | Performed by: NURSE PRACTITIONER

## 2021-01-07 PROCEDURE — 99999 PR PBB SHADOW E&M-EST. PATIENT-LVL V: CPT | Mod: PBBFAC,,, | Performed by: NURSE PRACTITIONER

## 2021-01-07 PROCEDURE — 99215 OFFICE O/P EST HI 40 MIN: CPT | Mod: PBBFAC | Performed by: NURSE PRACTITIONER

## 2021-01-07 PROCEDURE — 99999 PR PBB SHADOW E&M-EST. PATIENT-LVL V: ICD-10-PCS | Mod: PBBFAC,,, | Performed by: NURSE PRACTITIONER

## 2021-01-07 PROCEDURE — 99213 OFFICE O/P EST LOW 20 MIN: CPT | Mod: S$PBB,ICN,, | Performed by: NURSE PRACTITIONER

## 2021-01-08 ENCOUNTER — LAB VISIT (OUTPATIENT)
Dept: LAB | Facility: HOSPITAL | Age: 71
End: 2021-01-08
Attending: NURSE PRACTITIONER
Payer: MEDICARE

## 2021-01-08 DIAGNOSIS — N40.1 BPH WITH URINARY OBSTRUCTION: ICD-10-CM

## 2021-01-08 DIAGNOSIS — R35.0 URINARY FREQUENCY: ICD-10-CM

## 2021-01-08 DIAGNOSIS — N13.8 BPH WITH URINARY OBSTRUCTION: ICD-10-CM

## 2021-01-08 DIAGNOSIS — R97.20 ELEVATED PSA, BETWEEN 10 AND LESS THAN 20 NG/ML: ICD-10-CM

## 2021-01-08 LAB
BACTERIA SPEC AEROBE CULT: NORMAL
COMPLEXED PSA SERPL-MCNC: 4.8 NG/ML (ref 0–4)

## 2021-01-08 PROCEDURE — 36415 COLL VENOUS BLD VENIPUNCTURE: CPT | Mod: PO

## 2021-01-08 PROCEDURE — 84153 ASSAY OF PSA TOTAL: CPT

## 2021-01-10 ENCOUNTER — IMMUNIZATION (OUTPATIENT)
Dept: OBSTETRICS AND GYNECOLOGY | Facility: CLINIC | Age: 71
End: 2021-01-10
Payer: MEDICARE

## 2021-01-10 DIAGNOSIS — Z23 NEED FOR VACCINATION: ICD-10-CM

## 2021-01-10 PROCEDURE — 91300 COVID-19, MRNA, LNP-S, PF, 30 MCG/0.3 ML DOSE VACCINE: CPT | Mod: PBBFAC

## 2021-01-11 ENCOUNTER — PATIENT MESSAGE (OUTPATIENT)
Dept: UROLOGY | Facility: CLINIC | Age: 71
End: 2021-01-11

## 2021-01-11 DIAGNOSIS — R97.20 ELEVATED PSA: Primary | ICD-10-CM

## 2021-01-15 ENCOUNTER — HOSPITAL ENCOUNTER (EMERGENCY)
Facility: HOSPITAL | Age: 71
Discharge: HOME | End: 2021-01-15
Attending: EMERGENCY MEDICINE
Payer: MEDICARE

## 2021-01-15 VITALS
HEART RATE: 78 BPM | DIASTOLIC BLOOD PRESSURE: 79 MMHG | HEIGHT: 69 IN | OXYGEN SATURATION: 100 % | BODY MASS INDEX: 31.84 KG/M2 | SYSTOLIC BLOOD PRESSURE: 138 MMHG | WEIGHT: 215 LBS | TEMPERATURE: 99 F | RESPIRATION RATE: 20 BRPM
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
## 2021-01-15 DIAGNOSIS — M25.511 RIGHT SHOULDER PAIN: ICD-10-CM

## 2021-01-15 DIAGNOSIS — M25.461 EFFUSION OF RIGHT KNEE: Primary | ICD-10-CM

## 2021-01-15 DIAGNOSIS — M25.571 RIGHT ANKLE PAIN: ICD-10-CM

## 2021-01-15 DIAGNOSIS — M25.561 RIGHT KNEE PAIN: ICD-10-CM

## 2021-01-15 PROCEDURE — 99284 EMERGENCY DEPT VISIT MOD MDM: CPT | Mod: 25

## 2021-01-15 RX ORDER — HYDROCODONE BITARTRATE AND ACETAMINOPHEN 5; 325 MG/1; MG/1
1 TABLET ORAL EVERY 6 HOURS PRN
Qty: 11 TABLET | Refills: 0 | Status: SHIPPED | OUTPATIENT
Start: 2021-01-15 | End: 2021-03-02

## 2021-01-17 ENCOUNTER — PATIENT MESSAGE (OUTPATIENT)
Dept: FAMILY MEDICINE | Facility: CLINIC | Age: 71
End: 2021-01-17

## 2021-01-20 ENCOUNTER — HOSPITAL ENCOUNTER (OUTPATIENT)
Dept: RADIOLOGY | Facility: HOSPITAL | Age: 71
Discharge: HOME | End: 2021-01-20
Attending: ORTHOPAEDIC SURGERY
Payer: MEDICARE

## 2021-01-20 ENCOUNTER — OFFICE VISIT (OUTPATIENT)
Dept: ORTHOPEDICS | Facility: CLINIC | Age: 71
End: 2021-01-20
Payer: MEDICARE

## 2021-01-20 VITALS — WEIGHT: 217.06 LBS | HEIGHT: 69 IN | BODY MASS INDEX: 32.15 KG/M2

## 2021-01-20 DIAGNOSIS — M25.561 RIGHT KNEE PAIN, UNSPECIFIED CHRONICITY: ICD-10-CM

## 2021-01-20 DIAGNOSIS — M17.11 PRIMARY OSTEOARTHRITIS OF RIGHT KNEE: Primary | ICD-10-CM

## 2021-01-20 DIAGNOSIS — M25.561 RIGHT KNEE PAIN, UNSPECIFIED CHRONICITY: Primary | ICD-10-CM

## 2021-01-20 DIAGNOSIS — S80.01XA CONTUSION OF RIGHT KNEE, INITIAL ENCOUNTER: ICD-10-CM

## 2021-01-20 PROCEDURE — 73565 X-RAY EXAM OF KNEES: CPT | Mod: 26,,, | Performed by: RADIOLOGY

## 2021-01-20 PROCEDURE — 99999 PR PBB SHADOW E&M-EST. PATIENT-LVL III: ICD-10-PCS | Mod: PBBFAC,,, | Performed by: ORTHOPAEDIC SURGERY

## 2021-01-20 PROCEDURE — 73565 X-RAY EXAM OF KNEES: CPT | Mod: TC

## 2021-01-20 PROCEDURE — 99203 PR OFFICE/OUTPT VISIT, NEW, LEVL III, 30-44 MIN: ICD-10-PCS | Mod: S$PBB,,, | Performed by: ORTHOPAEDIC SURGERY

## 2021-01-20 PROCEDURE — 99213 OFFICE O/P EST LOW 20 MIN: CPT | Mod: PBBFAC,25 | Performed by: ORTHOPAEDIC SURGERY

## 2021-01-20 PROCEDURE — 73565 XR KNEE AP STANDING BILATERAL: ICD-10-PCS | Mod: 26,,, | Performed by: RADIOLOGY

## 2021-01-20 PROCEDURE — 99203 OFFICE O/P NEW LOW 30 MIN: CPT | Mod: S$PBB,,, | Performed by: ORTHOPAEDIC SURGERY

## 2021-01-20 PROCEDURE — 99999 PR PBB SHADOW E&M-EST. PATIENT-LVL III: CPT | Mod: PBBFAC,,, | Performed by: ORTHOPAEDIC SURGERY

## 2021-01-27 ENCOUNTER — OFFICE VISIT (OUTPATIENT)
Dept: FAMILY MEDICINE | Facility: CLINIC | Age: 71
End: 2021-01-27
Payer: MEDICARE

## 2021-01-27 VITALS
HEIGHT: 69 IN | TEMPERATURE: 99 F | SYSTOLIC BLOOD PRESSURE: 148 MMHG | RESPIRATION RATE: 16 BRPM | HEART RATE: 65 BPM | DIASTOLIC BLOOD PRESSURE: 92 MMHG | OXYGEN SATURATION: 96 % | WEIGHT: 219.81 LBS | BODY MASS INDEX: 32.56 KG/M2

## 2021-01-27 DIAGNOSIS — I10 ESSENTIAL HYPERTENSION: Primary | ICD-10-CM

## 2021-01-27 DIAGNOSIS — E66.01 MORBID (SEVERE) OBESITY DUE TO EXCESS CALORIES: ICD-10-CM

## 2021-01-27 DIAGNOSIS — I48.0 PAROXYSMAL ATRIAL FIBRILLATION: ICD-10-CM

## 2021-01-27 PROCEDURE — 99999 PR PBB SHADOW E&M-EST. PATIENT-LVL V: ICD-10-PCS | Mod: PBBFAC,,, | Performed by: FAMILY MEDICINE

## 2021-01-27 PROCEDURE — 99999 PR PBB SHADOW E&M-EST. PATIENT-LVL V: CPT | Mod: PBBFAC,,, | Performed by: FAMILY MEDICINE

## 2021-01-27 PROCEDURE — 99214 PR OFFICE/OUTPT VISIT, EST, LEVL IV, 30-39 MIN: ICD-10-PCS | Mod: S$PBB,,, | Performed by: FAMILY MEDICINE

## 2021-01-27 PROCEDURE — 99214 OFFICE O/P EST MOD 30 MIN: CPT | Mod: S$PBB,,, | Performed by: FAMILY MEDICINE

## 2021-01-27 PROCEDURE — 99215 OFFICE O/P EST HI 40 MIN: CPT | Mod: PBBFAC,PO | Performed by: FAMILY MEDICINE

## 2021-01-27 RX ORDER — AMLODIPINE BESYLATE 5 MG/1
5 TABLET ORAL DAILY
Qty: 30 TABLET | Refills: 2 | Status: SHIPPED | OUTPATIENT
Start: 2021-01-27 | End: 2021-02-22

## 2021-01-27 RX ORDER — METHOCARBAMOL 500 MG/1
500 TABLET, FILM COATED ORAL
COMMUNITY
End: 2023-07-11

## 2021-01-31 ENCOUNTER — IMMUNIZATION (OUTPATIENT)
Dept: OBSTETRICS AND GYNECOLOGY | Facility: CLINIC | Age: 71
End: 2021-01-31
Payer: MEDICARE

## 2021-01-31 DIAGNOSIS — Z23 NEED FOR VACCINATION: Primary | ICD-10-CM

## 2021-01-31 PROCEDURE — 0002A PR IMMUNIZ ADMIN, SARS-COV-2 COVID-19 VACC, 30MCG/0.3ML, 2ND DOSE: CPT | Mod: PBBFAC | Performed by: FAMILY MEDICINE

## 2021-01-31 PROCEDURE — 91300 PR SARS-COV- 2 COVID-19 VACCINE, NO PRSV, 30MCG/0.3ML, IM: CPT | Mod: PBBFAC | Performed by: FAMILY MEDICINE

## 2021-01-31 RX ADMIN — RNA INGREDIENT BNT-162B2 0.3 ML: 0.23 INJECTION, SUSPENSION INTRAMUSCULAR at 11:01

## 2021-02-02 LAB — FUNGUS BLD CULT: NORMAL

## 2021-02-17 ENCOUNTER — PATIENT MESSAGE (OUTPATIENT)
Dept: SLEEP MEDICINE | Facility: CLINIC | Age: 71
End: 2021-02-17

## 2021-02-23 ENCOUNTER — TELEPHONE (OUTPATIENT)
Dept: SLEEP MEDICINE | Facility: CLINIC | Age: 71
End: 2021-02-23

## 2021-02-23 ENCOUNTER — PATIENT MESSAGE (OUTPATIENT)
Dept: FAMILY MEDICINE | Facility: CLINIC | Age: 71
End: 2021-02-23

## 2021-03-02 ENCOUNTER — PATIENT MESSAGE (OUTPATIENT)
Dept: SLEEP MEDICINE | Facility: CLINIC | Age: 71
End: 2021-03-02

## 2021-03-02 ENCOUNTER — OFFICE VISIT (OUTPATIENT)
Dept: SLEEP MEDICINE | Facility: CLINIC | Age: 71
End: 2021-03-02
Payer: MEDICARE

## 2021-03-02 VITALS
HEART RATE: 54 BPM | SYSTOLIC BLOOD PRESSURE: 148 MMHG | WEIGHT: 220 LBS | DIASTOLIC BLOOD PRESSURE: 88 MMHG | HEIGHT: 69 IN | BODY MASS INDEX: 32.58 KG/M2

## 2021-03-02 DIAGNOSIS — G47.33 OBSTRUCTIVE SLEEP APNEA SYNDROME: ICD-10-CM

## 2021-03-02 DIAGNOSIS — G47.33 OBSTRUCTIVE SLEEP APNEA: ICD-10-CM

## 2021-03-02 DIAGNOSIS — I10 ESSENTIAL HYPERTENSION: Chronic | ICD-10-CM

## 2021-03-02 DIAGNOSIS — I48.0 PAROXYSMAL ATRIAL FIBRILLATION: Primary | Chronic | ICD-10-CM

## 2021-03-02 PROCEDURE — 99214 OFFICE O/P EST MOD 30 MIN: CPT | Mod: PBBFAC | Performed by: NURSE PRACTITIONER

## 2021-03-02 PROCEDURE — 99999 PR PBB SHADOW E&M-EST. PATIENT-LVL IV: ICD-10-PCS | Mod: PBBFAC,,, | Performed by: NURSE PRACTITIONER

## 2021-03-02 PROCEDURE — 99999 PR PBB SHADOW E&M-EST. PATIENT-LVL IV: CPT | Mod: PBBFAC,,, | Performed by: NURSE PRACTITIONER

## 2021-03-02 PROCEDURE — 99213 PR OFFICE/OUTPT VISIT, EST, LEVL III, 20-29 MIN: ICD-10-PCS | Mod: S$PBB,ICN,, | Performed by: NURSE PRACTITIONER

## 2021-03-02 PROCEDURE — 99213 OFFICE O/P EST LOW 20 MIN: CPT | Mod: S$PBB,ICN,, | Performed by: NURSE PRACTITIONER

## 2021-03-05 ENCOUNTER — TELEPHONE (OUTPATIENT)
Dept: SLEEP MEDICINE | Facility: HOSPITAL | Age: 71
End: 2021-03-05

## 2021-03-16 ENCOUNTER — PATIENT MESSAGE (OUTPATIENT)
Dept: UROLOGY | Facility: CLINIC | Age: 71
End: 2021-03-16

## 2021-03-29 ENCOUNTER — OFFICE VISIT (OUTPATIENT)
Dept: ELECTROPHYSIOLOGY | Facility: CLINIC | Age: 71
End: 2021-03-29
Payer: MEDICARE

## 2021-03-29 ENCOUNTER — HOSPITAL ENCOUNTER (OUTPATIENT)
Dept: CARDIOLOGY | Facility: CLINIC | Age: 71
Discharge: HOME | End: 2021-03-29
Payer: MEDICARE

## 2021-03-29 VITALS
DIASTOLIC BLOOD PRESSURE: 67 MMHG | WEIGHT: 220.69 LBS | HEIGHT: 69 IN | HEART RATE: 54 BPM | BODY MASS INDEX: 32.69 KG/M2 | SYSTOLIC BLOOD PRESSURE: 154 MMHG

## 2021-03-29 DIAGNOSIS — I48.0 PAROXYSMAL ATRIAL FIBRILLATION: Primary | Chronic | ICD-10-CM

## 2021-03-29 DIAGNOSIS — G47.33 OSA (OBSTRUCTIVE SLEEP APNEA): ICD-10-CM

## 2021-03-29 DIAGNOSIS — R00.1 SINUS BRADYCARDIA: ICD-10-CM

## 2021-03-29 DIAGNOSIS — E66.9 OBESITY (BMI 30.0-34.9): ICD-10-CM

## 2021-03-29 DIAGNOSIS — I48.19 PERSISTENT ATRIAL FIBRILLATION: ICD-10-CM

## 2021-03-29 DIAGNOSIS — Z79.01 ON CONTINUOUS ORAL ANTICOAGULATION: ICD-10-CM

## 2021-03-29 DIAGNOSIS — I10 ESSENTIAL HYPERTENSION: Chronic | ICD-10-CM

## 2021-03-29 PROCEDURE — 93005 ELECTROCARDIOGRAM TRACING: CPT | Mod: PBBFAC | Performed by: INTERNAL MEDICINE

## 2021-03-29 PROCEDURE — 93010 ELECTROCARDIOGRAM REPORT: CPT | Mod: S$PBB,,, | Performed by: INTERNAL MEDICINE

## 2021-03-29 PROCEDURE — 99214 OFFICE O/P EST MOD 30 MIN: CPT | Mod: PBBFAC,25 | Performed by: NURSE PRACTITIONER

## 2021-03-29 PROCEDURE — 99999 PR PBB SHADOW E&M-EST. PATIENT-LVL IV: ICD-10-PCS | Mod: PBBFAC,,, | Performed by: NURSE PRACTITIONER

## 2021-03-29 PROCEDURE — 93010 RHYTHM STRIP: ICD-10-PCS | Mod: S$PBB,,, | Performed by: INTERNAL MEDICINE

## 2021-03-29 PROCEDURE — 99214 PR OFFICE/OUTPT VISIT, EST, LEVL IV, 30-39 MIN: ICD-10-PCS | Mod: S$PBB,,, | Performed by: NURSE PRACTITIONER

## 2021-03-29 PROCEDURE — 99999 PR PBB SHADOW E&M-EST. PATIENT-LVL IV: CPT | Mod: PBBFAC,,, | Performed by: NURSE PRACTITIONER

## 2021-03-29 PROCEDURE — 99214 OFFICE O/P EST MOD 30 MIN: CPT | Mod: S$PBB,,, | Performed by: NURSE PRACTITIONER

## 2021-04-06 ENCOUNTER — PES CALL (OUTPATIENT)
Dept: ADMINISTRATIVE | Facility: CLINIC | Age: 71
End: 2021-04-06

## 2021-04-09 ENCOUNTER — TELEPHONE (OUTPATIENT)
Dept: ADMINISTRATIVE | Facility: CLINIC | Age: 71
End: 2021-04-09

## 2021-04-12 ENCOUNTER — TELEPHONE (OUTPATIENT)
Dept: ADMINISTRATIVE | Facility: CLINIC | Age: 71
End: 2021-04-12

## 2021-04-12 ENCOUNTER — LAB VISIT (OUTPATIENT)
Dept: FAMILY MEDICINE | Facility: CLINIC | Age: 71
End: 2021-04-12
Payer: MEDICARE

## 2021-04-12 DIAGNOSIS — G47.33 OBSTRUCTIVE SLEEP APNEA: ICD-10-CM

## 2021-04-12 PROCEDURE — U0003 INFECTIOUS AGENT DETECTION BY NUCLEIC ACID (DNA OR RNA); SEVERE ACUTE RESPIRATORY SYNDROME CORONAVIRUS 2 (SARS-COV-2) (CORONAVIRUS DISEASE [COVID-19]), AMPLIFIED PROBE TECHNIQUE, MAKING USE OF HIGH THROUGHPUT TECHNOLOGIES AS DESCRIBED BY CMS-2020-01-R: HCPCS | Performed by: NURSE PRACTITIONER

## 2021-04-12 PROCEDURE — U0005 INFEC AGEN DETEC AMPLI PROBE: HCPCS | Performed by: NURSE PRACTITIONER

## 2021-04-13 ENCOUNTER — TELEPHONE (OUTPATIENT)
Dept: HOME HEALTH SERVICES | Facility: CLINIC | Age: 71
End: 2021-04-13

## 2021-04-13 ENCOUNTER — OFFICE VISIT (OUTPATIENT)
Dept: HOME HEALTH SERVICES | Facility: CLINIC | Age: 71
End: 2021-04-13
Payer: MEDICARE

## 2021-04-13 ENCOUNTER — PATIENT MESSAGE (OUTPATIENT)
Dept: INTERNAL MEDICINE | Facility: CLINIC | Age: 71
End: 2021-04-13

## 2021-04-13 DIAGNOSIS — M62.830 MUSCLE SPASM OF BACK: ICD-10-CM

## 2021-04-13 DIAGNOSIS — M47.26 OSTEOARTHRITIS OF SPINE WITH RADICULOPATHY, LUMBAR REGION: ICD-10-CM

## 2021-04-13 DIAGNOSIS — N40.0 BENIGN PROSTATIC HYPERPLASIA, UNSPECIFIED WHETHER LOWER URINARY TRACT SYMPTOMS PRESENT: ICD-10-CM

## 2021-04-13 DIAGNOSIS — K21.9 GASTROESOPHAGEAL REFLUX DISEASE, UNSPECIFIED WHETHER ESOPHAGITIS PRESENT: ICD-10-CM

## 2021-04-13 DIAGNOSIS — I10 ESSENTIAL HYPERTENSION: Chronic | ICD-10-CM

## 2021-04-13 DIAGNOSIS — M48.061 SPINAL STENOSIS OF LUMBAR REGION WITHOUT NEUROGENIC CLAUDICATION: ICD-10-CM

## 2021-04-13 DIAGNOSIS — R35.1 NOCTURIA: ICD-10-CM

## 2021-04-13 DIAGNOSIS — Z91.89 RISK FOR CORONARY ARTERY DISEASE BETWEEN 10% AND 20% IN NEXT 10 YEARS: ICD-10-CM

## 2021-04-13 DIAGNOSIS — H25.10 NUCLEAR SCLEROSIS, UNSPECIFIED LATERALITY: ICD-10-CM

## 2021-04-13 DIAGNOSIS — M51.37 DEGENERATION OF LUMBAR OR LUMBOSACRAL INTERVERTEBRAL DISC: ICD-10-CM

## 2021-04-13 DIAGNOSIS — N52.01 ERECTILE DYSFUNCTION DUE TO ARTERIAL INSUFFICIENCY: ICD-10-CM

## 2021-04-13 DIAGNOSIS — E66.9 OBESITY (BMI 30.0-34.9): ICD-10-CM

## 2021-04-13 DIAGNOSIS — M51.26 DISPLACEMENT OF LUMBAR INTERVERTEBRAL DISC WITHOUT MYELOPATHY: ICD-10-CM

## 2021-04-13 DIAGNOSIS — I48.0 PAROXYSMAL ATRIAL FIBRILLATION: Chronic | ICD-10-CM

## 2021-04-13 DIAGNOSIS — Z00.00 ENCOUNTER FOR PREVENTIVE HEALTH EXAMINATION: ICD-10-CM

## 2021-04-13 DIAGNOSIS — M51.26 HNP (HERNIATED NUCLEUS PULPOSUS), LUMBAR: Primary | ICD-10-CM

## 2021-04-13 DIAGNOSIS — N13.8 BPH WITH URINARY OBSTRUCTION: ICD-10-CM

## 2021-04-13 DIAGNOSIS — M48.061 SPINAL STENOSIS, LUMBAR REGION, WITHOUT NEUROGENIC CLAUDICATION: ICD-10-CM

## 2021-04-13 DIAGNOSIS — R00.1 SINUS BRADYCARDIA: ICD-10-CM

## 2021-04-13 DIAGNOSIS — E66.01 MORBID (SEVERE) OBESITY DUE TO EXCESS CALORIES: Chronic | ICD-10-CM

## 2021-04-13 DIAGNOSIS — N40.1 BPH WITH URINARY OBSTRUCTION: ICD-10-CM

## 2021-04-13 DIAGNOSIS — C49.A2: ICD-10-CM

## 2021-04-13 DIAGNOSIS — N48.6 PEYRONIE'S DISEASE: ICD-10-CM

## 2021-04-13 DIAGNOSIS — G47.33 OSA (OBSTRUCTIVE SLEEP APNEA): ICD-10-CM

## 2021-04-13 LAB — SARS-COV-2 RNA RESP QL NAA+PROBE: NOT DETECTED

## 2021-04-13 PROCEDURE — G0439 PR MEDICARE ANNUAL WELLNESS SUBSEQUENT VISIT: ICD-10-PCS | Mod: 95,,, | Performed by: NURSE PRACTITIONER

## 2021-04-13 PROCEDURE — G0439 PPPS, SUBSEQ VISIT: HCPCS | Mod: 95,,, | Performed by: NURSE PRACTITIONER

## 2021-04-14 ENCOUNTER — HOSPITAL ENCOUNTER (OUTPATIENT)
Dept: SLEEP MEDICINE | Facility: HOSPITAL | Age: 71
Discharge: HOME | End: 2021-04-14
Attending: NURSE PRACTITIONER
Payer: MEDICARE

## 2021-04-14 DIAGNOSIS — G47.33 OBSTRUCTIVE SLEEP APNEA: ICD-10-CM

## 2021-04-14 PROCEDURE — 95810 POLYSOM 6/> YRS 4/> PARAM: CPT | Mod: 26,,, | Performed by: INTERNAL MEDICINE

## 2021-04-14 PROCEDURE — 95810 POLYSOM 6/> YRS 4/> PARAM: CPT

## 2021-04-14 PROCEDURE — 95810 PR POLYSOMNOGRAPHY, 4 OR MORE: ICD-10-PCS | Mod: 26,,, | Performed by: INTERNAL MEDICINE

## 2021-04-19 DIAGNOSIS — I10 ESSENTIAL HYPERTENSION: Chronic | ICD-10-CM

## 2021-04-19 RX ORDER — METOPROLOL SUCCINATE 50 MG/1
50 TABLET, EXTENDED RELEASE ORAL DAILY
Qty: 90 TABLET | Refills: 3 | Status: SHIPPED | OUTPATIENT
Start: 2021-04-19 | End: 2021-06-03 | Stop reason: SDUPTHER

## 2021-04-19 RX ORDER — METOPROLOL SUCCINATE 50 MG/1
50 TABLET, EXTENDED RELEASE ORAL DAILY
Qty: 90 TABLET | Refills: 3 | Status: SHIPPED | OUTPATIENT
Start: 2021-04-19 | End: 2021-04-19 | Stop reason: SDUPTHER

## 2021-04-20 ENCOUNTER — PATIENT MESSAGE (OUTPATIENT)
Dept: SLEEP MEDICINE | Facility: CLINIC | Age: 71
End: 2021-04-20

## 2021-04-20 RX ORDER — TERAZOSIN 10 MG/1
10 CAPSULE ORAL NIGHTLY
Qty: 90 CAPSULE | Refills: 3 | Status: SHIPPED | OUTPATIENT
Start: 2021-04-20 | End: 2022-03-03 | Stop reason: SDUPTHER

## 2021-05-10 ENCOUNTER — PATIENT MESSAGE (OUTPATIENT)
Dept: FAMILY MEDICINE | Facility: CLINIC | Age: 71
End: 2021-05-10

## 2021-05-10 DIAGNOSIS — L30.9 DERMATITIS: ICD-10-CM

## 2021-05-16 RX ORDER — TRIAMCINOLONE ACETONIDE 1 MG/G
CREAM TOPICAL 2 TIMES DAILY
Qty: 45 G | Refills: 0 | Status: SHIPPED | OUTPATIENT
Start: 2021-05-16 | End: 2021-06-03 | Stop reason: SDUPTHER

## 2021-05-18 ENCOUNTER — PATIENT MESSAGE (OUTPATIENT)
Dept: SLEEP MEDICINE | Facility: CLINIC | Age: 71
End: 2021-05-18

## 2021-05-19 DIAGNOSIS — G47.33 OBSTRUCTIVE SLEEP APNEA: Primary | ICD-10-CM

## 2021-05-21 DIAGNOSIS — I10 ESSENTIAL HYPERTENSION: ICD-10-CM

## 2021-05-23 RX ORDER — AMLODIPINE BESYLATE 5 MG/1
TABLET ORAL
Qty: 90 TABLET | Refills: 0 | Status: SHIPPED | OUTPATIENT
Start: 2021-05-23 | End: 2021-12-12 | Stop reason: SDUPTHER

## 2021-06-03 DIAGNOSIS — L30.9 DERMATITIS: ICD-10-CM

## 2021-06-03 DIAGNOSIS — I10 ESSENTIAL HYPERTENSION: Chronic | ICD-10-CM

## 2021-06-03 RX ORDER — METOPROLOL SUCCINATE 50 MG/1
50 TABLET, EXTENDED RELEASE ORAL DAILY
Qty: 90 TABLET | Refills: 3 | Status: SHIPPED | OUTPATIENT
Start: 2021-06-03 | End: 2022-01-10

## 2021-06-04 RX ORDER — TRIAMCINOLONE ACETONIDE 1 MG/G
CREAM TOPICAL 2 TIMES DAILY
Qty: 45 G | Refills: 0 | Status: SHIPPED | OUTPATIENT
Start: 2021-06-04 | End: 2022-03-02 | Stop reason: SDUPTHER

## 2021-07-01 ENCOUNTER — PATIENT MESSAGE (OUTPATIENT)
Dept: CARDIOLOGY | Facility: CLINIC | Age: 71
End: 2021-07-01

## 2021-07-12 ENCOUNTER — OFFICE VISIT (OUTPATIENT)
Dept: FAMILY MEDICINE | Facility: CLINIC | Age: 71
End: 2021-07-12
Payer: MEDICARE

## 2021-07-12 VITALS
HEIGHT: 69 IN | SYSTOLIC BLOOD PRESSURE: 136 MMHG | TEMPERATURE: 99 F | RESPIRATION RATE: 20 BRPM | DIASTOLIC BLOOD PRESSURE: 78 MMHG | HEART RATE: 86 BPM | WEIGHT: 217.13 LBS | BODY MASS INDEX: 32.16 KG/M2 | OXYGEN SATURATION: 97 %

## 2021-07-12 DIAGNOSIS — I48.0 PAROXYSMAL ATRIAL FIBRILLATION: Chronic | ICD-10-CM

## 2021-07-12 DIAGNOSIS — N13.8 BPH WITH URINARY OBSTRUCTION: ICD-10-CM

## 2021-07-12 DIAGNOSIS — I10 ESSENTIAL HYPERTENSION: Primary | ICD-10-CM

## 2021-07-12 DIAGNOSIS — N40.1 BPH WITH URINARY OBSTRUCTION: ICD-10-CM

## 2021-07-12 DIAGNOSIS — M17.11 PRIMARY OSTEOARTHRITIS OF RIGHT KNEE: ICD-10-CM

## 2021-07-12 PROCEDURE — 99999 PR PBB SHADOW E&M-EST. PATIENT-LVL V: ICD-10-PCS | Mod: PBBFAC,,, | Performed by: FAMILY MEDICINE

## 2021-07-12 PROCEDURE — 99214 OFFICE O/P EST MOD 30 MIN: CPT | Mod: S$PBB,,, | Performed by: FAMILY MEDICINE

## 2021-07-12 PROCEDURE — 99214 PR OFFICE/OUTPT VISIT, EST, LEVL IV, 30-39 MIN: ICD-10-PCS | Mod: S$PBB,,, | Performed by: FAMILY MEDICINE

## 2021-07-12 PROCEDURE — 99999 PR PBB SHADOW E&M-EST. PATIENT-LVL V: CPT | Mod: PBBFAC,,, | Performed by: FAMILY MEDICINE

## 2021-07-12 PROCEDURE — 99215 OFFICE O/P EST HI 40 MIN: CPT | Mod: PBBFAC,PO | Performed by: FAMILY MEDICINE

## 2021-07-22 ENCOUNTER — OFFICE VISIT (OUTPATIENT)
Dept: FAMILY MEDICINE | Facility: CLINIC | Age: 71
End: 2021-07-22
Payer: MEDICARE

## 2021-07-22 VITALS
RESPIRATION RATE: 17 BRPM | HEIGHT: 69 IN | TEMPERATURE: 98 F | OXYGEN SATURATION: 97 % | BODY MASS INDEX: 32.36 KG/M2 | SYSTOLIC BLOOD PRESSURE: 90 MMHG | HEART RATE: 58 BPM | DIASTOLIC BLOOD PRESSURE: 62 MMHG | WEIGHT: 218.5 LBS

## 2021-07-22 DIAGNOSIS — J06.9 UPPER RESPIRATORY TRACT INFECTION, UNSPECIFIED TYPE: Primary | ICD-10-CM

## 2021-07-22 DIAGNOSIS — I95.9 HYPOTENSION, UNSPECIFIED HYPOTENSION TYPE: ICD-10-CM

## 2021-07-22 PROCEDURE — U0005 INFEC AGEN DETEC AMPLI PROBE: HCPCS | Performed by: PHYSICIAN ASSISTANT

## 2021-07-22 PROCEDURE — 99215 OFFICE O/P EST HI 40 MIN: CPT | Mod: PBBFAC,PO | Performed by: PHYSICIAN ASSISTANT

## 2021-07-22 PROCEDURE — 99999 PR PBB SHADOW E&M-EST. PATIENT-LVL V: ICD-10-PCS | Mod: PBBFAC,,, | Performed by: PHYSICIAN ASSISTANT

## 2021-07-22 PROCEDURE — 99214 OFFICE O/P EST MOD 30 MIN: CPT | Mod: S$PBB,CS,, | Performed by: PHYSICIAN ASSISTANT

## 2021-07-22 PROCEDURE — 99214 PR OFFICE/OUTPT VISIT, EST, LEVL IV, 30-39 MIN: ICD-10-PCS | Mod: S$PBB,CS,, | Performed by: PHYSICIAN ASSISTANT

## 2021-07-22 PROCEDURE — U0003 INFECTIOUS AGENT DETECTION BY NUCLEIC ACID (DNA OR RNA); SEVERE ACUTE RESPIRATORY SYNDROME CORONAVIRUS 2 (SARS-COV-2) (CORONAVIRUS DISEASE [COVID-19]), AMPLIFIED PROBE TECHNIQUE, MAKING USE OF HIGH THROUGHPUT TECHNOLOGIES AS DESCRIBED BY CMS-2020-01-R: HCPCS | Performed by: PHYSICIAN ASSISTANT

## 2021-07-22 PROCEDURE — 99999 PR PBB SHADOW E&M-EST. PATIENT-LVL V: CPT | Mod: PBBFAC,,, | Performed by: PHYSICIAN ASSISTANT

## 2021-07-22 RX ORDER — BENZONATATE 200 MG/1
200 CAPSULE ORAL 3 TIMES DAILY PRN
Qty: 30 CAPSULE | Refills: 0 | Status: SHIPPED | OUTPATIENT
Start: 2021-07-22 | End: 2021-08-01

## 2021-07-22 RX ORDER — PROMETHAZINE HYDROCHLORIDE AND DEXTROMETHORPHAN HYDROBROMIDE 6.25; 15 MG/5ML; MG/5ML
5 SYRUP ORAL NIGHTLY PRN
Qty: 180 ML | Refills: 0 | Status: SHIPPED | OUTPATIENT
Start: 2021-07-22 | End: 2021-08-02 | Stop reason: SDUPTHER

## 2021-07-24 DIAGNOSIS — U07.1 COVID-19 VIRUS DETECTED: ICD-10-CM

## 2021-07-24 LAB
SARS-COV-2 RNA RESP QL NAA+PROBE: DETECTED
SARS-COV-2- CYCLE NUMBER: 6.42

## 2021-07-30 ENCOUNTER — TELEPHONE (OUTPATIENT)
Dept: FAMILY MEDICINE | Facility: CLINIC | Age: 71
End: 2021-07-30

## 2021-07-30 ENCOUNTER — PATIENT MESSAGE (OUTPATIENT)
Dept: FAMILY MEDICINE | Facility: CLINIC | Age: 71
End: 2021-07-30

## 2021-07-30 DIAGNOSIS — J06.9 UPPER RESPIRATORY TRACT INFECTION, UNSPECIFIED TYPE: ICD-10-CM

## 2021-08-01 NOTE — PROGRESS NOTES
Chief Complaint   Patient presents with    Annual Exam       HPI  Omar Pacheco is a 69 y.o. male with multiple medical diagnoses as listed in the medical history and problem list that presents for annual exam .    He has been having some elevated BP on home checks since football season has started. No symptoms of HA or vision change. He has also had some allergy symptoms resulting in congestion. Seen in ED for ear wax removal due to earache    HPI    Patient Active Problem List   Diagnosis    GERD (gastroesophageal reflux disease)    BPH (benign prostatic hyperplasia)    Atrial fibrillation - paroxysmal    Hypertension    Nuclear sclerosis - Both Eyes    HNP (herniated nucleus pulposus), lumbar    Degeneration of lumbar or lumbosacral intervertebral disc    Spinal stenosis, lumbar region, without neurogenic claudication    Displacement of lumbar intervertebral disc without myelopathy    Muscle spasm of back    Lumbar spinal stenosis    Osteoarthritis of spine with radiculopathy, lumbar region    Obesity (BMI 30-39.9)    BPH with urinary obstruction    Erectile dysfunction due to arterial insufficiency    Peyronie's disease    Nocturia         ROS  Review of Systems   Constitutional: Negative for chills, fatigue, fever and unexpected weight change.   HENT: Positive for congestion. Negative for ear pain, postnasal drip, rhinorrhea, sinus pressure and sore throat.    Eyes: Negative for photophobia and visual disturbance.   Respiratory: Negative for apnea, cough, chest tightness, shortness of breath and wheezing.    Cardiovascular: Negative for chest pain and palpitations.   Gastrointestinal: Negative for abdominal pain, blood in stool, constipation, diarrhea, nausea and vomiting.   Genitourinary: Negative for difficulty urinating.   Musculoskeletal: Negative for arthralgias and joint swelling.   Skin: Negative for rash.   Neurological: Negative for facial asymmetry, speech difficulty, weakness,  "numbness and headaches.   Psychiatric/Behavioral: Negative for dysphoric mood.       Physical Exam  Vitals:    09/21/20 0943 09/21/20 1050   BP: (!) 148/82  Comment: pt didn't take medicine (!) 158/90   BP Location: Right arm    Patient Position: Sitting    BP Method: Large (Manual)    Pulse: (!) 59    Temp: 97.5 °F (36.4 °C)    TempSrc: Oral    SpO2: 97%    Weight: 99.3 kg (219 lb 0.4 oz)    Height: 5' 9" (1.753 m)     Body mass index is 32.34 kg/m².  Weight: 99.3 kg (219 lb 0.4 oz)   Height: 5' 9" (175.3 cm)     Physical Exam  Vitals signs and nursing note reviewed.   Constitutional:       Appearance: He is well-developed.   HENT:      Head: Normocephalic and atraumatic.   Eyes:      Pupils: Pupils are equal, round, and reactive to light.   Cardiovascular:      Rate and Rhythm: Normal rate and regular rhythm.      Heart sounds: No murmur.   Pulmonary:      Breath sounds: Normal breath sounds. No wheezing or rales.   Abdominal:      General: Bowel sounds are normal. There is no distension.      Palpations: Abdomen is soft. There is no mass.      Tenderness: There is no abdominal tenderness. There is no guarding or rebound.      Hernia: No hernia is present.   Skin:     Findings: No rash.   Neurological:      Mental Status: He is alert and oriented to person, place, and time.         ALLERGIES AND MEDICATIONS: updated and reviewed.  Review of patient's allergies indicates:  No Known Allergies  Medication List with Changes/Refills   New Medications    LEVOCETIRIZINE (XYZAL) 5 MG TABLET    Take 1 tablet (5 mg total) by mouth every evening.   Current Medications    AZELASTINE (ASTELIN) 137 MCG (0.1 %) NASAL SPRAY    1 spray (137 mcg total) by Nasal route 2 (two) times daily.    CYANOCOBALAMIN (VITAMIN B-12) 1000 MCG TABLET    Take 100 mcg by mouth once daily.    DABIGATRAN ETEXILATE (PRADAXA) 150 MG CAP    Take 1 capsule (150 mg total) by mouth 2 (two) times daily. "Do NOT break, chew, or open capsules."    FLECAINIDE " (TAMBOCOR) 50 MG TAB    Take 1 tablet (50 mg total) by mouth every 12 (twelve) hours.    INSULIN SYRINGE-NEEDLE U-100 (EASY COMFORT INSULIN SYRINGE) 1 ML 31 GAUGE X 5/16 SYRG    Inject 1 time daily prn ED    METOPROLOL SUCCINATE (TOPROL-XL) 50 MG 24 HR TABLET    Take 1 tablet (50 mg total) by mouth once daily.    MIRTAZAPINE (REMERON) 30 MG TABLET    Take 30 mg by mouth every evening.    MULTIVITAMIN (THERAGRAN) PER TABLET    Take 1 tablet by mouth once daily.    PAPAVERINE 30 MG/ML INJECTION    ADD: Phentolamine 6 mg/ml ADD: PGE1 60 mcg. 30/6/60. Sig: Start w/ 40 units. Inject as directed into proximal lateral aspect of penis.    SILDENAFIL (VIAGRA) 100 MG TABLET    Take 1 tablet (100 mg total) by mouth as needed for Erectile Dysfunction (1 tablet 1 hr prior to intercourse.).    TERAZOSIN (HYTRIN) 10 MG CAPSULE    Take 1 capsule (10 mg total) by mouth every evening.    TERAZOSIN (HYTRIN) 10 MG CAPSULE    Take 1 capsule (10 mg total) by mouth every evening.    VITAMIN B COMPLEX (B COMPLETE ORAL)    Take by mouth once daily.   Changed and/or Refilled Medications    Modified Medication Previous Medication    AMMONIUM LACTATE (AMLACTIN) 12 % LOTION ammonium lactate (AMLACTIN) 12 % lotion       AmLactin 12 % lotion    AmLactin 12 % lotion    OMEPRAZOLE (PRILOSEC) 20 MG CAPSULE omeprazole (PRILOSEC) 20 MG capsule       Take 1 capsule (20 mg total) by mouth once daily.    Take 1 capsule (20 mg total) by mouth once daily.   Discontinued Medications    TERAZOSIN (HYTRIN) 10 MG CAPSULE    Take 1 capsule (10 mg total) by mouth every evening.       Assessment & Plan  1. Routine general medical examination at a health care facility    2. Dermatitis    3. History of gastroesophageal reflux (GERD)    4. Paroxysmal atrial fibrillation    5. Essential hypertension    6. Encounter for lipid screening for cardiovascular disease    7. Allergic rhinitis, unspecified seasonality, unspecified trigger        Problem List Items Addressed  This Visit        Cardiac/Vascular    Atrial fibrillation - paroxysmal (Chronic)  -stable has cardiology follow up    Hypertension (Chronic)  -home monitoring continued, will f/u with nurse in 2 wks    Relevant Orders    CBC auto differential    Comprehensive metabolic panel      Other Visit Diagnoses     Routine general medical examination at a health care facility    -  Primary  --discussed healthy lifestyle modification with exercise and healthy diet, reviewed age appropriate screening and healthy maintenance  -needs f/u for BPH and elevated PSA with urology    Dermatitis      -continue for prn use    Relevant Medications    ammonium lactate (AMLACTIN) 12 % lotion    History of gastroesophageal reflux (GERD)      -continue current regimen    Relevant Medications    omeprazole (PRILOSEC) 20 MG capsule    Encounter for lipid screening for cardiovascular disease    -as ordered         Relevant Orders    Lipid Panel    Allergic rhinitis, unspecified seasonality, unspecified trigger      -will begin oral antihistamine    Relevant Medications    levocetirizine (XYZAL) 5 MG tablet          Follow up in about 1 year (around 9/21/2021) for annual exam.    Other Orders Placed This Visit:  Orders Placed This Encounter   Procedures    CBC auto differential    Comprehensive metabolic panel    Lipid Panel                    - - -

## 2021-08-02 RX ORDER — PROMETHAZINE HYDROCHLORIDE AND DEXTROMETHORPHAN HYDROBROMIDE 6.25; 15 MG/5ML; MG/5ML
5 SYRUP ORAL NIGHTLY PRN
Qty: 180 ML | Refills: 0 | Status: SHIPPED | OUTPATIENT
Start: 2021-08-02 | End: 2021-08-29

## 2021-08-02 RX ORDER — BENZONATATE 200 MG/1
200 CAPSULE ORAL 3 TIMES DAILY PRN
Qty: 30 CAPSULE | Refills: 0 | Status: SHIPPED | OUTPATIENT
Start: 2021-08-02 | End: 2021-08-23

## 2021-08-06 ENCOUNTER — PATIENT MESSAGE (OUTPATIENT)
Dept: FAMILY MEDICINE | Facility: CLINIC | Age: 71
End: 2021-08-06

## 2021-08-24 ENCOUNTER — PATIENT MESSAGE (OUTPATIENT)
Dept: FAMILY MEDICINE | Facility: CLINIC | Age: 71
End: 2021-08-24

## 2021-09-13 ENCOUNTER — IMMUNIZATION (OUTPATIENT)
Dept: OBSTETRICS AND GYNECOLOGY | Facility: CLINIC | Age: 71
End: 2021-09-13
Payer: MEDICARE

## 2021-09-13 ENCOUNTER — PATIENT MESSAGE (OUTPATIENT)
Dept: FAMILY MEDICINE | Facility: CLINIC | Age: 71
End: 2021-09-13

## 2021-09-13 DIAGNOSIS — Z23 NEED FOR VACCINATION: Primary | ICD-10-CM

## 2021-09-13 DIAGNOSIS — J06.9 UPPER RESPIRATORY TRACT INFECTION, UNSPECIFIED TYPE: ICD-10-CM

## 2021-09-13 PROCEDURE — 91300 COVID-19, MRNA, LNP-S, PF, 30 MCG/0.3 ML DOSE VACCINE: ICD-10-PCS | Mod: ,,, | Performed by: FAMILY MEDICINE

## 2021-09-13 PROCEDURE — 91300 COVID-19, MRNA, LNP-S, PF, 30 MCG/0.3 ML DOSE VACCINE: CPT | Mod: ,,, | Performed by: FAMILY MEDICINE

## 2021-09-13 PROCEDURE — 0003A COVID-19, MRNA, LNP-S, PF, 30 MCG/0.3 ML DOSE VACCINE: CPT | Mod: CV19,,, | Performed by: FAMILY MEDICINE

## 2021-09-13 PROCEDURE — 0003A COVID-19, MRNA, LNP-S, PF, 30 MCG/0.3 ML DOSE VACCINE: ICD-10-PCS | Mod: CV19,,, | Performed by: FAMILY MEDICINE

## 2021-09-14 RX ORDER — BENZONATATE 200 MG/1
CAPSULE ORAL
Qty: 30 CAPSULE | Refills: 0 | Status: SHIPPED | OUTPATIENT
Start: 2021-09-14 | End: 2022-03-03

## 2021-09-14 RX ORDER — PROMETHAZINE HYDROCHLORIDE AND DEXTROMETHORPHAN HYDROBROMIDE 6.25; 15 MG/5ML; MG/5ML
SYRUP ORAL
Qty: 180 ML | Refills: 0 | Status: SHIPPED | OUTPATIENT
Start: 2021-09-14 | End: 2021-09-21 | Stop reason: SDUPTHER

## 2021-09-15 ENCOUNTER — PATIENT MESSAGE (OUTPATIENT)
Dept: UROLOGY | Facility: CLINIC | Age: 71
End: 2021-09-15

## 2021-09-15 DIAGNOSIS — N52.01 ERECTILE DYSFUNCTION DUE TO ARTERIAL INSUFFICIENCY: ICD-10-CM

## 2021-09-15 RX ORDER — PAPAVERINE HYDROCHLORIDE 30 MG/ML
INJECTION INTRAMUSCULAR; INTRAVENOUS
Qty: 10 ML | Refills: 11 | Status: SHIPPED | OUTPATIENT
Start: 2021-09-15 | End: 2022-03-03

## 2021-09-15 RX ORDER — PAPAVERINE HYDROCHLORIDE 30 MG/ML
INJECTION INTRAMUSCULAR; INTRAVENOUS
Qty: 10 ML | Refills: 11 | Status: SHIPPED | OUTPATIENT
Start: 2021-09-15 | End: 2021-09-15 | Stop reason: SDUPTHER

## 2021-09-21 ENCOUNTER — PATIENT MESSAGE (OUTPATIENT)
Dept: FAMILY MEDICINE | Facility: CLINIC | Age: 71
End: 2021-09-21

## 2021-09-21 DIAGNOSIS — J06.9 UPPER RESPIRATORY TRACT INFECTION, UNSPECIFIED TYPE: ICD-10-CM

## 2021-09-22 RX ORDER — PROMETHAZINE HYDROCHLORIDE AND DEXTROMETHORPHAN HYDROBROMIDE 6.25; 15 MG/5ML; MG/5ML
SYRUP ORAL
Qty: 180 ML | Refills: 0 | Status: SHIPPED | OUTPATIENT
Start: 2021-09-22 | End: 2022-01-04 | Stop reason: SDUPTHER

## 2021-09-24 ENCOUNTER — OFFICE VISIT (OUTPATIENT)
Dept: FAMILY MEDICINE | Facility: CLINIC | Age: 71
End: 2021-09-24
Payer: MEDICARE

## 2021-09-24 VITALS
SYSTOLIC BLOOD PRESSURE: 128 MMHG | BODY MASS INDEX: 31.74 KG/M2 | DIASTOLIC BLOOD PRESSURE: 88 MMHG | HEART RATE: 68 BPM | OXYGEN SATURATION: 96 % | TEMPERATURE: 98 F | RESPIRATION RATE: 16 BRPM | HEIGHT: 69 IN | WEIGHT: 214.31 LBS

## 2021-09-24 DIAGNOSIS — L85.3 DRY SKIN DERMATITIS: ICD-10-CM

## 2021-09-24 DIAGNOSIS — Z12.12 ENCOUNTER FOR COLORECTAL CANCER SCREENING: ICD-10-CM

## 2021-09-24 DIAGNOSIS — M17.11 PRIMARY OSTEOARTHRITIS OF RIGHT KNEE: ICD-10-CM

## 2021-09-24 DIAGNOSIS — M48.07 SPINAL STENOSIS, LUMBOSACRAL REGION: Primary | ICD-10-CM

## 2021-09-24 DIAGNOSIS — Z12.11 ENCOUNTER FOR COLORECTAL CANCER SCREENING: ICD-10-CM

## 2021-09-24 PROCEDURE — 99999 PR PBB SHADOW E&M-EST. PATIENT-LVL V: CPT | Mod: PBBFAC,,, | Performed by: FAMILY MEDICINE

## 2021-09-24 PROCEDURE — 99999 PR PBB SHADOW E&M-EST. PATIENT-LVL V: ICD-10-PCS | Mod: PBBFAC,,, | Performed by: FAMILY MEDICINE

## 2021-09-24 PROCEDURE — 99215 OFFICE O/P EST HI 40 MIN: CPT | Mod: PBBFAC,PO | Performed by: FAMILY MEDICINE

## 2021-09-24 PROCEDURE — 99215 OFFICE O/P EST HI 40 MIN: CPT | Mod: S$PBB,,, | Performed by: FAMILY MEDICINE

## 2021-09-24 PROCEDURE — 99215 PR OFFICE/OUTPT VISIT, EST, LEVL V, 40-54 MIN: ICD-10-PCS | Mod: S$PBB,,, | Performed by: FAMILY MEDICINE

## 2021-09-24 RX ORDER — AMMONIUM LACTATE 12 G/100G
LOTION TOPICAL DAILY PRN
Qty: 400 G | Refills: 2 | Status: SHIPPED | OUTPATIENT
Start: 2021-09-24 | End: 2022-03-03 | Stop reason: SDUPTHER

## 2021-09-24 RX ORDER — TRAMADOL HYDROCHLORIDE 50 MG/1
50 TABLET ORAL EVERY 12 HOURS PRN
Qty: 60 TABLET | Refills: 2 | Status: SHIPPED | OUTPATIENT
Start: 2021-09-24 | End: 2021-12-12 | Stop reason: SDUPTHER

## 2021-09-27 ENCOUNTER — PATIENT MESSAGE (OUTPATIENT)
Dept: FAMILY MEDICINE | Facility: CLINIC | Age: 71
End: 2021-09-27

## 2021-09-29 ENCOUNTER — PATIENT MESSAGE (OUTPATIENT)
Dept: FAMILY MEDICINE | Facility: CLINIC | Age: 71
End: 2021-09-29

## 2021-10-03 RX ORDER — SILDENAFIL 100 MG/1
100 TABLET, FILM COATED ORAL
Qty: 10 TABLET | Refills: 11 | Status: SHIPPED | OUTPATIENT
Start: 2021-10-03 | End: 2022-03-03 | Stop reason: SDUPTHER

## 2021-10-04 ENCOUNTER — PATIENT MESSAGE (OUTPATIENT)
Dept: FAMILY MEDICINE | Facility: CLINIC | Age: 71
End: 2021-10-04

## 2021-10-06 ENCOUNTER — HOSPITAL ENCOUNTER (OUTPATIENT)
Dept: RADIOLOGY | Facility: HOSPITAL | Age: 71
Discharge: HOME | End: 2021-10-06
Attending: FAMILY MEDICINE
Payer: MEDICARE

## 2021-10-06 DIAGNOSIS — M48.07 SPINAL STENOSIS, LUMBOSACRAL REGION: ICD-10-CM

## 2021-10-06 PROCEDURE — G1004 MRI LUMBAR SPINE WITHOUT CONTRAST: ICD-10-PCS | Mod: ,,, | Performed by: RADIOLOGY

## 2021-10-06 PROCEDURE — G1004 CDSM NDSC: HCPCS

## 2021-10-06 PROCEDURE — G1004 CDSM NDSC: HCPCS | Mod: ,,, | Performed by: RADIOLOGY

## 2021-10-06 PROCEDURE — 72148 MRI LUMBAR SPINE W/O DYE: CPT | Mod: 26,MG,, | Performed by: RADIOLOGY

## 2021-10-06 PROCEDURE — 72148 MRI LUMBAR SPINE WITHOUT CONTRAST: ICD-10-PCS | Mod: 26,MG,, | Performed by: RADIOLOGY

## 2021-10-07 ENCOUNTER — PATIENT MESSAGE (OUTPATIENT)
Dept: FAMILY MEDICINE | Facility: CLINIC | Age: 71
End: 2021-10-07

## 2021-10-22 ENCOUNTER — PATIENT MESSAGE (OUTPATIENT)
Dept: FAMILY MEDICINE | Facility: CLINIC | Age: 71
End: 2021-10-22
Payer: MEDICARE

## 2021-10-22 DIAGNOSIS — Z12.12 ENCOUNTER FOR COLORECTAL CANCER SCREENING: Primary | ICD-10-CM

## 2021-10-22 DIAGNOSIS — Z12.11 ENCOUNTER FOR COLORECTAL CANCER SCREENING: Primary | ICD-10-CM

## 2021-10-29 ENCOUNTER — CLINICAL SUPPORT (OUTPATIENT)
Dept: FAMILY MEDICINE | Facility: CLINIC | Age: 71
End: 2021-10-29
Payer: MEDICARE

## 2021-10-29 DIAGNOSIS — Z23 FLU VACCINE NEED: Primary | ICD-10-CM

## 2021-10-29 PROCEDURE — 90694 VACC AIIV4 NO PRSRV 0.5ML IM: CPT | Mod: PBBFAC,PO

## 2021-10-29 PROCEDURE — G0008 ADMIN INFLUENZA VIRUS VAC: HCPCS | Mod: PBBFAC,PO

## 2021-10-29 PROCEDURE — 99999 PR PBB SHADOW E&M-EST. PATIENT-LVL I: CPT | Mod: PBBFAC,,,

## 2021-10-29 PROCEDURE — 99211 OFF/OP EST MAY X REQ PHY/QHP: CPT | Mod: PBBFAC,PO

## 2021-10-29 PROCEDURE — 99999 PR PBB SHADOW E&M-EST. PATIENT-LVL I: ICD-10-PCS | Mod: PBBFAC,,,

## 2021-11-17 ENCOUNTER — PATIENT MESSAGE (OUTPATIENT)
Dept: FAMILY MEDICINE | Facility: CLINIC | Age: 71
End: 2021-11-17
Payer: MEDICARE

## 2021-11-18 ENCOUNTER — HOSPITAL ENCOUNTER (EMERGENCY)
Facility: HOSPITAL | Age: 71
Discharge: HOME | End: 2021-11-18
Attending: EMERGENCY MEDICINE
Payer: MEDICARE

## 2021-11-18 VITALS
RESPIRATION RATE: 18 BRPM | HEIGHT: 69 IN | BODY MASS INDEX: 31.84 KG/M2 | SYSTOLIC BLOOD PRESSURE: 155 MMHG | OXYGEN SATURATION: 98 % | HEART RATE: 68 BPM | WEIGHT: 215 LBS | TEMPERATURE: 98 F | DIASTOLIC BLOOD PRESSURE: 72 MMHG

## 2021-11-18 DIAGNOSIS — M25.519 SHOULDER PAIN: ICD-10-CM

## 2021-11-18 DIAGNOSIS — M19.012 OSTEOARTHRITIS OF LEFT SHOULDER, UNSPECIFIED OSTEOARTHRITIS TYPE: Primary | ICD-10-CM

## 2021-11-18 DIAGNOSIS — R52 PAIN: ICD-10-CM

## 2021-11-18 PROCEDURE — 93010 EKG 12-LEAD: ICD-10-PCS | Mod: ,,, | Performed by: INTERNAL MEDICINE

## 2021-11-18 PROCEDURE — 63600175 PHARM REV CODE 636 W HCPCS: Performed by: PHYSICIAN ASSISTANT

## 2021-11-18 PROCEDURE — 96372 THER/PROPH/DIAG INJ SC/IM: CPT

## 2021-11-18 PROCEDURE — 93010 ELECTROCARDIOGRAM REPORT: CPT | Mod: ,,, | Performed by: INTERNAL MEDICINE

## 2021-11-18 PROCEDURE — 93005 ELECTROCARDIOGRAM TRACING: CPT

## 2021-11-18 PROCEDURE — 99284 EMERGENCY DEPT VISIT MOD MDM: CPT | Mod: 25

## 2021-11-18 RX ORDER — KETOROLAC TROMETHAMINE 30 MG/ML
30 INJECTION, SOLUTION INTRAMUSCULAR; INTRAVENOUS
Status: COMPLETED | OUTPATIENT
Start: 2021-11-18 | End: 2021-11-18

## 2021-11-18 RX ORDER — PREDNISONE 20 MG/1
60 TABLET ORAL
Status: COMPLETED | OUTPATIENT
Start: 2021-11-18 | End: 2021-11-18

## 2021-11-18 RX ORDER — MELOXICAM 7.5 MG/1
7.5 TABLET ORAL DAILY
Qty: 12 TABLET | Refills: 0 | Status: SHIPPED | OUTPATIENT
Start: 2021-11-18 | End: 2022-01-19 | Stop reason: SDUPTHER

## 2021-11-18 RX ORDER — PREDNISONE 20 MG/1
60 TABLET ORAL DAILY
Qty: 9 TABLET | Refills: 0 | Status: SHIPPED | OUTPATIENT
Start: 2021-11-18 | End: 2021-11-21

## 2021-11-18 RX ADMIN — PREDNISONE 60 MG: 20 TABLET ORAL at 01:11

## 2021-11-18 RX ADMIN — KETOROLAC TROMETHAMINE 30 MG: 30 INJECTION, SOLUTION INTRAMUSCULAR; INTRAVENOUS at 01:11

## 2021-11-22 DIAGNOSIS — I48.0 PAROXYSMAL ATRIAL FIBRILLATION: Chronic | ICD-10-CM

## 2021-11-23 RX ORDER — FLECAINIDE ACETATE 100 MG/1
100 TABLET ORAL EVERY 12 HOURS
Qty: 180 TABLET | Refills: 3 | OUTPATIENT
Start: 2021-11-23 | End: 2021-11-29 | Stop reason: SDUPTHER

## 2021-11-26 ENCOUNTER — PATIENT MESSAGE (OUTPATIENT)
Dept: ELECTROPHYSIOLOGY | Facility: CLINIC | Age: 71
End: 2021-11-26
Payer: MEDICARE

## 2021-11-26 DIAGNOSIS — I48.0 PAROXYSMAL ATRIAL FIBRILLATION: Chronic | ICD-10-CM

## 2021-11-26 RX ORDER — FLECAINIDE ACETATE 100 MG/1
100 TABLET ORAL EVERY 12 HOURS
Qty: 180 TABLET | Refills: 3 | Status: CANCELLED | OUTPATIENT
Start: 2021-11-26

## 2021-11-29 DIAGNOSIS — I48.0 PAROXYSMAL ATRIAL FIBRILLATION: Chronic | ICD-10-CM

## 2021-11-29 RX ORDER — FLECAINIDE ACETATE 100 MG/1
100 TABLET ORAL EVERY 12 HOURS
Qty: 180 TABLET | Refills: 3 | Status: SHIPPED | OUTPATIENT
Start: 2021-11-29 | End: 2022-01-10 | Stop reason: SDUPTHER

## 2021-12-12 DIAGNOSIS — I10 ESSENTIAL HYPERTENSION: ICD-10-CM

## 2021-12-12 DIAGNOSIS — Z87.19 HISTORY OF GASTROESOPHAGEAL REFLUX (GERD): ICD-10-CM

## 2021-12-12 DIAGNOSIS — M48.07 SPINAL STENOSIS, LUMBOSACRAL REGION: ICD-10-CM

## 2021-12-14 RX ORDER — AMLODIPINE BESYLATE 5 MG/1
5 TABLET ORAL DAILY
Qty: 90 TABLET | Refills: 0 | Status: SHIPPED | OUTPATIENT
Start: 2021-12-14 | End: 2022-01-04 | Stop reason: SDUPTHER

## 2021-12-20 RX ORDER — TRAMADOL HYDROCHLORIDE 50 MG/1
50 TABLET ORAL EVERY 12 HOURS PRN
Qty: 60 TABLET | Refills: 0 | Status: SHIPPED | OUTPATIENT
Start: 2021-12-20 | End: 2022-03-29 | Stop reason: SDUPTHER

## 2021-12-20 RX ORDER — OMEPRAZOLE 20 MG/1
20 CAPSULE, DELAYED RELEASE ORAL DAILY
Qty: 90 CAPSULE | Refills: 0 | Status: SHIPPED | OUTPATIENT
Start: 2021-12-20 | End: 2022-05-30 | Stop reason: SDUPTHER

## 2022-01-04 DIAGNOSIS — I10 ESSENTIAL HYPERTENSION: ICD-10-CM

## 2022-01-04 DIAGNOSIS — Z79.01 ON CONTINUOUS ORAL ANTICOAGULATION: ICD-10-CM

## 2022-01-04 DIAGNOSIS — I48.0 PAROXYSMAL ATRIAL FIBRILLATION: Chronic | ICD-10-CM

## 2022-01-04 DIAGNOSIS — J06.9 UPPER RESPIRATORY TRACT INFECTION, UNSPECIFIED TYPE: ICD-10-CM

## 2022-01-04 DIAGNOSIS — I48.20 CHRONIC ATRIAL FIBRILLATION: ICD-10-CM

## 2022-01-04 NOTE — PROGRESS NOTES
Mr. Pacheco is a patient of Dr. Parikh and was last seen in clinic 3/29/2021.      Subjective:   Patient ID:  Omar Pacheco is a 71 y.o. male who presents for follow-up of Atrial Fibrillation  .     HPI:    Mr. Pacheco is a 71 y.o. male with pAF, HTN, ERVIN here for follow up.     Background:    PAF -- one episode in 2006; none until 2020!  HTN on meds  ERVIN, pending re-evaluation for CPAP    2006, had AF. Underwent MENG/DCCV. Started on flecainide 50 bid and pradaxa.  He's had no problems until 4 days ago; developed palpitations and dizziness again.  He's able to do cardio exercise 3-4x/week without problems.  He's 100% compliant with his Pradaxa.    ECG is AF  Increase flecainide to 100 bid.  Return on Monday. If still in AF, then MENG/DCCV.    11/9/2020: ECG on admit demonstrated sinus rhythm.  MENG/DCCV cancelled.  3/29/2021: Doing well from arrhythmia standpoint, with no more AF on increased dose of flecainide (100mg BID). Plan to continue current regimen, consider ablation if he has breakthrough on this dose. EKG with stable intervals.  He is on pradaxa and toprol.    Update (01/10/2022):    Today his primary complaint is left shoulder pain. Seeing orthopedics this week. Otherwise some positional LH. No CP, LAWSON, palpitations, syncope.    He is currently taking pradaxa 150mg BID for stroke prophylaxis and denies significant bleeding episodes. He is currently being treated with flecainide 100mg BID for rhythm control and metoprolol succinate 50mg daily for HR control.  Kidney function is stable, with a creatinine of 1 on 12/8/2020.    I have personally reviewed the patient's EKG today, which shows sinus rhythm at 58bpm. AZ interval is 190. QRS is 88. QTc is 420.    Relevant Cardiac Test Results:    2D Echo (12/9/2020):  · Moderate left atrial enlargement.  · The left ventricle is normal in size with normal systolic function. The estimated ejection fraction is 60%.  · Indeterminate diastolic function.  · Normal right  "ventricular size with normal right ventricular systolic function.  · Mild mitral regurgitation.  · Mild pulmonic regurgitation.  · The estimated PA systolic pressure is 29 mmHg.  · Normal central venous pressure (3 mmHg).    Current Outpatient Medications   Medication Sig    acetaminophen (TYLENOL ORAL) Take 500 mg by mouth as needed.    amLODIPine (NORVASC) 5 MG tablet Take 1 tablet (5 mg total) by mouth once daily.    ammonium lactate (AMLACTIN) 12 % lotion Apply topically daily as needed (Dry Skin).    azelastine (ASTELIN) 137 mcg (0.1 %) nasal spray 1 spray (137 mcg total) by Nasal route 2 (two) times daily.    cyanocobalamin (VITAMIN B-12) 1000 MCG tablet Take 100 mcg by mouth once daily.    dabigatran etexilate (PRADAXA) 150 mg Cap Take 1 capsule (150 mg total) by mouth 2 (two) times daily. "Do NOT break, chew, or open capsules."    flecainide (TAMBOCOR) 100 MG Tab Take 1 tablet (100 mg total) by mouth every 12 (twelve) hours.    insulin syringe-needle U-100 (EASY COMFORT INSULIN SYRINGE) 1 mL 31 gauge x 5/16 Syrg Inject 1 time daily prn ED    levocetirizine (XYZAL) 5 MG tablet Take 1 tablet (5 mg total) by mouth every evening.    losartan (COZAAR) 25 MG tablet Take 1 tablet (25 mg total) by mouth once daily.    meloxicam (MOBIC) 7.5 MG tablet Take 1 tablet (7.5 mg total) by mouth once daily.    methocarbamoL (ROBAXIN) 500 MG Tab Take 500 mg by mouth as needed.    metoprolol succinate (TOPROL-XL) 50 MG 24 hr tablet Take 1 tablet (50 mg total) by mouth once daily.    mirtazapine (REMERON) 30 MG tablet Take 30 mg by mouth every evening.    multivitamin (THERAGRAN) per tablet Take 1 tablet by mouth once daily.    omeprazole (PRILOSEC) 20 MG capsule Take 1 capsule (20 mg total) by mouth once daily.    papaverine 30 mg/mL injection ADD: Phentolamine 6 mg/ml ADD: PGE1 60 mcg. 30/6/60. Sig: Start w/ 40 units. Inject as directed into proximal lateral aspect of penis.    promethazine-dextromethorphan " (PROMETHAZINE-DM) 6.25-15 mg/5 mL Syrp TAKE 5 ML BY MOUTH EVERY NIGHT AS NEEDED FOR COUGH    sildenafiL (VIAGRA) 100 MG tablet Take 1 tablet (100 mg total) by mouth as needed for Erectile Dysfunction (1 tablet 1 hr prior to intercourse.).    terazosin (HYTRIN) 10 MG capsule Take 1 capsule (10 mg total) by mouth every evening.    traMADoL (ULTRAM) 50 mg tablet Take 1 tablet (50 mg total) by mouth every 12 (twelve) hours as needed for Pain.    triamcinolone acetonide 0.1% (KENALOG) 0.1 % cream Apply topically 2 (two) times daily.    UNABLE TO FIND Start WITH 40 UNITS. Inject as directed into proximal lateral aspect OF PENIS    VITAMIN B COMPLEX (B COMPLETE ORAL) Take by mouth once daily.    benzonatate (TESSALON) 200 MG capsule TAKE 1 CAPSULE(200 MG) BY MOUTH THREE TIMES DAILY AS NEEDED FOR COUGH     No current facility-administered medications for this visit.       Review of Systems   Constitutional: Negative for malaise/fatigue.   Cardiovascular: Negative for chest pain, dyspnea on exertion, irregular heartbeat, leg swelling and palpitations.   Respiratory: Negative for shortness of breath.    Hematologic/Lymphatic: Negative for bleeding problem.   Skin: Negative for rash.   Musculoskeletal: Positive for joint pain (left shoulder). Negative for myalgias.   Gastrointestinal: Negative for hematemesis, hematochezia and nausea.   Genitourinary: Negative for hematuria.   Neurological: Positive for light-headedness.   Psychiatric/Behavioral: Negative for altered mental status.   Allergic/Immunologic: Negative for persistent infections.       Objective:          BP (!) 160/80   Pulse 63   Wt 100.5 kg (221 lb 9 oz)   BMI 32.72 kg/m²     Physical Exam  Vitals and nursing note reviewed.   Constitutional:       Appearance: Normal appearance. He is well-developed and well-nourished.   HENT:      Head: Normocephalic.      Nose: Nose normal.   Eyes:      Pupils: Pupils are equal, round, and reactive to light.    Cardiovascular:      Rate and Rhythm: Normal rate and regular rhythm.   Pulmonary:      Effort: No respiratory distress.      Breath sounds: Normal breath sounds.   Musculoskeletal:         General: No edema. Normal range of motion.   Skin:     General: Skin is warm and dry.      Findings: No erythema.   Neurological:      Mental Status: He is alert and oriented to person, place, and time.   Psychiatric:         Mood and Affect: Mood and affect normal.         Speech: Speech normal.         Behavior: Behavior normal.           Lab Results   Component Value Date     12/08/2020    K 4.1 12/08/2020    MG 2.3 06/14/2006    BUN 14 12/08/2020    CREATININE 1.0 12/08/2020    ALT 22 09/22/2020    AST 26 09/22/2020    HGB 13.0 (L) 11/06/2020    HCT 41.3 11/06/2020    TSH 0.944 12/08/2020    LDLCALC 74.8 09/22/2020       Recent Labs   Lab 11/06/20  1537   INR 1.0         Assessment:     1. Paroxysmal atrial fibrillation    2. Essential hypertension    3. Sinus bradycardia    4. Morbid (severe) obesity due to excess calories    5. Obesity (BMI 30.0-34.9)    6. On continuous oral anticoagulation      Plan:     In summary, Mr. Pacheco is a 71 y.o. male with pAF, HTN, ERVIN here for follow up.   Doing well from rhythm standpoint, maintaining SR on flecainide. EKG with normal intervals. CHADSVASc 2 on pradaxa for CVA prophylaxis.  Pt reports some LH - will reduce metoprolol. BP elevated in clinic. He says he did not take his BP meds yet today.    Reduce metoprolol to 25mg daily  Continue other medications  RTC 6 mo, sooner if needed.    *A copy of this note has been sent to Dr. Parikh*    Follow up in about 6 months (around 7/10/2022).    ------------------------------------------------------------------    RYANNE Avila, NP-C  Cardiac Electrophysiology

## 2022-01-05 RX ORDER — DABIGATRAN ETEXILATE 150 MG/1
150 CAPSULE ORAL 2 TIMES DAILY
Qty: 180 CAPSULE | Refills: 3 | Status: SHIPPED | OUTPATIENT
Start: 2022-01-05 | End: 2022-07-19 | Stop reason: SDUPTHER

## 2022-01-07 ENCOUNTER — OFFICE VISIT (OUTPATIENT)
Dept: URGENT CARE | Facility: CLINIC | Age: 72
End: 2022-01-07
Payer: MEDICARE

## 2022-01-07 VITALS
HEART RATE: 64 BPM | TEMPERATURE: 98 F | WEIGHT: 215 LBS | SYSTOLIC BLOOD PRESSURE: 190 MMHG | RESPIRATION RATE: 16 BRPM | BODY MASS INDEX: 31.84 KG/M2 | HEIGHT: 69 IN | DIASTOLIC BLOOD PRESSURE: 89 MMHG | OXYGEN SATURATION: 96 %

## 2022-01-07 DIAGNOSIS — G89.29 CHRONIC LEFT SHOULDER PAIN: Primary | ICD-10-CM

## 2022-01-07 DIAGNOSIS — M25.512 CHRONIC LEFT SHOULDER PAIN: Primary | ICD-10-CM

## 2022-01-07 PROCEDURE — 99213 OFFICE O/P EST LOW 20 MIN: CPT | Mod: S$GLB,,, | Performed by: FAMILY MEDICINE

## 2022-01-07 PROCEDURE — 99213 PR OFFICE/OUTPT VISIT, EST, LEVL III, 20-29 MIN: ICD-10-PCS | Mod: S$GLB,,, | Performed by: FAMILY MEDICINE

## 2022-01-07 NOTE — PROGRESS NOTES
"Subjective:       Patient ID: Omar Pacheco is a 71 y.o. male.    Vitals:  height is 5' 9" (1.753 m) and weight is 97.5 kg (215 lb). His temperature is 98 °F (36.7 °C). His blood pressure is 190/89 (abnormal) and his pulse is 64. His respiration is 16 and oxygen saturation is 96%.     Chief Complaint: Shoulder Pain    Patient was pulling sheets over him last night and he felt like he dislocated it/ He states that he could not move his shoulder. HE took tramadol but uit does not help  Provider note begins below:  Pt reports he was evaluated in November for this left shoulder pain and dx with bursitis, arthritis and tendonitis. He reports he was given a pain shot in the ED and prednisone, and had relief with this and is requesting this same treatment. He is not interested in any images. he was pulling the sheets over on his bed last night and reports had pain 10/10, that is tender, and worse with mvmt. He reports since the nov ED visit the pain did subside, but has been gradually returning since that visit. He reports he has an ortho appt in march. He already has voltaren at home. He was given tramadol from pcp. Denies f/c, or warm, red joint. Denies any hx of septic joint.    Shoulder Pain   This is a chronic problem. The current episode started more than 1 month ago. There has been a history of trauma. The problem occurs constantly. The quality of the pain is described as sharp. The pain is at a severity of 10/10. The pain is severe. Associated symptoms include joint locking, a limited range of motion, stiffness and tingling. Pertinent negatives include no fever, headaches, inability to bear weight, itching, joint swelling, numbness or visual symptoms. He has tried heat, NSAIDS, oral narcotics, rest, OTC ointments and OTC pain meds for the symptoms. The treatment provided no relief. Family history includes arthritis. There is no history of diabetes, gout, Injuries to Extremity or migraines.       Constitution: " Negative for activity change, appetite change, chills, sweating, fatigue, fever, unexpected weight change and generalized weakness.   Musculoskeletal: Positive for pain, joint pain, abnormal ROM of joint and arthritis. Negative for trauma, joint swelling, gout, back pain, pain with walking, muscle cramps, muscle ache and history of spine disorder.   Neurological: Negative for headaches and numbness.       Objective:      Physical Exam   Constitutional: He is oriented to person, place, and time. He appears well-developed and well-nourished.  Non-toxic appearance. He does not appear ill. No distress.   HENT:   Head: Normocephalic and atraumatic.   Ears:   Right Ear: External ear normal.   Left Ear: External ear normal.   Nose: Nose normal.   Mouth/Throat: Oropharynx is clear and moist.   Eyes: Conjunctivae, EOM and lids are normal. Pupils are equal, round, and reactive to light.   Neck: Trachea normal and phonation normal. Neck supple.   Cardiovascular:   Pulses:       Radial pulses are 2+ on the right side and 2+ on the left side.   Musculoskeletal:      Left shoulder: He exhibits decreased range of motion, tenderness, bony tenderness and decreased strength. He exhibits normal pulse.   Neurological: He is alert and oriented to person, place, and time.   Skin: Skin is warm, dry, intact and not diaphoretic.   Psychiatric: He has a normal mood and affect. His speech is normal and behavior is normal. Judgment and thought content normal. Cognition and memory  Nursing note and vitals reviewed.        Assessment:       1. Chronic left shoulder pain          Plan:       Assisted pt with appt, scheduled him for 1/10 with his orthopedic  Discussed holding off on toradol as he takes pradaxa, he agrees, no steroids, risk of steroids discussed in detail, and he agreed, advised he can try CBD ointment, continue voltaren and take tylenol. He agrees.  No sign of septic joint at this time joint is not red, warm, he has no  f/c.    Discussed results/diagnosis/plan with patient in clinic. Strict precautions given to patient to monitor for worsening signs and symptoms. Advised to follow up with PCP or specialist.    Explained side effects of medications prescribed with patient and informed him/her to discontinue use if he/she has any side effects and to inform UC or PCP if this occurs. All questions answered. Strict ED verses clinic return precautions stressed and given in depth. Advised if symptoms worsens of fail to improve he/she should go to the Emergency Room. Discharge and follow-up instructions given verbally/printed with the patient who expressed understanding and willingness to comply with my recommendations. Patient voiced understanding and in agreement with current treatment plan. Patient exits the exam room in no acute distress. Conversant and engaged during discharge discussion, verbalized understanding.      Chronic left shoulder pain  -     Ambulatory referral/consult to Orthopedics           Medical Decision Making:   History:   Old Medical Records: I decided to obtain old medical records.  Old Records Summarized: records from clinic visits.       Patient Instructions   General Discharge Instructions   PLEASE READ YOUR DISCHARGE INSTRUCTIONS ENTIRELY AS IT CONTAINS IMPORTANT INFORMATION.  If you were prescribed a narcotic or controlled medication, do not drive or operate heavy equipment or machinery while taking these medications.  If you were prescribed antibiotics, please take them to completion.  You must understand that you've received an Urgent Care treatment only and that you may be released before all your medical problems are known or treated. You, the patient, will arrange for follow up care as instructed.    OVER THE COUNTER RECOMMENDATIONS/SUGGESTIONS.    Make sure to stay well hydrated.    Use Nasal Saline to mechanically move any post nasal drip from your eustachian tube or from the back of your  throat.    Use warm salt water gargles to ease your throat pain. Warm salt water gargles as needed for sore throat- 1/2 tsp salt to 1 cup warm water, gargle as desired.    Use an antihistamine such as Claritin, Zyrtec or Allegra to dry you out.    Use pseudoephedrine (behind the counter) to decongest. Pseudoephedrine 30 mg up to 240 mg /day. It can raise your blood pressure and give you palpitations.    Use mucinex (guaifenesin) to break up mucous up to 2400mg/day to loosen any mucous.    The mucinex DM pill has a cough suppressant that can be sedating. It can be used at night to stop the tickle at the back of your throat.    You can use Mucinex D (it has guaifenesin and a high dose of pseudoephedrine) in the mornings to help decongest.    Use Afrin in each nare for no longer than 3 days, as it is addictive. It can also dry out your mucous membranes and cause elevated blood pressure. This is especially useful if you are flying.    Use Flonase 1-2 sprays/nostril per day. It is a local acting steroid nasal spray, if you develop a bloody nose, stop using the medication immediately.    Sometimes Nyquil at night is beneficial to help you get some rest, however it is sedating and it does have an antihistamine, and tylenol.    Honey is a natural cough suppressant that can be used.    Tylenol up to 4,000 mg a day is safe for short periods and can be used for body aches, pain, and fever. However in high doses and prolonged use it can cause liver irritation.    Ibuprofen is a non-steroidal anti-inflammatory that can be used for body aches, pain, and fever.However it can also cause stomach irritation if over used.     Follow up with your PCP or specialty clinic as instructed in the next 2-3 days if not improved or as needed. You can call [phone] to schedule an appointment with appropriate provider.      If you condition worsens, we recommend that you receive another evaluation at the emergency room immediately or  contact your primary medical clinic's after hours call service to discuss your concerns.      Please return here or go to the Emergency Department for any concerns or worsening condition.   You can also call [phone] to schedule an appointment with the appropriate provider.    Please return here or go to the Emergency Department for any concerns or worsening of condition.    Thank you for choosing [facility]!    Our goal in the Urgent Care is to always provide outstanding medical care. You may receive a survey by mail or e-mail in the next week regarding your experience today. We would greatly appreciate you completing and returning the survey. Your feedback provides us with a way to recognize our staff who provide very good care, and it helps us learn how to improve when your experience was below our aspiration of excellence.      We appreciate you trusting us with your medical care. We hope you feel better soon. We will be happy to take care of you for all of your future medical needs.    Sincerely,    MICHELE Donnelly    Patient Education       Chronic Pain   The Basics   Written by the doctors and editors at [facility]   What is chronic pain? -- Chronic pain is pain that lasts longer than 3 to 6 months. In many cases, this means that pain continues even after the injury or condition that first caused it has healed.  What causes chronic pain? -- The cause of chronic pain is not always clear. Sometimes it is caused by an ongoing medical problem, such as arthritis or diabetic neuropathy (a form of nerve damage from diabetes). But doctors cannot always find the cause of chronic pain.  In some cases, people with chronic pain must accept that their pain will never be explained. This means that they have to work with their healthcare team to address the pain, even if they don't know its cause.  What are the symptoms of chronic pain? -- The main symptom of chronic pain is, of course, pain. But the pain  "can affect the body in different ways. Some people have aches deep inside their muscle or bone. Some people have stabbing or shooting pain, often with tingling or numbness. And others have dull, throbbing pain.  People who have chronic pain might have a hard time doing their usual activities, such as bathing or dressing. This can lead to depression and anxiety, and it can cause problems with sleep.  Will I need tests? -- When you first start having pain, your doctor might do tests to figure out the cause. You might get:  · Blood tests to check for infection, signs of inflammation, or diseases that can cause pain  · X-rays or other imaging tests to check for bone fractures, joint damage, cancer, or other changes in your body that could cause pain  · Nerve tests to check whether the nerves are working normally  However, tests cannot always show the cause of pain. Scientists think that in some people, the pain signals in the brain stop working normally. The signals get "stuck" in the on position, even when the source of pain is gone.  How is chronic pain treated? -- Treatments for chronic pain include both medicines and activities. No single treatment works for everyone. Your doctor or nurse will help you find the right mix of treatments for you. Treatment options include:  · Medicines to relieve pain, improve sleep, or improve mood  · Physical therapy to learn exercises and stretches  · Working with a counselor  · Relaxation therapy  · Massage therapy  · Injections (shots) of numbing or pain-relieving medicines into the spine or area with pain  · Acupuncture  · Devices that affect nerve signals  · Surgery  To find the best treatment for you:  · Be open to trying new treatments and combinations of treatments. Sometimes you have to try a few different options before you find one that works best.  · Set realistic goals for your treatment. Even if you can't completely get rid of your pain, you might be able to control it " enough so that you can do the things you want to do.  If your doctor suggests a medicine that seems out of place, keep an open mind. Sometimes, doctors treat pain with medicines made to treat other medical problems. For example, doctors can use medicines for depression to treat pain because they work on areas of the brain that process pain. Doctors can also use medicines for seizures to treat pain, because they help with overactive nerves.  Keep in mind, too, that many people need a team to help manage their care. A treatment team usually includes:  · Doctors or specialists  · A physical therapist  · Someone trained in mental health (such as a  or counselor)  Is there anything I can do on my own to feel better? -- Yes. Some things to try include:  · Use a heating pad or a cold pack on the painful area. Check with your doctor before trying this to make sure it is OK for your individual condition.  · Practice relaxing. You can learn methods to relax your body, such as doing deep breathing exercises. Ask your doctor or nurse about these methods. Relaxing the mind can help with how the body feels pain. People can learn to quiet their pain or make it less bothersome.  · Stay as active as possible. Walking, swimming, lopez chi (a kind of martial art), or biking can all help ease muscle and joint pain. If you are not active, your pain might get worse.  If you haven't been active for a while, start slowly. Make small increases in the intensity and amount of time you spend exercising. If exercising increases your pain, talk with your doctor. They might recommend a program that can help you get more active.  · If you feel depressed, talk to your doctor or nurse about it. Chronic pain and depression often go together, and each can make the other worse. Getting treatment for your depression can make it easier to cope with your pain.  All topics are updated as new evidence becomes available and our peer review process  is complete.  This topic retrieved from [employer] on: Sep 21, 2021.  Topic 35065 Version 14.0  Release: 29.4.2 - C29.263  © 2021 UpToDate, Inc. and/or its affiliates. All rights reserved.  Consumer Information Use and Disclaimer   This information is not specific medical advice and does not replace information you receive from your health care provider. This is only a brief summary of general information. It does NOT include all information about conditions, illnesses, injuries, tests, procedures, treatments, therapies, discharge instructions or life-style choices that may apply to you. You must talk with your health care provider for complete information about your health and treatment options. This information should not be used to decide whether or not to accept your health care provider's advice, instructions or recommendations. Only your health care provider has the knowledge and training to provide advice that is right for you. The use of this information is governed by the [employer] End User License Agreement, available at https://www.[employer]/en/solutions/[employer]/about/zita.The use of [employer] content is governed by the [employer] Terms of Use. ©2021 UpToDate, Inc. All rights reserved.  Copyright   © 2021 UpToDate, Inc. and/or its affiliates. All rights reserved.  Patient Education       Joint Pain   About this topic   Joint pain is sometimes called arthralgia. You have pain where one or more bones are connected.       What are the causes?   Joint pain may be caused by:  · Injury  · Infection  · Health problems like an immune disorder, or other illness  · Problems with cartilage, ligaments, or tendons  What can make this more likely to happen?   · Older age  · Doing the same motion over and over with a joint  · Being overweight  · Injuries  What are the main signs?   You may have mild or very bad pain. The pain may be constant or it may come and go. You may have trouble moving the joint that hurts. The  pain may burn, stab, or throb. It may be sharp or dull. Your joint may feel stiff, numb, or tingly. It may be hard to move or put weight on a joint if the pain is bad.  How does the doctor diagnose this health problem?   The doctor will ask you questions about your history and do an exam. The doctor will check your joints with care and may order:  · Lab tests  · X-rays  How does the doctor treat this health problem?   Your care is based on what is causing your pain. The doctor will also treat it based on how bad your pain is and where your pain is found on your body. The doctor may suggest you:  · Limit your activity.  · Do stretching exercises.  · Your doctor may want you to start an exercise program. Some kinds of exercise, like swimming, may help ease pain. It can keep your muscles strong and helps you maintain a healthy weight.  · Place an ice pack or a bag of frozen peas wrapped in a towel over the painful part. Never put ice right on the skin. Do not leave the ice on more than 10 to 15 minutes at a time.  · Use heat. Put a heating pad on your painful part for no more than 20 minutes at a time. Never go to sleep with a heating pad on as this can cause burns.  What drugs may be needed?   The doctor may order drugs to:  · Help with pain and swelling  Your doctor may instruct you to take:  · Drugs like ibuprofen or naproxen for pain. These are all nonsteroidal anti-inflammatory drugs (NSAIDS). Do not take more than one type of these drugs at the same time.  · Drugs for pain such as acetaminophen.  The doctor may give you a shot of an anti-inflammatory drug called a corticosteroid. This will help with swelling.  Helpful tips   · Stay active and work out to keep your muscles strong and flexible.  · Keep a healthy weight. Being heavy puts more stress on your joints. This makes them more likely to hurt.  · Warm up slowly and stretch before you work out. Use good ways to train, such as slowly adding to how far you run.  "Do not work out if you are overly tired. Take extra care if working out in cold weather.  Last Reviewed Date   2020-10-12  Consumer Information Use and Disclaimer   This information is not specific medical advice and does not replace information you receive from your health care provider. This is only a brief summary of general information. It does NOT include all information about conditions, illnesses, injuries, tests, procedures, treatments, therapies, discharge instructions or life-style choices that may apply to you. You must talk with your health care provider for complete information about your health and treatment options. This information should not be used to decide whether or not to accept your health care providers advice, instructions or recommendations. Only your health care provider has the knowledge and training to provide advice that is right for you.  Copyright   Copyright © 2021 UpToDate, Inc. and its affiliates and/or licensors. All rights reserved.  Patient Education       Shoulder Pain Discharge Instructions   About this topic   Your shoulder joint is made of 3 bones. These are the upper arm bone, the shoulder blade, and the collarbone. The shoulder is a "ball and socket" joint. The "ball" part of the joint is the top part of your upper arm bone. The "socket" part of your joint is a cup shaped indentation in your shoulder blade. Because of this, the shoulder can move in many ways. Strong bands of tissue called ligaments help hold the shoulder in place. Muscles and tendons also hold it in place.  You can have pain in your shoulder for many reasons. It may be hard for the doctor to tell exactly where the pain is coming from. You can have pain in your muscles, bones, or joints. It can also happen in your tendons and ligaments which connect these together.  Causes of this kind of pain may include:  · Overuse or using muscles in the same way over and over  · Trauma from falls, accidents, direct " blows to muscles, and injuries such as bone breaks, sprains, or dislocations  · Strain on your muscles from bad posture           What care is needed at home?   · Ask your doctor what you need to do when you go home. Make sure you ask questions if you do not understand what the doctor says. This way you will know what you need to do.  · Rest. Allow your injury to heal before you do slow movements.  · Place an ice pack or a bag of frozen peas wrapped in a towel over the painful part. Never put ice right on the skin. Do not leave the ice on more than 10 to 15 minutes at a time.  · Prop your arm on pillows to help with swelling.  · Your doctor may want you to use a sling, strap, or sleeve to keep your shoulder from moving.  · Heat may be used but not right after an injury. Heat can make swelling worse. If your doctor tells you to use heat, put a heating pad on your shoulder for no more than 20 minutes at a time. Never go to sleep with a heating pad on as this can cause burns.  · Do range of motion exercises as your therapist or doctor teaches you to do. As your shoulder heals, you will be given more exercises to stretch and strengthen your shoulder.  What follow-up care is needed?   · Your doctor may ask you to make visits to the office to check on your progress. Be sure to keep all these visits.  · Your doctor may send you to physical therapy or occupational therapy to help you regain use of your shoulder sooner.  What drugs may be needed?   The doctor may order drugs to:  · Help with pain and swelling  The doctor may give you a shot of an anti-inflammatory drug called a corticosteroid. This will help with swelling. Talk with your doctor about the risks of this shot.  Will physical activity be limited?   Your doctor may ask you to rest and limit your activity. Based on how bad your shoulder injury is, this could last for a few days to a number of weeks.  What can be done to prevent this health problem?   · Stay active  and work out to keep your muscles strong and flexible.  · Warm up slowly and stretch your muscles before you work out. Do not work out if you are overly tired. Take extra care if working out in cold weather.  · Slowly increase the amount of time you work out. If you are using weights, slowly increase the weight to strengthen your muscles.  · Wear protection when playing sports.  · Take breaks often when doing things that use repeat movements.  When do I need to call the doctor?   · Pain or swelling gets worse  · Hand feels cold or numb  · You are not feeling better in 2 or 3 days or you are feeling worse  Teach Back: Helping You Understand   The Teach Back Method helps you understand the information we are giving you. After you talk with the staff, tell them in your own words what you learned. This helps to make sure the staff has described each thing clearly. It also helps to explain things that may have been confusing. Before going home, make sure you can do these:  · I can tell you about my condition.  · I can tell you what may help ease my pain.  · I can tell you what I will do if I have more pain or swelling or my fingers are cool or blue.  Where can I learn more?   American [employer]  http://orthoinfo.aaos.org/PDFs/C54198.pdf   Last Reviewed Date   2020-09-25  Consumer Information Use and Disclaimer   This information is not specific medical advice and does not replace information you receive from your health care provider. This is only a brief summary of general information. It does NOT include all information about conditions, illnesses, injuries, tests, procedures, treatments, therapies, discharge instructions or life-style choices that may apply to you. You must talk with your health care provider for complete information about your health and treatment options. This information should not be used to decide whether or not to accept your health care providers advice, instructions or  recommendations. Only your health care provider has the knowledge and training to provide advice that is right for you.  Copyright   Copyright © 2021 [employer] and its affiliates and/or licensors. All rights reserved.

## 2022-01-07 NOTE — PATIENT INSTRUCTIONS
General Discharge Instructions   PLEASE READ YOUR DISCHARGE INSTRUCTIONS ENTIRELY AS IT CONTAINS IMPORTANT INFORMATION.  If you were prescribed a narcotic or controlled medication, do not drive or operate heavy equipment or machinery while taking these medications.  If you were prescribed antibiotics, please take them to completion.  You must understand that you've received an Urgent Care treatment only and that you may be released before all your medical problems are known or treated. You, the patient, will arrange for follow up care as instructed.    OVER THE COUNTER RECOMMENDATIONS/SUGGESTIONS.    Make sure to stay well hydrated.    Use Nasal Saline to mechanically move any post nasal drip from your eustachian tube or from the back of your throat.    Use warm salt water gargles to ease your throat pain. Warm salt water gargles as needed for sore throat- 1/2 tsp salt to 1 cup warm water, gargle as desired.    Use an antihistamine such as Claritin, Zyrtec or Allegra to dry you out.    Use pseudoephedrine (behind the counter) to decongest. Pseudoephedrine 30 mg up to 240 mg /day. It can raise your blood pressure and give you palpitations.    Use mucinex (guaifenesin) to break up mucous up to 2400mg/day to loosen any mucous.    The mucinex DM pill has a cough suppressant that can be sedating. It can be used at night to stop the tickle at the back of your throat.    You can use Mucinex D (it has guaifenesin and a high dose of pseudoephedrine) in the mornings to help decongest.    Use Afrin in each nare for no longer than 3 days, as it is addictive. It can also dry out your mucous membranes and cause elevated blood pressure. This is especially useful if you are flying.    Use Flonase 1-2 sprays/nostril per day. It is a local acting steroid nasal spray, if you develop a bloody nose, stop using the medication immediately.    Sometimes Nyquil at night is beneficial to help you get some rest, however it is sedating and it  does have an antihistamine, and tylenol.    Honey is a natural cough suppressant that can be used.    Tylenol up to 4,000 mg a day is safe for short periods and can be used for body aches, pain, and fever. However in high doses and prolonged use it can cause liver irritation.    Ibuprofen is a non-steroidal anti-inflammatory that can be used for body aches, pain, and fever.However it can also cause stomach irritation if over used.     Follow up with your PCP or specialty clinic as instructed in the next 2-3 days if not improved or as needed. You can call [phone] to schedule an appointment with appropriate provider.      If you condition worsens, we recommend that you receive another evaluation at the emergency room immediately or contact your primary medical clinic's after hours call service to discuss your concerns.      Please return here or go to the Emergency Department for any concerns or worsening condition.   You can also call [phone] to schedule an appointment with the appropriate provider.    Please return here or go to the Emergency Department for any concerns or worsening of condition.    Thank you for choosing [facility]!    Our goal in the Urgent Care is to always provide outstanding medical care. You may receive a survey by mail or e-mail in the next week regarding your experience today. We would greatly appreciate you completing and returning the survey. Your feedback provides us with a way to recognize our staff who provide very good care, and it helps us learn how to improve when your experience was below our aspiration of excellence.      We appreciate you trusting us with your medical care. We hope you feel better soon. We will be happy to take care of you for all of your future medical needs.    Sincerely,    MICHELE Donnelly    Patient Education       Chronic Pain   The Basics   Written by the doctors and editors at [facility]   What is chronic pain? -- Chronic pain is  "pain that lasts longer than 3 to 6 months. In many cases, this means that pain continues even after the injury or condition that first caused it has healed.  What causes chronic pain? -- The cause of chronic pain is not always clear. Sometimes it is caused by an ongoing medical problem, such as arthritis or diabetic neuropathy (a form of nerve damage from diabetes). But doctors cannot always find the cause of chronic pain.  In some cases, people with chronic pain must accept that their pain will never be explained. This means that they have to work with their healthcare team to address the pain, even if they don't know its cause.  What are the symptoms of chronic pain? -- The main symptom of chronic pain is, of course, pain. But the pain can affect the body in different ways. Some people have aches deep inside their muscle or bone. Some people have stabbing or shooting pain, often with tingling or numbness. And others have dull, throbbing pain.  People who have chronic pain might have a hard time doing their usual activities, such as bathing or dressing. This can lead to depression and anxiety, and it can cause problems with sleep.  Will I need tests? -- When you first start having pain, your doctor might do tests to figure out the cause. You might get:  · Blood tests to check for infection, signs of inflammation, or diseases that can cause pain  · X-rays or other imaging tests to check for bone fractures, joint damage, cancer, or other changes in your body that could cause pain  · Nerve tests to check whether the nerves are working normally  However, tests cannot always show the cause of pain. Scientists think that in some people, the pain signals in the brain stop working normally. The signals get "stuck" in the on position, even when the source of pain is gone.  How is chronic pain treated? -- Treatments for chronic pain include both medicines and activities. No single treatment works for everyone. Your doctor or " nurse will help you find the right mix of treatments for you. Treatment options include:  · Medicines to relieve pain, improve sleep, or improve mood  · Physical therapy to learn exercises and stretches  · Working with a counselor  · Relaxation therapy  · Massage therapy  · Injections (shots) of numbing or pain-relieving medicines into the spine or area with pain  · Acupuncture  · Devices that affect nerve signals  · Surgery  To find the best treatment for you:  · Be open to trying new treatments and combinations of treatments. Sometimes you have to try a few different options before you find one that works best.  · Set realistic goals for your treatment. Even if you can't completely get rid of your pain, you might be able to control it enough so that you can do the things you want to do.  If your doctor suggests a medicine that seems out of place, keep an open mind. Sometimes, doctors treat pain with medicines made to treat other medical problems. For example, doctors can use medicines for depression to treat pain because they work on areas of the brain that process pain. Doctors can also use medicines for seizures to treat pain, because they help with overactive nerves.  Keep in mind, too, that many people need a team to help manage their care. A treatment team usually includes:  · Doctors or specialists  · A physical therapist  · Someone trained in mental health (such as a  or counselor)  Is there anything I can do on my own to feel better? -- Yes. Some things to try include:  · Use a heating pad or a cold pack on the painful area. Check with your doctor before trying this to make sure it is OK for your individual condition.  · Practice relaxing. You can learn methods to relax your body, such as doing deep breathing exercises. Ask your doctor or nurse about these methods. Relaxing the mind can help with how the body feels pain. People can learn to quiet their pain or make it less bothersome.  · Stay  as active as possible. Walking, swimming, lopez chi (a kind of martial art), or biking can all help ease muscle and joint pain. If you are not active, your pain might get worse.  If you haven't been active for a while, start slowly. Make small increases in the intensity and amount of time you spend exercising. If exercising increases your pain, talk with your doctor. They might recommend a program that can help you get more active.  · If you feel depressed, talk to your doctor or nurse about it. Chronic pain and depression often go together, and each can make the other worse. Getting treatment for your depression can make it easier to cope with your pain.  All topics are updated as new evidence becomes available and our peer review process is complete.  This topic retrieved from [employer] on: Sep 21, 2021.  Topic 64049 Version 14.0  Release: 29.4.2 - C29.263  © 2021 UpToDate, Inc. and/or its affiliates. All rights reserved.  Consumer Information Use and Disclaimer   This information is not specific medical advice and does not replace information you receive from your health care provider. This is only a brief summary of general information. It does NOT include all information about conditions, illnesses, injuries, tests, procedures, treatments, therapies, discharge instructions or life-style choices that may apply to you. You must talk with your health care provider for complete information about your health and treatment options. This information should not be used to decide whether or not to accept your health care provider's advice, instructions or recommendations. Only your health care provider has the knowledge and training to provide advice that is right for you. The use of this information is governed by the [employer] End User License Agreement, available at https://www.[employer]/en/solutions/[email or web address]/about/zita.The use of [employer] content is governed by the [employer] Terms of Use. ©2021 UpToDate, Inc. All  rights reserved.  Copyright   © 2021 UpToDate, Inc. and/or its affiliates. All rights reserved.  Patient Education       Joint Pain   About this topic   Joint pain is sometimes called arthralgia. You have pain where one or more bones are connected.       What are the causes?   Joint pain may be caused by:  · Injury  · Infection  · Health problems like an immune disorder, or other illness  · Problems with cartilage, ligaments, or tendons  What can make this more likely to happen?   · Older age  · Doing the same motion over and over with a joint  · Being overweight  · Injuries  What are the main signs?   You may have mild or very bad pain. The pain may be constant or it may come and go. You may have trouble moving the joint that hurts. The pain may burn, stab, or throb. It may be sharp or dull. Your joint may feel stiff, numb, or tingly. It may be hard to move or put weight on a joint if the pain is bad.  How does the doctor diagnose this health problem?   The doctor will ask you questions about your history and do an exam. The doctor will check your joints with care and may order:  · Lab tests  · X-rays  How does the doctor treat this health problem?   Your care is based on what is causing your pain. The doctor will also treat it based on how bad your pain is and where your pain is found on your body. The doctor may suggest you:  · Limit your activity.  · Do stretching exercises.  · Your doctor may want you to start an exercise program. Some kinds of exercise, like swimming, may help ease pain. It can keep your muscles strong and helps you maintain a healthy weight.  · Place an ice pack or a bag of frozen peas wrapped in a towel over the painful part. Never put ice right on the skin. Do not leave the ice on more than 10 to 15 minutes at a time.  · Use heat. Put a heating pad on your painful part for no more than 20 minutes at a time. Never go to sleep with a heating pad on as this can cause burns.  What drugs may be  needed?   The doctor may order drugs to:  · Help with pain and swelling  Your doctor may instruct you to take:  · Drugs like ibuprofen or naproxen for pain. These are all nonsteroidal anti-inflammatory drugs (NSAIDS). Do not take more than one type of these drugs at the same time.  · Drugs for pain such as acetaminophen.  The doctor may give you a shot of an anti-inflammatory drug called a corticosteroid. This will help with swelling.  Helpful tips   · Stay active and work out to keep your muscles strong and flexible.  · Keep a healthy weight. Being heavy puts more stress on your joints. This makes them more likely to hurt.  · Warm up slowly and stretch before you work out. Use good ways to train, such as slowly adding to how far you run. Do not work out if you are overly tired. Take extra care if working out in cold weather.  Last Reviewed Date   2020-10-12  Consumer Information Use and Disclaimer   This information is not specific medical advice and does not replace information you receive from your health care provider. This is only a brief summary of general information. It does NOT include all information about conditions, illnesses, injuries, tests, procedures, treatments, therapies, discharge instructions or life-style choices that may apply to you. You must talk with your health care provider for complete information about your health and treatment options. This information should not be used to decide whether or not to accept your health care providers advice, instructions or recommendations. Only your health care provider has the knowledge and training to provide advice that is right for you.  Copyright   Copyright © 2021 UpToDate, Inc. and its affiliates and/or licensors. All rights reserved.  Patient Education       Shoulder Pain Discharge Instructions   About this topic   Your shoulder joint is made of 3 bones. These are the upper arm bone, the shoulder blade, and the collarbone. The shoulder is a  ""ball and socket" joint. The "ball" part of the joint is the top part of your upper arm bone. The "socket" part of your joint is a cup shaped indentation in your shoulder blade. Because of this, the shoulder can move in many ways. Strong bands of tissue called ligaments help hold the shoulder in place. Muscles and tendons also hold it in place.  You can have pain in your shoulder for many reasons. It may be hard for the doctor to tell exactly where the pain is coming from. You can have pain in your muscles, bones, or joints. It can also happen in your tendons and ligaments which connect these together.  Causes of this kind of pain may include:  · Overuse or using muscles in the same way over and over  · Trauma from falls, accidents, direct blows to muscles, and injuries such as bone breaks, sprains, or dislocations  · Strain on your muscles from bad posture           What care is needed at home?   · Ask your doctor what you need to do when you go home. Make sure you ask questions if you do not understand what the doctor says. This way you will know what you need to do.  · Rest. Allow your injury to heal before you do slow movements.  · Place an ice pack or a bag of frozen peas wrapped in a towel over the painful part. Never put ice right on the skin. Do not leave the ice on more than 10 to 15 minutes at a time.  · Prop your arm on pillows to help with swelling.  · Your doctor may want you to use a sling, strap, or sleeve to keep your shoulder from moving.  · Heat may be used but not right after an injury. Heat can make swelling worse. If your doctor tells you to use heat, put a heating pad on your shoulder for no more than 20 minutes at a time. Never go to sleep with a heating pad on as this can cause burns.  · Do range of motion exercises as your therapist or doctor teaches you to do. As your shoulder heals, you will be given more exercises to stretch and strengthen your shoulder.  What follow-up care is needed? "   · Your doctor may ask you to make visits to the office to check on your progress. Be sure to keep all these visits.  · Your doctor may send you to physical therapy or occupational therapy to help you regain use of your shoulder sooner.  What drugs may be needed?   The doctor may order drugs to:  · Help with pain and swelling  The doctor may give you a shot of an anti-inflammatory drug called a corticosteroid. This will help with swelling. Talk with your doctor about the risks of this shot.  Will physical activity be limited?   Your doctor may ask you to rest and limit your activity. Based on how bad your shoulder injury is, this could last for a few days to a number of weeks.  What can be done to prevent this health problem?   · Stay active and work out to keep your muscles strong and flexible.  · Warm up slowly and stretch your muscles before you work out. Do not work out if you are overly tired. Take extra care if working out in cold weather.  · Slowly increase the amount of time you work out. If you are using weights, slowly increase the weight to strengthen your muscles.  · Wear protection when playing sports.  · Take breaks often when doing things that use repeat movements.  When do I need to call the doctor?   · Pain or swelling gets worse  · Hand feels cold or numb  · You are not feeling better in 2 or 3 days or you are feeling worse  Teach Back: Helping You Understand   The Teach Back Method helps you understand the information we are giving you. After you talk with the staff, tell them in your own words what you learned. This helps to make sure the staff has described each thing clearly. It also helps to explain things that may have been confusing. Before going home, make sure you can do these:  · I can tell you about my condition.  · I can tell you what may help ease my pain.  · I can tell you what I will do if I have more pain or swelling or my fingers are cool or blue.  Where can I learn more?    American Academy [employer]  http://orthoinfo.aaos.org/PDFs/H69777.pdf   Last Reviewed Date   2020-09-25  Consumer Information Use and Disclaimer   This information is not specific medical advice and does not replace information you receive from your health care provider. This is only a brief summary of general information. It does NOT include all information about conditions, illnesses, injuries, tests, procedures, treatments, therapies, discharge instructions or life-style choices that may apply to you. You must talk with your health care provider for complete information about your health and treatment options. This information should not be used to decide whether or not to accept your health care providers advice, instructions or recommendations. Only your health care provider has the knowledge and training to provide advice that is right for you.  Copyright   Copyright © 2021 UpToDate, Inc. and its affiliates and/or licensors. All rights reserved.

## 2022-01-10 ENCOUNTER — OFFICE VISIT (OUTPATIENT)
Dept: ELECTROPHYSIOLOGY | Facility: CLINIC | Age: 72
End: 2022-01-10
Payer: MEDICARE

## 2022-01-10 VITALS
BODY MASS INDEX: 32.72 KG/M2 | SYSTOLIC BLOOD PRESSURE: 160 MMHG | HEART RATE: 63 BPM | DIASTOLIC BLOOD PRESSURE: 80 MMHG | WEIGHT: 221.56 LBS

## 2022-01-10 DIAGNOSIS — E66.9 OBESITY (BMI 30.0-34.9): ICD-10-CM

## 2022-01-10 DIAGNOSIS — I10 ESSENTIAL HYPERTENSION: ICD-10-CM

## 2022-01-10 DIAGNOSIS — I48.0 PAROXYSMAL ATRIAL FIBRILLATION: Primary | Chronic | ICD-10-CM

## 2022-01-10 DIAGNOSIS — R00.1 SINUS BRADYCARDIA: ICD-10-CM

## 2022-01-10 DIAGNOSIS — E66.01 MORBID (SEVERE) OBESITY DUE TO EXCESS CALORIES: Chronic | ICD-10-CM

## 2022-01-10 DIAGNOSIS — I48.0 PAROXYSMAL ATRIAL FIBRILLATION: Primary | ICD-10-CM

## 2022-01-10 DIAGNOSIS — Z79.01 ON CONTINUOUS ORAL ANTICOAGULATION: ICD-10-CM

## 2022-01-10 PROCEDURE — 99999 PR PBB SHADOW E&M-EST. PATIENT-LVL V: CPT | Mod: PBBFAC,,, | Performed by: NURSE PRACTITIONER

## 2022-01-10 PROCEDURE — 99215 OFFICE O/P EST HI 40 MIN: CPT | Mod: PBBFAC | Performed by: NURSE PRACTITIONER

## 2022-01-10 PROCEDURE — 99214 PR OFFICE/OUTPT VISIT, EST, LEVL IV, 30-39 MIN: ICD-10-PCS | Mod: S$PBB,,, | Performed by: NURSE PRACTITIONER

## 2022-01-10 PROCEDURE — 93010 RHYTHM STRIP: ICD-10-PCS | Mod: S$PBB,,, | Performed by: INTERNAL MEDICINE

## 2022-01-10 PROCEDURE — 99999 PR PBB SHADOW E&M-EST. PATIENT-LVL V: ICD-10-PCS | Mod: PBBFAC,,, | Performed by: NURSE PRACTITIONER

## 2022-01-10 PROCEDURE — 93005 ELECTROCARDIOGRAM TRACING: CPT | Mod: PBBFAC | Performed by: INTERNAL MEDICINE

## 2022-01-10 PROCEDURE — 93010 ELECTROCARDIOGRAM REPORT: CPT | Mod: S$PBB,,, | Performed by: INTERNAL MEDICINE

## 2022-01-10 PROCEDURE — 99214 OFFICE O/P EST MOD 30 MIN: CPT | Mod: S$PBB,,, | Performed by: NURSE PRACTITIONER

## 2022-01-10 RX ORDER — FLECAINIDE ACETATE 100 MG/1
100 TABLET ORAL EVERY 12 HOURS
Qty: 180 TABLET | Refills: 3 | Status: SHIPPED | OUTPATIENT
Start: 2022-01-10 | End: 2022-12-30 | Stop reason: SDUPTHER

## 2022-01-10 RX ORDER — METOPROLOL SUCCINATE 25 MG/1
25 TABLET, EXTENDED RELEASE ORAL DAILY
Qty: 30 TABLET | Refills: 6 | Status: SHIPPED | OUTPATIENT
Start: 2022-01-10 | End: 2022-01-10 | Stop reason: SDUPTHER

## 2022-01-10 RX ORDER — METOPROLOL SUCCINATE 25 MG/1
25 TABLET, EXTENDED RELEASE ORAL DAILY
Qty: 30 TABLET | Refills: 6 | Status: SHIPPED | OUTPATIENT
Start: 2022-01-10 | End: 2022-03-03 | Stop reason: SDUPTHER

## 2022-01-12 RX ORDER — AMLODIPINE BESYLATE 5 MG/1
5 TABLET ORAL DAILY
Qty: 90 TABLET | Refills: 0 | OUTPATIENT
Start: 2022-01-12 | End: 2022-03-03 | Stop reason: SDUPTHER

## 2022-01-18 ENCOUNTER — PATIENT OUTREACH (OUTPATIENT)
Dept: ADMINISTRATIVE | Facility: OTHER | Age: 72
End: 2022-01-18
Payer: MEDICARE

## 2022-01-18 NOTE — PROGRESS NOTES
Health Maintenance Due   Topic Date Due    TETANUS VACCINE  07/28/2021    Colorectal Cancer Screening  12/07/2021     Updates were requested from care everywhere.  Chart was reviewed for overdue Proactive [facility] Encounters ([facility]) topics (CRS, Breast Cancer Screening, Eye exam)  Health Maintenance has been updated.  [place] immunization registry triggered.  Immunizations were reconciled.

## 2022-01-19 ENCOUNTER — OFFICE VISIT (OUTPATIENT)
Dept: ORTHOPEDICS | Facility: CLINIC | Age: 72
End: 2022-01-19
Payer: MEDICARE

## 2022-01-19 VITALS
RESPIRATION RATE: 18 BRPM | SYSTOLIC BLOOD PRESSURE: 120 MMHG | WEIGHT: 221.13 LBS | OXYGEN SATURATION: 97 % | HEIGHT: 69 IN | BODY MASS INDEX: 32.75 KG/M2 | DIASTOLIC BLOOD PRESSURE: 80 MMHG | HEART RATE: 55 BPM

## 2022-01-19 DIAGNOSIS — M19.012 ARTHRITIS OF LEFT GLENOHUMERAL JOINT: ICD-10-CM

## 2022-01-19 DIAGNOSIS — M67.912 DYSFUNCTION OF LEFT ROTATOR CUFF: Primary | ICD-10-CM

## 2022-01-19 PROCEDURE — 99215 OFFICE O/P EST HI 40 MIN: CPT | Mod: PBBFAC,PN | Performed by: ORTHOPAEDIC SURGERY

## 2022-01-19 PROCEDURE — 99999 PR PBB SHADOW E&M-EST. PATIENT-LVL V: CPT | Mod: PBBFAC,,, | Performed by: ORTHOPAEDIC SURGERY

## 2022-01-19 PROCEDURE — 99214 PR OFFICE/OUTPT VISIT, EST, LEVL IV, 30-39 MIN: ICD-10-PCS | Mod: S$PBB,,, | Performed by: ORTHOPAEDIC SURGERY

## 2022-01-19 PROCEDURE — 99999 PR PBB SHADOW E&M-EST. PATIENT-LVL V: ICD-10-PCS | Mod: PBBFAC,,, | Performed by: ORTHOPAEDIC SURGERY

## 2022-01-19 PROCEDURE — 99214 OFFICE O/P EST MOD 30 MIN: CPT | Mod: S$PBB,,, | Performed by: ORTHOPAEDIC SURGERY

## 2022-01-19 RX ORDER — MELOXICAM 7.5 MG/1
7.5 TABLET ORAL DAILY
Qty: 12 TABLET | Refills: 0 | Status: SHIPPED | OUTPATIENT
Start: 2022-01-19 | End: 2022-01-26

## 2022-01-19 NOTE — PROGRESS NOTES
"Chief Complaint   Patient presents with    Left Shoulder - Pain, Swelling     This patient was seen in consultation at the request of Arline Cortez     [facility] (01/19/2022): Omar Pacheco is a 71 y.o. male who presents today complaining of left shoulder pain  Started about 2 months ago with gradual pain   Got so bad that he had to go to the ER   Trauma or new activity: yes - reached across this body and pulled covers over his body and had intense pain that prompted him to go to the ER - was unable to move the shoulder, was tender to the touch    Was told he  had arthritis and tendinitis   Pain is intermittent   Pain has improved since onset but has not resolved  Aggravating factors: Reaching overhead, reaching behind his back   Relieving factors: rest   Pain localizes pain to subdeltoid fossa with radiation to the lateral arm   Night pain is present and is disruptive to sleep  Radicular symptoms: the day he went to the ER he does have radicular type symptoms radiating to the wrist. This has resolved   Chronic neck pain   Associated symptoms:  limited range of motion.    Prior treatment:  prescription NSAIDs and IM steroid injection with improvement in pain.   Has not done PT   Pain does interfere with activities of daily living .    This is the extent of the patient's complaints at this time.     Hand dominance: Right      Occupation: Retired  - did a lot of physical work        Review of patient's allergies indicates:   Allergen Reactions    Bactrim [sulfamethoxazole-trimethoprim] Swelling     Swelling of lips/blisters in mouth           Physical Exam:   Vitals:    01/19/22 1033   BP: 120/80   Pulse: (!) 55   Resp: 18   SpO2: 97%   Weight: 100.3 kg (221 lb 1.9 oz)   Height: 5' 9" (1.753 m)   PainSc:   3   PainLoc: Shoulder       General: Weight: 100.3 kg (221 lb 1.9 oz) Body mass index is 32.65 kg/m².  Patient is alert, awake and oriented to time, place and person. Mood and affect are appropriate.  " Patient does not appear to be in any distress, denies any constitutional symptoms and appears stated age.   HEENT: Pupils are equal and round, sclera are not injected. External examination of ears and nose reveals no abnormalities. Cranial nerves II-X are grossly intact   Skin: no rashes, abrasions or open wounds on the affected extremity   Resp: No respiratory distress or audible wheezing   CV: 2+  pulses, all extremities warm and well perfused   Left Shoulder    Shoulder Range of Motion    Right     Left   (Active/Passive)       Forward Elevation     165/170            165/165  External rotation (arm at side)  40/40             40/40   Internal rotation behind the back  L5             L5     Range of motion is painful     Acromioclavicular joint is not tender  Crossbody test: negative    Neer's positive  Hawkin's positive    Juan Carlos's positive  Drop arm negative  Belly press negative    Cuff Strength     Right     Left   Supraspinatus        5/5    5/5  Infraspinatus     5/5    5/5  Subscapularis     5/5    5/5    Deltoid testing            5/5    5/5    Speeds positive    Elbow examination demonstrates no tenderness to palpation and has normal range of motion.     ltsi C5-T1  + epl, io, fds, fdp   2+ RP      Imaging: 3 views of the left shoulder:  positive for degenerative changes of the AC joint. The humeral head is well centered on the AP and axillary views. Mild degenerative changes with joint space narrowing and inferior humeral head osteophyte. No posterior subluxation of the humeral head. No acute changes or fracture.      I personally reviewed and interpreted the patient's imaging obtained today in clinic     Assessment: 71 y.o. male with left rotator cuff tendinitis glenohumeral arthritis        Plan:   - Mobic 7.5 mg PO QD x 2 weeks then PRN. The patient was advised that NSAID-type medications have important potential side effects: gastrointestinal irritation, GI bleeding, cardiac effects and renal  "injuries. Take the medication with food and to stop and call the office for any GI upset, vomiting, abdominal pain or black/bloody stools. The patient expresses understanding of these issues and questions were answered.  - PT referral   - Can consider GH injection if no improvement   - Return to clinic PRN    All questions were answered in detail. The patient is in full agreement with the treatment plan and will proceed accordingly.    A note notifying Arline Cortez of my findings was sent via the electronic medical record     This note was created by combination of typed  and M-Modal dictation. Transcription and phonetic errors may be present.  If there are any questions, please contact me.      Current Outpatient Medications:     acetaminophen (TYLENOL ORAL), Take 500 mg by mouth as needed., Disp: , Rfl:     amLODIPine (NORVASC) 5 MG tablet, Take 1 tablet (5 mg total) by mouth once daily., Disp: 90 tablet, Rfl: 0    ammonium lactate (AMLACTIN) 12 % lotion, Apply topically daily as needed (Dry Skin)., Disp: 400 g, Rfl: 2    benzonatate (TESSALON) 200 MG capsule, TAKE 1 CAPSULE(200 MG) BY MOUTH THREE TIMES DAILY AS NEEDED FOR COUGH, Disp: 30 capsule, Rfl: 0    cyanocobalamin (VITAMIN B-12) 1000 MCG tablet, Take 100 mcg by mouth once daily., Disp: , Rfl:     dabigatran etexilate (PRADAXA) 150 mg Cap, Take 1 capsule (150 mg total) by mouth 2 (two) times daily. "Do NOT break, chew, or open capsules.", Disp: 180 capsule, Rfl: 3    flecainide (TAMBOCOR) 100 MG Tab, Take 1 tablet (100 mg total) by mouth every 12 (twelve) hours., Disp: 180 tablet, Rfl: 3    insulin syringe-needle U-100 (EASY COMFORT INSULIN SYRINGE) 1 mL 31 gauge x 5/16 Syrg, Inject 1 time daily prn ED, Disp: 90 each, Rfl: 0    meloxicam (MOBIC) 7.5 MG tablet, Take 1 tablet (7.5 mg total) by mouth once daily., Disp: 12 tablet, Rfl: 0    methocarbamoL (ROBAXIN) 500 MG Tab, Take 500 mg by mouth as needed., Disp: , Rfl:     metoprolol " succinate (TOPROL-XL) 25 MG 24 hr tablet, Take 1 tablet (25 mg total) by mouth once daily., Disp: 30 tablet, Rfl: 6    mirtazapine (REMERON) 30 MG tablet, Take 30 mg by mouth every evening., Disp: , Rfl:     multivitamin (THERAGRAN) per tablet, Take 1 tablet by mouth once daily., Disp: , Rfl:     omeprazole (PRILOSEC) 20 MG capsule, Take 1 capsule (20 mg total) by mouth once daily., Disp: 90 capsule, Rfl: 0    papaverine 30 mg/mL injection, ADD: Phentolamine 6 mg/ml ADD: PGE1 60 mcg. 30/6/60. Sig: Start w/ 40 units. Inject as directed into proximal lateral aspect of penis., Disp: 10 mL, Rfl: 11    promethazine-dextromethorphan (PROMETHAZINE-DM) 6.25-15 mg/5 mL Syrp, TAKE 5 ML BY MOUTH EVERY NIGHT AS NEEDED FOR COUGH, Disp: 180 mL, Rfl: 0    sildenafiL (VIAGRA) 100 MG tablet, Take 1 tablet (100 mg total) by mouth as needed for Erectile Dysfunction (1 tablet 1 hr prior to intercourse.)., Disp: 10 tablet, Rfl: 11    terazosin (HYTRIN) 10 MG capsule, Take 1 capsule (10 mg total) by mouth every evening., Disp: 90 capsule, Rfl: 3    traMADoL (ULTRAM) 50 mg tablet, Take 1 tablet (50 mg total) by mouth every 12 (twelve) hours as needed for Pain., Disp: 60 tablet, Rfl: 0    triamcinolone acetonide 0.1% (KENALOG) 0.1 % cream, Apply topically 2 (two) times daily., Disp: 45 g, Rfl: 0    UNABLE TO FIND, Start WITH 40 UNITS. Inject as directed into proximal lateral aspect OF PENIS, Disp: , Rfl:     VITAMIN B COMPLEX (B COMPLETE ORAL), Take by mouth once daily., Disp: , Rfl:     azelastine (ASTELIN) 137 mcg (0.1 %) nasal spray, 1 spray (137 mcg total) by Nasal route 2 (two) times daily., Disp: 30 mL, Rfl: 0    levocetirizine (XYZAL) 5 MG tablet, Take 1 tablet (5 mg total) by mouth every evening., Disp: 90 tablet, Rfl: 3    losartan (COZAAR) 25 MG tablet, Take 1 tablet (25 mg total) by mouth once daily., Disp: 90 tablet, Rfl: 3    Past Medical History:   Diagnosis Date    *Atrial fibrillation     Back pain      Benign hypertrophy of prostate     Degenerative disc disease     GERD (gastroesophageal reflux disease)     Hx of colonic polyps     Hypertension     Pilonidal cyst 1971    Trouble in sleeping        Active Problem List with Overview Notes    Diagnosis Date Noted    Stromal tumor of the stomach 04/13/2021    Obesity (BMI 30.0-34.9) 11/24/2020    Sinus bradycardia 11/24/2020    On continuous oral anticoagulation 11/24/2020    Risk for coronary artery disease between 10% and 20% in next 10 years 11/24/2020    Obstructive sleep apnea 11/06/2020    Nocturia 01/13/2020    Peyronie's disease 11/15/2019    Erectile dysfunction due to arterial insufficiency 12/03/2018    BPH with urinary obstruction 02/16/2018    Morbid (severe) obesity due to excess calories 03/22/2017    Lumbar spinal stenosis 09/20/2016    Osteoarthritis of spine with radiculopathy, lumbar region 09/20/2016    Muscle spasm of back 10/23/2015    Degeneration of lumbar or lumbosacral intervertebral disc 04/09/2014    Spinal stenosis, lumbar region, without neurogenic claudication 04/09/2014    Displacement of lumbar intervertebral disc without myelopathy 04/09/2014    HNP (herniated nucleus pulposus), lumbar 04/03/2014    Nuclear sclerosis - Both Eyes 08/29/2013    Atrial fibrillation - paroxysmal 03/08/2013    Essential hypertension     GERD (gastroesophageal reflux disease) 01/07/2013    BPH (benign prostatic hyperplasia) 01/07/2013       Past Surgical History:   Procedure Laterality Date    COLONOSCOPY N/A 12/7/2016    Procedure: COLONOSCOPY;  Surgeon: Sumeet Lundy MD;  Location: [address]);  Service: Endoscopy;  Laterality: N/A;  OK to hold Pradaxa 2 days prior to procedure per Dr Jones/see note dated 11/15/16/svn    CYST REMOVAL  1971    coccyx    KNEE SURGERY  1989    right       Social History     Socioeconomic History    Marital status:    Tobacco Use    Smoking status: Former Smoker     Years:  2.00     Types: Cigarettes     Quit date: 1975     Years since quittin.3    Smokeless tobacco: Never Used   Substance and Sexual Activity    Alcohol use: Yes     Alcohol/week: 3.0 standard drinks     Types: 3 Glasses of wine per week     Comment: weekly    Drug use: No    Sexual activity: Yes

## 2022-01-26 ENCOUNTER — OFFICE VISIT (OUTPATIENT)
Dept: FAMILY MEDICINE | Facility: CLINIC | Age: 72
End: 2022-01-26
Payer: MEDICARE

## 2022-01-26 ENCOUNTER — LAB VISIT (OUTPATIENT)
Dept: LAB | Facility: HOSPITAL | Age: 72
End: 2022-01-26
Attending: PHYSICIAN ASSISTANT
Payer: MEDICARE

## 2022-01-26 VITALS
SYSTOLIC BLOOD PRESSURE: 130 MMHG | TEMPERATURE: 98 F | WEIGHT: 220.69 LBS | DIASTOLIC BLOOD PRESSURE: 76 MMHG | OXYGEN SATURATION: 95 % | BODY MASS INDEX: 32.69 KG/M2 | RESPIRATION RATE: 20 BRPM | HEIGHT: 69 IN | HEART RATE: 54 BPM

## 2022-01-26 DIAGNOSIS — N40.1 BPH WITH URINARY OBSTRUCTION: ICD-10-CM

## 2022-01-26 DIAGNOSIS — N13.8 BPH WITH URINARY OBSTRUCTION: ICD-10-CM

## 2022-01-26 DIAGNOSIS — I10 ESSENTIAL HYPERTENSION: ICD-10-CM

## 2022-01-26 DIAGNOSIS — I48.0 PAROXYSMAL ATRIAL FIBRILLATION: Chronic | ICD-10-CM

## 2022-01-26 DIAGNOSIS — Z12.11 COLON CANCER SCREENING: ICD-10-CM

## 2022-01-26 DIAGNOSIS — M48.061 SPINAL STENOSIS OF LUMBAR REGION WITHOUT NEUROGENIC CLAUDICATION: ICD-10-CM

## 2022-01-26 DIAGNOSIS — I10 ESSENTIAL HYPERTENSION: Primary | ICD-10-CM

## 2022-01-26 LAB
ALBUMIN SERPL BCP-MCNC: 4 G/DL (ref 3.5–5.2)
ALP SERPL-CCNC: 63 U/L (ref 55–135)
ALT SERPL W/O P-5'-P-CCNC: 17 U/L (ref 10–44)
ANION GAP SERPL CALC-SCNC: 9 MMOL/L (ref 8–16)
AST SERPL-CCNC: 26 U/L (ref 10–40)
BASOPHILS # BLD AUTO: 0.03 K/UL (ref 0–0.2)
BASOPHILS NFR BLD: 0.6 % (ref 0–1.9)
BILIRUB SERPL-MCNC: 0.5 MG/DL (ref 0.1–1)
BUN SERPL-MCNC: 12 MG/DL (ref 8–23)
CALCIUM SERPL-MCNC: 9.3 MG/DL (ref 8.7–10.5)
CHLORIDE SERPL-SCNC: 104 MMOL/L (ref 95–110)
CHOLEST SERPL-MCNC: 150 MG/DL (ref 120–199)
CHOLEST/HDLC SERPL: 2.9 {RATIO} (ref 2–5)
CO2 SERPL-SCNC: 26 MMOL/L (ref 23–29)
COMPLEXED PSA SERPL-MCNC: 6.5 NG/ML (ref 0–4)
CREAT SERPL-MCNC: 0.9 MG/DL (ref 0.5–1.4)
DIFFERENTIAL METHOD: ABNORMAL
EOSINOPHIL # BLD AUTO: 0.3 K/UL (ref 0–0.5)
EOSINOPHIL NFR BLD: 5.3 % (ref 0–8)
ERYTHROCYTE [DISTWIDTH] IN BLOOD BY AUTOMATED COUNT: 12.9 % (ref 11.5–14.5)
EST. GFR  (AFRICAN AMERICAN): >60 ML/MIN/1.73 M^2
EST. GFR  (NON AFRICAN AMERICAN): >60 ML/MIN/1.73 M^2
GLUCOSE SERPL-MCNC: 91 MG/DL (ref 70–110)
HCT VFR BLD AUTO: 41.3 % (ref 40–54)
HDLC SERPL-MCNC: 52 MG/DL (ref 40–75)
HDLC SERPL: 34.7 % (ref 20–50)
HGB BLD-MCNC: 13.5 G/DL (ref 14–18)
IMM GRANULOCYTES # BLD AUTO: 0.01 K/UL (ref 0–0.04)
IMM GRANULOCYTES NFR BLD AUTO: 0.2 % (ref 0–0.5)
LDLC SERPL CALC-MCNC: 81.2 MG/DL (ref 63–159)
LYMPHOCYTES # BLD AUTO: 2.1 K/UL (ref 1–4.8)
LYMPHOCYTES NFR BLD: 40.5 % (ref 18–48)
MCH RBC QN AUTO: 30.5 PG (ref 27–31)
MCHC RBC AUTO-ENTMCNC: 32.7 G/DL (ref 32–36)
MCV RBC AUTO: 93 FL (ref 82–98)
MONOCYTES # BLD AUTO: 0.6 K/UL (ref 0.3–1)
MONOCYTES NFR BLD: 12.5 % (ref 4–15)
NEUTROPHILS # BLD AUTO: 2.1 K/UL (ref 1.8–7.7)
NEUTROPHILS NFR BLD: 40.9 % (ref 38–73)
NONHDLC SERPL-MCNC: 98 MG/DL
NRBC BLD-RTO: 0 /100 WBC
PLATELET # BLD AUTO: 263 K/UL (ref 150–450)
PMV BLD AUTO: 10.2 FL (ref 9.2–12.9)
POTASSIUM SERPL-SCNC: 4.2 MMOL/L (ref 3.5–5.1)
PROT SERPL-MCNC: 7.1 G/DL (ref 6–8.4)
RBC # BLD AUTO: 4.43 M/UL (ref 4.6–6.2)
SODIUM SERPL-SCNC: 139 MMOL/L (ref 136–145)
TRIGL SERPL-MCNC: 84 MG/DL (ref 30–150)
WBC # BLD AUTO: 5.14 K/UL (ref 3.9–12.7)

## 2022-01-26 PROCEDURE — 84153 ASSAY OF PSA TOTAL: CPT | Performed by: PHYSICIAN ASSISTANT

## 2022-01-26 PROCEDURE — 99214 OFFICE O/P EST MOD 30 MIN: CPT | Mod: S$PBB,,, | Performed by: PHYSICIAN ASSISTANT

## 2022-01-26 PROCEDURE — 85025 COMPLETE CBC W/AUTO DIFF WBC: CPT | Performed by: PHYSICIAN ASSISTANT

## 2022-01-26 PROCEDURE — 99999 PR PBB SHADOW E&M-EST. PATIENT-LVL V: ICD-10-PCS | Mod: PBBFAC,,, | Performed by: PHYSICIAN ASSISTANT

## 2022-01-26 PROCEDURE — 99215 OFFICE O/P EST HI 40 MIN: CPT | Mod: PBBFAC,PO | Performed by: PHYSICIAN ASSISTANT

## 2022-01-26 PROCEDURE — 99999 PR PBB SHADOW E&M-EST. PATIENT-LVL V: CPT | Mod: PBBFAC,,, | Performed by: PHYSICIAN ASSISTANT

## 2022-01-26 PROCEDURE — 80061 LIPID PANEL: CPT | Performed by: PHYSICIAN ASSISTANT

## 2022-01-26 PROCEDURE — 36415 COLL VENOUS BLD VENIPUNCTURE: CPT | Mod: PO | Performed by: PHYSICIAN ASSISTANT

## 2022-01-26 PROCEDURE — 80053 COMPREHEN METABOLIC PANEL: CPT | Performed by: PHYSICIAN ASSISTANT

## 2022-01-26 PROCEDURE — 99214 PR OFFICE/OUTPT VISIT, EST, LEVL IV, 30-39 MIN: ICD-10-PCS | Mod: S$PBB,,, | Performed by: PHYSICIAN ASSISTANT

## 2022-01-26 RX ORDER — PROMETHAZINE HYDROCHLORIDE AND DEXTROMETHORPHAN HYDROBROMIDE 6.25; 15 MG/5ML; MG/5ML
SYRUP ORAL
Qty: 180 ML | Refills: 0 | Status: SHIPPED | OUTPATIENT
Start: 2022-01-26 | End: 2022-03-03 | Stop reason: SDUPTHER

## 2022-01-26 NOTE — PROGRESS NOTES
Subjective:       Patient ID: Omar Pacheco is a 71 y.o. male.    Chief Complaint: Follow-up    HPI:  70 y/o male presents for follow up htn and pain. Pt has been taking his medications without complications. Pt recently saw cardiologist for A-fib follow up (1/10/22), medication adjustments were made. No CP, SOB, palpitations, or dizziness reported. Pt recently saw Ortho for his shoulder for bursitis, tendonitis, and arthritis (22). PT is scheduled. Colonoscopy needs to be scheduled and lab work not UTD.     Social History     Socioeconomic History    Marital status:    Tobacco Use    Smoking status: Former Smoker     Years: 2.00     Types: Cigarettes     Quit date: 1975     Years since quittin.3    Smokeless tobacco: Never Used   Substance and Sexual Activity    Alcohol use: Yes     Alcohol/week: 3.0 standard drinks     Types: 3 Glasses of wine per week     Comment: weekly    Drug use: No    Sexual activity: Yes           Follow-up  Associated symptoms include arthralgias and joint swelling. Pertinent negatives include no chest pain, chills, fatigue, fever, headaches, neck pain, vomiting or weakness.     Review of Systems   Constitutional: Positive for activity change. Negative for chills, fatigue, fever and unexpected weight change.   HENT: Negative for hearing loss, rhinorrhea and trouble swallowing.    Eyes: Negative for discharge and visual disturbance.   Respiratory: Negative for chest tightness and wheezing.    Cardiovascular: Negative for chest pain, palpitations, leg swelling and claudication.   Gastrointestinal: Negative for blood in stool, constipation, diarrhea and vomiting.   Endocrine: Positive for polyuria. Negative for polydipsia.   Genitourinary: Negative for difficulty urinating, hematuria and urgency.   Musculoskeletal: Positive for arthralgias and joint swelling. Negative for neck pain.   Neurological: Negative for dizziness, weakness and headaches.    Psychiatric/Behavioral: Negative for confusion and dysphoric mood.         Objective:      Physical Exam  Constitutional:       Appearance: Normal appearance.   Cardiovascular:      Rate and Rhythm: Regular rhythm.      Heart sounds: Normal heart sounds.   Pulmonary:      Breath sounds: Normal breath sounds.   Neurological:      General: No focal deficit present.      Mental Status: He is alert and oriented to person, place, and time.   Psychiatric:         Mood and Affect: Mood normal.         Behavior: Behavior normal.         Assessment:       Problem List Items Addressed This Visit     Atrial fibrillation - paroxysmal (Chronic)    Essential hypertension - Primary    Relevant Orders    CBC Auto Differential (Completed)    Comprehensive Metabolic Panel (Completed)    Lipid Panel (Completed)    Lumbar spinal stenosis    BPH with urinary obstruction    Relevant Orders    PROSTATE SPECIFIC ANTIGEN, DIAGNOSTIC (Completed)      Other Visit Diagnoses     Colon cancer screening        Relevant Orders    Case Request Endoscopy: COLONOSCOPY (Completed)          Plan:         Omar was seen today for follow-up.    Diagnoses and all orders for this visit:    Essential hypertension  -     CBC Auto Differential; Future  -     Comprehensive Metabolic Panel; Future  -     Lipid Panel; Future  -     Stable on meds    Colon cancer screening  -     Case Request Endoscopy: COLONOSCOPY    BPH with urinary obstruction  -     PROSTATE SPECIFIC ANTIGEN, DIAGNOSTIC; Future    Paroxysmal atrial fibrillation  Followed by cardiology    Spinal stenosis of lumbar region without neurogenic claudication  Start PT next week

## 2022-01-26 NOTE — PROGRESS NOTES
Health Maintenance Due   Topic Date Due    Colorectal Cancer Screening  Pt will schedule        Answers for HPI/ROS submitted by the patient on 1/26/2022  activity change: Yes  unexpected weight change: No  neck pain: No  hearing loss: No  rhinorrhea: No  trouble swallowing: No  eye discharge: No  visual disturbance: No  chest tightness: No  wheezing: No  chest pain: No  palpitations: No  blood in stool: No  constipation: No  vomiting: No  diarrhea: No  polydipsia: No  polyuria: Yes  difficulty urinating: No  urgency: No  hematuria: No  joint swelling: Yes  arthralgias: Yes  headaches: No  weakness: No  confusion: No  dysphoric mood: No

## 2022-01-28 ENCOUNTER — TELEPHONE (OUTPATIENT)
Dept: ENDOSCOPY | Facility: HOSPITAL | Age: 72
End: 2022-01-28
Payer: MEDICARE

## 2022-01-28 DIAGNOSIS — Z01.818 PRE-OP TESTING: Primary | ICD-10-CM

## 2022-01-28 NOTE — TELEPHONE ENCOUNTER
Johanna Bentley RN  to Me    BB      1/28/22 1:01 PM  Per current guidelines, patient is cleared to hold his PRADAXA for 2 days prior to his colonoscopy.   Thanks,   YOANA Spencer   [facility]            GT    1/28/22 12:52 PM  You routed this conversation to [facility] Rn Staff        [facility]    1/28/22 12:52 PM  Note  Patient has an order for a colonoscopy.  Is it ok to hold Pradaxa 2 days prior to procedure?  Please advise.  Thank you.     Evangelina

## 2022-01-28 NOTE — TELEPHONE ENCOUNTER
Patient has an order for a colonoscopy.  Is it ok to hold Pradaxa 2 days prior to procedure?  Please advise.  Thank you.    Evangelina

## 2022-01-31 ENCOUNTER — PATIENT MESSAGE (OUTPATIENT)
Dept: ENDOSCOPY | Facility: HOSPITAL | Age: 72
End: 2022-01-31
Payer: MEDICARE

## 2022-01-31 DIAGNOSIS — Z12.11 SCREENING FOR COLON CANCER: Primary | ICD-10-CM

## 2022-01-31 RX ORDER — POLYETHYLENE GLYCOL 3350, SODIUM SULFATE ANHYDROUS, SODIUM BICARBONATE, SODIUM CHLORIDE, POTASSIUM CHLORIDE 236; 22.74; 6.74; 5.86; 2.97 G/4L; G/4L; G/4L; G/4L; G/4L
4 POWDER, FOR SOLUTION ORAL ONCE
Qty: 4000 ML | Refills: 0 | Status: SHIPPED | OUTPATIENT
Start: 2022-01-31 | End: 2022-01-31

## 2022-02-17 ENCOUNTER — PATIENT MESSAGE (OUTPATIENT)
Dept: FAMILY MEDICINE | Facility: CLINIC | Age: 72
End: 2022-02-17
Payer: MEDICARE

## 2022-02-17 ENCOUNTER — PATIENT MESSAGE (OUTPATIENT)
Dept: ORTHOPEDICS | Facility: CLINIC | Age: 72
End: 2022-02-17
Payer: MEDICARE

## 2022-02-21 ENCOUNTER — LAB VISIT (OUTPATIENT)
Dept: FAMILY MEDICINE | Facility: CLINIC | Age: 72
End: 2022-02-21
Payer: MEDICARE

## 2022-02-21 DIAGNOSIS — Z01.818 PRE-OP TESTING: ICD-10-CM

## 2022-02-21 PROCEDURE — U0005 INFEC AGEN DETEC AMPLI PROBE: HCPCS | Performed by: FAMILY MEDICINE

## 2022-02-21 PROCEDURE — U0003 INFECTIOUS AGENT DETECTION BY NUCLEIC ACID (DNA OR RNA); SEVERE ACUTE RESPIRATORY SYNDROME CORONAVIRUS 2 (SARS-COV-2) (CORONAVIRUS DISEASE [COVID-19]), AMPLIFIED PROBE TECHNIQUE, MAKING USE OF HIGH THROUGHPUT TECHNOLOGIES AS DESCRIBED BY CMS-2020-01-R: HCPCS | Performed by: FAMILY MEDICINE

## 2022-02-22 LAB
SARS-COV-2 RNA RESP QL NAA+PROBE: NOT DETECTED
SARS-COV-2- CYCLE NUMBER: NORMAL

## 2022-02-23 ENCOUNTER — ANESTHESIA EVENT (OUTPATIENT)
Dept: ENDOSCOPY | Facility: HOSPITAL | Age: 72
End: 2022-02-23
Payer: MEDICARE

## 2022-02-24 ENCOUNTER — HOSPITAL ENCOUNTER (OUTPATIENT)
Facility: HOSPITAL | Age: 72
Discharge: HOME | End: 2022-02-24
Attending: INTERNAL MEDICINE | Admitting: INTERNAL MEDICINE
Payer: MEDICARE

## 2022-02-24 ENCOUNTER — ANESTHESIA (OUTPATIENT)
Dept: ENDOSCOPY | Facility: HOSPITAL | Age: 72
End: 2022-02-24
Payer: MEDICARE

## 2022-02-24 VITALS
HEART RATE: 58 BPM | SYSTOLIC BLOOD PRESSURE: 112 MMHG | OXYGEN SATURATION: 98 % | TEMPERATURE: 99 F | DIASTOLIC BLOOD PRESSURE: 67 MMHG | RESPIRATION RATE: 19 BRPM

## 2022-02-24 DIAGNOSIS — Z12.11 COLON CANCER SCREENING: ICD-10-CM

## 2022-02-24 PROCEDURE — D9220A PRA ANESTHESIA: Mod: ANES,,, | Performed by: ANESTHESIOLOGY

## 2022-02-24 PROCEDURE — G0105 COLORECTAL SCRN; HI RISK IND: ICD-10-PCS | Mod: ,,, | Performed by: STUDENT IN AN ORGANIZED HEALTH CARE EDUCATION/TRAINING PROGRAM

## 2022-02-24 PROCEDURE — D9220A PRA ANESTHESIA: ICD-10-PCS | Mod: CRNA,,, | Performed by: NURSE ANESTHETIST, CERTIFIED REGISTERED

## 2022-02-24 PROCEDURE — G0105 COLORECTAL SCRN; HI RISK IND: HCPCS | Performed by: STUDENT IN AN ORGANIZED HEALTH CARE EDUCATION/TRAINING PROGRAM

## 2022-02-24 PROCEDURE — 63600175 PHARM REV CODE 636 W HCPCS: Performed by: NURSE ANESTHETIST, CERTIFIED REGISTERED

## 2022-02-24 PROCEDURE — G0105 COLORECTAL SCRN; HI RISK IND: HCPCS | Mod: ,,, | Performed by: STUDENT IN AN ORGANIZED HEALTH CARE EDUCATION/TRAINING PROGRAM

## 2022-02-24 PROCEDURE — 37000009 HC ANESTHESIA EA ADD 15 MINS: Performed by: INTERNAL MEDICINE

## 2022-02-24 PROCEDURE — 25000003 PHARM REV CODE 250: Performed by: ANESTHESIOLOGY

## 2022-02-24 PROCEDURE — 25000003 PHARM REV CODE 250: Performed by: NURSE ANESTHETIST, CERTIFIED REGISTERED

## 2022-02-24 PROCEDURE — D9220A PRA ANESTHESIA: Mod: CRNA,,, | Performed by: NURSE ANESTHETIST, CERTIFIED REGISTERED

## 2022-02-24 PROCEDURE — D9220A PRA ANESTHESIA: ICD-10-PCS | Mod: ANES,,, | Performed by: ANESTHESIOLOGY

## 2022-02-24 PROCEDURE — 37000008 HC ANESTHESIA 1ST 15 MINUTES: Performed by: INTERNAL MEDICINE

## 2022-02-24 RX ORDER — LIDOCAINE HYDROCHLORIDE 10 MG/ML
1 INJECTION, SOLUTION EPIDURAL; INFILTRATION; INTRACAUDAL; PERINEURAL ONCE
Status: DISCONTINUED | OUTPATIENT
Start: 2022-02-24 | End: 2022-02-24 | Stop reason: HOSPADM

## 2022-02-24 RX ORDER — SODIUM CHLORIDE 9 MG/ML
INJECTION, SOLUTION INTRAVENOUS CONTINUOUS
Status: DISCONTINUED | OUTPATIENT
Start: 2022-02-24 | End: 2022-02-24 | Stop reason: HOSPADM

## 2022-02-24 RX ORDER — LIDOCAINE HYDROCHLORIDE 20 MG/ML
INJECTION INTRAVENOUS
Status: DISCONTINUED | OUTPATIENT
Start: 2022-02-24 | End: 2022-02-24

## 2022-02-24 RX ORDER — LIDOCAINE HYDROCHLORIDE 20 MG/ML
INJECTION, SOLUTION EPIDURAL; INFILTRATION; INTRACAUDAL; PERINEURAL
Status: DISCONTINUED
Start: 2022-02-24 | End: 2022-02-24 | Stop reason: HOSPADM

## 2022-02-24 RX ORDER — PROPOFOL 10 MG/ML
INJECTION, EMULSION INTRAVENOUS
Status: DISCONTINUED
Start: 2022-02-24 | End: 2022-02-24 | Stop reason: HOSPADM

## 2022-02-24 RX ORDER — PROPOFOL 10 MG/ML
VIAL (ML) INTRAVENOUS
Status: DISCONTINUED | OUTPATIENT
Start: 2022-02-24 | End: 2022-02-24

## 2022-02-24 RX ADMIN — SODIUM CHLORIDE: 0.9 INJECTION, SOLUTION INTRAVENOUS at 11:02

## 2022-02-24 RX ADMIN — PROPOFOL 40 MG: 10 INJECTION, EMULSION INTRAVENOUS at 12:02

## 2022-02-24 RX ADMIN — LIDOCAINE HYDROCHLORIDE 75 MG: 20 INJECTION, SOLUTION INTRAVENOUS at 12:02

## 2022-02-24 RX ADMIN — PROPOFOL 100 MG: 10 INJECTION, EMULSION INTRAVENOUS at 12:02

## 2022-02-24 NOTE — TRANSFER OF CARE
Anesthesia Transfer of Care Note    Patient: Omar Pacheco    Procedure(s) Performed: Procedure(s) (LRB):  COLONOSCOPY (N/A)    Patient location: GI    Anesthesia Type: general    Transport from OR: Transported from OR on 2-3 L/min O2 by NC with adequate spontaneous ventilation    Post pain: adequate analgesia    Post assessment: no apparent anesthetic complications and tolerated procedure well    Post vital signs: stable    Level of consciousness: sedated and responds to stimulation    Nausea/Vomiting: no nausea/vomiting    Complications: none    Transfer of care protocol was followed      Last vitals:   Visit Vitals  BP (!) 109/57 (Patient Position: Lying)   Pulse (!) 58   Temp 37.1 °C (98.7 °F) (Oral)   Resp 12   SpO2 95%

## 2022-02-24 NOTE — H&P
Short Stay Endoscopy History and Physical    PCP - Azikiwe K Lombard, MD  Referring Physician - Azikiwe K. Lombard, MD  [address] [place] [address]    Procedure - Colonoscopy  ASA - per anesthesia  Mallampati - per anesthesia  History of Anesthesia problems - no  Family history Anesthesia problems -  no   Plan of anesthesia - General    HPI  71 y.o. male  Reason for procedure:   Screening [Z13.9]  History of colon polyps [Z86.010]    Last took pradaxa two days ago.     ROS:  Constitutional: No fevers, chills, No weight loss  CV: No chest pain  Pulm: No cough, No shortness of breath  GI: see HPI    Medical History:  has a past medical history of *Atrial fibrillation, Back pain, Benign hypertrophy of prostate, Degenerative disc disease, GERD (gastroesophageal reflux disease), colonic polyps, Hypertension, Pilonidal cyst (1971), and Trouble in sleeping.    Surgical History:  has a past surgical history that includes Cyst Removal (1971); Knee surgery (1989); and Colonoscopy (N/A, 12/7/2016).    Family History: family history includes Alcohol abuse in his father; Breast cancer in his mother and sister; Cancer in his father, mother, and sister; Depression in his brother; Early death in his brother and sister; Heart disease in his father; Hypertension in his father; Mental illness in his brother; Ovarian cancer in his other..    Social History:  reports that he quit smoking about 46 years ago. His smoking use included cigarettes. He quit after 2.00 years of use. He has never used smokeless tobacco. He reports current alcohol use of about 3.0 standard drinks of alcohol per week. He reports that he does not use drugs.    Review of patient's allergies indicates:   Allergen Reactions    Bactrim [sulfamethoxazole-trimethoprim] Swelling     Swelling of lips/blisters in mouth         Medications:   Medications Prior to Admission   Medication Sig Dispense Refill Last Dose    acetaminophen (TYLENOL ORAL) Take 500  "mg by mouth as needed.       amLODIPine (NORVASC) 5 MG tablet Take 1 tablet (5 mg total) by mouth once daily. 90 tablet 0     ammonium lactate (AMLACTIN) 12 % lotion Apply topically daily as needed (Dry Skin). 400 g 2     azelastine (ASTELIN) 137 mcg (0.1 %) nasal spray 1 spray (137 mcg total) by Nasal route 2 (two) times daily. 30 mL 0     benzonatate (TESSALON) 200 MG capsule TAKE 1 CAPSULE(200 MG) BY MOUTH THREE TIMES DAILY AS NEEDED FOR COUGH 30 capsule 0     cyanocobalamin (VITAMIN B-12) 1000 MCG tablet Take 100 mcg by mouth once daily.       dabigatran etexilate (PRADAXA) 150 mg Cap Take 1 capsule (150 mg total) by mouth 2 (two) times daily. "Do NOT break, chew, or open capsules." 180 capsule 3     flecainide (TAMBOCOR) 100 MG Tab Take 1 tablet (100 mg total) by mouth every 12 (twelve) hours. 180 tablet 3     insulin syringe-needle U-100 (EASY COMFORT INSULIN SYRINGE) 1 mL 31 gauge x 5/16 Syrg Inject 1 time daily prn ED 90 each 0     levocetirizine (XYZAL) 5 MG tablet Take 1 tablet (5 mg total) by mouth every evening. 90 tablet 3     losartan (COZAAR) 25 MG tablet Take 1 tablet (25 mg total) by mouth once daily. 90 tablet 3     methocarbamoL (ROBAXIN) 500 MG Tab Take 500 mg by mouth as needed.       metoprolol succinate (TOPROL-XL) 25 MG 24 hr tablet Take 1 tablet (25 mg total) by mouth once daily. 30 tablet 6     mirtazapine (REMERON) 30 MG tablet Take 30 mg by mouth every evening.       multivitamin (THERAGRAN) per tablet Take 1 tablet by mouth once daily.       omeprazole (PRILOSEC) 20 MG capsule Take 1 capsule (20 mg total) by mouth once daily. 90 capsule 0     papaverine 30 mg/mL injection ADD: Phentolamine 6 mg/ml ADD: PGE1 60 mcg. 30/6/60. Sig: Start w/ 40 units. Inject as directed into proximal lateral aspect of penis. 10 mL 11     promethazine-dextromethorphan (PROMETHAZINE-DM) 6.25-15 mg/5 mL Syrp TAKE 5 ML BY MOUTH EVERY NIGHT AS NEEDED FOR COUGH 180 mL 0     sildenafiL (VIAGRA) " 100 MG tablet Take 1 tablet (100 mg total) by mouth as needed for Erectile Dysfunction (1 tablet 1 hr prior to intercourse.). 10 tablet 11     terazosin (HYTRIN) 10 MG capsule Take 1 capsule (10 mg total) by mouth every evening. 90 capsule 3     traMADoL (ULTRAM) 50 mg tablet Take 1 tablet (50 mg total) by mouth every 12 (twelve) hours as needed for Pain. 60 tablet 0     triamcinolone acetonide 0.1% (KENALOG) 0.1 % cream Apply topically 2 (two) times daily. 45 g 0     UNABLE TO FIND Start WITH 40 UNITS. Inject as directed into proximal lateral aspect OF PENIS       VITAMIN B COMPLEX (B COMPLETE ORAL) Take by mouth once daily.          Physical Exam:    Vital Signs: There were no vitals filed for this visit.    General Appearance: Well appearing in no acute distress  Abdomen: Soft, non tender, non distended with normal bowel sounds, no masses    Labs:  Lab Results   Component Value Date    WBC 5.14 01/26/2022    HGB 13.5 (L) 01/26/2022    HCT 41.3 01/26/2022     01/26/2022    CHOL 150 01/26/2022    TRIG 84 01/26/2022    HDL 52 01/26/2022    ALT 17 01/26/2022    AST 26 01/26/2022     01/26/2022    K 4.2 01/26/2022     01/26/2022    CREATININE 0.9 01/26/2022    BUN 12 01/26/2022    CO2 26 01/26/2022    TSH 0.944 12/08/2020    PSA 4.09 (H) 01/07/2013    INR 1.0 11/06/2020       I have explained the risks and benefits of this endoscopic procedure to the patient including but not limited to bleeding, inflammation, infection, perforation, and death.    Assessment/Plan:     1. CRC Surveillance     - Proceed with colonoscopy       Kaylee Davis MD  Gastroenterology   [facility]

## 2022-02-24 NOTE — ANESTHESIA POSTPROCEDURE EVALUATION
Anesthesia Post Evaluation    Patient: Omar Pacheco    Procedure(s) Performed: Procedure(s) (LRB):  COLONOSCOPY (N/A)    Final Anesthesia Type: general      Patient location during evaluation: GI PACU  Patient participation: Yes- Able to Participate  Level of consciousness: awake and alert  Post-procedure vital signs: reviewed and stable  Pain management: adequate  Airway patency: patent    PONV status at discharge: No PONV  Anesthetic complications: no      Cardiovascular status: blood pressure returned to baseline  Respiratory status: unassisted and spontaneous ventilation  Hydration status: euvolemic  Follow-up not needed.          Vitals Value Taken Time   /67 02/24/22 1254   Temp 37.1 °C (98.7 °F) 02/24/22 1224   Pulse 58 02/24/22 1254   Resp 19 02/24/22 1254   SpO2 98 % 02/24/22 1254         Event Time   Out of Recovery 13:05:00         Pain/Robb Score: Robb Score: 10 (2/24/2022 12:53 PM)

## 2022-02-24 NOTE — ANESTHESIA PREPROCEDURE EVALUATION
02/24/2022  Omar Pacheco is a 71 y.o., male.  Pre-operative evaluation for Procedure(s) (LRB):  COLONOSCOPY (N/A)    Omar Pacheco is a 71 y.o. male     Patient Active Problem List   Diagnosis    GERD (gastroesophageal reflux disease)    BPH (benign prostatic hyperplasia)    Atrial fibrillation - paroxysmal    Essential hypertension    Nuclear sclerosis - Both Eyes    HNP (herniated nucleus pulposus), lumbar    Degeneration of lumbar or lumbosacral intervertebral disc    Spinal stenosis, lumbar region, without neurogenic claudication    Displacement of lumbar intervertebral disc without myelopathy    Muscle spasm of back    Lumbar spinal stenosis    Osteoarthritis of spine with radiculopathy, lumbar region    Morbid (severe) obesity due to excess calories    BPH with urinary obstruction    Erectile dysfunction due to arterial insufficiency    Peyronie's disease    Nocturia    Obstructive sleep apnea    Obesity (BMI 30.0-34.9)    Sinus bradycardia    On continuous oral anticoagulation    Risk for coronary artery disease between 10% and 20% in next 10 years    Stromal tumor of the stomach       Review of patient's allergies indicates:   Allergen Reactions    Bactrim [sulfamethoxazole-trimethoprim] Swelling     Swelling of lips/blisters in mouth         No current facility-administered medications on file prior to encounter.     Current Outpatient Medications on File Prior to Encounter   Medication Sig Dispense Refill    acetaminophen (TYLENOL ORAL) Take 500 mg by mouth as needed.      cyanocobalamin (VITAMIN B-12) 1000 MCG tablet Take 100 mcg by mouth once daily.      levocetirizine (XYZAL) 5 MG tablet Take 1 tablet (5 mg total) by mouth every evening. 90 tablet 3    losartan (COZAAR) 25 MG tablet Take 1 tablet (25 mg total) by mouth once daily. 90 tablet 3    methocarbamoL  (ROBAXIN) 500 MG Tab Take 500 mg by mouth as needed.      mirtazapine (REMERON) 30 MG tablet Take 30 mg by mouth every evening.      multivitamin (THERAGRAN) per tablet Take 1 tablet by mouth once daily.      terazosin (HYTRIN) 10 MG capsule Take 1 capsule (10 mg total) by mouth every evening. 90 capsule 3    VITAMIN B COMPLEX (B COMPLETE ORAL) Take by mouth once daily.      ammonium lactate (AMLACTIN) 12 % lotion Apply topically daily as needed (Dry Skin). 400 g 2    azelastine (ASTELIN) 137 mcg (0.1 %) nasal spray 1 spray (137 mcg total) by Nasal route 2 (two) times daily. 30 mL 0    benzonatate (TESSALON) 200 MG capsule TAKE 1 CAPSULE(200 MG) BY MOUTH THREE TIMES DAILY AS NEEDED FOR COUGH 30 capsule 0    insulin syringe-needle U-100 (EASY COMFORT INSULIN SYRINGE) 1 mL 31 gauge x 5/16 Syrg Inject 1 time daily prn ED 90 each 0    papaverine 30 mg/mL injection ADD: Phentolamine 6 mg/ml ADD: PGE1 60 mcg. 30/6/60. Sig: Start w/ 40 units. Inject as directed into proximal lateral aspect of penis. 10 mL 11    sildenafiL (VIAGRA) 100 MG tablet Take 1 tablet (100 mg total) by mouth as needed for Erectile Dysfunction (1 tablet 1 hr prior to intercourse.). 10 tablet 11    triamcinolone acetonide 0.1% (KENALOG) 0.1 % cream Apply topically 2 (two) times daily. 45 g 0    UNABLE TO FIND Start WITH 40 UNITS. Inject as directed into proximal lateral aspect OF PENIS         Past Surgical History:   Procedure Laterality Date    COLONOSCOPY N/A 12/7/2016    Procedure: COLONOSCOPY;  Surgeon: Sumeet Lundy MD;  Location: [facility] ([address] [place] [address]);  Service: Endoscopy;  Laterality: N/A;  OK to hold Pradaxa 2 days prior to procedure per Dr Jones/see note dated 11/15/16/svn    CYST REMOVAL  1971    coccyx    KNEE SURGERY  1989    right       Social History     Socioeconomic History    Marital status:    Tobacco Use    Smoking status: Former Smoker     Years: 2.00     Types: Cigarettes     Quit date: 9/26/1975      Years since quittin.4    Smokeless tobacco: Never Used   Substance and Sexual Activity    Alcohol use: Yes     Alcohol/week: 3.0 standard drinks     Types: 3 Glasses of wine per week     Comment: weekly    Drug use: No    Sexual activity: Yes         Lab Results   Component Value Date    WBC 5.14 2022    HGB 13.5 (L) 2022    HCT 41.3 2022    MCV 93 2022     2022       CMP  Sodium   Date Value Ref Range Status   2022 139 136 - 145 mmol/L Final     Potassium   Date Value Ref Range Status   2022 4.2 3.5 - 5.1 mmol/L Final     Chloride   Date Value Ref Range Status   2022 104 95 - 110 mmol/L Final     CO2   Date Value Ref Range Status   2022 26 23 - 29 mmol/L Final     Glucose   Date Value Ref Range Status   2022 91 70 - 110 mg/dL Final     BUN   Date Value Ref Range Status   2022 12 8 - 23 mg/dL Final     Creatinine   Date Value Ref Range Status   2022 0.9 0.5 - 1.4 mg/dL Final     Calcium   Date Value Ref Range Status   2022 9.3 8.7 - 10.5 mg/dL Final     Total Protein   Date Value Ref Range Status   2022 7.1 6.0 - 8.4 g/dL Final     Albumin   Date Value Ref Range Status   2022 4.0 3.5 - 5.2 g/dL Final     Total Bilirubin   Date Value Ref Range Status   2022 0.5 0.1 - 1.0 mg/dL Final     Comment:     For infants and newborns, interpretation of results should be based  on gestational age, weight and in agreement with clinical  observations.    Premature Infant recommended reference ranges:  Up to 24 hours.............<8.0 mg/dL  Up to 48 hours............<12.0 mg/dL  3-5 days..................<15.0 mg/dL  6-29 days.................<15.0 mg/dL       Alkaline Phosphatase   Date Value Ref Range Status   2022 63 55 - 135 U/L Final     AST   Date Value Ref Range Status   2022 26 10 - 40 U/L Final     ALT   Date Value Ref Range Status   2022 17 10 - 44 U/L Final     Anion Gap   Date Value Ref  Range Status   2022 9 8 - 16 mmol/L Final     eGFR if    Date Value Ref Range Status   2022 >60.0 >60 mL/min/1.73 m^2 Final     eGFR if non    Date Value Ref Range Status   2022 >60.0 >60 mL/min/1.73 m^2 Final     Comment:     Calculation used to obtain the estimated glomerular filtration  rate (eGFR) is the CKD-EPI equation.                Diagnostic Studies:      EKD Echo:  No results found for this or any previous visit.        Pre-op Assessment    I have reviewed the Patient Summary Reports.     I have reviewed the Nursing Notes. I have reviewed the NPO Status.   I have reviewed the Medications.     Review of Systems  Anesthesia Hx:  No problems with previous Anesthesia  History of prior surgery of interest to airway management or planning:  Denies Personal Hx of Anesthesia complications.   Social:  Former Smoker    Hematology/Oncology:  Hematology Normal   Oncology Normal     EENT/Dental:EENT/Dental Normal   Cardiovascular:   Exercise tolerance: good Hypertension Dysrhythmias (pradaxa) atrial fibrillation ECG has been reviewed.    Pulmonary:   Sleep Apnea Does not tolerate CPAP   Education provided regarding risk of obstructive sleep apnea     Renal/:  Renal/ Normal     Hepatic/GI:   Bowel Prep. GERD    Musculoskeletal:   Arthritis     Neurological:   Neuromuscular Disease,    Endocrine:  Endocrine Normal        Physical Exam  General: Well nourished    Airway:  Mallampati: II   Mouth Opening: Normal  TM Distance: Normal  Tongue: Normal  Neck ROM: Normal ROM    Dental:  Intact        Anesthesia Plan  Type of Anesthesia, risks & benefits discussed:    Anesthesia Type: Gen Natural Airway, MAC  Intra-op Monitoring Plan: Standard ASA Monitors  Induction:  IV  Informed Consent: Informed consent signed with the Patient and all parties understand the risks and agree with anesthesia plan.  All questions answered.   ASA Score: 3  Day of Surgery Review of  History & Physical: H&P Update referred to the surgeon/provider.    Ready For Surgery From Anesthesia Perspective.     .

## 2022-02-24 NOTE — PROVATION PATIENT INSTRUCTIONS
Discharge Summary/Instructions after an Endoscopic Procedure  Patient Name: Omar Pacheco  Patient MRN: [id number]  Patient YOB: 1950 Thursday, February 24, 2022  Kaylee Davis MD  Dear patient,  As a result of recent federal legislation (The Federal Cures Act), you may   receive lab or pathology results from your procedure in your [facility]   account before your physician is able to contact you. Your physician or   their representative will relay the results to you with their   recommendations at their soonest availability.  Thank you,  RESTRICTIONS:  During your procedure today, you received medications for sedation.  These   medications may affect your judgment, balance and coordination.  Therefore,   for 24 hours, you have the following restrictions:   - DO NOT drive a car, operate machinery, make legal/financial decisions,   sign important papers or drink alcohol.    ACTIVITY:  Today: no heavy lifting, straining or running due to procedural   sedation/anesthesia.  The following day: return to full activity including work.  DIET:  Eat and drink normally unless instructed otherwise.     TREATMENT FOR COMMON SIDE EFFECTS:  - Mild abdominal pain, nausea, belching, bloating or excessive gas:  rest,   eat lightly and use a heating pad.  - Sore Throat: treat with throat lozenges and/or gargle with warm salt   water.  - Because air was used during the procedure, expelling large amounts of air   from your rectum or belching is normal.  - If a bowel prep was taken, you may not have a bowel movement for 1-3 days.    This is normal.  SYMPTOMS TO WATCH FOR AND REPORT TO YOUR PHYSICIAN:  1. Abdominal pain or bloating, other than gas cramps.  2. Chest pain.  3. Back pain.  4. Signs of infection such as: chills or fever occurring within 24 hours   after the procedure.  5. Rectal bleeding, which would show as bright red, maroon, or black stools.   (A tablespoon of blood from the rectum is not serious,  especially if   hemorrhoids are present.)  6. Vomiting.  7. Weakness or dizziness.  GO DIRECTLY TO THE NEAREST EMERGENCY ROOM IF YOU HAVE ANY OF THE FOLLOWING:      Difficulty breathing              Chills and/or fever over 101 F   Persistent vomiting and/or vomiting blood   Severe abdominal pain   Severe chest pain   Black, tarry stools   Bleeding- more than one tablespoon   Any other symptom or condition that you feel may need urgent attention  Your doctor recommends these additional instructions:  If any biopsies were taken, your doctors clinic will contact you in 1 to 2   weeks with any results.  - Discharge patient to home (ambulatory).   - Resume regular diet.   - Continue present medications.   - Repeat colonoscopy for CRC surveillance not recommended due to patient's   age and lack of polyps on last two colonscopies  For questions, problems or results please call your physician - Kaylee Davis MD at Work:  [phone].  [facility] can be reached at [phone]     IF A COMPLICATION OR EMERGENCY SITUATION ARISES AND YOU ARE UNABLE TO REACH   YOUR PHYSICIAN - GO DIRECTLY TO THE EMERGENCY ROOM.  Kaylee Davis MD  2/24/2022 12:23:17 PM  This report has been verified and signed electronically.  Dear patient,  As a result of recent federal legislation (The Federal Cures Act), you may   receive lab or pathology results from your procedure in your [facility]   account before your physician is able to contact you. Your physician or   their representative will relay the results to you with their   recommendations at their soonest availability.  Thank you,  PROVATION

## 2022-03-02 ENCOUNTER — PATIENT MESSAGE (OUTPATIENT)
Dept: ORTHOPEDICS | Facility: CLINIC | Age: 72
End: 2022-03-02
Payer: MEDICARE

## 2022-03-02 DIAGNOSIS — L30.9 DERMATITIS: ICD-10-CM

## 2022-03-03 ENCOUNTER — OFFICE VISIT (OUTPATIENT)
Dept: FAMILY MEDICINE | Facility: CLINIC | Age: 72
End: 2022-03-03
Payer: MEDICARE

## 2022-03-03 VITALS
HEIGHT: 69 IN | DIASTOLIC BLOOD PRESSURE: 86 MMHG | TEMPERATURE: 99 F | HEART RATE: 53 BPM | BODY MASS INDEX: 31.77 KG/M2 | RESPIRATION RATE: 16 BRPM | SYSTOLIC BLOOD PRESSURE: 160 MMHG | WEIGHT: 214.5 LBS | OXYGEN SATURATION: 97 %

## 2022-03-03 DIAGNOSIS — I10 ESSENTIAL HYPERTENSION: Primary | ICD-10-CM

## 2022-03-03 DIAGNOSIS — J30.89 SEASONAL ALLERGIC RHINITIS DUE TO OTHER ALLERGIC TRIGGER: ICD-10-CM

## 2022-03-03 DIAGNOSIS — M48.062 SPINAL STENOSIS OF LUMBAR REGION WITH NEUROGENIC CLAUDICATION: ICD-10-CM

## 2022-03-03 DIAGNOSIS — M17.11 PRIMARY OSTEOARTHRITIS OF RIGHT KNEE: ICD-10-CM

## 2022-03-03 DIAGNOSIS — N40.1 BPH WITH URINARY OBSTRUCTION: ICD-10-CM

## 2022-03-03 DIAGNOSIS — L85.3 DRY SKIN DERMATITIS: ICD-10-CM

## 2022-03-03 DIAGNOSIS — N52.1 ERECTILE DYSFUNCTION DUE TO DISEASES CLASSIFIED ELSEWHERE: ICD-10-CM

## 2022-03-03 DIAGNOSIS — M51.16 LUMBAR DISC DISEASE WITH RADICULOPATHY: ICD-10-CM

## 2022-03-03 DIAGNOSIS — N13.8 BPH WITH URINARY OBSTRUCTION: ICD-10-CM

## 2022-03-03 PROBLEM — J30.2 SEASONAL ALLERGIC RHINITIS: Status: ACTIVE | Noted: 2022-03-03

## 2022-03-03 PROCEDURE — 99215 OFFICE O/P EST HI 40 MIN: CPT | Mod: PBBFAC,PO | Performed by: FAMILY MEDICINE

## 2022-03-03 PROCEDURE — 99214 OFFICE O/P EST MOD 30 MIN: CPT | Mod: S$PBB,,, | Performed by: FAMILY MEDICINE

## 2022-03-03 PROCEDURE — 99214 PR OFFICE/OUTPT VISIT, EST, LEVL IV, 30-39 MIN: ICD-10-PCS | Mod: S$PBB,,, | Performed by: FAMILY MEDICINE

## 2022-03-03 PROCEDURE — 99999 PR PBB SHADOW E&M-EST. PATIENT-LVL V: ICD-10-PCS | Mod: PBBFAC,,, | Performed by: FAMILY MEDICINE

## 2022-03-03 PROCEDURE — 99999 PR PBB SHADOW E&M-EST. PATIENT-LVL V: CPT | Mod: PBBFAC,,, | Performed by: FAMILY MEDICINE

## 2022-03-03 RX ORDER — SILDENAFIL 100 MG/1
100 TABLET, FILM COATED ORAL
Qty: 10 TABLET | Refills: 11 | Status: SHIPPED | OUTPATIENT
Start: 2022-03-03 | End: 2023-01-11 | Stop reason: SDUPTHER

## 2022-03-03 RX ORDER — AMMONIUM LACTATE 12 G/100G
LOTION TOPICAL DAILY PRN
Qty: 400 G | Refills: 11 | Status: SHIPPED | OUTPATIENT
Start: 2022-03-03 | End: 2022-10-11 | Stop reason: SDUPTHER

## 2022-03-03 RX ORDER — TERAZOSIN 10 MG/1
10 CAPSULE ORAL NIGHTLY
Qty: 90 CAPSULE | Refills: 3 | Status: SHIPPED | OUTPATIENT
Start: 2022-03-03 | End: 2022-06-29

## 2022-03-03 RX ORDER — TRIAMCINOLONE ACETONIDE 1 MG/G
CREAM TOPICAL 2 TIMES DAILY
Qty: 45 G | Refills: 0 | Status: SHIPPED | OUTPATIENT
Start: 2022-03-03 | End: 2022-03-30 | Stop reason: SDUPTHER

## 2022-03-03 RX ORDER — LOSARTAN POTASSIUM 50 MG/1
25 TABLET ORAL DAILY
Qty: 45 TABLET | Refills: 3 | Status: SHIPPED | OUTPATIENT
Start: 2022-03-03 | End: 2022-10-11 | Stop reason: SDUPTHER

## 2022-03-03 RX ORDER — TADALAFIL 20 MG/1
20 TABLET ORAL DAILY
Qty: 30 TABLET | Refills: 5 | Status: SHIPPED | OUTPATIENT
Start: 2022-03-03 | End: 2022-10-11 | Stop reason: SDUPTHER

## 2022-03-03 RX ORDER — PROMETHAZINE HYDROCHLORIDE AND DEXTROMETHORPHAN HYDROBROMIDE 6.25; 15 MG/5ML; MG/5ML
SYRUP ORAL
Qty: 180 ML | Refills: 2 | Status: SHIPPED | OUTPATIENT
Start: 2022-03-03 | End: 2022-04-21

## 2022-03-03 RX ORDER — MELOXICAM 7.5 MG/1
7.5 TABLET ORAL DAILY
Qty: 90 TABLET | Refills: 3 | Status: SHIPPED | OUTPATIENT
Start: 2022-03-03 | End: 2023-07-11 | Stop reason: SDUPTHER

## 2022-03-03 RX ORDER — LEVOCETIRIZINE DIHYDROCHLORIDE 5 MG/1
5 TABLET, FILM COATED ORAL NIGHTLY
Qty: 90 TABLET | Refills: 3 | Status: SHIPPED | OUTPATIENT
Start: 2022-03-03 | End: 2024-01-11

## 2022-03-03 RX ORDER — METOPROLOL SUCCINATE 25 MG/1
25 TABLET, EXTENDED RELEASE ORAL DAILY
Qty: 90 TABLET | Refills: 3 | Status: SHIPPED | OUTPATIENT
Start: 2022-03-03 | End: 2022-05-30 | Stop reason: SDUPTHER

## 2022-03-03 RX ORDER — AMLODIPINE BESYLATE 5 MG/1
5 TABLET ORAL DAILY
Qty: 90 TABLET | Refills: 3 | Status: SHIPPED | OUTPATIENT
Start: 2022-03-03 | End: 2022-10-11 | Stop reason: SDUPTHER

## 2022-03-03 NOTE — PROGRESS NOTES
Chief Complaint   Patient presents with    Hypertension     Follow up        Omar Pacheco is a 71 y.o. male who presents per chief complain.  Chronic medical issues, if present, have been documented.  Acute medical issues, if present have been documented in the Chief Complaint.     Hypertension  This is a chronic problem. The current episode started more than 1 year ago. The problem has been waxing and waning since onset. The problem is controlled. Pertinent negatives include no anxiety, blurred vision, chest pain, headaches, malaise/fatigue, neck pain, orthopnea, palpitations, peripheral edema, PND, shortness of breath or sweats. There are no associated agents to hypertension. Risk factors for coronary artery disease include male gender and obesity. Past treatments include angiotensin blockers, diuretics and calcium channel blockers. There are no compliance problems.  There is no history of angina, kidney disease, CAD/MI, CVA, heart failure, left ventricular hypertrophy, PVD or retinopathy. There is no history of chronic renal disease, coarctation of the aorta, hyperaldosteronism, hypercortisolism, hyperparathyroidism, a hypertension causing med, pheochromocytoma, renovascular disease, sleep apnea or a thyroid problem.   Back Pain  This is a recurrent problem. The current episode started 1 to 4 weeks ago. The problem occurs daily. The problem has been waxing and waning since onset. The pain is present in the lumbar spine and sacro-iliac. The quality of the pain is described as shooting, aching and burning. The pain radiates to the left thigh. The pain is at a severity of 7/10. The pain is moderate. The symptoms are aggravated by bending and position. Associated symptoms include leg pain. Pertinent negatives include no abdominal pain, bladder incontinence, bowel incontinence, chest pain, dysuria, fever, headaches, numbness, paresis, paresthesias, pelvic pain, perianal numbness, tingling, weakness or weight  "loss. He has tried analgesics, home exercises and heat for the symptoms. The treatment provided mild relief.     ROS  Review of Systems   Constitutional: Negative.  Negative for activity change, appetite change, chills, diaphoresis, fatigue, fever, malaise/fatigue, unexpected weight change and weight loss.   HENT: Negative.  Negative for congestion, ear pain, hearing loss, nosebleeds, postnasal drip, rhinorrhea, sinus pressure, sneezing, sore throat and trouble swallowing.    Eyes: Positive for discharge. Negative for blurred vision, pain and visual disturbance.   Respiratory: Negative for cough, choking, chest tightness, shortness of breath and wheezing.    Cardiovascular: Negative for chest pain, palpitations, orthopnea, leg swelling and PND.   Gastrointestinal: Negative for abdominal pain, blood in stool, bowel incontinence, constipation, diarrhea, nausea and vomiting.   Endocrine: Positive for polyuria. Negative for polydipsia.   Genitourinary: Negative for bladder incontinence, difficulty urinating, dysuria, frequency, hematuria, pelvic pain and urgency.   Musculoskeletal: Positive for arthralgias, back pain, gait problem and joint swelling. Negative for myalgias and neck pain.   Skin: Negative.    Allergic/Immunologic: Negative for environmental allergies and food allergies.   Neurological: Negative for dizziness, tingling, seizures, syncope, weakness, light-headedness, numbness, headaches and paresthesias.   Psychiatric/Behavioral: Negative.  Negative for confusion, decreased concentration, dysphoric mood and sleep disturbance. The patient is not nervous/anxious.      Physical Exam  Vitals:    03/03/22 1042   BP: (!) 160/86   Pulse: (!) 53   Resp: 16   Temp: 98.5 °F (36.9 °C)    Body mass index is 31.68 kg/m².  Weight: 97.3 kg (214 lb 8.1 oz)   Height: 5' 9" (175.3 cm)     Physical Exam  Constitutional:       General: He is not in acute distress.     Appearance: Normal appearance. He is well-developed. He is " not ill-appearing, toxic-appearing or diaphoretic.   HENT:      Head: Normocephalic and atraumatic.      Right Ear: Hearing and external ear normal. No decreased hearing noted.      Left Ear: Hearing and external ear normal. No decreased hearing noted.      Nose: Nose normal. No nasal deformity or rhinorrhea.      Mouth/Throat:      Dentition: Normal dentition. Does not have dentures.      Pharynx: Uvula midline.   Eyes:      General: Lids are normal. No scleral icterus.        Right eye: No foreign body or discharge.         Left eye: No foreign body or discharge.      Conjunctiva/sclera: Conjunctivae normal.      Right eye: No chemosis or exudate.     Left eye: No chemosis or exudate.     Pupils: Pupils are equal, round, and reactive to light.   Cardiovascular:      Rate and Rhythm: Normal rate and regular rhythm.      Heart sounds: Normal heart sounds, S1 normal and S2 normal. No murmur heard.    No friction rub. No gallop.   Pulmonary:      Effort: Pulmonary effort is normal. No accessory muscle usage or respiratory distress.      Breath sounds: Normal breath sounds. No decreased breath sounds, wheezing, rhonchi or rales.   Abdominal:      General: There is no distension.      Palpations: Abdomen is soft. Abdomen is not rigid.      Tenderness: There is no abdominal tenderness. There is no guarding or rebound.   Musculoskeletal:      Cervical back: Full passive range of motion without pain, normal range of motion and neck supple.      Lumbar back: Tenderness present. No swelling, edema, deformity, signs of trauma, lacerations, spasms or bony tenderness. Decreased range of motion. No scoliosis.        Back:       Left upper leg: Tenderness present. No swelling, edema, deformity, lacerations or bony tenderness.      Right knee: Swelling and bony tenderness present. No deformity, effusion, erythema, ecchymosis, lacerations or crepitus. Normal range of motion. Tenderness present over the medial joint line, lateral  joint line and patellar tendon.      Left knee: Normal.        Legs:    Skin:     General: Skin is warm and dry.      Findings: No rash.   Neurological:      Mental Status: He is alert and oriented to person, place, and time.      Cranial Nerves: No cranial nerve deficit.      Sensory: No sensory deficit.      Motor: No abnormal muscle tone or seizure activity.      Coordination: Coordination normal.      Gait: Gait normal.   Psychiatric:         Attention and Perception: He is attentive.         Speech: Speech normal.         Behavior: Behavior normal. Behavior is cooperative.         Thought Content: Thought content normal.         Judgment: Judgment normal.       Assessment & Plan    Discussion of plan of care including treatment options regarding health and wellness were reviewed and discussed with patient.  Any changes to medication or treatment plan, as well as any screening blood test, imaging, or referrals to specialist, are documented.  Follow up as indicated.     1. Essential hypertension  Patient was counseled and encouraged to maintain a low sodium diet, as well as increasing physical activity.  Recommend random BP checks at home on a regular basis.  Repeat BP at end of visit was improved. Will continue medication at this time, and follow up in 4-6 weeks, or sooner if blood pressure does not improve over time.  Elevated BP likely associated with current pain symptoms; usually better controlled.  Patient is enrolled in Digital Hypertension program.   - amLODIPine (NORVASC) 5 MG tablet; Take 1 tablet (5 mg total) by mouth once daily.  Dispense: 90 tablet; Refill: 3  - metoprolol succinate (TOPROL-XL) 25 MG 24 hr tablet; Take 1 tablet (25 mg total) by mouth once daily.  Dispense: 90 tablet; Refill: 3  - losartan (COZAAR) 50 MG tablet; Take 0.5 tablets (25 mg total) by mouth once daily.  Dispense: 45 tablet; Refill: 3    2. Lumbar disc disease with radiculopathy  Patient was advised that pain is most likely  sciatica, which is caused by nerve entrapment in the lower back.  Advised to stretch the hip and leg as much as tolerated, and to continue NSAID therapy.  In addition, medication for neuropathic pain was ordered and patient was advised to use medication up to three times daily for relief.  Patient was advised that pain may last several days, but should resolve.  If pain persists, patient is encouraged to follow up for further evaluation.     3. Spinal stenosis of lumbar region with neurogenic claudication  The current medical regimen will be continued at this time as discussed.     4. BPH with urinary obstruction  The current medical regimen will be continued at this time as discussed.   - terazosin (HYTRIN) 10 MG capsule; Take 1 capsule (10 mg total) by mouth every evening.  Dispense: 90 capsule; Refill: 3  - tadalafiL (CIALIS) 20 MG Tab; Take 1 tablet (20 mg total) by mouth once daily.  Dispense: 30 tablet; Refill: 5    5. Primary osteoarthritis of right knee  Patient is advised that arthritis is a result of regular daily use, and is caused by inflammation in the joint.  Patient was encouraged to exercise as tolerated, and attempt weight loss with sensible diet and lifestyle changes.  Patient was recommended to restart NSAID therapy to reduce inflammation, and to incorporate stretching into a daily routine.  Pain medication will be prescribed as necessary.  Patient should follow up if pain is not well controlled.   - meloxicam (MOBIC) 7.5 MG tablet; Take 1 tablet (7.5 mg total) by mouth once daily.  Dispense: 90 tablet; Refill: 3    6. Seasonal allergic rhinitis due to other allergic trigger  Medication prescribed for use only as symptoms require.   - promethazine-dextromethorphan (PROMETHAZINE-DM) 6.25-15 mg/5 mL Syrp; TAKE 5 ML BY MOUTH EVERY NIGHT AS NEEDED FOR COUGH  Dispense: 180 mL; Refill: 2  - levocetirizine (XYZAL) 5 MG tablet; Take 1 tablet (5 mg total) by mouth every evening.  Dispense: 90 tablet;  Refill: 3    7. Erectile dysfunction due to diseases classified elsewhere  Discussed different causes of ED, including anatomical disorder or nerve damage, vascular disorders, or psychological issues.  We agreed to try medication, and patient was given a prescription and advised to use with caution.  He was reminded that if he has an erection that last longer than 4 hours, he should report to the ER immediately.    - sildenafiL (VIAGRA) 100 MG tablet; Take 1 tablet (100 mg total) by mouth as needed for Erectile Dysfunction (1 tablet 1 hr prior to intercourse.).  Dispense: 10 tablet; Refill: 11  - tadalafiL (CIALIS) 20 MG Tab; Take 1 tablet (20 mg total) by mouth once daily.  Dispense: 30 tablet; Refill: 5    8. Dry skin dermatitis  Medication prescribed for use only as symptoms require.   - ammonium lactate (AMLACTIN) 12 % lotion; Apply topically daily as needed (Dry Skin).  Dispense: 400 g; Refill: 11       Follow up in about 4 months (around 7/3/2022).      ACTIVE MEDICAL ISSUES:  Documented in Problem List    PAST MEDICAL HISTORY  Documented    PAST SURGICAL HISTORY:  Documented    SOCIAL HISTORY:  Documented    FAMILY HISTORY:  Documented    ALLERGIES AND MEDICATIONS: updated and reviewed.  Documented    Health Maintenance       Date Due Completion Date    TETANUS VACCINE 07/28/2021 7/28/2011    COVID-19 Vaccine (4 - Booster for Pfizer series) 02/13/2022 9/13/2021    Lipid Panel 01/26/2027 1/26/2022    Colorectal Cancer Screening 02/24/2027 2/24/2022

## 2022-03-06 ENCOUNTER — PATIENT MESSAGE (OUTPATIENT)
Dept: UROLOGY | Facility: CLINIC | Age: 72
End: 2022-03-06
Payer: MEDICARE

## 2022-03-22 ENCOUNTER — PES CALL (OUTPATIENT)
Dept: ADMINISTRATIVE | Facility: CLINIC | Age: 72
End: 2022-03-22
Payer: MEDICARE

## 2022-03-29 DIAGNOSIS — M48.07 SPINAL STENOSIS, LUMBOSACRAL REGION: ICD-10-CM

## 2022-03-30 DIAGNOSIS — L30.9 DERMATITIS: ICD-10-CM

## 2022-03-30 NOTE — TELEPHONE ENCOUNTER
No new care gaps identified.  Powered by [employer]. Reference number: [id number].   3/30/2022 9:47:22 AM CDT

## 2022-03-30 NOTE — TELEPHONE ENCOUNTER
No new care gaps identified.  Powered by [employer]. Reference number: [id number].   3/29/2022 9:46:19 PM CDT

## 2022-03-31 RX ORDER — TRIAMCINOLONE ACETONIDE 1 MG/G
CREAM TOPICAL 2 TIMES DAILY
Qty: 45 G | Refills: 11 | Status: SHIPPED | OUTPATIENT
Start: 2022-03-31 | End: 2022-05-29 | Stop reason: SDUPTHER

## 2022-03-31 RX ORDER — TRAMADOL HYDROCHLORIDE 50 MG/1
50 TABLET ORAL EVERY 12 HOURS PRN
Qty: 60 TABLET | Refills: 0 | Status: SHIPPED | OUTPATIENT
Start: 2022-03-31 | End: 2022-08-24 | Stop reason: SDUPTHER

## 2022-04-01 DIAGNOSIS — M25.561 PAIN IN BOTH KNEES, UNSPECIFIED CHRONICITY: Primary | ICD-10-CM

## 2022-04-01 DIAGNOSIS — M25.562 PAIN IN BOTH KNEES, UNSPECIFIED CHRONICITY: Primary | ICD-10-CM

## 2022-04-04 ENCOUNTER — OFFICE VISIT (OUTPATIENT)
Dept: ORTHOPEDICS | Facility: CLINIC | Age: 72
End: 2022-04-04
Payer: MEDICARE

## 2022-04-04 ENCOUNTER — HOSPITAL ENCOUNTER (OUTPATIENT)
Dept: RADIOLOGY | Facility: HOSPITAL | Age: 72
Discharge: HOME | End: 2022-04-04
Attending: ORTHOPAEDIC SURGERY
Payer: MEDICARE

## 2022-04-04 ENCOUNTER — PATIENT OUTREACH (OUTPATIENT)
Dept: ADMINISTRATIVE | Facility: OTHER | Age: 72
End: 2022-04-04
Payer: MEDICARE

## 2022-04-04 VITALS — WEIGHT: 210.19 LBS | HEIGHT: 69 IN | BODY MASS INDEX: 31.13 KG/M2

## 2022-04-04 DIAGNOSIS — M17.11 PRIMARY OSTEOARTHRITIS OF RIGHT KNEE: Primary | ICD-10-CM

## 2022-04-04 DIAGNOSIS — M25.562 PAIN IN BOTH KNEES, UNSPECIFIED CHRONICITY: ICD-10-CM

## 2022-04-04 DIAGNOSIS — M25.561 PAIN IN BOTH KNEES, UNSPECIFIED CHRONICITY: ICD-10-CM

## 2022-04-04 PROCEDURE — 99214 OFFICE O/P EST MOD 30 MIN: CPT | Mod: S$PBB,,, | Performed by: ORTHOPAEDIC SURGERY

## 2022-04-04 PROCEDURE — 73562 X-RAY EXAM OF KNEE 3: CPT | Mod: 26,50,, | Performed by: RADIOLOGY

## 2022-04-04 PROCEDURE — 99999 PR PBB SHADOW E&M-EST. PATIENT-LVL IV: ICD-10-PCS | Mod: PBBFAC,,, | Performed by: ORTHOPAEDIC SURGERY

## 2022-04-04 PROCEDURE — 99214 PR OFFICE/OUTPT VISIT, EST, LEVL IV, 30-39 MIN: ICD-10-PCS | Mod: S$PBB,,, | Performed by: ORTHOPAEDIC SURGERY

## 2022-04-04 PROCEDURE — 73562 XR KNEE ORTHO BILAT: ICD-10-PCS | Mod: 26,50,, | Performed by: RADIOLOGY

## 2022-04-04 PROCEDURE — 99214 OFFICE O/P EST MOD 30 MIN: CPT | Mod: PBBFAC | Performed by: ORTHOPAEDIC SURGERY

## 2022-04-04 PROCEDURE — 73562 X-RAY EXAM OF KNEE 3: CPT | Mod: TC,50

## 2022-04-04 PROCEDURE — 99999 PR PBB SHADOW E&M-EST. PATIENT-LVL IV: CPT | Mod: PBBFAC,,, | Performed by: ORTHOPAEDIC SURGERY

## 2022-04-04 NOTE — PROGRESS NOTES
"Subjective:      Patient ID: Omar Pacheco is a 71 y.o. male.    Chief Complaint: Pain of the Left Knee and Pain of the Right Knee    HPI  Omar Pacheco has bilateral knee pain. Right much worse than left.  The right knee pain has worsened slightly. The pain is located in the global aspect of the knee.  There  is not radiation.  There is associated stiffness.   There is not catching and locking. The pain is described as achy. The pain is aggravated by activity.  It is alleviated by rest.  .  His history, medications and problem list were reviewed.    Review of Systems   Constitutional: Negative for chills, fever and night sweats.   HENT: Negative for hearing loss.    Eyes: Negative for blurred vision and double vision.   Cardiovascular: Negative for chest pain, claudication and leg swelling.   Respiratory: Negative for shortness of breath.    Endocrine: Negative for polydipsia, polyphagia and polyuria.   Hematologic/Lymphatic: Negative for adenopathy and bleeding problem. Does not bruise/bleed easily.   Skin: Negative for poor wound healing.   Musculoskeletal: Positive for joint pain.   Gastrointestinal: Negative for diarrhea and heartburn.   Genitourinary: Negative for bladder incontinence.   Neurological: Negative for focal weakness, headaches, numbness, paresthesias and sensory change.   Psychiatric/Behavioral: The patient is not nervous/anxious.    Allergic/Immunologic: Negative for persistent infections.         Objective:      Body mass index is 31.04 kg/m².  Vitals:    04/04/22 1048   Weight: 95.4 kg (210 lb 3.3 oz)   Height: 5' 9" (1.753 m)           General    Constitutional: He is oriented to person, place, and time. He appears well-developed and well-nourished.   HENT:   Head: Normocephalic and atraumatic.   Eyes: EOM are normal.   Cardiovascular: Normal rate.    Pulmonary/Chest: Effort normal.   Neurological: He is alert and oriented to person, place, and time.   Psychiatric: He has a normal mood " and affect. His behavior is normal.     General Musculoskeletal Exam   Gait: normal       Right Knee Exam     Inspection   Erythema: absent  Scars: absent  Swelling: present  Effusion: absent  Deformity: present (varus)  Bruising: absent    Tenderness   The patient is tender to palpation of the medial joint line and patella.    Crepitus   The patient has crepitus of the patella.    Range of Motion   Extension: 0   Flexion: 90     Tests   Ligament Examination Lachman: normal (-1 to 2mm)   MCL - Valgus: normal (0 to 2mm)  LCL - Varus: normal  Patella   Passive Patellar Tilt: neutral    Other   Sensation: normal    Left Knee Exam     Inspection   Erythema: absent  Scars: absent  Swelling: absent  Effusion: absent  Deformity: present (mild varus)  Bruising: absent    Tenderness   The patient is experiencing no tenderness.     Range of Motion   Extension: 0   Flexion: 110     Tests   Stability Lachman: normal (-1 to 2mm)   MCL - Valgus: normal (0 to 2mm)  LCL - Varus: normal (0 to 2mm)  Patella   Passive Patellar Tilt: neutral    Other   Sensation: normal    Muscle Strength   Right Lower Extremity   Hip Abduction: 5/5   Quadriceps:  5/5   Hamstrin/5   Left Lower Extremity   Hip Abduction: 5/5   Quadriceps:  5/5   Hamstrin/5     Reflexes     Left Side  Quadriceps:  2+    Right Side   Quadriceps:  2+    Vascular Exam       Edema  Right Lower Leg: absent  Left Lower Leg: absent      Radiographs taken today and reviewed by me demonstrate severe arthritic change of the right KNEE(s).There  is bone erosion.  There is not a fracture. The medial compartment is most involved.  There is a varus deformity.  The changes are tricompartmental.  Moderate changes on the left          Assessment:       Encounter Diagnosis   Name Primary?    Primary osteoarthritis of right knee Yes          Plan:       Omar was seen today for pain and pain.    Diagnoses and all orders for this visit:    Primary osteoarthritis of right  knee  -     Ambulatory referral/consult to Pain Clinic; Future        Options discussed.  He does no desire surgery.  Discussed Iovera vs RFA.  Will send for RFA.

## 2022-04-10 ENCOUNTER — PATIENT MESSAGE (OUTPATIENT)
Dept: FAMILY MEDICINE | Facility: CLINIC | Age: 72
End: 2022-04-10
Payer: MEDICARE

## 2022-04-19 ENCOUNTER — TELEPHONE (OUTPATIENT)
Dept: ADMINISTRATIVE | Facility: CLINIC | Age: 72
End: 2022-04-19
Payer: MEDICARE

## 2022-04-21 ENCOUNTER — OFFICE VISIT (OUTPATIENT)
Dept: FAMILY MEDICINE | Facility: CLINIC | Age: 72
End: 2022-04-21
Payer: MEDICARE

## 2022-04-21 VITALS
BODY MASS INDEX: 31.19 KG/M2 | OXYGEN SATURATION: 96 % | RESPIRATION RATE: 16 BRPM | SYSTOLIC BLOOD PRESSURE: 118 MMHG | DIASTOLIC BLOOD PRESSURE: 70 MMHG | TEMPERATURE: 98 F | WEIGHT: 210.56 LBS | HEART RATE: 58 BPM | HEIGHT: 69 IN

## 2022-04-21 DIAGNOSIS — Z00.00 ENCOUNTER FOR PREVENTIVE HEALTH EXAMINATION: Primary | ICD-10-CM

## 2022-04-21 DIAGNOSIS — C49.A2: ICD-10-CM

## 2022-04-21 DIAGNOSIS — I48.0 PAROXYSMAL ATRIAL FIBRILLATION: Chronic | ICD-10-CM

## 2022-04-21 PROCEDURE — G0439 PPPS, SUBSEQ VISIT: HCPCS | Mod: ,,, | Performed by: PHYSICIAN ASSISTANT

## 2022-04-21 PROCEDURE — 99999 PR PBB SHADOW E&M-EST. PATIENT-LVL V: ICD-10-PCS | Mod: PBBFAC,,, | Performed by: PHYSICIAN ASSISTANT

## 2022-04-21 PROCEDURE — 99999 PR PBB SHADOW E&M-EST. PATIENT-LVL V: CPT | Mod: PBBFAC,,, | Performed by: PHYSICIAN ASSISTANT

## 2022-04-21 PROCEDURE — G0439 PR MEDICARE ANNUAL WELLNESS SUBSEQUENT VISIT: ICD-10-PCS | Mod: ,,, | Performed by: PHYSICIAN ASSISTANT

## 2022-04-21 PROCEDURE — 99215 OFFICE O/P EST HI 40 MIN: CPT | Mod: PBBFAC,PO | Performed by: PHYSICIAN ASSISTANT

## 2022-04-21 NOTE — PROGRESS NOTES
Health Maintenance Due   Topic Date Due    TETANUS VACCINE  07/28/2021    COVID-19 Vaccine (4 - Booster for Pfizer series) 12/13/2021

## 2022-04-21 NOTE — PATIENT INSTRUCTIONS
Counseling and Referral of Other Preventative  (Italic type indicates deductible and co-insurance are waived)    Patient Name: Omar Pacheco  Today's Date: 4/21/2022    Health Maintenance       Date Due Completion Date    TETANUS VACCINE 07/28/2021 7/28/2011    COVID-19 Vaccine (4 - Booster for Pfizer series) 12/13/2021 9/13/2021    Lipid Panel 01/26/2027 1/26/2022    Colorectal Cancer Screening 02/24/2027 2/24/2022        No orders of the defined types were placed in this encounter.    The following information is provided to all patients.  This information is to help you find resources for any of the problems found today that may be affecting your health:                Living healthy guide: www.[facility].[place].gov      Understanding Diabetes: www.diabetes.org      Eating healthy: www.cdc.gov/healthyweight      [facility] home safety checklist: www.cdc.gov/steadi/patient.html      Agency on Aging: www.goea.[place].[facility]      Alcoholics anonymous (AA): www.aa.org      Physical Activity: www.john.nih.gov/gi5aozd      Tobacco use: www.quitwithusla.org

## 2022-04-21 NOTE — PROGRESS NOTES
"  I offered to discuss advanced care planning, including how to pick a person who would make decisions for you if you were unable to make them for yourself, called a health care power of , and what kind of decisions you might make such as use of life sustaining treatments such as ventilators and tube feeding when faced with a life limiting illness recorded on a living will that they will need to know. (How you want to be cared for as you near the end of your natural life)     X Patient is interested in learning more about jian Pacheco presented for a  Medicare AWV and comprehensive Health Risk Assessment today. The following components were reviewed and updated:    · Medical history  · Family History  · Social history  · Allergies and Current Medications  · Health Risk Assessment  · Health Maintenance  · Care Team         ** See Completed Assessments for Annual Wellness Visit within the encounter summary.**         The following assessments were completed:  · Living Situation  · CAGE  · Depression Screening  · Timed Get Up and Go  · Whisper Test  · Cognitive Function Screening  · Nutrition Screening  · ADL Screening  · PAQ Screening        Vitals:    04/21/22 1002   BP: 118/70   BP Location: Left arm   Patient Position: Sitting   BP Method: Small (Manual)   Pulse: (!) 58   Resp: 16   Temp: 98.1 °F (36.7 °C)   TempSrc: Oral   SpO2: 96%   Weight: 95.5 kg (210 lb 8.6 oz)   Height: 5' 9" (1.753 m)     Body mass index is 31.09 kg/m².  Physical Exam          Diagnoses and health risks identified today and associated recommendations/orders:    1. Encounter for preventive health examination  Provided Omar with a 5-10 year written screening schedule and personal prevention plan. Recommendations were developed using the USPSTF age appropriate recommendations. Education, counseling, and referrals were provided as needed. After Visit Summary printed and given to patient which includes a list of additional " screenings\tests needed.    2. Paroxysmal atrial fibrillation  Stable followed by cardiology    3. Stromal tumor of the stomach  Seen on US 2 years later, stable          No follow-ups on file.    Neel Davison to make advanced directives.  I provided them paperwork and offered to discuss this with them.

## 2022-04-22 ENCOUNTER — OFFICE VISIT (OUTPATIENT)
Dept: UROLOGY | Facility: CLINIC | Age: 72
End: 2022-04-22
Payer: MEDICARE

## 2022-04-22 ENCOUNTER — LAB VISIT (OUTPATIENT)
Dept: LAB | Facility: HOSPITAL | Age: 72
End: 2022-04-22
Payer: MEDICARE

## 2022-04-22 VITALS
HEART RATE: 55 BPM | HEIGHT: 69 IN | SYSTOLIC BLOOD PRESSURE: 135 MMHG | BODY MASS INDEX: 30.96 KG/M2 | WEIGHT: 209 LBS | DIASTOLIC BLOOD PRESSURE: 74 MMHG

## 2022-04-22 DIAGNOSIS — N52.01 ERECTILE DYSFUNCTION DUE TO ARTERIAL INSUFFICIENCY: ICD-10-CM

## 2022-04-22 DIAGNOSIS — N40.1 BPH WITH URINARY OBSTRUCTION: Primary | ICD-10-CM

## 2022-04-22 DIAGNOSIS — N40.1 BPH WITH URINARY OBSTRUCTION: ICD-10-CM

## 2022-04-22 DIAGNOSIS — N13.8 BPH WITH URINARY OBSTRUCTION: Primary | ICD-10-CM

## 2022-04-22 DIAGNOSIS — N13.8 BPH WITH URINARY OBSTRUCTION: ICD-10-CM

## 2022-04-22 DIAGNOSIS — R97.20 ELEVATED PSA: ICD-10-CM

## 2022-04-22 LAB
COMPLEXED PSA SERPL-MCNC: 9.4 NG/ML (ref 0–4)
TESTOST SERPL-MCNC: 463 NG/DL (ref 304–1227)

## 2022-04-22 PROCEDURE — 36415 COLL VENOUS BLD VENIPUNCTURE: CPT | Performed by: NURSE PRACTITIONER

## 2022-04-22 PROCEDURE — 84403 ASSAY OF TOTAL TESTOSTERONE: CPT | Performed by: NURSE PRACTITIONER

## 2022-04-22 PROCEDURE — 99215 OFFICE O/P EST HI 40 MIN: CPT | Mod: PBBFAC | Performed by: NURSE PRACTITIONER

## 2022-04-22 PROCEDURE — 99214 PR OFFICE/OUTPT VISIT, EST, LEVL IV, 30-39 MIN: ICD-10-PCS | Mod: S$PBB,,, | Performed by: NURSE PRACTITIONER

## 2022-04-22 PROCEDURE — 84153 ASSAY OF PSA TOTAL: CPT | Performed by: NURSE PRACTITIONER

## 2022-04-22 PROCEDURE — 99214 OFFICE O/P EST MOD 30 MIN: CPT | Mod: S$PBB,,, | Performed by: NURSE PRACTITIONER

## 2022-04-22 PROCEDURE — 99999 PR PBB SHADOW E&M-EST. PATIENT-LVL V: CPT | Mod: PBBFAC,,, | Performed by: NURSE PRACTITIONER

## 2022-04-22 PROCEDURE — 99999 PR PBB SHADOW E&M-EST. PATIENT-LVL V: ICD-10-PCS | Mod: PBBFAC,,, | Performed by: NURSE PRACTITIONER

## 2022-04-22 NOTE — PROGRESS NOTES
CHIEF COMPLAINT:    Omar Pacheco is a 71 y.o. male with a history of BPH and ED.     HISTORY OF PRESENTING ILLINESS:    Omar Pacheco is a 71 y.o. male with a history of BPH, elevated PSA and ED.  History of Prostatitis; bad reaction to Bactrim.  S/p REZUM with Dr. Martinez 02/16/2018.   He has failed the PDE-5 inhibitors and was successfully using ICI therapy.  Over time ICI was not so effective.  Has increased the dosage and concentration at [facility].  Held off ICI for a while due to beginning of curvature.   Now back using; no issue with curve; only issue is the effectiveness.   Curious about Testosterone Cypionate and ED.           REVIEW OF SYSTEMS:  Review of Systems   Constitutional: Negative.  Negative for chills and fever.   Eyes: Negative for double vision.   Respiratory: Negative for cough and shortness of breath.    Cardiovascular: Negative for chest pain and palpitations.   Gastrointestinal: Negative for nausea and vomiting.   Genitourinary: Negative.  Negative for dysuria, flank pain and hematuria.        Ok with urination  Some LUTS; FOS can vary;  Nocturia 1-3x     Musculoskeletal: Positive for joint pain.        Bilateral knee pain     Neurological: Negative for dizziness.         PATIENT HISTORY:    Past Medical History:   Diagnosis Date    *Atrial fibrillation     Back pain     Benign hypertrophy of prostate     Degenerative disc disease     GERD (gastroesophageal reflux disease)     Hx of colonic polyps     Hypertension     Pilonidal cyst 1971    Sleep apnea     Trouble in sleeping        Past Surgical History:   Procedure Laterality Date    COLONOSCOPY N/A 12/7/2016    Procedure: COLONOSCOPY;  Surgeon: Sumeet Lundy MD;  Location: [address];  Service: Endoscopy;  Laterality: N/A;  OK to hold Pradaxa 2 days prior to procedure per Dr Jones/see note dated 11/15/16/svn    COLONOSCOPY N/A 2/24/2022    Procedure: COLONOSCOPY;  Surgeon: Italo Roy MD;  Location: [facility]  ENDO;  Service: Endoscopy;  Laterality: N/A;  ok to hold Pradaxa 2 days per B Mc RN/arrhythmia clinic      COVID test at [place] on -GT  2/10/22 Changed Kirill to edward [facility]    CYST REMOVAL  1971    coccyx    KNEE SURGERY      right       Family History   Problem Relation Age of Onset    Breast cancer Mother     Cancer Mother     Alcohol abuse Father     Cancer Father     Heart disease Father     Hypertension Father     Breast cancer Sister     Depression Brother     Early death Brother     Mental illness Brother     Cancer Sister     Early death Sister     Ovarian cancer Other     Melanoma Neg Hx        Social History     Socioeconomic History    Marital status:     Number of children: 5    Highest education level: Associate degree: academic program   Tobacco Use    Smoking status: Former Smoker     Packs/day: 0.25     Years: 2.00     Pack years: 0.50     Types: Cigarettes     Quit date: 1975     Years since quittin.6    Smokeless tobacco: Never Used   Substance and Sexual Activity    Alcohol use: Yes     Alcohol/week: 3.0 standard drinks     Types: 3 Glasses of wine per week     Comment: weekly    Drug use: No    Sexual activity: Yes     Social Determinants of Health     Financial Resource Strain: Low Risk     Difficulty of Paying Living Expenses: Not hard at all   Food Insecurity: No Food Insecurity    Worried About Running Out of Food in the Last Year: Never true    Ran Out of Food in the Last Year: Never true   Transportation Needs: No Transportation Needs    Lack of Transportation (Medical): No    Lack of Transportation (Non-Medical): No   Physical Activity: Sufficiently Active    Days of Exercise per Week: 3 days    Minutes of Exercise per Session: 150+ min   Stress: No Stress Concern Present    Feeling of Stress : Only a little   Social Connections: Moderately Integrated    Frequency of Communication with Friends and Family: More than three times a  "week    Frequency of Social Gatherings with Friends and Family: Three times a week    Attends Adventism Services: More than 4 times per year    Active Member of Clubs or Organizations: Yes    Attends Club or Organization Meetings: More than 4 times per year    Marital Status:    Housing Stability: Low Risk     Unable to Pay for Housing in the Last Year: No    Number of Places Lived in the Last Year: 1    Unstable Housing in the Last Year: No       Allergies:  Bactrim [sulfamethoxazole-trimethoprim]    Medications:    Current Outpatient Medications:     acetaminophen (TYLENOL ORAL), Take 500 mg by mouth as needed., Disp: , Rfl:     amLODIPine (NORVASC) 5 MG tablet, Take 1 tablet (5 mg total) by mouth once daily., Disp: 90 tablet, Rfl: 3    ammonium lactate (AMLACTIN) 12 % lotion, Apply topically daily as needed (Dry Skin)., Disp: 400 g, Rfl: 11    cyanocobalamin (VITAMIN B-12) 1000 MCG tablet, Take 100 mcg by mouth once daily., Disp: , Rfl:     dabigatran etexilate (PRADAXA) 150 mg Cap, Take 1 capsule (150 mg total) by mouth 2 (two) times daily. "Do NOT break, chew, or open capsules.", Disp: 180 capsule, Rfl: 3    flecainide (TAMBOCOR) 100 MG Tab, Take 1 tablet (100 mg total) by mouth every 12 (twelve) hours., Disp: 180 tablet, Rfl: 3    levocetirizine (XYZAL) 5 MG tablet, Take 1 tablet (5 mg total) by mouth every evening., Disp: 90 tablet, Rfl: 3    losartan (COZAAR) 50 MG tablet, Take 0.5 tablets (25 mg total) by mouth once daily., Disp: 45 tablet, Rfl: 3    meloxicam (MOBIC) 7.5 MG tablet, Take 1 tablet (7.5 mg total) by mouth once daily., Disp: 90 tablet, Rfl: 3    methocarbamoL (ROBAXIN) 500 MG Tab, Take 500 mg by mouth as needed., Disp: , Rfl:     metoprolol succinate (TOPROL-XL) 25 MG 24 hr tablet, Take 1 tablet (25 mg total) by mouth once daily., Disp: 90 tablet, Rfl: 3    mirtazapine (REMERON) 30 MG tablet, Take 30 mg by mouth every evening., Disp: , Rfl:     multivitamin " (THERAGRAN) per tablet, Take 1 tablet by mouth once daily., Disp: , Rfl:     omeprazole (PRILOSEC) 20 MG capsule, Take 1 capsule (20 mg total) by mouth once daily., Disp: 90 capsule, Rfl: 0    sildenafiL (VIAGRA) 100 MG tablet, Take 1 tablet (100 mg total) by mouth as needed for Erectile Dysfunction (1 tablet 1 hr prior to intercourse.)., Disp: 10 tablet, Rfl: 11    tadalafiL (CIALIS) 20 MG Tab, Take 1 tablet (20 mg total) by mouth once daily., Disp: 30 tablet, Rfl: 5    terazosin (HYTRIN) 10 MG capsule, Take 1 capsule (10 mg total) by mouth every evening., Disp: 90 capsule, Rfl: 3    traMADoL (ULTRAM) 50 mg tablet, Take 1 tablet (50 mg total) by mouth every 12 (twelve) hours as needed for Pain., Disp: 60 tablet, Rfl: 0    triamcinolone acetonide 0.1% (KENALOG) 0.1 % cream, Apply topically 2 (two) times daily., Disp: 45 g, Rfl: 11    VITAMIN B COMPLEX (B COMPLETE ORAL), Take by mouth once daily., Disp: , Rfl:     azelastine (ASTELIN) 137 mcg (0.1 %) nasal spray, 1 spray (137 mcg total) by Nasal route 2 (two) times daily., Disp: 30 mL, Rfl: 0    insulin syringe-needle U-100 (EASY COMFORT INSULIN SYRINGE) 1 mL 31 gauge x 5/16 Syrg, Inject 1 time daily prn ED, Disp: 90 each, Rfl: 0    PHYSICAL EXAMINATION:  Physical Exam  Vitals and nursing note reviewed.   Constitutional:       General: He is awake.      Appearance: Normal appearance.   HENT:      Head: Normocephalic.      Right Ear: External ear normal.      Left Ear: External ear normal.      Nose: Nose normal.   Cardiovascular:      Rate and Rhythm: Normal rate.   Pulmonary:      Effort: Pulmonary effort is normal. No respiratory distress.   Abdominal:      Tenderness: There is no abdominal tenderness. There is no right CVA tenderness or left CVA tenderness.   Genitourinary:     Penis: Normal.       Testes: Normal.      Prostate: Enlarged. Not tender and no nodules present.      Rectum: Normal.   Musculoskeletal:         General: Normal range of motion.       Cervical back: Normal range of motion.   Skin:     General: Skin is warm and dry.   Neurological:      General: No focal deficit present.      Mental Status: He is alert and oriented to person, place, and time.   Psychiatric:         Mood and Affect: Mood normal.         Behavior: Behavior is cooperative.           LABS:          Lab Results   Component Value Date    PSA 4.09 (H) 01/07/2013    PSA 1.8 02/05/2007    PSADIAG 6.5 (H) 01/26/2022    PSADIAG 4.8 (H) 01/08/2021    PSADIAG 14.0 (H) 12/04/2020    PSATOTAL 14.5 (H) 12/04/2020             IMPRESSION:    Encounter Diagnoses   Name Primary?    BPH with urinary obstruction Yes    Erectile dysfunction due to arterial insufficiency     Elevated PSA          Assessment:       1. BPH with urinary obstruction    2. Erectile dysfunction due to arterial insufficiency    3. Elevated PSA        Plan:         I spent 30 minutes with the patient of which more than half was spent in direct consultation with the patient in regards to our treatment and plan.  We addressed the office findings and recent labs.   Education and recommendations of today's plan of care including home remedies and needed follow up with PCP.   We discussed his ED and contributory factors.  Discussed other treatments; JUVENCIO or IPP  He ok with how things are; going to take a break from ICI; may try it again later.  Ok with urination  Update his PSA and T levels today.

## 2022-04-25 ENCOUNTER — PATIENT MESSAGE (OUTPATIENT)
Dept: PAIN MEDICINE | Facility: CLINIC | Age: 72
End: 2022-04-25
Payer: MEDICARE

## 2022-04-25 ENCOUNTER — TELEPHONE (OUTPATIENT)
Dept: FAMILY MEDICINE | Facility: CLINIC | Age: 72
End: 2022-04-25
Payer: MEDICARE

## 2022-04-25 ENCOUNTER — PATIENT MESSAGE (OUTPATIENT)
Dept: UROLOGY | Facility: CLINIC | Age: 72
End: 2022-04-25
Payer: MEDICARE

## 2022-04-25 ENCOUNTER — PATIENT MESSAGE (OUTPATIENT)
Dept: FAMILY MEDICINE | Facility: CLINIC | Age: 72
End: 2022-04-25
Payer: MEDICARE

## 2022-04-25 NOTE — TELEPHONE ENCOUNTER
----- Message from Hugo Willis sent at 4/25/2022 10:33 AM CDT -----  Type:  Patient Returning Call    Who Called: Self    Who Left Message for Patient: Dr. Bebe Ye    Does the patient know what this is regarding?:no    Would the patient rather a call back or a response via [facility]? Call back    Best Call Back Number: [phone] (home)

## 2022-04-26 ENCOUNTER — PATIENT MESSAGE (OUTPATIENT)
Dept: UROLOGY | Facility: CLINIC | Age: 72
End: 2022-04-26
Payer: MEDICARE

## 2022-04-26 ENCOUNTER — TELEPHONE (OUTPATIENT)
Dept: UROLOGY | Facility: CLINIC | Age: 72
End: 2022-04-26
Payer: MEDICARE

## 2022-04-26 DIAGNOSIS — R97.20 ELEVATED PSA: ICD-10-CM

## 2022-04-26 DIAGNOSIS — N13.8 BPH WITH URINARY OBSTRUCTION: Primary | ICD-10-CM

## 2022-04-26 DIAGNOSIS — N40.1 BPH WITH URINARY OBSTRUCTION: Primary | ICD-10-CM

## 2022-04-26 PROBLEM — E66.01 MORBID (SEVERE) OBESITY DUE TO EXCESS CALORIES: Chronic | Status: RESOLVED | Noted: 2017-03-22 | Resolved: 2022-04-26

## 2022-04-26 NOTE — PROGRESS NOTES
Please schedule new PSA in 3 weeks.   He can also check a urine sample same time.   Orders are placed.can get a labeled specimen cup from us or lab day of PSA.  If PSA remains elevated he will need a prostate biopsy

## 2022-04-26 NOTE — TELEPHONE ENCOUNTER
Scheduled  PSA in 3 weeks. Patient notified that  He can also check a urine sample same time.   Orders are placed.can get a labeled specimen cup from us or lab day of PSA.   If PSA remains elevated he will need a prostate biopsy

## 2022-04-29 ENCOUNTER — IMMUNIZATION (OUTPATIENT)
Dept: OBSTETRICS AND GYNECOLOGY | Facility: CLINIC | Age: 72
End: 2022-04-29
Payer: MEDICARE

## 2022-04-29 DIAGNOSIS — Z23 NEED FOR VACCINATION: Primary | ICD-10-CM

## 2022-04-29 PROCEDURE — 91305 COVID-19, MRNA, LNP-S, PF, 30 MCG/0.3 ML DOSE VACCINE (PFIZER): CPT | Mod: PBBFAC

## 2022-05-04 ENCOUNTER — OFFICE VISIT (OUTPATIENT)
Dept: PAIN MEDICINE | Facility: CLINIC | Age: 72
End: 2022-05-04
Payer: MEDICARE

## 2022-05-04 VITALS
TEMPERATURE: 98 F | WEIGHT: 208.88 LBS | BODY MASS INDEX: 30.85 KG/M2 | OXYGEN SATURATION: 99 % | SYSTOLIC BLOOD PRESSURE: 131 MMHG | HEART RATE: 57 BPM | DIASTOLIC BLOOD PRESSURE: 73 MMHG

## 2022-05-04 DIAGNOSIS — M17.0 PRIMARY OSTEOARTHRITIS OF BOTH KNEES: ICD-10-CM

## 2022-05-04 DIAGNOSIS — M25.561 BILATERAL CHRONIC KNEE PAIN: Primary | ICD-10-CM

## 2022-05-04 DIAGNOSIS — M17.11 PRIMARY OSTEOARTHRITIS OF RIGHT KNEE: ICD-10-CM

## 2022-05-04 DIAGNOSIS — M25.562 BILATERAL CHRONIC KNEE PAIN: Primary | ICD-10-CM

## 2022-05-04 DIAGNOSIS — G89.29 BILATERAL CHRONIC KNEE PAIN: Primary | ICD-10-CM

## 2022-05-04 PROCEDURE — 99204 OFFICE O/P NEW MOD 45 MIN: CPT | Mod: S$PBB,,, | Performed by: PAIN MEDICINE

## 2022-05-04 PROCEDURE — 99204 PR OFFICE/OUTPT VISIT, NEW, LEVL IV, 45-59 MIN: ICD-10-PCS | Mod: S$PBB,,, | Performed by: PAIN MEDICINE

## 2022-05-04 PROCEDURE — 99215 OFFICE O/P EST HI 40 MIN: CPT | Mod: PBBFAC,PN | Performed by: PAIN MEDICINE

## 2022-05-04 PROCEDURE — 99999 PR PBB SHADOW E&M-EST. PATIENT-LVL V: CPT | Mod: PBBFAC,,, | Performed by: PAIN MEDICINE

## 2022-05-04 PROCEDURE — 99999 PR PBB SHADOW E&M-EST. PATIENT-LVL V: ICD-10-PCS | Mod: PBBFAC,,, | Performed by: PAIN MEDICINE

## 2022-05-04 NOTE — PROGRESS NOTES
Subjective:     Patient ID: Omar Pacheco is a 71 y.o. male    Chief Complaint: Knee Pain (Bilateral knee pain)      Referred by: Alonzo Johnson MD      HPI:    Initial Encounter (5/4/22):  Omar Pacheco is a 71 y.o. male who presents today with chronic bilateral knee pain right worse than left.  Patient has longstanding history of knee pain and is diagnosed with osteoarthritis.  Has tried and failed intra-articular steroid and viscosupplementation injections.  Is followed by Orthopedic surgery, but is very reluctant to undergo knee replacement surgery.  He is here today to discuss potential genicular nerve blocks/RFA.   This pain is described in detail below.    Physical Therapy:  Yes.    Non-pharmacologic Treatment:  Rest helps         · TENS?  No    Pain Medications:         · Currently taking:  Tylenol, meloxicam, Robaxin, tramadol    · Has tried in the past:      · Has not tried:   TCAs, SNRIs, anticonvulsants, topical creams    Blood thinners:  Pradaxa    Interventional Therapies:   Failed intra-articular steroid and viscosupplementation of both knees    Relevant Surgeries:  None    Affecting sleep?  Yes    Affecting daily activities? yes    Depressive symptoms? no          · SI/HI? No    Work status: Retired    Pain Scores:    Best:       3/10  Worst:     10/10  Usually:   5/10  Today:    5/10    Review of Systems   Constitutional: Negative for activity change, appetite change, chills, fatigue, fever and unexpected weight change.   HENT: Negative for hearing loss.    Eyes: Negative for visual disturbance.   Respiratory: Negative for chest tightness and shortness of breath.    Cardiovascular: Negative for chest pain.   Gastrointestinal: Negative for abdominal pain, constipation, diarrhea, nausea and vomiting.   Genitourinary: Negative for difficulty urinating.   Musculoskeletal: Positive for arthralgias, back pain, gait problem and myalgias. Negative for neck pain.   Skin: Negative for rash.    Neurological: Negative for dizziness, weakness, light-headedness, numbness and headaches.   Psychiatric/Behavioral: Positive for sleep disturbance. Negative for hallucinations and suicidal ideas. The patient is not nervous/anxious.        Past Medical History:   Diagnosis Date    *Atrial fibrillation     Back pain     Benign hypertrophy of prostate     Degenerative disc disease     GERD (gastroesophageal reflux disease)     Hx of colonic polyps     Hypertension     Pilonidal cyst     Sleep apnea     Trouble in sleeping        Past Surgical History:   Procedure Laterality Date    COLONOSCOPY N/A 2016    Procedure: COLONOSCOPY;  Surgeon: Sumeet Lundy MD;  Location: [facility] ENDO ([address] [place] [address]);  Service: Endoscopy;  Laterality: N/A;  OK to hold Pradaxa 2 days prior to procedure per Dr Jones/see note dated 11/15/16/svn    COLONOSCOPY N/A 2022    Procedure: COLONOSCOPY;  Surgeon: Italo Roy MD;  Location: [facility];  Service: Endoscopy;  Laterality: N/A;  ok to hold Pradaxa 2 days per B Mc RN/arrhythmia clinic      COVID test at [place] on -GT  2/10/22 Changed Kirill to scoping MD-ml    CYST REMOVAL      coccyx    KNEE SURGERY      right       Social History     Socioeconomic History    Marital status:     Number of children: 5    Highest education level: Associate degree: academic program   Tobacco Use    Smoking status: Former Smoker     Packs/day: 0.25     Years: 2.00     Pack years: 0.50     Types: Cigarettes     Quit date: 1975     Years since quittin.6    Smokeless tobacco: Never Used   Substance and Sexual Activity    Alcohol use: Yes     Alcohol/week: 3.0 standard drinks     Types: 3 Glasses of wine per week     Comment: weekly    Drug use: No    Sexual activity: Yes     Social Determinants of Health     Financial Resource Strain: Low Risk     Difficulty of Paying Living Expenses: Not hard at all   Food Insecurity: No Food Insecurity    Worried  "About Running Out of Food in the Last Year: Never true    Ran Out of Food in the Last Year: Never true   Transportation Needs: No Transportation Needs    Lack of Transportation (Medical): No    Lack of Transportation (Non-Medical): No   Physical Activity: Sufficiently Active    Days of Exercise per Week: 3 days    Minutes of Exercise per Session: 150+ min   Stress: No Stress Concern Present    Feeling of Stress : Only a little   Social Connections: Moderately Integrated    Frequency of Communication with Friends and Family: More than three times a week    Frequency of Social Gatherings with Friends and Family: Three times a week    Attends Cheondoism Services: More than 4 times per year    Active Member of Clubs or Organizations: Yes    Attends Club or Organization Meetings: More than 4 times per year    Marital Status:    Housing Stability: Low Risk     Unable to Pay for Housing in the Last Year: No    Number of Places Lived in the Last Year: 1    Unstable Housing in the Last Year: No       Review of patient's allergies indicates:   Allergen Reactions    Bactrim [sulfamethoxazole-trimethoprim] Swelling     Swelling of lips/blisters in mouth         Current Outpatient Medications on File Prior to Visit   Medication Sig Dispense Refill    acetaminophen (TYLENOL ORAL) Take 500 mg by mouth as needed.      amLODIPine (NORVASC) 5 MG tablet Take 1 tablet (5 mg total) by mouth once daily. 90 tablet 3    ammonium lactate (AMLACTIN) 12 % lotion Apply topically daily as needed (Dry Skin). 400 g 11    cyanocobalamin (VITAMIN B-12) 1000 MCG tablet Take 100 mcg by mouth once daily.      dabigatran etexilate (PRADAXA) 150 mg Cap Take 1 capsule (150 mg total) by mouth 2 (two) times daily. "Do NOT break, chew, or open capsules." 180 capsule 3    flecainide (TAMBOCOR) 100 MG Tab Take 1 tablet (100 mg total) by mouth every 12 (twelve) hours. 180 tablet 3    insulin syringe-needle U-100 (EASY COMFORT " INSULIN SYRINGE) 1 mL 31 gauge x 5/16 Syrg Inject 1 time daily prn ED 90 each 0    levocetirizine (XYZAL) 5 MG tablet Take 1 tablet (5 mg total) by mouth every evening. 90 tablet 3    losartan (COZAAR) 50 MG tablet Take 0.5 tablets (25 mg total) by mouth once daily. 45 tablet 3    meloxicam (MOBIC) 7.5 MG tablet Take 1 tablet (7.5 mg total) by mouth once daily. 90 tablet 3    methocarbamoL (ROBAXIN) 500 MG Tab Take 500 mg by mouth as needed.      metoprolol succinate (TOPROL-XL) 25 MG 24 hr tablet Take 1 tablet (25 mg total) by mouth once daily. 90 tablet 3    mirtazapine (REMERON) 30 MG tablet Take 30 mg by mouth every evening.      multivitamin (THERAGRAN) per tablet Take 1 tablet by mouth once daily.      omeprazole (PRILOSEC) 20 MG capsule Take 1 capsule (20 mg total) by mouth once daily. 90 capsule 0    sildenafiL (VIAGRA) 100 MG tablet Take 1 tablet (100 mg total) by mouth as needed for Erectile Dysfunction (1 tablet 1 hr prior to intercourse.). 10 tablet 11    tadalafiL (CIALIS) 20 MG Tab Take 1 tablet (20 mg total) by mouth once daily. 30 tablet 5    terazosin (HYTRIN) 10 MG capsule Take 1 capsule (10 mg total) by mouth every evening. 90 capsule 3    traMADoL (ULTRAM) 50 mg tablet Take 1 tablet (50 mg total) by mouth every 12 (twelve) hours as needed for Pain. 60 tablet 0    triamcinolone acetonide 0.1% (KENALOG) 0.1 % cream Apply topically 2 (two) times daily. 45 g 11    VITAMIN B COMPLEX (B COMPLETE ORAL) Take by mouth once daily.      azelastine (ASTELIN) 137 mcg (0.1 %) nasal spray 1 spray (137 mcg total) by Nasal route 2 (two) times daily. 30 mL 0     No current facility-administered medications on file prior to visit.       Objective:      /73 (BP Location: Left arm, Patient Position: Sitting, BP Method: Medium (Automatic))   Pulse (!) 57   Temp 97.5 °F (36.4 °C) (Skin)   Wt 94.8 kg (208 lb 14.4 oz)   SpO2 99%   BMI 30.85 kg/m²     Exam:  GEN:  Well developed, well nourished.   No acute distress.   HEENT:  No trauma.  Mucous membranes moist.  Nares patent bilaterally.  PSYCH: Normal affect. Thought content appropriate.  CHEST:  Breathing symmetric.  No audible wheezing.  ABD: Soft, non-distended.  SKIN:  Warm, pink, dry.  No rash on exposed areas.    EXT:  No cyanosis, clubbing, or edema.  No color change or changes in nail or hair growth.  NEURO/MUSCULOSKELETAL:  Fully alert, oriented, and appropriate. Speech normal yulia. No cranial nerve deficits.   Gait:  Antalgic.  No focal motor deficits.     No gross warmth, swelling, erythema to bilateral knees  Full active range of motion bilateral knees  Moderate crepitus noted to bilateral knees  No joint laxity instability noted bilateral knees  Tender to palpation specifically in the medial joint lines bilaterally      Imaging:    Narrative & Impression    EXAMINATION:  XR KNEE ORTHO BILAT     CLINICAL HISTORY:  Pain in right knee     TECHNIQUE:  AP standing, lateral and Merchant views of both knees were performed.     COMPARISON:  Multiple prior exams, most recent from 01/20/2021     FINDINGS:  Advanced tricompartment degenerative changes of the knees bilaterally.  Osteophyte formation present.  No evidence of acute fracture or dislocation.  No joint effusion.  No soft tissue abnormality.     Impression:     Degenerative changes as described above.        Electronically signed by: Tor Squires MD  Date:                                            04/04/2022  Time:                                           11:20         Assessment:       Encounter Diagnoses   Name Primary?    Primary osteoarthritis of right knee     Primary osteoarthritis of both knees     Bilateral chronic knee pain Yes         Plan:       Omar was seen today for knee pain.    Diagnoses and all orders for this visit:    Bilateral chronic knee pain    Primary osteoarthritis of right knee  -     Ambulatory referral/consult to Pain Clinic    Primary osteoarthritis of  both knees        Omar Pacheco is a 71 y.o. male with chronic bilateral knee pain right worse than left.  Secondary to osteoarthritis.  Has failed intra-articular steroid and viscosupplementation injections.  Trying to avoid knee replacement surgery if possible..    1.  Pertinent imaging studies reviewed by me. Imaging results were discussed with patient.  2.  Schedule for bilateral genicular nerve blocks under ultrasound x2.  If diagnostic can proceed with cooled radiofrequency ablations.  3.  Return to clinic after procedure to discuss results.            This note was created by combination of typed  and M-Modal dictation. Transcription and phonetic errors may be present.  If there are any questions, please contact me.

## 2022-05-05 PROBLEM — M17.0 PRIMARY OSTEOARTHRITIS OF BOTH KNEES: Status: ACTIVE | Noted: 2022-03-03

## 2022-05-05 PROBLEM — M25.562 BILATERAL CHRONIC KNEE PAIN: Status: ACTIVE | Noted: 2022-05-05

## 2022-05-05 PROBLEM — G89.29 BILATERAL CHRONIC KNEE PAIN: Status: ACTIVE | Noted: 2022-05-05

## 2022-05-05 PROBLEM — M25.561 BILATERAL CHRONIC KNEE PAIN: Status: ACTIVE | Noted: 2022-05-05

## 2022-05-06 ENCOUNTER — PATIENT MESSAGE (OUTPATIENT)
Dept: UROLOGY | Facility: CLINIC | Age: 72
End: 2022-05-06
Payer: MEDICARE

## 2022-05-18 ENCOUNTER — PROCEDURE VISIT (OUTPATIENT)
Dept: PAIN MEDICINE | Facility: CLINIC | Age: 72
End: 2022-05-18
Payer: MEDICARE

## 2022-05-18 VITALS
TEMPERATURE: 99 F | SYSTOLIC BLOOD PRESSURE: 121 MMHG | DIASTOLIC BLOOD PRESSURE: 63 MMHG | OXYGEN SATURATION: 99 % | HEART RATE: 56 BPM | BODY MASS INDEX: 30.86 KG/M2 | WEIGHT: 209 LBS

## 2022-05-18 DIAGNOSIS — G89.29 BILATERAL CHRONIC KNEE PAIN: ICD-10-CM

## 2022-05-18 DIAGNOSIS — M25.562 BILATERAL CHRONIC KNEE PAIN: ICD-10-CM

## 2022-05-18 DIAGNOSIS — M25.561 BILATERAL CHRONIC KNEE PAIN: ICD-10-CM

## 2022-05-18 DIAGNOSIS — M17.0 PRIMARY OSTEOARTHRITIS OF BOTH KNEES: Primary | ICD-10-CM

## 2022-05-18 PROCEDURE — 64454 NJX AA&/STRD GNCLR NRV BRNCH: CPT | Mod: 50,PBBFAC,PN | Performed by: PAIN MEDICINE

## 2022-05-18 PROCEDURE — 64454 NJX AA&/STRD GNCLR NRV BRNCH: CPT | Mod: 50,S$PBB,, | Performed by: PAIN MEDICINE

## 2022-05-18 PROCEDURE — 64454 PR NERVE BLOCK INJ, ANES/STEROID, GENICULAR NERVE, W/IMG: ICD-10-PCS | Mod: 50,S$PBB,, | Performed by: PAIN MEDICINE

## 2022-05-18 NOTE — PROCEDURES
Procedures     Date of procedure: 05/18/2022    Procedure:  Bilateral genicular nerve blocks under ultrasound    Pre-op diagnosis: Chronic bilateral knee pain     Post-op diagnosis: Same     Physician:  Luca Galan M.D.    Assistant: None    Anesthestia: local     EBL: None    Specimens: None    All medications, allergies, and relevant histories were reviewed. No recent antibiotics or infections. A time-out was taken to verify the correct patient, procedure, laterality, and appropriate medications/allergies.     Ultrasounded-guided Geniculate Nerve Blocks:    Informed consent obtained for the injection. The left knee(s) was/were prepped with chlor prep. Using US guidance, the lateral femoral condyle was identified. Under live US, a 27-gauge needle was advanced to the superior aspect of the lateral femoral condyle. After negative aspiration, 1 cc 0.5% bupivacaine was injected. US showed good spread of local anesthestic. This was the repeated at the level of the suprapatellar region, the medial femoral condyle and the medial tibial condyle.  The same procedures performed on the right side.  The needle was removed and a Band-Aid placed over the entry point. No complications.     Future Management:     Follow up via phone to tell me the results and plan future treatment steps..

## 2022-05-23 ENCOUNTER — PATIENT MESSAGE (OUTPATIENT)
Dept: PAIN MEDICINE | Facility: CLINIC | Age: 72
End: 2022-05-23
Payer: MEDICARE

## 2022-05-23 ENCOUNTER — PATIENT MESSAGE (OUTPATIENT)
Dept: UROLOGY | Facility: CLINIC | Age: 72
End: 2022-05-23
Payer: MEDICARE

## 2022-05-23 ENCOUNTER — LAB VISIT (OUTPATIENT)
Dept: LAB | Facility: HOSPITAL | Age: 72
End: 2022-05-23
Attending: NURSE PRACTITIONER
Payer: MEDICARE

## 2022-05-23 DIAGNOSIS — R97.20 ELEVATED PSA: ICD-10-CM

## 2022-05-23 DIAGNOSIS — N40.1 BPH WITH URINARY OBSTRUCTION: ICD-10-CM

## 2022-05-23 DIAGNOSIS — N40.1 BPH WITH URINARY OBSTRUCTION: Primary | ICD-10-CM

## 2022-05-23 DIAGNOSIS — N13.8 BPH WITH URINARY OBSTRUCTION: Primary | ICD-10-CM

## 2022-05-23 DIAGNOSIS — N13.8 BPH WITH URINARY OBSTRUCTION: ICD-10-CM

## 2022-05-23 LAB — COMPLEXED PSA SERPL-MCNC: 8 NG/ML (ref 0–4)

## 2022-05-23 PROCEDURE — 36415 COLL VENOUS BLD VENIPUNCTURE: CPT | Mod: PO | Performed by: NURSE PRACTITIONER

## 2022-05-23 PROCEDURE — 84153 ASSAY OF PSA TOTAL: CPT | Performed by: NURSE PRACTITIONER

## 2022-05-24 ENCOUNTER — TELEPHONE (OUTPATIENT)
Dept: PAIN MEDICINE | Facility: CLINIC | Age: 72
End: 2022-05-24
Payer: MEDICARE

## 2022-05-24 NOTE — TELEPHONE ENCOUNTER
Mr. Pacheco returned call to discuss bilat knee blocks performed 5/18/2022.  He stated he experienced ~50% improvement in pain, as well as 80% improvement in ADLs.  He was able to go up and down the stairs in his home, whereas he is normally not able to do so.  The pain has since returned to baseline. He would like to proceed with the 2nd blocks on the next available date.

## 2022-05-24 NOTE — TELEPHONE ENCOUNTER
[facility] and [facility] asking pt to contact clinic to discuss efficacy of bilat knee blocks #1 performed last week- phone number included.

## 2022-05-26 ENCOUNTER — PES CALL (OUTPATIENT)
Dept: ADMINISTRATIVE | Facility: CLINIC | Age: 72
End: 2022-05-26
Payer: MEDICARE

## 2022-05-29 DIAGNOSIS — L30.9 DERMATITIS: ICD-10-CM

## 2022-05-30 DIAGNOSIS — Z87.19 HISTORY OF GASTROESOPHAGEAL REFLUX (GERD): ICD-10-CM

## 2022-05-30 DIAGNOSIS — I10 ESSENTIAL HYPERTENSION: ICD-10-CM

## 2022-05-30 NOTE — TELEPHONE ENCOUNTER
No new care gaps identified.  [facility] Embedded Care Gaps. Reference number: [id number]. 5/30/2022   12:10:17 AM CDT

## 2022-05-30 NOTE — TELEPHONE ENCOUNTER
No new care gaps identified.  [facility] Embedded Care Gaps. Reference number: [id number]. 5/29/2022   11:59:29 PM CDT

## 2022-05-30 NOTE — TELEPHONE ENCOUNTER
"Last Office Visit Info:   The patient's last visit with Azikiwe K Lombard, MD was on 3/3/2022.    The patient's last visit in current department was on 4/21/2022.        Last CBC Results:   Lab Results   Component Value Date    WBC 5.14 01/26/2022    HGB 13.5 (L) 01/26/2022    HCT 41.3 01/26/2022     01/26/2022       Last CMP Results  Lab Results   Component Value Date     01/26/2022    K 4.2 01/26/2022     01/26/2022    CO2 26 01/26/2022    BUN 12 01/26/2022    CREATININE 0.9 01/26/2022    CALCIUM 9.3 01/26/2022    ALBUMIN 4.0 01/26/2022    AST 26 01/26/2022    ALT 17 01/26/2022       Last Lipids  Lab Results   Component Value Date    CHOL 150 01/26/2022    TRIG 84 01/26/2022    HDL 52 01/26/2022    LDLCALC 81.2 01/26/2022       Last A1C  No results found for: HGBA1C    Last TSH  Lab Results   Component Value Date    TSH 0.944 12/08/2020             Current Med Refills  Medication List with Changes/Refills   Current Medications    ACETAMINOPHEN (TYLENOL ORAL)    Take 500 mg by mouth as needed.       Start Date: --        End Date: --    AMLODIPINE (NORVASC) 5 MG TABLET    Take 1 tablet (5 mg total) by mouth once daily.       Start Date: 3/3/2022  End Date: --    AMMONIUM LACTATE (AMLACTIN) 12 % LOTION    Apply topically daily as needed (Dry Skin).       Start Date: 3/3/2022  End Date: --    AZELASTINE (ASTELIN) 137 MCG (0.1 %) NASAL SPRAY    1 spray (137 mcg total) by Nasal route 2 (two) times daily.       Start Date: 9/11/2020 End Date: 4/21/2022    CYANOCOBALAMIN (VITAMIN B-12) 1000 MCG TABLET    Take 100 mcg by mouth once daily.       Start Date: --        End Date: --    DABIGATRAN ETEXILATE (PRADAXA) 150 MG CAP    Take 1 capsule (150 mg total) by mouth 2 (two) times daily. "Do NOT break, chew, or open capsules."       Start Date: 1/5/2022  End Date: --    FLECAINIDE (TAMBOCOR) 100 MG TAB    Take 1 tablet (100 mg total) by mouth every 12 (twelve) hours.       Start Date: 1/10/2022 End Date: " --    INSULIN SYRINGE-NEEDLE U-100 (EASY COMFORT INSULIN SYRINGE) 1 ML 31 GAUGE X 5/16 SYRG    Inject 1 time daily prn ED       Start Date: 5/30/2018 End Date: --    LEVOCETIRIZINE (XYZAL) 5 MG TABLET    Take 1 tablet (5 mg total) by mouth every evening.       Start Date: 3/3/2022  End Date: 6/1/2022    LOSARTAN (COZAAR) 50 MG TABLET    Take 0.5 tablets (25 mg total) by mouth once daily.       Start Date: 3/3/2022  End Date: 3/3/2023    MELOXICAM (MOBIC) 7.5 MG TABLET    Take 1 tablet (7.5 mg total) by mouth once daily.       Start Date: 3/3/2022  End Date: --    METHOCARBAMOL (ROBAXIN) 500 MG TAB    Take 500 mg by mouth as needed.       Start Date: --        End Date: --    METOPROLOL SUCCINATE (TOPROL-XL) 25 MG 24 HR TABLET    Take 1 tablet (25 mg total) by mouth once daily.       Start Date: 3/3/2022  End Date: --    MIRTAZAPINE (REMERON) 30 MG TABLET    Take 30 mg by mouth every evening.       Start Date: --        End Date: --    MULTIVITAMIN (THERAGRAN) PER TABLET    Take 1 tablet by mouth once daily.       Start Date: --        End Date: --    OMEPRAZOLE (PRILOSEC) 20 MG CAPSULE    Take 1 capsule (20 mg total) by mouth once daily.       Start Date: 12/20/2021End Date: --    SILDENAFIL (VIAGRA) 100 MG TABLET    Take 1 tablet (100 mg total) by mouth as needed for Erectile Dysfunction (1 tablet 1 hr prior to intercourse.).       Start Date: 3/3/2022  End Date: --    TADALAFIL (CIALIS) 20 MG TAB    Take 1 tablet (20 mg total) by mouth once daily.       Start Date: 3/3/2022  End Date: --    TERAZOSIN (HYTRIN) 10 MG CAPSULE    Take 1 capsule (10 mg total) by mouth every evening.       Start Date: 3/3/2022  End Date: 3/3/2023    TRAMADOL (ULTRAM) 50 MG TABLET    Take 1 tablet (50 mg total) by mouth every 12 (twelve) hours as needed for Pain.       Start Date: 3/31/2022 End Date: --    TRIAMCINOLONE ACETONIDE 0.1% (KENALOG) 0.1 % CREAM    Apply topically 2 (two) times daily.       Start Date: 3/31/2022 End Date: --     VITAMIN B COMPLEX (B COMPLETE ORAL)    Take by mouth once daily.       Start Date: --        End Date: --       Order(s) placed per written order guidelines:    Please advise.

## 2022-06-03 RX ORDER — TRIAMCINOLONE ACETONIDE 1 MG/G
CREAM TOPICAL 2 TIMES DAILY
Qty: 45 G | Refills: 5 | Status: SHIPPED | OUTPATIENT
Start: 2022-06-03 | End: 2022-06-29

## 2022-06-03 RX ORDER — OMEPRAZOLE 20 MG/1
20 CAPSULE, DELAYED RELEASE ORAL DAILY
Qty: 90 CAPSULE | Refills: 1 | Status: SHIPPED | OUTPATIENT
Start: 2022-06-03 | End: 2022-06-29

## 2022-06-03 RX ORDER — METOPROLOL SUCCINATE 25 MG/1
25 TABLET, EXTENDED RELEASE ORAL DAILY
Qty: 90 TABLET | Refills: 1 | Status: SHIPPED | OUTPATIENT
Start: 2022-06-03 | End: 2022-06-29

## 2022-06-11 DIAGNOSIS — I10 ESSENTIAL HYPERTENSION: ICD-10-CM

## 2022-06-11 NOTE — TELEPHONE ENCOUNTER
No new care gaps identified.  [facility] Embedded Care Gaps. Reference number: [id number]. 6/11/2022   2:25:31 PM CDT

## 2022-06-13 NOTE — TELEPHONE ENCOUNTER
"Last Office Visit Info:   The patient's last visit with Azikiwe K Lombard, MD was on 3/3/2022.    The patient's last visit in current department was on 4/21/2022.        Last CBC Results:   Lab Results   Component Value Date    WBC 5.14 01/26/2022    HGB 13.5 (L) 01/26/2022    HCT 41.3 01/26/2022     01/26/2022       Last CMP Results  Lab Results   Component Value Date     01/26/2022    K 4.2 01/26/2022     01/26/2022    CO2 26 01/26/2022    BUN 12 01/26/2022    CREATININE 0.9 01/26/2022    CALCIUM 9.3 01/26/2022    ALBUMIN 4.0 01/26/2022    AST 26 01/26/2022    ALT 17 01/26/2022       Last Lipids  Lab Results   Component Value Date    CHOL 150 01/26/2022    TRIG 84 01/26/2022    HDL 52 01/26/2022    LDLCALC 81.2 01/26/2022       Last A1C  No results found for: HGBA1C    Last TSH  Lab Results   Component Value Date    TSH 0.944 12/08/2020             Current Med Refills  Medication List with Changes/Refills   Current Medications    ACETAMINOPHEN (TYLENOL ORAL)    Take 500 mg by mouth as needed.       Start Date: --        End Date: --    AMLODIPINE (NORVASC) 5 MG TABLET    Take 1 tablet (5 mg total) by mouth once daily.       Start Date: 3/3/2022  End Date: --    AMMONIUM LACTATE (AMLACTIN) 12 % LOTION    Apply topically daily as needed (Dry Skin).       Start Date: 3/3/2022  End Date: --    AZELASTINE (ASTELIN) 137 MCG (0.1 %) NASAL SPRAY    1 spray (137 mcg total) by Nasal route 2 (two) times daily.       Start Date: 9/11/2020 End Date: 4/21/2022    CYANOCOBALAMIN (VITAMIN B-12) 1000 MCG TABLET    Take 100 mcg by mouth once daily.       Start Date: --        End Date: --    DABIGATRAN ETEXILATE (PRADAXA) 150 MG CAP    Take 1 capsule (150 mg total) by mouth 2 (two) times daily. "Do NOT break, chew, or open capsules."       Start Date: 1/5/2022  End Date: --    FLECAINIDE (TAMBOCOR) 100 MG TAB    Take 1 tablet (100 mg total) by mouth every 12 (twelve) hours.       Start Date: 1/10/2022 End Date: " --    INSULIN SYRINGE-NEEDLE U-100 (EASY COMFORT INSULIN SYRINGE) 1 ML 31 GAUGE X 5/16 SYRG    Inject 1 time daily prn ED       Start Date: 5/30/2018 End Date: --    LEVOCETIRIZINE (XYZAL) 5 MG TABLET    Take 1 tablet (5 mg total) by mouth every evening.       Start Date: 3/3/2022  End Date: 6/1/2022    LOSARTAN (COZAAR) 50 MG TABLET    Take 0.5 tablets (25 mg total) by mouth once daily.       Start Date: 3/3/2022  End Date: 3/3/2023    MELOXICAM (MOBIC) 7.5 MG TABLET    Take 1 tablet (7.5 mg total) by mouth once daily.       Start Date: 3/3/2022  End Date: --    METHOCARBAMOL (ROBAXIN) 500 MG TAB    Take 500 mg by mouth as needed.       Start Date: --        End Date: --    METOPROLOL SUCCINATE (TOPROL-XL) 25 MG 24 HR TABLET    Take 1 tablet (25 mg total) by mouth once daily.       Start Date: 6/3/2022  End Date: --    MIRTAZAPINE (REMERON) 30 MG TABLET    Take 30 mg by mouth every evening.       Start Date: --        End Date: --    MULTIVITAMIN (THERAGRAN) PER TABLET    Take 1 tablet by mouth once daily.       Start Date: --        End Date: --    OMEPRAZOLE (PRILOSEC) 20 MG CAPSULE    Take 1 capsule (20 mg total) by mouth once daily.       Start Date: 6/3/2022  End Date: --    SILDENAFIL (VIAGRA) 100 MG TABLET    Take 1 tablet (100 mg total) by mouth as needed for Erectile Dysfunction (1 tablet 1 hr prior to intercourse.).       Start Date: 3/3/2022  End Date: --    TADALAFIL (CIALIS) 20 MG TAB    Take 1 tablet (20 mg total) by mouth once daily.       Start Date: 3/3/2022  End Date: --    TERAZOSIN (HYTRIN) 10 MG CAPSULE    Take 1 capsule (10 mg total) by mouth every evening.       Start Date: 3/3/2022  End Date: 3/3/2023    TRAMADOL (ULTRAM) 50 MG TABLET    Take 1 tablet (50 mg total) by mouth every 12 (twelve) hours as needed for Pain.       Start Date: 3/31/2022 End Date: --    TRIAMCINOLONE ACETONIDE 0.1% (KENALOG) 0.1 % CREAM    Apply topically 2 (two) times daily.       Start Date: 6/3/2022  End Date: --     VITAMIN B COMPLEX (B COMPLETE ORAL)    Take by mouth once daily.       Start Date: --        End Date: --       Order(s) placed per written order guidelines:     Please advise.

## 2022-06-24 ENCOUNTER — HOSPITAL ENCOUNTER (OUTPATIENT)
Dept: RADIOLOGY | Facility: HOSPITAL | Age: 72
Discharge: HOME | End: 2022-06-24
Attending: NURSE PRACTITIONER
Payer: MEDICARE

## 2022-06-24 DIAGNOSIS — R97.20 ELEVATED PSA: ICD-10-CM

## 2022-06-24 DIAGNOSIS — N13.8 BPH WITH URINARY OBSTRUCTION: ICD-10-CM

## 2022-06-24 DIAGNOSIS — N40.1 BPH WITH URINARY OBSTRUCTION: ICD-10-CM

## 2022-06-24 PROCEDURE — A9585 GADOBUTROL INJECTION: HCPCS | Performed by: NURSE PRACTITIONER

## 2022-06-24 PROCEDURE — 72197 MRI PELVIS W/O & W/DYE: CPT | Mod: 26,,, | Performed by: STUDENT IN AN ORGANIZED HEALTH CARE EDUCATION/TRAINING PROGRAM

## 2022-06-24 PROCEDURE — 72197 MRI PELVIS W/O & W/DYE: CPT | Mod: TC

## 2022-06-24 PROCEDURE — 72197 MRI PROSTATE W W/O CONTRAST: ICD-10-PCS | Mod: 26,,, | Performed by: STUDENT IN AN ORGANIZED HEALTH CARE EDUCATION/TRAINING PROGRAM

## 2022-06-24 PROCEDURE — 25500020 PHARM REV CODE 255: Performed by: NURSE PRACTITIONER

## 2022-06-24 RX ORDER — GADOBUTROL 604.72 MG/ML
10 INJECTION INTRAVENOUS
Status: COMPLETED | OUTPATIENT
Start: 2022-06-24 | End: 2022-06-24

## 2022-06-24 RX ADMIN — GADOBUTROL 10 ML: 604.72 INJECTION INTRAVENOUS at 01:06

## 2022-06-28 ENCOUNTER — TELEPHONE (OUTPATIENT)
Dept: PAIN MEDICINE | Facility: CLINIC | Age: 72
End: 2022-06-28
Payer: MEDICARE

## 2022-06-28 DIAGNOSIS — R97.20 ELEVATED PSA: Primary | ICD-10-CM

## 2022-06-28 DIAGNOSIS — N40.1 BPH WITH URINARY OBSTRUCTION: ICD-10-CM

## 2022-06-28 DIAGNOSIS — N13.8 BPH WITH URINARY OBSTRUCTION: ICD-10-CM

## 2022-06-28 RX ORDER — METOPROLOL SUCCINATE 25 MG/1
25 TABLET, EXTENDED RELEASE ORAL DAILY
Qty: 90 TABLET | Refills: 1 | OUTPATIENT
Start: 2022-06-28

## 2022-06-28 NOTE — PROGRESS NOTES
Due rising his PSA he needs regular prostate biopsy with .  MRI did not show any specific area to target, but he should still get the biopsy.   PSA is remaining elevated. There is concern for prostate cancer    Thanks

## 2022-06-28 NOTE — TELEPHONE ENCOUNTER
----- Message from Koki Genao sent at 6/28/2022  9:41 AM CDT -----  Regarding: reschedule  Contact: [phone]  Reschedule Request    Who called:Omar  Date/Time/Type: Procedure  Contact #[phone]

## 2022-06-29 ENCOUNTER — TELEPHONE (OUTPATIENT)
Dept: UROLOGY | Facility: CLINIC | Age: 72
End: 2022-06-29
Payer: MEDICARE

## 2022-06-29 ENCOUNTER — PROCEDURE VISIT (OUTPATIENT)
Dept: PAIN MEDICINE | Facility: CLINIC | Age: 72
End: 2022-06-29
Payer: MEDICARE

## 2022-06-29 VITALS — BODY MASS INDEX: 30.81 KG/M2 | HEIGHT: 69 IN | HEART RATE: 65 BPM | WEIGHT: 208 LBS | OXYGEN SATURATION: 98 %

## 2022-06-29 DIAGNOSIS — G89.29 BILATERAL CHRONIC KNEE PAIN: Primary | ICD-10-CM

## 2022-06-29 DIAGNOSIS — M17.0 PRIMARY OSTEOARTHRITIS OF BOTH KNEES: ICD-10-CM

## 2022-06-29 DIAGNOSIS — R97.20 ELEVATED PSA: Primary | ICD-10-CM

## 2022-06-29 DIAGNOSIS — M25.562 BILATERAL CHRONIC KNEE PAIN: Primary | ICD-10-CM

## 2022-06-29 DIAGNOSIS — M25.561 BILATERAL CHRONIC KNEE PAIN: Primary | ICD-10-CM

## 2022-06-29 PROCEDURE — 64454 NJX AA&/STRD GNCLR NRV BRNCH: CPT | Mod: 50,S$PBB,, | Performed by: PAIN MEDICINE

## 2022-06-29 PROCEDURE — 64454 NJX AA&/STRD GNCLR NRV BRNCH: CPT | Mod: 50,PBBFAC,PN | Performed by: PAIN MEDICINE

## 2022-06-29 PROCEDURE — 64454 PR NERVE BLOCK INJ, ANES/STEROID, GENICULAR NERVE, W/IMG: ICD-10-PCS | Mod: 50,S$PBB,, | Performed by: PAIN MEDICINE

## 2022-06-29 RX ORDER — METHOCARBAMOL 500 MG/1
2 TABLET, FILM COATED ORAL NIGHTLY
COMMUNITY
Start: 2022-06-01 | End: 2022-09-27

## 2022-06-29 RX ORDER — METOPROLOL SUCCINATE 25 MG/1
25 TABLET, EXTENDED RELEASE ORAL
COMMUNITY
Start: 2022-06-03 | End: 2022-10-11 | Stop reason: SDUPTHER

## 2022-06-29 RX ORDER — TRIAMCINOLONE ACETONIDE 1 MG/G
CREAM TOPICAL
COMMUNITY
Start: 2022-03-31 | End: 2023-05-16 | Stop reason: SDUPTHER

## 2022-06-29 RX ORDER — CIPROFLOXACIN 500 MG/1
500 TABLET ORAL ONCE
Qty: 1 TABLET | Refills: 0 | Status: SHIPPED | OUTPATIENT
Start: 2022-08-02 | End: 2022-08-02

## 2022-06-29 RX ORDER — CIPROFLOXACIN 500 MG/1
500 TABLET ORAL ONCE
Qty: 1 TABLET | Refills: 0 | Status: SHIPPED | OUTPATIENT
Start: 2022-08-02 | End: 2022-06-29 | Stop reason: SDUPTHER

## 2022-06-29 RX ORDER — OMEPRAZOLE 20 MG/1
20 CAPSULE, DELAYED RELEASE ORAL
COMMUNITY
Start: 2022-06-03 | End: 2023-06-02

## 2022-06-29 RX ORDER — TERAZOSIN 10 MG/1
10 CAPSULE ORAL
COMMUNITY
Start: 2022-03-03 | End: 2023-04-04 | Stop reason: SDUPTHER

## 2022-06-29 NOTE — PROCEDURES
Procedures     Date of procedure: 06/29/2022    Procedure:  Bilateral genicular nerve blocks under ultrasound    Pre-op diagnosis: Chronic bilateral knee pain     Post-op diagnosis: Same     Physician:  Luca Galan M.D.    Assistant: None    Anesthestia: local     EBL: None    Specimens: None    All medications, allergies, and relevant histories were reviewed. No recent antibiotics or infections. A time-out was taken to verify the correct patient, procedure, laterality, and appropriate medications/allergies.     Ultrasounded-guided Geniculate Nerve Blocks:    Informed consent obtained for the injection. The left knee(s) was/were prepped with chlor prep. Using US guidance, the lateral femoral condyle was identified. Under live US, a 27-gauge needle was advanced to the superior aspect of the lateral femoral condyle. After negative aspiration, 1 cc 0.5% bupivacaine was injected. US showed good spread of local anesthestic. This was the repeated at the level of the suprapatellar region, the medial femoral condyle and the medial tibial condyle. The needle was removed and a Band-Aid placed over the entry point. No complications. This procedure was then repeated in an identical manner on the right side.     Future Management:     Follow up via phone to tell me the results and plan future treatment steps..

## 2022-06-29 NOTE — TELEPHONE ENCOUNTER
Patient notified that Due to his rising  PSA he needs regular prostate biopsy with .   MRI did not show any specific area to target, but he should still get the biopsy.   PSA is remaining elevated. There is concern for prostate cancer

## 2022-06-30 ENCOUNTER — TELEPHONE (OUTPATIENT)
Dept: PAIN MEDICINE | Facility: CLINIC | Age: 72
End: 2022-06-30
Payer: MEDICARE

## 2022-06-30 DIAGNOSIS — G89.29 BILATERAL CHRONIC KNEE PAIN: Primary | ICD-10-CM

## 2022-06-30 DIAGNOSIS — M17.0 PRIMARY OSTEOARTHRITIS OF BOTH KNEES: ICD-10-CM

## 2022-06-30 DIAGNOSIS — M25.562 BILATERAL CHRONIC KNEE PAIN: Primary | ICD-10-CM

## 2022-06-30 DIAGNOSIS — M25.561 BILATERAL CHRONIC KNEE PAIN: Primary | ICD-10-CM

## 2022-06-30 NOTE — TELEPHONE ENCOUNTER
Mr. Pacheco reports ~60% reduction in knee pain and ~75% improvement in ADLs following the bilat knee nerve blocks performed yesterday.  His pain has since returned to baseline.  He would ilke to proceed with the right then left RFAs- Dr. Galan updated.  Right Knee RFA scheduled 7/27/2022.

## 2022-07-11 ENCOUNTER — TELEPHONE (OUTPATIENT)
Dept: PAIN MEDICINE | Facility: CLINIC | Age: 72
End: 2022-07-11
Payer: MEDICARE

## 2022-07-11 NOTE — TELEPHONE ENCOUNTER
Explained to patient that the times for that surgery date have not been set as of yet. Someone will call him closer to his procedure date to let him know what time to arrive. Pt expressed understanding and nothing further discussed.

## 2022-07-11 NOTE — TELEPHONE ENCOUNTER
----- Message from Yane Grimaldo sent at 7/11/2022 10:02 AM CDT -----  Regarding: Arrival Time  Contact: pt @ [phone]  Pt has Procedure on 7/27, calling to see what time of arrival. Asking for a call back

## 2022-07-19 ENCOUNTER — TELEPHONE (OUTPATIENT)
Dept: PAIN MEDICINE | Facility: CLINIC | Age: 72
End: 2022-07-19
Payer: MEDICARE

## 2022-07-19 ENCOUNTER — TELEPHONE (OUTPATIENT)
Dept: ADMINISTRATIVE | Facility: CLINIC | Age: 72
End: 2022-07-19
Payer: MEDICARE

## 2022-07-19 DIAGNOSIS — I48.20 CHRONIC ATRIAL FIBRILLATION: ICD-10-CM

## 2022-07-19 DIAGNOSIS — I48.0 PAROXYSMAL ATRIAL FIBRILLATION: Chronic | ICD-10-CM

## 2022-07-19 DIAGNOSIS — Z79.01 ON CONTINUOUS ORAL ANTICOAGULATION: ICD-10-CM

## 2022-07-19 RX ORDER — DABIGATRAN ETEXILATE 150 MG/1
150 CAPSULE ORAL 2 TIMES DAILY
Qty: 180 CAPSULE | Refills: 3 | OUTPATIENT
Start: 2022-07-19 | End: 2023-01-11 | Stop reason: SDUPTHER

## 2022-07-19 NOTE — TELEPHONE ENCOUNTER
Reviewed pre-procedure instructions with Mr. Pacheco, as well as provided arrival time of 11:00a for 7/27/2022.  All questions answered- verbalized understanding.

## 2022-07-19 NOTE — TELEPHONE ENCOUNTER
Called pt, informed pt I was calling to remind pt of his in office EAWV on 7/21/22; clinic location provided to patient; pt confirmed appointment

## 2022-07-21 ENCOUNTER — OFFICE VISIT (OUTPATIENT)
Dept: FAMILY MEDICINE | Facility: CLINIC | Age: 72
End: 2022-07-21
Payer: MEDICARE

## 2022-07-21 VITALS
HEART RATE: 58 BPM | TEMPERATURE: 98 F | WEIGHT: 208.31 LBS | RESPIRATION RATE: 16 BRPM | DIASTOLIC BLOOD PRESSURE: 60 MMHG | SYSTOLIC BLOOD PRESSURE: 122 MMHG | BODY MASS INDEX: 30.85 KG/M2 | OXYGEN SATURATION: 98 % | HEIGHT: 69 IN

## 2022-07-21 DIAGNOSIS — I48.0 PAROXYSMAL ATRIAL FIBRILLATION: Chronic | ICD-10-CM

## 2022-07-21 DIAGNOSIS — I10 ESSENTIAL HYPERTENSION: Primary | ICD-10-CM

## 2022-07-21 PROCEDURE — 99999 PR PBB SHADOW E&M-EST. PATIENT-LVL IV: ICD-10-PCS | Mod: PBBFAC,,, | Performed by: PHYSICIAN ASSISTANT

## 2022-07-21 PROCEDURE — 99999 PR PBB SHADOW E&M-EST. PATIENT-LVL IV: CPT | Mod: PBBFAC,,, | Performed by: PHYSICIAN ASSISTANT

## 2022-07-21 PROCEDURE — 99213 OFFICE O/P EST LOW 20 MIN: CPT | Mod: S$PBB,,, | Performed by: PHYSICIAN ASSISTANT

## 2022-07-21 PROCEDURE — 99213 PR OFFICE/OUTPT VISIT, EST, LEVL III, 20-29 MIN: ICD-10-PCS | Mod: S$PBB,,, | Performed by: PHYSICIAN ASSISTANT

## 2022-07-21 PROCEDURE — 99214 OFFICE O/P EST MOD 30 MIN: CPT | Mod: PBBFAC,PO | Performed by: PHYSICIAN ASSISTANT

## 2022-07-21 NOTE — PROGRESS NOTES
Subjective:       Patient ID: Omar Pacheco is a 71 y.o. male.    Chief Complaint: Hypertension    HPI: 70 yo male presents for htn follow up. No complaints. Well controlled.     Review of Systems   Eyes: Negative for visual disturbance.   Respiratory: Negative for shortness of breath.    Cardiovascular: Negative for chest pain.   Gastrointestinal: Negative for abdominal pain.   Neurological: Negative for headaches.         Objective:      Physical Exam  Constitutional:       Appearance: Normal appearance.   HENT:      Head: Normocephalic and atraumatic.   Neurological:      General: No focal deficit present.      Mental Status: He is alert and oriented to person, place, and time.   Psychiatric:         Mood and Affect: Mood normal.         Assessment:       Problem List Items Addressed This Visit     Atrial fibrillation - paroxysmal (Chronic)    Essential hypertension - Primary          Plan:         Omar was seen today for hypertension.    Diagnoses and all orders for this visit:    Essential hypertension  Controlled    Paroxysmal atrial fibrillation  Has f/u with cardiology set

## 2022-07-26 ENCOUNTER — PATIENT OUTREACH (OUTPATIENT)
Dept: ADMINISTRATIVE | Facility: HOSPITAL | Age: 72
End: 2022-07-26
Payer: MEDICARE

## 2022-07-28 NOTE — PROGRESS NOTES
Mr. Pacheco is a patient of Dr. Parikh and was last seen in clinic 1/10/2022.      Subjective:   Patient ID:  Omar Pacheco is a 71 y.o. male who presents for follow-up of Atrial Fibrillation  .     HPI:    Mr. Pacheco is a 71 y.o. male with pAF, HTN, ERVIN here for follow up.     Background:    PAF -- one episode in 2006; none until 2020!  HTN on meds  ERVIN, pending re-evaluation for CPAP    2006, had AF. Underwent MENG/DCCV. Started on flecainide 50 bid and pradaxa.  He's had no problems until 4 days ago; developed palpitations and dizziness again.  He's able to do cardio exercise 3-4x/week without problems.  He's 100% compliant with his Pradaxa.    ECG is AF  Increase flecainide to 100 bid.  Return on Monday. If still in AF, then MENG/DCCV.    11/9/2020: ECG on admit demonstrated sinus rhythm.  MENG/DCCV cancelled.  3/29/2021: Doing well from arrhythmia standpoint, with no more AF on increased dose of flecainide (100mg BID). Plan to continue current regimen, consider ablation if he has breakthrough on this dose. EKG with stable intervals.  He is on pradaxa and toprol.    1/10/2022: Doing well from rhythm standpoint, maintaining SR on flecainide. EKG with normal intervals. CHADSVASc 2 on pradaxa for CVA prophylaxis.  Pt reports some LH - will reduce metoprolol. BP elevated in clinic. He says he did not take his BP meds yet today.    Update (08/01/2022):    Today he says he is feeling well. No cardiac complaints. Mr. Pacheco reports no chest pain with exertion or at rest, palpitations, SOB, LAWSON, dizziness, or syncope. Not on CPAP.    He is currently taking pradaxa 150mg BID for stroke prophylaxis and denies significant bleeding episodes. He is currently being treated with flecainide 100mg BID for rhythm control and toprol 25mg daily for HR control.  Kidney function is stable, with a creatinine of 0.9 on 1/26/2022.    I have personally reviewed the patient's EKG today, which shows sinus rhythm at 57bpm. MO interval is  "194. QRS is 88. QT is 420.    Relevant Cardiac Test Results:    2D Echo (12/9/2020):  · Moderate left atrial enlargement.  · The left ventricle is normal in size with normal systolic function. The estimated ejection fraction is 60%.  · Indeterminate diastolic function.  · Normal right ventricular size with normal right ventricular systolic function.  · Mild mitral regurgitation.  · Mild pulmonic regurgitation.  · The estimated PA systolic pressure is 29 mmHg.  · Normal central venous pressure (3 mmHg).    Current Outpatient Medications   Medication Sig    acetaminophen (TYLENOL ORAL) Take 500 mg by mouth as needed.    amLODIPine (NORVASC) 5 MG tablet Take 1 tablet (5 mg total) by mouth once daily.    ammonium lactate (AMLACTIN) 12 % lotion Apply topically daily as needed (Dry Skin).    azelastine (ASTELIN) 137 mcg (0.1 %) nasal spray 1 spray (137 mcg total) by Nasal route 2 (two) times daily.    [START ON 8/2/2022] ciprofloxacin HCl (CIPRO) 500 MG tablet Take 1 tablet (500 mg total) by mouth once. Take the morning of the biopsy. for 1 dose    cyanocobalamin (VITAMIN B-12) 1000 MCG tablet Take 100 mcg by mouth once daily.    dabigatran etexilate (PRADAXA) 150 mg Cap Take 1 capsule (150 mg total) by mouth 2 (two) times daily. "Do NOT break, chew, or open capsules."    flecainide (TAMBOCOR) 100 MG Tab Take 1 tablet (100 mg total) by mouth every 12 (twelve) hours.    insulin syringe-needle U-100 (EASY COMFORT INSULIN SYRINGE) 1 mL 31 gauge x 5/16 Syrg Inject 1 time daily prn ED    levocetirizine (XYZAL) 5 MG tablet Take 1 tablet (5 mg total) by mouth every evening.    losartan (COZAAR) 50 MG tablet Take 0.5 tablets (25 mg total) by mouth once daily.    meloxicam (MOBIC) 7.5 MG tablet Take 1 tablet (7.5 mg total) by mouth once daily.    methocarbamoL (ROBAXIN) 500 MG Tab Take 500 mg by mouth as needed.    methocarbamoL (ROBAXIN) 500 MG Tab Take 2 tablets by mouth nightly.    metoprolol succinate " "(TOPROL-XL) 25 MG 24 hr tablet Take 25 mg by mouth.    mirtazapine (REMERON) 30 MG tablet Take 30 mg by mouth every evening.    multivitamin (THERAGRAN) per tablet Take 1 tablet by mouth once daily.    omeprazole (PRILOSEC) 20 MG capsule Take 20 mg by mouth.    sildenafiL (VIAGRA) 100 MG tablet Take 1 tablet (100 mg total) by mouth as needed for Erectile Dysfunction (1 tablet 1 hr prior to intercourse.).    [START ON 8/2/2022] sodium phosphates (FLEET) 19-7 gram/118 mL Enem Place 1 enema rectally once. Use morning of biopsy. for 1 dose    [START ON 8/2/2022] sodium phosphates (FLEET) 19-7 gram/118 mL Enem Place 1 enema rectally once. Use morning of biopsy. for 1 dose    tadalafiL (CIALIS) 20 MG Tab Take 1 tablet (20 mg total) by mouth once daily.    terazosin (HYTRIN) 10 MG capsule Take 10 mg by mouth.    traMADoL (ULTRAM) 50 mg tablet Take 1 tablet (50 mg total) by mouth every 12 (twelve) hours as needed for Pain.    triamcinolone acetonide 0.1% (KENALOG) 0.1 % cream Apply topically.    VITAMIN B COMPLEX (B COMPLETE ORAL) Take by mouth once daily.     No current facility-administered medications for this visit.       Review of Systems   Constitutional: Negative for malaise/fatigue.   Cardiovascular: Negative for chest pain, dyspnea on exertion, irregular heartbeat, leg swelling and palpitations.   Respiratory: Negative for shortness of breath.    Hematologic/Lymphatic: Negative for bleeding problem.   Skin: Negative for rash.   Musculoskeletal: Negative for myalgias.   Gastrointestinal: Negative for hematemesis, hematochezia and nausea.   Genitourinary: Negative for hematuria.   Neurological: Negative for light-headedness.   Psychiatric/Behavioral: Negative for altered mental status.   Allergic/Immunologic: Negative for persistent infections.       Objective:        /62   Pulse (!) 57   Ht 5' 9" (1.753 m)   Wt 94.6 kg (208 lb 8.9 oz)   BMI 30.80 kg/m²     Physical Exam  Vitals and nursing note " reviewed.   Constitutional:       Appearance: Normal appearance. He is well-developed.   HENT:      Head: Normocephalic.      Nose: Nose normal.   Eyes:      Pupils: Pupils are equal, round, and reactive to light.   Cardiovascular:      Rate and Rhythm: Normal rate and regular rhythm.   Pulmonary:      Effort: No respiratory distress.      Breath sounds: Normal breath sounds.   Musculoskeletal:         General: Normal range of motion.   Skin:     General: Skin is warm and dry.      Findings: No erythema.   Neurological:      Mental Status: He is alert and oriented to person, place, and time.   Psychiatric:         Speech: Speech normal.         Behavior: Behavior normal.       Lab Results   Component Value Date     01/26/2022    K 4.2 01/26/2022    MG 2.3 06/14/2006    BUN 12 01/26/2022    CREATININE 0.9 01/26/2022    ALT 17 01/26/2022    AST 26 01/26/2022    HGB 13.5 (L) 01/26/2022    HCT 41.3 01/26/2022    TSH 0.944 12/08/2020    LDLCALC 81.2 01/26/2022       Recent Labs   Lab 11/06/20  1537   INR 1.0       Assessment:     1. Paroxysmal atrial fibrillation    2. Essential hypertension    3. Sinus bradycardia    4. Obesity (BMI 30.0-34.9)    5. Obstructive sleep apnea    6. On continuous oral anticoagulation      Plan:     In summary, Mr. Pacheco is a 71 y.o. male with pAF, HTN, ERVIN here for follow up.   He is doing well from a rhythm standpoint, maintaining SR on flecainide. EKG with stable intervals.  BP well controlled - on digital HTN program. CHADSVASc 2 on pradaxa. Not on CPAP. He declined referral back to sleep medicine.  He has a biopsy scheduled for tomorrow. Did take AM pradaxa today. He will call dept to assess whether his procedure needs to be pushed forward. He is cleared to hold OAC 2 days prior to procedure.    Continue current medications  RTC 6 mo, sooner if needed    *A copy of this note has been sent to Dr. Parikh*    Follow up in about 6 months (around  2/1/2023).    ------------------------------------------------------------------    RYANNE Avila, NP-C  Cardiac Electrophysiology

## 2022-08-01 ENCOUNTER — OFFICE VISIT (OUTPATIENT)
Dept: ELECTROPHYSIOLOGY | Facility: CLINIC | Age: 72
End: 2022-08-01
Payer: MEDICARE

## 2022-08-01 ENCOUNTER — TELEPHONE (OUTPATIENT)
Dept: PAIN MEDICINE | Facility: CLINIC | Age: 72
End: 2022-08-01
Payer: MEDICARE

## 2022-08-01 ENCOUNTER — TELEPHONE (OUTPATIENT)
Dept: UROLOGY | Facility: CLINIC | Age: 72
End: 2022-08-01
Payer: MEDICARE

## 2022-08-01 VITALS
WEIGHT: 208.56 LBS | BODY MASS INDEX: 30.89 KG/M2 | SYSTOLIC BLOOD PRESSURE: 126 MMHG | HEART RATE: 57 BPM | DIASTOLIC BLOOD PRESSURE: 62 MMHG | HEIGHT: 69 IN

## 2022-08-01 DIAGNOSIS — I10 ESSENTIAL HYPERTENSION: ICD-10-CM

## 2022-08-01 DIAGNOSIS — Z79.01 ON CONTINUOUS ORAL ANTICOAGULATION: ICD-10-CM

## 2022-08-01 DIAGNOSIS — I48.0 PAROXYSMAL ATRIAL FIBRILLATION: Primary | Chronic | ICD-10-CM

## 2022-08-01 DIAGNOSIS — G47.33 OBSTRUCTIVE SLEEP APNEA: ICD-10-CM

## 2022-08-01 DIAGNOSIS — R00.1 SINUS BRADYCARDIA: ICD-10-CM

## 2022-08-01 DIAGNOSIS — E66.9 OBESITY (BMI 30.0-34.9): ICD-10-CM

## 2022-08-01 PROCEDURE — 99999 PR PBB SHADOW E&M-EST. PATIENT-LVL III: CPT | Mod: PBBFAC,,, | Performed by: NURSE PRACTITIONER

## 2022-08-01 PROCEDURE — 99214 PR OFFICE/OUTPT VISIT, EST, LEVL IV, 30-39 MIN: ICD-10-PCS | Mod: S$PBB,,, | Performed by: NURSE PRACTITIONER

## 2022-08-01 PROCEDURE — 99999 PR PBB SHADOW E&M-EST. PATIENT-LVL III: ICD-10-PCS | Mod: PBBFAC,,, | Performed by: NURSE PRACTITIONER

## 2022-08-01 PROCEDURE — 93010 RHYTHM STRIP: ICD-10-PCS | Mod: S$PBB,,, | Performed by: INTERNAL MEDICINE

## 2022-08-01 PROCEDURE — 99214 OFFICE O/P EST MOD 30 MIN: CPT | Mod: S$PBB,,, | Performed by: NURSE PRACTITIONER

## 2022-08-01 PROCEDURE — 99213 OFFICE O/P EST LOW 20 MIN: CPT | Mod: PBBFAC | Performed by: NURSE PRACTITIONER

## 2022-08-01 PROCEDURE — 93005 ELECTROCARDIOGRAM TRACING: CPT | Mod: PBBFAC | Performed by: INTERNAL MEDICINE

## 2022-08-01 PROCEDURE — 93010 ELECTROCARDIOGRAM REPORT: CPT | Mod: S$PBB,,, | Performed by: INTERNAL MEDICINE

## 2022-08-01 RX ORDER — LOSARTAN POTASSIUM 50 MG/1
50 TABLET ORAL
COMMUNITY
Start: 2022-03-03 | End: 2022-09-27

## 2022-08-01 RX ORDER — AMLODIPINE BESYLATE 5 MG/1
5 TABLET ORAL
COMMUNITY
Start: 2022-03-03 | End: 2022-09-27

## 2022-08-01 NOTE — TELEPHONE ENCOUNTER
----- Message from Tierney Cheney sent at 8/1/2022  2:40 PM CDT -----  Contact: Patient [phone]  Type: Patient Call Back    Who called: Patient     What is the request in detail: Asking to speak to Priyanka in regards to rescheduling a procedure. Please call.     Would the patient rather a call back or a response via [facility]? Call back    Best call back number: [phone]

## 2022-08-01 NOTE — TELEPHONE ENCOUNTER
Mr. Pacheco called to r/s knee RFA as his Prostate BX was r/s from 8/2 to 8/23.  Being that we have to be at least two weeks apart from other procedures and Mr. Pacheco is going out of town 8/10/2022, procedure moved from 8/19 to 9/7/2022.

## 2022-08-01 NOTE — TELEPHONE ENCOUNTER
----- Message from Suzie Claudio sent at 8/1/2022  8:11 AM CDT -----  Regarding: Medication review  Contact: [phone]  Calling to speak with provider or nurse regarding medication prior to biopsy on tomorrow. Patient mistakenly took blood thinner this morning and would like to know if he needs to reschedule.

## 2022-08-01 NOTE — TELEPHONE ENCOUNTER
Spoke to pt. Instructed pt he would need to reschedule. Pt verbalized understanding. appt rescheduled. Pt confirmed details.

## 2022-08-16 DIAGNOSIS — M48.07 SPINAL STENOSIS, LUMBOSACRAL REGION: ICD-10-CM

## 2022-08-16 RX ORDER — TRAMADOL HYDROCHLORIDE 50 MG/1
50 TABLET ORAL EVERY 12 HOURS PRN
Qty: 60 TABLET | Refills: 0 | Status: CANCELLED | OUTPATIENT
Start: 2022-08-16

## 2022-08-16 NOTE — TELEPHONE ENCOUNTER
No new care gaps identified.  [facility] Embedded Care Gaps. Reference number: [id number]. 8/16/2022   2:05:00 PM CDT

## 2022-08-22 DIAGNOSIS — M48.07 SPINAL STENOSIS, LUMBOSACRAL REGION: ICD-10-CM

## 2022-08-22 RX ORDER — TRAMADOL HYDROCHLORIDE 50 MG/1
50 TABLET ORAL EVERY 12 HOURS PRN
Qty: 60 TABLET | Refills: 0 | Status: CANCELLED | OUTPATIENT
Start: 2022-08-22

## 2022-08-22 NOTE — TELEPHONE ENCOUNTER
No new care gaps identified.  [facility] Embedded Care Gaps. Reference number: [id number]. 8/22/2022   5:50:37 PM CDT

## 2022-08-23 ENCOUNTER — PATIENT MESSAGE (OUTPATIENT)
Dept: FAMILY MEDICINE | Facility: CLINIC | Age: 72
End: 2022-08-23
Payer: MEDICARE

## 2022-08-23 ENCOUNTER — PROCEDURE VISIT (OUTPATIENT)
Dept: UROLOGY | Facility: CLINIC | Age: 72
End: 2022-08-23
Payer: MEDICARE

## 2022-08-23 VITALS
HEIGHT: 69 IN | TEMPERATURE: 98 F | DIASTOLIC BLOOD PRESSURE: 77 MMHG | SYSTOLIC BLOOD PRESSURE: 179 MMHG | RESPIRATION RATE: 16 BRPM | HEART RATE: 53 BPM | WEIGHT: 206 LBS | BODY MASS INDEX: 30.51 KG/M2

## 2022-08-23 DIAGNOSIS — N13.8 BPH WITH URINARY OBSTRUCTION: ICD-10-CM

## 2022-08-23 DIAGNOSIS — N40.1 BPH WITH URINARY OBSTRUCTION: ICD-10-CM

## 2022-08-23 DIAGNOSIS — R97.20 ELEVATED PSA: ICD-10-CM

## 2022-08-23 PROCEDURE — 88305 TISSUE EXAM BY PATHOLOGIST: ICD-10-PCS | Mod: 26,,, | Performed by: STUDENT IN AN ORGANIZED HEALTH CARE EDUCATION/TRAINING PROGRAM

## 2022-08-23 PROCEDURE — 76872 US TRANSRECTAL: CPT | Mod: 26,S$PBB,, | Performed by: UROLOGY

## 2022-08-23 PROCEDURE — 55700 PR BIOPSY OF PROSTATE,NEEDLE/PUNCH: CPT | Mod: PBBFAC | Performed by: UROLOGY

## 2022-08-23 PROCEDURE — 88344 IMHCHEM/IMCYTCHM EA MLT ANTB: CPT | Mod: 26,,, | Performed by: STUDENT IN AN ORGANIZED HEALTH CARE EDUCATION/TRAINING PROGRAM

## 2022-08-23 PROCEDURE — 76942 ECHO GUIDE FOR BIOPSY: CPT | Mod: PBBFAC | Performed by: UROLOGY

## 2022-08-23 PROCEDURE — 88344 IMHCHEM/IMCYTCHM EA MLT ANTB: CPT | Performed by: STUDENT IN AN ORGANIZED HEALTH CARE EDUCATION/TRAINING PROGRAM

## 2022-08-23 PROCEDURE — 88344 PR IHC OR ICC EACH MULTIPLEX ANTIBODY STAIN PROCEDURE: ICD-10-PCS | Mod: 26,,, | Performed by: STUDENT IN AN ORGANIZED HEALTH CARE EDUCATION/TRAINING PROGRAM

## 2022-08-23 PROCEDURE — 88305 TISSUE EXAM BY PATHOLOGIST: CPT | Mod: 59 | Performed by: STUDENT IN AN ORGANIZED HEALTH CARE EDUCATION/TRAINING PROGRAM

## 2022-08-23 PROCEDURE — 76872 US TRANSRECTAL: CPT | Mod: PBBFAC | Performed by: UROLOGY

## 2022-08-23 PROCEDURE — 55700 PR BIOPSY OF PROSTATE,NEEDLE/PUNCH: ICD-10-PCS | Mod: S$PBB,,, | Performed by: UROLOGY

## 2022-08-23 PROCEDURE — 88305 TISSUE EXAM BY PATHOLOGIST: CPT | Mod: 26,,, | Performed by: STUDENT IN AN ORGANIZED HEALTH CARE EDUCATION/TRAINING PROGRAM

## 2022-08-23 PROCEDURE — 55700 PR BIOPSY OF PROSTATE,NEEDLE/PUNCH: CPT | Mod: S$PBB,,, | Performed by: UROLOGY

## 2022-08-23 PROCEDURE — 96372 THER/PROPH/DIAG INJ SC/IM: CPT | Mod: 59,PBBFAC

## 2022-08-23 PROCEDURE — 55700 TRANSRECTAL ULTRASOUND W/ BIOPSY: CPT | Mod: PBBFAC | Performed by: UROLOGY

## 2022-08-23 PROCEDURE — 76872 PR US TRANSRECTAL: ICD-10-PCS | Mod: 26,S$PBB,, | Performed by: UROLOGY

## 2022-08-23 RX ORDER — CEFTRIAXONE 1 G/1
1 INJECTION, POWDER, FOR SOLUTION INTRAMUSCULAR; INTRAVENOUS
Status: COMPLETED | OUTPATIENT
Start: 2022-08-23 | End: 2022-08-23

## 2022-08-23 RX ORDER — LIDOCAINE HYDROCHLORIDE 10 MG/ML
1 INJECTION INFILTRATION; PERINEURAL
Status: COMPLETED | OUTPATIENT
Start: 2022-08-23 | End: 2022-08-23

## 2022-08-23 RX ORDER — LIDOCAINE HYDROCHLORIDE 20 MG/ML
JELLY TOPICAL
Status: COMPLETED | OUTPATIENT
Start: 2022-08-23 | End: 2022-08-23

## 2022-08-23 RX ADMIN — CEFTRIAXONE 1 G: 1 INJECTION, POWDER, FOR SOLUTION INTRAMUSCULAR; INTRAVENOUS at 01:08

## 2022-08-23 RX ADMIN — LIDOCAINE HYDROCHLORIDE 1 ML: 10 INJECTION, SOLUTION INFILTRATION; PERINEURAL at 01:08

## 2022-08-23 RX ADMIN — LIDOCAINE HYDROCHLORIDE: 20 JELLY TOPICAL at 01:08

## 2022-08-23 NOTE — PROCEDURES
"Transrectal Ultrasound w/ Biopsy    Date/Time: 8/23/2022 1:30 PM  Performed by: Earnest Martinez Jr., MD  Authorized by: Dominga Richard NP     Consent Done?:  Yes (Written)  Time out: Immediately prior to procedure a "time out" was called to verify the correct patient, procedure, equipment, support staff and site/side marked as required.    Indications: Elevated PSA    Position:  Left lateral  Anesthesia:  Pudendal nerve block and 20cc's 1% Lidocaine  Prostate Size:  55  Lesions:: No    Left Base Biopsies: 2  Left Mid Biopsies: 2  Left [place] Biopsies: 2  Right Base Biopsies: 2  Right Mid Biopsies: 2  Right [place] Biopsies: 2  Transitional zone: No    Total Biopsies:  12     Take abs  Expect bleeding  Call for fever or chills      "

## 2022-08-23 NOTE — PATIENT INSTRUCTIONS
What to Expect After a Prostate Biopsy    Please be sure to finish your pre-procedure antibiotics as instructed.    You may have mild bleeding from the rectum or urine for about 1 week to 1 month, or in your ejaculate for several months. This bleeding is normal and expected, and it will stop. You may have mild discomfort in your rectal or urethral area for 24-48 hours.    You cannot do any strenuous lifting, straining, or exercising for 24 hours. You may return to full activity the day after the biopsy.    You may continue to take all your regular medications after the procedure except for the blood thinners.    You may resume all blood-thinning medications once you no longer see any bleeding or whenever your physician prescribing the medication says it is all right to do so. You may take Tylenol if you have a fever and your temperature is less than 100° F or if you have some discomfort.    You will receive a call from the Urology Department at [facility] with the results of your prostate biopsy within one week.    Signs and Symptoms to Report    Call your [facility] urologist at [phone] if you develop any of the following:  Temperature greater than 101°  F  Inability to urinate  A large amount of bleeding from the rectum or in the urine  Persistent or severe pain    After hours or on weekends, you may reach a urology resident on call at this number: [phone].

## 2022-08-24 ENCOUNTER — PATIENT MESSAGE (OUTPATIENT)
Dept: ENDOSCOPY | Facility: HOSPITAL | Age: 72
End: 2022-08-24
Payer: MEDICARE

## 2022-08-24 DIAGNOSIS — M48.07 SPINAL STENOSIS, LUMBOSACRAL REGION: ICD-10-CM

## 2022-08-24 RX ORDER — TRAMADOL HYDROCHLORIDE 50 MG/1
50 TABLET ORAL EVERY 12 HOURS PRN
Qty: 60 TABLET | Refills: 0 | Status: SHIPPED | OUTPATIENT
Start: 2022-08-24 | End: 2023-01-11 | Stop reason: SDUPTHER

## 2022-08-30 ENCOUNTER — TELEPHONE (OUTPATIENT)
Dept: PAIN MEDICINE | Facility: CLINIC | Age: 72
End: 2022-08-30
Payer: MEDICARE

## 2022-08-30 NOTE — TELEPHONE ENCOUNTER
Reviewed pre-procedure instructions with Mr. Pacheco, as well as provided arrival time of 11:00a for 9/7/2022.  All questions answered- verbalized understanding.

## 2022-09-04 ENCOUNTER — PATIENT MESSAGE (OUTPATIENT)
Dept: UROLOGY | Facility: CLINIC | Age: 72
End: 2022-09-04
Payer: MEDICARE

## 2022-09-06 ENCOUNTER — TELEPHONE (OUTPATIENT)
Dept: UROLOGY | Facility: CLINIC | Age: 72
End: 2022-09-06
Payer: MEDICARE

## 2022-09-06 ENCOUNTER — PATIENT MESSAGE (OUTPATIENT)
Dept: PAIN MEDICINE | Facility: CLINIC | Age: 72
End: 2022-09-06
Payer: MEDICARE

## 2022-09-06 DIAGNOSIS — C61 PROSTATE CANCER: Primary | ICD-10-CM

## 2022-09-06 NOTE — TELEPHONE ENCOUNTER
He was informed of his recent (+) Prostate Biopsy.   Consult Dr. Grace to discuss Treatment Plan and options.         ----- Message from Earnest Martinez Jr., MD sent at 9/6/2022  7:06 AM CDT -----  Did you review pts path with him?  LP  ----- Message -----  From: [facility] Interface  Sent: 9/2/2022   7:43 AM CDT  To: Earnest Martinez Jr., MD

## 2022-09-07 ENCOUNTER — HOSPITAL ENCOUNTER (OUTPATIENT)
Facility: HOSPITAL | Age: 72
Discharge: HOME | End: 2022-09-07
Attending: PAIN MEDICINE | Admitting: PAIN MEDICINE
Payer: MEDICARE

## 2022-09-07 VITALS
SYSTOLIC BLOOD PRESSURE: 141 MMHG | HEART RATE: 52 BPM | TEMPERATURE: 98 F | WEIGHT: 210 LBS | BODY MASS INDEX: 31.1 KG/M2 | DIASTOLIC BLOOD PRESSURE: 75 MMHG | OXYGEN SATURATION: 95 % | RESPIRATION RATE: 16 BRPM | HEIGHT: 69 IN

## 2022-09-07 DIAGNOSIS — G89.29 BILATERAL CHRONIC KNEE PAIN: ICD-10-CM

## 2022-09-07 DIAGNOSIS — M25.561 BILATERAL CHRONIC KNEE PAIN: ICD-10-CM

## 2022-09-07 DIAGNOSIS — M25.561 RIGHT KNEE PAIN: ICD-10-CM

## 2022-09-07 DIAGNOSIS — M17.0 PRIMARY OSTEOARTHRITIS OF BOTH KNEES: Primary | ICD-10-CM

## 2022-09-07 DIAGNOSIS — M25.562 BILATERAL CHRONIC KNEE PAIN: ICD-10-CM

## 2022-09-07 PROCEDURE — 64624 PR DESTRUCT, NEUROLYTIC AGENT, GENICULAR NERVE, W/IMG: ICD-10-PCS | Mod: RT,,, | Performed by: PAIN MEDICINE

## 2022-09-07 PROCEDURE — 99152 MOD SED SAME PHYS/QHP 5/>YRS: CPT | Mod: ,,, | Performed by: PAIN MEDICINE

## 2022-09-07 PROCEDURE — 99152 MOD SED SAME PHYS/QHP 5/>YRS: CPT | Performed by: PAIN MEDICINE

## 2022-09-07 PROCEDURE — 64640 INJECTION TREATMENT OF NERVE: CPT | Performed by: PAIN MEDICINE

## 2022-09-07 PROCEDURE — 64624 DSTRJ NULYT AGT GNCLR NRV: CPT | Mod: RT | Performed by: PAIN MEDICINE

## 2022-09-07 PROCEDURE — 77002 NEEDLE LOCALIZATION BY XRAY: CPT | Performed by: PAIN MEDICINE

## 2022-09-07 PROCEDURE — 64624 DSTRJ NULYT AGT GNCLR NRV: CPT | Mod: RT,,, | Performed by: PAIN MEDICINE

## 2022-09-07 PROCEDURE — 25000003 PHARM REV CODE 250: Performed by: PAIN MEDICINE

## 2022-09-07 PROCEDURE — 99152 PR MOD CONSCIOUS SEDATION, SAME PHYS, 5+ YRS, FIRST 15 MIN: ICD-10-PCS | Mod: ,,, | Performed by: PAIN MEDICINE

## 2022-09-07 PROCEDURE — 99153 MOD SED SAME PHYS/QHP EA: CPT | Performed by: PAIN MEDICINE

## 2022-09-07 PROCEDURE — 63600175 PHARM REV CODE 636 W HCPCS: Performed by: PAIN MEDICINE

## 2022-09-07 RX ORDER — TRIAMCINOLONE ACETONIDE 40 MG/ML
40 INJECTION, SUSPENSION INTRA-ARTICULAR; INTRAMUSCULAR ONCE
Status: COMPLETED | OUTPATIENT
Start: 2022-09-07 | End: 2022-09-07

## 2022-09-07 RX ORDER — BUPIVACAINE HYDROCHLORIDE 2.5 MG/ML
INJECTION, SOLUTION EPIDURAL; INFILTRATION; INTRACAUDAL
Status: DISCONTINUED
Start: 2022-09-07 | End: 2022-09-07 | Stop reason: HOSPADM

## 2022-09-07 RX ORDER — LIDOCAINE HYDROCHLORIDE 20 MG/ML
10 INJECTION, SOLUTION INFILTRATION; PERINEURAL ONCE
Status: COMPLETED | OUTPATIENT
Start: 2022-09-07 | End: 2022-09-07

## 2022-09-07 RX ORDER — SODIUM CHLORIDE 9 MG/ML
INJECTION, SOLUTION INTRAVENOUS CONTINUOUS
Status: DISCONTINUED | OUTPATIENT
Start: 2022-09-07 | End: 2022-09-07 | Stop reason: HOSPADM

## 2022-09-07 RX ORDER — TRIAMCINOLONE ACETONIDE 40 MG/ML
INJECTION, SUSPENSION INTRA-ARTICULAR; INTRAMUSCULAR
Status: DISCONTINUED
Start: 2022-09-07 | End: 2022-09-07 | Stop reason: HOSPADM

## 2022-09-07 RX ORDER — FENTANYL CITRATE 50 UG/ML
INJECTION, SOLUTION INTRAMUSCULAR; INTRAVENOUS
Status: DISCONTINUED
Start: 2022-09-07 | End: 2022-09-07 | Stop reason: HOSPADM

## 2022-09-07 RX ORDER — LIDOCAINE HYDROCHLORIDE 20 MG/ML
INJECTION, SOLUTION INFILTRATION; PERINEURAL
Status: DISCONTINUED
Start: 2022-09-07 | End: 2022-09-07 | Stop reason: HOSPADM

## 2022-09-07 RX ORDER — BUPIVACAINE HYDROCHLORIDE 2.5 MG/ML
10 INJECTION, SOLUTION EPIDURAL; INFILTRATION; INTRACAUDAL ONCE
Status: COMPLETED | OUTPATIENT
Start: 2022-09-07 | End: 2022-09-07

## 2022-09-07 RX ORDER — FENTANYL CITRATE 50 UG/ML
100 INJECTION, SOLUTION INTRAMUSCULAR; INTRAVENOUS
Status: DISCONTINUED | OUTPATIENT
Start: 2022-09-07 | End: 2022-09-07 | Stop reason: HOSPADM

## 2022-09-07 RX ORDER — MIDAZOLAM HYDROCHLORIDE 1 MG/ML
5 INJECTION INTRAMUSCULAR; INTRAVENOUS
Status: DISCONTINUED | OUTPATIENT
Start: 2022-09-07 | End: 2022-09-07 | Stop reason: HOSPADM

## 2022-09-07 RX ORDER — MIDAZOLAM HYDROCHLORIDE 1 MG/ML
INJECTION INTRAMUSCULAR; INTRAVENOUS
Status: DISCONTINUED
Start: 2022-09-07 | End: 2022-09-07 | Stop reason: HOSPADM

## 2022-09-07 RX ADMIN — SODIUM CHLORIDE: 0.9 INJECTION, SOLUTION INTRAVENOUS at 11:09

## 2022-09-07 NOTE — DISCHARGE SUMMARY
[facility] - Endoscopy  Discharge Note  Short Stay    Procedure(s) (LRB):  Right Genicular Nerve Radiofrequency Ablations (Right)    OUTCOME: Patient tolerated treatment/procedure well without complication and is now ready for discharge.    DISPOSITION: Home or Self Care    FINAL DIAGNOSIS:  <principal problem not specified>    FOLLOWUP: In clinic    DISCHARGE INSTRUCTIONS:  No discharge procedures on file.       Discharge Diagnosis:Bilateral chronic knee pain [M25.561, M25.562, G89.29]  Primary osteoarthritis of both knees [M17.0]  Condition on Discharge: Stable.  Diet on Discharge: Same as before.  Activity: as per instruction sheet.  Discharge to: Home with a responsible adult.  Follow up: as per Discharge instructions

## 2022-09-07 NOTE — H&P (VIEW-ONLY)
Subjective:     Patient ID: Omar Pacheco is a 71 y.o. male    Chief Complaint: Right knee pain    Referred by: Luca Galan Jr*      HPI:    Omar Pacheco is a 71 y.o. male who presents today for right genicular cooled RFA. He denies any changes in the quality or location of the pain.        Review of Systems   Constitutional:  Negative for activity change, appetite change, chills, fatigue, fever and unexpected weight change.   HENT:  Negative for hearing loss.    Eyes:  Negative for visual disturbance.   Respiratory:  Negative for chest tightness and shortness of breath.    Cardiovascular:  Negative for chest pain.   Gastrointestinal:  Negative for abdominal pain, constipation, diarrhea, nausea and vomiting.   Genitourinary:  Negative for difficulty urinating.   Musculoskeletal:  Positive for arthralgias and myalgias. Negative for back pain, gait problem and neck pain.   Skin:  Negative for rash.   Neurological:  Negative for dizziness, weakness, light-headedness, numbness and headaches.   Psychiatric/Behavioral:  Positive for sleep disturbance. Negative for hallucinations and suicidal ideas. The patient is not nervous/anxious.      Past Medical History:   Diagnosis Date    *Atrial fibrillation     Back pain     Benign hypertrophy of prostate     Degenerative disc disease     GERD (gastroesophageal reflux disease)     Hx of colonic polyps     Hypertension     Pilonidal cyst 1971    Sleep apnea     Trouble in sleeping        Past Surgical History:   Procedure Laterality Date    COLONOSCOPY N/A 12/7/2016    Procedure: COLONOSCOPY;  Surgeon: Sumeet Lundy MD;  Location: [address]);  Service: Endoscopy;  Laterality: N/A;  OK to hold Pradaxa 2 days prior to procedure per Dr Jones/see note dated 11/15/16/iesha    COLONOSCOPY N/A 2/24/2022    Procedure: COLONOSCOPY;  Surgeon: Italo Roy MD;  Location: [facility];  Service: Endoscopy;  Laterality: N/A;  ok to hold Pradaxa 2 days per EBONI Bentley  RN/arrhythmia clinic      COVID test at [place] on -GT  2/10/22 Changed Kirill to edward Zuluaga    CYST REMOVAL  1971    coccyx    KNEE SURGERY  1989    right       Social History     Socioeconomic History    Marital status:     Number of children: 5    Highest education level: Associate degree: academic program   Tobacco Use    Smoking status: Former     Packs/day: 0.25     Years: 2.00     Pack years: 0.50     Types: Cigarettes     Quit date: 1975     Years since quittin.9    Smokeless tobacco: Never   Substance and Sexual Activity    Alcohol use: Yes     Alcohol/week: 3.0 standard drinks     Types: 3 Glasses of wine per week     Comment: weekly    Drug use: No    Sexual activity: Yes     Social Determinants of Health     Financial Resource Strain: Low Risk     Difficulty of Paying Living Expenses: Not hard at all   Food Insecurity: No Food Insecurity    Worried About Running Out of Food in the Last Year: Never true    Ran Out of Food in the Last Year: Never true   Transportation Needs: No Transportation Needs    Lack of Transportation (Medical): No    Lack of Transportation (Non-Medical): No   Physical Activity: Sufficiently Active    Days of Exercise per Week: 3 days    Minutes of Exercise per Session: 150+ min   Stress: No Stress Concern Present    Feeling of Stress : Only a little   Social Connections: Moderately Integrated    Frequency of Communication with Friends and Family: More than three times a week    Frequency of Social Gatherings with Friends and Family: Three times a week    Attends [employer] Services: More than 4 times per year    Active Member of Clubs or Organizations: Yes    Attends Club or Organization Meetings: More than 4 times per year    Marital Status:    Housing Stability: Low Risk     Unable to Pay for Housing in the Last Year: No    Number of Places Lived in the Last Year: 1    Unstable Housing in the Last Year: No       Review of patient's allergies indicates:    Allergen Reactions    Bactrim [sulfamethoxazole-trimethoprim] Swelling     Swelling of lips/blisters in mouth         No current facility-administered medications on file prior to encounter.     Current Outpatient Medications on File Prior to Encounter   Medication Sig Dispense Refill    amLODIPine (NORVASC) 5 MG tablet Take 1 tablet (5 mg total) by mouth once daily. 90 tablet 3    losartan (COZAAR) 50 MG tablet Take 0.5 tablets (25 mg total) by mouth once daily. 45 tablet 3    metoprolol succinate (TOPROL-XL) 25 MG 24 hr tablet Take 25 mg by mouth.      omeprazole (PRILOSEC) 20 MG capsule Take 20 mg by mouth.      terazosin (HYTRIN) 10 MG capsule Take 10 mg by mouth.      acetaminophen (TYLENOL ORAL) Take 500 mg by mouth as needed.      ammonium lactate (AMLACTIN) 12 % lotion Apply topically daily as needed (Dry Skin). 400 g 11    azelastine (ASTELIN) 137 mcg (0.1 %) nasal spray 1 spray (137 mcg total) by Nasal route 2 (two) times daily. 30 mL 0    cyanocobalamin (VITAMIN B-12) 1000 MCG tablet Take 100 mcg by mouth once daily.      flecainide (TAMBOCOR) 100 MG Tab Take 1 tablet (100 mg total) by mouth every 12 (twelve) hours. 180 tablet 3    insulin syringe-needle U-100 (EASY COMFORT INSULIN SYRINGE) 1 mL 31 gauge x 5/16 Syrg Inject 1 time daily prn ED 90 each 0    levocetirizine (XYZAL) 5 MG tablet Take 1 tablet (5 mg total) by mouth every evening. 90 tablet 3    meloxicam (MOBIC) 7.5 MG tablet Take 1 tablet (7.5 mg total) by mouth once daily. 90 tablet 3    methocarbamoL (ROBAXIN) 500 MG Tab Take 500 mg by mouth as needed.      methocarbamoL (ROBAXIN) 500 MG Tab Take 2 tablets by mouth nightly.      mirtazapine (REMERON) 30 MG tablet Take 30 mg by mouth every evening.      multivitamin (THERAGRAN) per tablet Take 1 tablet by mouth once daily.      sildenafiL (VIAGRA) 100 MG tablet Take 1 tablet (100 mg total) by mouth as needed for Erectile Dysfunction (1 tablet 1 hr prior to intercourse.). 10 tablet 11     "tadalafiL (CIALIS) 20 MG Tab Take 1 tablet (20 mg total) by mouth once daily. 30 tablet 5    triamcinolone acetonide 0.1% (KENALOG) 0.1 % cream Apply topically.      VITAMIN B COMPLEX (B COMPLETE ORAL) Take by mouth once daily.         Objective:      /63 (BP Location: Right arm, Patient Position: Sitting)   Pulse (!) 52   Temp 97.8 °F (36.6 °C) (Oral)   Resp 16   Ht 5' 9" (1.753 m)   Wt 95.3 kg (210 lb)   SpO2 95%   BMI 31.01 kg/m²     Exam:  AAOx3 NAD  Mood and affect normal  Memory and language intact  RRR  CTAB        Assessment:       Chronic right knee pain      Plan:         Omar Pacheco is a 71 y.o. male with chronic right knee pain 2/2 OA.    Proceed with RFA as planned.    "

## 2022-09-07 NOTE — OR NURSING
Sedation Start: 1225  Stop: 1253    Re-Compose Coolief Multi Cooled Radiofrequency Kit [id number] used with sterile NS.

## 2022-09-07 NOTE — PLAN OF CARE
Patient tolerated procedure well. Patient reports 0/10 pain after procedure. Assisted patient up for first time. Gait steady. All discharge instructions given.

## 2022-09-07 NOTE — OP NOTE
KNEE COOL Radiofrequency Ablation Under Fluoroscopy  Current medications reviewed. Time-out taken to identify patient and procedure side prior to starting the procedure.  I attest that I have reviewed the patient's home medications prior to the procedure and no contraindication have been identified. I  re-evaluated the patient after the patient was positioned for the procedure in the procedure room immediately before the procedural time-out. The vital signs are current and represent the current state of the patient which has not significantly changed since the preprocedure assessment.      Date of Service: 09/07/2022    PCP: Azikiwe K Lombard, MD     Referring Physician:                 PROCEDURE:  Right suprapatellar, superior lateral and medial genicular nerve, and inferior medial genicular nerve cool   radiofrequency ablation under fluoroscopy    REASON FOR PROCEDURE:  Bilateral chronic knee pain [M25.561, M25.562, G89.29]  Primary osteoarthritis of both knees [M17.0]    PHYSICIAN: Luca Galan MD  ASSISTANTS: None      MEDICATIONS INJECTED:  0.5 ml of Kenalog 40mg/ml  with bupivacaine 0.25% 9ml. Of that, 1.5ml injected per level.    LOCAL ANESTHETIC USED: Xylocaine 2%  3ml each site.    SEDATION MEDICATIONS: Versed 1 mg and fentanyl 100 mcg IV.  Conscious sedation provided by MD and monitored by RN.  Total sedation time: 27 minutes (See nurse documentation and case log for sedation time)      ESTIMATED BLOOD LOSS:  None.    COMPLICATIONS:  None.    TECHNIQUE:   Laying in a supine position, the patient was prepped and draped in the usual sterile fashion using ChloraPrep and fenestrated drape.  Four target sites including the suprapatellar region, the superior lateral genicular nerve where the lateral femoral shaft meets the epicondyle, the superior medial genicular nerve where the medial femoral shaft meets the epicondyle, and the inferior medial genicular nerve where the medial tibial shaft meets the  epicondyle, were determined under fluoroscopic guidance via true AP view.  Local anesthetic was given by going down to the hub of the 27-gauge 1.25in needle and raising a wheal.  The 17-gauge, 50-mm needle with a 4-mm active tip was introduced to the anatomic local of the above target sites utilizing live fluoroscopy.  At each target, the needle was advanced using the tunnel technique until bony contact is made.    At each target, fine adjustments to confirm needle tip is 50% of the diaphysis on the lateral fluoroscopic view.     Motor stimulation done to confirm no motor nerve ablation takes place up to 2 Volt 2Hz.. Medication was then injected slowly.  Ablation then done per level utilizing cool radiofrequency generator @ 60°C for 150 seconds. The patient tolerated the procedure well.         The patient was monitored after the procedure.  Patient was given post procedure and discharge instructions to follow at home.  We will see the patient back in two weeks or the patient may call to inform of status. The patient was discharged in a stable condition

## 2022-09-07 NOTE — DISCHARGE INSTRUCTIONS
Home Care Instructions Pain Management:    1. DIET:   You may resume your normal diet today.   2. BATHING:   You may shower with luke warm water.  3. DRESSING:   You may remove your bandage today.   4. ACTIVITY LEVEL:   You may resume your normal activities 24 hrs after your procedure.  5. MEDICATIONS:   You may resume your normal medications today.   6. SPECIAL INSTRUCTIONS:   No heat to the injection site for 24 hrs including, bath or shower, heating pad, moist heat, or hot tubs.    Use ice pack to injection site for any pain or discomfort.  Apply ice packs for 20 minute intervals as needed.   If you have received any sedatives by mouth today you may not drive for 12 hours.    If you have received any sedation through your IV, you may not drive for 24 hrs.     PLEASE CALL YOUR DOCTOR IF:  1. Redness or swelling around the injection site.  2. Fever of 101 degrees  3. Drainage (pus) from the injection site.  4. For any continuous bleeding (some dried blood over the incision is normal.)    FOR EMERGENCIES:   If any unusual problems or difficulties occur during clinic hours, call [phone] or [phone].  Procedural Sedation  Procedural sedation is medicine to ease discomfort, pain, and anxiety during a procedure. The medicine is often given through an intravenous (IV) line in your arm or hand. In some cases, the medicine may be taken by mouth or inhaled. While you are under sedation, you will likely be awake. But you may not remember anything afterward.  Why procedural sedation is used  Sedation is used for many types of procedures. The goal is to reduce pain, anxiety, and stressful memories of a procedure. It can also help your health care provider treat you. For example, having a broken bone fixed may be easier if you feel relaxed.  Procedural sedation is used only for short, basic procedures. It is not used for complex surgeries. Some procedures that use this type of sedation include:  Dental surgery  Breast biopsy,  to take a sample of breast tissue  Endoscopy, to look at gastrointestinal problems  Bronchoscopy, to check for lung problems  Bone or joint realignment, to fix a broken bone or dislocated joint  Minor foot or skin surgery  Electrical cardioversion, to restore a normal heart rhythm  Lumbar puncture, to assess neurological disease  Risks of procedural sedation  Procedural sedation has some risks and possible side effects, such as:  Headache  Nausea and vomiting  Unpleasant memory of the procedure  Lowered rate of breathing  Changes in heart rate and blood pressure (rare)  Inhalation of stomach contents into your lungs (rare)  Side effects will likely go away shortly after the procedure. Your health care team will watch your heart rate and breathing during your sedation. This is to help prevent problems.  Your own risks may vary based on your age and your overall health. They also depend on the type of sedation you are given. Talk with your health care provider about the risks that apply most to you.  Getting ready for procedural sedation  Talk with your health care provider how to get ready for your procedure. Tell him or her about all the medicines you take. This includes over-the-counter medicines such as ibuprofen. It also includes vitamins, herbs, and other supplements. You may need to stop taking some medicines before the procedure, such as blood thinners and aspirin. If you smoke, you may need to stop. Talk with your health care provider if you need help to stop smoking.  Tell your health care provider if you:  Have had any problems in the past with sedation or anesthesia  Have had any recent changes in your health, such as an infection or fever  Are pregnant or think you may be pregnant  Also, make sure to:  Ask a family member or friend to take you home after the procedure. You cannot drive on the day you receive sedation.  Not eat or drink after midnight the night before your procedure, if advised.  Follow  all other instructions from your health care provider.  During your procedural sedation  You may have your procedure in a hospital or a medical clinic. Sedation is done by a trained health care provider. In general, you can expect the following:  You will be given medicine through an IV line in your arm or hand. Or you may receive a shot. The medicine may also be given by mouth. Or you may inhale it through a mask.  If you receive medicine through an IV, you may feel the effects very quickly. You will start to feel relaxed and drowsy.  During the procedure, your heart rate, breathing, and blood pressure will be closely watched. Your breathing and blood pressure may decrease a little. But you will likely not need help with your breathing. You may receive a little extra oxygen through a mask.  You will probably be awake the entire time. If you do fall asleep, you should be easy to wake up, if needed. You should feel little or no pain.  When your procedure is over, the sedative medicine will be stopped.  After your procedural sedation  You will begin to feel more awake and aware. But you will likely be drowsy for a while afterward. You will be closely watched as you become more alert. You may have a faint memory of the procedure. Or you may not remember it at all.  You should be able to return home within an hour or two after your procedure. Plan to have someone stay with you for a few hours. Side effects such as headache and nausea may go away quickly. Tell your health care provider if they continue.  Dont drive or make any important decisions for at least 24 hours. Be sure to follow all after-care instructions.      When to call your health care provider  Have someone call your health care provider right away if you have any of these:  Drowsiness that gets worse  Weakness or dizziness that gets worse  Repeated vomiting  You cant be awakened   Date Last Reviewed: 2/6/2015  © 0713-8724 [employer] [address]  [place] [address]. All rights reserved. This information is not intended as a substitute for professional medical care. Always follow your healthcare professional's instructions.

## 2022-09-07 NOTE — H&P
Subjective:     Patient ID: Omar Pacheco is a 71 y.o. male    Chief Complaint: Right knee pain    Referred by: Luca Galan Jr*      HPI:    Omar Pacheco is a 71 y.o. male who presents today for right genicular cooled RFA. He denies any changes in the quality or location of the pain.        Review of Systems   Constitutional:  Negative for activity change, appetite change, chills, fatigue, fever and unexpected weight change.   HENT:  Negative for hearing loss.    Eyes:  Negative for visual disturbance.   Respiratory:  Negative for chest tightness and shortness of breath.    Cardiovascular:  Negative for chest pain.   Gastrointestinal:  Negative for abdominal pain, constipation, diarrhea, nausea and vomiting.   Genitourinary:  Negative for difficulty urinating.   Musculoskeletal:  Positive for arthralgias and myalgias. Negative for back pain, gait problem and neck pain.   Skin:  Negative for rash.   Neurological:  Negative for dizziness, weakness, light-headedness, numbness and headaches.   Psychiatric/Behavioral:  Positive for sleep disturbance. Negative for hallucinations and suicidal ideas. The patient is not nervous/anxious.      Past Medical History:   Diagnosis Date    *Atrial fibrillation     Back pain     Benign hypertrophy of prostate     Degenerative disc disease     GERD (gastroesophageal reflux disease)     Hx of colonic polyps     Hypertension     Pilonidal cyst 1971    Sleep apnea     Trouble in sleeping        Past Surgical History:   Procedure Laterality Date    COLONOSCOPY N/A 12/7/2016    Procedure: COLONOSCOPY;  Surgeon: Sumeet Lundy MD;  Location: [address]);  Service: Endoscopy;  Laterality: N/A;  OK to hold Pradaxa 2 days prior to procedure per Dr Jones/see note dated 11/15/16/iesha    COLONOSCOPY N/A 2/24/2022    Procedure: COLONOSCOPY;  Surgeon: Italo Roy MD;  Location: [facility];  Service: Endoscopy;  Laterality: N/A;  ok to hold Pradaxa 2 days per EBONI Bentley  RN/arrhythmia clinic      COVID test at [place] on -GT  2/10/22 Changed Kirill to edward Zuluaga    CYST REMOVAL  1971    coccyx    KNEE SURGERY  1989    right       Social History     Socioeconomic History    Marital status:     Number of children: 5    Highest education level: Associate degree: academic program   Tobacco Use    Smoking status: Former     Packs/day: 0.25     Years: 2.00     Pack years: 0.50     Types: Cigarettes     Quit date: 1975     Years since quittin.9    Smokeless tobacco: Never   Substance and Sexual Activity    Alcohol use: Yes     Alcohol/week: 3.0 standard drinks     Types: 3 Glasses of wine per week     Comment: weekly    Drug use: No    Sexual activity: Yes     Social Determinants of Health     Financial Resource Strain: Low Risk     Difficulty of Paying Living Expenses: Not hard at all   Food Insecurity: No Food Insecurity    Worried About Running Out of Food in the Last Year: Never true    Ran Out of Food in the Last Year: Never true   Transportation Needs: No Transportation Needs    Lack of Transportation (Medical): No    Lack of Transportation (Non-Medical): No   Physical Activity: Sufficiently Active    Days of Exercise per Week: 3 days    Minutes of Exercise per Session: 150+ min   Stress: No Stress Concern Present    Feeling of Stress : Only a little   Social Connections: Moderately Integrated    Frequency of Communication with Friends and Family: More than three times a week    Frequency of Social Gatherings with Friends and Family: Three times a week    Attends Judaism Services: More than 4 times per year    Active Member of Clubs or Organizations: Yes    Attends Club or Organization Meetings: More than 4 times per year    Marital Status:    Housing Stability: Low Risk     Unable to Pay for Housing in the Last Year: No    Number of Places Lived in the Last Year: 1    Unstable Housing in the Last Year: No       Review of patient's allergies indicates:    Allergen Reactions    Bactrim [sulfamethoxazole-trimethoprim] Swelling     Swelling of lips/blisters in mouth         No current facility-administered medications on file prior to encounter.     Current Outpatient Medications on File Prior to Encounter   Medication Sig Dispense Refill    amLODIPine (NORVASC) 5 MG tablet Take 1 tablet (5 mg total) by mouth once daily. 90 tablet 3    losartan (COZAAR) 50 MG tablet Take 0.5 tablets (25 mg total) by mouth once daily. 45 tablet 3    metoprolol succinate (TOPROL-XL) 25 MG 24 hr tablet Take 25 mg by mouth.      omeprazole (PRILOSEC) 20 MG capsule Take 20 mg by mouth.      terazosin (HYTRIN) 10 MG capsule Take 10 mg by mouth.      acetaminophen (TYLENOL ORAL) Take 500 mg by mouth as needed.      ammonium lactate (AMLACTIN) 12 % lotion Apply topically daily as needed (Dry Skin). 400 g 11    azelastine (ASTELIN) 137 mcg (0.1 %) nasal spray 1 spray (137 mcg total) by Nasal route 2 (two) times daily. 30 mL 0    cyanocobalamin (VITAMIN B-12) 1000 MCG tablet Take 100 mcg by mouth once daily.      flecainide (TAMBOCOR) 100 MG Tab Take 1 tablet (100 mg total) by mouth every 12 (twelve) hours. 180 tablet 3    insulin syringe-needle U-100 (EASY COMFORT INSULIN SYRINGE) 1 mL 31 gauge x 5/16 Syrg Inject 1 time daily prn ED 90 each 0    levocetirizine (XYZAL) 5 MG tablet Take 1 tablet (5 mg total) by mouth every evening. 90 tablet 3    meloxicam (MOBIC) 7.5 MG tablet Take 1 tablet (7.5 mg total) by mouth once daily. 90 tablet 3    methocarbamoL (ROBAXIN) 500 MG Tab Take 500 mg by mouth as needed.      methocarbamoL (ROBAXIN) 500 MG Tab Take 2 tablets by mouth nightly.      mirtazapine (REMERON) 30 MG tablet Take 30 mg by mouth every evening.      multivitamin (THERAGRAN) per tablet Take 1 tablet by mouth once daily.      sildenafiL (VIAGRA) 100 MG tablet Take 1 tablet (100 mg total) by mouth as needed for Erectile Dysfunction (1 tablet 1 hr prior to intercourse.). 10 tablet 11     "tadalafiL (CIALIS) 20 MG Tab Take 1 tablet (20 mg total) by mouth once daily. 30 tablet 5    triamcinolone acetonide 0.1% (KENALOG) 0.1 % cream Apply topically.      VITAMIN B COMPLEX (B COMPLETE ORAL) Take by mouth once daily.         Objective:      /63 (BP Location: Right arm, Patient Position: Sitting)   Pulse (!) 52   Temp 97.8 °F (36.6 °C) (Oral)   Resp 16   Ht 5' 9" (1.753 m)   Wt 95.3 kg (210 lb)   SpO2 95%   BMI 31.01 kg/m²     Exam:  AAOx3 NAD  Mood and affect normal  Memory and language intact  RRR  CTAB        Assessment:       Chronic right knee pain      Plan:         Omar Pacheco is a 71 y.o. male with chronic right knee pain 2/2 OA.    Proceed with RFA as planned.    "

## 2022-09-08 ENCOUNTER — TELEPHONE (OUTPATIENT)
Dept: PAIN MEDICINE | Facility: CLINIC | Age: 72
End: 2022-09-08
Payer: MEDICARE

## 2022-09-08 ENCOUNTER — TELEPHONE (OUTPATIENT)
Dept: UROLOGY | Facility: CLINIC | Age: 72
End: 2022-09-08
Payer: MEDICARE

## 2022-09-08 DIAGNOSIS — M17.0 PRIMARY OSTEOARTHRITIS OF BOTH KNEES: ICD-10-CM

## 2022-09-08 DIAGNOSIS — M25.561 BILATERAL CHRONIC KNEE PAIN: Primary | ICD-10-CM

## 2022-09-08 DIAGNOSIS — M25.562 BILATERAL CHRONIC KNEE PAIN: Primary | ICD-10-CM

## 2022-09-08 DIAGNOSIS — G89.29 BILATERAL CHRONIC KNEE PAIN: Primary | ICD-10-CM

## 2022-09-08 NOTE — TELEPHONE ENCOUNTER
1421-I spoke to pt and scheduled Sanjay OV per below for Tue 9/13/22.  Pt uses the Portal and VU.    ----- Message from Dominga Richard NP sent at 9/6/2022  2:27 PM CDT -----  Regarding: New Diagnosed Prostate Cancer Talk  He Bessy, please Schedule him to see Dr. Grace for a newly diagnosed prostate cancer talk.   He is aware of the bx and this consult.     Thanks    TC   ----- Message -----  From: Earnest Martinez Jr., MD  Sent: 9/6/2022   7:06 AM CDT  To: Dominga Richard NP    Did you review pts path with him?  LP  ----- Message -----  From: Giovanni, Soft Lab Interface  Sent: 9/2/2022   7:43 AM CDT  To: Earnest Martinez Jr., MD

## 2022-09-13 ENCOUNTER — TELEPHONE (OUTPATIENT)
Dept: PAIN MEDICINE | Facility: CLINIC | Age: 72
End: 2022-09-13
Payer: MEDICARE

## 2022-09-13 NOTE — TELEPHONE ENCOUNTER
Reviewed pre-procedure instructions with Mr. Pacheco, as well as provided arrival time of 11:30am for 9/21/2022.  All questions answered- verbalized understanding.

## 2022-09-19 ENCOUNTER — PATIENT MESSAGE (OUTPATIENT)
Dept: ENDOSCOPY | Facility: HOSPITAL | Age: 72
End: 2022-09-19
Payer: MEDICARE

## 2022-09-19 ENCOUNTER — OFFICE VISIT (OUTPATIENT)
Dept: UROLOGY | Facility: CLINIC | Age: 72
End: 2022-09-19
Payer: MEDICARE

## 2022-09-19 VITALS
HEART RATE: 83 BPM | SYSTOLIC BLOOD PRESSURE: 153 MMHG | DIASTOLIC BLOOD PRESSURE: 77 MMHG | WEIGHT: 210.13 LBS | BODY MASS INDEX: 31.12 KG/M2 | HEIGHT: 69 IN

## 2022-09-19 DIAGNOSIS — C61 PROSTATE CANCER: ICD-10-CM

## 2022-09-19 PROCEDURE — 99215 OFFICE O/P EST HI 40 MIN: CPT | Mod: PBBFAC | Performed by: UROLOGY

## 2022-09-19 PROCEDURE — 99999 PR PBB SHADOW E&M-EST. PATIENT-LVL V: CPT | Mod: PBBFAC,,, | Performed by: UROLOGY

## 2022-09-19 PROCEDURE — 99215 OFFICE O/P EST HI 40 MIN: CPT | Mod: S$PBB,,, | Performed by: UROLOGY

## 2022-09-19 PROCEDURE — 99999 PR PBB SHADOW E&M-EST. PATIENT-LVL V: ICD-10-PCS | Mod: PBBFAC,,, | Performed by: UROLOGY

## 2022-09-19 PROCEDURE — 99215 PR OFFICE/OUTPT VISIT, EST, LEVL V, 40-54 MIN: ICD-10-PCS | Mod: S$PBB,,, | Performed by: UROLOGY

## 2022-09-19 RX ORDER — NALOXONE HYDROCHLORIDE 4 MG/.1ML
1 SPRAY NASAL ONCE
COMMUNITY

## 2022-09-19 NOTE — PROGRESS NOTES
[facility] - Urology [facility]   Clinic Note    SUBJECTIVE:     Chief Complaint: prostate cancer    History of Present Illness:  Omar Pacheco is a 71 y.o. male who presents to clinic for f/u after recent prostate biopsy. He is new to our clinic. Referral from Dominga Richard NP.    Reports hematuria after biopsy but has since resolved. Denies bone pain or unintentional weight loss.  Denies FH prostate cancer.     RELIAPATH DIAGNOSIS:   A.   PROSTATE, LEFT APEX, NEEDLE BIOPSY:   PROSTATE GLAND:  NEEDLE BIOPSY   HISTOLOGIC TYPE:  Acinar adenocarcinoma.   HISTOLOGIC GRADE:   Grade Group 2 (Triangle Score 3+4=7)      Percentage of Pattern 4:  30%   INTRADUCTAL CARCINOMA:  Not identified.   TUMOR QUANTITATION:      Number of Cores Positive:  1      Total Number of Cores:  2      Measurement of Tumor:  1 mm      Measurement of Core Tissue:  19 mm      Percentage of Prostatic Tissue Involved by Tumor:  5%   PERINEURAL INVASION:  Not identified.   B.   PROSTATE, LEFT MID, NEEDLE BIOPSY:   - Benign prostatic tissue.   C.   PROSTATE, LEFT BASE, NEEDLE BIOPSY:   - Benign prostatic tissue.   D.   PROSTATE, RIGHT APEX, NEEDLE BIOPSY:   - Benign prostatic tissue, see comment.   E.   PROSTATE, RIGHT MID, NEEDLE BIOPSY:   PROSTATE GLAND:  NEEDLE BIOPSY   HISTOLOGIC TYPE:  Acinar adenocarcinoma, see comment.   HISTOLOGIC GRADE:   Grade Group 1 (Triangle Score 3+3=6)   INTRADUCTAL CARCINOMA:  Not identified.   TUMOR QUANTITATION:      Number of Cores Positive:  1      Total Number of Cores:  2      Measurement of Tumor:  1 mm      Measurement of Core Tissue:  19 mm      Percentage of Prostatic Tissue Involved by Tumor:  5%.   PERINEURAL INVASION:  Not identified.   F.   PROSTATE, RIGHT BASE, NEEDLE BIOPSY:   - Benign prostatic tissue, see comment.   COMMENT:   D).  *A prostate triple immunostain was performed and shows negative   staining, supporting the above diagnosis.   E).  *A prostate triple immunostain was performed  "and shows positive   staining, supporting the above diagnosis.   F).  *A prostate triple immunostain was performed and shows negative   staining, supporting the above diagnosis.   GALI BARRY M.D.   Report attached.   Performing site:   [facility] [place] [address] [place] [address]   "Disclaimer:  This case diagnosis was rendered completely by the outside   consultation pathologist and the case is electronically signed by an [facility]   pathologist listed below solely to release the report into the medical   record."       OBJECTIVE:     Estimated body mass index is 31.03 kg/m² as calculated from the following:    Height as of this encounter: 5' 9" (1.753 m).    Weight as of this encounter: 95.3 kg (210 lb 1.6 oz).    Vital Signs (Most Recent)  Pulse: 83 (09/19/22 1322)  BP: (!) 153/77 (09/19/22 1322)    Physical Exam  Vitals reviewed.   Constitutional:       General: He is not in acute distress.     Appearance: He is well-developed.   HENT:      Head: Normocephalic and atraumatic.   Cardiovascular:      Rate and Rhythm: Normal rate.   Pulmonary:      Effort: Pulmonary effort is normal. No respiratory distress.      Breath sounds: No stridor.   Abdominal:      General: There is no distension.      Palpations: Abdomen is soft.      Tenderness: There is no abdominal tenderness.   Musculoskeletal:         General: Normal range of motion.      Cervical back: Normal range of motion.   Skin:     General: Skin is warm and dry.   Neurological:      Mental Status: He is alert.   Psychiatric:         Behavior: Behavior normal.     Lab Results   Component Value Date    BUN 12 01/26/2022    CREATININE 0.9 01/26/2022    WBC 5.14 01/26/2022    HGB 13.5 (L) 01/26/2022    HCT 41.3 01/26/2022     01/26/2022    AST 26 01/26/2022    ALT 17 01/26/2022    ALKPHOS 63 01/26/2022    ALBUMIN 4.0 01/26/2022        Lab Results   Component Value Date    PSA 4.09 (H) 01/07/2013    PSA 1.8 02/05/2007    PSADIAG 8.0 (H) " 05/23/2022    PSADIAG 9.4 (H) 04/22/2022    PSADIAG 6.5 (H) 01/26/2022    PSADIAG 4.8 (H) 01/08/2021    PSADIAG 14.0 (H) 12/04/2020    PSADIAG 4.7 (H) 01/13/2020    PSADIAG 4.4 (H) 08/01/2017    PSADIAG 3.2 06/17/2015    PSADIAG 4.6 (H) 12/15/2014    PSAFREE 2.51 (H) 12/04/2020    PSAFREEPCT 17.31 12/04/2020     Imaging: PIRADS 2. 58cc      ASSESSMENT     1. Prostate cancer      PLAN:   1. Prostate cancer  -     Ambulatory referral/consult to Urology       -Today's visit was spent almost entirely on counseling. 45 minutes of counseling time was spent.  We reviewed his diagnosis, stage, grade, and prognosis.  We reviewed the [facility] nomograms. We discussed the concept of low risk, moderate risk, and high risk disease. We discussed the different treatment options including active surveillance (as well as surveillance protocol), prostate brachytherapy,external bean radiation, and both open and robotic prostatectomy.We also discussed the advantages, disadvantages, risks and benefits of the different options. Regarding radiation therapy we discussed treatment planning, the different techniques, short and long term complications. These included radiation cystitis, radiation proctitis, and impotence. We discussed success, failure, and salvage therapeutic options.     We discussed surgical therapy in depth including preoperative preparation, surgical technique (including bladder neck and nerve-sparing techniques), postoperative recuperation and recovery, and short and long term complications. We discussed the risks of reoperation, incontinence and impotence. We discussed the chance of rectal injury, obturator nerve injury, and occult injury to the bowel during verress needle access.  We discussed preop and postop Kegels, post op penile rehab, and treatment options for incontinence and impotence. We discussed success, failure and salvage therapeutic options. We discussed the possible  indications for adjuvant  radiation therapy.    - Patient has favorable intermediate disease. We discussed options including active surveillance, EBRT, brachytherapy and prostatectomy.  -Interested in brachytherapy. Will place referral to Dr Shreyas CASTILLO MD     Letter to Dominga Richard NP    The patient was seen and examined with the resident physician.  All questions were answered.  The plan was discussed with the patient and I concur with the resident physician's documentation.

## 2022-09-21 ENCOUNTER — HOSPITAL ENCOUNTER (OUTPATIENT)
Facility: HOSPITAL | Age: 72
Discharge: HOME | End: 2022-09-21
Attending: PAIN MEDICINE | Admitting: PAIN MEDICINE
Payer: MEDICARE

## 2022-09-21 VITALS
BODY MASS INDEX: 31.1 KG/M2 | HEIGHT: 69 IN | SYSTOLIC BLOOD PRESSURE: 149 MMHG | HEART RATE: 63 BPM | TEMPERATURE: 98 F | DIASTOLIC BLOOD PRESSURE: 71 MMHG | OXYGEN SATURATION: 98 % | RESPIRATION RATE: 16 BRPM | WEIGHT: 210 LBS

## 2022-09-21 DIAGNOSIS — M25.562 BILATERAL CHRONIC KNEE PAIN: Primary | ICD-10-CM

## 2022-09-21 DIAGNOSIS — M25.561 BILATERAL CHRONIC KNEE PAIN: Primary | ICD-10-CM

## 2022-09-21 DIAGNOSIS — M25.562 LEFT KNEE PAIN: ICD-10-CM

## 2022-09-21 DIAGNOSIS — M17.0 PRIMARY OSTEOARTHRITIS OF BOTH KNEES: ICD-10-CM

## 2022-09-21 DIAGNOSIS — G89.29 BILATERAL CHRONIC KNEE PAIN: Primary | ICD-10-CM

## 2022-09-21 PROCEDURE — 64640 INJECTION TREATMENT OF NERVE: CPT | Performed by: PAIN MEDICINE

## 2022-09-21 PROCEDURE — 64624 DSTRJ NULYT AGT GNCLR NRV: CPT | Performed by: PAIN MEDICINE

## 2022-09-21 PROCEDURE — 63600175 PHARM REV CODE 636 W HCPCS: Performed by: PAIN MEDICINE

## 2022-09-21 PROCEDURE — 99152 PR MOD CONSCIOUS SEDATION, SAME PHYS, 5+ YRS, FIRST 15 MIN: ICD-10-PCS | Mod: ,,, | Performed by: PAIN MEDICINE

## 2022-09-21 PROCEDURE — 25000003 PHARM REV CODE 250: Performed by: PAIN MEDICINE

## 2022-09-21 PROCEDURE — 99152 MOD SED SAME PHYS/QHP 5/>YRS: CPT | Performed by: PAIN MEDICINE

## 2022-09-21 PROCEDURE — 64624 DSTRJ NULYT AGT GNCLR NRV: CPT | Mod: LT,,, | Performed by: PAIN MEDICINE

## 2022-09-21 PROCEDURE — 64624 PR DESTRUCT, NEUROLYTIC AGENT, GENICULAR NERVE, W/IMG: ICD-10-PCS | Mod: LT,,, | Performed by: PAIN MEDICINE

## 2022-09-21 PROCEDURE — 99152 MOD SED SAME PHYS/QHP 5/>YRS: CPT | Mod: ,,, | Performed by: PAIN MEDICINE

## 2022-09-21 PROCEDURE — 99153 MOD SED SAME PHYS/QHP EA: CPT | Performed by: PAIN MEDICINE

## 2022-09-21 RX ORDER — FENTANYL CITRATE 50 UG/ML
100 INJECTION, SOLUTION INTRAMUSCULAR; INTRAVENOUS
Status: DISCONTINUED | OUTPATIENT
Start: 2022-09-21 | End: 2022-09-21 | Stop reason: HOSPADM

## 2022-09-21 RX ORDER — SODIUM CHLORIDE 9 MG/ML
INJECTION, SOLUTION INTRAVENOUS CONTINUOUS
Status: DISCONTINUED | OUTPATIENT
Start: 2022-09-21 | End: 2022-09-21 | Stop reason: HOSPADM

## 2022-09-21 RX ORDER — LIDOCAINE HYDROCHLORIDE 10 MG/ML
1 INJECTION INFILTRATION; PERINEURAL ONCE
Status: COMPLETED | OUTPATIENT
Start: 2022-09-21 | End: 2022-09-21

## 2022-09-21 RX ORDER — MIDAZOLAM HYDROCHLORIDE 1 MG/ML
INJECTION INTRAMUSCULAR; INTRAVENOUS
Status: DISCONTINUED
Start: 2022-09-21 | End: 2022-09-21 | Stop reason: HOSPADM

## 2022-09-21 RX ORDER — TRIAMCINOLONE ACETONIDE 40 MG/ML
40 INJECTION, SUSPENSION INTRA-ARTICULAR; INTRAMUSCULAR ONCE
Status: COMPLETED | OUTPATIENT
Start: 2022-09-21 | End: 2022-09-21

## 2022-09-21 RX ORDER — BUPIVACAINE HYDROCHLORIDE 2.5 MG/ML
10 INJECTION, SOLUTION EPIDURAL; INFILTRATION; INTRACAUDAL ONCE
Status: COMPLETED | OUTPATIENT
Start: 2022-09-21 | End: 2022-09-21

## 2022-09-21 RX ORDER — BUPIVACAINE HYDROCHLORIDE 2.5 MG/ML
INJECTION, SOLUTION EPIDURAL; INFILTRATION; INTRACAUDAL
Status: DISCONTINUED
Start: 2022-09-21 | End: 2022-09-21 | Stop reason: HOSPADM

## 2022-09-21 RX ORDER — MIDAZOLAM HYDROCHLORIDE 1 MG/ML
5 INJECTION INTRAMUSCULAR; INTRAVENOUS
Status: DISCONTINUED | OUTPATIENT
Start: 2022-09-21 | End: 2022-09-21 | Stop reason: HOSPADM

## 2022-09-21 RX ORDER — LIDOCAINE HYDROCHLORIDE 20 MG/ML
10 INJECTION, SOLUTION INFILTRATION; PERINEURAL ONCE
Status: DISCONTINUED | OUTPATIENT
Start: 2022-09-21 | End: 2022-09-21 | Stop reason: RX

## 2022-09-21 RX ORDER — TRIAMCINOLONE ACETONIDE 40 MG/ML
INJECTION, SUSPENSION INTRA-ARTICULAR; INTRAMUSCULAR
Status: DISCONTINUED
Start: 2022-09-21 | End: 2022-09-21 | Stop reason: HOSPADM

## 2022-09-21 RX ORDER — LIDOCAINE HYDROCHLORIDE 10 MG/ML
INJECTION INFILTRATION; PERINEURAL
Status: DISCONTINUED
Start: 2022-09-21 | End: 2022-09-21 | Stop reason: HOSPADM

## 2022-09-21 RX ORDER — FENTANYL CITRATE 50 UG/ML
INJECTION, SOLUTION INTRAMUSCULAR; INTRAVENOUS
Status: DISCONTINUED
Start: 2022-09-21 | End: 2022-09-21 | Stop reason: HOSPADM

## 2022-09-21 RX ADMIN — SODIUM CHLORIDE: 0.9 INJECTION, SOLUTION INTRAVENOUS at 11:09

## 2022-09-21 NOTE — DISCHARGE INSTRUCTIONS
Home Care Instructions Pain Management:    1. DIET:   You may resume your normal diet today.   2. BATHING:   You may shower with luke warm water.  3. DRESSING:   You may remove your bandage today.   4. ACTIVITY LEVEL:   You may resume your normal activities 24 hrs after your procedure.  5. MEDICATIONS:   You may resume your normal medications today.   6. SPECIAL INSTRUCTIONS:   No heat to the injection site for 24 hrs including, bath or shower, heating pad, moist heat, or hot tubs.    Use ice pack to injection site for any pain or discomfort.  Apply ice packs for 20 minute intervals as needed.   If you have received any sedatives by mouth today you may not drive for 12 hours.    If you have received any sedation through your IV, you may not drive for 24 hrs.     PLEASE CALL YOUR DOCTOR IF:  1. Redness or swelling around the injection site.  2. Fever of 101 degrees  3. Drainage (pus) from the injection site.  4. For any continuous bleeding (some dried blood over the incision is normal.)    FOR EMERGENCIES:   If any unusual problems or difficulties occur during clinic hours, call [phone] or [phone].  Procedural Sedation  Procedural sedation is medicine to ease discomfort, pain, and anxiety during a procedure. The medicine is often given through an intravenous (IV) line in your arm or hand. In some cases, the medicine may be taken by mouth or inhaled. While you are under sedation, you will likely be awake. But you may not remember anything afterward.  Why procedural sedation is used  Sedation is used for many types of procedures. The goal is to reduce pain, anxiety, and stressful memories of a procedure. It can also help your health care provider treat you. For example, having a broken bone fixed may be easier if you feel relaxed.  Procedural sedation is used only for short, basic procedures. It is not used for complex surgeries. Some procedures that use this type of sedation include:  Dental surgery  Breast biopsy,  to take a sample of breast tissue  Endoscopy, to look at gastrointestinal problems  Bronchoscopy, to check for lung problems  Bone or joint realignment, to fix a broken bone or dislocated joint  Minor foot or skin surgery  Electrical cardioversion, to restore a normal heart rhythm  Lumbar puncture, to assess neurological disease  Risks of procedural sedation  Procedural sedation has some risks and possible side effects, such as:  Headache  Nausea and vomiting  Unpleasant memory of the procedure  Lowered rate of breathing  Changes in heart rate and blood pressure (rare)  Inhalation of stomach contents into your lungs (rare)  Side effects will likely go away shortly after the procedure. Your health care team will watch your heart rate and breathing during your sedation. This is to help prevent problems.  Your own risks may vary based on your age and your overall health. They also depend on the type of sedation you are given. Talk with your health care provider about the risks that apply most to you.  Getting ready for procedural sedation  Talk with your health care provider how to get ready for your procedure. Tell him or her about all the medicines you take. This includes over-the-counter medicines such as ibuprofen. It also includes vitamins, herbs, and other supplements. You may need to stop taking some medicines before the procedure, such as blood thinners and aspirin. If you smoke, you may need to stop. Talk with your health care provider if you need help to stop smoking.  Tell your health care provider if you:  Have had any problems in the past with sedation or anesthesia  Have had any recent changes in your health, such as an infection or fever  Are pregnant or think you may be pregnant  Also, make sure to:  Ask a family member or friend to take you home after the procedure. You cannot drive on the day you receive sedation.  Not eat or drink after midnight the night before your procedure, if advised.  Follow  all other instructions from your health care provider.  During your procedural sedation  You may have your procedure in a hospital or a medical clinic. Sedation is done by a trained health care provider. In general, you can expect the following:  You will be given medicine through an IV line in your arm or hand. Or you may receive a shot. The medicine may also be given by mouth. Or you may inhale it through a mask.  If you receive medicine through an IV, you may feel the effects very quickly. You will start to feel relaxed and drowsy.  During the procedure, your heart rate, breathing, and blood pressure will be closely watched. Your breathing and blood pressure may decrease a little. But you will likely not need help with your breathing. You may receive a little extra oxygen through a mask.  You will probably be awake the entire time. If you do fall asleep, you should be easy to wake up, if needed. You should feel little or no pain.  When your procedure is over, the sedative medicine will be stopped.  After your procedural sedation  You will begin to feel more awake and aware. But you will likely be drowsy for a while afterward. You will be closely watched as you become more alert. You may have a faint memory of the procedure. Or you may not remember it at all.  You should be able to return home within an hour or two after your procedure. Plan to have someone stay with you for a few hours. Side effects such as headache and nausea may go away quickly. Tell your health care provider if they continue.  Dont drive or make any important decisions for at least 24 hours. Be sure to follow all after-care instructions.      When to call your health care provider  Have someone call your health care provider right away if you have any of these:  Drowsiness that gets worse  Weakness or dizziness that gets worse  Repeated vomiting  You cant be awakened   Date Last Reviewed: 2/6/2015  © 0251-5908 [employer] [address]  [place] [address]. All rights reserved. This information is not intended as a substitute for professional medical care. Always follow your healthcare professional's instructions.

## 2022-09-21 NOTE — OP NOTE
KNEE COOL Radiofrequency Ablation Under Fluoroscopy  Current medications reviewed. Time-out taken to identify patient and procedure side prior to starting the procedure.  I attest that I have reviewed the patient's home medications prior to the procedure and no contraindication have been identified. I  re-evaluated the patient after the patient was positioned for the procedure in the procedure room immediately before the procedural time-out. The vital signs are current and represent the current state of the patient which has not significantly changed since the preprocedure assessment.      Date of Service: 09/21/2022    PCP: Azikiwe K Lombard, MD     Referring Physician:                 PROCEDURE:  Left suprapatellar, superior lateral and medial genicular nerve, and inferior medial genicular nerve cool   radiofrequency ablation under fluoroscopy    REASON FOR PROCEDURE:  Bilateral chronic knee pain [M25.561, M25.562, G89.29]  Primary osteoarthritis of both knees [M17.0]    PHYSICIAN: Luca Galan MD  ASSISTANTS: None      MEDICATIONS INJECTED:  0.5 ml of Kenalog 40mg/ml  with bupivacaine 0.25% 9ml. Of that, 1.5ml injected per level.    LOCAL ANESTHETIC USED: Xylocaine 1% 3ml each site.    SEDATION MEDICATIONS: Versed 1 mg and fentanyl 100 mcg IV.  Conscious sedation provided by MD and monitored by RN.  Total sedation time: 29 minutes (See nurse documentation and case log for sedation time)      ESTIMATED BLOOD LOSS:  None.    COMPLICATIONS:  None.    TECHNIQUE:   Laying in a supine position, the patient was prepped and draped in the usual sterile fashion using ChloraPrep and fenestrated drape.  Four target sites including the suprapatellar region, the superior lateral genicular nerve where the lateral femoral shaft meets the epicondyle, the superior medial genicular nerve where the medial femoral shaft meets the epicondyle, and the inferior medial genicular nerve where the medial tibial shaft meets the  epicondyle, were determined under fluoroscopic guidance via true AP view.  Local anesthetic was given by going down to the hub of the 27-gauge 1.25in needle and raising a wheal.  The 17-gauge, 50-mm needle with a 4-mm active tip was introduced to the anatomic local of the above target sites utilizing live fluoroscopy.  At each target, the needle was advanced using the tunnel technique until bony contact is made.    At each target, fine adjustments to confirm needle tip is 50% of the diaphysis on the lateral fluoroscopic view.     Motor stimulation done to confirm no motor nerve ablation takes place up to 2 Volt 2Hz.. Medication was then injected slowly.  Ablation then done per level utilizing cool radiofrequency generator @ 60°C for 150 seconds. The patient tolerated the procedure well.      The patient was monitored after the procedure.  Patient was given post procedure and discharge instructions to follow at home.  We will see the patient back in two weeks or the patient may call to inform of status. The patient was discharged in a stable condition

## 2022-09-21 NOTE — DISCHARGE SUMMARY
[facility] - Endoscopy  Discharge Note  Short Stay    Procedure(s) (LRB):  Left Genicular Radiofrequency Ablations (Left)    OUTCOME: Patient tolerated treatment/procedure well without complication and is now ready for discharge.    DISPOSITION: Home or Self Care    FINAL DIAGNOSIS:  <principal problem not specified>    FOLLOWUP: In clinic    DISCHARGE INSTRUCTIONS:  No discharge procedures on file.       Discharge Diagnosis:Bilateral chronic knee pain [M25.561, M25.562, G89.29]  Primary osteoarthritis of both knees [M17.0]  Condition on Discharge: Stable.  Diet on Discharge: Same as before.  Activity: as per instruction sheet.  Discharge to: Home with a responsible adult.  Follow up: as per Discharge instructions

## 2022-09-27 ENCOUNTER — OFFICE VISIT (OUTPATIENT)
Dept: RADIATION ONCOLOGY | Facility: CLINIC | Age: 72
End: 2022-09-27
Payer: MEDICARE

## 2022-09-27 VITALS
DIASTOLIC BLOOD PRESSURE: 76 MMHG | RESPIRATION RATE: 16 BRPM | WEIGHT: 210.19 LBS | HEART RATE: 88 BPM | SYSTOLIC BLOOD PRESSURE: 145 MMHG | HEIGHT: 69 IN | BODY MASS INDEX: 31.13 KG/M2

## 2022-09-27 DIAGNOSIS — C61 PROSTATE CANCER: ICD-10-CM

## 2022-09-27 PROCEDURE — 99999 PR PBB SHADOW E&M-EST. PATIENT-LVL V: ICD-10-PCS | Mod: PBBFAC,,, | Performed by: RADIOLOGY

## 2022-09-27 PROCEDURE — 99999 PR PBB SHADOW E&M-EST. PATIENT-LVL V: CPT | Mod: PBBFAC,,, | Performed by: RADIOLOGY

## 2022-09-27 PROCEDURE — 99204 OFFICE O/P NEW MOD 45 MIN: CPT | Mod: S$PBB,,, | Performed by: RADIOLOGY

## 2022-09-27 PROCEDURE — 99215 OFFICE O/P EST HI 40 MIN: CPT | Mod: PBBFAC | Performed by: RADIOLOGY

## 2022-09-27 PROCEDURE — 99204 PR OFFICE/OUTPT VISIT, NEW, LEVL IV, 45-59 MIN: ICD-10-PCS | Mod: S$PBB,,, | Performed by: RADIOLOGY

## 2022-09-28 ENCOUNTER — PATIENT MESSAGE (OUTPATIENT)
Dept: UROLOGY | Facility: CLINIC | Age: 72
End: 2022-09-28
Payer: MEDICARE

## 2022-09-28 NOTE — PROGRESS NOTES
Multidisciplinary Uro-Oncology Clinic    HISTORY OF PRESENT ILLNESS:   This patient presents for discussion of treatment options for a recently diagnosed prostate cancer.      Mr. Pacheco has a history of BPH with an elevated PSA in the past 5 years with significant fluctuations. His most recent PSA in May of 2022 returned at 8 ng/ml.  MRI on 6/24/22 revealed a 58 cc prostate with no focal abnormalities in the peripheral or transitional zone. The seminal vesicles and neurovascular bundles were unremarkable.  There was no adenopathy.  Biopsies on 8/23/22 revealed Jackeline 7 (3+4) adenocarcinoma involving 5% of 1 of 2 cores from the Lt. apex.  The Jackeline pattern 4 accounted for 40% of the tumor.  There was Criders 6 (3+3) adenocarcinoma involving 5% of 1 of 2 cores from the Rt. mid gland.  The remaining 8 cores revealed benign prostate tissue. The patient presnts for discussion of treatment options.       REVIEW OF SYSTEMS:   Review of Systems   Constitutional:  Negative for chills, fever, malaise/fatigue and weight loss.   Respiratory:  Negative for cough and shortness of breath.    Cardiovascular:  Negative for chest pain and palpitations.   Gastrointestinal:  Negative for abdominal pain, constipation and diarrhea.   Genitourinary:  Negative for dysuria, frequency, hematuria and urgency.        Some LUTS   history of ED    Musculoskeletal:  Positive for back pain (chronic). Negative for falls.       PAST MEDICAL HISTORY:  Past Medical History:   Diagnosis Date    *Atrial fibrillation     Back pain     Benign hypertrophy of prostate     Degenerative disc disease     GERD (gastroesophageal reflux disease)     Hx of colonic polyps     Hypertension     Pilonidal cyst 1971    Prostate cancer     Sleep apnea     Trouble in sleeping        PAST SURGICAL HISTORY:  Past Surgical History:   Procedure Laterality Date    COLONOSCOPY N/A 12/07/2016    Procedure: COLONOSCOPY;  Surgeon: Sumeet Lundy MD;  [facility]  Service: Endoscopy;  Laterality: N/A;  OK to hold Pradaxa 2 days prior to procedure per Dr Jones/see note dated 11/15/16/iesha    COLONOSCOPY N/A 02/24/2022    Procedure: COLONOSCOPY;  Surgeon: Italo Roy MD;  Location: [facility] ENDO;  Service: Endoscopy;  Laterality: N/A;  ok to hold Pradaxa 2 days per B Mc RN/arrhythmia clinic      COVID test at [place] on 2/21-GT  2/10/22 Changed Kirill to scoping MD-ml    CYST REMOVAL  1971    coccyx    EPIDURAL STEROID INJECTION Right 09/07/2022    Procedure: Right Genicular Nerve Radiofrequency Ablations;  Surgeon: Luca Galan Jr., MD;  Location: [facility] ENDO;  Service: Pain Management;  Laterality: Right;  @1100  Pradaxa not held  No DM  Prostate BX 8/2    EPIDURAL STEROID INJECTION Left 09/21/2022    Procedure: Left Genicular Radiofrequency Ablations;  Surgeon: Luca Galan Jr., MD;  Location: [facility] ENDO;  Service: Pain Management;  Laterality: Left;  @1130  Pradaxa not held  No DM  Last 1&100    KNEE SURGERY  1989    right    PROSTATE BIOPSY  08/23/2022       ALLERGIES:   Review of patient's allergies indicates:   Allergen Reactions    Bactrim [sulfamethoxazole-trimethoprim] Swelling     Swelling of lips/blisters in mouth         MEDICATIONS:  Current Outpatient Medications   Medication Sig    acetaminophen (TYLENOL ORAL) Take 500 mg by mouth as needed.    amLODIPine (NORVASC) 5 MG tablet Take 1 tablet (5 mg total) by mouth once daily.    ammonium lactate (AMLACTIN) 12 % lotion Apply topically daily as needed (Dry Skin).    dabigatran etexilate (PRADAXA) 150 mg Cap Take 1 capsule (150 mg total) by mouth 2 (two) times daily. "Do NOT break, chew, or open capsules."    flecainide (TAMBOCOR) 100 MG Tab Take 1 tablet (100 mg total) by mouth every 12 (twelve) hours.    insulin syringe-needle U-100 (EASY COMFORT INSULIN SYRINGE) 1 mL 31 gauge x 5/16 Syrg Inject 1 time daily prn ED    losartan (COZAAR) 50 MG tablet Take 0.5 tablets (25 mg total) by mouth once daily. "    meloxicam (MOBIC) 7.5 MG tablet Take 1 tablet (7.5 mg total) by mouth once daily.    methocarbamoL (ROBAXIN) 500 MG Tab Take 500 mg by mouth as needed.    metoprolol succinate (TOPROL-XL) 25 MG 24 hr tablet Take 25 mg by mouth.    mirtazapine (REMERON) 30 MG tablet Take 30 mg by mouth every evening.    multivitamin (THERAGRAN) per tablet Take 1 tablet by mouth once daily.    naloxone (NARCAN) 4 mg/actuation Spry 1 spray by Nasal route once.    omeprazole (PRILOSEC) 20 MG capsule Take 20 mg by mouth.    sildenafiL (VIAGRA) 100 MG tablet Take 1 tablet (100 mg total) by mouth as needed for Erectile Dysfunction (1 tablet 1 hr prior to intercourse.).    tadalafiL (CIALIS) 20 MG Tab Take 1 tablet (20 mg total) by mouth once daily.    terazosin (HYTRIN) 10 MG capsule Take 10 mg by mouth.    traMADoL (ULTRAM) 50 mg tablet Take 1 tablet (50 mg total) by mouth every 12 (twelve) hours as needed for Pain.    triamcinolone acetonide 0.1% (KENALOG) 0.1 % cream Apply topically.    VITAMIN B COMPLEX (B COMPLETE ORAL) Take by mouth once daily.    azelastine (ASTELIN) 137 mcg (0.1 %) nasal spray 1 spray (137 mcg total) by Nasal route 2 (two) times daily.    levocetirizine (XYZAL) 5 MG tablet Take 1 tablet (5 mg total) by mouth every evening.     No current facility-administered medications for this visit.       SOCIAL HISTORY:  Social History     Socioeconomic History    Marital status:     Number of children: 5    Highest education level: Associate degree: academic program   Tobacco Use    Smoking status: Former     Packs/day: 0.25     Years: 2.00     Pack years: 0.50     Types: Cigarettes     Quit date: 1975     Years since quittin.0    Smokeless tobacco: Never   Substance and Sexual Activity    Alcohol use: Yes     Alcohol/week: 3.0 standard drinks     Types: 3 Glasses of wine per week     Comment: weekly    Drug use: No    Sexual activity: Yes     Social Determinants of Health     Financial Resource Strain:  Low Risk     Difficulty of Paying Living Expenses: Not hard at all   Food Insecurity: No Food Insecurity    Worried About Running Out of Food in the Last Year: Never true    Ran Out of Food in the Last Year: Never true   Transportation Needs: No Transportation Needs    Lack of Transportation (Medical): No    Lack of Transportation (Non-Medical): No   Physical Activity: Sufficiently Active    Days of Exercise per Week: 3 days    Minutes of Exercise per Session: 150+ min   Stress: No Stress Concern Present    Feeling of Stress : Only a little   Social Connections: Moderately Integrated    Frequency of Communication with Friends and Family: More than three times a week    Frequency of Social Gatherings with Friends and Family: Three times a week    Attends [employer] Services: More than 4 times per year    Active Member of Clubs or Organizations: Yes    Attends Club or Organization Meetings: More than 4 times per year    Marital Status:    Housing Stability: Low Risk     Unable to Pay for Housing in the Last Year: No    Number of Places Lived in the Last Year: 1    Unstable Housing in the Last Year: No       FAMILY HISTORY:  Family History   Problem Relation Age of Onset    Breast cancer Mother     Cancer Mother     Alcohol abuse Father     Cancer Father     Heart disease Father     Hypertension Father     Breast cancer Sister     Depression Brother     Early death Brother     Mental illness Brother     Cancer Sister     Early death Sister     Ovarian cancer Other     Melanoma Neg Hx          PHYSICAL EXAMINATION:  Vitals:    09/27/22 1422   BP: (!) 145/76   Pulse: 88   Resp: 16     Physical Exam  Constitutional:       General: He is not in acute distress.     Appearance: Normal appearance.   Abdominal:      General: Abdomen is flat. There is no distension.   Neurological:      Mental Status: He is alert and oriented to person, place, and time.   Psychiatric:         Mood and Affect: Mood normal.          Judgment: Judgment normal.        Latest Reference Range & Units 20 09:27 21 10:54 22 13:44 22 09:44 22 09:32   PSA Diagnostic 0.00 - 4.00 ng/mL 14.0 (H) 4.8 (H) 6.5 (H) 9.4 (H) 8.0 (H)   (H): Data is abnormally high    ASSESSMENT/PLAN:  Clinical Stage IIB (T1c, N0, M0, GG2, PSA < 10) prostate cancer.     ECO    I had a long discussion with the patient and his daughter.  We reviewed his presentation.  Explained he is considered to have favorable intermediate risk, low volume prostate cancer.  Discussed the implications of this classification.   We reviewed his MRI and I explained that based on the size of his gland and existing LUTS, he is not a candidate for prostate brachytherapy.  The patient is not interested in surgical resection.  We discussed the options of active surveillance vs. definitive radiotherapy using IMRT / IGRT techniques or SBRT.  Discussed the rational for each management.  Explained that based on his MRI and biopsy results, active surveillance is a reasonable option.  His PSA continues to fluctuate but this is unlikely related to his prostate cancer.  Explained the utility of obtaining a Polaris score in this setting. Explained we would recommend repeat PSA every 4 - 6 months followed by repeat MRI and biopsy in 12 - 18 months.  Discussed the procedures, risks and benefits of definitive radiotherapy using IMRT and delivering 70 Gy in 28 fractions or 36.25 Gy in 5 fractions.  Discussed the acute and long term side effects.  The patient would like to pursue active surveillance for now which I confirmed is very reasonable.  Will plan to order Polaris score and arrange for follow up with Urology.  Thank you for allowing us to participate in the care of this patient.     Psychosocial Distress screening score of Distress Score: 1 noted and reviewed. No intervention indicated.     I spent approximately 60 minutes reviewing the available records and evaluating the  patient, out of which over 50% of the time was spent face to face with the patient in counseling and coordinating this patient's care.

## 2022-10-05 ENCOUNTER — OFFICE VISIT (OUTPATIENT)
Dept: PAIN MEDICINE | Facility: CLINIC | Age: 72
End: 2022-10-05
Payer: MEDICARE

## 2022-10-05 VITALS — HEART RATE: 70 BPM | OXYGEN SATURATION: 97 % | SYSTOLIC BLOOD PRESSURE: 149 MMHG | DIASTOLIC BLOOD PRESSURE: 69 MMHG

## 2022-10-05 DIAGNOSIS — M25.561 BILATERAL CHRONIC KNEE PAIN: ICD-10-CM

## 2022-10-05 DIAGNOSIS — M25.562 BILATERAL CHRONIC KNEE PAIN: ICD-10-CM

## 2022-10-05 DIAGNOSIS — G89.29 BILATERAL CHRONIC KNEE PAIN: ICD-10-CM

## 2022-10-05 DIAGNOSIS — M17.0 PRIMARY OSTEOARTHRITIS OF BOTH KNEES: Primary | ICD-10-CM

## 2022-10-05 PROCEDURE — 99214 OFFICE O/P EST MOD 30 MIN: CPT | Mod: PBBFAC,PN

## 2022-10-05 PROCEDURE — 99213 OFFICE O/P EST LOW 20 MIN: CPT | Mod: S$PBB,,,

## 2022-10-05 PROCEDURE — 99999 PR PBB SHADOW E&M-EST. PATIENT-LVL IV: CPT | Mod: PBBFAC,,,

## 2022-10-05 PROCEDURE — 99999 PR PBB SHADOW E&M-EST. PATIENT-LVL IV: ICD-10-PCS | Mod: PBBFAC,,,

## 2022-10-05 PROCEDURE — 99213 PR OFFICE/OUTPT VISIT, EST, LEVL III, 20-29 MIN: ICD-10-PCS | Mod: S$PBB,,,

## 2022-10-05 NOTE — PROGRESS NOTES
Subjective:     Patient ID: Omar Pacheco is a 71 y.o. male    Chief Complaint: Follow-up (Post procedure 09/21/22)      Referred by: No ref. provider found      HPI:    Interval History PA (10/05/2022):  Patient returns to clinic for follow up.  Patient is s/p bilateral right than left genicular radiofrequency ablations with 85% improvement of symptoms.  Patient states he is no longer having the constant achy knee pain.  He has noticed an overall improvement in daily activities, being able to walk more and do more things around the house.  He notes still having pain a sharp pain and in bilateral knees with certain extraneous activities, specifically with twisting motion.  But notes overall improvement.  Patient states he has been able to significantly decrease the amount of pain medications he was taking prior to procedure.    Initial Encounter (5/4/22):  Omar Pacheco is a 71 y.o. male who presents today with chronic bilateral knee pain right worse than left.  Patient has longstanding history of knee pain and is diagnosed with osteoarthritis.  Has tried and failed intra-articular steroid and viscosupplementation injections.  Is followed by Orthopedic surgery, but is very reluctant to undergo knee replacement surgery.  He is here today to discuss potential genicular nerve blocks/RFA.   This pain is described in detail below.    Physical Therapy:  Yes.    Non-pharmacologic Treatment:  Rest helps         TENS?  No    Pain Medications:         Currently taking:  Tylenol, meloxicam, Robaxin, tramadol    Has tried in the past:      Has not tried:   TCAs, SNRIs, anticonvulsants, topical creams    Blood thinners:  Pradaxa    Interventional Therapies:   Failed intra-articular steroid and viscosupplementation of both knees    Relevant Surgeries:    09/07/2022 - right genicular radiofrequency ablation - 85% relief  09/21/2022 - left genicular radiofrequency ablation - 85% relief    Affecting sleep?  Yes    Affecting  daily activities? yes    Depressive symptoms? no          SI/HI? No    Work status: Retired    Pain Scores:    Best:       1/10  Worst:     3/10  Usually:   3/10  Today:    3/10    Review of Systems   Constitutional:  Negative for activity change, appetite change, chills, fatigue, fever and unexpected weight change.   HENT:  Negative for hearing loss.    Eyes:  Negative for visual disturbance.   Respiratory:  Negative for chest tightness and shortness of breath.    Cardiovascular:  Negative for chest pain.   Gastrointestinal:  Negative for abdominal pain, constipation, diarrhea, nausea and vomiting.   Genitourinary:  Negative for difficulty urinating.   Musculoskeletal:  Positive for arthralgias, back pain, gait problem and myalgias. Negative for neck pain.   Skin:  Negative for rash.   Neurological:  Negative for dizziness, weakness, light-headedness, numbness and headaches.   Psychiatric/Behavioral:  Positive for sleep disturbance. Negative for hallucinations and suicidal ideas. The patient is not nervous/anxious.      Past Medical History:   Diagnosis Date    *Atrial fibrillation     Back pain     Benign hypertrophy of prostate     Degenerative disc disease     GERD (gastroesophageal reflux disease)     Hx of colonic polyps     Hypertension     Pilonidal cyst 1971    Prostate cancer     Sleep apnea     Trouble in sleeping        Past Surgical History:   Procedure Laterality Date    COLONOSCOPY N/A 12/07/2016    Procedure: COLONOSCOPY;  Surgeon: Sumeet Lundy MD;  Location: [facility] ([address] [place] [address]);  Service: Endoscopy;  Laterality: N/A;  OK to hold Pradaxa 2 days prior to procedure per Dr Jones/see note dated 11/15/16/svn    COLONOSCOPY N/A 02/24/2022    Procedure: COLONOSCOPY;  Surgeon: Italo Roy MD;  Location: [facility];  Service: Endoscopy;  Laterality: N/A;  ok to hold Pradaxa 2 days per EBONI Bentley RN/arrhythmia clinic      COVID test at [facility] on 2/21-GT  2/10/22 Changed Kirill to edward BARBER-ml    CYST REMOVAL   1971    coccyx    EPIDURAL STEROID INJECTION Right 2022    Procedure: Right Genicular Nerve Radiofrequency Ablations;  Surgeon: Luca Galan Jr., MD;  Location: [facility] ENDO;  Service: Pain Management;  Laterality: Right;  @1100  Pradaxa not held  No DM  Prostate BX     EPIDURAL STEROID INJECTION Left 2022    Procedure: Left Genicular Radiofrequency Ablations;  Surgeon: Luca Galan Jr., MD;  Location: [facility] ENDO;  Service: Pain Management;  Laterality: Left;  @1130  Pradaxa not held  No DM  Last 1&100    KNEE SURGERY      right    PROSTATE BIOPSY  2022       Social History     Socioeconomic History    Marital status:     Number of children: 5    Highest education level: Associate degree: academic program   Tobacco Use    Smoking status: Former     Packs/day: 0.25     Years: 2.00     Pack years: 0.50     Types: Cigarettes     Quit date: 1975     Years since quittin.0    Smokeless tobacco: Never   Substance and Sexual Activity    Alcohol use: Yes     Alcohol/week: 3.0 standard drinks     Types: 3 Glasses of wine per week     Comment: weekly    Drug use: No    Sexual activity: Yes     Social Determinants of Health     Financial Resource Strain: Low Risk     Difficulty of Paying Living Expenses: Not hard at all   Food Insecurity: No Food Insecurity    Worried About Running Out of Food in the Last Year: Never true    Ran Out of Food in the Last Year: Never true   Transportation Needs: No Transportation Needs    Lack of Transportation (Medical): No    Lack of Transportation (Non-Medical): No   Physical Activity: Sufficiently Active    Days of Exercise per Week: 3 days    Minutes of Exercise per Session: 150+ min   Stress: No Stress Concern Present    Feeling of Stress : Only a little   Social Connections: Moderately Integrated    Frequency of Communication with Friends and Family: More than three times a week    Frequency of Social Gatherings with Friends and Family:  "Three times a week    Attends Spiritism Services: More than 4 times per year    Active Member of Clubs or Organizations: Yes    Attends Club or Organization Meetings: More than 4 times per year    Marital Status:    Housing Stability: Low Risk     Unable to Pay for Housing in the Last Year: No    Number of Places Lived in the Last Year: 1    Unstable Housing in the Last Year: No       Review of patient's allergies indicates:   Allergen Reactions    Bactrim [sulfamethoxazole-trimethoprim] Swelling     Swelling of lips/blisters in mouth         Current Outpatient Medications on File Prior to Visit   Medication Sig Dispense Refill    acetaminophen (TYLENOL ORAL) Take 500 mg by mouth as needed.      amLODIPine (NORVASC) 5 MG tablet Take 1 tablet (5 mg total) by mouth once daily. 90 tablet 3    ammonium lactate (AMLACTIN) 12 % lotion Apply topically daily as needed (Dry Skin). 400 g 11    dabigatran etexilate (PRADAXA) 150 mg Cap Take 1 capsule (150 mg total) by mouth 2 (two) times daily. "Do NOT break, chew, or open capsules." 180 capsule 3    flecainide (TAMBOCOR) 100 MG Tab Take 1 tablet (100 mg total) by mouth every 12 (twelve) hours. 180 tablet 3    insulin syringe-needle U-100 (EASY COMFORT INSULIN SYRINGE) 1 mL 31 gauge x 5/16 Syrg Inject 1 time daily prn ED 90 each 0    losartan (COZAAR) 50 MG tablet Take 0.5 tablets (25 mg total) by mouth once daily. 45 tablet 3    meloxicam (MOBIC) 7.5 MG tablet Take 1 tablet (7.5 mg total) by mouth once daily. 90 tablet 3    methocarbamoL (ROBAXIN) 500 MG Tab Take 500 mg by mouth as needed.      metoprolol succinate (TOPROL-XL) 25 MG 24 hr tablet Take 25 mg by mouth.      mirtazapine (REMERON) 30 MG tablet Take 30 mg by mouth every evening.      multivitamin (THERAGRAN) per tablet Take 1 tablet by mouth once daily.      naloxone (NARCAN) 4 mg/actuation Spry 1 spray by Nasal route once.      omeprazole (PRILOSEC) 20 MG capsule Take 20 mg by mouth.      sildenafiL " (VIAGRA) 100 MG tablet Take 1 tablet (100 mg total) by mouth as needed for Erectile Dysfunction (1 tablet 1 hr prior to intercourse.). 10 tablet 11    tadalafiL (CIALIS) 20 MG Tab Take 1 tablet (20 mg total) by mouth once daily. 30 tablet 5    terazosin (HYTRIN) 10 MG capsule Take 10 mg by mouth.      traMADoL (ULTRAM) 50 mg tablet Take 1 tablet (50 mg total) by mouth every 12 (twelve) hours as needed for Pain. 60 tablet 0    triamcinolone acetonide 0.1% (KENALOG) 0.1 % cream Apply topically.      VITAMIN B COMPLEX (B COMPLETE ORAL) Take by mouth once daily.      azelastine (ASTELIN) 137 mcg (0.1 %) nasal spray 1 spray (137 mcg total) by Nasal route 2 (two) times daily. 30 mL 0    levocetirizine (XYZAL) 5 MG tablet Take 1 tablet (5 mg total) by mouth every evening. 90 tablet 3     No current facility-administered medications on file prior to visit.       Objective:      BP (!) 149/69 (BP Location: Right arm, Patient Position: Sitting, BP Method: Medium (Automatic))   Pulse 70   SpO2 97%     Exam:  GEN:  Well developed, well nourished.  No acute distress.   HEENT:  No trauma.  Mucous membranes moist.  Nares patent bilaterally.  PSYCH: Normal affect. Thought content appropriate.  CHEST:  Breathing symmetric.  No audible wheezing.  ABD: Soft, non-distended.  SKIN:  Warm, pink, dry.  No rash on exposed areas.    EXT:  No cyanosis, clubbing, or edema.  No color change or changes in nail or hair growth.  NEURO/MUSCULOSKELETAL:  Fully alert, oriented, and appropriate. Speech normal yulia. No cranial nerve deficits.   Gait:  Antalgic.  No focal motor deficits.   Full ROM of bilateral knees without pain  No TTP to bilateral knees  No swelling, warmth, erythema noted to bilateral knees      Imaging:    Narrative & Impression    EXAMINATION:  XR KNEE ORTHO BILAT     CLINICAL HISTORY:  Pain in right knee     TECHNIQUE:  AP standing, lateral and Merchant views of both knees were performed.     COMPARISON:  Multiple prior  exams, most recent from 01/20/2021     FINDINGS:  Advanced tricompartment degenerative changes of the knees bilaterally.  Osteophyte formation present.  No evidence of acute fracture or dislocation.  No joint effusion.  No soft tissue abnormality.     Impression:     Degenerative changes as described above.        Electronically signed by: Tor Squires MD  Date:                                            04/04/2022  Time:                                           11:20         Assessment:       Encounter Diagnoses   Name Primary?    Bilateral chronic knee pain     Primary osteoarthritis of both knees Yes           Plan:       Omar was seen today for follow-up.    Diagnoses and all orders for this visit:    Primary osteoarthritis of both knees    Bilateral chronic knee pain        Omar Pacheco is a 71 y.o. male with chronic bilateral knee pain right worse than left.  Secondary to osteoarthritis.  Has failed intra-articular steroid and viscosupplementation injections.  Trying to avoid knee replacement surgery if possible.  Recent bilateral genicular RFA with significant improvement of symptoms.    Prior records reviewed.  S/p bilateral genicular radiofrequency ablations with good improvement, 85% relief.  Discussed with patient to monitor symptoms as we can repeat this procedure in the future if necessary.  Discussed importance of PT/HEP.  Follow-up PRN.        Brandyn Lowry PA-C  [facility]  Interventional Pain Management   10/05/2022    The total time spent for evaluation and management on 10/05/2022 including reviewing separately obtained history, performing a medically appropriate exam and evaluation, documenting clinical information in the health record, independently interpreting results and communicating them to the patient/family/caregiver, and ordering medications/tests/procedures was between 15-29 minutes.    This note was created by combination of typed  and  M-Modal dictation. Transcription and phonetic errors may be present.  If there are any questions, please contact me.

## 2022-10-11 ENCOUNTER — OFFICE VISIT (OUTPATIENT)
Dept: FAMILY MEDICINE | Facility: CLINIC | Age: 72
End: 2022-10-11
Payer: MEDICARE

## 2022-10-11 ENCOUNTER — PATIENT MESSAGE (OUTPATIENT)
Dept: RADIATION ONCOLOGY | Facility: CLINIC | Age: 72
End: 2022-10-11
Payer: MEDICARE

## 2022-10-11 VITALS
HEART RATE: 68 BPM | TEMPERATURE: 98 F | DIASTOLIC BLOOD PRESSURE: 68 MMHG | BODY MASS INDEX: 30.66 KG/M2 | SYSTOLIC BLOOD PRESSURE: 130 MMHG | HEIGHT: 69 IN | WEIGHT: 207 LBS | RESPIRATION RATE: 18 BRPM | OXYGEN SATURATION: 98 %

## 2022-10-11 DIAGNOSIS — M17.0 BILATERAL PRIMARY OSTEOARTHRITIS OF KNEE: ICD-10-CM

## 2022-10-11 DIAGNOSIS — L85.3 DRY SKIN DERMATITIS: ICD-10-CM

## 2022-10-11 DIAGNOSIS — Z00.00 ANNUAL PHYSICAL EXAM: Primary | ICD-10-CM

## 2022-10-11 DIAGNOSIS — N13.8 BPH WITH URINARY OBSTRUCTION: ICD-10-CM

## 2022-10-11 DIAGNOSIS — C61 PROSTATE CANCER: ICD-10-CM

## 2022-10-11 DIAGNOSIS — Z79.01 ON CONTINUOUS ORAL ANTICOAGULATION: ICD-10-CM

## 2022-10-11 DIAGNOSIS — N52.1 ERECTILE DYSFUNCTION DUE TO DISEASES CLASSIFIED ELSEWHERE: ICD-10-CM

## 2022-10-11 DIAGNOSIS — I10 ESSENTIAL HYPERTENSION: ICD-10-CM

## 2022-10-11 DIAGNOSIS — I48.20 CHRONIC ATRIAL FIBRILLATION: ICD-10-CM

## 2022-10-11 DIAGNOSIS — R79.9 ABNORMAL FINDING OF BLOOD CHEMISTRY, UNSPECIFIED: ICD-10-CM

## 2022-10-11 DIAGNOSIS — I48.0 PAROXYSMAL ATRIAL FIBRILLATION: Chronic | ICD-10-CM

## 2022-10-11 DIAGNOSIS — N40.1 BPH WITH URINARY OBSTRUCTION: ICD-10-CM

## 2022-10-11 DIAGNOSIS — Z23 INFLUENZA VACCINE NEEDED: ICD-10-CM

## 2022-10-11 PROCEDURE — G0008 ADMIN INFLUENZA VIRUS VAC: HCPCS | Mod: PBBFAC,PO

## 2022-10-11 PROCEDURE — 99999 PR PBB SHADOW E&M-EST. PATIENT-LVL IV: CPT | Mod: PBBFAC,,, | Performed by: FAMILY MEDICINE

## 2022-10-11 PROCEDURE — 99214 PR OFFICE/OUTPT VISIT, EST, LEVL IV, 30-39 MIN: ICD-10-PCS | Mod: S$PBB,,, | Performed by: FAMILY MEDICINE

## 2022-10-11 PROCEDURE — 99999 PR PBB SHADOW E&M-EST. PATIENT-LVL IV: ICD-10-PCS | Mod: PBBFAC,,, | Performed by: FAMILY MEDICINE

## 2022-10-11 PROCEDURE — 99214 OFFICE O/P EST MOD 30 MIN: CPT | Mod: S$PBB,,, | Performed by: FAMILY MEDICINE

## 2022-10-11 PROCEDURE — 99214 OFFICE O/P EST MOD 30 MIN: CPT | Mod: PBBFAC,PO | Performed by: FAMILY MEDICINE

## 2022-10-11 RX ORDER — AMMONIUM LACTATE 12 G/100G
LOTION TOPICAL DAILY PRN
Qty: 400 G | Refills: 11 | Status: SHIPPED | OUTPATIENT
Start: 2022-10-11 | End: 2024-01-14 | Stop reason: SDUPTHER

## 2022-10-11 RX ORDER — AMLODIPINE BESYLATE 5 MG/1
5 TABLET ORAL DAILY
Qty: 90 TABLET | Refills: 3 | Status: SHIPPED | OUTPATIENT
Start: 2022-10-11 | End: 2023-01-11 | Stop reason: SDUPTHER

## 2022-10-11 RX ORDER — TADALAFIL 20 MG/1
20 TABLET ORAL DAILY
Qty: 30 TABLET | Refills: 5 | Status: SHIPPED | OUTPATIENT
Start: 2022-10-11

## 2022-10-11 RX ORDER — METOPROLOL SUCCINATE 25 MG/1
25 TABLET, EXTENDED RELEASE ORAL DAILY
Qty: 90 TABLET | Refills: 3 | Status: SHIPPED | OUTPATIENT
Start: 2022-10-11 | End: 2022-12-30 | Stop reason: SDUPTHER

## 2022-10-11 RX ORDER — LOSARTAN POTASSIUM 50 MG/1
25 TABLET ORAL DAILY
Qty: 45 TABLET | Refills: 3 | Status: SHIPPED | OUTPATIENT
Start: 2022-10-11 | End: 2023-01-11 | Stop reason: SDUPTHER

## 2022-10-11 NOTE — PROGRESS NOTES
Subjective:       Patient ID: Omar Pacheco is a 71 y.o. male.    Chief Complaint: Establish Care      HPI  71-year-old male presents to establish care.  Patient is here for annual exam.  Doing well.  Denies any issues at this time.  Was recently diagnosed with prostate cancer and cont to f/u w/ uro and onc.    Review of Systems   Constitutional: Negative.    HENT: Negative.     Respiratory: Negative.     Cardiovascular: Negative.    Gastrointestinal: Negative.    Endocrine: Negative.    Genitourinary: Negative.    Musculoskeletal: Negative.    Neurological: Negative.    Psychiatric/Behavioral: Negative.          Past Medical History:   Diagnosis Date    *Atrial fibrillation     Back pain     Benign hypertrophy of prostate     Degenerative disc disease     GERD (gastroesophageal reflux disease)     Hx of colonic polyps     Hypertension     Pilonidal cyst 1971    Prostate cancer     Sleep apnea     Trouble in sleeping      Past Surgical History:   Procedure Laterality Date    COLONOSCOPY N/A 12/07/2016    Procedure: COLONOSCOPY;  Surgeon: Sumeet Lundy MD;  Location: [address]);  Service: Endoscopy;  Laterality: N/A;  OK to hold Pradaxa 2 days prior to procedure per Dr Jones/see note dated 11/15/16/teen    COLONOSCOPY N/A 02/24/2022    Procedure: COLONOSCOPY;  Surgeon: Italo Roy MD;  Location: [facility];  Service: Endoscopy;  Laterality: N/A;  ok to hold Pradaxa 2 days per EBONI Bentley RN/arrhythmia clinic      COVID test at [facility] on 2/21-GT  2/10/22 Changed Kirill to edward MD-ml    CYST REMOVAL  1971    coccyx    EPIDURAL STEROID INJECTION Right 09/07/2022    Procedure: Right Genicular Nerve Radiofrequency Ablations;  Surgeon: Luca Galan Jr., MD;  Location: [facility];  Service: Pain Management;  Laterality: Right;  @1100  Pradaxa not held  No DM  Prostate BX 8/2    EPIDURAL STEROID INJECTION Left 09/21/2022    Procedure: Left Genicular Radiofrequency Ablations;  Surgeon: Luca Galan Jr.,  MD;  Location: [facility];  Service: Pain Management;  Laterality: Left;  @1130  Pradaxa not held  No DM  Last 1&100    KNEE SURGERY      right    PROSTATE BIOPSY  2022     Family History   Problem Relation Age of Onset    Breast cancer Mother     Cancer Mother     Alcohol abuse Father     Cancer Father     Heart disease Father     Hypertension Father     Breast cancer Sister     Depression Brother     Early death Brother     Mental illness Brother     Cancer Sister     Early death Sister     Ovarian cancer Other     Melanoma Neg Hx      Social History     Socioeconomic History    Marital status:     Number of children: 5    Highest education level: Associate degree: academic program   Tobacco Use    Smoking status: Former     Packs/day: 0.25     Years: 2.00     Pack years: 0.50     Types: Cigarettes     Quit date: 1975     Years since quittin.0    Smokeless tobacco: Never   Substance and Sexual Activity    Alcohol use: Yes     Alcohol/week: 3.0 standard drinks     Types: 3 Glasses of wine per week     Comment: weekly    Drug use: No    Sexual activity: Yes     Social Determinants of Health     Financial Resource Strain: Low Risk     Difficulty of Paying Living Expenses: Not hard at all   Food Insecurity: No Food Insecurity    Worried About Running Out of Food in the Last Year: Never true    Ran Out of Food in the Last Year: Never true   Transportation Needs: No Transportation Needs    Lack of Transportation (Medical): No    Lack of Transportation (Non-Medical): No   Physical Activity: Sufficiently Active    Days of Exercise per Week: 3 days    Minutes of Exercise per Session: 150+ min   Stress: No Stress Concern Present    Feeling of Stress : Only a little   Social Connections: Moderately Integrated    Frequency of Communication with Friends and Family: More than three times a week    Frequency of Social Gatherings with Friends and Family: Three times a week    Attends Latter day Services:  "More than 4 times per year    Active Member of Clubs or Organizations: Yes    Attends Club or Organization Meetings: More than 4 times per year    Marital Status:    Housing Stability: Low Risk     Unable to Pay for Housing in the Last Year: No    Number of Places Lived in the Last Year: 1    Unstable Housing in the Last Year: No       Current Outpatient Medications:     acetaminophen (TYLENOL ORAL), Take 500 mg by mouth as needed., Disp: , Rfl:     dabigatran etexilate (PRADAXA) 150 mg Cap, Take 1 capsule (150 mg total) by mouth 2 (two) times daily. "Do NOT break, chew, or open capsules.", Disp: 180 capsule, Rfl: 3    flecainide (TAMBOCOR) 100 MG Tab, Take 1 tablet (100 mg total) by mouth every 12 (twelve) hours., Disp: 180 tablet, Rfl: 3    levocetirizine (XYZAL) 5 MG tablet, Take 1 tablet (5 mg total) by mouth every evening., Disp: 90 tablet, Rfl: 3    meloxicam (MOBIC) 7.5 MG tablet, Take 1 tablet (7.5 mg total) by mouth once daily., Disp: 90 tablet, Rfl: 3    methocarbamoL (ROBAXIN) 500 MG Tab, Take 500 mg by mouth as needed., Disp: , Rfl:     mirtazapine (REMERON) 30 MG tablet, Take 30 mg by mouth every evening., Disp: , Rfl:     multivitamin (THERAGRAN) per tablet, Take 1 tablet by mouth once daily., Disp: , Rfl:     naloxone (NARCAN) 4 mg/actuation Spry, 1 spray by Nasal route once., Disp: , Rfl:     omeprazole (PRILOSEC) 20 MG capsule, Take 20 mg by mouth., Disp: , Rfl:     sildenafiL (VIAGRA) 100 MG tablet, Take 1 tablet (100 mg total) by mouth as needed for Erectile Dysfunction (1 tablet 1 hr prior to intercourse.)., Disp: 10 tablet, Rfl: 11    terazosin (HYTRIN) 10 MG capsule, Take 10 mg by mouth., Disp: , Rfl:     traMADoL (ULTRAM) 50 mg tablet, Take 1 tablet (50 mg total) by mouth every 12 (twelve) hours as needed for Pain., Disp: 60 tablet, Rfl: 0    triamcinolone acetonide 0.1% (KENALOG) 0.1 % cream, Apply topically., Disp: , Rfl:     VITAMIN B COMPLEX (B COMPLETE ORAL), Take by mouth once " "daily., Disp: , Rfl:     amLODIPine (NORVASC) 5 MG tablet, Take 1 tablet (5 mg total) by mouth once daily., Disp: 90 tablet, Rfl: 3    ammonium lactate (AMLACTIN) 12 % lotion, Apply topically daily as needed (Dry Skin)., Disp: 400 g, Rfl: 11    losartan (COZAAR) 50 MG tablet, Take 0.5 tablets (25 mg total) by mouth once daily., Disp: 45 tablet, Rfl: 3    metoprolol succinate (TOPROL-XL) 25 MG 24 hr tablet, Take 1 tablet (25 mg total) by mouth once daily., Disp: 90 tablet, Rfl: 3    tadalafiL (CIALIS) 20 MG Tab, Take 1 tablet (20 mg total) by mouth once daily., Disp: 30 tablet, Rfl: 5   Objective:      Vitals:    10/11/22 0824   BP: 130/68   BP Location: Right arm   Patient Position: Sitting   BP Method: Large (Manual)   Pulse: 68   Resp: 18   Temp: 98.1 °F (36.7 °C)   TempSrc: Oral   SpO2: 98%   Weight: 93.9 kg (207 lb)   Height: 5' 9" (1.753 m)       Physical Exam  Constitutional:       General: He is not in acute distress.  HENT:      Head: Normocephalic and atraumatic.   Eyes:      Conjunctiva/sclera: Conjunctivae normal.   Cardiovascular:      Rate and Rhythm: Normal rate and regular rhythm.      Heart sounds: Normal heart sounds. No murmur heard.    No friction rub. No gallop.   Pulmonary:      Effort: Pulmonary effort is normal.      Breath sounds: Normal breath sounds. No wheezing or rales.   Musculoskeletal:      Cervical back: Neck supple.   Skin:     General: Skin is warm and dry.   Neurological:      Mental Status: He is alert and oriented to person, place, and time.   Psychiatric:         Behavior: Behavior normal.         Thought Content: Thought content normal.         Judgment: Judgment normal.          Assessment:       1. Annual physical exam    2. Influenza vaccine needed    3. Essential hypertension    4. Dry skin dermatitis    5. Chronic atrial fibrillation    6. Paroxysmal atrial fibrillation    7. On continuous oral anticoagulation    8. BPH with urinary obstruction    9. Erectile dysfunction " due to diseases classified elsewhere    10. Bilateral primary osteoarthritis of knee    11. Prostate cancer    12. Abnormal finding of blood chemistry, unspecified          Plan:       Annual physical exam    Influenza vaccine needed  -     Influenza (FLUAD) - Quadrivalent (Adjuvanted) *Preferred* (65+) (PF)    Essential hypertension  -     amLODIPine (NORVASC) 5 MG tablet; Take 1 tablet (5 mg total) by mouth once daily.  Dispense: 90 tablet; Refill: 3  -     losartan (COZAAR) 50 MG tablet; Take 0.5 tablets (25 mg total) by mouth once daily.  Dispense: 45 tablet; Refill: 3  -     CBC Auto Differential; Future; Expected date: 10/11/2022  -     Comprehensive Metabolic Panel; Future; Expected date: 10/11/2022  -     Hemoglobin A1C; Future; Expected date: 10/11/2022  -     TSH; Future; Expected date: 10/11/2022  -     Lipid Panel; Future; Expected date: 10/11/2022    Dry skin dermatitis  -     ammonium lactate (AMLACTIN) 12 % lotion; Apply topically daily as needed (Dry Skin).  Dispense: 400 g; Refill: 11    Chronic atrial fibrillation  -     CBC Auto Differential; Future; Expected date: 10/11/2022  -     Comprehensive Metabolic Panel; Future; Expected date: 10/11/2022  -     Hemoglobin A1C; Future; Expected date: 10/11/2022  -     TSH; Future; Expected date: 10/11/2022  -     Lipid Panel; Future; Expected date: 10/11/2022    Paroxysmal atrial fibrillation  -     metoprolol succinate (TOPROL-XL) 25 MG 24 hr tablet; Take 1 tablet (25 mg total) by mouth once daily.  Dispense: 90 tablet; Refill: 3    On continuous oral anticoagulation    BPH with urinary obstruction  -     tadalafiL (CIALIS) 20 MG Tab; Take 1 tablet (20 mg total) by mouth once daily.  Dispense: 30 tablet; Refill: 5  -     CBC Auto Differential; Future; Expected date: 10/11/2022  -     Comprehensive Metabolic Panel; Future; Expected date: 10/11/2022  -     Hemoglobin A1C; Future; Expected date: 10/11/2022  -     TSH; Future; Expected date: 10/11/2022  -      Lipid Panel; Future; Expected date: 10/11/2022    Erectile dysfunction due to diseases classified elsewhere  -     tadalafiL (CIALIS) 20 MG Tab; Take 1 tablet (20 mg total) by mouth once daily.  Dispense: 30 tablet; Refill: 5    Bilateral primary osteoarthritis of knee    Prostate cancer  -     CBC Auto Differential; Future; Expected date: 10/11/2022  -     Comprehensive Metabolic Panel; Future; Expected date: 10/11/2022  -     Hemoglobin A1C; Future; Expected date: 10/11/2022  -     TSH; Future; Expected date: 10/11/2022  -     Lipid Panel; Future; Expected date: 10/11/2022    Abnormal finding of blood chemistry, unspecified  -     Hemoglobin A1C; Future; Expected date: 10/11/2022      F/u labs. F/u in 3 months. Advised pt to get labs when psa is due.        Future Appointments   Date Time Provider Department Center   1/11/2023  9:40 AM MD ANTOLIN Alas[facility] MED [place]       Patient note was created using [email or web address].  Any errors in syntax or even information may not have been identified and edited on initial review prior to signing this note.

## 2022-10-12 DIAGNOSIS — C61 PROSTATE CANCER: Primary | ICD-10-CM

## 2022-10-13 ENCOUNTER — IMMUNIZATION (OUTPATIENT)
Dept: OBSTETRICS AND GYNECOLOGY | Facility: CLINIC | Age: 72
End: 2022-10-13
Payer: MEDICARE

## 2022-10-13 DIAGNOSIS — Z23 NEED FOR VACCINATION: Primary | ICD-10-CM

## 2022-10-13 PROCEDURE — 0124A COVID-19, MRNA, LNP-S, BIVALENT BOOSTER, PF, 30 MCG/0.3 ML DOSE: CPT | Mod: PBBFAC

## 2022-10-13 PROCEDURE — 91312 COVID-19, MRNA, LNP-S, BIVALENT BOOSTER, PF, 30 MCG/0.3 ML DOSE: CPT | Mod: PBBFAC

## 2022-11-02 ENCOUNTER — LAB VISIT (OUTPATIENT)
Dept: LAB | Facility: HOSPITAL | Age: 72
End: 2022-11-02
Attending: NURSE PRACTITIONER
Payer: MEDICARE

## 2022-11-02 DIAGNOSIS — C61 PROSTATE CANCER: ICD-10-CM

## 2022-11-02 LAB — COMPLEXED PSA SERPL-MCNC: 7.8 NG/ML (ref 0–4)

## 2022-11-02 PROCEDURE — 36415 COLL VENOUS BLD VENIPUNCTURE: CPT | Mod: PO | Performed by: NURSE PRACTITIONER

## 2022-11-02 PROCEDURE — 84153 ASSAY OF PSA TOTAL: CPT | Performed by: NURSE PRACTITIONER

## 2022-11-03 ENCOUNTER — OFFICE VISIT (OUTPATIENT)
Dept: UROLOGY | Facility: CLINIC | Age: 72
End: 2022-11-03
Payer: MEDICARE

## 2022-11-03 VITALS
WEIGHT: 211.19 LBS | HEART RATE: 53 BPM | BODY MASS INDEX: 31.28 KG/M2 | HEIGHT: 69 IN | SYSTOLIC BLOOD PRESSURE: 148 MMHG | DIASTOLIC BLOOD PRESSURE: 80 MMHG

## 2022-11-03 DIAGNOSIS — N13.8 BPH WITH URINARY OBSTRUCTION: ICD-10-CM

## 2022-11-03 DIAGNOSIS — N52.01 ERECTILE DYSFUNCTION DUE TO ARTERIAL INSUFFICIENCY: ICD-10-CM

## 2022-11-03 DIAGNOSIS — N40.1 BPH WITH URINARY OBSTRUCTION: ICD-10-CM

## 2022-11-03 DIAGNOSIS — R97.20 ELEVATED PSA: ICD-10-CM

## 2022-11-03 DIAGNOSIS — C61 PROSTATE CANCER: Primary | ICD-10-CM

## 2022-11-03 LAB
BILIRUB SERPL-MCNC: NORMAL MG/DL
BLOOD URINE, POC: 50
CLARITY, POC UA: CLEAR
COLOR, POC UA: YELLOW
GLUCOSE UR QL STRIP: NORMAL
KETONES UR QL STRIP: NORMAL
LEUKOCYTE ESTERASE URINE, POC: NORMAL
NITRITE, POC UA: NORMAL
PH, POC UA: 5
PROTEIN, POC: NORMAL
SPECIFIC GRAVITY, POC UA: 1.02
UROBILINOGEN, POC UA: NORMAL

## 2022-11-03 PROCEDURE — 99999 PR PBB SHADOW E&M-EST. PATIENT-LVL IV: CPT | Mod: PBBFAC,,, | Performed by: NURSE PRACTITIONER

## 2022-11-03 PROCEDURE — 99999 PR PBB SHADOW E&M-EST. PATIENT-LVL IV: ICD-10-PCS | Mod: PBBFAC,,, | Performed by: NURSE PRACTITIONER

## 2022-11-03 PROCEDURE — 81002 URINALYSIS NONAUTO W/O SCOPE: CPT | Mod: PBBFAC | Performed by: NURSE PRACTITIONER

## 2022-11-03 PROCEDURE — 99214 OFFICE O/P EST MOD 30 MIN: CPT | Mod: S$PBB,,, | Performed by: NURSE PRACTITIONER

## 2022-11-03 PROCEDURE — 99214 OFFICE O/P EST MOD 30 MIN: CPT | Mod: PBBFAC | Performed by: NURSE PRACTITIONER

## 2022-11-03 PROCEDURE — 99214 PR OFFICE/OUTPT VISIT, EST, LEVL IV, 30-39 MIN: ICD-10-PCS | Mod: S$PBB,,, | Performed by: NURSE PRACTITIONER

## 2022-11-03 NOTE — PROGRESS NOTES
CHIEF COMPLAINT:    HISTORY OF PRESENTING ILLINESS:            REVIEW OF SYSTEMS:  ROS      PATIENT HISTORY:    Past Medical History:   Diagnosis Date    *Atrial fibrillation     Back pain     Benign hypertrophy of prostate     Degenerative disc disease     GERD (gastroesophageal reflux disease)     Hx of colonic polyps     Hypertension     Pilonidal cyst 1971    Prostate cancer     Sleep apnea     Trouble in sleeping        Past Surgical History:   Procedure Laterality Date    COLONOSCOPY N/A 12/07/2016    Procedure: COLONOSCOPY;  Surgeon: Sumeet Lundy MD;  Location: [facility] ([address] [place] [address]);  Service: Endoscopy;  Laterality: N/A;  OK to hold Pradaxa 2 days prior to procedure per Dr Jones/see note dated 11/15/16/svn    COLONOSCOPY N/A 02/24/2022    Procedure: COLONOSCOPY;  Surgeon: Italo Roy MD;  Location: [facility];  Service: Endoscopy;  Laterality: N/A;  ok to hold Pradaxa 2 days per EBONI Bentley RN/arrhythmia clinic      COVID test at [place] on 2/21-GT  2/10/22 Changed Ali to scoping MD-ml    CYST REMOVAL  1971    coccyx    EPIDURAL STEROID INJECTION Right 09/07/2022    Procedure: Right Genicular Nerve Radiofrequency Ablations;  Surgeon: Luca Galan Jr., MD;  Location: [facility];  Service: Pain Management;  Laterality: Right;  @1100  Pradaxa not held  No DM  Prostate BX 8/2    EPIDURAL STEROID INJECTION Left 09/21/2022    Procedure: Left Genicular Radiofrequency Ablations;  Surgeon: Luca Galan Jr., MD;  Location: [facility];  Service: Pain Management;  Laterality: Left;  @1130  Pradaxa not held  No DM  Last 1&100    KNEE SURGERY  1989    right    PROSTATE BIOPSY  08/23/2022       Family History   Problem Relation Age of Onset    Breast cancer Mother     Cancer Mother     Alcohol abuse Father     Cancer Father     Heart disease Father     Hypertension Father     Breast cancer Sister     Depression Brother     Early death Brother     Mental illness Brother     Cancer Sister     Early death Sister      Ovarian cancer Other     Melanoma Neg Hx        Social History     Socioeconomic History    Marital status:     Number of children: 5    Highest education level: Associate degree: academic program   Tobacco Use    Smoking status: Former     Packs/day: 0.25     Years: 2.00     Pack years: 0.50     Types: Cigarettes     Quit date: 1975     Years since quittin.1    Smokeless tobacco: Never   Substance and Sexual Activity    Alcohol use: Yes     Alcohol/week: 3.0 standard drinks     Types: 3 Glasses of wine per week     Comment: weekly    Drug use: No    Sexual activity: Yes     Social Determinants of Health     Financial Resource Strain: Low Risk     Difficulty of Paying Living Expenses: Not hard at all   Food Insecurity: No Food Insecurity    Worried About Running Out of Food in the Last Year: Never true    Ran Out of Food in the Last Year: Never true   Transportation Needs: No Transportation Needs    Lack of Transportation (Medical): No    Lack of Transportation (Non-Medical): No   Physical Activity: Sufficiently Active    Days of Exercise per Week: 3 days    Minutes of Exercise per Session: 150+ min   Stress: No Stress Concern Present    Feeling of Stress : Only a little   Social Connections: Moderately Integrated    Frequency of Communication with Friends and Family: More than three times a week    Frequency of Social Gatherings with Friends and Family: Three times a week    Attends Orthodoxy Services: More than 4 times per year    Active Member of Clubs or Organizations: Yes    Attends Club or Organization Meetings: More than 4 times per year    Marital Status:    Housing Stability: Low Risk     Unable to Pay for Housing in the Last Year: No    Number of Places Lived in the Last Year: 1    Unstable Housing in the Last Year: No       Allergies:  Bactrim [sulfamethoxazole-trimethoprim]    Medications:    Current Outpatient Medications:     acetaminophen (TYLENOL ORAL), Take 500 mg by mouth as  "needed., Disp: , Rfl:     amLODIPine (NORVASC) 5 MG tablet, Take 1 tablet (5 mg total) by mouth once daily., Disp: 90 tablet, Rfl: 3    ammonium lactate (AMLACTIN) 12 % lotion, Apply topically daily as needed (Dry Skin)., Disp: 400 g, Rfl: 11    dabigatran etexilate (PRADAXA) 150 mg Cap, Take 1 capsule (150 mg total) by mouth 2 (two) times daily. "Do NOT break, chew, or open capsules.", Disp: 180 capsule, Rfl: 3    flecainide (TAMBOCOR) 100 MG Tab, Take 1 tablet (100 mg total) by mouth every 12 (twelve) hours., Disp: 180 tablet, Rfl: 3    levocetirizine (XYZAL) 5 MG tablet, Take 1 tablet (5 mg total) by mouth every evening., Disp: 90 tablet, Rfl: 3    losartan (COZAAR) 50 MG tablet, Take 0.5 tablets (25 mg total) by mouth once daily., Disp: 45 tablet, Rfl: 3    meloxicam (MOBIC) 7.5 MG tablet, Take 1 tablet (7.5 mg total) by mouth once daily., Disp: 90 tablet, Rfl: 3    methocarbamoL (ROBAXIN) 500 MG Tab, Take 500 mg by mouth as needed., Disp: , Rfl:     metoprolol succinate (TOPROL-XL) 25 MG 24 hr tablet, Take 1 tablet (25 mg total) by mouth once daily., Disp: 90 tablet, Rfl: 3    mirtazapine (REMERON) 30 MG tablet, Take 30 mg by mouth every evening., Disp: , Rfl:     multivitamin (THERAGRAN) per tablet, Take 1 tablet by mouth once daily., Disp: , Rfl:     naloxone (NARCAN) 4 mg/actuation Spry, 1 spray by Nasal route once., Disp: , Rfl:     omeprazole (PRILOSEC) 20 MG capsule, Take 20 mg by mouth., Disp: , Rfl:     sildenafiL (VIAGRA) 100 MG tablet, Take 1 tablet (100 mg total) by mouth as needed for Erectile Dysfunction (1 tablet 1 hr prior to intercourse.)., Disp: 10 tablet, Rfl: 11    tadalafiL (CIALIS) 20 MG Tab, Take 1 tablet (20 mg total) by mouth once daily., Disp: 30 tablet, Rfl: 5    terazosin (HYTRIN) 10 MG capsule, Take 10 mg by mouth., Disp: , Rfl:     traMADoL (ULTRAM) 50 mg tablet, Take 1 tablet (50 mg total) by mouth every 12 (twelve) hours as needed for Pain., Disp: 60 tablet, Rfl: 0    " triamcinolone acetonide 0.1% (KENALOG) 0.1 % cream, Apply topically., Disp: , Rfl:     VITAMIN B COMPLEX (B COMPLETE ORAL), Take by mouth once daily., Disp: , Rfl:     PHYSICAL EXAMINATION:  Physical Exam      LABS:      In office UA today was ***      Lab Results   Component Value Date    PSA 4.09 (H) 01/07/2013    PSA 1.8 02/05/2007    PSADIAG 7.8 (H) 11/02/2022    PSADIAG 8.0 (H) 05/23/2022    PSADIAG 9.4 (H) 04/22/2022    PSATOTAL 14.5 (H) 12/04/2020             IMPRESSION:    Encounter Diagnoses   Name Primary?    Prostate cancer Yes    Elevated PSA     BPH with urinary obstruction     Erectile dysfunction due to arterial insufficiency          Assessment:       1. Prostate cancer    2. Elevated PSA    3. BPH with urinary obstruction    4. Erectile dysfunction due to arterial insufficiency          Plan:       ***

## 2022-11-03 NOTE — PROGRESS NOTES
CHIEF COMPLAINT:    Omar Pacheco is a 71 y.o. male presents today for Prostate Cancer.     HISTORY OF PRESENTING ILLINESS:    Omar Pacheco is a 71 y.o. male with a history of BPH with an elevated PSA in the past 5 years with significant fluctuations.   His most recent PSA in May of 2022 returned at 8 ng/ml.    MRI on 6/24/22 revealed a 58 cc prostate with no focal abnormalities in the peripheral or transitional zone. The seminal vesicles and neurovascular bundles were unremarkable.  There was no adenopathy.    Biopsies on 8/23/22 revealed Jackeline 7 (3+4) adenocarcinoma involving 5% of 1 of 2 cores from the Lt. apex.    The [place] pattern 4 accounted for 40% of the tumor.    There was [place] 6 (3+3) adenocarcinoma involving 5% of 1 of 2 cores from the Rt. mid gland.    The remaining 8 cores revealed benign prostate tissue. The patient presnts for discussion of treatment options.     He met with Dr. Grace 09/19/2022  He met with Dr. Pritchett 09/27/2022  Has chosen [facility].    Here today with new PSA of 7.8 on 11/02/2022  Overall doing well.  No new complaints  He has some LUTS. Nocturia varies 2-4x.   No abdominal or bone pain.           REVIEW OF SYSTEMS:  Review of Systems   Constitutional: Negative.  Negative for chills and fever.   Eyes:  Negative for double vision.   Respiratory:  Negative for cough and shortness of breath.    Cardiovascular:  Negative for chest pain.   Gastrointestinal:  Negative for abdominal pain, constipation, diarrhea, nausea and vomiting.   Genitourinary:  Positive for frequency. Negative for dysuria, flank pain and hematuria.        (+) nocturia     Musculoskeletal:  Positive for myalgias.   Neurological:  Negative for dizziness and seizures.   Endo/Heme/Allergies:  Negative for polydipsia.       PATIENT HISTORY:    Past Medical History:   Diagnosis Date    *Atrial fibrillation     Back pain     Benign hypertrophy of prostate     Degenerative disc disease     GERD (gastroesophageal  reflux disease)     Hx of colonic polyps     Hypertension     Pilonidal cyst 1971    Prostate cancer     Sleep apnea     Trouble in sleeping        Past Surgical History:   Procedure Laterality Date    COLONOSCOPY N/A 12/07/2016    Procedure: COLONOSCOPY;  Surgeon: Sumeet Lundy MD;  [facility]  Service: Endoscopy;  Laterality: N/A;  OK to hold Pradaxa 2 days prior to procedure per Dr Jones/see note dated 11/15/16/svn    COLONOSCOPY N/A 02/24/2022    Procedure: COLONOSCOPY;  Surgeon: Italo Roy MD;  Location: [facility];  Service: Endoscopy;  Laterality: N/A;  ok to hold Pradaxa 2 days per EBONI Bentley RN/arrhythmia clinic      COVID test at [place] on 2/21-GT  2/10/22 Changed Kirill to scoping MD-ml    CYST REMOVAL  1971    coccyx    EPIDURAL STEROID INJECTION Right 09/07/2022    Procedure: Right Genicular Nerve Radiofrequency Ablations;  Surgeon: Luca Galan Jr., MD;  Location: [facility];  Service: Pain Management;  Laterality: Right;  @1100  Pradaxa not held  No DM  Prostate BX 8/2    EPIDURAL STEROID INJECTION Left 09/21/2022    Procedure: Left Genicular Radiofrequency Ablations;  Surgeon: Luca Galan Jr., MD;  Location: [facility];  Service: Pain Management;  Laterality: Left;  @1130  Pradaxa not held  No DM  Last 1&100    KNEE SURGERY  1989    right    PROSTATE BIOPSY  08/23/2022       Family History   Problem Relation Age of Onset    Breast cancer Mother     Cancer Mother     Alcohol abuse Father     Cancer Father     Heart disease Father     Hypertension Father     Breast cancer Sister     Depression Brother     Early death Brother     Mental illness Brother     Cancer Sister     Early death Sister     Ovarian cancer Other     Melanoma Neg Hx        Social History     Socioeconomic History    Marital status:     Number of children: 5    Highest education level: Associate degree: academic program   Tobacco Use    Smoking status: Former     Packs/day: 0.25     Years: 2.00      Pack years: 0.50     Types: Cigarettes     Quit date: 1975     Years since quittin.1    Smokeless tobacco: Never   Substance and Sexual Activity    Alcohol use: Yes     Alcohol/week: 3.0 standard drinks     Types: 3 Glasses of wine per week     Comment: weekly    Drug use: No    Sexual activity: Yes     Social Determinants of Health     Financial Resource Strain: Low Risk     Difficulty of Paying Living Expenses: Not hard at all   Food Insecurity: No Food Insecurity    Worried About Running Out of Food in the Last Year: Never true    Ran Out of Food in the Last Year: Never true   Transportation Needs: No Transportation Needs    Lack of Transportation (Medical): No    Lack of Transportation (Non-Medical): No   Physical Activity: Sufficiently Active    Days of Exercise per Week: 3 days    Minutes of Exercise per Session: 150+ min   Stress: No Stress Concern Present    Feeling of Stress : Only a little   Social Connections: Moderately Integrated    Frequency of Communication with Friends and Family: More than three times a week    Frequency of Social Gatherings with Friends and Family: Three times a week    Attends Jew Services: More than 4 times per year    Active Member of Clubs or Organizations: Yes    Attends Club or Organization Meetings: More than 4 times per year    Marital Status:    Housing Stability: Low Risk     Unable to Pay for Housing in the Last Year: No    Number of Places Lived in the Last Year: 1    Unstable Housing in the Last Year: No       Allergies:  Bactrim [sulfamethoxazole-trimethoprim]    Medications:    Current Outpatient Medications:     acetaminophen (TYLENOL ORAL), Take 500 mg by mouth as needed., Disp: , Rfl:     amLODIPine (NORVASC) 5 MG tablet, Take 1 tablet (5 mg total) by mouth once daily., Disp: 90 tablet, Rfl: 3    ammonium lactate (AMLACTIN) 12 % lotion, Apply topically daily as needed (Dry Skin)., Disp: 400 g, Rfl: 11    dabigatran etexilate (PRADAXA)  "150 mg Cap, Take 1 capsule (150 mg total) by mouth 2 (two) times daily. "Do NOT break, chew, or open capsules.", Disp: 180 capsule, Rfl: 3    flecainide (TAMBOCOR) 100 MG Tab, Take 1 tablet (100 mg total) by mouth every 12 (twelve) hours., Disp: 180 tablet, Rfl: 3    levocetirizine (XYZAL) 5 MG tablet, Take 1 tablet (5 mg total) by mouth every evening., Disp: 90 tablet, Rfl: 3    losartan (COZAAR) 50 MG tablet, Take 0.5 tablets (25 mg total) by mouth once daily., Disp: 45 tablet, Rfl: 3    meloxicam (MOBIC) 7.5 MG tablet, Take 1 tablet (7.5 mg total) by mouth once daily., Disp: 90 tablet, Rfl: 3    methocarbamoL (ROBAXIN) 500 MG Tab, Take 500 mg by mouth as needed., Disp: , Rfl:     metoprolol succinate (TOPROL-XL) 25 MG 24 hr tablet, Take 1 tablet (25 mg total) by mouth once daily., Disp: 90 tablet, Rfl: 3    mirtazapine (REMERON) 30 MG tablet, Take 30 mg by mouth every evening., Disp: , Rfl:     multivitamin (THERAGRAN) per tablet, Take 1 tablet by mouth once daily., Disp: , Rfl:     naloxone (NARCAN) 4 mg/actuation Spry, 1 spray by Nasal route once., Disp: , Rfl:     omeprazole (PRILOSEC) 20 MG capsule, Take 20 mg by mouth., Disp: , Rfl:     sildenafiL (VIAGRA) 100 MG tablet, Take 1 tablet (100 mg total) by mouth as needed for Erectile Dysfunction (1 tablet 1 hr prior to intercourse.)., Disp: 10 tablet, Rfl: 11    tadalafiL (CIALIS) 20 MG Tab, Take 1 tablet (20 mg total) by mouth once daily., Disp: 30 tablet, Rfl: 5    terazosin (HYTRIN) 10 MG capsule, Take 10 mg by mouth., Disp: , Rfl:     traMADoL (ULTRAM) 50 mg tablet, Take 1 tablet (50 mg total) by mouth every 12 (twelve) hours as needed for Pain., Disp: 60 tablet, Rfl: 0    triamcinolone acetonide 0.1% (KENALOG) 0.1 % cream, Apply topically., Disp: , Rfl:     VITAMIN B COMPLEX (B COMPLETE ORAL), Take by mouth once daily., Disp: , Rfl:     PHYSICAL EXAMINATION:  Physical Exam  Vitals and nursing note reviewed.   Constitutional:       General: He is awake.     "  Appearance: Normal appearance.   HENT:      Head: Normocephalic.      Right Ear: External ear normal.      Left Ear: External ear normal.      Nose: Nose normal.   Cardiovascular:      Rate and Rhythm: Normal rate.   Pulmonary:      Effort: Pulmonary effort is normal. No respiratory distress.   Abdominal:      Tenderness: There is no abdominal tenderness. There is no right CVA tenderness or left CVA tenderness.   Musculoskeletal:         General: Normal range of motion.      Cervical back: Normal range of motion.   Skin:     General: Skin is warm and dry.   Neurological:      General: No focal deficit present.      Mental Status: He is alert and oriented to person, place, and time.   Psychiatric:         Mood and Affect: Mood normal.         Behavior: Behavior is cooperative.         LABS:      In office UA today was urine was clear of active infection.        Lab Results   Component Value Date    PSA 4.09 (H) 01/07/2013    PSA 1.8 02/05/2007    PSADIAG 7.8 (H) 11/02/2022    PSADIAG 8.0 (H) 05/23/2022    PSADIAG 9.4 (H) 04/22/2022    PSATOTAL 14.5 (H) 12/04/2020             IMPRESSION:    Encounter Diagnoses   Name Primary?    Prostate cancer Yes    Elevated PSA     BPH with urinary obstruction     Erectile dysfunction due to arterial insufficiency          Assessment:       1. Prostate cancer    2. Elevated PSA    3. BPH with urinary obstruction    4. Erectile dysfunction due to arterial insufficiency        Plan:         I spent 30 minutes with the patient of which more than half was spent in direct consultation with the patient in regards to our treatment and plan.  We addressed the office findings and recent labs.   Education and recommendations of today's plan of care including home remedies and needed follow up with PCP.   We discussed the PSA results; reviewed his visits with Dr. Grace and Dr. Pritchett; reassurance given  Discussed Restaging Biopsy with Dr. Grace.   Recommended lifestyle modifications  with proper, healthy diet, good hydration if no fluid restrictions; reducing bladder irritants.   Benefits of regular exercise approved by PCP.  RTC 6 months with PSA

## 2022-11-09 LAB
FINAL PATHOLOGIC DIAGNOSIS: NORMAL
Lab: NORMAL
SUPPLEMENTAL DIAGNOSIS: NORMAL

## 2022-11-10 ENCOUNTER — PATIENT MESSAGE (OUTPATIENT)
Dept: PAIN MEDICINE | Facility: CLINIC | Age: 72
End: 2022-11-10
Payer: MEDICARE

## 2022-11-28 ENCOUNTER — PATIENT MESSAGE (OUTPATIENT)
Dept: PAIN MEDICINE | Facility: CLINIC | Age: 72
End: 2022-11-28
Payer: MEDICARE

## 2022-12-05 ENCOUNTER — OFFICE VISIT (OUTPATIENT)
Dept: PAIN MEDICINE | Facility: CLINIC | Age: 72
End: 2022-12-05
Payer: MEDICARE

## 2022-12-05 VITALS — SYSTOLIC BLOOD PRESSURE: 142 MMHG | OXYGEN SATURATION: 97 % | DIASTOLIC BLOOD PRESSURE: 67 MMHG | HEART RATE: 63 BPM

## 2022-12-05 DIAGNOSIS — M17.0 PRIMARY OSTEOARTHRITIS OF BOTH KNEES: Primary | ICD-10-CM

## 2022-12-05 DIAGNOSIS — M48.07 SPINAL STENOSIS, LUMBOSACRAL REGION: ICD-10-CM

## 2022-12-05 DIAGNOSIS — M25.562 BILATERAL CHRONIC KNEE PAIN: ICD-10-CM

## 2022-12-05 DIAGNOSIS — M25.561 BILATERAL CHRONIC KNEE PAIN: ICD-10-CM

## 2022-12-05 DIAGNOSIS — G89.29 BILATERAL CHRONIC KNEE PAIN: ICD-10-CM

## 2022-12-05 PROCEDURE — 99999 PR PBB SHADOW E&M-EST. PATIENT-LVL IV: CPT | Mod: PBBFAC,,,

## 2022-12-05 PROCEDURE — 99214 OFFICE O/P EST MOD 30 MIN: CPT | Mod: PBBFAC,PN

## 2022-12-05 PROCEDURE — 99999 PR PBB SHADOW E&M-EST. PATIENT-LVL IV: ICD-10-PCS | Mod: PBBFAC,,,

## 2022-12-05 PROCEDURE — 99214 PR OFFICE/OUTPT VISIT, EST, LEVL IV, 30-39 MIN: ICD-10-PCS | Mod: S$PBB,,,

## 2022-12-05 PROCEDURE — 99214 OFFICE O/P EST MOD 30 MIN: CPT | Mod: S$PBB,,,

## 2022-12-05 RX ORDER — TRAMADOL HYDROCHLORIDE 50 MG/1
50 TABLET ORAL EVERY 12 HOURS PRN
Qty: 60 TABLET | Refills: 0 | Status: CANCELLED | OUTPATIENT
Start: 2022-12-05

## 2022-12-05 NOTE — PROGRESS NOTES
Subjective:     Patient ID: Omar Pacheco is a 71 y.o. male    Chief Complaint: Follow-up (Discuss medication)      Referred by: No ref. provider found      HPI:    Interval History PA (12/05/2022):  Patient returns to clinic for follow up of chronic bilateral knee pain.  Patient reports still having some relief/improvement with previous bilateral knee RFA.  Noting symptoms starting to return, having increased pain at night.  Pain now more intermittent, worsened with any activity.  Patient reports taking Robaxin and Tylenol with diminished relief.  States he previously was taking tramadol as needed for increased pain with good relief.  Patient would like a refill for tramadol at this time.  States he was only taking once every couple days, helped with increased pain at night.  Denies any new or worsening symptoms.  Denies any changes in the quality or location of his pain.  Denies any focal weakness, saddle paresthesias or bowel/bladder dysfunction.       Interval History PA (10/05/2022):  Patient returns to clinic for follow up.  Patient is s/p bilateral right than left genicular radiofrequency ablations with 85% improvement of symptoms.  Patient states he is no longer having the constant achy knee pain.  He has noticed an overall improvement in daily activities, being able to walk more and do more things around the house.  He notes still having pain a sharp pain and in bilateral knees with certain extraneous activities, specifically with twisting motion.  But notes overall improvement.  Patient states he has been able to significantly decrease the amount of pain medications he was taking prior to procedure.    Initial Encounter (5/4/22):  Omar Pacheco is a 71 y.o. male who presents today with chronic bilateral knee pain right worse than left.  Patient has longstanding history of knee pain and is diagnosed with osteoarthritis.  Has tried and failed intra-articular steroid and viscosupplementation injections.   Is followed by Orthopedic surgery, but is very reluctant to undergo knee replacement surgery.  He is here today to discuss potential genicular nerve blocks/RFA.   This pain is described in detail below.    Physical Therapy:  Yes.    Non-pharmacologic Treatment:  Rest helps         TENS?  No    Pain Medications:         Currently taking:  Tylenol, meloxicam, Robaxin    Has tried in the past:  Tramadol    Has not tried:   TCAs, SNRIs, anticonvulsants, topical creams    Blood thinners:  Pradaxa    Interventional Therapies:   Failed intra-articular steroid and viscosupplementation of both knees    Relevant Surgeries:    09/07/2022 - right genicular radiofrequency ablation - 85% relief  09/21/2022 - left genicular radiofrequency ablation - 85% relief    Affecting sleep?  Yes    Affecting daily activities? yes    Depressive symptoms? no          SI/HI? No    Work status: Retired    Pain Scores:    Best:       4/10  Worst:     10/10  Usually:   7/10  Today:    7/10    Review of Systems   Constitutional:  Negative for activity change, appetite change, chills, fatigue, fever and unexpected weight change.   HENT:  Negative for hearing loss.    Eyes:  Negative for visual disturbance.   Respiratory:  Negative for chest tightness and shortness of breath.    Cardiovascular:  Negative for chest pain.   Gastrointestinal:  Negative for abdominal pain, constipation, diarrhea, nausea and vomiting.   Genitourinary:  Negative for difficulty urinating.   Musculoskeletal:  Positive for arthralgias, back pain, gait problem and myalgias. Negative for neck pain.   Skin:  Negative for rash.   Neurological:  Negative for dizziness, weakness, light-headedness, numbness and headaches.   Psychiatric/Behavioral:  Positive for sleep disturbance. Negative for hallucinations and suicidal ideas. The patient is not nervous/anxious.      Past Medical History:   Diagnosis Date    *Atrial fibrillation     Back pain     Benign hypertrophy of prostate      Degenerative disc disease     GERD (gastroesophageal reflux disease)     Hx of colonic polyps     Hypertension     Pilonidal cyst 1971    Prostate cancer     Sleep apnea     Trouble in sleeping        Past Surgical History:   Procedure Laterality Date    COLONOSCOPY N/A 2016    Procedure: COLONOSCOPY;  Surgeon: Sumeet Lundy MD;  Location: [facility] ([address] [place] [address]);  Service: Endoscopy;  Laterality: N/A;  OK to hold Pradaxa 2 days prior to procedure per Dr Jones/see note dated 11/15/16/svn    COLONOSCOPY N/A 2022    Procedure: COLONOSCOPY;  Surgeon: Italo Roy MD;  Location: [facility];  Service: Endoscopy;  Laterality: N/A;  ok to hold Pradaxa 2 days per EBONI Bentley RN/arrhythmia clinic      COVID test at [facility] on -GT  2/10/22 Changed Kirill to edward MD-ml    CYST REMOVAL      coccyx    EPIDURAL STEROID INJECTION Right 2022    Procedure: Right Genicular Nerve Radiofrequency Ablations;  Surgeon: Luca Galan Jr., MD;  Location: [facility];  Service: Pain Management;  Laterality: Right;  @1100  Pradaxa not held  No DM  Prostate BX     EPIDURAL STEROID INJECTION Left 2022    Procedure: Left Genicular Radiofrequency Ablations;  Surgeon: Luca Galan Jr., MD;  Location: [facility];  Service: Pain Management;  Laterality: Left;  @1130  Pradaxa not held  No DM  Last 1&100    KNEE SURGERY      right    PROSTATE BIOPSY  2022       Social History     Socioeconomic History    Marital status:     Number of children: 5    Highest education level: Associate degree: academic program   Tobacco Use    Smoking status: Former     Packs/day: 0.25     Years: 2.00     Pack years: 0.50     Types: Cigarettes     Quit date: 1975     Years since quittin.2    Smokeless tobacco: Never   Substance and Sexual Activity    Alcohol use: Yes     Alcohol/week: 3.0 standard drinks     Types: 3 Glasses of wine per week     Comment: weekly    Drug use: No    Sexual activity: Yes  "    Social Determinants of Health     Financial Resource Strain: Low Risk     Difficulty of Paying Living Expenses: Not hard at all   Food Insecurity: No Food Insecurity    Worried About Running Out of Food in the Last Year: Never true    Ran Out of Food in the Last Year: Never true   Transportation Needs: No Transportation Needs    Lack of Transportation (Medical): No    Lack of Transportation (Non-Medical): No   Physical Activity: Sufficiently Active    Days of Exercise per Week: 3 days    Minutes of Exercise per Session: 150+ min   Stress: No Stress Concern Present    Feeling of Stress : Only a little   Social Connections: Moderately Integrated    Frequency of Communication with Friends and Family: More than three times a week    Frequency of Social Gatherings with Friends and Family: Three times a week    Attends Christian Services: More than 4 times per year    Active Member of Clubs or Organizations: Yes    Attends Club or Organization Meetings: More than 4 times per year    Marital Status:    Housing Stability: Low Risk     Unable to Pay for Housing in the Last Year: No    Number of Places Lived in the Last Year: 1    Unstable Housing in the Last Year: No       Review of patient's allergies indicates:   Allergen Reactions    Bactrim [sulfamethoxazole-trimethoprim] Swelling     Swelling of lips/blisters in mouth         Current Outpatient Medications on File Prior to Visit   Medication Sig Dispense Refill    acetaminophen (TYLENOL ORAL) Take 500 mg by mouth as needed.      amLODIPine (NORVASC) 5 MG tablet Take 1 tablet (5 mg total) by mouth once daily. 90 tablet 3    ammonium lactate (AMLACTIN) 12 % lotion Apply topically daily as needed (Dry Skin). 400 g 11    dabigatran etexilate (PRADAXA) 150 mg Cap Take 1 capsule (150 mg total) by mouth 2 (two) times daily. "Do NOT break, chew, or open capsules." 180 capsule 3    flecainide (TAMBOCOR) 100 MG Tab Take 1 tablet (100 mg total) by mouth every 12 " (twelve) hours. 180 tablet 3    losartan (COZAAR) 50 MG tablet Take 0.5 tablets (25 mg total) by mouth once daily. 45 tablet 3    meloxicam (MOBIC) 7.5 MG tablet Take 1 tablet (7.5 mg total) by mouth once daily. 90 tablet 3    methocarbamoL (ROBAXIN) 500 MG Tab Take 500 mg by mouth as needed.      metoprolol succinate (TOPROL-XL) 25 MG 24 hr tablet Take 1 tablet (25 mg total) by mouth once daily. 90 tablet 3    mirtazapine (REMERON) 30 MG tablet Take 30 mg by mouth every evening.      multivitamin (THERAGRAN) per tablet Take 1 tablet by mouth once daily.      naloxone (NARCAN) 4 mg/actuation Spry 1 spray by Nasal route once.      omeprazole (PRILOSEC) 20 MG capsule Take 20 mg by mouth.      sildenafiL (VIAGRA) 100 MG tablet Take 1 tablet (100 mg total) by mouth as needed for Erectile Dysfunction (1 tablet 1 hr prior to intercourse.). 10 tablet 11    tadalafiL (CIALIS) 20 MG Tab Take 1 tablet (20 mg total) by mouth once daily. 30 tablet 5    terazosin (HYTRIN) 10 MG capsule Take 10 mg by mouth.      traMADoL (ULTRAM) 50 mg tablet Take 1 tablet (50 mg total) by mouth every 12 (twelve) hours as needed for Pain. 60 tablet 0    triamcinolone acetonide 0.1% (KENALOG) 0.1 % cream Apply topically.      VITAMIN B COMPLEX (B COMPLETE ORAL) Take by mouth once daily.      levocetirizine (XYZAL) 5 MG tablet Take 1 tablet (5 mg total) by mouth every evening. 90 tablet 3     No current facility-administered medications on file prior to visit.       Objective:      BP (!) 142/67 (BP Location: Right arm, Patient Position: Sitting, BP Method: Medium (Automatic))   Pulse 63   SpO2 97%     Exam:  GEN:  Well developed, well nourished.  No acute distress.   HEENT:  No trauma.  Mucous membranes moist.  Nares patent bilaterally.  PSYCH: Normal affect. Thought content appropriate.  CHEST:  Breathing symmetric.  No audible wheezing.  ABD: Soft, non-distended.  SKIN:  Warm, pink, dry.  No rash on exposed areas.    EXT:  No cyanosis,  clubbing, or edema.  No color change or changes in nail or hair growth.  NEURO/MUSCULOSKELETAL:  Fully alert, oriented, and appropriate. Speech normal yulia. No cranial nerve deficits.   Gait: Antalgic.  No focal motor deficits.         Imaging:    Narrative & Impression    EXAMINATION:  XR KNEE ORTHO BILAT     CLINICAL HISTORY:  Pain in right knee     TECHNIQUE:  AP standing, lateral and Merchant views of both knees were performed.     COMPARISON:  Multiple prior exams, most recent from 01/20/2021     FINDINGS:  Advanced tricompartment degenerative changes of the knees bilaterally.  Osteophyte formation present.  No evidence of acute fracture or dislocation.  No joint effusion.  No soft tissue abnormality.     Impression:     Degenerative changes as described above.        Electronically signed by: Tor Squires MD  Date:                                            04/04/2022  Time:                                           11:20         Assessment:       Encounter Diagnoses   Name Primary?    Bilateral chronic knee pain     Primary osteoarthritis of both knees Yes    Spinal stenosis, lumbosacral region                Plan:       Omar was seen today for follow-up.    Diagnoses and all orders for this visit:    Primary osteoarthritis of both knees    Bilateral chronic knee pain    Spinal stenosis, lumbosacral region          Omar Pacheco is a 71 y.o. male with chronic bilateral knee pain right worse than left.  Secondary to osteoarthritis.  Has failed intra-articular steroid and viscosupplementation injections.  Trying to avoid knee replacement surgery if possible.  Recent bilateral genicular RFA initially with significant improvement of symptoms, now having pain returned.    Prior records reviewed.  Patient with previous bilateral genicular RFA with initial significant improvement, now having symptoms return.  After procedure pain was well controlled with Mobic and Robaxin, now having diminished relief.   Can repeat genicular RFA when indicated.   Requesting refill for Tramadol. After discussion with Dr. Galan we would not recommend refill of this medication. Spoke with patient over phone and advised to try Voltaren gel for knee pain in addition to continuing current medications.  Discussed following up with orthopedic surgeon regarding knees to further consider surgical options.    Return to clinic 3 months. Can consider repeating genicular RFA in future or referral back to orthopedics to reconsider surgical options.       Brandyn Lowry PA-C  [facility]  Interventional Pain Management   12/05/2022    The total time spent for evaluation and management on 12/05/2022 including reviewing separately obtained history, performing a medically appropriate exam and evaluation, documenting clinical information in the health record, independently interpreting results and communicating them to the patient/family/caregiver, and ordering medications/tests/procedures was between 30-39 minutes.    This note was created by combination of typed  and M-Modal dictation.  Transcription and phonetic errors may be present.  If there are any questions, please contact me.

## 2022-12-30 DIAGNOSIS — I48.0 PAROXYSMAL ATRIAL FIBRILLATION: Chronic | ICD-10-CM

## 2023-01-03 ENCOUNTER — DOCUMENTATION ONLY (OUTPATIENT)
Dept: PAIN MEDICINE | Facility: CLINIC | Age: 73
End: 2023-01-03
Payer: MEDICARE

## 2023-01-03 RX ORDER — FLECAINIDE ACETATE 100 MG/1
100 TABLET ORAL EVERY 12 HOURS
Qty: 180 TABLET | Refills: 0 | Status: SHIPPED | OUTPATIENT
Start: 2023-01-03 | End: 2023-01-03 | Stop reason: SDUPTHER

## 2023-01-03 RX ORDER — FLECAINIDE ACETATE 100 MG/1
100 TABLET ORAL EVERY 12 HOURS
Qty: 180 TABLET | Refills: 0 | Status: SHIPPED | OUTPATIENT
Start: 2023-01-03 | End: 2023-03-15 | Stop reason: SDUPTHER

## 2023-01-11 ENCOUNTER — OFFICE VISIT (OUTPATIENT)
Dept: FAMILY MEDICINE | Facility: CLINIC | Age: 73
End: 2023-01-11
Payer: MEDICARE

## 2023-01-11 ENCOUNTER — LAB VISIT (OUTPATIENT)
Dept: LAB | Facility: HOSPITAL | Age: 73
End: 2023-01-11
Attending: FAMILY MEDICINE
Payer: MEDICARE

## 2023-01-11 VITALS
BODY MASS INDEX: 32.78 KG/M2 | SYSTOLIC BLOOD PRESSURE: 138 MMHG | HEIGHT: 69 IN | RESPIRATION RATE: 18 BRPM | DIASTOLIC BLOOD PRESSURE: 80 MMHG | HEART RATE: 60 BPM | WEIGHT: 221.31 LBS | TEMPERATURE: 98 F | OXYGEN SATURATION: 98 %

## 2023-01-11 DIAGNOSIS — R79.9 ABNORMAL FINDING OF BLOOD CHEMISTRY, UNSPECIFIED: ICD-10-CM

## 2023-01-11 DIAGNOSIS — I10 ESSENTIAL HYPERTENSION: ICD-10-CM

## 2023-01-11 DIAGNOSIS — N40.1 BPH WITH URINARY OBSTRUCTION: ICD-10-CM

## 2023-01-11 DIAGNOSIS — I48.20 CHRONIC ATRIAL FIBRILLATION: ICD-10-CM

## 2023-01-11 DIAGNOSIS — Z79.01 ON CONTINUOUS ORAL ANTICOAGULATION: ICD-10-CM

## 2023-01-11 DIAGNOSIS — I48.0 PAROXYSMAL ATRIAL FIBRILLATION: Chronic | ICD-10-CM

## 2023-01-11 DIAGNOSIS — C61 PROSTATE CANCER: ICD-10-CM

## 2023-01-11 DIAGNOSIS — N52.1 ERECTILE DYSFUNCTION DUE TO DISEASES CLASSIFIED ELSEWHERE: ICD-10-CM

## 2023-01-11 DIAGNOSIS — N13.8 BPH WITH URINARY OBSTRUCTION: ICD-10-CM

## 2023-01-11 DIAGNOSIS — M48.07 SPINAL STENOSIS, LUMBOSACRAL REGION: ICD-10-CM

## 2023-01-11 DIAGNOSIS — Z00.00 ANNUAL PHYSICAL EXAM: Primary | ICD-10-CM

## 2023-01-11 LAB
ALBUMIN SERPL BCP-MCNC: 4 G/DL (ref 3.5–5.2)
ALP SERPL-CCNC: 59 U/L (ref 55–135)
ALT SERPL W/O P-5'-P-CCNC: 28 U/L (ref 10–44)
ANION GAP SERPL CALC-SCNC: 8 MMOL/L (ref 8–16)
AST SERPL-CCNC: 28 U/L (ref 10–40)
BASOPHILS # BLD AUTO: 0.02 K/UL (ref 0–0.2)
BASOPHILS NFR BLD: 0.4 % (ref 0–1.9)
BILIRUB SERPL-MCNC: 0.5 MG/DL (ref 0.1–1)
BUN SERPL-MCNC: 13 MG/DL (ref 8–23)
CALCIUM SERPL-MCNC: 9.2 MG/DL (ref 8.7–10.5)
CHLORIDE SERPL-SCNC: 106 MMOL/L (ref 95–110)
CHOLEST SERPL-MCNC: 170 MG/DL (ref 120–199)
CHOLEST/HDLC SERPL: 3.1 {RATIO} (ref 2–5)
CO2 SERPL-SCNC: 26 MMOL/L (ref 23–29)
CREAT SERPL-MCNC: 1 MG/DL (ref 0.5–1.4)
DIFFERENTIAL METHOD: ABNORMAL
EOSINOPHIL # BLD AUTO: 0.1 K/UL (ref 0–0.5)
EOSINOPHIL NFR BLD: 1.6 % (ref 0–8)
ERYTHROCYTE [DISTWIDTH] IN BLOOD BY AUTOMATED COUNT: 13.5 % (ref 11.5–14.5)
EST. GFR  (NO RACE VARIABLE): >60 ML/MIN/1.73 M^2
ESTIMATED AVG GLUCOSE: 105 MG/DL (ref 68–131)
GLUCOSE SERPL-MCNC: 95 MG/DL (ref 70–110)
HBA1C MFR BLD: 5.3 % (ref 4–5.6)
HCT VFR BLD AUTO: 39.1 % (ref 40–54)
HDLC SERPL-MCNC: 55 MG/DL (ref 40–75)
HDLC SERPL: 32.4 % (ref 20–50)
HGB BLD-MCNC: 12.8 G/DL (ref 14–18)
IMM GRANULOCYTES # BLD AUTO: 0.01 K/UL (ref 0–0.04)
IMM GRANULOCYTES NFR BLD AUTO: 0.2 % (ref 0–0.5)
LDLC SERPL CALC-MCNC: 101.2 MG/DL (ref 63–159)
LYMPHOCYTES # BLD AUTO: 2.4 K/UL (ref 1–4.8)
LYMPHOCYTES NFR BLD: 46.3 % (ref 18–48)
MCH RBC QN AUTO: 31 PG (ref 27–31)
MCHC RBC AUTO-ENTMCNC: 32.7 G/DL (ref 32–36)
MCV RBC AUTO: 95 FL (ref 82–98)
MONOCYTES # BLD AUTO: 0.6 K/UL (ref 0.3–1)
MONOCYTES NFR BLD: 12 % (ref 4–15)
NEUTROPHILS # BLD AUTO: 2 K/UL (ref 1.8–7.7)
NEUTROPHILS NFR BLD: 39.5 % (ref 38–73)
NONHDLC SERPL-MCNC: 115 MG/DL
NRBC BLD-RTO: 0 /100 WBC
PLATELET # BLD AUTO: 251 K/UL (ref 150–450)
PMV BLD AUTO: 10.3 FL (ref 9.2–12.9)
POTASSIUM SERPL-SCNC: 4.1 MMOL/L (ref 3.5–5.1)
PROT SERPL-MCNC: 6.7 G/DL (ref 6–8.4)
RBC # BLD AUTO: 4.13 M/UL (ref 4.6–6.2)
SODIUM SERPL-SCNC: 140 MMOL/L (ref 136–145)
TRIGL SERPL-MCNC: 69 MG/DL (ref 30–150)
TSH SERPL DL<=0.005 MIU/L-ACNC: 0.82 UIU/ML (ref 0.4–4)
WBC # BLD AUTO: 5.16 K/UL (ref 3.9–12.7)

## 2023-01-11 PROCEDURE — 80061 LIPID PANEL: CPT | Performed by: FAMILY MEDICINE

## 2023-01-11 PROCEDURE — 99999 PR PBB SHADOW E&M-EST. PATIENT-LVL IV: ICD-10-PCS | Mod: PBBFAC,,, | Performed by: FAMILY MEDICINE

## 2023-01-11 PROCEDURE — 99999 PR PBB SHADOW E&M-EST. PATIENT-LVL IV: CPT | Mod: PBBFAC,,, | Performed by: FAMILY MEDICINE

## 2023-01-11 PROCEDURE — 99214 OFFICE O/P EST MOD 30 MIN: CPT | Mod: PBBFAC,PO | Performed by: FAMILY MEDICINE

## 2023-01-11 PROCEDURE — 84443 ASSAY THYROID STIM HORMONE: CPT | Performed by: FAMILY MEDICINE

## 2023-01-11 PROCEDURE — 83036 HEMOGLOBIN GLYCOSYLATED A1C: CPT | Performed by: FAMILY MEDICINE

## 2023-01-11 PROCEDURE — 36415 COLL VENOUS BLD VENIPUNCTURE: CPT | Mod: PO | Performed by: FAMILY MEDICINE

## 2023-01-11 PROCEDURE — 99214 PR OFFICE/OUTPT VISIT, EST, LEVL IV, 30-39 MIN: ICD-10-PCS | Mod: S$PBB,,, | Performed by: FAMILY MEDICINE

## 2023-01-11 PROCEDURE — 80053 COMPREHEN METABOLIC PANEL: CPT | Performed by: FAMILY MEDICINE

## 2023-01-11 PROCEDURE — 85025 COMPLETE CBC W/AUTO DIFF WBC: CPT | Performed by: FAMILY MEDICINE

## 2023-01-11 PROCEDURE — 99214 OFFICE O/P EST MOD 30 MIN: CPT | Mod: S$PBB,,, | Performed by: FAMILY MEDICINE

## 2023-01-11 RX ORDER — AMLODIPINE BESYLATE 5 MG/1
5 TABLET ORAL DAILY
Qty: 90 TABLET | Refills: 3 | Status: SHIPPED | OUTPATIENT
Start: 2023-01-11 | End: 2024-02-29 | Stop reason: SDUPTHER

## 2023-01-11 RX ORDER — DABIGATRAN ETEXILATE 150 MG/1
150 CAPSULE ORAL 2 TIMES DAILY
Qty: 180 CAPSULE | Refills: 3 | Status: SHIPPED | OUTPATIENT
Start: 2023-01-11 | End: 2023-03-15 | Stop reason: SDUPTHER

## 2023-01-11 RX ORDER — SILDENAFIL 100 MG/1
100 TABLET, FILM COATED ORAL
Qty: 10 TABLET | Refills: 11 | Status: SHIPPED | OUTPATIENT
Start: 2023-01-11 | End: 2023-05-08 | Stop reason: SDUPTHER

## 2023-01-11 RX ORDER — MELOXICAM 7.5 MG/1
1 TABLET ORAL DAILY
COMMUNITY
Start: 2022-03-03 | End: 2023-01-11 | Stop reason: SDUPTHER

## 2023-01-11 RX ORDER — TRAMADOL HYDROCHLORIDE 50 MG/1
50 TABLET ORAL EVERY 12 HOURS PRN
Qty: 60 TABLET | Refills: 0 | Status: SHIPPED | OUTPATIENT
Start: 2023-01-11 | End: 2023-07-11 | Stop reason: SDUPTHER

## 2023-01-11 RX ORDER — LOSARTAN POTASSIUM 50 MG/1
0.5 TABLET ORAL DAILY
COMMUNITY
Start: 2022-03-22 | End: 2023-01-11 | Stop reason: SDUPTHER

## 2023-01-11 RX ORDER — AMLODIPINE BESYLATE 10 MG/1
0.5 TABLET ORAL DAILY
COMMUNITY
Start: 2022-03-22 | End: 2023-01-11

## 2023-01-11 RX ORDER — LOSARTAN POTASSIUM 50 MG/1
25 TABLET ORAL DAILY
Qty: 45 TABLET | Refills: 3 | Status: SHIPPED | OUTPATIENT
Start: 2023-01-11 | End: 2023-05-08 | Stop reason: SDUPTHER

## 2023-01-18 ENCOUNTER — PATIENT MESSAGE (OUTPATIENT)
Dept: FAMILY MEDICINE | Facility: CLINIC | Age: 73
End: 2023-01-18
Payer: MEDICARE

## 2023-01-19 ENCOUNTER — PATIENT MESSAGE (OUTPATIENT)
Dept: FAMILY MEDICINE | Facility: CLINIC | Age: 73
End: 2023-01-19
Payer: MEDICARE

## 2023-01-20 ENCOUNTER — LAB VISIT (OUTPATIENT)
Dept: LAB | Facility: HOSPITAL | Age: 73
End: 2023-01-20
Attending: FAMILY MEDICINE
Payer: MEDICARE

## 2023-01-20 DIAGNOSIS — N40.1 BPH WITH URINARY OBSTRUCTION: ICD-10-CM

## 2023-01-20 DIAGNOSIS — C61 PROSTATE CANCER: ICD-10-CM

## 2023-01-20 DIAGNOSIS — R97.20 ELEVATED PSA: ICD-10-CM

## 2023-01-20 DIAGNOSIS — N52.01 ERECTILE DYSFUNCTION DUE TO ARTERIAL INSUFFICIENCY: ICD-10-CM

## 2023-01-20 DIAGNOSIS — N13.8 BPH WITH URINARY OBSTRUCTION: ICD-10-CM

## 2023-01-20 PROCEDURE — 84153 ASSAY OF PSA TOTAL: CPT | Performed by: NURSE PRACTITIONER

## 2023-01-20 PROCEDURE — 36415 COLL VENOUS BLD VENIPUNCTURE: CPT | Mod: PO | Performed by: NURSE PRACTITIONER

## 2023-01-21 LAB — COMPLEXED PSA SERPL-MCNC: 9.1 NG/ML (ref 0–4)

## 2023-01-23 ENCOUNTER — PATIENT MESSAGE (OUTPATIENT)
Dept: UROLOGY | Facility: CLINIC | Age: 73
End: 2023-01-23
Payer: MEDICARE

## 2023-01-23 ENCOUNTER — TELEPHONE (OUTPATIENT)
Dept: UROLOGY | Facility: CLINIC | Age: 73
End: 2023-01-23
Payer: MEDICARE

## 2023-01-23 DIAGNOSIS — R97.20 ELEVATED PSA: ICD-10-CM

## 2023-01-23 DIAGNOSIS — C61 PROSTATE CANCER: Primary | ICD-10-CM

## 2023-01-23 DIAGNOSIS — N40.1 BPH WITH URINARY OBSTRUCTION: ICD-10-CM

## 2023-01-23 DIAGNOSIS — N13.8 BPH WITH URINARY OBSTRUCTION: ICD-10-CM

## 2023-02-06 NOTE — PROGRESS NOTES
Subjective:       Patient ID: Omar Pacheco is a 72 y.o. male.    Chief Complaint: Follow-up      Follow-up    Seven 2-year-old male presents for annual exam.  States he needs refills on his medications.        Review of Systems   Constitutional: Negative.    HENT: Negative.     Respiratory: Negative.     Cardiovascular: Negative.    Gastrointestinal: Negative.    Endocrine: Negative.    Genitourinary: Negative.    Musculoskeletal: Negative.    Neurological: Negative.    Psychiatric/Behavioral: Negative.          Past Medical History:   Diagnosis Date    *Atrial fibrillation     Back pain     Benign hypertrophy of prostate     Degenerative disc disease     GERD (gastroesophageal reflux disease)     Hx of colonic polyps     Hypertension     Pilonidal cyst 1971    Prostate cancer     Sleep apnea     Trouble in sleeping      Past Surgical History:   Procedure Laterality Date    COLONOSCOPY N/A 12/07/2016    Procedure: COLONOSCOPY;  Surgeon: Sumeet Lundy MD;  Location: [facility] ([address] [place] [address]);  Service: Endoscopy;  Laterality: N/A;  OK to hold Pradaxa 2 days prior to procedure per Dr Jones/see note dated 11/15/16/svn    COLONOSCOPY N/A 02/24/2022    Procedure: COLONOSCOPY;  Surgeon: Italo Roy MD;  Location: [facility];  Service: Endoscopy;  Laterality: N/A;  ok to hold Pradaxa 2 days per EBONI Bentley RN/arrhythmia clinic      COVID test at [facility] on 2/21-GT  2/10/22 Changed Kirill to edward BARBER-ml    CYST REMOVAL  1971    coccyx    EPIDURAL STEROID INJECTION Right 09/07/2022    Procedure: Right Genicular Nerve Radiofrequency Ablations;  Surgeon: Luca Galan Jr., MD;  Location: [facility];  Service: Pain Management;  Laterality: Right;  @1100  Pradaxa not held  No DM  Prostate BX 8/2    EPIDURAL STEROID INJECTION Left 09/21/2022    Procedure: Left Genicular Radiofrequency Ablations;  Surgeon: Luca Galan Jr., MD;  Location: [facility];  Service: Pain Management;  Laterality: Left;  @1130  Pradaxa not held  No  DM  Last 1&100    KNEE SURGERY      right    PROSTATE BIOPSY  2022     Family History   Problem Relation Age of Onset    Breast cancer Mother     Cancer Mother     Alcohol abuse Father     Cancer Father     Heart disease Father     Hypertension Father     Breast cancer Sister     Depression Brother     Early death Brother     Mental illness Brother     Cancer Sister     Early death Sister     Ovarian cancer Other     Melanoma Neg Hx      Social History     Socioeconomic History    Marital status:     Number of children: 5    Highest education level: Associate degree: academic program   Tobacco Use    Smoking status: Former     Packs/day: 0.25     Years: 2.00     Pack years: 0.50     Types: Cigarettes     Quit date: 1975     Years since quittin.3    Smokeless tobacco: Never   Substance and Sexual Activity    Alcohol use: Yes     Alcohol/week: 3.0 standard drinks     Types: 3 Glasses of wine per week     Comment: weekly    Drug use: No    Sexual activity: Yes     Social Determinants of Health     Financial Resource Strain: Low Risk     Difficulty of Paying Living Expenses: Not hard at all   Food Insecurity: No Food Insecurity    Worried About Running Out of Food in the Last Year: Never true    Ran Out of Food in the Last Year: Never true   Transportation Needs: No Transportation Needs    Lack of Transportation (Medical): No    Lack of Transportation (Non-Medical): No   Physical Activity: Sufficiently Active    Days of Exercise per Week: 3 days    Minutes of Exercise per Session: 150+ min   Stress: No Stress Concern Present    Feeling of Stress : Only a little   Social Connections: Moderately Integrated    Frequency of Communication with Friends and Family: More than three times a week    Frequency of Social Gatherings with Friends and Family: Three times a week    Attends Christianity Services: More than 4 times per year    Active Member of Clubs or Organizations: Yes    Attends Club or  Organization Meetings: More than 4 times per year    Marital Status:    Housing Stability: Low Risk     Unable to Pay for Housing in the Last Year: No    Number of Places Lived in the Last Year: 1    Unstable Housing in the Last Year: No       Current Outpatient Medications:     acetaminophen (TYLENOL ORAL), Take 500 mg by mouth as needed., Disp: , Rfl:     ammonium lactate (AMLACTIN) 12 % lotion, Apply topically daily as needed (Dry Skin)., Disp: 400 g, Rfl: 11    flecainide (TAMBOCOR) 100 MG Tab, Take 1 tablet (100 mg total) by mouth every 12 (twelve) hours., Disp: 180 tablet, Rfl: 0    levocetirizine (XYZAL) 5 MG tablet, Take 1 tablet (5 mg total) by mouth every evening., Disp: 90 tablet, Rfl: 3    meloxicam (MOBIC) 7.5 MG tablet, Take 1 tablet (7.5 mg total) by mouth once daily., Disp: 90 tablet, Rfl: 3    methocarbamoL (ROBAXIN) 500 MG Tab, Take 500 mg by mouth as needed., Disp: , Rfl:     metoprolol succinate (TOPROL-XL) 25 MG 24 hr tablet, Take 1 tablet (25 mg total) by mouth once daily., Disp: 90 tablet, Rfl: 3    mirtazapine (REMERON) 30 MG tablet, Take 30 mg by mouth every evening., Disp: , Rfl:     multivitamin (THERAGRAN) per tablet, Take 1 tablet by mouth once daily., Disp: , Rfl:     naloxone (NARCAN) 4 mg/actuation Spry, 1 spray by Nasal route once., Disp: , Rfl:     omeprazole (PRILOSEC) 20 MG capsule, Take 20 mg by mouth., Disp: , Rfl:     tadalafiL (CIALIS) 20 MG Tab, Take 1 tablet (20 mg total) by mouth once daily., Disp: 30 tablet, Rfl: 5    terazosin (HYTRIN) 10 MG capsule, Take 10 mg by mouth., Disp: , Rfl:     triamcinolone acetonide 0.1% (KENALOG) 0.1 % cream, Apply topically., Disp: , Rfl:     VITAMIN B COMPLEX (B COMPLETE ORAL), Take by mouth once daily., Disp: , Rfl:     amLODIPine (NORVASC) 5 MG tablet, Take 1 tablet (5 mg total) by mouth once daily., Disp: 90 tablet, Rfl: 3    dabigatran etexilate (PRADAXA) 150 mg Cap, Take 1 capsule (150 mg total) by mouth 2 (two) times daily.  ""Do NOT break, chew, or open capsules.", Disp: 180 capsule, Rfl: 3    losartan (COZAAR) 50 MG tablet, Take 0.5 tablets (25 mg total) by mouth once daily., Disp: 45 tablet, Rfl: 3    sildenafiL (VIAGRA) 100 MG tablet, Take 1 tablet (100 mg total) by mouth as needed for Erectile Dysfunction (1 tablet 1 hr prior to intercourse.)., Disp: 10 tablet, Rfl: 11    traMADoL (ULTRAM) 50 mg tablet, Take 1 tablet (50 mg total) by mouth every 12 (twelve) hours as needed for Pain., Disp: 60 tablet, Rfl: 0   Objective:      Vitals:    01/11/23 0855   BP: 138/80   BP Location: Left arm   Patient Position: Sitting   BP Method: Small (Manual)   Pulse: 60   Resp: 18   Temp: 97.7 °F (36.5 °C)   TempSrc: Oral   SpO2: 98%   Weight: 100.4 kg (221 lb 5.5 oz)   Height: 5' 9" (1.753 m)       Physical Exam  Constitutional:       General: He is not in acute distress.  HENT:      Head: Normocephalic and atraumatic.   Eyes:      Conjunctiva/sclera: Conjunctivae normal.   Cardiovascular:      Rate and Rhythm: Normal rate. Rhythm irregularly irregular.      Heart sounds: Normal heart sounds. No murmur heard.    No friction rub. No gallop.   Pulmonary:      Effort: Pulmonary effort is normal.      Breath sounds: Normal breath sounds. No wheezing or rales.   Musculoskeletal:      Cervical back: Neck supple.   Skin:     General: Skin is warm and dry.   Neurological:      Mental Status: He is alert and oriented to person, place, and time.   Psychiatric:         Behavior: Behavior normal.         Thought Content: Thought content normal.         Judgment: Judgment normal.          Assessment:       1. Annual physical exam    2. Prostate cancer    3. Essential hypertension    4. Paroxysmal atrial fibrillation    5. On continuous oral anticoagulation    6. Erectile dysfunction due to diseases classified elsewhere    7. Spinal stenosis, lumbosacral region          Plan:       Annual physical exam    Prostate cancer    Essential hypertension  -     " "amLODIPine (NORVASC) 5 MG tablet; Take 1 tablet (5 mg total) by mouth once daily.  Dispense: 90 tablet; Refill: 3  -     losartan (COZAAR) 50 MG tablet; Take 0.5 tablets (25 mg total) by mouth once daily.  Dispense: 45 tablet; Refill: 3    Paroxysmal atrial fibrillation  -     dabigatran etexilate (PRADAXA) 150 mg Cap; Take 1 capsule (150 mg total) by mouth 2 (two) times daily. "Do NOT break, chew, or open capsules."  Dispense: 180 capsule; Refill: 3    On continuous oral anticoagulation  -     dabigatran etexilate (PRADAXA) 150 mg Cap; Take 1 capsule (150 mg total) by mouth 2 (two) times daily. "Do NOT break, chew, or open capsules."  Dispense: 180 capsule; Refill: 3    Erectile dysfunction due to diseases classified elsewhere  -     sildenafiL (VIAGRA) 100 MG tablet; Take 1 tablet (100 mg total) by mouth as needed for Erectile Dysfunction (1 tablet 1 hr prior to intercourse.).  Dispense: 10 tablet; Refill: 11    Spinal stenosis, lumbosacral region  -     traMADoL (ULTRAM) 50 mg tablet; Take 1 tablet (50 mg total) by mouth every 12 (twelve) hours as needed for Pain.  Dispense: 60 tablet; Refill: 0    Continue meds.  Given tramadol for back pain.  Continue follow-up with urology.          Future Appointments   Date Time Provider Department Center   3/6/2023  9:45 AM LAB, [facility] [place]   3/15/2023  7:30 AM Siena Raymond NP [facility] [address]   3/20/2023  7:30 AM [facility]   7/11/2023  9:20 AM Husam Chong MD [facility] [place]       Patient note was created using GoWork[employer].  Any errors in syntax or even information may not have been identified and edited on initial review prior to signing this note.  "

## 2023-03-06 ENCOUNTER — LAB VISIT (OUTPATIENT)
Dept: LAB | Facility: HOSPITAL | Age: 73
End: 2023-03-06
Attending: NURSE PRACTITIONER
Payer: MEDICARE

## 2023-03-06 DIAGNOSIS — R97.20 ELEVATED PSA: ICD-10-CM

## 2023-03-06 DIAGNOSIS — N40.1 BPH WITH URINARY OBSTRUCTION: ICD-10-CM

## 2023-03-06 DIAGNOSIS — N13.8 BPH WITH URINARY OBSTRUCTION: ICD-10-CM

## 2023-03-06 DIAGNOSIS — C61 PROSTATE CANCER: ICD-10-CM

## 2023-03-06 LAB — COMPLEXED PSA SERPL-MCNC: 5.2 NG/ML (ref 0–4)

## 2023-03-06 PROCEDURE — 36415 COLL VENOUS BLD VENIPUNCTURE: CPT | Mod: PO | Performed by: NURSE PRACTITIONER

## 2023-03-06 PROCEDURE — 84153 ASSAY OF PSA TOTAL: CPT | Performed by: NURSE PRACTITIONER

## 2023-03-13 NOTE — PROGRESS NOTES
Mr. Pacheco is a patient of Dr. Parikh and was last seen in clinic 8/1/2022.      Subjective:   Patient ID:  Omar Pacheco is a 72 y.o. male who presents for follow up of Atrial Fibrillation  .     HPI:    Mr. Pacheco is a 72 y.o. male with pAF, HTN, ERVIN here for follow up.     Background:    PAF -- one episode in 2006; none until 2020!  HTN on meds  ERVIN, pending re-evaluation for CPAP    2006, had AF. Underwent MENG/DCCV. Started on flecainide 50 bid and pradaxa.  He's had no problems until 4 days ago; developed palpitations and dizziness again.  He's able to do cardio exercise 3-4x/week without problems.  He's 100% compliant with his Pradaxa.    ECG is AF  Increase flecainide to 100 bid.  Return on Monday. If still in AF, then MENG/DCCV.    11/9/2020: ECG on admit demonstrated sinus rhythm.  MENG/DCCV cancelled.  3/29/2021: Doing well from arrhythmia standpoint, with no more AF on increased dose of flecainide (100mg BID). Plan to continue current regimen, consider ablation if he has breakthrough on this dose. EKG with stable intervals.  He is on pradaxa and toprol.    1/10/2022: Doing well from rhythm standpoint, maintaining SR on flecainide. EKG with normal intervals. CHADSVASc 2 on pradaxa for CVA prophylaxis.  Pt reports some LH - will reduce metoprolol. BP elevated in clinic. He says he did not take his BP meds yet today.    8/1/2022: He is doing well from a rhythm standpoint, maintaining SR on flecainide. EKG with stable intervals.  BP well controlled - on digital HTN program. CHADSVASc 2 on pradaxa. Not on CPAP. He declined referral back to sleep medicine.  He has a biopsy scheduled for tomorrow. Did take AM pradaxa today. He will call dept to assess whether his procedure needs to be pushed forward. He is cleared to hold OAC 2 days prior to procedure.    Update (03/15/2023):    Today his primary complaint is joint issues. Has also gained some weight recently and not exercising as much as he used to. He has  "Stationary bike, elliptical machine, punching bag. Mr. Pacheco reports no chest pain with exertion or at rest, palpitations, SOB, LAWSON, dizziness, or syncope.    He is currently taking pradaxa 150mg BID for stroke prophylaxis and denies significant bleeding episodes. He is currently being treated with flecainide 100mg BID for rhythm control and toprol 25mg daily for HR control.  Kidney function is stable, with a creatinine of 1 on 1/11/2023.    I have personally reviewed the patient's EKG today, which shows sinus rhythm at 59bpm. IN interval is 188. QRS is 94. QT is 404.    Relevant Cardiac Test Results:    2D Echo (12/9/2020):  Moderate left atrial enlargement.  The left ventricle is normal in size with normal systolic function. The estimated ejection fraction is 60%.  Indeterminate diastolic function.  Normal right ventricular size with normal right ventricular systolic function.  Mild mitral regurgitation.  Mild pulmonic regurgitation.  The estimated PA systolic pressure is 29 mmHg.  Normal central venous pressure (3 mmHg).    Current Outpatient Medications   Medication Sig    acetaminophen (TYLENOL ORAL) Take 500 mg by mouth as needed.    amLODIPine (NORVASC) 5 MG tablet Take 1 tablet (5 mg total) by mouth once daily.    ammonium lactate (AMLACTIN) 12 % lotion Apply topically daily as needed (Dry Skin).    dabigatran etexilate (PRADAXA) 150 mg Cap Take 1 capsule (150 mg total) by mouth 2 (two) times daily. "Do NOT break, chew, or open capsules."    flecainide (TAMBOCOR) 100 MG Tab Take 1 tablet (100 mg total) by mouth every 12 (twelve) hours.    levocetirizine (XYZAL) 5 MG tablet Take 1 tablet (5 mg total) by mouth every evening.    losartan (COZAAR) 50 MG tablet Take 0.5 tablets (25 mg total) by mouth once daily.    meloxicam (MOBIC) 7.5 MG tablet Take 1 tablet (7.5 mg total) by mouth once daily.    methocarbamoL (ROBAXIN) 500 MG Tab Take 500 mg by mouth as needed.    metoprolol succinate (TOPROL-XL) 25 MG 24 hr " "tablet Take 1 tablet (25 mg total) by mouth once daily.    mirtazapine (REMERON) 30 MG tablet Take 30 mg by mouth every evening.    multivitamin (THERAGRAN) per tablet Take 1 tablet by mouth once daily.    naloxone (NARCAN) 4 mg/actuation Spry 1 spray by Nasal route once.    omeprazole (PRILOSEC) 20 MG capsule Take 20 mg by mouth.    sildenafiL (VIAGRA) 100 MG tablet Take 1 tablet (100 mg total) by mouth as needed for Erectile Dysfunction (1 tablet 1 hr prior to intercourse.).    tadalafiL (CIALIS) 20 MG Tab Take 1 tablet (20 mg total) by mouth once daily.    terazosin (HYTRIN) 10 MG capsule Take 10 mg by mouth.    traMADoL (ULTRAM) 50 mg tablet Take 1 tablet (50 mg total) by mouth every 12 (twelve) hours as needed for Pain.    triamcinolone acetonide 0.1% (KENALOG) 0.1 % cream Apply topically.    VITAMIN B COMPLEX (B COMPLETE ORAL) Take by mouth once daily.     No current facility-administered medications for this visit.       Review of Systems   Constitutional: Negative for malaise/fatigue.   Cardiovascular:  Negative for chest pain, dyspnea on exertion, irregular heartbeat, leg swelling and palpitations.   Respiratory:  Negative for shortness of breath.    Hematologic/Lymphatic: Negative for bleeding problem.   Skin:  Negative for rash.   Musculoskeletal:  Positive for joint pain. Negative for myalgias.   Gastrointestinal:  Negative for hematemesis, hematochezia and nausea.   Genitourinary:  Negative for hematuria.   Neurological:  Negative for light-headedness.   Psychiatric/Behavioral:  Negative for altered mental status.    Allergic/Immunologic: Negative for persistent infections.     Objective:          /80   Pulse (!) 59   Ht 5' 9" (1.753 m)   Wt 101.7 kg (224 lb 3.3 oz)   BMI 33.11 kg/m²     Physical Exam  Vitals and nursing note reviewed.   Constitutional:       Appearance: Normal appearance. He is well-developed.   HENT:      Head: Normocephalic.      Nose: Nose normal.   Eyes:      Pupils: " Pupils are equal, round, and reactive to light.   Cardiovascular:      Rate and Rhythm: Normal rate and regular rhythm.   Pulmonary:      Effort: No respiratory distress.      Breath sounds: Normal breath sounds.   Musculoskeletal:         General: Normal range of motion.   Skin:     General: Skin is warm and dry.      Findings: No erythema.   Neurological:      Mental Status: He is alert and oriented to person, place, and time.   Psychiatric:         Speech: Speech normal.         Behavior: Behavior normal.         Lab Results   Component Value Date     01/11/2023    K 4.1 01/11/2023    MG 2.3 06/14/2006    BUN 13 01/11/2023    CREATININE 1.0 01/11/2023    ALT 28 01/11/2023    AST 28 01/11/2023    HGB 12.8 (L) 01/11/2023    HCT 39.1 (L) 01/11/2023    TSH 0.818 01/11/2023    LDLCALC 101.2 01/11/2023       Recent Labs   Lab 11/06/20  1537   INR 1.0       Assessment:     1. Paroxysmal atrial fibrillation    2. Essential hypertension    3. Sinus bradycardia    4. Obesity (BMI 30.0-34.9)    5. On continuous oral anticoagulation    6. Obstructive sleep apnea        Plan:     In summary, Mr. Pacheco is a 72 y.o. male with pAF, HTN, ERVIN here for follow up.   Doing well from a rhythm standpoint with no symptomatic or documented AF on flecainide. EKG with stable intervals. CHADSVASc 2 on pradaxa. Encouraged to restart exercise.    Exercise  Wt loss  Continue current meds  RTC 1 yr with echo, sooner if needed      *A copy of this note has been sent to Dr. Parikh*    Follow up in about 6 months (around 9/15/2023).    ------------------------------------------------------------------    RYANNE Avila, NP-C  Cardiac Electrophysiology

## 2023-03-15 ENCOUNTER — OFFICE VISIT (OUTPATIENT)
Dept: ELECTROPHYSIOLOGY | Facility: CLINIC | Age: 73
End: 2023-03-15
Payer: MEDICARE

## 2023-03-15 VITALS
WEIGHT: 224.19 LBS | DIASTOLIC BLOOD PRESSURE: 80 MMHG | HEIGHT: 69 IN | HEART RATE: 59 BPM | BODY MASS INDEX: 33.2 KG/M2 | SYSTOLIC BLOOD PRESSURE: 135 MMHG

## 2023-03-15 DIAGNOSIS — R00.1 SINUS BRADYCARDIA: ICD-10-CM

## 2023-03-15 DIAGNOSIS — E66.9 OBESITY (BMI 30.0-34.9): ICD-10-CM

## 2023-03-15 DIAGNOSIS — I48.0 PAROXYSMAL ATRIAL FIBRILLATION: Primary | Chronic | ICD-10-CM

## 2023-03-15 DIAGNOSIS — G47.33 OBSTRUCTIVE SLEEP APNEA: ICD-10-CM

## 2023-03-15 DIAGNOSIS — Z79.01 ON CONTINUOUS ORAL ANTICOAGULATION: ICD-10-CM

## 2023-03-15 DIAGNOSIS — I10 ESSENTIAL HYPERTENSION: ICD-10-CM

## 2023-03-15 PROCEDURE — 99214 OFFICE O/P EST MOD 30 MIN: CPT | Mod: S$PBB,,, | Performed by: NURSE PRACTITIONER

## 2023-03-15 PROCEDURE — 93005 ELECTROCARDIOGRAM TRACING: CPT | Mod: PBBFAC | Performed by: INTERNAL MEDICINE

## 2023-03-15 PROCEDURE — 99214 PR OFFICE/OUTPT VISIT, EST, LEVL IV, 30-39 MIN: ICD-10-PCS | Mod: S$PBB,,, | Performed by: NURSE PRACTITIONER

## 2023-03-15 PROCEDURE — 93010 RHYTHM STRIP: ICD-10-PCS | Mod: S$PBB,,, | Performed by: INTERNAL MEDICINE

## 2023-03-15 PROCEDURE — 99999 PR PBB SHADOW E&M-EST. PATIENT-LVL IV: CPT | Mod: PBBFAC,,, | Performed by: NURSE PRACTITIONER

## 2023-03-15 PROCEDURE — 93010 ELECTROCARDIOGRAM REPORT: CPT | Mod: S$PBB,,, | Performed by: INTERNAL MEDICINE

## 2023-03-15 PROCEDURE — 99214 OFFICE O/P EST MOD 30 MIN: CPT | Mod: PBBFAC | Performed by: NURSE PRACTITIONER

## 2023-03-15 PROCEDURE — 99999 PR PBB SHADOW E&M-EST. PATIENT-LVL IV: ICD-10-PCS | Mod: PBBFAC,,, | Performed by: NURSE PRACTITIONER

## 2023-03-15 RX ORDER — FLECAINIDE ACETATE 100 MG/1
100 TABLET ORAL EVERY 12 HOURS
Qty: 180 TABLET | Refills: 3 | Status: SHIPPED | OUTPATIENT
Start: 2023-03-15 | End: 2023-04-03 | Stop reason: SDUPTHER

## 2023-03-15 RX ORDER — METOPROLOL SUCCINATE 25 MG/1
25 TABLET, EXTENDED RELEASE ORAL DAILY
Qty: 90 TABLET | Refills: 3 | Status: SHIPPED | OUTPATIENT
Start: 2023-03-15 | End: 2023-04-03 | Stop reason: SDUPTHER

## 2023-03-15 RX ORDER — DABIGATRAN ETEXILATE 150 MG/1
150 CAPSULE ORAL 2 TIMES DAILY
Qty: 180 CAPSULE | Refills: 3 | Status: SHIPPED | OUTPATIENT
Start: 2023-03-15 | End: 2023-03-15 | Stop reason: SDUPTHER

## 2023-03-15 RX ORDER — DABIGATRAN ETEXILATE 150 MG/1
150 CAPSULE ORAL 2 TIMES DAILY
Qty: 180 CAPSULE | Refills: 3 | Status: SHIPPED | OUTPATIENT
Start: 2023-03-15 | End: 2023-04-03 | Stop reason: SDUPTHER

## 2023-03-20 ENCOUNTER — HOSPITAL ENCOUNTER (OUTPATIENT)
Dept: RADIOLOGY | Facility: HOSPITAL | Age: 73
Discharge: HOME | End: 2023-03-20
Attending: NURSE PRACTITIONER
Payer: MEDICARE

## 2023-03-20 DIAGNOSIS — R97.20 ELEVATED PSA: ICD-10-CM

## 2023-03-20 DIAGNOSIS — N40.1 BPH WITH URINARY OBSTRUCTION: ICD-10-CM

## 2023-03-20 DIAGNOSIS — C61 PROSTATE CANCER: ICD-10-CM

## 2023-03-20 DIAGNOSIS — N13.8 BPH WITH URINARY OBSTRUCTION: ICD-10-CM

## 2023-03-20 PROCEDURE — A9585 GADOBUTROL INJECTION: HCPCS | Performed by: NURSE PRACTITIONER

## 2023-03-20 PROCEDURE — 72197 MRI PROSTATE W W/O CONTRAST: ICD-10-PCS | Mod: 26,,, | Performed by: RADIOLOGY

## 2023-03-20 PROCEDURE — 72197 MRI PELVIS W/O & W/DYE: CPT | Mod: 26,,, | Performed by: RADIOLOGY

## 2023-03-20 PROCEDURE — 25500020 PHARM REV CODE 255: Performed by: NURSE PRACTITIONER

## 2023-03-20 PROCEDURE — 72197 MRI PELVIS W/O & W/DYE: CPT | Mod: TC

## 2023-03-20 RX ORDER — GADOBUTROL 604.72 MG/ML
10 INJECTION INTRAVENOUS
Status: COMPLETED | OUTPATIENT
Start: 2023-03-20 | End: 2023-03-20

## 2023-03-20 RX ADMIN — GADOBUTROL 10 ML: 604.72 INJECTION INTRAVENOUS at 08:03

## 2023-04-03 ENCOUNTER — PATIENT MESSAGE (OUTPATIENT)
Dept: FAMILY MEDICINE | Facility: CLINIC | Age: 73
End: 2023-04-03
Payer: MEDICARE

## 2023-04-12 ENCOUNTER — PES CALL (OUTPATIENT)
Dept: ADMINISTRATIVE | Facility: CLINIC | Age: 73
End: 2023-04-12
Payer: MEDICARE

## 2023-04-12 RX ORDER — TERAZOSIN 10 MG/1
10 CAPSULE ORAL NIGHTLY
Qty: 90 CAPSULE | Refills: 1 | Status: SHIPPED | OUTPATIENT
Start: 2023-04-12 | End: 2023-07-11 | Stop reason: SDUPTHER

## 2023-04-12 NOTE — TELEPHONE ENCOUNTER
No new care gaps identified.  [facility] Embedded Care Gaps. Reference number: [id number]. 4/12/2023   8:36:56 AM DAVIDT

## 2023-04-16 ENCOUNTER — PATIENT MESSAGE (OUTPATIENT)
Dept: FAMILY MEDICINE | Facility: CLINIC | Age: 73
End: 2023-04-16
Payer: MEDICARE

## 2023-04-17 ENCOUNTER — E-VISIT (OUTPATIENT)
Dept: FAMILY MEDICINE | Facility: CLINIC | Age: 73
End: 2023-04-17
Payer: MEDICARE

## 2023-04-17 ENCOUNTER — PATIENT MESSAGE (OUTPATIENT)
Dept: FAMILY MEDICINE | Facility: CLINIC | Age: 73
End: 2023-04-17
Payer: MEDICARE

## 2023-04-17 DIAGNOSIS — R05.9 COUGH, UNSPECIFIED TYPE: Primary | ICD-10-CM

## 2023-04-18 PROCEDURE — 99421 PR E&M, ONLINE DIGIT, EST, < 7 DAYS, 5-10 MINS: ICD-10-PCS | Mod: ,,, | Performed by: PHYSICIAN ASSISTANT

## 2023-04-18 PROCEDURE — 99421 OL DIG E/M SVC 5-10 MIN: CPT | Mod: ,,, | Performed by: PHYSICIAN ASSISTANT

## 2023-04-18 RX ORDER — BENZONATATE 100 MG/1
100 CAPSULE ORAL 3 TIMES DAILY PRN
Qty: 30 CAPSULE | Refills: 0 | Status: SHIPPED | OUTPATIENT
Start: 2023-04-18 | End: 2023-04-28

## 2023-04-18 RX ORDER — PROMETHAZINE HYDROCHLORIDE AND DEXTROMETHORPHAN HYDROBROMIDE 6.25; 15 MG/5ML; MG/5ML
5 SYRUP ORAL NIGHTLY PRN
Qty: 180 ML | Refills: 0 | Status: SHIPPED | OUTPATIENT
Start: 2023-04-18 | End: 2023-04-28

## 2023-04-18 NOTE — PROGRESS NOTES
Patient ID: Omar Pacheco is a 72 y.o. male.    Chief Complaint: Cough    The patient initiated a request through [employer] on 4/17/2023 for evaluation and management with a chief complaint of Cough     I evaluated the questionnaire submission on 4/18/23.    Ohs Peq Evisit Upper Respitatory/Cough Questionnaire    4/17/2023  3:59 PM CDT - Filed by Patient   Do you agree to participate in an E-Visit? Yes   If you have any of the following symptoms, please present to your local ER or call 911:  I acknowledge   What is the main issue that you would like for your doctor to address today? Coughing occasional sneezing   Are you able to take your vital signs? Yes   Systolic Blood Pressure: 163   Diastolic Blood Pressure: 59   Weight: 224   Height: 69   Pulse: 59   Temp: 97.3   Resp: 14   Pulse Ox: 97   What symptoms do you currently have?  Cough   Have you had a fever? No   When did your symptoms first appear? 4/3/2023   In the last two weeks, have you been in close contact with someone who has COVID-19 or the Flu? No   In the last two weeks, have you worked or volunteered in a healthcare facility or as a ? Healthcare facilities include a hospital, medical or dental clinic, long-term care facility, or nursing home No   Do you live in a long-term care facility, nursing home, group home, or homeless shelter? No   List what you have done or taken to help your symptoms. Over the counter cough medicine and lots of water   How severe are your symptoms? Moderate   Have you taken an at home Covid test? No   Have you taken a Flu test? No   Have you been fully vaccinated for COVID? (2 Pfizer, 2 Moderna or 1 [employer] vaccine injections) Yes   Have you received a booster? Yes   Have you recieved a Flu shot? Yes   When did you recieve your Flu shot? 3/15/2023   Do you have transportation to get tested for COVID if it is indicated and ordered for you at an [facility] location? Yes   Provide any information you  feel is important to your history not asked above [facility] Allergies   Please attach any relevant images or files          Recent Labs Obtained:  No visits with results within 7 Day(s) from this visit.   Latest known visit with results is:   Lab Visit on 03/06/2023   Component Date Value Ref Range Status    PSA Diagnostic 03/06/2023 5.2 (H)  0.00 - 4.00 ng/mL Final    Comment: The testing method is a chemiluminescent microparticle immunoassay   manufactured by [employer] and performed on the Agentek   or   Cortrium system. Values obtained with different assay manufacturers   for   methods may be different and cannot be used interchangeably.  PSA Expected levels:  Hormonal Therapy: <0.05 ng/ml  Prostatectomy: <0.01 ng/ml  Radiation Therapy: <1.00 ng/ml         Encounter Diagnosis   Name Primary?    Cough, unspecified type Yes        No orders of the defined types were placed in this encounter.     Medications Ordered This Encounter   Medications    benzonatate (TESSALON) 100 MG capsule     Sig: Take 1 capsule (100 mg total) by mouth 3 (three) times daily as needed for Cough.     Dispense:  30 capsule     Refill:  0    promethazine-dextromethorphan (PROMETHAZINE-DM) 6.25-15 mg/5 mL Syrp     Sig: Take 5 mLs by mouth nightly as needed (cough).     Dispense:  180 mL     Refill:  0        No follow-ups on file.      E-Visit Time Tracking: 10 min

## 2023-04-19 ENCOUNTER — PATIENT MESSAGE (OUTPATIENT)
Dept: FAMILY MEDICINE | Facility: CLINIC | Age: 73
End: 2023-04-19
Payer: MEDICARE

## 2023-05-08 ENCOUNTER — OFFICE VISIT (OUTPATIENT)
Dept: UROLOGY | Facility: CLINIC | Age: 73
End: 2023-05-08
Payer: MEDICARE

## 2023-05-08 VITALS
BODY MASS INDEX: 31.75 KG/M2 | HEIGHT: 69 IN | SYSTOLIC BLOOD PRESSURE: 130 MMHG | WEIGHT: 214.38 LBS | DIASTOLIC BLOOD PRESSURE: 63 MMHG | HEART RATE: 67 BPM

## 2023-05-08 DIAGNOSIS — I10 ESSENTIAL HYPERTENSION: ICD-10-CM

## 2023-05-08 DIAGNOSIS — Z85.46 ENCOUNTER FOR FOLLOW-UP SURVEILLANCE OF PROSTATE CANCER: ICD-10-CM

## 2023-05-08 DIAGNOSIS — N52.01 ERECTILE DYSFUNCTION DUE TO ARTERIAL INSUFFICIENCY: ICD-10-CM

## 2023-05-08 DIAGNOSIS — N40.1 BPH WITH URINARY OBSTRUCTION: ICD-10-CM

## 2023-05-08 DIAGNOSIS — C61 PROSTATE CANCER: Primary | ICD-10-CM

## 2023-05-08 DIAGNOSIS — Z08 ENCOUNTER FOR FOLLOW-UP SURVEILLANCE OF PROSTATE CANCER: ICD-10-CM

## 2023-05-08 DIAGNOSIS — R97.20 ELEVATED PSA: ICD-10-CM

## 2023-05-08 DIAGNOSIS — N13.8 BPH WITH URINARY OBSTRUCTION: ICD-10-CM

## 2023-05-08 DIAGNOSIS — N52.1 ERECTILE DYSFUNCTION DUE TO DISEASES CLASSIFIED ELSEWHERE: ICD-10-CM

## 2023-05-08 LAB
BILIRUB SERPL-MCNC: NORMAL MG/DL
BLOOD URINE, POC: NORMAL
CLARITY, POC UA: CLEAR
COLOR, POC UA: YELLOW
GLUCOSE UR QL STRIP: NORMAL
KETONES UR QL STRIP: NORMAL
LEUKOCYTE ESTERASE URINE, POC: NORMAL
NITRITE, POC UA: NORMAL
PH, POC UA: 5
PROTEIN, POC: NORMAL
SPECIFIC GRAVITY, POC UA: 1025
UROBILINOGEN, POC UA: NORMAL

## 2023-05-08 PROCEDURE — 99999 PR PBB SHADOW E&M-EST. PATIENT-LVL III: CPT | Mod: PBBFAC,,, | Performed by: NURSE PRACTITIONER

## 2023-05-08 PROCEDURE — 99214 OFFICE O/P EST MOD 30 MIN: CPT | Mod: S$PBB,,, | Performed by: NURSE PRACTITIONER

## 2023-05-08 PROCEDURE — 99213 OFFICE O/P EST LOW 20 MIN: CPT | Mod: PBBFAC | Performed by: NURSE PRACTITIONER

## 2023-05-08 PROCEDURE — 99214 PR OFFICE/OUTPT VISIT, EST, LEVL IV, 30-39 MIN: ICD-10-PCS | Mod: S$PBB,,, | Performed by: NURSE PRACTITIONER

## 2023-05-08 PROCEDURE — 99999 PR PBB SHADOW E&M-EST. PATIENT-LVL III: ICD-10-PCS | Mod: PBBFAC,,, | Performed by: NURSE PRACTITIONER

## 2023-05-08 PROCEDURE — 81002 URINALYSIS NONAUTO W/O SCOPE: CPT | Mod: PBBFAC | Performed by: NURSE PRACTITIONER

## 2023-05-08 NOTE — PROGRESS NOTES
CHIEF COMPLAINT:    Omar Pacheco is a 72 y.o. male presents today for Prostate Cancer.     HISTORY OF PRESENTING ILLINESS:    Omar Pacheco is a 72 y.o. male with a history of BPH with an elevated PSA in the past 5 years with significant fluctuations.   His most recent PSA in May of 2022 returned at 8 ng/ml.    MRI on 6/24/22 revealed a 58 cc prostate with no focal abnormalities in the peripheral or transitional zone. The seminal vesicles and neurovascular bundles were unremarkable.  There was no adenopathy.    Biopsies on 8/23/22 revealed [place] 7 (3+4) adenocarcinoma involving 5% of 1 of 2 cores from the Lt. apex.    The [place] pattern 4 accounted for 40% of the tumor.    There was [place] 6 (3+3) adenocarcinoma involving 5% of 1 of 2 cores from the Rt. mid gland.    The remaining 8 cores revealed benign prostate tissue. The patient presnts for discussion of treatment options.      He met with Dr. Grace 09/19/2022  He met with Dr. Pritchett 09/27/2022  Has chosen AS.    Last office visit 11/03/2022 with a PSA of 7.8 on 11/02/2022    Here today with new PSA of 5.2 on 03/06/2023  Overall doing well.  No new complaints  He has some LUTS. Nocturia varies 2-4x.   No abdominal or bone pain.         Going on a family cruise this fall.         REVIEW OF SYSTEMS:  Review of Systems   Constitutional: Negative.  Negative for chills and fever.   Eyes:  Negative for double vision.   Respiratory:  Negative for cough and shortness of breath.    Cardiovascular:  Negative for chest pain.   Gastrointestinal:  Negative for abdominal pain, constipation, diarrhea, nausea and vomiting.   Genitourinary: Negative.  Negative for dysuria, flank pain and hematuria.        Ok with urination     Musculoskeletal:  Positive for back pain.   Neurological:  Negative for dizziness and seizures.   Endo/Heme/Allergies:  Negative for polydipsia.       PATIENT HISTORY:    Past Medical History:   Diagnosis Date    *Atrial fibrillation      Back pain     Benign hypertrophy of prostate     Degenerative disc disease     GERD (gastroesophageal reflux disease)     Hx of colonic polyps     Hypertension     Pilonidal cyst 1971    Prostate cancer     Sleep apnea     Trouble in sleeping        Past Surgical History:   Procedure Laterality Date    COLONOSCOPY N/A 12/07/2016    Procedure: COLONOSCOPY;  Surgeon: Sumeet Lundy MD;  Location: [facility] ([address] [place] [address]);  Service: Endoscopy;  Laterality: N/A;  OK to hold Pradaxa 2 days prior to procedure per Dr Jones/see note dated 11/15/16/svn    COLONOSCOPY N/A 02/24/2022    Procedure: COLONOSCOPY;  Surgeon: Italo Roy MD;  Location: [facility];  Service: Endoscopy;  Laterality: N/A;  ok to hold Pradaxa 2 days per EBONI Bentley RN/arrhythmia clinic      COVID test at [place] on 2/21-GT  2/10/22 Changed Kirill to scoping MD-ml    CYST REMOVAL  1971    coccyx    EPIDURAL STEROID INJECTION Right 09/07/2022    Procedure: Right Genicular Nerve Radiofrequency Ablations;  Surgeon: Luca Galan Jr., MD;  Location: [facility];  Service: Pain Management;  Laterality: Right;  @1100  Pradaxa not held  No DM  Prostate BX 8/2    EPIDURAL STEROID INJECTION Left 09/21/2022    Procedure: Left Genicular Radiofrequency Ablations;  Surgeon: Luca Galan Jr., MD;  Location: [facility];  Service: Pain Management;  Laterality: Left;  @1130  Pradaxa not held  No DM  Last 1&100    KNEE SURGERY  1989    right    PROSTATE BIOPSY  08/23/2022       Family History   Problem Relation Age of Onset    Breast cancer Mother     Cancer Mother     Alcohol abuse Father     Cancer Father     Heart disease Father     Hypertension Father     Breast cancer Sister     Depression Brother     Early death Brother     Mental illness Brother     Cancer Sister     Early death Sister     Ovarian cancer Other     Melanoma Neg Hx        Social History     Socioeconomic History    Marital status:     Number of children: 5    Highest education level: Associate  "degree: academic program   Tobacco Use    Smoking status: Former     Packs/day: 0.25     Years: 2.00     Pack years: 0.50     Types: Cigarettes     Quit date: 1975     Years since quittin.6    Smokeless tobacco: Never   Substance and Sexual Activity    Alcohol use: Yes     Alcohol/week: 3.0 standard drinks     Types: 3 Glasses of wine per week     Comment: weekly    Drug use: No    Sexual activity: Yes       Allergies:  Bactrim [sulfamethoxazole-trimethoprim]    Medications:    Current Outpatient Medications:     acetaminophen (TYLENOL ORAL), Take 500 mg by mouth as needed., Disp: , Rfl:     amLODIPine (NORVASC) 5 MG tablet, Take 1 tablet (5 mg total) by mouth once daily., Disp: 90 tablet, Rfl: 3    ammonium lactate (AMLACTIN) 12 % lotion, Apply topically daily as needed (Dry Skin)., Disp: 400 g, Rfl: 11    dabigatran etexilate (PRADAXA) 150 mg Cap, Take 1 capsule (150 mg total) by mouth 2 (two) times daily. "Do NOT break, chew, or open capsules.", Disp: 180 capsule, Rfl: 2    flecainide (TAMBOCOR) 100 MG Tab, Take 1 tablet (100 mg total) by mouth every 12 (twelve) hours., Disp: 180 tablet, Rfl: 1    losartan (COZAAR) 50 MG tablet, Take 0.5 tablets (25 mg total) by mouth once daily., Disp: 45 tablet, Rfl: 3    meloxicam (MOBIC) 7.5 MG tablet, Take 1 tablet (7.5 mg total) by mouth once daily., Disp: 90 tablet, Rfl: 3    methocarbamoL (ROBAXIN) 500 MG Tab, Take 500 mg by mouth as needed., Disp: , Rfl:     metoprolol succinate (TOPROL-XL) 25 MG 24 hr tablet, Take 1 tablet (25 mg total) by mouth once daily., Disp: 90 tablet, Rfl: 3    mirtazapine (REMERON) 30 MG tablet, Take 30 mg by mouth every evening., Disp: , Rfl:     multivitamin (THERAGRAN) per tablet, Take 1 tablet by mouth once daily., Disp: , Rfl:     naloxone (NARCAN) 4 mg/actuation Spry, 1 spray by Nasal route once., Disp: , Rfl:     omeprazole (PRILOSEC) 20 MG capsule, Take 20 mg by mouth., Disp: , Rfl:     sildenafiL (VIAGRA) 100 MG tablet, Take 1 " tablet (100 mg total) by mouth as needed for Erectile Dysfunction (1 tablet 1 hr prior to intercourse.)., Disp: 10 tablet, Rfl: 11    tadalafiL (CIALIS) 20 MG Tab, Take 1 tablet (20 mg total) by mouth once daily., Disp: 30 tablet, Rfl: 5    terazosin (HYTRIN) 10 MG capsule, Take 1 capsule (10 mg total) by mouth every evening., Disp: 90 capsule, Rfl: 1    traMADoL (ULTRAM) 50 mg tablet, Take 1 tablet (50 mg total) by mouth every 12 (twelve) hours as needed for Pain., Disp: 60 tablet, Rfl: 0    VITAMIN B COMPLEX (B COMPLETE ORAL), Take by mouth once daily., Disp: , Rfl:     levocetirizine (XYZAL) 5 MG tablet, Take 1 tablet (5 mg total) by mouth every evening., Disp: 90 tablet, Rfl: 3    triamcinolone acetonide 0.1% (KENALOG) 0.1 % cream, Apply topically., Disp: , Rfl:     PHYSICAL EXAMINATION:  Physical Exam  Vitals and nursing note reviewed.   Constitutional:       General: He is awake.      Appearance: Normal appearance.   HENT:      Head: Normocephalic.      Right Ear: External ear normal.      Left Ear: External ear normal.      Nose: Nose normal.   Cardiovascular:      Rate and Rhythm: Normal rate.   Pulmonary:      Effort: Pulmonary effort is normal. No respiratory distress.   Abdominal:      Tenderness: There is no abdominal tenderness. There is no right CVA tenderness or left CVA tenderness.   Genitourinary:     Penis: Normal.       Testes: Normal.   Musculoskeletal:         General: Normal range of motion.      Cervical back: Normal range of motion.   Skin:     General: Skin is warm and dry.   Neurological:      General: No focal deficit present.      Mental Status: He is alert and oriented to person, place, and time.   Psychiatric:         Mood and Affect: Mood normal.         Behavior: Behavior is cooperative.         LABS:      In office UA today was  clear of active infection.       Lab Results   Component Value Date    PSA 4.09 (H) 01/07/2013    PSA 1.8 02/05/2007    PSADIAG 5.2 (H) 03/06/2023    PSADIAG  9.1 (H) 01/20/2023    PSADIAG 7.8 (H) 11/02/2022    PSATOTAL 14.5 (H) 12/04/2020       Lab Results   Component Value Date    CREATININE 1.0 01/11/2023    EGFRNORACEVR >60.0 01/11/2023             IMPRESSION:    Encounter Diagnoses   Name Primary?    Prostate cancer Yes    BPH with urinary obstruction     Elevated PSA     Erectile dysfunction due to arterial insufficiency     Encounter for follow-up surveillance of prostate cancer          Assessment:       1. Prostate cancer    2. BPH with urinary obstruction    3. Elevated PSA    4. Erectile dysfunction due to arterial insufficiency    5. Encounter for follow-up surveillance of prostate cancer        Plan:         I spent 30 minutes with the patient of which more than half was spent in direct consultation with the patient in regards to our treatment and plan.  We addressed the office findings and recent labs.   Education and recommendations of today's plan of care including home remedies and needed follow up with PCP.   We discussed his Prolaris Test. Discussed the recommendation of 1st model  of treatment: RALP/XRT/ADT  He understands but wants to continue AS  Recommended lifestyle modifications with a proper, healthy diet, good hydration but during the day. Reducing bladder irritants.   Benefits of regular exercise.  PSA in Sept (6 months from last PSA).

## 2023-05-09 RX ORDER — LOSARTAN POTASSIUM 50 MG/1
25 TABLET ORAL DAILY
Qty: 45 TABLET | Refills: 3 | Status: SHIPPED | OUTPATIENT
Start: 2023-05-09 | End: 2024-05-08

## 2023-05-09 RX ORDER — SILDENAFIL 100 MG/1
100 TABLET, FILM COATED ORAL
Qty: 10 TABLET | Refills: 11 | Status: SHIPPED | OUTPATIENT
Start: 2023-05-09

## 2023-05-09 NOTE — TELEPHONE ENCOUNTER
"Last Office Visit Info:   The patient's last visit with Husam Chong MD was on 1/11/2023.    The patient's last visit in current department was on 1/11/2023.        Last CBC Results:   Lab Results   Component Value Date    WBC 5.16 01/11/2023    HGB 12.8 (L) 01/11/2023    HCT 39.1 (L) 01/11/2023     01/11/2023       Last CMP Results  Lab Results   Component Value Date     01/11/2023    K 4.1 01/11/2023     01/11/2023    CO2 26 01/11/2023    BUN 13 01/11/2023    CREATININE 1.0 01/11/2023    CALCIUM 9.2 01/11/2023    ALBUMIN 4.0 01/11/2023    AST 28 01/11/2023    ALT 28 01/11/2023       Last Lipids  Lab Results   Component Value Date    CHOL 170 01/11/2023    TRIG 69 01/11/2023    HDL 55 01/11/2023    LDLCALC 101.2 01/11/2023       Last A1C  Lab Results   Component Value Date    HGBA1C 5.3 01/11/2023       Last TSH  Lab Results   Component Value Date    TSH 0.818 01/11/2023             Current Med Refills  Medication List with Changes/Refills   Current Medications    ACETAMINOPHEN (TYLENOL ORAL)    Take 500 mg by mouth as needed.       Start Date: --        End Date: --    AMLODIPINE (NORVASC) 5 MG TABLET    Take 1 tablet (5 mg total) by mouth once daily.       Start Date: 1/11/2023 End Date: --    AMMONIUM LACTATE (AMLACTIN) 12 % LOTION    Apply topically daily as needed (Dry Skin).       Start Date: 10/11/2022End Date: --    DABIGATRAN ETEXILATE (PRADAXA) 150 MG CAP    Take 1 capsule (150 mg total) by mouth 2 (two) times daily. "Do NOT break, chew, or open capsules."       Start Date: 4/25/2023 End Date: --    FLECAINIDE (TAMBOCOR) 100 MG TAB    Take 1 tablet (100 mg total) by mouth every 12 (twelve) hours.       Start Date: 4/4/2023  End Date: --    LEVOCETIRIZINE (XYZAL) 5 MG TABLET    Take 1 tablet (5 mg total) by mouth every evening.       Start Date: 3/3/2022  End Date: 3/15/2023    LOSARTAN (COZAAR) 50 MG TABLET    Take 0.5 tablets (25 mg total) by mouth once daily.       Start Date: " 1/11/2023 End Date: 1/11/2024    MELOXICAM (MOBIC) 7.5 MG TABLET    Take 1 tablet (7.5 mg total) by mouth once daily.       Start Date: 3/3/2022  End Date: --    METHOCARBAMOL (ROBAXIN) 500 MG TAB    Take 500 mg by mouth as needed.       Start Date: --        End Date: --    METOPROLOL SUCCINATE (TOPROL-XL) 25 MG 24 HR TABLET    Take 1 tablet (25 mg total) by mouth once daily.       Start Date: 4/4/2023  End Date: 4/2/2024    MIRTAZAPINE (REMERON) 30 MG TABLET    Take 30 mg by mouth every evening.       Start Date: --        End Date: --    MULTIVITAMIN (THERAGRAN) PER TABLET    Take 1 tablet by mouth once daily.       Start Date: --        End Date: --    NALOXONE (NARCAN) 4 MG/ACTUATION SPRY    1 spray by Nasal route once.       Start Date: --        End Date: --    OMEPRAZOLE (PRILOSEC) 20 MG CAPSULE    Take 20 mg by mouth.       Start Date: 6/3/2022  End Date: 6/2/2023    SILDENAFIL (VIAGRA) 100 MG TABLET    Take 1 tablet (100 mg total) by mouth as needed for Erectile Dysfunction (1 tablet 1 hr prior to intercourse.).       Start Date: 1/11/2023 End Date: --    TADALAFIL (CIALIS) 20 MG TAB    Take 1 tablet (20 mg total) by mouth once daily.       Start Date: 10/11/2022End Date: --    TERAZOSIN (HYTRIN) 10 MG CAPSULE    Take 1 capsule (10 mg total) by mouth every evening.       Start Date: 4/12/2023 End Date: 4/23/2024    TRAMADOL (ULTRAM) 50 MG TABLET    Take 1 tablet (50 mg total) by mouth every 12 (twelve) hours as needed for Pain.       Start Date: 1/11/2023 End Date: --    TRIAMCINOLONE ACETONIDE 0.1% (KENALOG) 0.1 % CREAM    Apply topically.       Start Date: 3/31/2022 End Date: 3/30/2023    VITAMIN B COMPLEX (B COMPLETE ORAL)    Take by mouth once daily.       Start Date: --        End Date: --       Order(s) placed per written order guidelines: none    Please advise.

## 2023-05-09 NOTE — TELEPHONE ENCOUNTER
No care due was identified.  [facility] Embedded Care Due Messages. Reference number: [id number].   5/08/2023 11:23:09 PM CDT

## 2023-05-15 ENCOUNTER — PATIENT MESSAGE (OUTPATIENT)
Dept: FAMILY MEDICINE | Facility: CLINIC | Age: 73
End: 2023-05-15
Payer: MEDICARE

## 2023-05-16 NOTE — TELEPHONE ENCOUNTER
No care due was identified.  [facility] Embedded Care Due Messages. Reference number: [id number].   5/16/2023 7:51:13 AM CDT

## 2023-05-17 RX ORDER — TRIAMCINOLONE ACETONIDE 1 MG/G
CREAM TOPICAL 2 TIMES DAILY
Qty: 80 G | Refills: 0 | Status: SHIPPED | OUTPATIENT
Start: 2023-05-17 | End: 2023-06-26 | Stop reason: SDUPTHER

## 2023-05-22 ENCOUNTER — OFFICE VISIT (OUTPATIENT)
Dept: FAMILY MEDICINE | Facility: CLINIC | Age: 73
End: 2023-05-22
Payer: MEDICARE

## 2023-05-22 ENCOUNTER — PES CALL (OUTPATIENT)
Dept: ADMINISTRATIVE | Facility: CLINIC | Age: 73
End: 2023-05-22
Payer: MEDICARE

## 2023-05-22 VITALS
TEMPERATURE: 98 F | DIASTOLIC BLOOD PRESSURE: 60 MMHG | BODY MASS INDEX: 32.49 KG/M2 | RESPIRATION RATE: 17 BRPM | WEIGHT: 219.38 LBS | SYSTOLIC BLOOD PRESSURE: 118 MMHG | HEIGHT: 69 IN | HEART RATE: 69 BPM | OXYGEN SATURATION: 97 %

## 2023-05-22 DIAGNOSIS — M48.062 SPINAL STENOSIS OF LUMBAR REGION WITH NEUROGENIC CLAUDICATION: Primary | ICD-10-CM

## 2023-05-22 PROCEDURE — 99214 OFFICE O/P EST MOD 30 MIN: CPT | Mod: PBBFAC,25,PO | Performed by: PHYSICIAN ASSISTANT

## 2023-05-22 PROCEDURE — 99214 PR OFFICE/OUTPT VISIT, EST, LEVL IV, 30-39 MIN: ICD-10-PCS | Mod: S$PBB,,, | Performed by: PHYSICIAN ASSISTANT

## 2023-05-22 PROCEDURE — 99214 OFFICE O/P EST MOD 30 MIN: CPT | Mod: S$PBB,,, | Performed by: PHYSICIAN ASSISTANT

## 2023-05-22 PROCEDURE — 99999 PR PBB SHADOW E&M-EST. PATIENT-LVL IV: CPT | Mod: PBBFAC,,, | Performed by: PHYSICIAN ASSISTANT

## 2023-05-22 PROCEDURE — 99999 PR PBB SHADOW E&M-EST. PATIENT-LVL IV: ICD-10-PCS | Mod: PBBFAC,,, | Performed by: PHYSICIAN ASSISTANT

## 2023-05-22 PROCEDURE — 96372 THER/PROPH/DIAG INJ SC/IM: CPT | Mod: PBBFAC,PO

## 2023-05-22 RX ORDER — GABAPENTIN 100 MG/1
100 CAPSULE ORAL 3 TIMES DAILY PRN
Qty: 60 CAPSULE | Refills: 0 | Status: SHIPPED | OUTPATIENT
Start: 2023-05-22 | End: 2023-07-11 | Stop reason: SDUPTHER

## 2023-05-22 RX ORDER — METHYLPREDNISOLONE 4 MG/1
TABLET ORAL
Qty: 1 EACH | Refills: 0 | Status: SHIPPED | OUTPATIENT
Start: 2023-05-23 | End: 2023-11-29 | Stop reason: ALTCHOICE

## 2023-05-22 RX ADMIN — METHYLPREDNISOLONE SODIUM SUCCINATE 125 MG: 125 INJECTION, POWDER, FOR SOLUTION INTRAMUSCULAR; INTRAVENOUS at 03:05

## 2023-05-22 NOTE — PROGRESS NOTES
Answers submitted by the patient for this visit:  Review of Systems Questionnaire (Submitted on 5/22/2023)  activity change: Yes  unexpected weight change: No  neck pain: Yes  hearing loss: No  rhinorrhea: No  trouble swallowing: No  eye discharge: No  visual disturbance: No  chest tightness: No  wheezing: No  chest pain: No  palpitations: No  blood in stool: No  constipation: No  vomiting: No  diarrhea: No  polydipsia: No  polyuria: No  difficulty urinating: No  urgency: No  hematuria: No  joint swelling: Yes  arthralgias: Yes  headaches: Yes  weakness: No  confusion: No  dysphoric mood: No

## 2023-05-22 NOTE — PROGRESS NOTES
Subjective     Patient ID: Omar Pacheco is a 72 y.o. male.    Chief Complaint: Follow-up    Back Pain  This is a recurrent problem. The current episode started in the past 7 days. The problem occurs constantly. The problem has been gradually improving since onset. The pain is present in the lumbar spine. The quality of the pain is described as shooting and aching. The pain radiates to the left thigh. The symptoms are aggravated by position. Associated symptoms include headaches, numbness and tingling. Pertinent negatives include no bladder incontinence, bowel incontinence, chest pain, perianal numbness or weakness. Treatments tried: TYLENOL, TRAMADOL, ROBAXIN.  Provided no relief.    Patient states last flare up in 2021. He went through PT which was ineffective. He states he did have MARIAMA.     Review of Systems   Constitutional:  Positive for activity change. Negative for unexpected weight change.   HENT:  Negative for hearing loss, rhinorrhea and trouble swallowing.    Eyes:  Negative for discharge and visual disturbance.   Respiratory:  Negative for chest tightness and wheezing.    Cardiovascular:  Negative for chest pain and palpitations.   Gastrointestinal:  Negative for blood in stool, bowel incontinence, constipation, diarrhea and vomiting.   Endocrine: Negative for polydipsia and polyuria.   Genitourinary:  Negative for bladder incontinence, difficulty urinating, hematuria and urgency.   Musculoskeletal:  Positive for arthralgias, back pain, joint swelling and neck pain.   Neurological:  Positive for tingling, numbness and headaches. Negative for weakness.   Psychiatric/Behavioral:  Negative for confusion and dysphoric mood.         Objective     Physical Exam  Constitutional:       Appearance: Normal appearance.   HENT:      Head: Normocephalic and atraumatic.   Musculoskeletal:      Lumbar back: Tenderness and bony tenderness present. Decreased range of motion. Positive left straight leg raise test.       Comments: Guarding left leg   Skin:     General: Skin is warm and dry.      Findings: No rash.   Neurological:      General: No focal deficit present.      Mental Status: He is alert and oriented to person, place, and time.   Psychiatric:         Mood and Affect: Mood normal.         Thought Content: Thought content normal.          Assessment and Plan     Problem List Items Addressed This Visit    None       Omar was seen today for follow-up.    Diagnoses and all orders for this visit:    Spinal stenosis of lumbar region with neurogenic claudication  -     methylPREDNISolone sod suc(PF) injection 125 mg  -     methylPREDNISolone (MEDROL DOSEPACK) 4 mg tablet; use as directed, start tomorrow, unable to take nsaids.   -     gabapentin (NEURONTIN) 100 MG capsule; Take 1 capsule (100 mg total) by mouth 3 (three) times daily as needed (back pain). Advised can cause drowsiness  -     stretches given

## 2023-06-21 ENCOUNTER — PATIENT MESSAGE (OUTPATIENT)
Dept: UROLOGY | Facility: CLINIC | Age: 73
End: 2023-06-21
Payer: MEDICARE

## 2023-06-24 ENCOUNTER — PATIENT MESSAGE (OUTPATIENT)
Dept: FAMILY MEDICINE | Facility: CLINIC | Age: 73
End: 2023-06-24
Payer: MEDICARE

## 2023-06-26 NOTE — TELEPHONE ENCOUNTER
No care due was identified.  [facility] Embedded Care Due Messages. Reference number: [id number].   6/26/2023 7:30:29 AM CDT

## 2023-06-28 RX ORDER — TRIAMCINOLONE ACETONIDE 1 MG/G
CREAM TOPICAL 2 TIMES DAILY
Qty: 80 G | Refills: 0 | Status: SHIPPED | OUTPATIENT
Start: 2023-06-28 | End: 2024-06-26

## 2023-07-05 ENCOUNTER — PES CALL (OUTPATIENT)
Dept: ADMINISTRATIVE | Facility: CLINIC | Age: 73
End: 2023-07-05
Payer: MEDICARE

## 2023-07-11 ENCOUNTER — OFFICE VISIT (OUTPATIENT)
Dept: FAMILY MEDICINE | Facility: CLINIC | Age: 73
End: 2023-07-11
Payer: MEDICARE

## 2023-07-11 ENCOUNTER — PATIENT OUTREACH (OUTPATIENT)
Dept: ADMINISTRATIVE | Facility: OTHER | Age: 73
End: 2023-07-11
Payer: MEDICARE

## 2023-07-11 VITALS
HEIGHT: 69 IN | DIASTOLIC BLOOD PRESSURE: 82 MMHG | WEIGHT: 224 LBS | OXYGEN SATURATION: 97 % | RESPIRATION RATE: 18 BRPM | TEMPERATURE: 98 F | HEART RATE: 60 BPM | BODY MASS INDEX: 33.18 KG/M2 | SYSTOLIC BLOOD PRESSURE: 138 MMHG

## 2023-07-11 DIAGNOSIS — M17.11 PRIMARY OSTEOARTHRITIS OF RIGHT KNEE: ICD-10-CM

## 2023-07-11 DIAGNOSIS — N40.1 BPH WITH URINARY OBSTRUCTION: ICD-10-CM

## 2023-07-11 DIAGNOSIS — I48.0 PAROXYSMAL ATRIAL FIBRILLATION: Chronic | ICD-10-CM

## 2023-07-11 DIAGNOSIS — N13.8 BPH WITH URINARY OBSTRUCTION: ICD-10-CM

## 2023-07-11 DIAGNOSIS — I10 ESSENTIAL HYPERTENSION: ICD-10-CM

## 2023-07-11 DIAGNOSIS — M48.07 SPINAL STENOSIS, LUMBOSACRAL REGION: Primary | ICD-10-CM

## 2023-07-11 DIAGNOSIS — R79.9 ABNORMAL FINDING OF BLOOD CHEMISTRY, UNSPECIFIED: ICD-10-CM

## 2023-07-11 DIAGNOSIS — M48.062 SPINAL STENOSIS OF LUMBAR REGION WITH NEUROGENIC CLAUDICATION: ICD-10-CM

## 2023-07-11 PROBLEM — C49.A2: Status: RESOLVED | Noted: 2021-04-13 | Resolved: 2023-07-11

## 2023-07-11 PROCEDURE — 99215 PR OFFICE/OUTPT VISIT, EST, LEVL V, 40-54 MIN: ICD-10-PCS | Mod: S$PBB,,, | Performed by: FAMILY MEDICINE

## 2023-07-11 PROCEDURE — 99214 OFFICE O/P EST MOD 30 MIN: CPT | Mod: PBBFAC,PO | Performed by: FAMILY MEDICINE

## 2023-07-11 PROCEDURE — 99215 OFFICE O/P EST HI 40 MIN: CPT | Mod: S$PBB,,, | Performed by: FAMILY MEDICINE

## 2023-07-11 PROCEDURE — 99999 PR PBB SHADOW E&M-EST. PATIENT-LVL IV: CPT | Mod: PBBFAC,,, | Performed by: FAMILY MEDICINE

## 2023-07-11 PROCEDURE — 99999 PR PBB SHADOW E&M-EST. PATIENT-LVL IV: ICD-10-PCS | Mod: PBBFAC,,, | Performed by: FAMILY MEDICINE

## 2023-07-11 RX ORDER — PROMETHAZINE HYDROCHLORIDE AND DEXTROMETHORPHAN HYDROBROMIDE 6.25; 15 MG/5ML; MG/5ML
SYRUP ORAL
COMMUNITY
Start: 2023-04-20 | End: 2023-07-11

## 2023-07-11 RX ORDER — OMEPRAZOLE 20 MG/1
CAPSULE, DELAYED RELEASE ORAL
COMMUNITY
End: 2023-07-11

## 2023-07-11 RX ORDER — GABAPENTIN 100 MG/1
100 CAPSULE ORAL 3 TIMES DAILY PRN
Qty: 60 CAPSULE | Refills: 2 | Status: SHIPPED | OUTPATIENT
Start: 2023-07-11 | End: 2024-01-04 | Stop reason: SDUPTHER

## 2023-07-11 RX ORDER — BENZONATATE 100 MG/1
CAPSULE ORAL
COMMUNITY
Start: 2023-04-20 | End: 2023-07-11

## 2023-07-11 RX ORDER — TERAZOSIN 10 MG/1
10 CAPSULE ORAL NIGHTLY
Qty: 90 CAPSULE | Refills: 3 | Status: SHIPPED | OUTPATIENT
Start: 2023-07-11 | End: 2024-07-22

## 2023-07-11 RX ORDER — METHOCARBAMOL 500 MG/1
500 TABLET, FILM COATED ORAL 4 TIMES DAILY PRN
Qty: 60 TABLET | Refills: 2 | Status: SHIPPED | OUTPATIENT
Start: 2023-07-11

## 2023-07-11 RX ORDER — FLECAINIDE ACETATE 100 MG/1
100 TABLET ORAL EVERY 12 HOURS
Qty: 180 TABLET | Refills: 3 | Status: SHIPPED | OUTPATIENT
Start: 2023-07-11 | End: 2023-10-11 | Stop reason: SDUPTHER

## 2023-07-11 RX ORDER — TRAMADOL HYDROCHLORIDE 50 MG/1
50 TABLET ORAL EVERY 12 HOURS PRN
Qty: 60 TABLET | Refills: 0 | Status: SHIPPED | OUTPATIENT
Start: 2023-07-11 | End: 2024-01-30

## 2023-07-11 RX ORDER — MELOXICAM 7.5 MG/1
7.5 TABLET ORAL DAILY PRN
Qty: 30 TABLET | Refills: 2 | Status: SHIPPED | OUTPATIENT
Start: 2023-07-11 | End: 2024-01-04

## 2023-07-11 RX ORDER — SILDENAFIL 50 MG/1
TABLET, FILM COATED ORAL
COMMUNITY
End: 2023-07-11 | Stop reason: SDUPTHER

## 2023-07-11 NOTE — PROGRESS NOTES
Subjective:       Patient ID: Omar Pacheco is a 72 y.o. male.    Chief Complaint: Follow-up      Follow-up    72-year-old male presents for six-month follow-up.  States he has worsening back pain.  States he has been using his medications more often.  States he takes his other meds.  Denies any other issues.        Review of Systems   Constitutional: Negative.    HENT: Negative.     Respiratory: Negative.     Cardiovascular: Negative.    Gastrointestinal: Negative.    Endocrine: Negative.    Genitourinary: Negative.    Musculoskeletal: Negative.    Neurological: Negative.    Psychiatric/Behavioral: Negative.          Past Medical History:   Diagnosis Date    *Atrial fibrillation     Back pain     Benign hypertrophy of prostate     Degenerative disc disease     GERD (gastroesophageal reflux disease)     Hx of colonic polyps     Hypertension     Pilonidal cyst 1971    Prostate cancer     Sleep apnea     Trouble in sleeping      Past Surgical History:   Procedure Laterality Date    COLONOSCOPY N/A 12/07/2016    Procedure: COLONOSCOPY;  Surgeon: Sumeet Lundy MD;  Location: [address]);  Service: Endoscopy;  Laterality: N/A;  OK to hold Pradaxa 2 days prior to procedure per Dr Jones/see note dated 11/15/16/teen    COLONOSCOPY N/A 02/24/2022    Procedure: COLONOSCOPY;  Surgeon: Italo Roy MD;  Location: [facility];  Service: Endoscopy;  Laterality: N/A;  ok to hold Pradaxa 2 days per EBONI Bentley RN/arrhythmia clinic      COVID test at [facility] on 2/21-GT  2/10/22 Changed Kirill to edward BARBER-ml    CYST REMOVAL  1971    coccyx    EPIDURAL STEROID INJECTION Right 09/07/2022    Procedure: Right Genicular Nerve Radiofrequency Ablations;  Surgeon: Luca Galan Jr., MD;  Location: [facility];  Service: Pain Management;  Laterality: Right;  @1100  Pradaxa not held  No DM  Prostate BX 8/2    EPIDURAL STEROID INJECTION Left 09/21/2022    Procedure: Left Genicular Radiofrequency Ablations;  Surgeon: Luca Galan  MD Simona;  Location: [facility];  Service: Pain Management;  Laterality: Left;  @1130  Pradaxa not held  No DM  Last 1&100    KNEE SURGERY      right    PROSTATE BIOPSY  2022     Family History   Problem Relation Age of Onset    Breast cancer Mother     Cancer Mother     Alcohol abuse Father     Cancer Father     Heart disease Father     Hypertension Father     Breast cancer Sister     Depression Brother     Early death Brother     Mental illness Brother     Cancer Sister     Early death Sister     Ovarian cancer Other     Melanoma Neg Hx      Social History     Socioeconomic History    Marital status:     Number of children: 5    Highest education level: Associate degree: academic program   Tobacco Use    Smoking status: Former     Packs/day: 0.25     Years: 2.00     Pack years: 0.50     Types: Cigarettes     Quit date: 1975     Years since quittin.8    Smokeless tobacco: Never   Substance and Sexual Activity    Alcohol use: Yes     Alcohol/week: 3.0 standard drinks     Types: 3 Glasses of wine per week     Comment: weekly    Drug use: No    Sexual activity: Yes     Social Determinants of Health     Financial Resource Strain: Low Risk     Difficulty of Paying Living Expenses: Not hard at all   Food Insecurity: No Food Insecurity    Worried About Running Out of Food in the Last Year: Never true    Ran Out of Food in the Last Year: Never true   Transportation Needs: No Transportation Needs    Lack of Transportation (Medical): No    Lack of Transportation (Non-Medical): No   Physical Activity: Sufficiently Active    Days of Exercise per Week: 3 days    Minutes of Exercise per Session: 60 min   Stress: No Stress Concern Present    Feeling of Stress : Not at all   Social Connections: Moderately Integrated    Frequency of Communication with Friends and Family: More than three times a week    Frequency of Social Gatherings with Friends and Family: Three times a week    Attends [employer] Services:  "More than 4 times per year    Active Member of Clubs or Organizations: No    Attends Club or Organization Meetings: More than 4 times per year    Marital Status:    Housing Stability: Low Risk     Unable to Pay for Housing in the Last Year: No    Number of Places Lived in the Last Year: 1    Unstable Housing in the Last Year: No       Current Outpatient Medications:     acetaminophen (TYLENOL ORAL), Take 500 mg by mouth as needed., Disp: , Rfl:     amLODIPine (NORVASC) 5 MG tablet, Take 1 tablet (5 mg total) by mouth once daily., Disp: 90 tablet, Rfl: 3    ammonium lactate (AMLACTIN) 12 % lotion, Apply topically daily as needed (Dry Skin)., Disp: 400 g, Rfl: 11    dabigatran etexilate (PRADAXA) 150 mg Cap, Take 1 capsule (150 mg total) by mouth 2 (two) times daily. "Do NOT break, chew, or open capsules.", Disp: 180 capsule, Rfl: 2    losartan (COZAAR) 50 MG tablet, Take 0.5 tablets (25 mg total) by mouth once daily., Disp: 45 tablet, Rfl: 3    methylPREDNISolone (MEDROL DOSEPACK) 4 mg tablet, use as directed, Disp: 1 each, Rfl: 0    metoprolol succinate (TOPROL-XL) 25 MG 24 hr tablet, Take 1 tablet (25 mg total) by mouth once daily., Disp: 90 tablet, Rfl: 3    mirtazapine (REMERON) 30 MG tablet, Take 30 mg by mouth every evening., Disp: , Rfl:     multivitamin (THERAGRAN) per tablet, Take 1 tablet by mouth once daily., Disp: , Rfl:     naloxone (NARCAN) 4 mg/actuation Spry, 1 spray by Nasal route once., Disp: , Rfl:     sildenafiL (VIAGRA) 100 MG tablet, Take 1 tablet (100 mg total) by mouth as needed for Erectile Dysfunction (1 tablet 1 hr prior to intercourse.)., Disp: 10 tablet, Rfl: 11    tadalafiL (CIALIS) 20 MG Tab, Take 1 tablet (20 mg total) by mouth once daily., Disp: 30 tablet, Rfl: 5    triamcinolone acetonide 0.1% (KENALOG) 0.1 % cream, Apply topically 2 (two) times daily., Disp: 80 g, Rfl: 0    VITAMIN B COMPLEX (B COMPLETE ORAL), Take by mouth once daily., Disp: , Rfl:     flecainide (TAMBOCOR) " "100 MG Tab, Take 1 tablet (100 mg total) by mouth every 12 (twelve) hours., Disp: 180 tablet, Rfl: 3    gabapentin (NEURONTIN) 100 MG capsule, Take 1 capsule (100 mg total) by mouth 3 (three) times daily as needed (back pain)., Disp: 60 capsule, Rfl: 2    levocetirizine (XYZAL) 5 MG tablet, Take 1 tablet (5 mg total) by mouth every evening., Disp: 90 tablet, Rfl: 3    meloxicam (MOBIC) 7.5 MG tablet, Take 1 tablet (7.5 mg total) by mouth daily as needed for Pain., Disp: 30 tablet, Rfl: 2    methocarbamoL (ROBAXIN) 500 MG Tab, Take 1 tablet (500 mg total) by mouth 4 (four) times daily as needed., Disp: 60 tablet, Rfl: 2    terazosin (HYTRIN) 10 MG capsule, Take 1 capsule (10 mg total) by mouth every evening., Disp: 90 capsule, Rfl: 3    traMADoL (ULTRAM) 50 mg tablet, Take 1 tablet (50 mg total) by mouth every 12 (twelve) hours as needed for Pain., Disp: 60 tablet, Rfl: 0   Objective:      Vitals:    07/11/23 0933   BP: 138/82   BP Location: Left arm   Patient Position: Sitting   BP Method: Small (Manual)   Pulse: 60   Resp: 18   Temp: 98 °F (36.7 °C)   TempSrc: Oral   SpO2: 97%   Weight: 101.6 kg (223 lb 15.8 oz)   Height: 5' 9" (1.753 m)       Physical Exam  Constitutional:       General: He is not in acute distress.     Appearance: He is not diaphoretic.   HENT:      Head: Normocephalic and atraumatic.   Eyes:      Conjunctiva/sclera: Conjunctivae normal.   Pulmonary:      Effort: Pulmonary effort is normal.   Musculoskeletal:      Cervical back: Neck supple.   Skin:     Findings: No rash.   Neurological:      Mental Status: He is alert and oriented to person, place, and time.   Psychiatric:         Behavior: Behavior normal.         Thought Content: Thought content normal.         Judgment: Judgment normal.          Assessment:       1. Spinal stenosis, lumbosacral region    2. Paroxysmal atrial fibrillation    3. Essential hypertension    4. Primary osteoarthritis of right knee    5. Spinal stenosis of lumbar " region with neurogenic claudication    6. BPH with urinary obstruction    7. Abnormal finding of blood chemistry, unspecified        Plan:       Spinal stenosis, lumbosacral region  -     traMADoL (ULTRAM) 50 mg tablet; Take 1 tablet (50 mg total) by mouth every 12 (twelve) hours as needed for Pain.  Dispense: 60 tablet; Refill: 0  -     methocarbamoL (ROBAXIN) 500 MG Tab; Take 1 tablet (500 mg total) by mouth 4 (four) times daily as needed.  Dispense: 60 tablet; Refill: 2    Paroxysmal atrial fibrillation  -     flecainide (TAMBOCOR) 100 MG Tab; Take 1 tablet (100 mg total) by mouth every 12 (twelve) hours.  Dispense: 180 tablet; Refill: 3  -     CBC Auto Differential; Future; Expected date: 07/11/2023  -     Comprehensive Metabolic Panel; Future; Expected date: 07/11/2023  -     Hemoglobin A1C; Future; Expected date: 07/11/2023  -     TSH; Future; Expected date: 07/11/2023  -     Lipid Panel; Future; Expected date: 07/11/2023    Essential hypertension  -     terazosin (HYTRIN) 10 MG capsule; Take 1 capsule (10 mg total) by mouth every evening.  Dispense: 90 capsule; Refill: 3  -     CBC Auto Differential; Future; Expected date: 07/11/2023  -     Comprehensive Metabolic Panel; Future; Expected date: 07/11/2023  -     Hemoglobin A1C; Future; Expected date: 07/11/2023  -     TSH; Future; Expected date: 07/11/2023  -     Lipid Panel; Future; Expected date: 07/11/2023    Primary osteoarthritis of right knee  -     meloxicam (MOBIC) 7.5 MG tablet; Take 1 tablet (7.5 mg total) by mouth daily as needed for Pain.  Dispense: 30 tablet; Refill: 2    Spinal stenosis of lumbar region with neurogenic claudication  -     gabapentin (NEURONTIN) 100 MG capsule; Take 1 capsule (100 mg total) by mouth 3 (three) times daily as needed (back pain).  Dispense: 60 capsule; Refill: 2    BPH with urinary obstruction    Abnormal finding of blood chemistry, unspecified  -     Hemoglobin A1C; Future; Expected date: 07/11/2023    Refill meds.   Continue other meds.  Follow-up in 6 months.          Future Appointments   Date Time Provider Department Center   [facility]13[facility]2023  [facility]   1/8/2024  9[facility]   1/11/2024  9:20 AM Husam Chong MD [facility]       Patient note was created using [employer].  Any errors in syntax or even information may not have been identified and edited on initial review prior to signing this note.

## 2023-07-11 NOTE — PROGRESS NOTES
CHW - Initial Contact    This Community Health Worker completed the Social Determinant of Health questionnaire with patient during clinic visit today.    Pt has not identified any barriers of importance at this time.  Referrals were put through [facility] - no  Follow up required: Y  Follow-up Outreach - Due: 7/25/2023

## 2023-07-31 ENCOUNTER — PATIENT OUTREACH (OUTPATIENT)
Dept: ADMINISTRATIVE | Facility: OTHER | Age: 73
End: 2023-07-31
Payer: MEDICARE

## 2023-07-31 NOTE — PROGRESS NOTES
CHW - Case Closure    This Community Health Worker spoke to patient via telephone today.   Pt reported he has bills to pay but denied any additional needs at this time and agrees with episode closure at this time.  Provided patient with Community Health Worker's contact information and encouraged him/her to contact this Community Health Worker if additional needs arise.

## 2023-08-01 ENCOUNTER — PES CALL (OUTPATIENT)
Dept: ADMINISTRATIVE | Facility: CLINIC | Age: 73
End: 2023-08-01
Payer: MEDICARE

## 2023-08-23 ENCOUNTER — TELEPHONE (OUTPATIENT)
Dept: ADMINISTRATIVE | Facility: CLINIC | Age: 73
End: 2023-08-23
Payer: MEDICARE

## 2023-08-25 ENCOUNTER — OFFICE VISIT (OUTPATIENT)
Dept: FAMILY MEDICINE | Facility: CLINIC | Age: 73
End: 2023-08-25
Payer: MEDICARE

## 2023-08-25 VITALS — BODY MASS INDEX: 31.99 KG/M2 | WEIGHT: 216 LBS | HEIGHT: 69 IN

## 2023-08-25 DIAGNOSIS — C61 PROSTATE CANCER: ICD-10-CM

## 2023-08-25 DIAGNOSIS — M25.562 BILATERAL CHRONIC KNEE PAIN: ICD-10-CM

## 2023-08-25 DIAGNOSIS — M48.062 SPINAL STENOSIS OF LUMBAR REGION WITH NEUROGENIC CLAUDICATION: ICD-10-CM

## 2023-08-25 DIAGNOSIS — N48.6 PEYRONIE'S DISEASE: ICD-10-CM

## 2023-08-25 DIAGNOSIS — E66.9 OBESITY (BMI 30.0-34.9): ICD-10-CM

## 2023-08-25 DIAGNOSIS — N40.1 BPH WITH URINARY OBSTRUCTION: ICD-10-CM

## 2023-08-25 DIAGNOSIS — N52.01 ERECTILE DYSFUNCTION DUE TO ARTERIAL INSUFFICIENCY: ICD-10-CM

## 2023-08-25 DIAGNOSIS — I48.0 PAROXYSMAL ATRIAL FIBRILLATION: Chronic | ICD-10-CM

## 2023-08-25 DIAGNOSIS — Z00.00 ENCOUNTER FOR PREVENTIVE HEALTH EXAMINATION: Primary | ICD-10-CM

## 2023-08-25 DIAGNOSIS — M25.561 BILATERAL CHRONIC KNEE PAIN: ICD-10-CM

## 2023-08-25 DIAGNOSIS — M51.26 HNP (HERNIATED NUCLEUS PULPOSUS), LUMBAR: ICD-10-CM

## 2023-08-25 DIAGNOSIS — I10 ESSENTIAL HYPERTENSION: ICD-10-CM

## 2023-08-25 DIAGNOSIS — M17.0 PRIMARY OSTEOARTHRITIS OF BOTH KNEES: ICD-10-CM

## 2023-08-25 DIAGNOSIS — Z79.01 ON CONTINUOUS ORAL ANTICOAGULATION: ICD-10-CM

## 2023-08-25 DIAGNOSIS — G47.33 OBSTRUCTIVE SLEEP APNEA: ICD-10-CM

## 2023-08-25 DIAGNOSIS — M51.16 LUMBAR DISC DISEASE WITH RADICULOPATHY: ICD-10-CM

## 2023-08-25 DIAGNOSIS — N13.8 BPH WITH URINARY OBSTRUCTION: ICD-10-CM

## 2023-08-25 DIAGNOSIS — J30.89 SEASONAL ALLERGIC RHINITIS DUE TO OTHER ALLERGIC TRIGGER: ICD-10-CM

## 2023-08-25 DIAGNOSIS — K21.9 GASTROESOPHAGEAL REFLUX DISEASE, UNSPECIFIED WHETHER ESOPHAGITIS PRESENT: ICD-10-CM

## 2023-08-25 DIAGNOSIS — G89.29 BILATERAL CHRONIC KNEE PAIN: ICD-10-CM

## 2023-08-25 PROCEDURE — G0439 PR MEDICARE ANNUAL WELLNESS SUBSEQUENT VISIT: ICD-10-PCS | Mod: 95,,,

## 2023-08-25 PROCEDURE — G0439 PPPS, SUBSEQ VISIT: HCPCS | Mod: 95,,,

## 2023-08-25 RX ORDER — OMEPRAZOLE 20 MG/1
1 CAPSULE, DELAYED RELEASE ORAL DAILY
COMMUNITY

## 2023-08-25 NOTE — PATIENT INSTRUCTIONS
Counseling and Referral of Other Preventative  (Italic type indicates deductible and co-insurance are waived)    Patient Name: Omar Pacheco  Today's Date: 8/25/2023    Health Maintenance       Date Due Completion Date    COVID-19 Vaccine (6 - Pfizer risk series) 12/08/2022 10/13/2022    Influenza Vaccine (1) 09/01/2023 12/1/2022    Colorectal Cancer Screening 02/24/2027 2/24/2022    Lipid Panel 01/11/2028 1/11/2023    TETANUS VACCINE 03/22/2033 3/22/2023        No orders of the defined types were placed in this encounter.      The following information is provided to all patients.  This information is to help you find resources for any of the problems found today that may be affecting your health:                Living healthy guide: www.[facility].[place].gov      Understanding Diabetes: www.diabetes.org      Eating healthy: www.cdc.gov/healthyweight      [facility] home safety checklist: www.cdc.gov/steadi/patient.html      Agency on Aging: www.goea.[place].[facility]      Alcoholics anonymous (AA): www.aa.org      Physical Activity: www.john.nih.gov/tr3erhw      Tobacco use: www.quitwithusla.org

## 2023-08-25 NOTE — PROGRESS NOTES
"  The patient location is: [place]  The chief complaint leading to consultation is: Medicare AWV     Visit type: audiovisual    Face to Face time with patient: 32  60 minutes of total time spent on the encounter, which includes face to face time and non-face to face time preparing to see the patient (eg, review of tests), Obtaining and/or reviewing separately obtained history, Documenting clinical information in the electronic or other health record, Independently interpreting results (not separately reported) and communicating results to the patient/family/caregiver, or Care coordination (not separately reported).         Each patient to whom he or she provides medical services by telemedicine is:  (1) informed of the relationship between the physician and patient and the respective role of any other health care provider with respect to management of the patient; and (2) notified that he or she may decline to receive medical services by telemedicine and may withdraw from such care at any time.    Notes:       Omar Pacheco presented for a  Medicare AWV and comprehensive Health Risk Assessment today. The following components were reviewed and updated:    Medical history  Family History  Social history  Allergies and Current Medications  Health Risk Assessment  Health Maintenance  Care Team         ** See Completed Assessments for Annual Wellness Visit within the encounter summary.**         The following assessments were completed:  Living Situation  CAGE  Depression Screening  Fall Risk Assessment (MACH 10)  Hearing Assessment(HHI)  Cognitive Function Screening  Nutrition Screening  ADL Screening  PAQ Screening      Vitals:    08/25/23 0902   Weight: 98 kg (216 lb)   Height: 5' 9" (1.753 m)     Body mass index is 31.9 kg/m².  Physical Exam  Constitutional:       General: He is not in acute distress.     Appearance: Normal appearance. He is well-developed and well-groomed.   Eyes:      Comments: Wearing glasses "    Skin:     Coloration: Skin is not pale.   Neurological:      Mental Status: He is alert and oriented to person, place, and time.   Psychiatric:         Mood and Affect: Mood normal.         Speech: Speech normal.         Behavior: Behavior normal. Behavior is cooperative.         Thought Content: Thought content normal.               Diagnoses and health risks identified today and associated recommendations/orders:    1. Encounter for preventive health examination  Screenings performed, as noted above. Personal preventative testing needs reviewed.     2. Prostate cancer  Followed by Radiation Oncology, Urology.     3. Paroxysmal atrial fibrillation  4. On continuous oral anticoagulation  5. Essential hypertension  Chronic; stable on medication. Followed by Cardiology/EP.    6. Seasonal allergic rhinitis due to other allergic trigger  Chronic; stable on medication. Followed by PCP.    7. BPH with urinary obstruction  8. Erectile dysfunction due to arterial insufficiency  9. Peyronie's disease  Chronic; stable on medication. Followed by Urology.    10. Obesity (BMI 30.0-34.9)  Work on diet modification and increase in physical activity as tolerated. Followed by PCP.     11. Gastroesophageal reflux disease, unspecified whether esophagitis present  Chronic; stable on medication. Followed by PCP.    12. Lumbar disc disease with radiculopathy  13. Spinal stenosis of lumbar region with neurogenic claudication  14. HNP (herniated nucleus pulposus), lumbar  15. Primary osteoarthritis of both knees  16. Bilateral chronic knee pain  Chronic; stable on medication. Followed by Orthopedics.     17. Obstructive sleep apnea  Chronic. Stable. Currently does not use CPAP. Followed by PCP.     Pain level assessed and reviewed. Patient provided with non-opioid treatment options for relief of back pain; tramadol used only when absolutely necessary. Reviewed potential opioid use disorder risk factors. Patient instructed to follow up  with PCP and/or Pain Medicine.    Provided Omar with a 5-10 year written screening schedule and personal prevention plan. Recommendations were developed using the USPSTF age appropriate recommendations. Education, counseling, and referrals were provided as needed. After Visit Summary printed and given to patient which includes a list of additional screenings\tests needed.    Follow up in about 1 year (around 8/25/2024) for your next annual wellness visit.    Sammi Ziegler, NP      Advance Care Planning     I offered to discuss advanced care planning, including how to pick a person who would make decisions for you if you were unable to make them for yourself, called a health care power of , and what kind of decisions you might make such as use of life sustaining treatments such as ventilators and tube feeding when faced with a life limiting illness recorded on a living will that they will need to know. (How you want to be cared for as you near the end of your natural life)     X  Patient has advanced directives written and agrees to provide copies to the institution.   fever

## 2023-09-13 ENCOUNTER — LAB VISIT (OUTPATIENT)
Dept: LAB | Facility: HOSPITAL | Age: 73
End: 2023-09-13
Attending: FAMILY MEDICINE
Payer: MEDICARE

## 2023-09-13 DIAGNOSIS — R97.20 ELEVATED PSA: ICD-10-CM

## 2023-09-13 DIAGNOSIS — N40.1 BPH WITH URINARY OBSTRUCTION: ICD-10-CM

## 2023-09-13 DIAGNOSIS — Z08 ENCOUNTER FOR FOLLOW-UP SURVEILLANCE OF PROSTATE CANCER: ICD-10-CM

## 2023-09-13 DIAGNOSIS — Z85.46 ENCOUNTER FOR FOLLOW-UP SURVEILLANCE OF PROSTATE CANCER: ICD-10-CM

## 2023-09-13 DIAGNOSIS — N52.01 ERECTILE DYSFUNCTION DUE TO ARTERIAL INSUFFICIENCY: ICD-10-CM

## 2023-09-13 DIAGNOSIS — N13.8 BPH WITH URINARY OBSTRUCTION: ICD-10-CM

## 2023-09-13 DIAGNOSIS — C61 PROSTATE CANCER: ICD-10-CM

## 2023-09-13 LAB — COMPLEXED PSA SERPL-MCNC: 8.2 NG/ML (ref 0–4)

## 2023-09-13 PROCEDURE — 84153 ASSAY OF PSA TOTAL: CPT | Performed by: NURSE PRACTITIONER

## 2023-09-13 PROCEDURE — 36415 COLL VENOUS BLD VENIPUNCTURE: CPT | Mod: PO | Performed by: NURSE PRACTITIONER

## 2023-09-14 ENCOUNTER — TELEPHONE (OUTPATIENT)
Dept: FAMILY MEDICINE | Facility: CLINIC | Age: 73
End: 2023-09-14

## 2023-09-15 ENCOUNTER — PATIENT MESSAGE (OUTPATIENT)
Dept: UROLOGY | Facility: CLINIC | Age: 73
End: 2023-09-15
Payer: MEDICARE

## 2023-09-15 DIAGNOSIS — Z85.46 ENCOUNTER FOR FOLLOW-UP SURVEILLANCE OF PROSTATE CANCER: ICD-10-CM

## 2023-09-15 DIAGNOSIS — N40.1 BPH WITH URINARY OBSTRUCTION: ICD-10-CM

## 2023-09-15 DIAGNOSIS — Z08 ENCOUNTER FOR FOLLOW-UP SURVEILLANCE OF PROSTATE CANCER: ICD-10-CM

## 2023-09-15 DIAGNOSIS — C61 PROSTATE CANCER: Primary | ICD-10-CM

## 2023-09-15 DIAGNOSIS — R97.20 ELEVATED PSA: ICD-10-CM

## 2023-09-15 DIAGNOSIS — N13.8 BPH WITH URINARY OBSTRUCTION: ICD-10-CM

## 2023-10-17 ENCOUNTER — TELEPHONE (OUTPATIENT)
Dept: FAMILY MEDICINE | Facility: CLINIC | Age: 73
End: 2023-10-17

## 2023-10-20 ENCOUNTER — OFFICE VISIT (OUTPATIENT)
Dept: FAMILY MEDICINE | Facility: CLINIC | Age: 73
End: 2023-10-20
Payer: MEDICARE

## 2023-10-20 VITALS
HEART RATE: 62 BPM | WEIGHT: 222.25 LBS | SYSTOLIC BLOOD PRESSURE: 136 MMHG | BODY MASS INDEX: 32.82 KG/M2 | TEMPERATURE: 98 F | OXYGEN SATURATION: 96 % | DIASTOLIC BLOOD PRESSURE: 82 MMHG

## 2023-10-20 DIAGNOSIS — D17.24 LIPOMA OF LEFT LOWER EXTREMITY: Primary | ICD-10-CM

## 2023-10-20 PROCEDURE — 99214 OFFICE O/P EST MOD 30 MIN: CPT | Mod: PBBFAC,PO | Performed by: FAMILY MEDICINE

## 2023-10-20 PROCEDURE — 99214 OFFICE O/P EST MOD 30 MIN: CPT | Mod: S$PBB,,, | Performed by: FAMILY MEDICINE

## 2023-10-20 PROCEDURE — 99214 PR OFFICE/OUTPT VISIT, EST, LEVL IV, 30-39 MIN: ICD-10-PCS | Mod: S$PBB,,, | Performed by: FAMILY MEDICINE

## 2023-10-20 PROCEDURE — 99999 PR PBB SHADOW E&M-EST. PATIENT-LVL IV: CPT | Mod: PBBFAC,,, | Performed by: FAMILY MEDICINE

## 2023-10-20 PROCEDURE — 99999 PR PBB SHADOW E&M-EST. PATIENT-LVL IV: ICD-10-PCS | Mod: PBBFAC,,, | Performed by: FAMILY MEDICINE

## 2023-10-20 NOTE — PROGRESS NOTES
Health Maintenance Due   Topic     Influenza Vaccine (1)     COVID-19 Vaccine (6 - 2023-24 season) Not offered at this office

## 2023-10-20 NOTE — PROGRESS NOTES
Subjective:       Patient ID: Omar Pacheco is a 72 y.o. male.    Chief Complaint: Groin Swelling (Left groin, pt states it has been going on for about 2-3 months)      HPI  71 yo male presents for L groin swelling. Started about 2-3 months ago. Denies any trauma. States it was improving but over the last week it has increased in size and is tender.    Review of Systems   Constitutional: Negative.    HENT: Negative.     Respiratory: Negative.     Cardiovascular: Negative.    Gastrointestinal: Negative.    Endocrine: Negative.    Genitourinary: Negative.    Musculoskeletal: Negative.    Neurological: Negative.    Psychiatric/Behavioral: Negative.            Past Medical History:   Diagnosis Date    *Atrial fibrillation     Back pain     Benign hypertrophy of prostate     Degenerative disc disease     GERD (gastroesophageal reflux disease)     Hx of colonic polyps     Hypertension     Pilonidal cyst 1971    Prostate cancer     Sleep apnea     Trouble in sleeping      Past Surgical History:   Procedure Laterality Date    COLONOSCOPY N/A 12/07/2016    Procedure: COLONOSCOPY;  Surgeon: Sumeet Lundy MD;  Location: [address]);  Service: Endoscopy;  Laterality: N/A;  OK to hold Pradaxa 2 days prior to procedure per Dr Jones/see note dated 11/15/16/svn    COLONOSCOPY N/A 02/24/2022    Procedure: COLONOSCOPY;  Surgeon: Italo Roy MD;  Location: [facility];  Service: Endoscopy;  Laterality: N/A;  ok to hold Pradaxa 2 days per EBONI Bentley RN/arrhythmia clinic      COVID test at [place] on 2/21-GT  2/10/22 Changed Kirill to edward BARBER-ml    CYST REMOVAL  1971    coccyx    EPIDURAL STEROID INJECTION Right 09/07/2022    Procedure: Right Genicular Nerve Radiofrequency Ablations;  Surgeon: Luca Galan Jr., MD;  Location: [facility];  Service: Pain Management;  Laterality: Right;  @1100  Pradaxa not held  No DM  Prostate BX 8/2    EPIDURAL STEROID INJECTION Left 09/21/2022    Procedure: Left Genicular Radiofrequency  Ablations;  Surgeon: Luca Galan Jr., MD;  Location: [facility];  Service: Pain Management;  Laterality: Left;  @1130  Pradaxa not held  No DM  Last 1&100    KNEE SURGERY      right    PROSTATE BIOPSY  2022     Family History   Problem Relation Age of Onset    Breast cancer Mother     Cancer Mother     Alcohol abuse Father     Cancer Father     Heart disease Father     Hypertension Father     Breast cancer Sister     Cancer Sister     Early death Sister     Depression Brother     Early death Brother     Mental illness Brother     No Known Problems Daughter     No Known Problems Daughter     No Known Problems Daughter     No Known Problems Daughter     No Known Problems Daughter     Ovarian cancer Other     Melanoma Neg Hx      Social History     Socioeconomic History    Marital status:     Number of children: 5    Highest education level: Associate degree: academic program   Tobacco Use    Smoking status: Former     Current packs/day: 0.00     Average packs/day: 0.3 packs/day for 2.0 years (0.5 ttl pk-yrs)     Types: Cigarettes     Start date: 1973     Quit date: 1975     Years since quittin.0    Smokeless tobacco: Never   Substance and Sexual Activity    Alcohol use: Yes     Alcohol/week: 6.0 standard drinks of alcohol     Types: 6 Glasses of wine per week     Comment: weekly    Drug use: No    Sexual activity: Yes     Partners: Female     Social Determinants of Health     Financial Resource Strain: Low Risk  (2023)    Overall Financial Resource Strain (CARDIA)     Difficulty of Paying Living Expenses: Not hard at all   Food Insecurity: No Food Insecurity (2023)    Hunger Vital Sign     Worried About Running Out of Food in the Last Year: Never true     Ran Out of Food in the Last Year: Never true   Transportation Needs: No Transportation Needs (2023)    PRAPARE - Transportation     Lack of Transportation (Medical): No     Lack of Transportation (Non-Medical): No  "  Physical Activity: Sufficiently Active (8/25/2023)    Exercise Vital Sign     Days of Exercise per Week: 5 days     Minutes of Exercise per Session: 90 min   Stress: No Stress Concern Present (8/25/2023)    [employer] - Occupational Stress Questionnaire     Feeling of Stress : Not at all   Social Connections: Moderately Integrated (8/25/2023)    Social Connection and Isolation Panel [NHANES]     Frequency of Communication with Friends and Family: More than three times a week     Frequency of Social Gatherings with Friends and Family: Three times a week     Attends Yazidi Services: More than 4 times per year     Active Member of Clubs or Organizations: Yes     Attends Club or Organization Meetings: More than 4 times per year     Marital Status:    Housing Stability: Low Risk  (8/25/2023)    Housing Stability Vital Sign     Unable to Pay for Housing in the Last Year: No     Number of Places Lived in the Last Year: 1     Unstable Housing in the Last Year: No       Current Outpatient Medications:     acetaminophen (TYLENOL ORAL), Take 500 mg by mouth as needed., Disp: , Rfl:     amLODIPine (NORVASC) 5 MG tablet, Take 1 tablet (5 mg total) by mouth once daily., Disp: 90 tablet, Rfl: 3    ammonium lactate (AMLACTIN) 12 % lotion, Apply topically daily as needed (Dry Skin)., Disp: 400 g, Rfl: 11    dabigatran etexilate (PRADAXA) 150 mg Cap, Take 1 capsule (150 mg total) by mouth 2 (two) times daily. "Do NOT break, chew, or open capsules.", Disp: 180 capsule, Rfl: 2    flecainide (TAMBOCOR) 100 MG Tab, Take 1 tablet (100 mg total) by mouth every 12 (twelve) hours., Disp: 180 tablet, Rfl: 3    levocetirizine (XYZAL) 5 MG tablet, Take 1 tablet (5 mg total) by mouth every evening., Disp: 90 tablet, Rfl: 3    losartan (COZAAR) 50 MG tablet, Take 0.5 tablets (25 mg total) by mouth once daily., Disp: 45 tablet, Rfl: 3    meloxicam (MOBIC) 7.5 MG tablet, Take 1 tablet (7.5 mg total) by " mouth daily as needed for Pain., Disp: 30 tablet, Rfl: 2    methocarbamoL (ROBAXIN) 500 MG Tab, Take 1 tablet (500 mg total) by mouth 4 (four) times daily as needed., Disp: 60 tablet, Rfl: 2    metoprolol succinate (TOPROL-XL) 25 MG 24 hr tablet, Take 1 tablet (25 mg total) by mouth once daily., Disp: 90 tablet, Rfl: 3    mirtazapine (REMERON) 30 MG tablet, Take 30 mg by mouth every evening., Disp: , Rfl:     multivitamin (THERAGRAN) per tablet, Take 1 tablet by mouth once daily., Disp: , Rfl:     naloxone (NARCAN) 4 mg/actuation Spry, 1 spray by Nasal route once., Disp: , Rfl:     omeprazole (PRILOSEC) 20 MG capsule, Take 1 capsule by mouth Daily., Disp: , Rfl:     sildenafiL (VIAGRA) 100 MG tablet, Take 1 tablet (100 mg total) by mouth as needed for Erectile Dysfunction (1 tablet 1 hr prior to intercourse.)., Disp: 10 tablet, Rfl: 11    tadalafiL (CIALIS) 20 MG Tab, Take 1 tablet (20 mg total) by mouth once daily., Disp: 30 tablet, Rfl: 5    terazosin (HYTRIN) 10 MG capsule, Take 1 capsule (10 mg total) by mouth every evening., Disp: 90 capsule, Rfl: 3    traMADoL (ULTRAM) 50 mg tablet, Take 1 tablet (50 mg total) by mouth every 12 (twelve) hours as needed for Pain., Disp: 60 tablet, Rfl: 0    triamcinolone acetonide 0.1% (KENALOG) 0.1 % cream, Apply topically 2 (two) times daily., Disp: 80 g, Rfl: 0    VITAMIN B COMPLEX (B COMPLETE ORAL), Take by mouth once daily., Disp: , Rfl:     vitamin E 100 UNIT capsule, Take 100 Units by mouth once daily., Disp: , Rfl:     gabapentin (NEURONTIN) 100 MG capsule, Take 1 capsule (100 mg total) by mouth 3 (three) times daily as needed (back pain). (Patient not taking: Reported on 8/25/2023), Disp: 60 capsule, Rfl: 2    methylPREDNISolone (MEDROL DOSEPACK) 4 mg tablet, use as directed (Patient not taking: Reported on 8/25/2023), Disp: 1 each, Rfl: 0   Objective:      Vitals:    10/20/23 1518   BP: 136/82   BP Location: Left arm   Patient Position: Sitting   BP Method: Small  (Manual)   Pulse: 62   Temp: 98.1 °F (36.7 °C)   TempSrc: Oral   SpO2: 96%   Weight: 100.8 kg (222 lb 3.6 oz)       Physical Exam  Constitutional:       General: He is not in acute distress.     Appearance: He is not diaphoretic.   HENT:      Head: Normocephalic and atraumatic.   Eyes:      Conjunctiva/sclera: Conjunctivae normal.   Pulmonary:      Effort: Pulmonary effort is normal.   Genitourinary:      Musculoskeletal:         General: Normal range of motion.      Cervical back: Neck supple.   Skin:     Findings: No rash.   Neurological:      Mental Status: He is alert and oriented to person, place, and time.   Psychiatric:         Behavior: Behavior normal.         Thought Content: Thought content normal.         Judgment: Judgment normal.            Assessment:       1. Lipoma of left lower extremity        Plan:       Lipoma of left lower extremity      Patient states he would like to continue to monitor lipoma for now.  Lipoma was seen MRI earlier this year.    Had a recent PSA that was elevated.  [facility]  Advised patient to continue follow-up with Urology        Future Appointments   Date Time Provider Department Center   1/8/2024  9:30 AM LAB, [facility] [place]   1/11/2024  9:20 AM Husam Chong MD [facility]       Patient note was created using [employer].  Any errors in syntax or even information may not have been identified and edited on initial review prior to signing this note.

## 2023-11-02 ENCOUNTER — PATIENT MESSAGE (OUTPATIENT)
Dept: FAMILY MEDICINE | Facility: CLINIC | Age: 73
End: 2023-11-02
Payer: MEDICARE

## 2023-11-09 ENCOUNTER — PATIENT MESSAGE (OUTPATIENT)
Dept: FAMILY MEDICINE | Facility: CLINIC | Age: 73
End: 2023-11-09
Payer: MEDICARE

## 2023-11-14 ENCOUNTER — OFFICE VISIT (OUTPATIENT)
Dept: FAMILY MEDICINE | Facility: CLINIC | Age: 73
End: 2023-11-14
Payer: MEDICARE

## 2023-11-14 DIAGNOSIS — R05.9 COUGH, UNSPECIFIED TYPE: Primary | ICD-10-CM

## 2023-11-14 PROCEDURE — 99441 PR PHYSICIAN TELEPHONE EVALUATION 5-10 MIN: CPT | Mod: 95,,, | Performed by: FAMILY MEDICINE

## 2023-11-14 PROCEDURE — 99441 PR PHYSICIAN TELEPHONE EVALUATION 5-10 MIN: ICD-10-PCS | Mod: 95,,, | Performed by: FAMILY MEDICINE

## 2023-11-14 RX ORDER — BENZONATATE 200 MG/1
200 CAPSULE ORAL 3 TIMES DAILY PRN
Qty: 30 CAPSULE | Refills: 0 | Status: SHIPPED | OUTPATIENT
Start: 2023-11-14 | End: 2023-11-24

## 2023-11-14 RX ORDER — PROMETHAZINE HYDROCHLORIDE AND DEXTROMETHORPHAN HYDROBROMIDE 6.25; 15 MG/5ML; MG/5ML
5 SYRUP ORAL NIGHTLY PRN
Qty: 118 ML | Refills: 0 | Status: SHIPPED | OUTPATIENT
Start: 2023-11-14 | End: 2023-11-24

## 2023-11-14 NOTE — PROGRESS NOTES
Established Patient - Audio Only Telehealth Visit     The patient location is: [place]  The chief complaint leading to consultation is: cough  Visit type: Virtual visit with audio only (telephone)  Total time spent with patient: 10 mins       The reason for the audio only service rather than synchronous audio and video virtual visit was related to technical difficulties or patient preference/necessity.     Each patient to whom I provide medical services by telemedicine is:  (1) informed of the relationship between the physician and patient and the respective role of any other health care provider with respect to management of the patient; and (2) notified that they may decline to receive medical services by telemedicine and may withdraw from such care at any time. Patient verbally consented to receive this service via voice-only telephone call.       HPI: has a cough. Started about 2w ago. Denies any f/c. Has congestion in AM and feels it improved. Denies any sob. Has tried otc meds     Assessment and plan:    Cough, unspecified type  -     promethazine-dextromethorphan (PROMETHAZINE-DM) 6.25-15 mg/5 mL Syrp; Take 5 mLs by mouth nightly as needed.  Dispense: 118 mL; Refill: 0  -     benzonatate (TESSALON) 200 MG capsule; Take 1 capsule (200 mg total) by mouth 3 (three) times daily as needed for Cough.  Dispense: 30 capsule; Refill: 0    Sent meds.                          This service was not originating from a related E/M service provided within the previous 7 days nor will  to an E/M service or procedure within the next 24 hours or my soonest available appointment.  Prevailing standard of care was able to be met in this audio-only visit.

## 2023-11-29 ENCOUNTER — OFFICE VISIT (OUTPATIENT)
Dept: URGENT CARE | Facility: CLINIC | Age: 73
End: 2023-11-29
Payer: MEDICARE

## 2023-11-29 VITALS
SYSTOLIC BLOOD PRESSURE: 167 MMHG | TEMPERATURE: 98 F | BODY MASS INDEX: 32.88 KG/M2 | OXYGEN SATURATION: 95 % | DIASTOLIC BLOOD PRESSURE: 92 MMHG | HEART RATE: 60 BPM | HEIGHT: 69 IN | RESPIRATION RATE: 16 BRPM | WEIGHT: 222 LBS

## 2023-11-29 DIAGNOSIS — M54.41 ACUTE MIDLINE LOW BACK PAIN WITH BILATERAL SCIATICA: Primary | ICD-10-CM

## 2023-11-29 DIAGNOSIS — M54.42 ACUTE MIDLINE LOW BACK PAIN WITH BILATERAL SCIATICA: Primary | ICD-10-CM

## 2023-11-29 PROCEDURE — 99213 PR OFFICE/OUTPT VISIT, EST, LEVL III, 20-29 MIN: ICD-10-PCS | Mod: 25,S$GLB,,

## 2023-11-29 PROCEDURE — 96372 PR INJECTION,THERAP/PROPH/DIAG2ST, IM OR SUBCUT: ICD-10-PCS | Mod: S$GLB,,,

## 2023-11-29 PROCEDURE — 99213 OFFICE O/P EST LOW 20 MIN: CPT | Mod: 25,S$GLB,,

## 2023-11-29 PROCEDURE — 96372 THER/PROPH/DIAG INJ SC/IM: CPT | Mod: S$GLB,,,

## 2023-11-29 RX ORDER — DICLOFENAC SODIUM 10 MG/G
2 GEL TOPICAL DAILY
Qty: 20 G | Refills: 0 | Status: SHIPPED | OUTPATIENT
Start: 2023-11-29

## 2023-11-29 RX ORDER — LIDOCAINE 50 MG/G
1 PATCH TOPICAL DAILY
Qty: 5 PATCH | Refills: 0 | Status: SHIPPED | OUTPATIENT
Start: 2023-11-29 | End: 2024-02-29 | Stop reason: SDUPTHER

## 2023-11-29 RX ORDER — DEXAMETHASONE SODIUM PHOSPHATE 100 MG/10ML
10 INJECTION INTRAMUSCULAR; INTRAVENOUS
Status: COMPLETED | OUTPATIENT
Start: 2023-11-29 | End: 2023-11-29

## 2023-11-29 RX ADMIN — DEXAMETHASONE SODIUM PHOSPHATE 10 MG: 100 INJECTION INTRAMUSCULAR; INTRAVENOUS at 01:11

## 2023-11-29 NOTE — PROGRESS NOTES
"Subjective:      Patient ID: Omar Pacheco is a 72 y.o. male.    Vitals:  height is 5' 9" (1.753 m) and weight is 100.7 kg (222 lb). His oral temperature is 98.3 °F (36.8 °C). His blood pressure is 167/92 (abnormal) and his pulse is 60. His respiration is 16 and oxygen saturation is 95%.     Chief Complaint: Back Pain    Patient presents with low back pain.  He states that his symptoms started 2 weeks ago.  He denies any trauma or injury to the affected area.  He states that he gets a shooting sensation down both of his legs.  Associated symptoms include numbness/tingling.  He denies fever, chills, chest pain, SOB, nausea, vomiting, bowel/bladder incontinence.  Patient has a history spinal stenosis.  He states that he is used some Tylenol and muscle relaxers with no improvement of symptoms.    Back Pain  This is a new problem. The current episode started 1 to 4 weeks ago (2 weeks ago). The problem occurs constantly. The problem has been gradually worsening since onset. The pain is present in the lumbar spine. The quality of the pain is described as aching and stabbing. The pain radiates to the right thigh and left thigh. The pain is at a severity of 8/10. The pain is moderate. The pain is The same all the time. The symptoms are aggravated by bending, lying down, sitting and standing. Associated symptoms include leg pain, numbness and tingling. Pertinent negatives include no abdominal pain, bladder incontinence, bowel incontinence, chest pain, dysuria, fever, headaches, paresis, paresthesias, pelvic pain, perianal numbness, weakness or weight loss. He has tried muscle relaxant (tylenol) for the symptoms. The treatment provided no relief.       Constitution: Negative for chills and fever.   Cardiovascular:  Negative for chest pain and sob on exertion.   Respiratory:  Negative for shortness of breath.    Gastrointestinal:  Negative for abdominal pain and bowel incontinence.   Genitourinary:  Negative for dysuria, " bladder incontinence and pelvic pain.   Musculoskeletal:  Positive for back pain, pain with walking, muscle cramps and muscle ache. Negative for trauma.   Neurological:  Positive for numbness. Negative for headaches.     Objective:     Physical Exam   Constitutional:  Non-toxic appearance. He does not appear ill. No distress.      Comments:Patient is in no acute distress, patient is non-toxic appearing, patient is ox3, patient is answering question appropriately.   normal  Abdominal: Normal appearance.   Musculoskeletal:      Thoracic back: Normal.      Lumbar back: He exhibits tenderness. He exhibits normal range of motion, no bony tenderness, no swelling, no edema, no deformity, no laceration and no spasm.        Back:       Comments: Red represents area of tendereness to palpation. 5/5 strength of LE. Slightly decreased ROM at the hip due to pain. Sensation intact. Distal pulses, 2+ bilaterally. Patellar DTR , 2+ bilaterally. There is L4-S1 midline tenderness. No paraspinal tenderness. No step off deformities. Patient is able to ambulate in exam room and throughout clinic. There is no ecchymosis or erythema on exam.   Neurological: He is alert.   Skin: Skin is not diaphoretic.   Nursing note and vitals reviewed.    Assessment:     1. Acute midline low back pain with bilateral sciatica      Plan:   Previous notes reviewed.  Vital signs reviewed.  Discussed low back pain , home care, tx options, and given follow up precautions.  Patient was briefed on my thought process and diagnosis.   Patient involved with the treatment plan and agreed to the plan.    Will treat for acute low back pain with sciatica at this time, patient is stable and nontoxic appearing, physical exam shows midline tenderness appreciated near the L4-S1 region without step-off deformity, patient is neurovascularly intact, with history of spinal stenosis and no new trauma/injury to the back, symptoms are likely due to acute on chronic low back  pain, patient given referral to back/spine and pain clinic, PCP follow up encouraged, ER precautions given.    Patient informed on warning signs, patient understood warning signs and to go to urgent care or ER if warning signs appear.  Please excuse grammatical/spelling errors appreciated throughout this visit encounter for a remote dictation device was used during this encounter.    Patient Instructions   Please drink plenty of fluids.  Please get plenty of rest.    You received a steroid injection today - this can elevate your blood pressure, elevate your blood sugar, cause weight gain, nervous energy, redness to the face and dimpling of the skin where the injection was administered.    Please apply LIDOCAINE PATCH to the affected area.  Please apply VOLTAREN GEL to the affected area.  Please consider applying a heating pad to the affected area.    You were given a referral to back and spine clinic and pain clinic, please call [phone] for scheduling and appointments.     Rest, ice, compression and elevation to the affected joint or limb as needed.  Please follow up with your primary care doctor or specialist as needed.    If you  smoke, please stop smoking.    Acute midline low back pain with bilateral sciatica  -     dexAMETHasone injection 10 mg  -     LIDOcaine (LIDODERM) 5 %; Place 1 patch onto the skin once daily. Remove & Discard patch within 12 hours or as directed by MD  Dispense: 5 patch; Refill: 0  -     diclofenac sodium (VOLTAREN) 1 % Gel; Apply 2 g topically once daily.  Dispense: 20 g; Refill: 0  -     Ambulatory referral/consult to Back & Spine Clinic  -     Ambulatory referral/consult to Pain Clinic      Vivi Steward PA-C

## 2023-11-29 NOTE — PATIENT INSTRUCTIONS
Please drink plenty of fluids.  Please get plenty of rest.    You received a steroid injection today - this can elevate your blood pressure, elevate your blood sugar, cause weight gain, nervous energy, redness to the face and dimpling of the skin where the injection was administered.    Please apply LIDOCAINE PATCH to the affected area.  Please apply VOLTAREN GEL to the affected area.  Please consider applying a heating pad to the affected area.    You were given a referral to back and spine clinic and pain clinic, please call [phone] for scheduling and appointments.     Rest, ice, compression and elevation to the affected joint or limb as needed.  Please follow up with your primary care doctor or specialist as needed.    If you  smoke, please stop smoking.

## 2023-12-01 ENCOUNTER — TELEPHONE (OUTPATIENT)
Dept: FAMILY MEDICINE | Facility: CLINIC | Age: 73
End: 2023-12-01
Payer: MEDICARE

## 2023-12-01 NOTE — TELEPHONE ENCOUNTER
Pt states he will keep his previous schedule appt in January, because he has an appt with a back specialist.

## 2023-12-05 ENCOUNTER — HOSPITAL ENCOUNTER (EMERGENCY)
Facility: HOSPITAL | Age: 73
Discharge: HOME | End: 2023-12-05
Attending: EMERGENCY MEDICINE
Payer: MEDICARE

## 2023-12-05 VITALS
WEIGHT: 220 LBS | HEIGHT: 69 IN | OXYGEN SATURATION: 97 % | BODY MASS INDEX: 32.58 KG/M2 | TEMPERATURE: 98 F | RESPIRATION RATE: 20 BRPM | HEART RATE: 73 BPM | SYSTOLIC BLOOD PRESSURE: 133 MMHG | DIASTOLIC BLOOD PRESSURE: 76 MMHG

## 2023-12-05 DIAGNOSIS — M54.31 SCIATICA, RIGHT SIDE: ICD-10-CM

## 2023-12-05 DIAGNOSIS — G89.29 ACUTE EXACERBATION OF CHRONIC LOW BACK PAIN: Primary | ICD-10-CM

## 2023-12-05 DIAGNOSIS — M54.50 ACUTE EXACERBATION OF CHRONIC LOW BACK PAIN: Primary | ICD-10-CM

## 2023-12-05 PROCEDURE — 99284 EMERGENCY DEPT VISIT MOD MDM: CPT

## 2023-12-05 PROCEDURE — 96372 THER/PROPH/DIAG INJ SC/IM: CPT | Performed by: PHYSICIAN ASSISTANT

## 2023-12-05 PROCEDURE — 63600175 PHARM REV CODE 636 W HCPCS: Performed by: PHYSICIAN ASSISTANT

## 2023-12-05 PROCEDURE — 25000003 PHARM REV CODE 250: Performed by: PHYSICIAN ASSISTANT

## 2023-12-05 RX ORDER — OXYCODONE AND ACETAMINOPHEN 5; 325 MG/1; MG/1
1 TABLET ORAL EVERY 6 HOURS PRN
Qty: 12 TABLET | Refills: 0 | Status: SHIPPED | OUTPATIENT
Start: 2023-12-05 | End: 2023-12-07 | Stop reason: SDUPTHER

## 2023-12-05 RX ORDER — ONDANSETRON 4 MG/1
4 TABLET, ORALLY DISINTEGRATING ORAL
Status: COMPLETED | OUTPATIENT
Start: 2023-12-05 | End: 2023-12-05

## 2023-12-05 RX ORDER — HYDROMORPHONE HYDROCHLORIDE 1 MG/ML
1 INJECTION, SOLUTION INTRAMUSCULAR; INTRAVENOUS; SUBCUTANEOUS
Status: COMPLETED | OUTPATIENT
Start: 2023-12-05 | End: 2023-12-05

## 2023-12-05 RX ORDER — KETOROLAC TROMETHAMINE 30 MG/ML
15 INJECTION, SOLUTION INTRAMUSCULAR; INTRAVENOUS
Status: COMPLETED | OUTPATIENT
Start: 2023-12-05 | End: 2023-12-05

## 2023-12-05 RX ORDER — MELOXICAM 7.5 MG/1
7.5 TABLET ORAL DAILY
Qty: 12 TABLET | Refills: 0 | Status: SHIPPED | OUTPATIENT
Start: 2023-12-05 | End: 2023-12-07 | Stop reason: SDUPTHER

## 2023-12-05 RX ADMIN — ONDANSETRON 4 MG: 4 TABLET, ORALLY DISINTEGRATING ORAL at 10:12

## 2023-12-05 RX ADMIN — KETOROLAC TROMETHAMINE 15 MG: 30 INJECTION, SOLUTION INTRAMUSCULAR; INTRAVENOUS at 10:12

## 2023-12-05 RX ADMIN — HYDROMORPHONE HYDROCHLORIDE 1 MG: 1 INJECTION, SOLUTION INTRAMUSCULAR; INTRAVENOUS; SUBCUTANEOUS at 10:12

## 2023-12-05 NOTE — ED PROVIDER NOTES
Encounter Date: 12/5/2023       History     Chief Complaint   Patient presents with    Back Pain     Reports lower back pain x Wednesday that radiates down R leg. Hx sciatica. Reports going to  on Wednesday and getting steroid shot with no relief. Denies meds pta.      72-year-old male with history of chronic low back pain with associated lumbar canal stenosis, herniated disc, and degenerative disc disease presents to the emergency department requesting pain control of his chronic low back pain.  Exacerbation of pain 6 days ago.  Atraumatic.  Pain radiating down his right lower extremity with associated paresthesias and is identical to his past exacerbations of sciatica with no new features.  Received a steroid injection from an urgent care 6 days ago without relief and is requesting additional pain control.  No relief with tramadol and NSAIDs at home.  Has appointment with pain management for the 1st time on January 8th.  Has referral placed for Neurosurgery but is not currently being managed by Neurosurgery.  Denies urinary incontinence/symptoms, fever, nausea, vomiting, chest pain, shortness of breath, and inability to walk.    The history is provided by the patient.     Review of patient's allergies indicates:   Allergen Reactions    Bactrim [sulfamethoxazole-trimethoprim] Swelling     Swelling of lips/blisters in mouth       Past Medical History:   Diagnosis Date    *Atrial fibrillation     Back pain     Benign hypertrophy of prostate     Degenerative disc disease     GERD (gastroesophageal reflux disease)     Hx of colonic polyps     Hypertension     Pilonidal cyst 1971    Prostate cancer     Sleep apnea     Trouble in sleeping      Past Surgical History:   Procedure Laterality Date    COLONOSCOPY N/A 12/07/2016    Procedure: COLONOSCOPY;  Surgeon: Sumeet Lundy MD;  Location: [facility] ([address] [place] [address]);  Service: Endoscopy;  Laterality: N/A;  OK to hold Pradaxa 2 days prior to procedure per Dr Jones/see note  dated 11/15/16/svn    COLONOSCOPY N/A 2022    Procedure: COLONOSCOPY;  Surgeon: Italo Roy MD;  Location: [facility] ENDO;  Service: Endoscopy;  Laterality: N/A;  ok to hold Pradaxa 2 days per B Mc RN/arrhythmia clinic      COVID test at [place] on -GT  2/10/22 Changed Ali to scoping MD-ml    CYST REMOVAL  1971    coccyx    EPIDURAL STEROID INJECTION Right 2022    Procedure: Right Genicular Nerve Radiofrequency Ablations;  Surgeon: Luca Galan Jr., MD;  Location: [facility] ENDO;  Service: Pain Management;  Laterality: Right;  @1100  Pradaxa not held  No DM  Prostate BX     EPIDURAL STEROID INJECTION Left 2022    Procedure: Left Genicular Radiofrequency Ablations;  Surgeon: Luca Galan Jr., MD;  Location: [facility] ENDO;  Service: Pain Management;  Laterality: Left;  @1130  Pradaxa not held  No DM  Last 1&100    KNEE SURGERY      right    PROSTATE BIOPSY  2022     Family History   Problem Relation Age of Onset    Breast cancer Mother     Cancer Mother     Alcohol abuse Father     Cancer Father     Heart disease Father     Hypertension Father     Breast cancer Sister     Cancer Sister     Early death Sister     Depression Brother     Early death Brother     Mental illness Brother     No Known Problems Daughter     No Known Problems Daughter     No Known Problems Daughter     No Known Problems Daughter     No Known Problems Daughter     Ovarian cancer Other     Melanoma Neg Hx      Social History     Tobacco Use    Smoking status: Former     Current packs/day: 0.00     Average packs/day: 0.3 packs/day for 2.0 years (0.5 ttl pk-yrs)     Types: Cigarettes     Start date: 1973     Quit date: 1975     Years since quittin.2    Smokeless tobacco: Never   Substance Use Topics    Alcohol use: Yes     Alcohol/week: 6.0 standard drinks of alcohol     Types: 6 Glasses of wine per week     Comment: weekly    Drug use: No     Review of Systems   Constitutional:  Negative for  fever.   Respiratory:  Negative for shortness of breath.    Cardiovascular:  Negative for chest pain.   Gastrointestinal:  Negative for abdominal pain, nausea and vomiting.   Musculoskeletal:  Positive for back pain. Negative for joint swelling and neck pain.   Skin:  Negative for color change and wound.   Neurological:  Positive for numbness.   All other systems reviewed and are negative.      Physical Exam     Initial Vitals [12/05/23 0954]   BP Pulse Resp Temp SpO2   133/76 73 16 97.6 °F (36.4 °C) 97 %      MAP       --         Physical Exam    Nursing note and vitals reviewed.  Constitutional: He appears well-developed and well-nourished. He is not diaphoretic. No distress.   HENT:   Head: Atraumatic.   Right Ear: External ear normal.   Left Ear: External ear normal.   Eyes: Conjunctivae are normal.   Neck: No tracheal deviation present.   Normal range of motion.  Cardiovascular:  Normal rate and regular rhythm.           Pulmonary/Chest: No accessory muscle usage or stridor. No tachypnea. No respiratory distress.   Abdominal: Abdomen is soft. He exhibits no distension. There is no abdominal tenderness. There is no guarding.   Musculoskeletal:      Cervical back: Normal range of motion.      Comments: Reproducible TTP over the R sciatic notch. No midline tenderness or bony deformities noted down the neck and spine. No bony TTP to the hips. (+) SLR on the R. No lower extremity swelling. Distal lower extremity pulses 2+ and equal. No rash/skin lesions. No foot drop. Ambulatory.          Neurological: He has normal strength. He displays no tremor. He displays no seizure activity. Coordination and gait normal.   Skin: Skin is intact. Capillary refill takes less than 2 seconds. No cyanosis.         ED Course   Procedures  Labs Reviewed - No data to display       Imaging Results    None          Medications   HYDROmorphone injection 1 mg (has no administration in time range)   ondansetron disintegrating tablet 4 mg  "(has no administration in time range)   ketorolac injection 15 mg (has no administration in time range)     Medical Decision Making    This is an emergent evaluation of a 72 y.o. male with a Hx of chronic low back pain presenting to the ED for "sharp" low back pain that intermittently radiates down the RLE. Denies traumatic injury, fever, urinary retention/loss of bowel bladder control, urinary symptoms, and abdominal pain. Afebrile. Patient is non-toxic appearing and in no acute distress. Ambulatory. (+) SLR. No sensory or motor deficit.     Presentation most consistent with acute lumbosacral radiculopathy. No Hx of trauma to suggest acute vertebral fracture or warrant emergent imaging at this time. I doubt cauda equina, AAA rupture, and ureteral stone. I have a low suspicion for infectious etiology, including for epidural abscess and pyelonephritis. I have a lower suspicion for neoplastic etiology that will require emergent neurosurgical intervention today.     Will offer pain control in ED. Discharged home with supportive care. Instructed the patient to follow up with PCP. Also instructed to follow up with neurosurgery and/or orthopedics for reevaluation and management of symptoms. May benefit from MRI of lumbar spine as an outpatient if symptoms persist. An educational resource on sciatica is issued to the patient.     I discussed with the patient the diagnosis, treatment plan, indications for return to the emergency department, and for expected follow-up. The patient verbalized an understanding. The patient is asked if there are any questions or concerns. We discuss the case, until all issues are addressed to the patient's satisfaction. Patient understands and is agreeable to the plan.       Risk  Prescription drug management.                                      Clinical Impression:  Final diagnoses:  [M54.50, G89.29] Acute exacerbation of chronic low back pain (Primary)  [M54.31] Sciatica, right side          " ED Disposition Condition    Discharge Stable          ED Prescriptions       Medication Sig Dispense Start Date End Date Auth. Provider    oxyCODONE-acetaminophen (PERCOCET) 5-325 mg per tablet Take 1 tablet by mouth every 6 (six) hours as needed for Pain. 12 tablet 12/5/2023 12/8/2023 Lui Vo PA-C    meloxicam (MOBIC) 7.5 MG tablet Take 1 tablet (7.5 mg total) by mouth once daily. 12 tablet 12/5/2023 -- Lui Vo PA-C          Follow-up Information       Follow up With Specialties Details Why Contact Info    Husam Chong MD Family Medicine Schedule an appointment as soon as possible for a visit in 1 day For re-evaluation [address] [place] [address]  [phone]      [facility] NEUROSURGERY Neurosurgery Schedule an appointment as soon as possible for a visit in 1 day For further evaluation, For more definitive management of your symptoms [address] [facility] [place] [address] [phone]    [facility] Emergency Dept Emergency Medicine Go to  If symptoms worsen or new symptoms develop [address] [facility] [place] [address] [phone]             Lui Vo PA-C  12/05/23 1028

## 2023-12-05 NOTE — DISCHARGE INSTRUCTIONS
Thank you for coming to our Emergency Department today. It is important to remember that some problems or medical conditions are difficult to diagnose and may not be found or addressed during your Emergency Department visit.  These conditions often start with non-specific symptoms and can only be diagnosed on follow up visits with your primary care physician or specialist when the symptoms continue or change. Please remember that all medical conditions can change, and we cannot predict how you will be feeling tomorrow or the next day. Return to the ER with any questions/concerns, new/concerning symptoms, worsening or failure to improve.       Be sure to follow up with your primary care doctor and review all labs/imaging/tests that were performed during your ER visit with them. It is very common for us to identify non-emergent incidental findings which must be followed up with your primary care physician.  Some labs/imaging/tests may be outside of the normal range, and require non-emergent follow-up and/or further investigation/treatment/procedures/testing to help diagnose/exclude/prevent complications or other potentially serious medical conditions. Some abnormalities may not have been discussed or addressed during your ER visit.     An ER visit does not replace a primary care visit, and many screening tests or follow-up tests cannot be ordered by an ER doctor or performed by the ER. Some tests may even require pre-approval.    If you do not have a primary care doctor, you may contact the one listed on your discharge paperwork or you may also call the [facility] Appointment Desk at [phone] , or [address] [place] [address] at  [phone] to schedule an appointment, or establish care with a primary care doctor or even a specialist and to obtain information about local resources. It is important to your health that you have a primary care doctor.    Please take all medications as directed. We have done our best to select  a medication for you that will treat your condition however, all medications may potentially have side-effects and it is impossible to predict which medications may give you side-effects or what those side-effects (if any) those medications may give you.  If you feel that you are having a negative effect or side-effect of any medication you should stop taking those medications immediately and seek medical attention. If you feel that you are having a life-threatening reaction call 911.        Do not drive, swim, climb to height, take a bath, operate heavy machinery, drink alcohol or take potentially sedating medications, sign any legal documents or make any important decisions for 24 hours if you have received any pain medications, sedatives or mood altering drugs during your ER visit or within 24 hours of taking them if they have been prescribed to you.     You can find additional resources for Dentists, hearing aids, durable medical equipment, low cost pharmacies and other resources at [email or web address]

## 2023-12-06 ENCOUNTER — TELEPHONE (OUTPATIENT)
Dept: FAMILY MEDICINE | Facility: CLINIC | Age: 73
End: 2023-12-06

## 2023-12-07 ENCOUNTER — OFFICE VISIT (OUTPATIENT)
Dept: FAMILY MEDICINE | Facility: CLINIC | Age: 73
End: 2023-12-07
Payer: MEDICARE

## 2023-12-07 VITALS
DIASTOLIC BLOOD PRESSURE: 70 MMHG | OXYGEN SATURATION: 95 % | RESPIRATION RATE: 16 BRPM | BODY MASS INDEX: 34.8 KG/M2 | TEMPERATURE: 97 F | HEIGHT: 69 IN | SYSTOLIC BLOOD PRESSURE: 138 MMHG | WEIGHT: 235 LBS | HEART RATE: 64 BPM

## 2023-12-07 DIAGNOSIS — M51.16 LUMBAR DISC DISEASE WITH RADICULOPATHY: Primary | ICD-10-CM

## 2023-12-07 PROCEDURE — 99999 PR PBB SHADOW E&M-EST. PATIENT-LVL IV: CPT | Mod: PBBFAC,,, | Performed by: FAMILY MEDICINE

## 2023-12-07 PROCEDURE — 99214 OFFICE O/P EST MOD 30 MIN: CPT | Mod: PBBFAC,PO | Performed by: FAMILY MEDICINE

## 2023-12-07 PROCEDURE — 99215 OFFICE O/P EST HI 40 MIN: CPT | Mod: S$PBB,,, | Performed by: FAMILY MEDICINE

## 2023-12-07 PROCEDURE — 99999 PR PBB SHADOW E&M-EST. PATIENT-LVL IV: ICD-10-PCS | Mod: PBBFAC,,, | Performed by: FAMILY MEDICINE

## 2023-12-07 PROCEDURE — 99215 PR OFFICE/OUTPT VISIT, EST, LEVL V, 40-54 MIN: ICD-10-PCS | Mod: S$PBB,,, | Performed by: FAMILY MEDICINE

## 2023-12-07 RX ORDER — PREDNISONE 10 MG/1
TABLET ORAL
Qty: 21 TABLET | Refills: 0 | Status: SHIPPED | OUTPATIENT
Start: 2023-12-07 | End: 2023-12-21

## 2023-12-07 RX ORDER — OXYCODONE AND ACETAMINOPHEN 5; 325 MG/1; MG/1
1 TABLET ORAL EVERY 8 HOURS PRN
Qty: 40 TABLET | Refills: 0 | Status: SHIPPED | OUTPATIENT
Start: 2023-12-07 | End: 2023-12-19

## 2023-12-07 RX ORDER — TIZANIDINE 4 MG/1
4 TABLET ORAL NIGHTLY PRN
Qty: 30 TABLET | Refills: 0 | Status: SHIPPED | OUTPATIENT
Start: 2023-12-07 | End: 2024-01-06

## 2023-12-07 RX ORDER — MELOXICAM 15 MG/1
15 TABLET ORAL DAILY
Qty: 30 TABLET | Refills: 2 | Status: SHIPPED | OUTPATIENT
Start: 2023-12-07 | End: 2024-02-28 | Stop reason: SDUPTHER

## 2023-12-07 NOTE — PROGRESS NOTES
Health Maintenance Due   Topic     RSV Vaccine (Age 60+ and Pregnant patients) (1 - 1-dose 60+ series)     COVID-19 Vaccine (6 - 2023-24 season)

## 2023-12-07 NOTE — PROGRESS NOTES
Subjective:       Patient ID: Omar Pacheco is a 72 y.o. male.    Chief Complaint: Follow-up and Back Pain (Lower back down to legs)      Follow-up    Back Pain      72-year-old male presents for ED follow-up.  Patient states he went to ED for severe back pain.  Given pain medication which he feels has been helping.  States he is going out of town is worried his pain will come back.  States initially did have pain radiating down both of his legs.  States he had ablation previously and has a follow-up with pain management in January.  Review of Systems   Constitutional: Negative.    HENT: Negative.     Respiratory: Negative.     Cardiovascular: Negative.    Gastrointestinal: Negative.    Endocrine: Negative.    Genitourinary: Negative.    Musculoskeletal:  Positive for back pain.   Neurological: Negative.    Psychiatric/Behavioral: Negative.            Past Medical History:   Diagnosis Date    *Atrial fibrillation     Back pain     Benign hypertrophy of prostate     Degenerative disc disease     GERD (gastroesophageal reflux disease)     Hx of colonic polyps     Hypertension     Pilonidal cyst 1971    Prostate cancer     Sleep apnea     Trouble in sleeping      Past Surgical History:   Procedure Laterality Date    COLONOSCOPY N/A 12/07/2016    Procedure: COLONOSCOPY;  Surgeon: Sumeet Lundy MD;  Location: [address]);  Service: Endoscopy;  Laterality: N/A;  OK to hold Pradaxa 2 days prior to procedure per Dr Jones/see note dated 11/15/16/svn    COLONOSCOPY N/A 02/24/2022    Procedure: COLONOSCOPY;  Surgeon: Italo Roy MD;  Location: [facility];  Service: Endoscopy;  Laterality: N/A;  ok to hold Pradaxa 2 days per EBONI Bentley RN/arrhythmia clinic      COVID test at [place] on 2/21-GT  2/10/22 Changed Kirill to edward BARBER-ml    CYST REMOVAL  1971    coccyx    EPIDURAL STEROID INJECTION Right 09/07/2022    Procedure: Right Genicular Nerve Radiofrequency Ablations;  Surgeon: Luca Galan Jr., MD;  Location:  [facility] ENDO;  Service: Pain Management;  Laterality: Right;  @1100  Pradaxa not held  No DM  Prostate BX     EPIDURAL STEROID INJECTION Left 2022    Procedure: Left Genicular Radiofrequency Ablations;  Surgeon: Luca Galan Jr., MD;  Location: [facility] ENDO;  Service: Pain Management;  Laterality: Left;  @1130  Pradaxa not held  No DM  Last 1&100    KNEE SURGERY      right    PROSTATE BIOPSY  2022     Family History   Problem Relation Age of Onset    Breast cancer Mother     Cancer Mother     Alcohol abuse Father     Cancer Father     Heart disease Father     Hypertension Father     Breast cancer Sister     Cancer Sister     Early death Sister     Depression Brother     Early death Brother     Mental illness Brother     No Known Problems Daughter     No Known Problems Daughter     No Known Problems Daughter     No Known Problems Daughter     No Known Problems Daughter     Ovarian cancer Other     Melanoma Neg Hx      Social History     Socioeconomic History    Marital status:     Number of children: 5    Highest education level: Associate degree: academic program   Tobacco Use    Smoking status: Former     Current packs/day: 0.00     Average packs/day: 0.3 packs/day for 2.0 years (0.5 ttl pk-yrs)     Types: Cigarettes     Start date: 1973     Quit date: 1975     Years since quittin.2    Smokeless tobacco: Never   Substance and Sexual Activity    Alcohol use: Yes     Alcohol/week: 6.0 standard drinks of alcohol     Types: 6 Glasses of wine per week     Comment: weekly    Drug use: No    Sexual activity: Yes     Partners: Female     Social Determinants of Health     Financial Resource Strain: Low Risk  (2023)    Overall Financial Resource Strain (CARDIA)     Difficulty of Paying Living Expenses: Not hard at all   Food Insecurity: No Food Insecurity (2023)    Hunger Vital Sign     Worried About Running Out of Food in the Last Year: Never true     Ran Out of Food in  "the Last Year: Never true   Transportation Needs: No Transportation Needs (11/14/2023)    PRAPARE - Transportation     Lack of Transportation (Medical): No     Lack of Transportation (Non-Medical): No   Physical Activity: Sufficiently Active (11/14/2023)    Exercise Vital Sign     Days of Exercise per Week: 4 days     Minutes of Exercise per Session: 90 min   Stress: Stress Concern Present (11/14/2023)    [employer] [place] [employer] - Occupational Stress Questionnaire     Feeling of Stress : Very much   Social Connections: Moderately Integrated (11/14/2023)    Social Connection and Isolation Panel [NHANES]     Frequency of Communication with Friends and Family: More than three times a week     Frequency of Social Gatherings with Friends and Family: More than three times a week     Attends Jainism Services: More than 4 times per year     Active Member of Clubs or Organizations: Yes     Attends Club or Organization Meetings: More than 4 times per year     Marital Status:    Housing Stability: Low Risk  (11/14/2023)    Housing Stability Vital Sign     Unable to Pay for Housing in the Last Year: No     Number of Places Lived in the Last Year: 1     Unstable Housing in the Last Year: No       Current Outpatient Medications:     acetaminophen (TYLENOL ORAL), Take 500 mg by mouth as needed., Disp: , Rfl:     amLODIPine (NORVASC) 5 MG tablet, Take 1 tablet (5 mg total) by mouth once daily., Disp: 90 tablet, Rfl: 3    ammonium lactate (AMLACTIN) 12 % lotion, Apply topically daily as needed (Dry Skin)., Disp: 400 g, Rfl: 11    dabigatran etexilate (PRADAXA) 150 mg Cap, Take 1 capsule (150 mg total) by mouth 2 (two) times daily. "Do NOT break, chew, or open capsules.", Disp: 180 capsule, Rfl: 2    diclofenac sodium (VOLTAREN) 1 % Gel, Apply 2 g topically once daily., Disp: 20 g, Rfl: 0    flecainide (TAMBOCOR) 100 MG Tab, Take 1 tablet (100 mg total) by mouth every 12 (twelve) hours., Disp: 180 tablet, " Rfl: 3    gabapentin (NEURONTIN) 100 MG capsule, Take 1 capsule (100 mg total) by mouth 3 (three) times daily as needed (back pain)., Disp: 60 capsule, Rfl: 2    levocetirizine (XYZAL) 5 MG tablet, Take 1 tablet (5 mg total) by mouth every evening., Disp: 90 tablet, Rfl: 3    LIDOcaine (LIDODERM) 5 %, Place 1 patch onto the skin once daily. Remove & Discard patch within 12 hours or as directed by MD, Disp: 5 patch, Rfl: 0    losartan (COZAAR) 50 MG tablet, Take 0.5 tablets (25 mg total) by mouth once daily., Disp: 45 tablet, Rfl: 3    methocarbamoL (ROBAXIN) 500 MG Tab, Take 1 tablet (500 mg total) by mouth 4 (four) times daily as needed., Disp: 60 tablet, Rfl: 2    metoprolol succinate (TOPROL-XL) 25 MG 24 hr tablet, Take 1 tablet (25 mg total) by mouth once daily., Disp: 90 tablet, Rfl: 3    mirtazapine (REMERON) 30 MG tablet, Take 30 mg by mouth every evening., Disp: , Rfl:     multivitamin (THERAGRAN) per tablet, Take 1 tablet by mouth once daily., Disp: , Rfl:     naloxone (NARCAN) 4 mg/actuation Spry, 1 spray by Nasal route once., Disp: , Rfl:     omeprazole (PRILOSEC) 20 MG capsule, Take 1 capsule by mouth Daily., Disp: , Rfl:     sildenafiL (VIAGRA) 100 MG tablet, Take 1 tablet (100 mg total) by mouth as needed for Erectile Dysfunction (1 tablet 1 hr prior to intercourse.)., Disp: 10 tablet, Rfl: 11    tadalafiL (CIALIS) 20 MG Tab, Take 1 tablet (20 mg total) by mouth once daily., Disp: 30 tablet, Rfl: 5    terazosin (HYTRIN) 10 MG capsule, Take 1 capsule (10 mg total) by mouth every evening., Disp: 90 capsule, Rfl: 3    traMADoL (ULTRAM) 50 mg tablet, Take 1 tablet (50 mg total) by mouth every 12 (twelve) hours as needed for Pain., Disp: 60 tablet, Rfl: 0    triamcinolone acetonide 0.1% (KENALOG) 0.1 % cream, Apply topically 2 (two) times daily., Disp: 80 g, Rfl: 0    VITAMIN B COMPLEX (B COMPLETE ORAL), Take by mouth once daily., Disp: , Rfl:     vitamin E 100 UNIT capsule, Take 100 Units by mouth once  "daily., Disp: , Rfl:     meloxicam (MOBIC) 15 MG tablet, Take 1 tablet (15 mg total) by mouth once daily., Disp: 30 tablet, Rfl: 2    meloxicam (MOBIC) 7.5 MG tablet, Take 1 tablet (7.5 mg total) by mouth daily as needed for Pain., Disp: 30 tablet, Rfl: 2    oxyCODONE-acetaminophen (PERCOCET) 5-325 mg per tablet, Take 1 tablet by mouth every 8 (eight) hours as needed for Pain., Disp: 40 tablet, Rfl: 0    predniSONE (DELTASONE) 10 MG tablet, Take 2 tablets (20 mg total) by mouth once daily for 7 days, THEN 1 tablet (10 mg total) once daily for 7 days., Disp: 21 tablet, Rfl: 0    tiZANidine (ZANAFLEX) 4 MG tablet, Take 1 tablet (4 mg total) by mouth nightly as needed., Disp: 30 tablet, Rfl: 0   Objective:      Vitals:    12/07/23 1041   BP: 138/70   BP Location: Right arm   Patient Position: Sitting   BP Method: Large (Manual)   Pulse: 64   Resp: 16   Temp: 97 °F (36.1 °C)   TempSrc: Oral   SpO2: 95%   Weight: 106.6 kg (235 lb 0.2 oz)   Height: 5' 9" (1.753 m)       Physical Exam  Constitutional:       General: He is not in acute distress.     Appearance: He is not diaphoretic.   HENT:      Head: Normocephalic and atraumatic.   Eyes:      Conjunctiva/sclera: Conjunctivae normal.   Pulmonary:      Effort: Pulmonary effort is normal.   Musculoskeletal:      Cervical back: Neck supple.   Skin:     Findings: No rash.   Neurological:      Mental Status: He is alert and oriented to person, place, and time.   Psychiatric:         Behavior: Behavior normal.         Thought Content: Thought content normal.         Judgment: Judgment normal.            Assessment:       1. Lumbar disc disease with radiculopathy        Plan:       Lumbar disc disease with radiculopathy  -     oxyCODONE-acetaminophen (PERCOCET) 5-325 mg per tablet; Take 1 tablet by mouth every 8 (eight) hours as needed for Pain.  Dispense: 40 tablet; Refill: 0  -     meloxicam (MOBIC) 15 MG tablet; Take 1 tablet (15 mg total) by mouth once daily.  Dispense: 30 " tablet; Refill: 2  -     tiZANidine (ZANAFLEX) 4 MG tablet; Take 1 tablet (4 mg total) by mouth nightly as needed.  Dispense: 30 tablet; Refill: 0  -     predniSONE (DELTASONE) 10 MG tablet; Take 2 tablets (20 mg total) by mouth once daily for 7 days, THEN 1 tablet (10 mg total) once daily for 7 days.  Dispense: 21 tablet; Refill: 0              Future Appointments   Date Time Provider Department Center   1/4/2024  2:00 PM Donya Hickey MD [facility]   1/8/2024  9:30 AM [facility] [place]   1/11/2024  9:20 AM Husam Chong MD [facility]       Patient note was created using [employer].  Any errors in syntax or even information may not have been identified and edited on initial review prior to signing this note.

## 2024-01-04 ENCOUNTER — OFFICE VISIT (OUTPATIENT)
Dept: SPINE | Facility: CLINIC | Age: 74
End: 2024-01-04
Payer: MEDICARE

## 2024-01-04 VITALS
HEIGHT: 69 IN | RESPIRATION RATE: 12 BRPM | HEART RATE: 63 BPM | SYSTOLIC BLOOD PRESSURE: 167 MMHG | DIASTOLIC BLOOD PRESSURE: 77 MMHG | BODY MASS INDEX: 34.8 KG/M2 | WEIGHT: 235 LBS

## 2024-01-04 DIAGNOSIS — R97.20 ELEVATED PSA: ICD-10-CM

## 2024-01-04 DIAGNOSIS — M43.06 LUMBAR SPONDYLOLYSIS: ICD-10-CM

## 2024-01-04 DIAGNOSIS — M48.062 SPINAL STENOSIS OF LUMBAR REGION WITH NEUROGENIC CLAUDICATION: Primary | ICD-10-CM

## 2024-01-04 PROBLEM — M54.9 DORSALGIA, UNSPECIFIED: Status: ACTIVE | Noted: 2024-01-04

## 2024-01-04 PROBLEM — M48.07 SPINAL STENOSIS, LUMBOSACRAL REGION: Status: ACTIVE | Noted: 2024-01-04

## 2024-01-04 PROCEDURE — 99204 OFFICE O/P NEW MOD 45 MIN: CPT | Mod: S$PBB,,, | Performed by: ANESTHESIOLOGY

## 2024-01-04 PROCEDURE — 99214 OFFICE O/P EST MOD 30 MIN: CPT | Mod: PBBFAC | Performed by: ANESTHESIOLOGY

## 2024-01-04 PROCEDURE — 99999 PR PBB SHADOW E&M-EST. PATIENT-LVL IV: CPT | Mod: PBBFAC,,, | Performed by: ANESTHESIOLOGY

## 2024-01-04 RX ORDER — GABAPENTIN 100 MG/1
100 CAPSULE ORAL 3 TIMES DAILY PRN
Qty: 60 CAPSULE | Refills: 2 | Status: SHIPPED | OUTPATIENT
Start: 2024-01-04 | End: 2024-01-30

## 2024-01-04 NOTE — PROGRESS NOTES
"  PCP: Husam Chong MD    REFERRING PHYSICIAN: Vivi Steward,*    CHIEF COMPLAINT: Low back pain that radiates into bilateral legs    Original HISTORY OF PRESENT ILLNESS: Omar Pacheco presents to the clinic for the evaluation of the above pain. The pain started >10 years ago. It has been waxing and waning for years with periods of acute exacerbation. He was treated with stated epidural injections some years ago with resolution of distal "Sciatica" symptoms. About 6 months ago his pain (both axial and BLE) became exacerbated without inciting trauma. He underwent PT 1-2 years ago and has been under the care of chiropractor. He is wearing a back brace now and tries not to move around very much, avoiding lifting, flexion, and extension as they exacerbate his pain. Leg pain = back pain. Pain travels into b uttocks and posterior thighs. +shopping cart sign. Facet synovitis on 2021 MRI. History of elevated PSA with surveillance. +leg numbness and paresthesias.   The pain is at a severity of 8/10. Pertinent negatives include no abdominal pain, bladder incontinence, bowel incontinence, chest pain, dysuria, fever, headaches, paresis, paresthesias, pelvic pain, perianal numbness, weakness or weight loss. Tylenol, robaxin, oxycodone have all helped. Gabapentin was helpful though he ran out a few months ago and would like to reinitiate.       Original PAIN SCORES:  Best: Pain is 5  Worst: Pain is 10  Current: Pain is 8        5/4/2022     8:28 AM   Last 3 PDI Scores   Pain Disability Index (PDI) 21       INTERVAL HISTORY: (Newest visit at the bottom)   Interval History (Date):       6 weeks of Conservative therapy:  HEP: 5 x per week HEP for the last ~ 2 years  Chiro: yes      Treatments / Medications: (Ice/Heat/NSAIDS/APAP/etc):  Tylenol  Robaxin  Oxycodone  Gabapentin      Interventional Pain Procedures: (Previous injections)  (Years ago) MARIAMA with years of relief    Past Medical History:   Diagnosis Date    " *Atrial fibrillation     Back pain     Benign hypertrophy of prostate     Degenerative disc disease     GERD (gastroesophageal reflux disease)     Hx of colonic polyps     Hypertension     Pilonidal cyst     Prostate cancer     Sleep apnea     Trouble in sleeping      Past Surgical History:   Procedure Laterality Date    COLONOSCOPY N/A 2016    Procedure: COLONOSCOPY;  Surgeon: Sumeet Lundy MD;  Location: [facility] ([address] [place] [address]);  Service: Endoscopy;  Laterality: N/A;  OK to hold Pradaxa 2 days prior to procedure per Dr Jones/see note dated 11/15/16/svn    COLONOSCOPY N/A 2022    Procedure: COLONOSCOPY;  Surgeon: Italo Roy MD;  Location: [facility];  Service: Endoscopy;  Laterality: N/A;  ok to hold Pradaxa 2 days per EBONI Bentley RN/arrhythmia clinic      COVID test at [place] on -GT  2/10/22 Changed Kirill to scoping MD-ml    CYST REMOVAL      coccyx    EPIDURAL STEROID INJECTION Right 2022    Procedure: Right Genicular Nerve Radiofrequency Ablations;  Surgeon: Luca Galan Jr., MD;  Location: [facility];  Service: Pain Management;  Laterality: Right;  @1100  Pradaxa not held  No DM  Prostate BX     EPIDURAL STEROID INJECTION Left 2022    Procedure: Left Genicular Radiofrequency Ablations;  Surgeon: Luca Galan Jr., MD;  Location: [facility];  Service: Pain Management;  Laterality: Left;  @1130  Pradaxa not held  No DM  Last 1&100    KNEE SURGERY      right    PROSTATE BIOPSY  2022     Social History     Socioeconomic History    Marital status:     Number of children: 5    Highest education level: Associate degree: academic program   Tobacco Use    Smoking status: Former     Current packs/day: 0.00     Average packs/day: 0.3 packs/day for 2.0 years (0.5 ttl pk-yrs)     Types: Cigarettes     Start date: 1973     Quit date: 1975     Years since quittin.3    Smokeless tobacco: Never   Substance and Sexual Activity    Alcohol use: Yes      Alcohol/week: 6.0 standard drinks of alcohol     Types: 6 Glasses of wine per week     Comment: weekly    Drug use: No    Sexual activity: Yes     Partners: Female     Social Determinants of Health     Financial Resource Strain: Low Risk  (11/14/2023)    Overall Financial Resource Strain (CARDIA)     Difficulty of Paying Living Expenses: Not hard at all   Food Insecurity: No Food Insecurity (11/14/2023)    Hunger Vital Sign     Worried About Running Out of Food in the Last Year: Never true     Ran Out of Food in the Last Year: Never true   Transportation Needs: No Transportation Needs (11/14/2023)    PRAPARE - Transportation     Lack of Transportation (Medical): No     Lack of Transportation (Non-Medical): No   Physical Activity: Sufficiently Active (11/14/2023)    Exercise Vital Sign     Days of Exercise per Week: 4 days     Minutes of Exercise per Session: 90 min   Stress: Stress Concern Present (11/14/2023)    [employer] [place] [employer] - Occupational Stress Questionnaire     Feeling of Stress : Very much   Social Connections: Moderately Integrated (11/14/2023)    Social Connection and Isolation Panel [NHANES]     Frequency of Communication with Friends and Family: More than three times a week     Frequency of Social Gatherings with Friends and Family: More than three times a week     Attends Buddhist Services: More than 4 times per year     Active Member of Clubs or Organizations: Yes     Attends Club or Organization Meetings: More than 4 times per year     Marital Status:    Housing Stability: Low Risk  (11/14/2023)    Housing Stability Vital Sign     Unable to Pay for Housing in the Last Year: No     Number of Places Lived in the Last Year: 1     Unstable Housing in the Last Year: No     Family History   Problem Relation Age of Onset    Breast cancer Mother     Cancer Mother     Alcohol abuse Father     Cancer Father     Heart disease Father     Hypertension Father     Breast cancer  "Sister     Cancer Sister     Early death Sister     Depression Brother     Early death Brother     Mental illness Brother     No Known Problems Daughter     No Known Problems Daughter     No Known Problems Daughter     No Known Problems Daughter     No Known Problems Daughter     Ovarian cancer Other     Melanoma Neg Hx        Review of patient's allergies indicates:   Allergen Reactions    Bactrim [sulfamethoxazole-trimethoprim] Swelling     Swelling of lips/blisters in mouth         Current Outpatient Medications   Medication Sig    acetaminophen (TYLENOL ORAL) Take 500 mg by mouth as needed.    amLODIPine (NORVASC) 5 MG tablet Take 1 tablet (5 mg total) by mouth once daily.    ammonium lactate (AMLACTIN) 12 % lotion Apply topically daily as needed (Dry Skin).    dabigatran etexilate (PRADAXA) 150 mg Cap Take 1 capsule (150 mg total) by mouth 2 (two) times daily. "Do NOT break, chew, or open capsules."    diclofenac sodium (VOLTAREN) 1 % Gel Apply 2 g topically once daily.    flecainide (TAMBOCOR) 100 MG Tab Take 1 tablet (100 mg total) by mouth every 12 (twelve) hours.    levocetirizine (XYZAL) 5 MG tablet Take 1 tablet (5 mg total) by mouth every evening.    LIDOcaine (LIDODERM) 5 % Place 1 patch onto the skin once daily. Remove & Discard patch within 12 hours or as directed by MD    losartan (COZAAR) 50 MG tablet Take 0.5 tablets (25 mg total) by mouth once daily.    meloxicam (MOBIC) 15 MG tablet Take 1 tablet (15 mg total) by mouth once daily.    methocarbamoL (ROBAXIN) 500 MG Tab Take 1 tablet (500 mg total) by mouth 4 (four) times daily as needed.    metoprolol succinate (TOPROL-XL) 25 MG 24 hr tablet Take 1 tablet (25 mg total) by mouth once daily.    mirtazapine (REMERON) 30 MG tablet Take 30 mg by mouth every evening.    multivitamin (THERAGRAN) per tablet Take 1 tablet by mouth once daily.    naloxone (NARCAN) 4 mg/actuation Spry 1 spray by Nasal route once.    omeprazole (PRILOSEC) 20 MG capsule Take 1 " "capsule by mouth Daily.    sildenafiL (VIAGRA) 100 MG tablet Take 1 tablet (100 mg total) by mouth as needed for Erectile Dysfunction (1 tablet 1 hr prior to intercourse.).    tadalafiL (CIALIS) 20 MG Tab Take 1 tablet (20 mg total) by mouth once daily.    terazosin (HYTRIN) 10 MG capsule Take 1 capsule (10 mg total) by mouth every evening.    tiZANidine (ZANAFLEX) 4 MG tablet Take 1 tablet (4 mg total) by mouth nightly as needed.    traMADoL (ULTRAM) 50 mg tablet Take 1 tablet (50 mg total) by mouth every 12 (twelve) hours as needed for Pain.    triamcinolone acetonide 0.1% (KENALOG) 0.1 % cream Apply topically 2 (two) times daily.    VITAMIN B COMPLEX (B COMPLETE ORAL) Take by mouth once daily.    vitamin E 100 UNIT capsule Take 100 Units by mouth once daily.    gabapentin (NEURONTIN) 100 MG capsule Take 1 capsule (100 mg total) by mouth 3 (three) times daily as needed (back pain).    meloxicam (MOBIC) 7.5 MG tablet Take 1 tablet (7.5 mg total) by mouth daily as needed for Pain.     No current facility-administered medications for this visit.       ROS:  GENERAL: No fever. No chills. No fatigue. Denies weight loss. Denies weight gain.  HEENT: Denies headaches. Denies vision change. Denies eye pain. Denies double vision. Denies ear pain.   CV: Denies chest pain.   PULM: Denies of shortness of breath.  GI: Denies constipation. No diarrhea. No abdominal pain. Denies nausea. Denies vomiting. No blood in stool.  HEME: Denies bleeding problems.  : Denies urgency. No painful urination. No blood in urine.  MS: Denies joint stiffness. Denies joint swelling.  + back pain.  SKIN: Denies rash.   NEURO: Denies seizures. No weakness.  PSYCH:  Denies difficulty sleeping. No anxiety. Denies depression. No suicidal thoughts.       VITALS:   Vitals:    01/04/24 1327   BP: (!) 167/77   Pulse: 63   Resp: 12   Weight: 106.6 kg (235 lb)   Height: 5' 9" (1.753 m)   PainSc:   5         PHYSICAL EXAM:   GENERAL: Well appearing, in no " acute distress, alert and oriented x3.  PSYCH:  Mood and affect appropriate.  SKIN: Skin color, texture, turgor normal, no rashes or lesions.  HEENT:  Normocephalic, atraumatic. Cranial nerves grossly intact.  NECK: No pain to palpation over the cervical paraspinous muscles. No pain to palpation over facets. No pain with neck flexion, extension, or lateral flexion.   PULM: No evidence of respiratory difficulty, symmetric chest rise.  GI:  Non-distended  BACK: Limited range of motion in all planes of motion due to pain. + pain to palpation over the interspinous area at mid to lower lumbar levels. + mild pain to palpation over facet joints. There is no pain with palpation over the sacroiliac joints bilaterally.   EXTREMITIES: No deformities, edema, or skin discoloration.   MUSCULOSKELETAL: No atrophy is noted.  NEURO: Sensation is equal and appropriate bilaterally. Bilateral upper and lower extremity strength is normal and symmetric. Bilateral lower extremity  muscle stretch reflexes are hyporeflexic and symmetric. Plantar response are downgoing. Straight leg raising in the supine position is negative to radicular pain.   GAIT: cautious, bradykinetic, slightly flexed      LABS:  Elevated PSA    IMAGING:    MRI lumbar spine 2021 reviewed    ASSESSMENT: 73 y.o. year old male with pain, consistent with:    Encounter Diagnoses   Name Primary?    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 Imaging shows severe central stenosis and facet synovitis from 2021. Exam shows pain reproduced with facet and stenosis provocation. He is also being monitored for prostate cancer with a recent increase in PSA.       PLAN:  MRI lumbar spine with contrast  to rule out any prostate metastasis  Schedule bilateral L4/5 TFESI to treat central stenosis with neurogenic claudication  Reorder gabapentin 100 mg TID  RTC 2 weeks post procedure      I would like to thank Vivi Steward* for the opportunity to assist in the care of this patient. We had a very nice visit and I look forward to continuing their care. Please let me know if I can be of further assistance.     Edmond Jay MD  01/04/2024

## 2024-01-04 NOTE — H&P (VIEW-ONLY)
"  PCP: Husam Chong MD    REFERRING PHYSICIAN: Vivi Steward,*    CHIEF COMPLAINT: Low back pain that radiates into bilateral legs    Original HISTORY OF PRESENT ILLNESS: Omar Pacheco presents to the clinic for the evaluation of the above pain. The pain started >10 years ago. It has been waxing and waning for years with periods of acute exacerbation. He was treated with stated epidural injections some years ago with resolution of distal "Sciatica" symptoms. About 6 months ago his pain (both axial and BLE) became exacerbated without inciting trauma. He underwent PT 1-2 years ago and has been under the care of chiropractor. He is wearing a back brace now and tries not to move around very much, avoiding lifting, flexion, and extension as they exacerbate his pain. Leg pain = back pain. Pain travels into b uttocks and posterior thighs. +shopping cart sign. Facet synovitis on 2021 MRI. History of elevated PSA with surveillance. +leg numbness and paresthesias.   The pain is at a severity of 8/10. Pertinent negatives include no abdominal pain, bladder incontinence, bowel incontinence, chest pain, dysuria, fever, headaches, paresis, paresthesias, pelvic pain, perianal numbness, weakness or weight loss. Tylenol, robaxin, oxycodone have all helped. Gabapentin was helpful though he ran out a few months ago and would like to reinitiate.       Original PAIN SCORES:  Best: Pain is 5  Worst: Pain is 10  Current: Pain is 8        5/4/2022     8:28 AM   Last 3 PDI Scores   Pain Disability Index (PDI) 21       INTERVAL HISTORY: (Newest visit at the bottom)   Interval History (Date):       6 weeks of Conservative therapy:  HEP: 5 x per week HEP for the last ~ 2 years  Chiro: yes      Treatments / Medications: (Ice/Heat/NSAIDS/APAP/etc):  Tylenol  Robaxin  Oxycodone  Gabapentin      Interventional Pain Procedures: (Previous injections)  (Years ago) MARIAMA with years of relief    Past Medical History:   Diagnosis Date    " *Atrial fibrillation     Back pain     Benign hypertrophy of prostate     Degenerative disc disease     GERD (gastroesophageal reflux disease)     Hx of colonic polyps     Hypertension     Pilonidal cyst     Prostate cancer     Sleep apnea     Trouble in sleeping      Past Surgical History:   Procedure Laterality Date    COLONOSCOPY N/A 2016    Procedure: COLONOSCOPY;  Surgeon: Sumeet Lundy MD;  Location: [facility] ([address] [place] [address]);  Service: Endoscopy;  Laterality: N/A;  OK to hold Pradaxa 2 days prior to procedure per Dr Jones/see note dated 11/15/16/svn    COLONOSCOPY N/A 2022    Procedure: COLONOSCOPY;  Surgeon: Italo Roy MD;  Location: [facility];  Service: Endoscopy;  Laterality: N/A;  ok to hold Pradaxa 2 days per EBONI Bentley RN/arrhythmia clinic      COVID test at [place] on -GT  2/10/22 Changed Kirill to scoping MD-ml    CYST REMOVAL      coccyx    EPIDURAL STEROID INJECTION Right 2022    Procedure: Right Genicular Nerve Radiofrequency Ablations;  Surgeon: Luca Galan Jr., MD;  Location: [facility];  Service: Pain Management;  Laterality: Right;  @1100  Pradaxa not held  No DM  Prostate BX     EPIDURAL STEROID INJECTION Left 2022    Procedure: Left Genicular Radiofrequency Ablations;  Surgeon: Luca Galan Jr., MD;  Location: [facility];  Service: Pain Management;  Laterality: Left;  @1130  Pradaxa not held  No DM  Last 1&100    KNEE SURGERY      right    PROSTATE BIOPSY  2022     Social History     Socioeconomic History    Marital status:     Number of children: 5    Highest education level: Associate degree: academic program   Tobacco Use    Smoking status: Former     Current packs/day: 0.00     Average packs/day: 0.3 packs/day for 2.0 years (0.5 ttl pk-yrs)     Types: Cigarettes     Start date: 1973     Quit date: 1975     Years since quittin.3    Smokeless tobacco: Never   Substance and Sexual Activity    Alcohol use: Yes      Alcohol/week: 6.0 standard drinks of alcohol     Types: 6 Glasses of wine per week     Comment: weekly    Drug use: No    Sexual activity: Yes     Partners: Female     Social Determinants of Health     Financial Resource Strain: Low Risk  (11/14/2023)    Overall Financial Resource Strain (CARDIA)     Difficulty of Paying Living Expenses: Not hard at all   Food Insecurity: No Food Insecurity (11/14/2023)    Hunger Vital Sign     Worried About Running Out of Food in the Last Year: Never true     Ran Out of Food in the Last Year: Never true   Transportation Needs: No Transportation Needs (11/14/2023)    PRAPARE - Transportation     Lack of Transportation (Medical): No     Lack of Transportation (Non-Medical): No   Physical Activity: Sufficiently Active (11/14/2023)    Exercise Vital Sign     Days of Exercise per Week: 4 days     Minutes of Exercise per Session: 90 min   Stress: Stress Concern Present (11/14/2023)    [employer] [place] [employer] - Occupational Stress Questionnaire     Feeling of Stress : Very much   Social Connections: Moderately Integrated (11/14/2023)    Social Connection and Isolation Panel [NHANES]     Frequency of Communication with Friends and Family: More than three times a week     Frequency of Social Gatherings with Friends and Family: More than three times a week     Attends Jewish Services: More than 4 times per year     Active Member of Clubs or Organizations: Yes     Attends Club or Organization Meetings: More than 4 times per year     Marital Status:    Housing Stability: Low Risk  (11/14/2023)    Housing Stability Vital Sign     Unable to Pay for Housing in the Last Year: No     Number of Places Lived in the Last Year: 1     Unstable Housing in the Last Year: No     Family History   Problem Relation Age of Onset    Breast cancer Mother     Cancer Mother     Alcohol abuse Father     Cancer Father     Heart disease Father     Hypertension Father     Breast cancer  "Sister     Cancer Sister     Early death Sister     Depression Brother     Early death Brother     Mental illness Brother     No Known Problems Daughter     No Known Problems Daughter     No Known Problems Daughter     No Known Problems Daughter     No Known Problems Daughter     Ovarian cancer Other     Melanoma Neg Hx        Review of patient's allergies indicates:   Allergen Reactions    Bactrim [sulfamethoxazole-trimethoprim] Swelling     Swelling of lips/blisters in mouth         Current Outpatient Medications   Medication Sig    acetaminophen (TYLENOL ORAL) Take 500 mg by mouth as needed.    amLODIPine (NORVASC) 5 MG tablet Take 1 tablet (5 mg total) by mouth once daily.    ammonium lactate (AMLACTIN) 12 % lotion Apply topically daily as needed (Dry Skin).    dabigatran etexilate (PRADAXA) 150 mg Cap Take 1 capsule (150 mg total) by mouth 2 (two) times daily. "Do NOT break, chew, or open capsules."    diclofenac sodium (VOLTAREN) 1 % Gel Apply 2 g topically once daily.    flecainide (TAMBOCOR) 100 MG Tab Take 1 tablet (100 mg total) by mouth every 12 (twelve) hours.    levocetirizine (XYZAL) 5 MG tablet Take 1 tablet (5 mg total) by mouth every evening.    LIDOcaine (LIDODERM) 5 % Place 1 patch onto the skin once daily. Remove & Discard patch within 12 hours or as directed by MD    losartan (COZAAR) 50 MG tablet Take 0.5 tablets (25 mg total) by mouth once daily.    meloxicam (MOBIC) 15 MG tablet Take 1 tablet (15 mg total) by mouth once daily.    methocarbamoL (ROBAXIN) 500 MG Tab Take 1 tablet (500 mg total) by mouth 4 (four) times daily as needed.    metoprolol succinate (TOPROL-XL) 25 MG 24 hr tablet Take 1 tablet (25 mg total) by mouth once daily.    mirtazapine (REMERON) 30 MG tablet Take 30 mg by mouth every evening.    multivitamin (THERAGRAN) per tablet Take 1 tablet by mouth once daily.    naloxone (NARCAN) 4 mg/actuation Spry 1 spray by Nasal route once.    omeprazole (PRILOSEC) 20 MG capsule Take 1 " "capsule by mouth Daily.    sildenafiL (VIAGRA) 100 MG tablet Take 1 tablet (100 mg total) by mouth as needed for Erectile Dysfunction (1 tablet 1 hr prior to intercourse.).    tadalafiL (CIALIS) 20 MG Tab Take 1 tablet (20 mg total) by mouth once daily.    terazosin (HYTRIN) 10 MG capsule Take 1 capsule (10 mg total) by mouth every evening.    tiZANidine (ZANAFLEX) 4 MG tablet Take 1 tablet (4 mg total) by mouth nightly as needed.    traMADoL (ULTRAM) 50 mg tablet Take 1 tablet (50 mg total) by mouth every 12 (twelve) hours as needed for Pain.    triamcinolone acetonide 0.1% (KENALOG) 0.1 % cream Apply topically 2 (two) times daily.    VITAMIN B COMPLEX (B COMPLETE ORAL) Take by mouth once daily.    vitamin E 100 UNIT capsule Take 100 Units by mouth once daily.    gabapentin (NEURONTIN) 100 MG capsule Take 1 capsule (100 mg total) by mouth 3 (three) times daily as needed (back pain).    meloxicam (MOBIC) 7.5 MG tablet Take 1 tablet (7.5 mg total) by mouth daily as needed for Pain.     No current facility-administered medications for this visit.       ROS:  GENERAL: No fever. No chills. No fatigue. Denies weight loss. Denies weight gain.  HEENT: Denies headaches. Denies vision change. Denies eye pain. Denies double vision. Denies ear pain.   CV: Denies chest pain.   PULM: Denies of shortness of breath.  GI: Denies constipation. No diarrhea. No abdominal pain. Denies nausea. Denies vomiting. No blood in stool.  HEME: Denies bleeding problems.  : Denies urgency. No painful urination. No blood in urine.  MS: Denies joint stiffness. Denies joint swelling.  + back pain.  SKIN: Denies rash.   NEURO: Denies seizures. No weakness.  PSYCH:  Denies difficulty sleeping. No anxiety. Denies depression. No suicidal thoughts.       VITALS:   Vitals:    01/04/24 1327   BP: (!) 167/77   Pulse: 63   Resp: 12   Weight: 106.6 kg (235 lb)   Height: 5' 9" (1.753 m)   PainSc:   5         PHYSICAL EXAM:   GENERAL: Well appearing, in no " acute distress, alert and oriented x3.  PSYCH:  Mood and affect appropriate.  SKIN: Skin color, texture, turgor normal, no rashes or lesions.  HEENT:  Normocephalic, atraumatic. Cranial nerves grossly intact.  NECK: No pain to palpation over the cervical paraspinous muscles. No pain to palpation over facets. No pain with neck flexion, extension, or lateral flexion.   PULM: No evidence of respiratory difficulty, symmetric chest rise.  GI:  Non-distended  BACK: Limited range of motion in all planes of motion due to pain. + pain to palpation over the interspinous area at mid to lower lumbar levels. + mild pain to palpation over facet joints. There is no pain with palpation over the sacroiliac joints bilaterally.   EXTREMITIES: No deformities, edema, or skin discoloration.   MUSCULOSKELETAL: No atrophy is noted.  NEURO: Sensation is equal and appropriate bilaterally. Bilateral upper and lower extremity strength is normal and symmetric. Bilateral lower extremity  muscle stretch reflexes are hyporeflexic and symmetric. Plantar response are downgoing. Straight leg raising in the supine position is negative to radicular pain.   GAIT: cautious, bradykinetic, slightly flexed      LABS:  Elevated PSA    IMAGING:    MRI lumbar spine 2021 reviewed    ASSESSMENT: 73 y.o. year old male with pain, consistent with:    Encounter Diagnoses   Name Primary?    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 Imaging shows severe central stenosis and facet synovitis from 2021. Exam shows pain reproduced with facet and stenosis provocation. He is also being monitored for prostate cancer with a recent increase in PSA.       PLAN:  MRI lumbar spine with contrast  to rule out any prostate metastasis  Schedule bilateral L4/5 TFESI to treat central stenosis with neurogenic claudication  Reorder gabapentin 100 mg TID  RTC 2 weeks post procedure      I would like to thank Vivi Steward* for the opportunity to assist in the care of this patient. We had a very nice visit and I look forward to continuing their care. Please let me know if I can be of further assistance.     Edmond Jay MD  01/04/2024

## 2024-01-05 ENCOUNTER — TELEPHONE (OUTPATIENT)
Dept: PAIN MEDICINE | Facility: OTHER | Age: 74
End: 2024-01-05
Payer: MEDICARE

## 2024-01-05 ENCOUNTER — PATIENT MESSAGE (OUTPATIENT)
Dept: PAIN MEDICINE | Facility: OTHER | Age: 74
End: 2024-01-05
Payer: MEDICARE

## 2024-01-05 DIAGNOSIS — M48.062 SPINAL STENOSIS OF LUMBAR REGION WITH NEUROGENIC CLAUDICATION: Primary | ICD-10-CM

## 2024-01-05 NOTE — TELEPHONE ENCOUNTER
----- Message from Yasir Parikh MD sent at 1/5/2024 12:47 PM CST -----  Regarding: RE: Request to hold Pradaxa  ok    ----- Message -----  From: Estephania Bhatia LPN  Sent: 1/5/2024  12:38 PM CST  To: Yasir Parikh MD  Subject: Request to hold Pradaxa                          Good afternoon,    Patient will be scheduled to have a procedure with Dr. Hickey, Bilatral L4/5 Transforaminal Epidural Steroid injection. Staff is requesting to hold Pradaxa for 5 days prior to procedure. Please advise.    Thank you,  Estephania

## 2024-01-08 ENCOUNTER — LAB VISIT (OUTPATIENT)
Dept: LAB | Facility: HOSPITAL | Age: 74
End: 2024-01-08
Attending: FAMILY MEDICINE
Payer: MEDICARE

## 2024-01-08 DIAGNOSIS — N40.1 BPH WITH URINARY OBSTRUCTION: ICD-10-CM

## 2024-01-08 DIAGNOSIS — N13.8 BPH WITH URINARY OBSTRUCTION: ICD-10-CM

## 2024-01-08 DIAGNOSIS — C61 PROSTATE CANCER: ICD-10-CM

## 2024-01-08 DIAGNOSIS — R79.9 ABNORMAL FINDING OF BLOOD CHEMISTRY, UNSPECIFIED: ICD-10-CM

## 2024-01-08 DIAGNOSIS — Z08 ENCOUNTER FOR FOLLOW-UP SURVEILLANCE OF PROSTATE CANCER: ICD-10-CM

## 2024-01-08 DIAGNOSIS — R97.20 ELEVATED PSA: ICD-10-CM

## 2024-01-08 DIAGNOSIS — I10 ESSENTIAL HYPERTENSION: ICD-10-CM

## 2024-01-08 DIAGNOSIS — I48.0 PAROXYSMAL ATRIAL FIBRILLATION: Chronic | ICD-10-CM

## 2024-01-08 DIAGNOSIS — Z85.46 ENCOUNTER FOR FOLLOW-UP SURVEILLANCE OF PROSTATE CANCER: ICD-10-CM

## 2024-01-08 LAB
ALBUMIN SERPL BCP-MCNC: 3.5 G/DL (ref 3.5–5.2)
ALP SERPL-CCNC: 64 U/L (ref 55–135)
ALT SERPL W/O P-5'-P-CCNC: 24 U/L (ref 10–44)
ANION GAP SERPL CALC-SCNC: 11 MMOL/L (ref 8–16)
AST SERPL-CCNC: 29 U/L (ref 10–40)
BASOPHILS # BLD AUTO: 0.05 K/UL (ref 0–0.2)
BASOPHILS NFR BLD: 0.8 % (ref 0–1.9)
BILIRUB SERPL-MCNC: 0.4 MG/DL (ref 0.1–1)
BUN SERPL-MCNC: 9 MG/DL (ref 8–23)
CALCIUM SERPL-MCNC: 8.7 MG/DL (ref 8.7–10.5)
CHLORIDE SERPL-SCNC: 106 MMOL/L (ref 95–110)
CHOLEST SERPL-MCNC: 151 MG/DL (ref 120–199)
CHOLEST/HDLC SERPL: 2.7 {RATIO} (ref 2–5)
CO2 SERPL-SCNC: 25 MMOL/L (ref 23–29)
COMPLEXED PSA SERPL-MCNC: 5.4 NG/ML (ref 0–4)
CREAT SERPL-MCNC: 0.9 MG/DL (ref 0.5–1.4)
DIFFERENTIAL METHOD BLD: ABNORMAL
EOSINOPHIL # BLD AUTO: 0.3 K/UL (ref 0–0.5)
EOSINOPHIL NFR BLD: 4.9 % (ref 0–8)
ERYTHROCYTE [DISTWIDTH] IN BLOOD BY AUTOMATED COUNT: 14 % (ref 11.5–14.5)
EST. GFR  (NO RACE VARIABLE): >60 ML/MIN/1.73 M^2
GLUCOSE SERPL-MCNC: 110 MG/DL (ref 70–110)
HCT VFR BLD AUTO: 36.8 % (ref 40–54)
HDLC SERPL-MCNC: 56 MG/DL (ref 40–75)
HDLC SERPL: 37.1 % (ref 20–50)
HGB BLD-MCNC: 11.9 G/DL (ref 14–18)
IMM GRANULOCYTES # BLD AUTO: 0.02 K/UL (ref 0–0.04)
IMM GRANULOCYTES NFR BLD AUTO: 0.3 % (ref 0–0.5)
LDLC SERPL CALC-MCNC: 82 MG/DL (ref 63–159)
LYMPHOCYTES # BLD AUTO: 2.6 K/UL (ref 1–4.8)
LYMPHOCYTES NFR BLD: 43.3 % (ref 18–48)
MCH RBC QN AUTO: 31 PG (ref 27–31)
MCHC RBC AUTO-ENTMCNC: 32.3 G/DL (ref 32–36)
MCV RBC AUTO: 96 FL (ref 82–98)
MONOCYTES # BLD AUTO: 0.9 K/UL (ref 0.3–1)
MONOCYTES NFR BLD: 15.7 % (ref 4–15)
NEUTROPHILS # BLD AUTO: 2.1 K/UL (ref 1.8–7.7)
NEUTROPHILS NFR BLD: 35 % (ref 38–73)
NONHDLC SERPL-MCNC: 95 MG/DL
NRBC BLD-RTO: 0 /100 WBC
PLATELET # BLD AUTO: 227 K/UL (ref 150–450)
PMV BLD AUTO: 10.7 FL (ref 9.2–12.9)
POTASSIUM SERPL-SCNC: 4 MMOL/L (ref 3.5–5.1)
PROT SERPL-MCNC: 6.3 G/DL (ref 6–8.4)
RBC # BLD AUTO: 3.84 M/UL (ref 4.6–6.2)
SODIUM SERPL-SCNC: 142 MMOL/L (ref 136–145)
TRIGL SERPL-MCNC: 65 MG/DL (ref 30–150)
TSH SERPL DL<=0.005 MIU/L-ACNC: 1 UIU/ML (ref 0.4–4)
WBC # BLD AUTO: 5.91 K/UL (ref 3.9–12.7)

## 2024-01-08 PROCEDURE — 36415 COLL VENOUS BLD VENIPUNCTURE: CPT | Mod: PO | Performed by: NURSE PRACTITIONER

## 2024-01-08 PROCEDURE — 84153 ASSAY OF PSA TOTAL: CPT | Performed by: NURSE PRACTITIONER

## 2024-01-08 PROCEDURE — 83036 HEMOGLOBIN GLYCOSYLATED A1C: CPT | Performed by: FAMILY MEDICINE

## 2024-01-08 PROCEDURE — 80061 LIPID PANEL: CPT | Performed by: FAMILY MEDICINE

## 2024-01-08 PROCEDURE — 84443 ASSAY THYROID STIM HORMONE: CPT | Performed by: FAMILY MEDICINE

## 2024-01-08 PROCEDURE — 80053 COMPREHEN METABOLIC PANEL: CPT | Performed by: FAMILY MEDICINE

## 2024-01-08 PROCEDURE — 85025 COMPLETE CBC W/AUTO DIFF WBC: CPT | Performed by: FAMILY MEDICINE

## 2024-01-09 ENCOUNTER — PATIENT MESSAGE (OUTPATIENT)
Dept: UROLOGY | Facility: CLINIC | Age: 74
End: 2024-01-09
Payer: MEDICARE

## 2024-01-09 DIAGNOSIS — N13.8 BPH WITH URINARY OBSTRUCTION: ICD-10-CM

## 2024-01-09 DIAGNOSIS — C61 PROSTATE CANCER: Primary | ICD-10-CM

## 2024-01-09 DIAGNOSIS — Z85.46 ENCOUNTER FOR FOLLOW-UP SURVEILLANCE OF PROSTATE CANCER: ICD-10-CM

## 2024-01-09 DIAGNOSIS — N40.1 BPH WITH URINARY OBSTRUCTION: ICD-10-CM

## 2024-01-09 DIAGNOSIS — Z08 ENCOUNTER FOR FOLLOW-UP SURVEILLANCE OF PROSTATE CANCER: ICD-10-CM

## 2024-01-09 LAB
ESTIMATED AVG GLUCOSE: 114 MG/DL (ref 68–131)
HBA1C MFR BLD: 5.6 % (ref 4–5.6)

## 2024-01-10 ENCOUNTER — PATIENT MESSAGE (OUTPATIENT)
Dept: SPINE | Facility: CLINIC | Age: 74
End: 2024-01-10
Payer: MEDICARE

## 2024-01-11 ENCOUNTER — OFFICE VISIT (OUTPATIENT)
Dept: FAMILY MEDICINE | Facility: CLINIC | Age: 74
End: 2024-01-11
Payer: MEDICARE

## 2024-01-11 VITALS
WEIGHT: 234.13 LBS | SYSTOLIC BLOOD PRESSURE: 160 MMHG | HEIGHT: 69 IN | DIASTOLIC BLOOD PRESSURE: 88 MMHG | TEMPERATURE: 98 F | RESPIRATION RATE: 16 BRPM | OXYGEN SATURATION: 95 % | HEART RATE: 69 BPM | BODY MASS INDEX: 34.68 KG/M2

## 2024-01-11 DIAGNOSIS — K42.9 UMBILICAL HERNIA WITHOUT OBSTRUCTION AND WITHOUT GANGRENE: ICD-10-CM

## 2024-01-11 DIAGNOSIS — M48.062 SPINAL STENOSIS OF LUMBAR REGION WITH NEUROGENIC CLAUDICATION: Primary | ICD-10-CM

## 2024-01-11 DIAGNOSIS — I10 UNCONTROLLED HYPERTENSION: ICD-10-CM

## 2024-01-11 DIAGNOSIS — I48.0 PAROXYSMAL ATRIAL FIBRILLATION: Chronic | ICD-10-CM

## 2024-01-11 DIAGNOSIS — C61 PROSTATE CANCER: ICD-10-CM

## 2024-01-11 PROCEDURE — 99213 OFFICE O/P EST LOW 20 MIN: CPT | Mod: PBBFAC,PO | Performed by: FAMILY MEDICINE

## 2024-01-11 PROCEDURE — 99214 OFFICE O/P EST MOD 30 MIN: CPT | Mod: S$PBB,,, | Performed by: FAMILY MEDICINE

## 2024-01-11 PROCEDURE — 99999 PR PBB SHADOW E&M-EST. PATIENT-LVL III: CPT | Mod: PBBFAC,,, | Performed by: FAMILY MEDICINE

## 2024-01-11 RX ORDER — PREDNISONE 10 MG/1
TABLET ORAL
COMMUNITY
Start: 2023-12-08 | End: 2024-12-06

## 2024-01-11 RX ORDER — OXYCODONE AND ACETAMINOPHEN 5; 325 MG/1; MG/1
TABLET ORAL
COMMUNITY
Start: 2023-12-08 | End: 2024-01-30

## 2024-01-11 NOTE — PROGRESS NOTES
Subjective:       Patient ID: Omar Pacheco is a 73 y.o. male.    Chief Complaint: Follow-up (Having a nerve block on the 23rd/PSA) and Hernia      Follow-up    73-year-old male presents for annual exam.    Review of Systems   Constitutional: Negative.    HENT: Negative.     Respiratory: Negative.     Cardiovascular: Negative.    Gastrointestinal: Negative.    Endocrine: Negative.    Genitourinary: Negative.    Musculoskeletal: Negative.    Neurological: Negative.    Psychiatric/Behavioral: Negative.            Past Medical History:   Diagnosis Date    *Atrial fibrillation     Back pain     Benign hypertrophy of prostate     Degenerative disc disease     GERD (gastroesophageal reflux disease)     Hx of colonic polyps     Hypertension     Pilonidal cyst 1971    Prostate cancer     Sleep apnea     Trouble in sleeping      Past Surgical History:   Procedure Laterality Date    COLONOSCOPY N/A 12/07/2016    Procedure: COLONOSCOPY;  Surgeon: Sumeet Lundy MD;  Location: [facility] ([address] [place] [address]);  Service: Endoscopy;  Laterality: N/A;  OK to hold Pradaxa 2 days prior to procedure per Dr Jones/see note dated 11/15/16/svn    COLONOSCOPY N/A 02/24/2022    Procedure: COLONOSCOPY;  Surgeon: Italo Roy MD;  Location: [facility];  Service: Endoscopy;  Laterality: N/A;  ok to hold Pradaxa 2 days per EBONI Bentley RN/arrhythmia clinic      COVID test at [place] on 2/21-GT  2/10/22 Changed Kirill to edward BARBER-ml    CYST REMOVAL  1971    coccyx    EPIDURAL STEROID INJECTION Right 09/07/2022    Procedure: Right Genicular Nerve Radiofrequency Ablations;  Surgeon: Luca Galan Jr., MD;  Location: [facility];  Service: Pain Management;  Laterality: Right;  @1100  Pradaxa not held  No DM  Prostate BX 8/2    EPIDURAL STEROID INJECTION Left 09/21/2022    Procedure: Left Genicular Radiofrequency Ablations;  Surgeon: Luca Galan Jr., MD;  Location: [facility] ENDO;  Service: Pain Management;  Laterality: Left;  @1130  Pradaxa not held  No  DM  Last 1&100    KNEE SURGERY      right    PROSTATE BIOPSY  2022     Family History   Problem Relation Age of Onset    Breast cancer Mother     Cancer Mother     Alcohol abuse Father     Cancer Father     Heart disease Father     Hypertension Father     Breast cancer Sister     Cancer Sister     Early death Sister     Depression Brother     Early death Brother     Mental illness Brother     No Known Problems Daughter     No Known Problems Daughter     No Known Problems Daughter     No Known Problems Daughter     No Known Problems Daughter     Ovarian cancer Other     Melanoma Neg Hx      Social History     Socioeconomic History    Marital status:     Number of children: 5    Highest education level: Associate degree: academic program   Tobacco Use    Smoking status: Former     Current packs/day: 0.00     Average packs/day: 0.3 packs/day for 2.0 years (0.5 ttl pk-yrs)     Types: Cigarettes     Start date: 1973     Quit date: 1975     Years since quittin.3    Smokeless tobacco: Never   Substance and Sexual Activity    Alcohol use: Yes     Alcohol/week: 6.0 standard drinks of alcohol     Types: 6 Glasses of wine per week     Comment: weekly    Drug use: No    Sexual activity: Yes     Partners: Female     Social Determinants of Health     Financial Resource Strain: Low Risk  (2023)    Overall Financial Resource Strain (CARDIA)     Difficulty of Paying Living Expenses: Not hard at all   Food Insecurity: No Food Insecurity (2023)    Hunger Vital Sign     Worried About Running Out of Food in the Last Year: Never true     Ran Out of Food in the Last Year: Never true   Transportation Needs: No Transportation Needs (2023)    PRAPARE - Transportation     Lack of Transportation (Medical): No     Lack of Transportation (Non-Medical): No   Physical Activity: Sufficiently Active (2023)    Exercise Vital Sign     Days of Exercise per Week: 4 days     Minutes of Exercise per  "Session: 90 min   Stress: Stress Concern Present (11/14/2023)    [employer] [employer] - Occupational Stress Questionnaire     Feeling of Stress : Very much   Social Connections: Moderately Integrated (11/14/2023)    Social Connection and Isolation Panel [NHANES]     Frequency of Communication with Friends and Family: More than three times a week     Frequency of Social Gatherings with Friends and Family: More than three times a week     Attends Catholic Services: More than 4 times per year     Active Member of Clubs or Organizations: Yes     Attends Club or Organization Meetings: More than 4 times per year     Marital Status:    Housing Stability: Low Risk  (11/14/2023)    Housing Stability Vital Sign     Unable to Pay for Housing in the Last Year: No     Number of Places Lived in the Last Year: 1     Unstable Housing in the Last Year: No       Current Outpatient Medications:     acetaminophen (TYLENOL ORAL), Take 500 mg by mouth as needed., Disp: , Rfl:     amLODIPine (NORVASC) 5 MG tablet, Take 1 tablet (5 mg total) by mouth once daily., Disp: 90 tablet, Rfl: 3    ammonium lactate (AMLACTIN) 12 % lotion, Apply topically daily as needed (Dry Skin)., Disp: 400 g, Rfl: 11    dabigatran etexilate (PRADAXA) 150 mg Cap, Take 1 capsule (150 mg total) by mouth 2 (two) times daily. "Do NOT break, chew, or open capsules.", Disp: 180 capsule, Rfl: 2    diclofenac sodium (VOLTAREN) 1 % Gel, Apply 2 g topically once daily., Disp: 20 g, Rfl: 0    gabapentin (NEURONTIN) 100 MG capsule, Take 1 capsule (100 mg total) by mouth 3 (three) times daily as needed (back pain)., Disp: 60 capsule, Rfl: 2    levocetirizine (XYZAL) 5 MG tablet, Take 1 tablet (5 mg total) by mouth every evening., Disp: 90 tablet, Rfl: 3    LIDOcaine (LIDODERM) 5 %, Place 1 patch onto the skin once daily. Remove & Discard patch within 12 hours or as directed by MD, Disp: 5 patch, Rfl: 0    losartan (COZAAR) 50 MG tablet, Take 0.5 " tablets (25 mg total) by mouth once daily., Disp: 45 tablet, Rfl: 3    meloxicam (MOBIC) 15 MG tablet, Take 1 tablet (15 mg total) by mouth once daily., Disp: 30 tablet, Rfl: 2    methocarbamoL (ROBAXIN) 500 MG Tab, Take 1 tablet (500 mg total) by mouth 4 (four) times daily as needed., Disp: 60 tablet, Rfl: 2    metoprolol succinate (TOPROL-XL) 25 MG 24 hr tablet, Take 1 tablet (25 mg total) by mouth once daily., Disp: 90 tablet, Rfl: 3    mirtazapine (REMERON) 30 MG tablet, Take 30 mg by mouth every evening., Disp: , Rfl:     multivitamin (THERAGRAN) per tablet, Take 1 tablet by mouth once daily., Disp: , Rfl:     naloxone (NARCAN) 4 mg/actuation Spry, 1 spray by Nasal route once., Disp: , Rfl:     omeprazole (PRILOSEC) 20 MG capsule, Take 1 capsule by mouth Daily., Disp: , Rfl:     oxyCODONE-acetaminophen (PERCOCET) 5-325 mg per tablet, May cause drowsiness.This prescription cannot be refilled.Federal law prohibits transfer of prescription.Overdose may cause serious breathing problems.This product contains acetaminophen., Disp: , Rfl:     predniSONE (DELTASONE) 10 MG tablet, Take with food/milk.Take or use exactly as directed.Obtain advice for OTCs., Disp: , Rfl:     sildenafiL (VIAGRA) 100 MG tablet, Take 1 tablet (100 mg total) by mouth as needed for Erectile Dysfunction (1 tablet 1 hr prior to intercourse.)., Disp: 10 tablet, Rfl: 11    terazosin (HYTRIN) 10 MG capsule, Take 1 capsule (10 mg total) by mouth every evening., Disp: 90 capsule, Rfl: 3    traMADoL (ULTRAM) 50 mg tablet, Take 1 tablet (50 mg total) by mouth every 12 (twelve) hours as needed for Pain., Disp: 60 tablet, Rfl: 0    triamcinolone acetonide 0.1% (KENALOG) 0.1 % cream, Apply topically 2 (two) times daily., Disp: 80 g, Rfl: 0    VITAMIN B COMPLEX (B COMPLETE ORAL), Take by mouth once daily., Disp: , Rfl:     vitamin E 100 UNIT capsule, Take 100 Units by mouth once daily., Disp: , Rfl:     flecainide (TAMBOCOR) 100 MG Tab, Take 1 tablet (100  "mg total) by mouth every 12 (twelve) hours., Disp: 180 tablet, Rfl: 3    tadalafiL (CIALIS) 20 MG Tab, Take 1 tablet (20 mg total) by mouth once daily. (Patient not taking: Reported on 1/11/2024), Disp: 30 tablet, Rfl: 5   Objective:      Vitals:    01/11/24 0927   BP: (!) 160/88   BP Location: Right arm   Patient Position: Sitting   BP Method: Large (Manual)   Pulse: 69   Resp: 16   Temp: 98 °F (36.7 °C)   TempSrc: Oral   SpO2: 95%   Weight: 106.2 kg (234 lb 2.1 oz)   Height: 5' 9" (1.753 m)       Physical Exam  Constitutional:       General: He is not in acute distress.     Appearance: He is not diaphoretic.   HENT:      Head: Normocephalic and atraumatic.   Eyes:      Conjunctiva/sclera: Conjunctivae normal.   Pulmonary:      Effort: Pulmonary effort is normal.   Musculoskeletal:      Cervical back: Neck supple.   Skin:     Findings: No rash.   Neurological:      Mental Status: He is alert and oriented to person, place, and time.   Psychiatric:         Behavior: Behavior normal.         Thought Content: Thought content normal.         Judgment: Judgment normal.            Assessment:       1. Spinal stenosis of lumbar region with neurogenic claudication    2. Prostate cancer    3. Paroxysmal atrial fibrillation    4. Uncontrolled hypertension    5. Umbilical hernia without obstruction and without gangrene        Plan:       Spinal stenosis of lumbar region with neurogenic claudication    Prostate cancer    Paroxysmal atrial fibrillation    Uncontrolled hypertension    Umbilical hernia without obstruction and without gangrene      Labs are at his baseline.  PSA level improved to 5.  Patient will follow-up with pain management steroid injection.  MRI was ordered as well.  Continue follow-up with specialists.  BP is most likely elevated due to his pain.  As patient to monitor and report blood pressure after injection.  May need to take additional amlodipine if blood pressure continues to be elevated        Future " Appointments   Date Time Provider Department Center   1/19/2024  9:30 AM [facility] MRI1 350 LB LIMIT [facility]   7/11/2024  9:20 AM Husam Chong MD [facility] [place]       Patient note was created using [employer].  Any errors in syntax or even information may not have been identified and edited on initial review prior to signing this note.

## 2024-01-14 DIAGNOSIS — L85.3 DRY SKIN DERMATITIS: ICD-10-CM

## 2024-01-17 RX ORDER — AMMONIUM LACTATE 12 G/100G
LOTION TOPICAL DAILY PRN
Qty: 400 G | Refills: 11 | Status: SHIPPED | OUTPATIENT
Start: 2024-01-17

## 2024-01-19 ENCOUNTER — TELEPHONE (OUTPATIENT)
Dept: PAIN MEDICINE | Facility: CLINIC | Age: 74
End: 2024-01-19
Payer: MEDICARE

## 2024-01-19 ENCOUNTER — HOSPITAL ENCOUNTER (OUTPATIENT)
Dept: RADIOLOGY | Facility: OTHER | Age: 74
Discharge: HOME | End: 2024-01-19
Attending: STUDENT IN AN ORGANIZED HEALTH CARE EDUCATION/TRAINING PROGRAM
Payer: MEDICARE

## 2024-01-19 DIAGNOSIS — M43.06 LUMBAR SPONDYLOLYSIS: ICD-10-CM

## 2024-01-19 PROCEDURE — 72158 MRI LUMBAR SPINE W/O & W/DYE: CPT | Mod: 26,,, | Performed by: RADIOLOGY

## 2024-01-19 PROCEDURE — A9585 GADOBUTROL INJECTION: HCPCS | Performed by: STUDENT IN AN ORGANIZED HEALTH CARE EDUCATION/TRAINING PROGRAM

## 2024-01-19 PROCEDURE — 72158 MRI LUMBAR SPINE W/O & W/DYE: CPT | Mod: TC

## 2024-01-19 PROCEDURE — 25500020 PHARM REV CODE 255: Performed by: STUDENT IN AN ORGANIZED HEALTH CARE EDUCATION/TRAINING PROGRAM

## 2024-01-19 RX ORDER — GADOBUTROL 604.72 MG/ML
10 INJECTION INTRAVENOUS
Status: COMPLETED | OUTPATIENT
Start: 2024-01-19 | End: 2024-01-19

## 2024-01-19 RX ADMIN — GADOBUTROL 10 ML: 604.72 INJECTION INTRAVENOUS at 09:01

## 2024-01-19 NOTE — TELEPHONE ENCOUNTER
Pt has been scheduled.    ----- Message from Donya Hickey MD sent at 1/19/2024 12:50 PM CST -----  Please get Mr. Pacheco a follow up with me after his injection. Thank you.

## 2024-01-19 NOTE — TELEPHONE ENCOUNTER
----- Message from Donya Hickey MD sent at 1/19/2024 12:50 PM CST -----  Please get Mr. Pacheco a follow up with me after his injection. Thank you.

## 2024-01-22 ENCOUNTER — PATIENT MESSAGE (OUTPATIENT)
Dept: PAIN MEDICINE | Facility: OTHER | Age: 74
End: 2024-01-22
Payer: MEDICARE

## 2024-01-23 ENCOUNTER — HOSPITAL ENCOUNTER (OUTPATIENT)
Facility: OTHER | Age: 74
Discharge: HOME | End: 2024-01-23
Attending: ANESTHESIOLOGY | Admitting: ANESTHESIOLOGY
Payer: MEDICARE

## 2024-01-23 VITALS
WEIGHT: 210 LBS | OXYGEN SATURATION: 97 % | SYSTOLIC BLOOD PRESSURE: 126 MMHG | TEMPERATURE: 98 F | RESPIRATION RATE: 14 BRPM | HEIGHT: 69 IN | BODY MASS INDEX: 31.1 KG/M2 | DIASTOLIC BLOOD PRESSURE: 73 MMHG | HEART RATE: 54 BPM

## 2024-01-23 DIAGNOSIS — M48.07 SPINAL STENOSIS, LUMBOSACRAL REGION: Primary | ICD-10-CM

## 2024-01-23 DIAGNOSIS — G89.29 CHRONIC PAIN: ICD-10-CM

## 2024-01-23 DIAGNOSIS — M48.062 SPINAL STENOSIS OF LUMBAR REGION WITH NEUROGENIC CLAUDICATION: ICD-10-CM

## 2024-01-23 DIAGNOSIS — M51.16 LUMBAR DISC DISEASE WITH RADICULOPATHY: ICD-10-CM

## 2024-01-23 PROCEDURE — 64483 NJX AA&/STRD TFRM EPI L/S 1: CPT | Mod: 50,,, | Performed by: ANESTHESIOLOGY

## 2024-01-23 PROCEDURE — 25500020 PHARM REV CODE 255: Performed by: ANESTHESIOLOGY

## 2024-01-23 PROCEDURE — 25000003 PHARM REV CODE 250: Performed by: STUDENT IN AN ORGANIZED HEALTH CARE EDUCATION/TRAINING PROGRAM

## 2024-01-23 PROCEDURE — 64483 NJX AA&/STRD TFRM EPI L/S 1: CPT | Mod: 50 | Performed by: ANESTHESIOLOGY

## 2024-01-23 PROCEDURE — 25000003 PHARM REV CODE 250: Performed by: ANESTHESIOLOGY

## 2024-01-23 PROCEDURE — 99152 MOD SED SAME PHYS/QHP 5/>YRS: CPT | Performed by: ANESTHESIOLOGY

## 2024-01-23 PROCEDURE — 63600175 PHARM REV CODE 636 W HCPCS: Performed by: ANESTHESIOLOGY

## 2024-01-23 RX ORDER — LIDOCAINE HYDROCHLORIDE 20 MG/ML
INJECTION, SOLUTION INFILTRATION; PERINEURAL
Status: DISCONTINUED | OUTPATIENT
Start: 2024-01-23 | End: 2024-01-23 | Stop reason: HOSPADM

## 2024-01-23 RX ORDER — MIDAZOLAM HYDROCHLORIDE 1 MG/ML
INJECTION INTRAMUSCULAR; INTRAVENOUS
Status: DISCONTINUED | OUTPATIENT
Start: 2024-01-23 | End: 2024-01-23 | Stop reason: HOSPADM

## 2024-01-23 RX ORDER — DEXAMETHASONE SODIUM PHOSPHATE 10 MG/ML
INJECTION INTRAMUSCULAR; INTRAVENOUS
Status: DISCONTINUED | OUTPATIENT
Start: 2024-01-23 | End: 2024-01-23 | Stop reason: HOSPADM

## 2024-01-23 RX ORDER — SODIUM CHLORIDE 9 MG/ML
INJECTION, SOLUTION INTRAVENOUS CONTINUOUS
Status: DISCONTINUED | OUTPATIENT
Start: 2024-01-23 | End: 2024-01-23 | Stop reason: HOSPADM

## 2024-01-23 RX ORDER — LIDOCAINE HYDROCHLORIDE 10 MG/ML
INJECTION, SOLUTION EPIDURAL; INFILTRATION; INTRACAUDAL; PERINEURAL
Status: DISCONTINUED | OUTPATIENT
Start: 2024-01-23 | End: 2024-01-23 | Stop reason: HOSPADM

## 2024-01-23 NOTE — OP NOTE
Lumbar Transforaminal Epidural Steroid Injection under Fluoroscopic Guidance    The procedure, risks, benefits, and options were discussed with the patient. There are no contraindications to the procedure. The patent expressed understanding and agreed to the procedure. Informed written consent was obtained prior to the start of the procedure and can be found in the patient's chart.    PATIENT NAME: Mr. Omar Pacheco   MRN: [id number]     DATE OF PROCEDURE: 01/23/2024    PROCEDURE:  Bilateral  L4/5 Lumbar Transforaminal Epidural Steroid Injection under Fluoroscopic Guidance    PRE-OP DIAGNOSIS: Spinal stenosis of lumbar region with neurogenic claudication [M48.062] Lumbar radiculopathy [M54.16]    POST-OP DIAGNOSIS: Same    PHYSICIAN: Donya Hickey MD    ASSISTANTS: DONNIE Jay MD  [employer] PM[facility],, pain fellow     MEDICATIONS INJECTED: Preservative-free Decadron 10mg with 5cc of Lidocaine 1% MPF     LOCAL ANESTHETIC INJECTED: Xylocaine 2%     SEDATION: versed 1 mg   Event Time In   Sedation Start 1106   Sedation End 1119       ESTIMATED BLOOD LOSS: None    COMPLICATIONS: None    TECHNIQUE: Time-out was performed to identify the patient and procedure to be performed. With the patient laying in a prone position, the surgical area was prepped and draped in the usual sterile fashion using ChloraPrep and a fenestrated drape.The levels were determined under fluoroscopy guidance. Skin anesthesia was achieved by injecting Lidocaine 2% over the injection sites. The transforaminal spaces were then approached with a 25 gauge, 3.5 inch spinal quinke needle that was introduced under fluoroscopic guidance in the AP and Lateral views. Once the needle tip was in the area of the transforaminal space, and there was no blood, CSF or paraesthesias, contrast dye Omnipaque (300mg/mL) was injected to confirm placement and there was no vascular runoff. Fluoroscopic imaging in the AP and lateral views revealed a clear outline of the  spinal nerve with proximal spread of agent through the neural foramen into the epidural space. 3 mL of the medication mixture listed above was injected slowly at each site. Displacement of the radio opaque contrast after injection of the medication confirmed that the medication went into the area of the transforaminal spaces. The needles were removed and bleeding was nil. A sterile dressing was applied. No specimens collected. The patient tolerated the procedure well.       The patient was monitored after the procedure in the recovery area. They were given post-procedure and discharge instructions to follow at home. The patient was discharged in a stable condition.    I reviewed and edited the fellow's note. I conducted my own interview and physical examination. I agree with the findings. I was present and supervising all critical portions of the procedure.    Carroll Kennedy MD

## 2024-01-23 NOTE — DISCHARGE SUMMARY
"Discharge Note  Short Stay      SUMMARY     Admit Date: 1/23/2024    Attending Physician: Carroll Kennedy      Discharge Physician: Carroll Kennedy      Discharge Date: 1/23/2024 11:06 AM    Procedure(s) (LRB):  LUMBAR TRANSFORAMINAL BILATERAL L4/5 *PRADAXA CLEARANCE IN CHART* (Bilateral)    Final Diagnosis: Spinal stenosis of lumbar region with neurogenic claudication [M48.062]    Disposition: Home or self care    Patient Instructions:   Current Discharge Medication List        CONTINUE these medications which have NOT CHANGED    Details   acetaminophen (TYLENOL ORAL) Take 500 mg by mouth as needed.      amLODIPine (NORVASC) 5 MG tablet Take 1 tablet (5 mg total) by mouth once daily.  Qty: 90 tablet, Refills: 3    Comments: .  Associated Diagnoses: Essential hypertension      ammonium lactate (AMLACTIN) 12 % lotion Apply topically daily as needed (Dry Skin).  Qty: 400 g, Refills: 11    Associated Diagnoses: Dry skin dermatitis      dabigatran etexilate (PRADAXA) 150 mg Cap Take 1 capsule (150 mg total) by mouth 2 (two) times daily. "Do NOT break, chew, or open capsules."  Qty: 180 capsule, Refills: 2    Associated Diagnoses: Paroxysmal atrial fibrillation      diclofenac sodium (VOLTAREN) 1 % Gel Apply 2 g topically once daily.  Qty: 20 g, Refills: 0    Associated Diagnoses: Acute midline low back pain with bilateral sciatica      flecainide (TAMBOCOR) 100 MG Tab Take 1 tablet (100 mg total) by mouth every 12 (twelve) hours.  Qty: 180 tablet, Refills: 3    Associated Diagnoses: Paroxysmal atrial fibrillation      gabapentin (NEURONTIN) 100 MG capsule Take 1 capsule (100 mg total) by mouth 3 (three) times daily as needed (back pain).  Qty: 60 capsule, Refills: 2    Associated Diagnoses: Spinal stenosis of lumbar region with neurogenic claudication      levocetirizine (XYZAL) 5 MG tablet Take 1 tablet (5 mg total) by mouth every evening.  Qty: 90 tablet, Refills: 3    Associated Diagnoses: Seasonal allergic rhinitis due to other " allergic trigger      LIDOcaine (LIDODERM) 5 % Place 1 patch onto the skin once daily. Remove & Discard patch within 12 hours or as directed by MD  Qty: 5 patch, Refills: 0    Associated Diagnoses: Acute midline low back pain with bilateral sciatica      losartan (COZAAR) 50 MG tablet Take 0.5 tablets (25 mg total) by mouth once daily.  Qty: 45 tablet, Refills: 3    Comments: .  Associated Diagnoses: Essential hypertension      meloxicam (MOBIC) 15 MG tablet Take 1 tablet (15 mg total) by mouth once daily.  Qty: 30 tablet, Refills: 2    Associated Diagnoses: Lumbar disc disease with radiculopathy      methocarbamoL (ROBAXIN) 500 MG Tab Take 1 tablet (500 mg total) by mouth 4 (four) times daily as needed.  Qty: 60 tablet, Refills: 2    Associated Diagnoses: Spinal stenosis, lumbosacral region      metoprolol succinate (TOPROL-XL) 25 MG 24 hr tablet Take 1 tablet (25 mg total) by mouth once daily.  Qty: 90 tablet, Refills: 3    Comments: .  Associated Diagnoses: Paroxysmal atrial fibrillation      mirtazapine (REMERON) 30 MG tablet Take 30 mg by mouth every evening.      multivitamin (THERAGRAN) per tablet Take 1 tablet by mouth once daily.      naloxone (NARCAN) 4 mg/actuation Spry 1 spray by Nasal route once.      omeprazole (PRILOSEC) 20 MG capsule Take 1 capsule by mouth Daily.      oxyCODONE-acetaminophen (PERCOCET) 5-325 mg per tablet May cause drowsiness.This prescription cannot be refilled.Federal law prohibits transfer of prescription.Overdose may cause serious breathing problems.This product contains acetaminophen.      predniSONE (DELTASONE) 10 MG tablet Take with food/milk.Take or use exactly as directed.Obtain advice for OTCs.      sildenafiL (VIAGRA) 100 MG tablet Take 1 tablet (100 mg total) by mouth as needed for Erectile Dysfunction (1 tablet 1 hr prior to intercourse.).  Qty: 10 tablet, Refills: 11    Associated Diagnoses: Erectile dysfunction due to diseases classified elsewhere      tadalafiL  (CIALIS) 20 MG Tab Take 1 tablet (20 mg total) by mouth once daily.  Qty: 30 tablet, Refills: 5    Associated Diagnoses: BPH with urinary obstruction; Erectile dysfunction due to diseases classified elsewhere      terazosin (HYTRIN) 10 MG capsule Take 1 capsule (10 mg total) by mouth every evening.  Qty: 90 capsule, Refills: 3    Associated Diagnoses: Essential hypertension      traMADoL (ULTRAM) 50 mg tablet Take 1 tablet (50 mg total) by mouth every 12 (twelve) hours as needed for Pain.  Qty: 60 tablet, Refills: 0    Comments: Quantity prescribed more than 7 day supply? Yes, quantity medically necessary  Associated Diagnoses: Spinal stenosis, lumbosacral region      triamcinolone acetonide 0.1% (KENALOG) 0.1 % cream Apply topically 2 (two) times daily.  Qty: 80 g, Refills: 0      VITAMIN B COMPLEX (B COMPLETE ORAL) Take by mouth once daily.      vitamin E 100 UNIT capsule Take 100 Units by mouth once daily.                 Discharge Diagnosis: Spinal stenosis of lumbar region with neurogenic claudication [M48.062]  Condition on Discharge: Stable with no complications to procedure   Diet on Discharge: Same as before.  Activity: as per instruction sheet.  Discharge to: Home with a responsible adult.  Follow up: 2-4 weeks       Please call my office or pager at [phone] if experienced any weakness or loss of sensation, fever > 101.5, pain uncontrolled with oral medications, persistent nausea/vomiting/or diarrhea, redness or drainage from the incisions, or any other worrisome concerns. If physician on call was not reached or could not communicate with our office for any reason please go to the nearest emergency department

## 2024-01-23 NOTE — DISCHARGE INSTRUCTIONS

## 2024-01-25 ENCOUNTER — PATIENT MESSAGE (OUTPATIENT)
Dept: FAMILY MEDICINE | Facility: CLINIC | Age: 74
End: 2024-01-25
Payer: MEDICARE

## 2024-01-25 DIAGNOSIS — I48.0 PAROXYSMAL ATRIAL FIBRILLATION: Chronic | ICD-10-CM

## 2024-01-26 ENCOUNTER — PATIENT MESSAGE (OUTPATIENT)
Dept: SPINE | Facility: CLINIC | Age: 74
End: 2024-01-26
Payer: MEDICARE

## 2024-01-26 ENCOUNTER — TELEPHONE (OUTPATIENT)
Dept: PAIN MEDICINE | Facility: CLINIC | Age: 74
End: 2024-01-26
Payer: MEDICARE

## 2024-01-26 DIAGNOSIS — I49.8 OTHER SPECIFIED CARDIAC ARRHYTHMIAS: Primary | ICD-10-CM

## 2024-01-26 RX ORDER — FLECAINIDE ACETATE 100 MG/1
100 TABLET ORAL EVERY 12 HOURS
Qty: 180 TABLET | Refills: 3 | Status: SHIPPED | OUTPATIENT
Start: 2024-01-26

## 2024-01-26 RX ORDER — METOPROLOL SUCCINATE 25 MG/1
25 TABLET, EXTENDED RELEASE ORAL DAILY
Qty: 90 TABLET | Refills: 3 | Status: SHIPPED | OUTPATIENT
Start: 2024-01-26 | End: 2025-01-24

## 2024-01-26 RX ORDER — DABIGATRAN ETEXILATE 150 MG/1
150 CAPSULE ORAL 2 TIMES DAILY
Qty: 180 CAPSULE | Refills: 3 | Status: SHIPPED | OUTPATIENT
Start: 2024-01-26

## 2024-01-26 NOTE — TELEPHONE ENCOUNTER
"----- Message from Natalia Garcia sent at 1/26/2024  9:34 AM CST -----  Regarding: Call back  "Type:  Patient Call Back    Who Called:PT    What is the reqeust in detail:Requesting call back in regards to having an injection on 1/23 and is still in severe pain and is walking with an cane. Please advise    Can the clinic reply by [facility]?no    Best Call Back Number:[phone]      Additional Information:This is second message requesting call back Today.            "

## 2024-01-30 ENCOUNTER — OFFICE VISIT (OUTPATIENT)
Dept: PAIN MEDICINE | Facility: CLINIC | Age: 74
End: 2024-01-30
Payer: MEDICARE

## 2024-01-30 VITALS
SYSTOLIC BLOOD PRESSURE: 139 MMHG | DIASTOLIC BLOOD PRESSURE: 75 MMHG | RESPIRATION RATE: 18 BRPM | BODY MASS INDEX: 34.61 KG/M2 | HEIGHT: 69 IN | WEIGHT: 233.69 LBS | HEART RATE: 72 BPM | TEMPERATURE: 98 F | OXYGEN SATURATION: 100 %

## 2024-01-30 DIAGNOSIS — G89.18 POSTOPERATIVE PAIN: ICD-10-CM

## 2024-01-30 DIAGNOSIS — M48.07 SPINAL STENOSIS, LUMBOSACRAL REGION: Primary | ICD-10-CM

## 2024-01-30 DIAGNOSIS — M48.062 SPINAL STENOSIS OF LUMBAR REGION WITH NEUROGENIC CLAUDICATION: ICD-10-CM

## 2024-01-30 PROCEDURE — 99213 OFFICE O/P EST LOW 20 MIN: CPT | Mod: PBBFAC | Performed by: ANESTHESIOLOGY

## 2024-01-30 PROCEDURE — 99214 OFFICE O/P EST MOD 30 MIN: CPT | Mod: S$PBB,,, | Performed by: ANESTHESIOLOGY

## 2024-01-30 PROCEDURE — 99999 PR PBB SHADOW E&M-EST. PATIENT-LVL III: CPT | Mod: PBBFAC,,, | Performed by: ANESTHESIOLOGY

## 2024-01-30 RX ORDER — GABAPENTIN 100 MG/1
200 CAPSULE ORAL 3 TIMES DAILY PRN
Qty: 180 CAPSULE | Refills: 2 | Status: SHIPPED | OUTPATIENT
Start: 2024-01-30

## 2024-01-30 RX ORDER — TRAMADOL HYDROCHLORIDE 50 MG/1
50 TABLET ORAL
Qty: 15 TABLET | Refills: 0 | Status: SHIPPED | OUTPATIENT
Start: 2024-01-30 | End: 2024-02-28

## 2024-01-30 NOTE — PROGRESS NOTES
"  PCP: Husam Chong MD    REFERRING PHYSICIAN: No ref. provider found    CHIEF COMPLAINT: Low back pain that radiates into bilateral legs    Original HISTORY OF PRESENT ILLNESS: Omar Pacheco presents to the clinic for the evaluation of the above pain. The pain started >10 years ago. It has been waxing and waning for years with periods of acute exacerbation. He was treated with stated epidural injections some years ago with resolution of distal "Sciatica" symptoms. About 6 months ago his pain (both axial and BLE) became exacerbated without inciting trauma. He underwent PT 1-2 years ago and has been under the care of chiropractor. He is wearing a back brace now and tries not to move around very much, avoiding lifting, flexion, and extension as they exacerbate his pain. Leg pain = back pain. Pain travels into b uttocks and posterior thighs. +shopping cart sign. Facet synovitis on 2021 MRI. History of elevated PSA with surveillance. +leg numbness and paresthesias.   The pain is at a severity of 8/10. Pertinent negatives include no abdominal pain, bladder incontinence, bowel incontinence, chest pain, dysuria, fever, headaches, paresis, paresthesias, pelvic pain, perianal numbness, weakness or weight loss. Tylenol, robaxin, oxycodone have all helped. Gabapentin was helpful though he ran out a few months ago and would like to reinitiate.       Original PAIN SCORES:  Best: Pain is 5  Worst: Pain is 10  Current: Pain is 8        5/4/2022     8:28 AM   Last 3 PDI Scores   Pain Disability Index (PDI) 21       INTERVAL HISTORY: (Newest visit at the bottom)   Interval History 1/30/24: Omar Pacheco returns for follow up. At our last visit we scheduled a TFESI due to severe canal stenosis. He called back several days after the injection with worsening radiating pain in the right leg. That has now mostly resolved. He is better able to sleep in his own bed and get around since the injection. He reports being able to " walk as far as he wants now, utilizing his cane. However, he is still hoping for more functional gains. We reviewed his MRI again and discussed that those goals may require a surgical solution. He is not interested in surgery at this time. We will try to assist with medication management for the meantime.       6 weeks of Conservative therapy:  HEP: 5 x per week HEP for the last ~ 2 years  Chiro: yes      Treatments / Medications: (Ice/Heat/NSAIDS/APAP/etc):  Tylenol  Robaxin  Oxycodone  Gabapentin      Interventional Pain Procedures: (Previous injections)  (Years ago) MARIAMA with years of relief  1/23/24: B/L L4/5 TFESI 50% relief    Past Medical History:   Diagnosis Date    *Atrial fibrillation     Back pain     Benign hypertrophy of prostate     Degenerative disc disease     GERD (gastroesophageal reflux disease)     Hx of colonic polyps     Hypertension     Pilonidal cyst 1971    Prostate cancer     Sleep apnea     Trouble in sleeping      Past Surgical History:   Procedure Laterality Date    COLONOSCOPY N/A 12/07/2016    Procedure: COLONOSCOPY;  Surgeon: Sumeet Lundy MD;  Location: [facility] ([address] [place] [address]);  Service: Endoscopy;  Laterality: N/A;  OK to hold Pradaxa 2 days prior to procedure per Dr Jones/see note dated 11/15/16/svn    COLONOSCOPY N/A 02/24/2022    Procedure: COLONOSCOPY;  Surgeon: Italo Roy MD;  Location: [facility];  Service: Endoscopy;  Laterality: N/A;  ok to hold Pradaxa 2 days per EBONI eBntley RN/arrhythmia clinic      COVID test at [place] on 2/21-GT  2/10/22 Changed Kirill to edward BARBER-ml    CYST REMOVAL  1971    coccyx    EPIDURAL STEROID INJECTION Right 09/07/2022    Procedure: Right Genicular Nerve Radiofrequency Ablations;  Surgeon: Luca Galan Jr., MD;  Location: [facility];  Service: Pain Management;  Laterality: Right;  @1100  Pradaxa not held  No DM  Prostate BX 8/2    EPIDURAL STEROID INJECTION Left 09/21/2022    Procedure: Left Genicular Radiofrequency Ablations;  Surgeon: Luca  ROHINI Galan Jr., MD;  Location: [facility] ENDO;  Service: Pain Management;  Laterality: Left;  @1130  Pradaxa not held  No DM  Last 1&100    KNEE SURGERY      right    PROSTATE BIOPSY  2022    TRANSFORAMINAL EPIDURAL INJECTION OF STEROID Bilateral 2024    Procedure: LUMBAR TRANSFORAMINAL BILATERAL L4/5 *PRADAXA CLEARANCE IN CHART*;  Surgeon: Donya Hickey MD;  Location: [facility] PAIN MGT;  Service: Pain Management;  Laterality: Bilateral;  [phone]  2 WK F/U JERONIMO  * COVID BOOSTER     Social History     Socioeconomic History    Marital status:     Number of children: 5    Highest education level: Associate degree: academic program   Tobacco Use    Smoking status: Former     Current packs/day: 0.00     Average packs/day: 0.3 packs/day for 2.0 years (0.5 ttl pk-yrs)     Types: Cigarettes     Start date: 1973     Quit date: 1975     Years since quittin.3    Smokeless tobacco: Never   Substance and Sexual Activity    Alcohol use: Yes     Alcohol/week: 6.0 standard drinks of alcohol     Types: 6 Glasses of wine per week     Comment: weekly    Drug use: No    Sexual activity: Yes     Partners: Female     Social Determinants of Health     Financial Resource Strain: Low Risk  (2023)    Overall Financial Resource Strain (CARDIA)     Difficulty of Paying Living Expenses: Not hard at all   Food Insecurity: No Food Insecurity (2023)    Hunger Vital Sign     Worried About Running Out of Food in the Last Year: Never true     Ran Out of Food in the Last Year: Never true   Transportation Needs: No Transportation Needs (2023)    PRAPARE - Transportation     Lack of Transportation (Medical): No     Lack of Transportation (Non-Medical): No   Physical Activity: Sufficiently Active (2023)    Exercise Vital Sign     Days of Exercise per Week: 4 days     Minutes of Exercise per Session: 90 min   Stress: Stress Concern Present (2023)    [place] [employer]  "Health - Occupational Stress Questionnaire     Feeling of Stress : Very much   Social Connections: Moderately Integrated (11/14/2023)    Social Connection and Isolation Panel [NHANES]     Frequency of Communication with Friends and Family: More than three times a week     Frequency of Social Gatherings with Friends and Family: More than three times a week     Attends Hoahaoism Services: More than 4 times per year     Active Member of Clubs or Organizations: Yes     Attends Club or Organization Meetings: More than 4 times per year     Marital Status:    Housing Stability: Low Risk  (11/14/2023)    Housing Stability Vital Sign     Unable to Pay for Housing in the Last Year: No     Number of Places Lived in the Last Year: 1     Unstable Housing in the Last Year: No     Family History   Problem Relation Age of Onset    Breast cancer Mother     Cancer Mother     Alcohol abuse Father     Cancer Father     Heart disease Father     Hypertension Father     Breast cancer Sister     Cancer Sister     Early death Sister     Depression Brother     Early death Brother     Mental illness Brother     No Known Problems Daughter     No Known Problems Daughter     No Known Problems Daughter     No Known Problems Daughter     No Known Problems Daughter     Ovarian cancer Other     Melanoma Neg Hx        Review of patient's allergies indicates:   Allergen Reactions    Bactrim [sulfamethoxazole-trimethoprim] Swelling     Swelling of lips/blisters in mouth         Current Outpatient Medications   Medication Sig    acetaminophen (TYLENOL ORAL) Take 500 mg by mouth as needed.    amLODIPine (NORVASC) 5 MG tablet Take 1 tablet (5 mg total) by mouth once daily.    ammonium lactate (AMLACTIN) 12 % lotion Apply topically daily as needed (Dry Skin).    dabigatran etexilate (PRADAXA) 150 mg Cap Take 1 capsule (150 mg total) by mouth 2 (two) times daily. "Do NOT break, chew, or open capsules."    diclofenac sodium (VOLTAREN) 1 % Gel Apply 2 " g topically once daily.    flecainide (TAMBOCOR) 100 MG Tab Take 1 tablet (100 mg total) by mouth every 12 (twelve) hours.    gabapentin (NEURONTIN) 100 MG capsule Take 1 capsule (100 mg total) by mouth 3 (three) times daily as needed (back pain).    LIDOcaine (LIDODERM) 5 % Place 1 patch onto the skin once daily. Remove & Discard patch within 12 hours or as directed by MD    losartan (COZAAR) 50 MG tablet Take 0.5 tablets (25 mg total) by mouth once daily.    meloxicam (MOBIC) 15 MG tablet Take 1 tablet (15 mg total) by mouth once daily.    methocarbamoL (ROBAXIN) 500 MG Tab Take 1 tablet (500 mg total) by mouth 4 (four) times daily as needed.    metoprolol succinate (TOPROL-XL) 25 MG 24 hr tablet Take 1 tablet (25 mg total) by mouth once daily.    mirtazapine (REMERON) 30 MG tablet Take 30 mg by mouth every evening.    multivitamin (THERAGRAN) per tablet Take 1 tablet by mouth once daily.    naloxone (NARCAN) 4 mg/actuation Spry 1 spray by Nasal route once.    omeprazole (PRILOSEC) 20 MG capsule Take 1 capsule by mouth Daily.    oxyCODONE-acetaminophen (PERCOCET) 5-325 mg per tablet May cause drowsiness.This prescription cannot be refilled.Federal law prohibits transfer of prescription.Overdose may cause serious breathing problems.This product contains acetaminophen.    predniSONE (DELTASONE) 10 MG tablet Take with food/milk.Take or use exactly as directed.Obtain advice for OTCs.    sildenafiL (VIAGRA) 100 MG tablet Take 1 tablet (100 mg total) by mouth as needed for Erectile Dysfunction (1 tablet 1 hr prior to intercourse.).    tadalafiL (CIALIS) 20 MG Tab Take 1 tablet (20 mg total) by mouth once daily.    terazosin (HYTRIN) 10 MG capsule Take 1 capsule (10 mg total) by mouth every evening.    traMADoL (ULTRAM) 50 mg tablet Take 1 tablet (50 mg total) by mouth every 12 (twelve) hours as needed for Pain.    triamcinolone acetonide 0.1% (KENALOG) 0.1 % cream Apply topically 2 (two) times daily.    VITAMIN B  "COMPLEX (B COMPLETE ORAL) Take by mouth once daily.    vitamin E 100 UNIT capsule Take 100 Units by mouth once daily.    levocetirizine (XYZAL) 5 MG tablet Take 1 tablet (5 mg total) by mouth every evening.     No current facility-administered medications for this visit.       ROS:  GENERAL: No fever. No chills. No fatigue. Denies weight loss. Denies weight gain.  HEENT: Denies headaches. Denies vision change. Denies eye pain. Denies double vision. Denies ear pain.   CV: Denies chest pain.   PULM: Denies of shortness of breath.  GI: Denies constipation. No diarrhea. No abdominal pain. Denies nausea. Denies vomiting. No blood in stool.  HEME: Denies bleeding problems.  : Denies urgency. No painful urination. No blood in urine.  MS: Denies joint stiffness. Denies joint swelling.  + back pain.  SKIN: Denies rash.   NEURO: Denies seizures. No weakness.  PSYCH:  Denies difficulty sleeping. No anxiety. Denies depression. No suicidal thoughts.       VITALS:   Vitals:    01/30/24 1435   BP: 139/75   Pulse: 72   Resp: 18   Temp: 98.2 °F (36.8 °C)   SpO2: 100%   Weight: 106 kg (233 lb 11 oz)   Height: 5' 9" (1.753 m)   PainSc:   8         PHYSICAL EXAM:   GENERAL: Well appearing, in no acute distress, alert and oriented x3.  PSYCH:  Mood and affect appropriate.  SKIN: Skin color, texture, turgor normal, no rashes or lesions.  HEENT:  Normocephalic, atraumatic. Cranial nerves grossly intact.  NECK: No pain to palpation over the cervical paraspinous muscles. No pain to palpation over facets. No pain with neck flexion, extension, or lateral flexion.   PULM: No evidence of respiratory difficulty, symmetric chest rise.  GI:  Non-distended  BACK: Limited range of motion in all planes of motion due to pain. + pain to palpation over the interspinous area at mid to lower lumbar levels. + mild pain to palpation over facet joints. There is no pain with palpation over the sacroiliac joints bilaterally.   EXTREMITIES: No deformities, " edema, or skin discoloration.   MUSCULOSKELETAL: No atrophy is noted.  NEURO: Sensation is equal and appropriate bilaterally. Bilateral upper and lower extremity strength is normal and symmetric. Plantar response are downgoing. Straight leg raising in the supine position is negative to radicular pain.   GAIT: Cane      LABS:  Elevated PSA    IMAGING:    MRI lumbar spine 2021 reviewed    ASSESSMENT: 73 y.o. year old male with pain, consistent with:    Encounter Diagnoses   Name Primary?    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 Imaging shows severe central stenosis and facet synovitis from 2021. Exam shows pain reproduced with facet and stenosis provocation. He is also being monitored for prostate cancer with a recent increase in PSA. He improved with TFESI but wants more functional improvements. Discussed that may require surgery.       PLAN:  MRI lumbar spine reviewed with patient  Increase to gabapentin 200 mg TID, ramp instructions provided  Tramadol 50 #15 for breakthrough pain for his trip to [place] (Son has TBI from tire explosion at work)  Follow up when back from [place]    Donya Hickey MD  01/30/2024

## 2024-02-09 ENCOUNTER — TELEPHONE (OUTPATIENT)
Dept: PAIN MEDICINE | Facility: CLINIC | Age: 74
End: 2024-02-09
Payer: MEDICARE

## 2024-02-09 ENCOUNTER — PATIENT MESSAGE (OUTPATIENT)
Dept: PAIN MEDICINE | Facility: CLINIC | Age: 74
End: 2024-02-09
Payer: MEDICARE

## 2024-02-09 NOTE — TELEPHONE ENCOUNTER
----- Message from Julián Whatley sent at 2/9/2024 12:01 PM CST -----  Type:  Patient Returning Call    Who Called:PT  Who Left Message for Patient:  Does the patient know what this is regarding?:appt   Would the patient rather a call back or a response via [facility]? Call   Best Call Back Number: [phone]  Additional Information: pt is calling to schedule an follow up. Please give the pt an call

## 2024-02-14 ENCOUNTER — PATIENT MESSAGE (OUTPATIENT)
Dept: PAIN MEDICINE | Facility: CLINIC | Age: 74
End: 2024-02-14
Payer: MEDICARE

## 2024-02-14 DIAGNOSIS — M48.07 SPINAL STENOSIS, LUMBOSACRAL REGION: ICD-10-CM

## 2024-02-14 RX ORDER — TRAMADOL HYDROCHLORIDE 50 MG/1
50 TABLET ORAL
Qty: 15 TABLET | Refills: 0 | OUTPATIENT
Start: 2024-02-14

## 2024-02-14 NOTE — TELEPHONE ENCOUNTER
Patient requesting refill on tramadol  Last office visit 1-30-24   shows last refill on N/A  Patient does not have a pain contract on file with [facility] department  Patient last UDS  was not consistent with current therapy

## 2024-02-19 NOTE — TELEPHONE ENCOUNTER
Please sign RX patient in the front waiting thanks.   Spoke with provider after call. Provider stated dark green bile could indicate worry for obstruction or late in vomiting there can be bile. Discussed w/ mom that although she recently has had multiple tests done, provider would like to get an U/S on her gallbladder. Stated there was maybe some dilation in her biliary system and that could be the reason for her vomiting, mother v/u. Ordered gallbladder U/S to be done on the [facility] at 9:30am on 2/22 Thursday. Mom v/u to date, time, and location.

## 2024-02-21 ENCOUNTER — TELEPHONE (OUTPATIENT)
Dept: PAIN MEDICINE | Facility: CLINIC | Age: 74
End: 2024-02-21
Payer: MEDICARE

## 2024-02-21 NOTE — TELEPHONE ENCOUNTER
Patients medication was discontinued.                ----- Message from Natalia Garcia sent at 2/21/2024 11:37 AM CST -----  Regarding: Refill  Type: RX Refill Request    Who Called:Janusz aiken    RX Name and Strength:traMADoL (ULTRAM) 50 mg tablet,meloxicam (MOBIC) 15 MG tablet    Preferred Pharmacy with phone number:[facility] - [place] [address] [address]      Would the patient rather a call back or a response via [facility]?call back    Best Call Back Number:[phone]    Additional Information:

## 2024-02-21 NOTE — TELEPHONE ENCOUNTER
----- Message from Norma Mccabe sent at 2/21/2024 11:38 AM CST -----  Regarding: Return Call  .Type:  Patient Returning Call    Who Called: Self     Who Left Message for Patient: Aria    Does the patient know what this is regarding?: yes, medicine     Would the patient rather a call back or a response via [facility]? Call     Best Call Back Number: [phone]      Additional Information:

## 2024-02-28 ENCOUNTER — OFFICE VISIT (OUTPATIENT)
Dept: PAIN MEDICINE | Facility: CLINIC | Age: 74
End: 2024-02-28
Payer: MEDICARE

## 2024-02-28 VITALS
RESPIRATION RATE: 18 BRPM | BODY MASS INDEX: 34.09 KG/M2 | WEIGHT: 230.81 LBS | OXYGEN SATURATION: 98 % | SYSTOLIC BLOOD PRESSURE: 150 MMHG | TEMPERATURE: 98 F | HEART RATE: 64 BPM | DIASTOLIC BLOOD PRESSURE: 78 MMHG

## 2024-02-28 DIAGNOSIS — F11.90 CHRONIC, CONTINUOUS USE OF OPIOIDS: ICD-10-CM

## 2024-02-28 DIAGNOSIS — M48.07 SPINAL STENOSIS, LUMBOSACRAL REGION: Primary | ICD-10-CM

## 2024-02-28 PROCEDURE — 99214 OFFICE O/P EST MOD 30 MIN: CPT | Mod: S$PBB,,, | Performed by: ANESTHESIOLOGY

## 2024-02-28 PROCEDURE — 99999 PR PBB SHADOW E&M-EST. PATIENT-LVL V: CPT | Mod: PBBFAC,,, | Performed by: ANESTHESIOLOGY

## 2024-02-28 PROCEDURE — 99215 OFFICE O/P EST HI 40 MIN: CPT | Mod: PBBFAC | Performed by: ANESTHESIOLOGY

## 2024-02-28 PROCEDURE — 80326 AMPHETAMINES 5 OR MORE: CPT | Performed by: ANESTHESIOLOGY

## 2024-02-28 PROCEDURE — 80307 DRUG TEST PRSMV CHEM ANLYZR: CPT | Performed by: ANESTHESIOLOGY

## 2024-02-28 RX ORDER — MELOXICAM 15 MG/1
15 TABLET ORAL DAILY
Qty: 30 TABLET | Refills: 2 | Status: SHIPPED | OUTPATIENT
Start: 2024-02-28 | End: 2024-05-13 | Stop reason: SDUPTHER

## 2024-02-28 RX ORDER — TRAMADOL HYDROCHLORIDE 50 MG/1
50 TABLET ORAL
Qty: 30 TABLET | Refills: 0 | Status: SHIPPED | OUTPATIENT
Start: 2024-02-28 | End: 2024-04-03 | Stop reason: SDUPTHER

## 2024-02-28 NOTE — PROGRESS NOTES
"  PCP: Husam Chong MD    REFERRING PHYSICIAN: No ref. provider found    CHIEF COMPLAINT: Low back pain that radiates into bilateral legs    Original HISTORY OF PRESENT ILLNESS: Omar Pacheco presents to the clinic for the evaluation of the above pain. The pain started >10 years ago. It has been waxing and waning for years with periods of acute exacerbation. He was treated with stated epidural injections some years ago with resolution of distal "Sciatica" symptoms. About 6 months ago his pain (both axial and BLE) became exacerbated without inciting trauma. He underwent PT 1-2 years ago and has been under the care of chiropractor. He is wearing a back brace now and tries not to move around very much, avoiding lifting, flexion, and extension as they exacerbate his pain. Leg pain = back pain. Pain travels into b uttocks and posterior thighs. +shopping cart sign. Facet synovitis on 2021 MRI. History of elevated PSA with surveillance. +leg numbness and paresthesias.   The pain is at a severity of 8/10. Pertinent negatives include no abdominal pain, bladder incontinence, bowel incontinence, chest pain, dysuria, fever, headaches, paresis, paresthesias, pelvic pain, perianal numbness, weakness or weight loss. Tylenol, robaxin, oxycodone have all helped. Gabapentin was helpful though he ran out a few months ago and would like to reinitiate.       Original PAIN SCORES:  Best: Pain is 5  Worst: Pain is 10  Current: Pain is 8        5/4/2022     8:28 AM   Last 3 PDI Scores   Pain Disability Index (PDI) 21       INTERVAL HISTORY: (Newest visit at the bottom)   Interval History 1/30/24: Omar Pacheco returns for follow up. At our last visit we scheduled a TFESI due to severe canal stenosis. He called back several days after the injection with worsening radiating pain in the right leg. That has now mostly resolved. He is better able to sleep in his own bed and get around since the injection. He reports being able to " walk as far as he wants now, utilizing his cane. However, he is still hoping for more functional gains. We reviewed his MRI again and discussed that those goals may require a surgical solution. He is not interested in surgery at this time. We will try to assist with medication management for the meantime.     Interval History 2/28/24: Omar Pacheco returns for follow up. At our last visit we discussed lumbar spinal stenosis with Mr. Pacheco and decided to trial an epidural to assist with his pain since it helped in the past. Today, he returns with his daughter Jeanne to discuss the outcomes. Unfortunately, he reports feeling worse since the epidural. So, we reviewed his MRI again and discussed options. They would like to avoid surgery. Fortunately, he does note benefit from Mobic and Tramadol. We had a long discussion about the problems with these medications and the fact that they do not fix the problem, and may not work for very long. They are OK with that and are willing to exchange those risks for the potential benefits the medications provide. They would like to try therapy to see if this can help him become more mobile. He really just wants to be more active, or at least to dance one song before having to sit down.     6 weeks of Conservative therapy:  HEP: 5 x per week HEP for the last ~ 2 years  Chiro: yes      Treatments / Medications: (Ice/Heat/NSAIDS/APAP/etc):  Tylenol  Robaxin  Oxycodone  Gabapentin      Interventional Pain Procedures: (Previous injections)  (Years ago) MARIAMA with years of relief  1/23/24: B/L L4/5 TFESI 50% relief    Past Medical History:   Diagnosis Date    *Atrial fibrillation     Back pain     Benign hypertrophy of prostate     Degenerative disc disease     GERD (gastroesophageal reflux disease)     Hx of colonic polyps     Hypertension     Pilonidal cyst 1971    Prostate cancer     Sleep apnea     Trouble in sleeping      Past Surgical History:   Procedure Laterality Date     COLONOSCOPY N/A 2016    Procedure: COLONOSCOPY;  Surgeon: Sumeet Lundy MD;  Location: [facility] ([address] [place] [address]);  Service: Endoscopy;  Laterality: N/A;  OK to hold Pradaxa 2 days prior to procedure per Dr Jones/see note dated 11/15/16/svn    COLONOSCOPY N/A 2022    Procedure: COLONOSCOPY;  Surgeon: Italo Roy MD;  Location: [facility];  Service: Endoscopy;  Laterality: N/A;  ok to hold Pradaxa 2 days per EBONI Bentley RN/arrhythmia clinic      COVID test at [place] on -GT  2/10/22 Changed Ali to scoping MD-ml    CYST REMOVAL  1971    coccyx    EPIDURAL STEROID INJECTION Right 2022    Procedure: Right Genicular Nerve Radiofrequency Ablations;  Surgeon: Luca Galan Jr., MD;  Location: [facility];  Service: Pain Management;  Laterality: Right;  @1100  Pradaxa not held  No DM  Prostate BX     EPIDURAL STEROID INJECTION Left 2022    Procedure: Left Genicular Radiofrequency Ablations;  Surgeon: Luca Galan Jr., MD;  Location: [facility];  Service: Pain Management;  Laterality: Left;  @1130  Pradaxa not held  No DM  Last 1&100    KNEE SURGERY      right    PROSTATE BIOPSY  2022    TRANSFORAMINAL EPIDURAL INJECTION OF STEROID Bilateral 2024    Procedure: LUMBAR TRANSFORAMINAL BILATERAL L4/5 *PRADAXA CLEARANCE IN CHART*;  Surgeon: Donya Hickey MD;  Location: [facility];  Service: Pain Management;  Laterality: Bilateral;  [phone]  2 WK F/U JERONIMO  * COVID BOOSTER     Social History     Socioeconomic History    Marital status:     Number of children: 5    Highest education level: Associate degree: academic program   Tobacco Use    Smoking status: Former     Current packs/day: 0.00     Average packs/day: 0.3 packs/day for 2.0 years (0.5 ttl pk-yrs)     Types: Cigarettes     Start date: 1973     Quit date: 1975     Years since quittin.4    Smokeless tobacco: Never   Substance and Sexual Activity    Alcohol use: Yes     Alcohol/week: 6.0  standard drinks of alcohol     Types: 6 Glasses of wine per week     Comment: weekly    Drug use: No    Sexual activity: Yes     Partners: Female     Social Determinants of Health     Financial Resource Strain: Low Risk  (11/14/2023)    Overall Financial Resource Strain (CARDIA)     Difficulty of Paying Living Expenses: Not hard at all   Food Insecurity: No Food Insecurity (11/14/2023)    Hunger Vital Sign     Worried About Running Out of Food in the Last Year: Never true     Ran Out of Food in the Last Year: Never true   Transportation Needs: No Transportation Needs (11/14/2023)    PRAPARE - Transportation     Lack of Transportation (Medical): No     Lack of Transportation (Non-Medical): No   Physical Activity: Sufficiently Active (11/14/2023)    Exercise Vital Sign     Days of Exercise per Week: 4 days     Minutes of Exercise per Session: 90 min   Stress: Stress Concern Present (11/14/2023)    [place] [employer] - Occupational Stress Questionnaire     Feeling of Stress : Very much   Social Connections: Moderately Integrated (11/14/2023)    Social Connection and Isolation Panel [NHANES]     Frequency of Communication with Friends and Family: More than three times a week     Frequency of Social Gatherings with Friends and Family: More than three times a week     Attends Religion Services: More than 4 times per year     Active Member of Clubs or Organizations: Yes     Attends Club or Organization Meetings: More than 4 times per year     Marital Status:    Housing Stability: Low Risk  (11/14/2023)    Housing Stability Vital Sign     Unable to Pay for Housing in the Last Year: No     Number of Places Lived in the Last Year: 1     Unstable Housing in the Last Year: No     Family History   Problem Relation Age of Onset    Breast cancer Mother     Cancer Mother     Alcohol abuse Father     Cancer Father     Heart disease Father     Hypertension Father     Breast cancer Sister     Cancer  "Sister     Early death Sister     Depression Brother     Early death Brother     Mental illness Brother     No Known Problems Daughter     No Known Problems Daughter     No Known Problems Daughter     No Known Problems Daughter     No Known Problems Daughter     Ovarian cancer Other     Melanoma Neg Hx        Review of patient's allergies indicates:   Allergen Reactions    Bactrim [sulfamethoxazole-trimethoprim] Swelling     Swelling of lips/blisters in mouth         Current Outpatient Medications   Medication Sig    acetaminophen (TYLENOL ORAL) Take 500 mg by mouth as needed.    amLODIPine (NORVASC) 5 MG tablet Take 1 tablet (5 mg total) by mouth once daily.    ammonium lactate (AMLACTIN) 12 % lotion Apply topically daily as needed (Dry Skin).    dabigatran etexilate (PRADAXA) 150 mg Cap Take 1 capsule (150 mg total) by mouth 2 (two) times daily. "Do NOT break, chew, or open capsules."    diclofenac sodium (VOLTAREN) 1 % Gel Apply 2 g topically once daily.    flecainide (TAMBOCOR) 100 MG Tab Take 1 tablet (100 mg total) by mouth every 12 (twelve) hours.    gabapentin (NEURONTIN) 100 MG capsule Take 2 capsules (200 mg total) by mouth 3 (three) times daily as needed (back pain).    levocetirizine (XYZAL) 5 MG tablet Take 1 tablet (5 mg total) by mouth every evening.    LIDOcaine (LIDODERM) 5 % Place 1 patch onto the skin once daily. Remove & Discard patch within 12 hours or as directed by MD    losartan (COZAAR) 50 MG tablet Take 0.5 tablets (25 mg total) by mouth once daily.    meloxicam (MOBIC) 15 MG tablet Take 1 tablet (15 mg total) by mouth once daily.    methocarbamoL (ROBAXIN) 500 MG Tab Take 1 tablet (500 mg total) by mouth 4 (four) times daily as needed.    metoprolol succinate (TOPROL-XL) 25 MG 24 hr tablet Take 1 tablet (25 mg total) by mouth once daily.    mirtazapine (REMERON) 30 MG tablet Take 30 mg by mouth every evening.    multivitamin (THERAGRAN) per tablet Take 1 tablet by mouth once daily.    " naloxone (NARCAN) 4 mg/actuation Spry 1 spray by Nasal route once.    omeprazole (PRILOSEC) 20 MG capsule Take 1 capsule by mouth Daily.    predniSONE (DELTASONE) 10 MG tablet Take with food/milk.Take or use exactly as directed.Obtain advice for OTCs.    sildenafiL (VIAGRA) 100 MG tablet Take 1 tablet (100 mg total) by mouth as needed for Erectile Dysfunction (1 tablet 1 hr prior to intercourse.).    tadalafiL (CIALIS) 20 MG Tab Take 1 tablet (20 mg total) by mouth once daily.    terazosin (HYTRIN) 10 MG capsule Take 1 capsule (10 mg total) by mouth every evening.    traMADoL (ULTRAM) 50 mg tablet Take 1 tablet (50 mg total) by mouth every 24 hours as needed for Pain.    triamcinolone acetonide 0.1% (KENALOG) 0.1 % cream Apply topically 2 (two) times daily.    VITAMIN B COMPLEX (B COMPLETE ORAL) Take by mouth once daily.    vitamin E 100 UNIT capsule Take 100 Units by mouth once daily.     No current facility-administered medications for this visit.       ROS:  GENERAL: No fever. No chills. No fatigue. Denies weight loss. Denies weight gain.  HEENT: Denies headaches. Denies vision change. Denies eye pain. Denies double vision. Denies ear pain.   CV: Denies chest pain.   PULM: Denies of shortness of breath.  GI: Denies constipation. No diarrhea. No abdominal pain. Denies nausea. Denies vomiting. No blood in stool.  HEME: Denies bleeding problems.  : Denies urgency. No painful urination. No blood in urine.  MS: Denies joint stiffness. Denies joint swelling.  + back pain.  SKIN: Denies rash.   NEURO: Denies seizures. No weakness.  PSYCH:  Denies difficulty sleeping. No anxiety. Denies depression. No suicidal thoughts.       VITALS:   Vitals:    02/28/24 1406   BP: (!) 150/78   Pulse: 64   Resp: 18   Temp: 97.5 °F (36.4 °C)   SpO2: 98%   Weight: 104.7 kg (230 lb 13.2 oz)   PainSc:   8         PHYSICAL EXAM:   GENERAL: Well appearing, in no acute distress, alert and oriented x3.  PSYCH:  Mood and affect  appropriate.  SKIN: Skin color, texture, turgor normal, no rashes or lesions.  HEENT:  Normocephalic, atraumatic. Cranial nerves grossly intact.  NECK: No pain to palpation over the cervical paraspinous muscles. No pain to palpation over facets. No pain with neck flexion, extension, or lateral flexion.   PULM: No evidence of respiratory difficulty, symmetric chest rise.  GI:  Non-distended  BACK: Limited range of motion in all planes of motion due to pain. + pain to palpation over the interspinous area at mid to lower lumbar levels. + mild pain to palpation over facet joints. There is no pain with palpation over the sacroiliac joints bilaterally.   EXTREMITIES: No deformities, edema, or skin discoloration.   MUSCULOSKELETAL: No atrophy is noted.  NEURO: Sensation is equal and appropriate bilaterally. Bilateral upper and lower extremity strength is normal and symmetric. Plantar response are downgoing. Straight leg raising in the supine position is negative to radicular pain.   GAIT: Cane      LABS:  Elevated PSA    IMAGING:    MRI LUMBAR SPINE W WO CONTRAST     CLINICAL HISTORY:  Spinal stenosis, lumbar;Low back pain, cancer suspected;  Spondylolysis, lumbar region     TECHNIQUE:  Sagittal T1, T2, stir, coronal T2 and axial T1-T2 imaging lumbar spine precontrast with axial T1 and fat sat sagittal T1 postcontrast imaging lumbar spine.  10 mL of Gadavist contrast was infused intravenously.     COMPARISON:  Lumbar MRI 10/06/2021     FINDINGS:  Lumbar sagittal alignment relatively stable.  Lumbar vertebral body heights, contour and marrow signal relatively stable allowing for scattered endplate degeneration without evidence for acute fracture or subluxation     There is continued degenerative disc disease with disc desiccation height loss and endplate degeneration most pronounced at L5/S1 with moderate height loss.     Several scattered bilateral probable renal cyst included in the coronal field of view.  Largest left  lower pole measuring 2.6 cm with largest on the right within the hilum measuring 2.4 cm.  Please note there is subtle questioned enhancement involving the inferior L5 and right inferior L4 vertebral bodies suggestive for recent endplate degeneration.  Enhancing metastases felt less likely.     The distal spinal cord and conus is normal in signal and contour tip of the conus approximates T12/L1 level.     There is subtle questionable enhancement along the nerve roots at the L3/L4 level likely related to degree of canal stenosis detailed below     T12/L1: Small bulging disc without significant central canal or neural foraminal stenosis.     L1/L2: No significant disc bulge, central canal or neural foraminal stenosis.     L2/L3: Posterior disc osteophyte with ligamentum flavum hypertrophy and facet arthropathy moderate central canal stenosis with moderate right and moderate to severe left neural foraminal stenosis.     L3/L4: Posterior disc osteophyte complex with ligamentum flavum hypertrophy and facet joint arthropathy with small facet joint effusions severe resulting central canal stenosis with moderate bilateral neural foraminal stenosis.  There is redundancy of the nerve roots at the L3 vertebral body level compatible and severity of the stenosis.  In addition there is trace enhancement along the filum at the L4 vertebral body level likely related to degree of stenosis.  Alternative differential for the enhancement including inflammatory/infectious or neoplastic process felt less likely.     L4/L5: Posterior disc osteophyte with ligamentum flavum hypertrophy and facet arthropathy with dorsal epidural lipomatosis moderate central canal stenosis and severe right and moderate left neural foraminal stenosis.     L5/S1: Posterior disc osteophyte with facet joint arthropathy without significant central canal stenosis with mild moderate left and mild right neural foraminal stenosis     Degenerative change visualized  sacroiliac joints.     This report was flagged in Epic as abnormal.     Impression:     Multilevel degenerative change lumbar spine most pronounced at L3/L4 with posterior disc osteophyte, ligamentum flavum hypertrophy and facet arthropathy with small facet joint effusions.  Severe resulting central canal stenosis with moderate bilateral foraminal stenosis     Please note there is redundancy of the nerve roots at L3 level with subtle enhancement of the filum at L4 level likely reactive from severity of stenosis.     Probable recent endplate degeneration with enhancement along the inferior L4 and L5 endplates..  No definite enhancing mass lesion to suggest metastatic disease     Multiple fluid signal foci in the kidneys bilaterally suggestive for probable cysts.  This could be further evaluated with dedicated renal imaging as warranted.        Electronically signed by: Lamberto Wallace,   Date:                                            01/19/2024  Time:                                           09:54    ASSESSMENT: 73 y.o. year old male with pain, consistent with:    Encounter Diagnosis   Name Primary?    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 Imaging shows severe central stenosis and facet synovitis at L3/4. He denies red flag findings. He did not improve after epidural but wants to avoid surgery. He wants to take a trip to [place] (Son has TBI from tire explosion at work).     OPIOID MANAGEMENT:  MME: 5  Risk: Low  : Reviewed today  Naloxone:   Utox: 2/28/24  Violations: None  Contract: Pending Utox    PLAN:  MRI lumbar spine reviewed with patient, daughter  Continue gabapentin 200 mg TID  Long discussion about risk/benefit of Tramadol and Mobic  Agreed to continue Mobic 15 QD  Agreed to continue Tramadol 50 QD  [facility]  [facility]erred to [facility]  Follow up in 4 weeks     Donya Hickey MD  02/28/2024

## 2024-02-29 DIAGNOSIS — M54.42 ACUTE MIDLINE LOW BACK PAIN WITH BILATERAL SCIATICA: ICD-10-CM

## 2024-02-29 DIAGNOSIS — I10 ESSENTIAL HYPERTENSION: ICD-10-CM

## 2024-02-29 DIAGNOSIS — M54.41 ACUTE MIDLINE LOW BACK PAIN WITH BILATERAL SCIATICA: ICD-10-CM

## 2024-03-01 ENCOUNTER — TELEPHONE (OUTPATIENT)
Dept: PAIN MEDICINE | Facility: CLINIC | Age: 74
End: 2024-03-01
Payer: MEDICARE

## 2024-03-01 RX ORDER — AMLODIPINE BESYLATE 5 MG/1
5 TABLET ORAL DAILY
Qty: 90 TABLET | Refills: 3 | Status: SHIPPED | OUTPATIENT
Start: 2024-03-01 | End: 2024-04-16 | Stop reason: SDUPTHER

## 2024-03-01 RX ORDER — LIDOCAINE 50 MG/G
1 PATCH TOPICAL DAILY
Qty: 5 PATCH | Refills: 0 | Status: SHIPPED | OUTPATIENT
Start: 2024-03-01 | End: 2024-04-03 | Stop reason: SDUPTHER

## 2024-03-01 NOTE — TELEPHONE ENCOUNTER
----- Message from Ashley Juarez sent at 3/1/2024 12:14 PM CST -----  Regarding: Therapy appt  Contact: [phone]  Calling to speak with nurse regarding receiving a call for back therapy. Please call and discuss  with patient as soon as possible.

## 2024-03-01 NOTE — TELEPHONE ENCOUNTER
No care due was identified.  Health [facility] Embedded Care Due Messages. Reference number: [id number].   2/29/2024 10:10:23 PM CST

## 2024-03-07 ENCOUNTER — TELEPHONE (OUTPATIENT)
Dept: FAMILY MEDICINE | Facility: CLINIC | Age: 74
End: 2024-03-07
Payer: MEDICARE

## 2024-03-07 LAB
6MAM UR QL: NOT DETECTED
7AMINOCLONAZEPAM UR QL: NOT DETECTED
A-OH ALPRAZ UR QL: NOT DETECTED
ALPHA-OH-MIDAZOLAM: NOT DETECTED
ALPRAZ UR QL: NOT DETECTED
AMPHET UR QL SCN: NOT DETECTED
ANNOTATION COMMENT IMP: NORMAL
BARBITURATES UR QL: NEGATIVE
BUPRENORPHINE UR QL: NOT DETECTED
BZE UR QL: NEGATIVE
CARBOXYTHC UR QL: NEGATIVE
CARISOPRODOL UR QL: NEGATIVE
CLONAZEPAM UR QL: NOT DETECTED
CODEINE UR QL: NOT DETECTED
CREAT UR-MCNC: 115.2 MG/DL (ref 20–400)
DIAZEPAM UR QL: NOT DETECTED
ETHYL GLUCURONIDE UR QL: NORMAL
FENTANYL UR QL: NOT DETECTED
GABAPENTIN: PRESENT
HYDROCODONE UR QL: NOT DETECTED
HYDROMORPHONE UR QL: NOT DETECTED
LORAZEPAM UR QL: NOT DETECTED
MDA UR QL: NOT DETECTED
MDEA UR QL: NOT DETECTED
MDMA UR QL: NOT DETECTED
ME-PHENIDATE UR QL: NOT DETECTED
METHADONE UR QL: NEGATIVE
METHAMPHET UR QL: NOT DETECTED
MIDAZOLAM UR QL SCN: NOT DETECTED
MORPHINE UR QL: NOT DETECTED
NALOXONE: NOT DETECTED
NORBUPRENORPHINE UR QL CFM: NOT DETECTED
NORDIAZEPAM UR QL: NOT DETECTED
NORFENTANYL UR QL: NOT DETECTED
NORHYDROCODONE UR QL CFM: NOT DETECTED
NORMEPERIDINE UR QL CFM: NOT DETECTED
NOROXYCODONE UR QL CFM: NOT DETECTED
NOROXYMORPHONE UR QL SCN: NOT DETECTED
OXAZEPAM UR QL: NOT DETECTED
OXYCODONE UR QL: NOT DETECTED
OXYMORPHONE UR QL: NOT DETECTED
PATHOLOGY STUDY: NORMAL
PCP UR QL: NEGATIVE
PHENTERMINE UR QL: NOT DETECTED
PREGABALIN: NOT DETECTED
SERVICE CMNT-IMP: NORMAL
TAPENTADOL UR QL SCN: NOT DETECTED
TAPENTADOL UR QL SCN: NOT DETECTED
TEMAZEPAM UR QL: NOT DETECTED
TRAMADOL UR QL: NEGATIVE
ZOLPIDEM METABOLITE: NOT DETECTED
ZOLPIDEM UR QL: NOT DETECTED

## 2024-03-07 NOTE — TELEPHONE ENCOUNTER
"Please be advised of patient's message:      "Dr. Chong a referral was submitted by Pain management, for [facility], no response,   Please check why Im not being responded to."     "

## 2024-03-08 ENCOUNTER — TELEPHONE (OUTPATIENT)
Dept: SPINE | Facility: CLINIC | Age: 74
End: 2024-03-08

## 2024-03-08 NOTE — TELEPHONE ENCOUNTER
Staff contacted pt regards of getting scheduled with [facility]. Staff informed the pt that [facility] will contact him soon to get him scheduled . Pt was also scheduled incorrectly with  .      Pt agreed and said thanks so much.

## 2024-03-12 ENCOUNTER — TELEPHONE (OUTPATIENT)
Dept: FAMILY MEDICINE | Facility: CLINIC | Age: 74
End: 2024-03-12
Payer: MEDICARE

## 2024-03-12 DIAGNOSIS — M54.9 BACK PAIN, UNSPECIFIED BACK LOCATION, UNSPECIFIED BACK PAIN LATERALITY, UNSPECIFIED CHRONICITY: Primary | ICD-10-CM

## 2024-03-12 NOTE — TELEPHONE ENCOUNTER
Patient requesting to have referral for Physical Therapy.  Please fax to [facility].  Please advise.

## 2024-03-18 ENCOUNTER — TELEPHONE (OUTPATIENT)
Dept: FAMILY MEDICINE | Facility: CLINIC | Age: 74
End: 2024-03-18
Payer: MEDICARE

## 2024-03-18 NOTE — TELEPHONE ENCOUNTER
Patient stated he requested referral to [facility] because he has not been contacted by [facility].  Spoke with representative at [facility].  Rep stated he would text someone to find out what the hold up is on scheduling.  Stated it takes some time, but he will reach out to someone.  Advised representative to contact patient with information regarding referral.  Rep verbalized understanding.

## 2024-03-28 ENCOUNTER — PATIENT OUTREACH (OUTPATIENT)
Dept: ADMINISTRATIVE | Facility: HOSPITAL | Age: 74
End: 2024-03-28
Payer: MEDICARE

## 2024-03-28 ENCOUNTER — PATIENT MESSAGE (OUTPATIENT)
Dept: ADMINISTRATIVE | Facility: HOSPITAL | Age: 74
End: 2024-03-28
Payer: MEDICARE

## 2024-03-28 VITALS — DIASTOLIC BLOOD PRESSURE: 75 MMHG | SYSTOLIC BLOOD PRESSURE: 135 MMHG

## 2024-03-28 NOTE — PROGRESS NOTES
Population Health Chart Review & Patient Outreach Details      Additional [facility] Health Notes:      Remote blood pressure of 135/75 from 3/25/2024         Updates Requested / Reviewed:      Updated Care Coordination Note, Care Everywhere, and Care Team Updated         Health Maintenance Topics Overdue:      VB Score: 1     Uncontrolled BP    RSV Vaccine                  Health Maintenance Topic(s) Outreach Outcomes & Actions Taken:    Blood Pressure - Outreach Outcomes & Actions Taken  : Remote Blood Pressure Reading Captured

## 2024-04-03 ENCOUNTER — TELEPHONE (OUTPATIENT)
Dept: ORTHOPEDICS | Facility: CLINIC | Age: 74
End: 2024-04-03
Payer: MEDICARE

## 2024-04-03 DIAGNOSIS — M25.561 RIGHT KNEE PAIN, UNSPECIFIED CHRONICITY: Primary | ICD-10-CM

## 2024-04-03 DIAGNOSIS — M54.42 ACUTE MIDLINE LOW BACK PAIN WITH BILATERAL SCIATICA: ICD-10-CM

## 2024-04-03 DIAGNOSIS — M54.41 ACUTE MIDLINE LOW BACK PAIN WITH BILATERAL SCIATICA: ICD-10-CM

## 2024-04-03 DIAGNOSIS — M48.07 SPINAL STENOSIS, LUMBOSACRAL REGION: ICD-10-CM

## 2024-04-03 RX ORDER — TRAMADOL HYDROCHLORIDE 50 MG/1
50 TABLET ORAL
Qty: 30 TABLET | Refills: 0 | Status: SHIPPED | OUTPATIENT
Start: 2024-04-03 | End: 2024-05-13 | Stop reason: SDUPTHER

## 2024-04-03 NOTE — TELEPHONE ENCOUNTER
Patient requesting refill on tramadol  Last office visit 02-28-24   shows last refill on n/a  Patient does have a pain contract on file with [facility] department  Patient last UDS 02-28-24 was consistent with current therapy     CODEINE  Not Detected   Comment: INTERPRETIVE INFORMATION: Codeine, U  Positive Cutoff: 40 ng/mL  Methodology: Mass Spectrometry   MORPHINE  Not Detected   Comment: INTERPRETIVE INFORMATION:Morphine, U  Positive Cutoff: 20 ng/mL  Methodology: Mass Spectrometry   6-ACETYLMORPHINE  Not Detected   Comment: INTERPRETIVE INFORMATION:6-acetylmorphine, U  Positive Cutoff: 20 ng/mL  Methodology: Mass Spectrometry   OXYCODONE  Not Detected   Comment: INTERPRETIVE INFORMATION:Oxycodone, U  Positive Cutoff: 40 ng/mL  Methodology: Mass Spectrometry   NOROYXCODONE  Not Detected   Comment: INTERPRETIVE INFORMATION:Noroxycodone, U  Positive Cutoff: 100 ng/mL  Methodology: Mass Spectrometry   OXYMORPHONE  Not Detected   Comment: INTERPRETIVE INFORMATION:Oxymorphone, U  Positive Cutoff: 40 ng/mL  Methodology: Mass Spectrometry   NOROXYMORPHONE  Not Detected   Comment: INTERPRETIVE INFORMATION:Noroxymorphone, U  Positive Cutoff: 100 ng/mL  Methodology: Mass Spectrometry   HYDROCODONE  Not Detected   Comment: INTERPRETIVE INFORMATION:Hydrocodone, U  Positive Cutoff: 40 ng/mL  Methodology: Mass Spectrometry   NORHYDROCODONE  Not Detected   Comment: INTERPRETIVE INFORMATION:Norhydrocodone, U  Positive Cutoff: 100 ng/mL  Methodology: Mass Spectrometry   HYDROMORPHONE  Not Detected   Comment: INTERPRETIVE INFORMATION:Hydromorphone, U  Positive Cutoff: 20 ng/mL  Methodology: Mass Spectrometry   BUPRENORPHINE  Not Detected   Comment: INTERPRETIVE INFORMATION:Buprenorphine, U  Positive Cutoff: 5 ng/mL  Methodology: Mass Spectrometry   NORUBPRENORPHINE  Not Detected   Comment: INTERPRETIVE INFORMATION:Norbuprenorphine, U  Positive Cutoff: 20 ng/mL  Methodology: Mass Spectrometry   FENTANYL   Not Detected   Comment: INTERPRETIVE INFORMATION:Fentanyl, U  Positive Cutoff: 2 ng/mL  Methodology: Mass Spectrometry   NORFENTANYL  Not Detected   Comment: INTERPRETIVE INFORMATION:Norfentanyl, U  Positive Cutoff: 2 ng/mL  Methodology: Mass Spectrometry   MEPERIDINE METABOLITE  Not Detected   Comment: INTERPRETIVE INFORMATION:Meperidine metabolite, U  Positive Cutoff: 50 ng/mL  Methodology: Mass Spectrometry   TAPENTADOL  Not Detected   Comment: INTERPRETIVE INFORMATION:Tapentadol, U  Positive Cutoff: 100 ng/mL  Methodology: Mass Spectrometry   TAPENTADOL-O-SULF  Not Detected   Comment: INTERPRETIVE INFORMATION:Tapentadol-o-Sulf, U  Positive Cutoff: 200 ng/mL  Me

## 2024-04-03 NOTE — TELEPHONE ENCOUNTER
No care due was identified.  Health [facility] Embedded Care Due Messages. Reference number: [id number].   4/03/2024 11:41:26 AM CDT

## 2024-04-04 RX ORDER — LIDOCAINE 50 MG/G
1 PATCH TOPICAL DAILY
Qty: 30 PATCH | Refills: 0 | Status: SHIPPED | OUTPATIENT
Start: 2024-04-04

## 2024-04-15 ENCOUNTER — CLINICAL SUPPORT (OUTPATIENT)
Dept: REHABILITATION | Facility: OTHER | Age: 74
End: 2024-04-15
Attending: ANESTHESIOLOGY
Payer: MEDICARE

## 2024-04-15 ENCOUNTER — TELEPHONE (OUTPATIENT)
Dept: ORTHOPEDICS | Facility: CLINIC | Age: 74
End: 2024-04-15
Payer: MEDICARE

## 2024-04-15 ENCOUNTER — PATIENT MESSAGE (OUTPATIENT)
Dept: ADMINISTRATIVE | Facility: OTHER | Age: 74
End: 2024-04-15
Payer: MEDICARE

## 2024-04-15 DIAGNOSIS — R29.898 DECREASED STRENGTH OF TRUNK AND BACK: ICD-10-CM

## 2024-04-15 DIAGNOSIS — M25.561 RIGHT KNEE PAIN, UNSPECIFIED CHRONICITY: Primary | ICD-10-CM

## 2024-04-15 DIAGNOSIS — M25.69 DECREASED RANGE OF MOTION OF TRUNK AND BACK: Primary | ICD-10-CM

## 2024-04-15 DIAGNOSIS — M48.07 SPINAL STENOSIS, LUMBOSACRAL REGION: ICD-10-CM

## 2024-04-15 PROCEDURE — 97110 THERAPEUTIC EXERCISES: CPT

## 2024-04-15 PROCEDURE — 97530 THERAPEUTIC ACTIVITIES: CPT

## 2024-04-15 PROCEDURE — 97161 PT EVAL LOW COMPLEX 20 MIN: CPT

## 2024-04-15 RX ORDER — AMLODIPINE BESYLATE 10 MG/1
0.5 TABLET ORAL DAILY
COMMUNITY
Start: 2024-04-02 | End: 2024-04-16

## 2024-04-15 NOTE — PROGRESS NOTES
Health Maintenance Due   Topic     RSV Vaccine (Age 60+ and Pregnant patients) (1 - 1-dose 60+ series) DONT CARRY THIS VACCINE

## 2024-04-16 ENCOUNTER — OFFICE VISIT (OUTPATIENT)
Dept: FAMILY MEDICINE | Facility: CLINIC | Age: 74
End: 2024-04-16
Payer: MEDICARE

## 2024-04-16 VITALS
BODY MASS INDEX: 33.47 KG/M2 | HEART RATE: 60 BPM | OXYGEN SATURATION: 97 % | HEIGHT: 69 IN | DIASTOLIC BLOOD PRESSURE: 78 MMHG | RESPIRATION RATE: 16 BRPM | WEIGHT: 226 LBS | SYSTOLIC BLOOD PRESSURE: 138 MMHG | TEMPERATURE: 97 F

## 2024-04-16 DIAGNOSIS — M54.42 ACUTE MIDLINE LOW BACK PAIN WITH BILATERAL SCIATICA: ICD-10-CM

## 2024-04-16 DIAGNOSIS — I10 ESSENTIAL HYPERTENSION: ICD-10-CM

## 2024-04-16 DIAGNOSIS — D17.24 LIPOMA OF LEFT LOWER EXTREMITY: ICD-10-CM

## 2024-04-16 DIAGNOSIS — M54.41 ACUTE MIDLINE LOW BACK PAIN WITH BILATERAL SCIATICA: ICD-10-CM

## 2024-04-16 DIAGNOSIS — M48.062 SPINAL STENOSIS OF LUMBAR REGION WITH NEUROGENIC CLAUDICATION: Primary | ICD-10-CM

## 2024-04-16 PROBLEM — M25.69 DECREASED RANGE OF MOTION OF TRUNK AND BACK: Status: ACTIVE | Noted: 2024-04-16

## 2024-04-16 PROBLEM — R29.898 DECREASED STRENGTH OF TRUNK AND BACK: Status: ACTIVE | Noted: 2024-04-16

## 2024-04-16 PROCEDURE — 99999 PR PBB SHADOW E&M-EST. PATIENT-LVL III: CPT | Mod: PBBFAC,,, | Performed by: FAMILY MEDICINE

## 2024-04-16 PROCEDURE — 99213 OFFICE O/P EST LOW 20 MIN: CPT | Mod: PBBFAC,PO | Performed by: FAMILY MEDICINE

## 2024-04-16 PROCEDURE — 99214 OFFICE O/P EST MOD 30 MIN: CPT | Mod: S$PBB,,, | Performed by: FAMILY MEDICINE

## 2024-04-16 RX ORDER — DICLOFENAC SODIUM 10 MG/G
2 GEL TOPICAL 4 TIMES DAILY
Qty: 200 G | Refills: 11 | Status: SHIPPED | OUTPATIENT
Start: 2024-04-16

## 2024-04-16 RX ORDER — AMLODIPINE BESYLATE 10 MG/1
10 TABLET ORAL DAILY
Qty: 90 TABLET | Refills: 3 | Status: SHIPPED | OUTPATIENT
Start: 2024-04-16 | End: 2024-05-19 | Stop reason: SDUPTHER

## 2024-04-16 RX ORDER — METOPROLOL SUCCINATE 50 MG/1
0.5 TABLET, EXTENDED RELEASE ORAL DAILY
COMMUNITY
Start: 2024-04-02
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
## 2024-04-16 NOTE — PROGRESS NOTES
Subjective:       Patient ID: Omar Pacheco is a 73 y.o. male.    Chief Complaint: Medication Management      HPI  73-year-old male presents for back pain.  Follows with pain management.  Feels epidural injection did not help with his pain.  States he is walking with a cane and.  States he does have occasional leg weakness.    Review of Systems   Constitutional: Negative.    HENT: Negative.     Respiratory: Negative.     Cardiovascular: Negative.    Gastrointestinal: Negative.    Endocrine: Negative.    Genitourinary: Negative.    Musculoskeletal: Negative.    Neurological: Negative.    Psychiatric/Behavioral: Negative.            Past Medical History:   Diagnosis Date    *Atrial fibrillation     Back pain     Benign hypertrophy of prostate     Degenerative disc disease     GERD (gastroesophageal reflux disease)     Hx of colonic polyps     Hypertension     Pilonidal cyst 1971    Prostate cancer     Sleep apnea     Trouble in sleeping      Past Surgical History:   Procedure Laterality Date    COLONOSCOPY N/A 12/07/2016    Procedure: COLONOSCOPY;  Surgeon: Sumeet Lundy MD;  Location: [address]);  Service: Endoscopy;  Laterality: N/A;  OK to hold Pradaxa 2 days prior to procedure per Dr Jones/see note dated 11/15/16/svn    COLONOSCOPY N/A 02/24/2022    Procedure: COLONOSCOPY;  Surgeon: Italo Roy MD;  Location: [facility];  Service: Endoscopy;  Laterality: N/A;  ok to hold Pradaxa 2 days per EBONI Bentley RN/arrhythmia clinic      COVID test at [place] on 2/21-GT  2/10/22 Changed Kirill to edward BARBER-ml    CYST REMOVAL  1971    coccyx    EPIDURAL STEROID INJECTION Right 09/07/2022    Procedure: Right Genicular Nerve Radiofrequency Ablations;  Surgeon: Luca Galan Jr., MD;  Location: [facility];  Service: Pain Management;  Laterality: Right;  @1100  Pradaxa not held  No DM  Prostate BX 8/2    EPIDURAL STEROID INJECTION Left 09/21/2022    Procedure: Left Genicular Radiofrequency Ablations;  Surgeon:  Luca Galan Jr., MD;  Location: [facility] ENDO;  Service: Pain Management;  Laterality: Left;  @1130  Pradaxa not held  No DM  Last 1&100    KNEE SURGERY      right    PROSTATE BIOPSY  2022    TRANSFORAMINAL EPIDURAL INJECTION OF STEROID Bilateral 2024    Procedure: LUMBAR TRANSFORAMINAL BILATERAL L4/5 *PRADAXA CLEARANCE IN CHART*;  Surgeon: Donya Hickey MD;  Location: [facility] PAIN MGT;  Service: Pain Management;  Laterality: Bilateral;  [phone]  2 WK F/U JERONIMO  * COVID BOOSTER     Family History   Problem Relation Name Age of Onset    Breast cancer Mother      Cancer Mother      Alcohol abuse Father      Cancer Father      Heart disease Father      Hypertension Father      Breast cancer Sister      Cancer Sister      Early death Sister      Depression Brother      Early death Brother      Mental illness Brother      No Known Problems Daughter      No Known Problems Daughter      No Known Problems Daughter      No Known Problems Daughter      No Known Problems Daughter      Ovarian cancer Other niece     Melanoma Neg Hx       Social History     Socioeconomic History    Marital status:     Number of children: 5    Highest education level: Associate degree: academic program   Tobacco Use    Smoking status: Former     Current packs/day: 0.00     Average packs/day: 0.3 packs/day for 2.0 years (0.5 ttl pk-yrs)     Types: Cigarettes     Start date: 1973     Quit date: 1975     Years since quittin.5    Smokeless tobacco: Never   Substance and Sexual Activity    Alcohol use: Yes     Alcohol/week: 6.0 standard drinks of alcohol     Types: 6 Glasses of wine per week     Comment: weekly    Drug use: No    Sexual activity: Yes     Partners: Female     Social Determinants of Health     Financial Resource Strain: Low Risk  (2023)    Overall Financial Resource Strain (CARDIA)     Difficulty of Paying Living Expenses: Not hard at all   Food Insecurity: No Food Insecurity  "(11/14/2023)    Hunger Vital Sign     Worried About Running Out of Food in the Last Year: Never true     Ran Out of Food in the Last Year: Never true   Transportation Needs: No Transportation Needs (11/14/2023)    PRAPARE - Transportation     Lack of Transportation (Medical): No     Lack of Transportation (Non-Medical): No   Physical Activity: Sufficiently Active (11/14/2023)    Exercise Vital Sign     Days of Exercise per Week: 4 days     Minutes of Exercise per Session: 90 min   Stress: Stress Concern Present (11/14/2023)    [facility] [place] of Occupational Health - Occupational Stress Questionnaire     Feeling of Stress : Very much   Social Connections: Moderately Integrated (11/14/2023)    Social Connection and Isolation Panel [NHANES]     Frequency of Communication with Friends and Family: More than three times a week     Frequency of Social Gatherings with Friends and Family: More than three times a week     Attends Christian Services: More than 4 times per year     Active Member of Clubs or Organizations: Yes     Attends Club or Organization Meetings: More than 4 times per year     Marital Status:    Housing Stability: Low Risk  (11/14/2023)    Housing Stability Vital Sign     Unable to Pay for Housing in the Last Year: No     Number of Places Lived in the Last Year: 1     Unstable Housing in the Last Year: No       Current Outpatient Medications:     ammonium lactate (AMLACTIN) 12 % lotion, Apply topically daily as needed (Dry Skin)., Disp: 400 g, Rfl: 11    dabigatran etexilate (PRADAXA) 150 mg Cap, Take 1 capsule (150 mg total) by mouth 2 (two) times daily. "Do NOT break, chew, or open capsules.", Disp: 180 capsule, Rfl: 3    flecainide (TAMBOCOR) 100 MG Tab, Take 1 tablet (100 mg total) by mouth every 12 (twelve) hours., Disp: 180 tablet, Rfl: 3    gabapentin (NEURONTIN) 100 MG capsule, Take 2 capsules (200 mg total) by mouth 3 (three) times daily as needed (back pain)., Disp: 180 capsule, Rfl: " 2    levocetirizine (XYZAL) 5 MG tablet, Take 1 tablet (5 mg total) by mouth every evening., Disp: 90 tablet, Rfl: 3    LIDOcaine (LIDODERM) 5 %, Place 1 patch onto the skin once daily. Remove & Discard patch within 12 hours or as directed by MD, Disp: 30 patch, Rfl: 0    losartan (COZAAR) 50 MG tablet, Take 0.5 tablets (25 mg total) by mouth once daily., Disp: 45 tablet, Rfl: 3    meloxicam (MOBIC) 15 MG tablet, Take 1 tablet (15 mg total) by mouth once daily., Disp: 30 tablet, Rfl: 2    methocarbamoL (ROBAXIN) 500 MG Tab, Take 1 tablet (500 mg total) by mouth 4 (four) times daily as needed., Disp: 60 tablet, Rfl: 2    metoprolol succinate (TOPROL-XL) 50 MG 24 hr tablet, Take 0.5 tablets by mouth once daily., Disp: , Rfl:     mirtazapine (REMERON) 30 MG tablet, Take 30 mg by mouth every evening., Disp: , Rfl:     multivitamin (THERAGRAN) per tablet, Take 1 tablet by mouth once daily., Disp: , Rfl:     naloxone (NARCAN) 4 mg/actuation Spry, 1 spray by Nasal route once., Disp: , Rfl:     omeprazole (PRILOSEC) 20 MG capsule, Take 1 capsule by mouth Daily., Disp: , Rfl:     sildenafiL (VIAGRA) 100 MG tablet, Take 1 tablet (100 mg total) by mouth as needed for Erectile Dysfunction (1 tablet 1 hr prior to intercourse.)., Disp: 10 tablet, Rfl: 11    traMADoL (ULTRAM) 50 mg tablet, Take 1 tablet (50 mg total) by mouth every 24 hours as needed for Pain., Disp: 30 tablet, Rfl: 0    triamcinolone acetonide 0.1% (KENALOG) 0.1 % cream, Apply topically 2 (two) times daily., Disp: 80 g, Rfl: 0    VITAMIN B COMPLEX (B COMPLETE ORAL), Take by mouth once daily., Disp: , Rfl:     vitamin E 100 UNIT capsule, Take 100 Units by mouth once daily., Disp: , Rfl:     acetaminophen (TYLENOL ORAL), Take 500 mg by mouth as needed., Disp: , Rfl:     amLODIPine (NORVASC) 10 MG tablet, Take 1 tablet (10 mg total) by mouth once daily., Disp: 90 tablet, Rfl: 3    diclofenac sodium (VOLTAREN) 1 % Gel, Apply 2 g topically 4 (four) times daily., Disp:  "200 g, Rfl: 11    metoprolol succinate (TOPROL-XL) 25 MG 24 hr tablet, Take 1 tablet (25 mg total) by mouth once daily. (Patient not taking: Reported on 4/16/2024), Disp: 90 tablet, Rfl: 3    predniSONE (DELTASONE) 10 MG tablet, Take with food/milk.Take or use exactly as directed.Obtain advice for OTCs. (Patient not taking: Reported on 4/16/2024), Disp: , Rfl:     tadalafiL (CIALIS) 20 MG Tab, Take 1 tablet (20 mg total) by mouth once daily. (Patient not taking: Reported on 4/16/2024), Disp: 30 tablet, Rfl: 5    terazosin (HYTRIN) 10 MG capsule, Take 1 capsule (10 mg total) by mouth every evening., Disp: 90 capsule, Rfl: 3   Objective:      Vitals:    04/16/24 0949   BP: 138/78   BP Location: Right arm   Patient Position: Sitting   BP Method: Medium (Manual)   Pulse: 60   Resp: 16   Temp: 97 °F (36.1 °C)   TempSrc: Oral   SpO2: 97%   Weight: 102.5 kg (225 lb 15.5 oz)   Height: 5' 9" (1.753 m)       Physical Exam  Constitutional:       General: He is not in acute distress.     Appearance: He is not diaphoretic.   HENT:      Head: Normocephalic and atraumatic.   Eyes:      Conjunctiva/sclera: Conjunctivae normal.   Pulmonary:      Effort: Pulmonary effort is normal.   Musculoskeletal:      Cervical back: Neck supple.   Skin:     Findings: Lesion (L thigh lesion) present. No rash.   Neurological:      Mental Status: He is alert and oriented to person, place, and time.   Psychiatric:         Behavior: Behavior normal.         Thought Content: Thought content normal.         Judgment: Judgment normal.            Assessment:       1. Spinal stenosis of lumbar region with neurogenic claudication    2. Essential hypertension    3. Lipoma of left lower extremity    4. Acute midline low back pain with bilateral sciatica        Plan:       Spinal stenosis of lumbar region with neurogenic claudication    Essential hypertension  -     amLODIPine (NORVASC) 10 MG tablet; Take 1 tablet (10 mg total) by mouth once daily.  Dispense: " 90 tablet; Refill: 3    Lipoma of left lower extremity    Acute midline low back pain with bilateral sciatica  -     diclofenac sodium (VOLTAREN) 1 % Gel; Apply 2 g topically 4 (four) times daily.  Dispense: 200 g; Refill: 11    Continue follow-up with pain management.  Patient will start healthy back program.  Refilled medications.          Future Appointments   Date Time Provider Department Center   4/17/2024 11:00 AM Blake Perez, PT [facility]   4/23/2024 10:30 AM Khadar Jackson, PTA [facility]   4/25/2024 10:00 AM Shona Pierce, PT [facility]   4/29/2024 12:30 PM [facility] OIC-XRAY [facility]   4/29/2024  1:15 PM Alonzo Johnson MD [facility] ORTHO [address]   4/30/2024 11:00 AM Khadar Jackson, PTA [facility]   5/1/2024  7:30 AM Siena Raymond NP [facility] ARRHYTH [address]   5/2/2024 10:00 AM Kylie Portillo, PT [facility]   5/7/2024 10:00 AM Rmoelia Ceballos, PTA [facility]   5/9/2024 10:00 AM Kylie Portillo, PT [facility]   5/14/2024 10:00 AM Kylie Portillo, PT [facility]   5/16/2024 10:00 AM Kylie Portillo, PT [facility]   5/21/2024 10:00 AM Bandar, Kylie, PT [facility]   5/23/2024 10:00 AM Bandar, Kylie, PT [facility]       Patient note was created using [employer].  Any errors in syntax or even information may not have been identified and edited on initial review prior to signing this note.

## 2024-04-17 ENCOUNTER — CLINICAL SUPPORT (OUTPATIENT)
Dept: REHABILITATION | Facility: OTHER | Age: 74
End: 2024-04-17
Attending: ANESTHESIOLOGY
Payer: MEDICARE

## 2024-04-17 DIAGNOSIS — M25.69 DECREASED RANGE OF MOTION OF TRUNK AND BACK: Primary | ICD-10-CM

## 2024-04-17 DIAGNOSIS — R29.898 DECREASED STRENGTH OF TRUNK AND BACK: ICD-10-CM

## 2024-04-17 PROCEDURE — 97110 THERAPEUTIC EXERCISES: CPT

## 2024-04-17 PROCEDURE — 97112 NEUROMUSCULAR REEDUCATION: CPT

## 2024-04-17 NOTE — PROGRESS NOTES
"[facility]  Physical Therapy Treatment Note     Name: Omar Pacheco  Clinic Number: [id number]    Therapy Diagnosis:   Encounter Diagnoses   Name Primary?    Decreased range of motion of trunk and back Yes    Decreased strength of trunk and back      Physician: Donya Hickey MD    Visit Date: 4/17/2024    Physician Orders: PT Eval and Treat  Medical Diagnosis from Referral: M48.07 (ICD-10-CM) - Spinal stenosis, lumbosacral region   Evaluation Date: 4/15/2024  Authorization Period Expiration: 2/27/2025  Plan of Care Expiration: 6/25/2024  Reassessment Due: 5/16/2024  Visit # / Visits authorized: 2/20  MedX testing visit 2     Time In: 11:00 am  Time Out: 12:00 pm  Total Billable Time: 55 minutes  INSURANCE and OUTCOMES: Fee for Service with FOTO Outcomes 1/3     Precautions: standard, HTN, fall, and history of cancer     Pattern of pain determined: 1 PEN    PTA Visit #: 0/5     Pattern of pain determined: 1 PEN    Subjective     Omar Pacheco reports he was able to do his exercises but he thinks his mattress is too soft. Pain today is in the center of his back to his tailbone      Patient reports tolerating previous visit well  Patient reports their pain to be 4/10 on a 0-10 scale with 0 being no pain and 10 being the worst pain imaginable.  Pain Location: central LB     Occupation: Retired  Leisure: [employer] meetings, Jew, dancing, travelling       Pts goals: "Be able to stand and walk for longer so that I can do the things I need to without pain"    Objective      Lumbar  Isometric Testing on Med X equipment: Testing administered by PT    Test Initial Baseline Midpoint Final   Date 4/17/2024     ROM 0-42 deg     Max Peak Torque 142      Min Peak Torque 34      Flex/Ext Ratio 4.17:1     % variance  normative data 46%     % change from initial test N/A visit 1         Outcomes:  Intake Score: TBD%  Visit 6 Score:   Visit 10 Score:   Discharge Score:  Goal Score: " "TBD%     Treatment     Omar received the treatments listed below:      Medical MedX Treatment as follows:  MedX testing performed day 2: Patient  received neuromuscular education to engage spinal musculature correctly for motor control and engagement of musculature for 15 minutes including the MedX exercise component and practice and standard testing. MedX dynamic exercise and baseline isometric test performed with instructions to guide the patient safely through the testing procedure. Patient instructed to perform isometric test correctly and safely while building to an optimal force with a pain-free effort. Patient also instructed that they should feel support/pressure from MedX restraints but no pain/discomfort, and encouraged to report any pain to therapist. Patient demonstrated appropriate understanding of information and tolerance of test.  Education regarding purpose of test, safety during test given, and reviewed possible more soreness and strategies.           4/17/2024      [facility] Therapy - Short   Visit Number 1   VAS Pain Rating 4   Time 5   Lumbar Stretches - Slouch 10   Flexion in Lying 10   Lumbar Extension - Seat Pad 1   Femur Restraint 6   Top Dead Center 24   Counterweight 263   Lumbar Flexion 42   Lumbar Extension 0   Lumbar Peak Torque 142 ft. lbs.   Lumbar Weight 60 lbs   Repetitions 5       Omar participated in neuromuscular re-education activities to improve balance, coordination, proprioception, motor control and/or posture for - minutes. The following activities were included:       Omar received therapeutic exercises to develop/improve posture, lumbar ROM, strength, and muscular endurance for 40 minutes including the following exercises:      LTR on ball 10x5"  DKTC 2x10x5"  PPT 15x5"  SOC 15x5"  Bridge x10    Peripheral muscle strengthening which included one set of 15-20 repetitions at a slow and controlled 10-13 second per rep pace focused on strengthening supporting " musculature in order to improve body mechanics and functional mobility. Patient and therapist focused on proper form during treatment to ensure optimal strengthening of each targeted muscle group.  Machines utilized included:Torso rotation, Leg Ext, Leg Curl, Chest Press, and Rowing  To be added next visit:Torso rotation, Leg Ext, Leg Curl, Chest Press, and Rowing      Omar participated in dynamic functional therapeutic activities to improve functional performance and simulate household and community activities for -  minutes. The following activities were included:    -    Omar received manual therapy techniques for -  minutes. The following activities were included:      Pt given cold pack for 5 minutes to low back  in z-lie.    Patient Education and Home Exercises     Home exercises include:  LTR on ball   DKTC  PPT   SOC   Bridge   Cardio program (V5): -  Lifting education (V11): -  Posture/Lumbar roll: yes  Fridge Magnet Discharge handout (date given): -  Equipment at home/gym membership: no    Education provided:   - PT role and POC  - HEP    Written Home Exercises Provided: Patient instructed to cont prior HEP.  Exercises were reviewed and Omar was able to demonstrate them prior to the end of the session.  Omar demonstrated good  understanding of the education provided.     See EMR under Patient Instructions for exercises provided prior visit.    Assessment     Omar presents to second healthy back visit reporting continued back symptoms, was able to demo HEP with Mod VC for form. Pt was able to tolerate Medical MedX machine well as follows:  MedX testing performed and patient tolerated test well.  Pt was also able to complete half of the peripheral strengthening exercises without increased discomfort and will complete the complete circuit next visit as tolerated.    Patient is making good progress towards established goals.  Pt will continue to benefit from skilled outpatient physical  therapy to address the deficits stated in the impairment chart, provide pt/family education and to maximize pt's level of independence in the home and community environment.     Anticipated Barriers for therapy: chronicity of condition, herniated lumbar disc, stenosis     Pt's spiritual, cultural and educational needs considered and pt agreeable to plan of care and goals as stated below:     GOALS: Pt is in agreement with the following goals.     Short term goals:  6 weeks or 10 visits   - Pt will demonstrate increased lumbar MedX ROM by at least 6 degrees from the initial ROM value with improvements noted in functional ROM and ability to perform ADLs. Appropriate and Ongoing  - Pt will demonstrate increased MedX average isometric strength value by 15% from initial test resulting in improved ability to perform bending, lifting, and carrying activities safely, confidently. Appropriate and Ongoing  - Pt will report a reduction in worst pain score by 1-2 points for improved tolerance for standing. Appropriate and Ongoing  - Pt able to perform HEP correctly with minimal cueing or supervision from therapist to encourage independent management of symptoms. Appropriate and Ongoing     Long term goals: 10 weeks or 20 visits   - Pt will demonstrate increased lumbar MedX ROM by at least 9 degrees from initial ROM value, resulting in improved ability to perform functional forward bending while standing and sitting. Appropriate and Ongoing  - Pt will demonstrate increased MedX average isometric strength value by 30% from initial test resulting in improved ability to perform bending, lifting, and carrying activities safely and confidently. Appropriate and Ongoing  - Pt to demonstrate ability to independently control and reduce their pain through posture positioning and mechanical movements throughout a typical day. Appropriate and Ongoing  - Pt will demonstrate reduced pain and improved functional outcomes as reported on the  FOTO by reaching an intake score of >/= TBD% functional ability in order to demonstrate subjective improvement in patient's condition. . Appropriate and Ongoing  - Pt will demonstrate independence with the HEP at discharge. Appropriate and Ongoing  - Pt will be able to stand and walk for 1 hour in order to have improved tolerance to ADL's and leisure activities. Appropriate and Ongoing       Plan     Continue with established Plan of Care towards established PT goals.     Therapist: Blake Perez, PT  4/17/2024

## 2024-04-23 ENCOUNTER — CLINICAL SUPPORT (OUTPATIENT)
Dept: REHABILITATION | Facility: OTHER | Age: 74
End: 2024-04-23
Payer: MEDICARE

## 2024-04-23 DIAGNOSIS — R29.898 DECREASED STRENGTH OF TRUNK AND BACK: ICD-10-CM

## 2024-04-23 DIAGNOSIS — M25.69 DECREASED RANGE OF MOTION OF TRUNK AND BACK: Primary | ICD-10-CM

## 2024-04-23 PROCEDURE — 97112 NEUROMUSCULAR REEDUCATION: CPT | Mod: CQ

## 2024-04-23 PROCEDURE — 97110 THERAPEUTIC EXERCISES: CPT | Mod: CQ

## 2024-04-23 NOTE — PROGRESS NOTES
"[facility]  Physical Therapy Treatment Note     Name: Omar Pacheco  Clinic Number: [id number]    Therapy Diagnosis:   Encounter Diagnoses   Name Primary?    Decreased range of motion of trunk and back Yes    Decreased strength of trunk and back        Physician: Donya Hickey MD    Visit Date: 4/23/2024    Physician Orders: PT Eval and Treat  Medical Diagnosis from Referral: M48.07 (ICD-10-CM) - Spinal stenosis, lumbosacral region   Evaluation Date: 4/15/2024  Authorization Period Expiration: 2/27/2025  Plan of Care Expiration: 6/25/2024  Reassessment Due: 5/16/2024  Visit # / Visits authorized: 3/20  MedX testing visit 2     Time In: 1030 AM  Time Out: 1135 AM  Total Billable Time: 65 minutes  INSURANCE and OUTCOMES: Fee for Service with FOTO Outcomes 1/3     Precautions: standard, HTN, fall, and history of cancer     Pattern of pain determined: 1 PEN    PTA Visit #: 1/5     Pattern of pain determined: 1 PEN    Subjective     Omar Pacheco reports he still has numbness in R thigh and numbness and tingling in R foot. His tailbone also hurts.     Patient reports tolerating previous visit well with mild soreness  Patient reports their pain to be 4/10 on a 0-10 scale with 0 being no pain and 10 being the worst pain imaginable.  Pain Location: central LB     Occupation: Retired  Leisure: [employer], Christianity, dancing, travelling       Pts goals: "Be able to stand and walk for longer so that I can do the things I need to without pain"    Objective      Lumbar  Isometric Testing on Med X equipment: Testing administered by PT    Test Initial Baseline Midpoint Final   Date 4/17/2024     ROM 0-42 deg     Max Peak Torque 142      Min Peak Torque 34      Flex/Ext Ratio 4.17:1     % variance  normative data 46%     % change from initial test N/A visit 1         Outcomes:  Intake Score: TBD%  Visit 6 Score:   Visit 10 Score:   Discharge Score:  Goal Score: TBD% " "    Treatment     Omar received the treatments listed below:      Medical MedX Treatment as follows:  Patient received neuromuscular education for 10 minutes via participation on the Medical MedX Machine. Therapist assisted patient in isolating and engaging spinal stabilization musculature in order to improve functional ability and postural control. Patient performed exercise with therapist guidance in order to accurately use pacer function, avoid valsalva, and optimally exert effort within a safe and effective range via the Cedrick Exertion Rating Scale. Patient instructed to perform at a midrange of exertion and to complete 15-20 repetitions within appropriate split time, with proper technique, and while maintaining safety.            4/23/2024    11:15 AM   [facility]   Visit Number 3   VAS Pain Rating 4   Time 5   Lumbar Stretches - Slouch Overcorrection 10   Flexion in Lying 10   Lumbar Weight 55 lbs   Repetitions 20   Rating of Perceived Exertion 2   Ice - Z Lie (in min.) 5           Omar participated in neuromuscular re-education activities to improve balance, coordination, proprioception, motor control and/or posture for - minutes. The following activities were included:       Omar received therapeutic exercises to develop/improve posture, lumbar ROM, strength, and muscular endurance for 55 minutes including the following exercises:      LTR on ball 10x5"  DKTC x10x5"  PPT 15x5"  SOC 10x5"  Bridge  2x10      Peripheral muscle strengthening which included one set of 15-20 repetitions at a slow and controlled 10-13 second per rep pace focused on strengthening supporting musculature in order to improve body mechanics and functional mobility. Patient and therapist focused on proper form during treatment to ensure optimal strengthening of each targeted muscle group.  Machines utilized included:Torso rotation, Leg Ext, Leg Curl, Chest Press, and Rowing  To be added next visit:Torso rotation, Leg " Ext, Leg Curl, Chest Press, and Rowing      Omar participated in dynamic functional therapeutic activities to improve functional performance and simulate household and community activities for -  minutes. The following activities were included:    -    Omar received manual therapy techniques for -  minutes. The following activities were included:      Pt given cold pack for 5 minutes to low back  in z-lie.    Patient Education and Home Exercises     Home exercises include:  LTR on ball   DKTC  PPT   SOC   Bridge   Cardio program (V5): -  Lifting education (V11): -  Posture/Lumbar roll: yes  Fridge Magnet Discharge handout (date given): -  Equipment at home/gym membership: no    Education provided:   - PT role and POC  - HEP    Written Home Exercises Provided: Patient instructed to cont prior HEP.  Exercises were reviewed and Omar was able to demonstrate them prior to the end of the session.  Omar demonstrated good  understanding of the education provided.     See EMR under Patient Instructions for exercises provided prior visit.    Assessment     Omar presents with symptoms unchanged. Reviewed HEP as pt states some he has been unable to due to being in the bed. Slouch over correct increased symptoms into R glut area. Pt was able to start MedX strengthening at 55 ft lbs with pt able to complete 20 reps at 2/10. Recommend increase of 10 % next visit. Pt was able to complete all peripheral strengthening.     Patient is making good progress towards established goals.  Pt will continue to benefit from skilled outpatient physical therapy to address the deficits stated in the impairment chart, provide pt/family education and to maximize pt's level of independence in the home and community environment.     Anticipated Barriers for therapy: chronicity of condition, herniated lumbar disc, stenosis     Pt's spiritual, cultural and educational needs considered and pt agreeable to plan of care and goals as  stated below:     GOALS: Pt is in agreement with the following goals.     Short term goals:  6 weeks or 10 visits   - Pt will demonstrate increased lumbar MedX ROM by at least 6 degrees from the initial ROM value with improvements noted in functional ROM and ability to perform ADLs. Appropriate and Ongoing  - Pt will demonstrate increased MedX average isometric strength value by 15% from initial test resulting in improved ability to perform bending, lifting, and carrying activities safely, confidently. Appropriate and Ongoing  - Pt will report a reduction in worst pain score by 1-2 points for improved tolerance for standing. Appropriate and Ongoing  - Pt able to perform HEP correctly with minimal cueing or supervision from therapist to encourage independent management of symptoms. Appropriate and Ongoing     Long term goals: 10 weeks or 20 visits   - Pt will demonstrate increased lumbar MedX ROM by at least 9 degrees from initial ROM value, resulting in improved ability to perform functional forward bending while standing and sitting. Appropriate and Ongoing  - Pt will demonstrate increased MedX average isometric strength value by 30% from initial test resulting in improved ability to perform bending, lifting, and carrying activities safely and confidently. Appropriate and Ongoing  - Pt to demonstrate ability to independently control and reduce their pain through posture positioning and mechanical movements throughout a typical day. Appropriate and Ongoing  - Pt will demonstrate reduced pain and improved functional outcomes as reported on the FOTO by reaching an intake score of >/= TBD% functional ability in order to demonstrate subjective improvement in patient's condition. . Appropriate and Ongoing  - Pt will demonstrate independence with the HEP at discharge. Appropriate and Ongoing  - Pt will be able to stand and walk for 1 hour in order to have improved tolerance to ADL's and leisure activities. Appropriate and  Ongoing       Plan     Continue with established Plan of Care towards established PT goals.     Therapist: Khadar Jackson, PTA  4/23/2024

## 2024-04-25 ENCOUNTER — CLINICAL SUPPORT (OUTPATIENT)
Dept: REHABILITATION | Facility: OTHER | Age: 74
End: 2024-04-25
Payer: MEDICARE

## 2024-04-25 DIAGNOSIS — R29.898 DECREASED STRENGTH OF TRUNK AND BACK: ICD-10-CM

## 2024-04-25 DIAGNOSIS — M25.69 DECREASED RANGE OF MOTION OF TRUNK AND BACK: Primary | ICD-10-CM

## 2024-04-25 PROCEDURE — 97110 THERAPEUTIC EXERCISES: CPT

## 2024-04-25 PROCEDURE — 97112 NEUROMUSCULAR REEDUCATION: CPT

## 2024-04-25 NOTE — PROGRESS NOTES
"[facility]  Physical Therapy Treatment Note     Name: Omar Pacheco  Clinic Number: [id number]    Therapy Diagnosis:   Encounter Diagnoses   Name Primary?    Decreased range of motion of trunk and back Yes    Decreased strength of trunk and back        Physician: Donya Hickey MD    Visit Date: 4/25/2024    Physician Orders: PT Eval and Treat  Medical Diagnosis from Referral: M48.07 (ICD-10-CM) - Spinal stenosis, lumbosacral region   Evaluation Date: 4/15/2024  Authorization Period Expiration: 2/27/2025  Plan of Care Expiration: 6/25/2024  Reassessment Due: 5/16/2024  Visit # / Visits authorized: 4/20  MedX testing visit 2     Time In: 1000 AM  Time Out: 1105 AM  Total Billable Time: 60 minutes  INSURANCE and OUTCOMES: Fee for Service with FOTO Outcomes 1/3     Precautions: standard, HTN, fall, and history of cancer     Pattern of pain determined: 1 PEN    PTA Visit #: 1/5     Subjective     Omar Pacheco reports his pain is a little better, but the numbness is a bout the same    Patient reports tolerating previous visit well with mild soreness  Patient reports their pain to be 3/10 on a 0-10 scale with 0 being no pain and 10 being the worst pain imaginable.  Pain Location: central LB     Occupation: Retired  Leisure: [employer], [employer], dancing, travelling       Pts goals: "Be able to stand and walk for longer so that I can do the things I need to without pain"    Objective      Lumbar  Isometric Testing on Med X equipment: Testing administered by PT    Test Initial Baseline Midpoint Final   Date 4/17/2024     ROM 0-42 deg     Max Peak Torque 142      Min Peak Torque 34      Flex/Ext Ratio 4.17:1     % variance  normative data 46%     % change from initial test N/A visit 1         Outcomes:  Intake Score: TBD%  Visit 6 Score:   Visit 10 Score:   Discharge Score:  Goal Score: TBD%     Treatment     Omar received the treatments listed below:      Medical " "MedX Treatment as follows:  Patient received neuromuscular education for 10 minutes via participation on the Medical MedX Machine. Therapist assisted patient in isolating and engaging spinal stabilization musculature in order to improve functional ability and postural control. Patient performed exercise with therapist guidance in order to accurately use pacer function, avoid valsalva, and optimally exert effort within a safe and effective range via the Cedrick Exertion Rating Scale. Patient instructed to perform at a midrange of exertion and to complete 15-20 repetitions within appropriate split time, with proper technique, and while maintaining safety.            4/30/2024     7:06 AM   [facility]   Visit Number 4   VAS Pain Rating 3   Time 5   Lumbar Stretches - Slouch 10   Flexion in Lying 10   Lumbar Weight 60 lbs   Repetitions 20   Rating of Perceived Exertion 3           Omar participated in neuromuscular re-education activities to improve balance, coordination, proprioception, motor control and/or posture for - minutes. The following activities were included:       Omar received therapeutic exercises to develop/improve posture, lumbar ROM, strength, and muscular endurance for 55 minutes including the following exercises:      LTR on ball 10x5"  DKTC x10x5"  PPT 15x5"  SOC 15x5"  Bridge  2x10      Peripheral muscle strengthening which included one set of 15-20 repetitions at a slow and controlled 10-13 second per rep pace focused on strengthening supporting musculature in order to improve body mechanics and functional mobility. Patient and therapist focused on proper form during treatment to ensure optimal strengthening of each targeted muscle group.  Machines utilized included:Torso rotation, Leg Ext, Leg Curl, Chest Press, and Rowing  To be added next visit:Torso rotation, Leg Ext, Leg Curl, Chest Press, and Rowing      Omar participated in dynamic functional therapeutic activities " to improve functional performance and simulate household and community activities for -  minutes. The following activities were included:    -    Omar received manual therapy techniques for -  minutes. The following activities were included:      Pt given cold pack for 5 minutes to low back  in z-lie.    Patient Education and Home Exercises     Home exercises include:  LTR on ball   DKTC  PPT   SOC   Bridge   Cardio program (V5): -  Lifting education (V11): -  Posture/Lumbar roll: yes  Fridge Magnet Discharge handout (date given): -  Equipment at home/gym membership: no    Education provided:   - PT role and POC  - HEP    Written Home Exercises Provided: Patient instructed to cont prior HEP.  Exercises were reviewed and Omar was able to demonstrate them prior to the end of the session.  Omar demonstrated good  understanding of the education provided.     See EMR under Patient Instructions for exercises provided prior visit.    Assessment     Omar presents with reports of slight improvement in pain levels. Continuation and progression of dynamic lumbopelvic stability and mobility exercises with good tolerance and no adverse effects. Medx resistance progressed to 60 ft/lbs and pt was able to complete 20 reps with 3/10 RPE. Progress 10% next visit    Patient is making good progress towards established goals.  Pt will continue to benefit from skilled outpatient physical therapy to address the deficits stated in the impairment chart, provide pt/family education and to maximize pt's level of independence in the home and community environment.     Anticipated Barriers for therapy: chronicity of condition, herniated lumbar disc, stenosis     Pt's spiritual, cultural and educational needs considered and pt agreeable to plan of care and goals as stated below:     GOALS: Pt is in agreement with the following goals.     Short term goals:  6 weeks or 10 visits   - Pt will demonstrate increased lumbar MedX ROM by  at least 6 degrees from the initial ROM value with improvements noted in functional ROM and ability to perform ADLs. Appropriate and Ongoing  - Pt will demonstrate increased MedX average isometric strength value by 15% from initial test resulting in improved ability to perform bending, lifting, and carrying activities safely, confidently. Appropriate and Ongoing  - Pt will report a reduction in worst pain score by 1-2 points for improved tolerance for standing. Appropriate and Ongoing  - Pt able to perform HEP correctly with minimal cueing or supervision from therapist to encourage independent management of symptoms. Appropriate and Ongoing     Long term goals: 10 weeks or 20 visits   - Pt will demonstrate increased lumbar MedX ROM by at least 9 degrees from initial ROM value, resulting in improved ability to perform functional forward bending while standing and sitting. Appropriate and Ongoing  - Pt will demonstrate increased MedX average isometric strength value by 30% from initial test resulting in improved ability to perform bending, lifting, and carrying activities safely and confidently. Appropriate and Ongoing  - Pt to demonstrate ability to independently control and reduce their pain through posture positioning and mechanical movements throughout a typical day. Appropriate and Ongoing  - Pt will demonstrate reduced pain and improved functional outcomes as reported on the FOTO by reaching an intake score of >/= TBD% functional ability in order to demonstrate subjective improvement in patient's condition. . Appropriate and Ongoing  - Pt will demonstrate independence with the HEP at discharge. Appropriate and Ongoing  - Pt will be able to stand and walk for 1 hour in order to have improved tolerance to ADL's and leisure activities. Appropriate and Ongoing       Plan     Continue with established Plan of Care towards established PT goals.     Therapist: Blake Perez, PT  4/25/2024

## 2024-04-26 ENCOUNTER — TELEPHONE (OUTPATIENT)
Dept: ORTHOPEDICS | Facility: CLINIC | Age: 74
End: 2024-04-26
Payer: MEDICARE

## 2024-04-29 ENCOUNTER — OFFICE VISIT (OUTPATIENT)
Dept: ORTHOPEDICS | Facility: CLINIC | Age: 74
End: 2024-04-29
Payer: MEDICARE

## 2024-04-29 ENCOUNTER — HOSPITAL ENCOUNTER (OUTPATIENT)
Dept: RADIOLOGY | Facility: HOSPITAL | Age: 74
Discharge: HOME | End: 2024-04-29
Attending: ORTHOPAEDIC SURGERY
Payer: MEDICARE

## 2024-04-29 VITALS — WEIGHT: 226.88 LBS | HEIGHT: 69 IN | BODY MASS INDEX: 33.6 KG/M2

## 2024-04-29 DIAGNOSIS — M51.36 DDD (DEGENERATIVE DISC DISEASE), LUMBAR: Primary | ICD-10-CM

## 2024-04-29 DIAGNOSIS — M25.561 RIGHT KNEE PAIN, UNSPECIFIED CHRONICITY: ICD-10-CM

## 2024-04-29 DIAGNOSIS — M17.11 PRIMARY OSTEOARTHRITIS OF RIGHT KNEE: Primary | ICD-10-CM

## 2024-04-29 PROCEDURE — 99213 OFFICE O/P EST LOW 20 MIN: CPT | Mod: S$PBB,,, | Performed by: ORTHOPAEDIC SURGERY

## 2024-04-29 PROCEDURE — 73562 X-RAY EXAM OF KNEE 3: CPT | Mod: 26,50,, | Performed by: RADIOLOGY

## 2024-04-29 PROCEDURE — 99213 OFFICE O/P EST LOW 20 MIN: CPT | Mod: PBBFAC,25 | Performed by: ORTHOPAEDIC SURGERY

## 2024-04-29 PROCEDURE — 73562 X-RAY EXAM OF KNEE 3: CPT | Mod: TC,50

## 2024-04-29 PROCEDURE — 99999 PR PBB SHADOW E&M-EST. PATIENT-LVL III: CPT | Mod: PBBFAC,,, | Performed by: ORTHOPAEDIC SURGERY

## 2024-04-29 NOTE — PROGRESS NOTES
"Subjective:      Patient ID: Omar Pacheco is a 73 y.o. male.    Chief Complaint: Pain of the Right Knee and Pain of the Left Knee    HPI  Omar Pacheco has bilateral knee pain. Right is worse than left.  The pain has worsened. The pain is located in the global aspect of the knee.  There  is not radiation.   There is associated stiffness.   There is catching and locking. The pain is described as achy. The pain is aggravated by standing and walking.  It is alleviated by nothing consistently.  There is associated back pain. He has pain radiating down his right leg, with numbness and tingling His history, medications and problem list were reviewed.    Review of Systems   Constitutional: Negative for chills, fever and night sweats.   HENT:  Negative for hearing loss.    Eyes:  Negative for blurred vision and double vision.   Cardiovascular:  Negative for chest pain, claudication and leg swelling.   Respiratory:  Negative for shortness of breath.    Endocrine: Negative for polydipsia, polyphagia and polyuria.   Hematologic/Lymphatic: Negative for adenopathy and bleeding problem. Does not bruise/bleed easily.   Skin:  Negative for poor wound healing.   Gastrointestinal:  Negative for diarrhea and heartburn.   Genitourinary:  Negative for bladder incontinence.   Neurological:  Negative for focal weakness, headaches, numbness, paresthesias and sensory change.   Psychiatric/Behavioral:  The patient is not nervous/anxious.    Allergic/Immunologic: Negative for persistent infections.         Objective:      Body mass index is 33.79 kg/m².  Vitals:    04/29/24 1338   Weight: 102.9 kg (226 lb 13.7 oz)   Height: 5' 8.7" (1.745 m)           General    Constitutional: He is oriented to person, place, and time. He appears well-developed and well-nourished.   HENT:   Head: Normocephalic and atraumatic.   Eyes: EOM are normal.   Cardiovascular:  Normal rate.            Pulmonary/Chest: Effort normal.   Neurological: He is alert " and oriented to person, place, and time.   Psychiatric: He has a normal mood and affect. His behavior is normal.     General Musculoskeletal Exam   Gait: normal       Right Knee Exam     Inspection   Erythema: absent  Scars: absent  Swelling: present  Effusion: absent  Deformity: present (varus)  Bruising: absent    Tenderness   The patient is tender to palpation of the medial joint line.    Crepitus   The patient has crepitus of the patella and medial joint line.    Range of Motion   Extension:  5   Flexion:  90     Tests   Ligament Examination   Lachman: normal (-1 to 2mm)   MCL - Valgus: normal (0 to 2mm)  LCL - Varus: normal  Patella   Passive Patellar Tilt: neutral    Other   Popliteal (Baker's) Cyst: present  Sensation: normal    Left Knee Exam     Inspection   Erythema: absent  Scars: absent  Swelling: present  Effusion: absent  Deformity: present (varus)  Bruising: absent    Tenderness   The patient tender to palpation of the medial joint line.    Crepitus   The patient has crepitus of the medial joint line and patella.    Range of Motion   Extension:  0   Flexion:  100     Tests   Stability   Lachman: normal (-1 to 2mm)   MCL - Valgus: normal (0 to 2mm)  LCL - Varus: normal (0 to 2mm)  Patella   Passive Patellar Tilt: neutral    Other   Popliteal (Baker's) Cyst: present  Sensation: normal    Muscle Strength   Right Lower Extremity   Hip Abduction: 5/5   Quadriceps:  5/5   Hamstrin/5   Left Lower Extremity   Hip Abduction: 5/5   Quadriceps:  5/5   Hamstrin/5     Reflexes     Left Side  Quadriceps:  2+    Right Side   Quadriceps:  2+    Vascular Exam     Right Pulses  Dorsalis Pedis:      2+          Left Pulses  Dorsalis Pedis:      2+          Edema  Right Lower Leg: absent  Left Lower Leg: absent    Grafts obtained today reviewed by me demonstrate severe arthritic change in both knees. Kellgren-Cristóbal 4  The changes tricompartmental.  The medial compartment is most involved.  There was some  bone erosion especially on the right side.  Subchondral cysts, loss of articular space, osteophytes and sclerosis.          Assessment:       Encounter Diagnosis   Name Primary?    Primary osteoarthritis of right knee Yes          Plan:       Omar was seen today for pain and pain.    Diagnoses and all orders for this visit:    Primary osteoarthritis of right knee      He has a candidate for right total knee arthroplasty.  However given the nature of his complaints of numbness weakness and back pain I would like to get his spine evaluated.  I believe there is a strong radicular component that may need to be addressed.  He did not have any relief with the prior epidural steroid injection.  I will see him back once he has his spine evaluated.

## 2024-04-29 NOTE — PROGRESS NOTES
Mr. Pacheco is a patient of Dr. Parikh and was last seen in clinic 3/15/2023.      Subjective:   Patient ID:  Omar Pacheco is a 73 y.o. male who presents for follow up of Atrial Fibrillation  .     HPI:    Mr. Pacheco is a 73 y.o. male with pAF, HTN, ERVIN here for follow up.     Background:    PAF -- one episode in 2006; none until 2020!  HTN on meds  ERVIN, pending re-evaluation for CPAP    2006, had AF. Underwent MENG/DCCV. Started on flecainide 50 bid and pradaxa.  He's had no problems until 4 days ago; developed palpitations and dizziness again.  He's able to do cardio exercise 3-4x/week without problems.  He's 100% compliant with his Pradaxa.    ECG is AF  Increase flecainide to 100 bid.  Return on Monday. If still in AF, then MENG/DCCV.    11/9/2020: ECG on admit demonstrated sinus rhythm.  MENG/DCCV cancelled.  3/29/2021: Doing well from arrhythmia standpoint, with no more AF on increased dose of flecainide (100mg BID). Plan to continue current regimen, consider ablation if he has breakthrough on this dose. EKG with stable intervals.  He is on pradaxa and toprol.    1/10/2022: Doing well from rhythm standpoint, maintaining SR on flecainide. EKG with normal intervals. CHADSVASc 2 on pradaxa for CVA prophylaxis.  Pt reports some LH - will reduce metoprolol. BP elevated in clinic. He says he did not take his BP meds yet today.    8/1/2022: He is doing well from a rhythm standpoint, maintaining SR on flecainide. EKG with stable intervals.  BP well controlled - on digital HTN program. CHADSVASc 2 on pradaxa. Not on CPAP. He declined referral back to sleep medicine.  He has a biopsy scheduled for tomorrow. Did take AM pradaxa today. He will call dept to assess whether his procedure needs to be pushed forward. He is cleared to hold OAC 2 days prior to procedure.    3/15/2023: Doing well from a rhythm standpoint with no symptomatic or documented AF on flecainide. EKG with stable intervals. CHADSVASc 2 on pradaxa.  "Encouraged to restart exercise.    Update (05/01/2024):    Today primary complaint is back pain. Numbness in right leg and foot. He is in healthy back program. Is still able to ride stationary bike but is frustrated he is not as active as he would like to be. Grandson will mow lawn and pressure wash house, etc.   No CP, LAWSON, palpitations, LH, syncope reported. Remains on digital HTN program.    He is currently taking pradaxa 150mg BID for stroke prophylaxis and denies significant bleeding episodes. He is currently being treated with flecainide 100mg BID for rhythm control and toprol 25mg daily for HR control.  Kidney function is stable, with a creatinine of 0.9 on 1/8/2024.    I have personally reviewed the patient's EKG today, which shows sinus rhythm at 58bpm. OR interval is 198. QRS is 100. QT is 426.    Relevant Cardiac Test Results:    2D Echo (12/9/2020):  Moderate left atrial enlargement.  The left ventricle is normal in size with normal systolic function. The estimated ejection fraction is 60%.  Indeterminate diastolic function.  Normal right ventricular size with normal right ventricular systolic function.  Mild mitral regurgitation.  Mild pulmonic regurgitation.  The estimated PA systolic pressure is 29 mmHg.  Normal central venous pressure (3 mmHg).    Current Outpatient Medications   Medication Sig Dispense Refill    acetaminophen (TYLENOL ORAL) Take 500 mg by mouth as needed.      amLODIPine (NORVASC) 10 MG tablet Take 1 tablet (10 mg total) by mouth once daily. 90 tablet 3    ammonium lactate (AMLACTIN) 12 % lotion Apply topically daily as needed (Dry Skin). 400 g 11    dabigatran etexilate (PRADAXA) 150 mg Cap Take 1 capsule (150 mg total) by mouth 2 (two) times daily. "Do NOT break, chew, or open capsules." 180 capsule 3    diclofenac sodium (VOLTAREN) 1 % Gel Apply 2 g topically 4 (four) times daily. 200 g 11    flecainide (TAMBOCOR) 100 MG Tab Take 1 tablet (100 mg total) by mouth every 12 (twelve) " hours. 180 tablet 3    gabapentin (NEURONTIN) 100 MG capsule Take 2 capsules (200 mg total) by mouth 3 (three) times daily as needed (back pain). 180 capsule 2    levocetirizine (XYZAL) 5 MG tablet Take 1 tablet (5 mg total) by mouth every evening. 90 tablet 3    LIDOcaine (LIDODERM) 5 % Place 1 patch onto the skin once daily. Remove & Discard patch within 12 hours or as directed by MD 30 patch 0    losartan (COZAAR) 50 MG tablet Take 0.5 tablets (25 mg total) by mouth once daily. 45 tablet 3    meloxicam (MOBIC) 15 MG tablet Take 1 tablet (15 mg total) by mouth once daily. 30 tablet 2    methocarbamoL (ROBAXIN) 500 MG Tab Take 1 tablet (500 mg total) by mouth 4 (four) times daily as needed. 60 tablet 2    metoprolol succinate (TOPROL-XL) 25 MG 24 hr tablet Take 1 tablet (25 mg total) by mouth once daily. (Patient not taking: Reported on 4/16/2024) 90 tablet 3    metoprolol succinate (TOPROL-XL) 50 MG 24 hr tablet Take 0.5 tablets by mouth once daily.      mirtazapine (REMERON) 30 MG tablet Take 30 mg by mouth every evening.      multivitamin (THERAGRAN) per tablet Take 1 tablet by mouth once daily.      naloxone (NARCAN) 4 mg/actuation Spry 1 spray by Nasal route once.      omeprazole (PRILOSEC) 20 MG capsule Take 1 capsule by mouth Daily.      predniSONE (DELTASONE) 10 MG tablet Take with food/milk.Take or use exactly as directed.Obtain advice for OTCs. (Patient not taking: Reported on 4/16/2024)      sildenafiL (VIAGRA) 100 MG tablet Take 1 tablet (100 mg total) by mouth as needed for Erectile Dysfunction (1 tablet 1 hr prior to intercourse.). 10 tablet 11    tadalafiL (CIALIS) 20 MG Tab Take 1 tablet (20 mg total) by mouth once daily. (Patient not taking: Reported on 4/16/2024) 30 tablet 5    terazosin (HYTRIN) 10 MG capsule Take 1 capsule (10 mg total) by mouth every evening. 90 capsule 3    traMADoL (ULTRAM) 50 mg tablet Take 1 tablet (50 mg total) by mouth every 24 hours as needed for Pain. 30 tablet 0     triamcinolone acetonide 0.1% (KENALOG) 0.1 % cream Apply topically 2 (two) times daily. 80 g 0    VITAMIN B COMPLEX (B COMPLETE ORAL) Take by mouth once daily.      vitamin E 100 UNIT capsule Take 100 Units by mouth once daily.       No current facility-administered medications for this visit.       Review of Systems   Constitutional: Negative for malaise/fatigue.   Cardiovascular:  Negative for chest pain, dyspnea on exertion, irregular heartbeat, leg swelling and palpitations.   Respiratory:  Negative for shortness of breath.    Hematologic/Lymphatic: Negative for bleeding problem.   Skin:  Negative for rash.   Musculoskeletal:  Positive for back pain and joint pain. Negative for myalgias.   Gastrointestinal:  Negative for hematemesis, hematochezia and nausea.   Genitourinary:  Negative for hematuria.   Neurological:  Positive for numbness. Negative for light-headedness.   Psychiatric/Behavioral:  Negative for altered mental status.    Allergic/Immunologic: Negative for persistent infections.       Objective:          /80   Pulse (!) 58   Wt 101.6 kg (223 lb 15.8 oz)   BMI 33.37 kg/m²     Physical Exam  Vitals and nursing note reviewed.   Constitutional:       Appearance: Normal appearance. He is well-developed.   HENT:      Head: Normocephalic.      Nose: Nose normal.   Eyes:      Pupils: Pupils are equal, round, and reactive to light.   Cardiovascular:      Rate and Rhythm: Normal rate and regular rhythm.   Pulmonary:      Effort: No respiratory distress.      Breath sounds: Normal breath sounds.   Musculoskeletal:         General: Normal range of motion.   Skin:     General: Skin is warm and dry.      Findings: No erythema.   Neurological:      Mental Status: He is alert and oriented to person, place, and time.   Psychiatric:         Speech: Speech normal.         Behavior: Behavior normal.           Lab Results   Component Value Date     01/08/2024    K 4.0 01/08/2024    MG 2.3 06/14/2006    BUN  9 01/08/2024    CREATININE 0.9 01/08/2024    ALT 24 01/08/2024    AST 29 01/08/2024    HGB 11.9 (L) 01/08/2024    HCT 36.8 (L) 01/08/2024    TSH 0.999 01/08/2024    LDLCALC 82.0 01/08/2024             Assessment:     1. Paroxysmal atrial fibrillation    2. Essential hypertension    3. Sinus bradycardia    4. Obesity (BMI 30.0-34.9)    5. Obstructive sleep apnea    6. On continuous oral anticoagulation    7. Encounter for monitoring flecainide therapy      Plan:     In summary, Mr. Pacheco is a 73 y.o. male with pAF, HTN, ERVIN here for follow up.   Doing well from a rhythm standpoint, maintaining SR on flecainide. ECG with stable intervals.  CHADSVASc 3 on pradaxa. Back pain limits activity somewhat. He is working on weight loss. Update echo.    TTE  Continue current meds  If testing WNL, RTC 1 yr, soone if needed      *A copy of this note has been sent to Dr. Parikh*    Follow up in about 1 year (around 5/1/2025).    ------------------------------------------------------------------    RYANNE Avila, NP-C  Cardiac Electrophysiology

## 2024-04-30 ENCOUNTER — CLINICAL SUPPORT (OUTPATIENT)
Dept: REHABILITATION | Facility: OTHER | Age: 74
End: 2024-04-30
Payer: MEDICARE

## 2024-04-30 DIAGNOSIS — R29.898 DECREASED STRENGTH OF TRUNK AND BACK: ICD-10-CM

## 2024-04-30 DIAGNOSIS — M25.69 DECREASED RANGE OF MOTION OF TRUNK AND BACK: Primary | ICD-10-CM

## 2024-04-30 PROCEDURE — 97112 NEUROMUSCULAR REEDUCATION: CPT

## 2024-04-30 PROCEDURE — 97110 THERAPEUTIC EXERCISES: CPT

## 2024-04-30 NOTE — PROGRESS NOTES
"[facility]  Physical Therapy Treatment Note     Name: Omar Pacheco  Clinic Number: [id number]    Therapy Diagnosis:   Encounter Diagnoses   Name Primary?    Decreased range of motion of trunk and back Yes    Decreased strength of trunk and back        Physician: Donya Hickey MD    Visit Date: 4/30/2024    Physician Orders: PT Eval and Treat  Medical Diagnosis from Referral: M48.07 (ICD-10-CM) - Spinal stenosis, lumbosacral region   Evaluation Date: 4/15/2024  Authorization Period Expiration: 2/27/2025  Plan of Care Expiration: 6/25/2024  Reassessment Due: 5/16/2024  Visit # / Visits authorized: 5/20  MedX testing visit 2     Time In: 10:55 AM  Time Out: 11:55AM  Total Billable Time: 60 minutes  INSURANCE and OUTCOMES: Fee for Service with FOTO Outcomes 1/3     Precautions: standard, HTN, fall, and history of cancer     Pattern of pain determined: 1 PEN    PTA Visit #: 1/5     Subjective     Omar Pacheco reports he woke up with a pinch in the back, he thinks from moving wrong in his sleep. He did the stretches and once getting up moving around it feels better, he feels like the slouch correct helps    Patient reports tolerating previous visit well with mild soreness  Patient reports their pain to be 3/10 on a 0-10 scale with 0 being no pain and 10 being the worst pain imaginable.  Pain Location: central LB     Occupation: Retired  Leisure: [employer] meetings, [employer], dancing, travelling       Pts goals: "Be able to stand and walk for longer so that I can do the things I need to without pain"    Objective      Lumbar  Isometric Testing on Med X equipment: Testing administered by PT    Test Initial Baseline Midpoint Final   Date 4/17/2024     ROM 0-42 deg     Max Peak Torque 142      Min Peak Torque 34      Flex/Ext Ratio 4.17:1     % variance  normative data 46%     % change from initial test N/A visit 1         Outcomes:  Intake Score: 41%  Visit 6 Score:   Visit " "10 Score:   Discharge Score:  Goal Score: 57%     Treatment     Omar received the treatments listed below:      Medical MedX Treatment as follows:  Patient received neuromuscular education for 10 minutes via participation on the Medical MedX Machine. Therapist assisted patient in isolating and engaging spinal stabilization musculature in order to improve functional ability and postural control. Patient performed exercise with therapist guidance in order to accurately use pacer function, avoid valsalva, and optimally exert effort within a safe and effective range via the Cedrick Exertion Rating Scale. Patient instructed to perform at a midrange of exertion and to complete 15-20 repetitions within appropriate split time, with proper technique, and while maintaining safety.            4/30/2024     7:06 AM   [facility] - Short   Visit Number 4   VAS Pain Rating 3   Time 5   Lumbar Stretches - Slouch 10   Flexion in Lying 10   Lumbar Weight 60 lbs   Repetitions 20   Rating of Perceived Exertion 3           Omar participated in neuromuscular re-education activities to improve balance, coordination, proprioception, motor control and/or posture for - minutes. The following activities were included:       Omar received therapeutic exercises to develop/improve posture, lumbar ROM, strength, and muscular endurance for 55 minutes including the following exercises:      LTR on ball 10x5"  DKTC x10x5"  PPT 10x5"  +Posterior pelvic tilt 10x + march  +Posterior pelvic tilt 10x + BKFO GTB  +TrA brace isometric x10   SOC 15x5"  Bridge  2x10 GTB        Peripheral muscle strengthening which included one set of 15-20 repetitions at a slow and controlled 10-13 second per rep pace focused on strengthening supporting musculature in order to improve body mechanics and functional mobility. Patient and therapist focused on proper form during treatment to ensure optimal strengthening of each targeted muscle group.  Machines " utilized included:Torso rotation, Leg Ext, Leg Curl, Chest Press, and Rowing  To be added next visit:Torso rotation, Leg Ext, Leg Curl, Chest Press, and Rowing      Omar participated in dynamic functional therapeutic activities to improve functional performance and simulate household and community activities for -  minutes. The following activities were included:    -    Omar received manual therapy techniques for -  minutes. The following activities were included:      Pt given cold pack for 5 minutes to low back  in z-lie.    Patient Education and Home Exercises     Home exercises include:  LTR on ball   DKTC  PPT   SOC   Bridge   Cardio program (V5): - Stationary bike 30 min and 30 min on elliptical 2x/week  Lifting education (V11): -  Posture/Lumbar roll: yes  Fridge Magnet Discharge handout (date given): -  Equipment at home/gym membership: no - Has stationary bike at home    Education provided:   - PT role and POC  - HEP    Written Home Exercises Provided: Patient instructed to cont prior HEP.  Exercises were reviewed and Omar was able to demonstrate them prior to the end of the session.  Omar demonstrated good  understanding of the education provided.     See EMR under Patient Instructions for exercises provided prior visit.    Assessment     Omar presents with reports of slight improvement in pain levels and the stretches helping. He woke up with some discomfort but the stretches. Continuation and progression of dynamic lumbopelvic stability and mobility exercises with good tolerance and no adverse effects. Added Pelvic tilt with march and BKFO for stabilization as well as TrA isometric with ball. He had increased discomfort on the r side low back with single leg TrA isometric. Possible SIJ involvement. Medx resistance progressed to 63 ft/lbs and pt was able to complete 20 reps with 2/10 RPE. Progress 10% next visit    Patient is making good progress towards established goals.  Pt will  continue to benefit from skilled outpatient physical therapy to address the deficits stated in the impairment chart, provide pt/family education and to maximize pt's level of independence in the home and community environment.     Anticipated Barriers for therapy: chronicity of condition, herniated lumbar disc, stenosis     Pt's spiritual, cultural and educational needs considered and pt agreeable to plan of care and goals as stated below:     GOALS: Pt is in agreement with the following goals.     Short term goals:  6 weeks or 10 visits   - Pt will demonstrate increased lumbar MedX ROM by at least 6 degrees from the initial ROM value with improvements noted in functional ROM and ability to perform ADLs. Appropriate and Ongoing  - Pt will demonstrate increased MedX average isometric strength value by 15% from initial test resulting in improved ability to perform bending, lifting, and carrying activities safely, confidently. Appropriate and Ongoing  - Pt will report a reduction in worst pain score by 1-2 points for improved tolerance for standing. Appropriate and Ongoing  - Pt able to perform HEP correctly with minimal cueing or supervision from therapist to encourage independent management of symptoms. Appropriate and Ongoing     Long term goals: 10 weeks or 20 visits   - Pt will demonstrate increased lumbar MedX ROM by at least 9 degrees from initial ROM value, resulting in improved ability to perform functional forward bending while standing and sitting. Appropriate and Ongoing  - Pt will demonstrate increased MedX average isometric strength value by 30% from initial test resulting in improved ability to perform bending, lifting, and carrying activities safely and confidently. Appropriate and Ongoing  - Pt to demonstrate ability to independently control and reduce their pain through posture positioning and mechanical movements throughout a typical day. Appropriate and Ongoing  - Pt will demonstrate reduced pain  and improved functional outcomes as reported on the FOTO by reaching an intake score of >/= 57% functional ability in order to demonstrate subjective improvement in patient's condition. . Appropriate and Ongoing  - Pt will demonstrate independence with the HEP at discharge. Appropriate and Ongoing  - Pt will be able to stand and walk for 1 hour in order to have improved tolerance to ADL's and leisure activities. Appropriate and Ongoing       Plan     Continue with established Plan of Care towards established PT goals.     Therapist: Dana Byrd, PT  4/30/2024

## 2024-05-01 ENCOUNTER — OFFICE VISIT (OUTPATIENT)
Dept: ORTHOPEDICS | Facility: CLINIC | Age: 74
End: 2024-05-01
Payer: MEDICARE

## 2024-05-01 ENCOUNTER — OFFICE VISIT (OUTPATIENT)
Dept: ELECTROPHYSIOLOGY | Facility: CLINIC | Age: 74
End: 2024-05-01
Payer: MEDICARE

## 2024-05-01 ENCOUNTER — HOSPITAL ENCOUNTER (OUTPATIENT)
Dept: RADIOLOGY | Facility: HOSPITAL | Age: 74
Discharge: HOME | End: 2024-05-01
Attending: ORTHOPAEDIC SURGERY
Payer: MEDICARE

## 2024-05-01 VITALS — WEIGHT: 224 LBS | BODY MASS INDEX: 33.18 KG/M2 | HEIGHT: 69 IN

## 2024-05-01 VITALS
WEIGHT: 224 LBS | HEART RATE: 58 BPM | SYSTOLIC BLOOD PRESSURE: 138 MMHG | BODY MASS INDEX: 33.37 KG/M2 | DIASTOLIC BLOOD PRESSURE: 80 MMHG

## 2024-05-01 DIAGNOSIS — E66.9 OBESITY (BMI 30.0-34.9): ICD-10-CM

## 2024-05-01 DIAGNOSIS — M54.16 LUMBAR RADICULOPATHY: Primary | ICD-10-CM

## 2024-05-01 DIAGNOSIS — Z79.899 ENCOUNTER FOR MONITORING FLECAINIDE THERAPY: ICD-10-CM

## 2024-05-01 DIAGNOSIS — G47.33 OBSTRUCTIVE SLEEP APNEA: ICD-10-CM

## 2024-05-01 DIAGNOSIS — I10 ESSENTIAL HYPERTENSION: ICD-10-CM

## 2024-05-01 DIAGNOSIS — Z79.01 ON CONTINUOUS ORAL ANTICOAGULATION: ICD-10-CM

## 2024-05-01 DIAGNOSIS — Z51.81 ENCOUNTER FOR MONITORING FLECAINIDE THERAPY: ICD-10-CM

## 2024-05-01 DIAGNOSIS — I48.0 PAROXYSMAL ATRIAL FIBRILLATION: Primary | Chronic | ICD-10-CM

## 2024-05-01 DIAGNOSIS — R00.1 SINUS BRADYCARDIA: ICD-10-CM

## 2024-05-01 DIAGNOSIS — M51.36 DDD (DEGENERATIVE DISC DISEASE), LUMBAR: ICD-10-CM

## 2024-05-01 LAB
OHS QRS DURATION: 100 MS
OHS QTC CALCULATION: 418 MS

## 2024-05-01 PROCEDURE — 99214 OFFICE O/P EST MOD 30 MIN: CPT | Mod: S$PBB,,, | Performed by: NURSE PRACTITIONER

## 2024-05-01 PROCEDURE — 99999 PR PBB SHADOW E&M-EST. PATIENT-LVL III: CPT | Mod: PBBFAC,,, | Performed by: ORTHOPAEDIC SURGERY

## 2024-05-01 PROCEDURE — 72110 X-RAY EXAM L-2 SPINE 4/>VWS: CPT | Mod: 26,,, | Performed by: INTERNAL MEDICINE

## 2024-05-01 PROCEDURE — 99214 OFFICE O/P EST MOD 30 MIN: CPT | Mod: PBBFAC | Performed by: NURSE PRACTITIONER

## 2024-05-01 PROCEDURE — 93010 ELECTROCARDIOGRAM REPORT: CPT | Mod: S$PBB,,, | Performed by: INTERNAL MEDICINE

## 2024-05-01 PROCEDURE — 99999 PR PBB SHADOW E&M-EST. PATIENT-LVL IV: CPT | Mod: PBBFAC,,, | Performed by: NURSE PRACTITIONER

## 2024-05-01 PROCEDURE — 99214 OFFICE O/P EST MOD 30 MIN: CPT | Mod: S$PBB,,, | Performed by: ORTHOPAEDIC SURGERY

## 2024-05-01 PROCEDURE — 72110 X-RAY EXAM L-2 SPINE 4/>VWS: CPT | Mod: TC

## 2024-05-01 PROCEDURE — 93005 ELECTROCARDIOGRAM TRACING: CPT | Mod: PBBFAC | Performed by: INTERNAL MEDICINE

## 2024-05-01 PROCEDURE — 99213 OFFICE O/P EST LOW 20 MIN: CPT | Mod: PBBFAC,25,27 | Performed by: ORTHOPAEDIC SURGERY

## 2024-05-01 RX ORDER — GABAPENTIN 400 MG/1
400 CAPSULE ORAL 3 TIMES DAILY
Qty: 90 CAPSULE | Refills: 11 | Status: SHIPPED | OUTPATIENT
Start: 2024-05-01 | End: 2025-05-01

## 2024-05-01 RX ORDER — METHYLPREDNISOLONE 4 MG/1
TABLET ORAL
Qty: 1 EACH | Refills: 0 | Status: SHIPPED | OUTPATIENT
Start: 2024-05-01 | End: 2024-05-22

## 2024-05-02 ENCOUNTER — TELEPHONE (OUTPATIENT)
Dept: ORTHOPEDICS | Facility: CLINIC | Age: 74
End: 2024-05-02
Payer: MEDICARE

## 2024-05-02 ENCOUNTER — CLINICAL SUPPORT (OUTPATIENT)
Dept: REHABILITATION | Facility: OTHER | Age: 74
End: 2024-05-02
Payer: MEDICARE

## 2024-05-02 DIAGNOSIS — R29.898 DECREASED STRENGTH OF TRUNK AND BACK: ICD-10-CM

## 2024-05-02 DIAGNOSIS — M25.69 DECREASED RANGE OF MOTION OF TRUNK AND BACK: Primary | ICD-10-CM

## 2024-05-02 PROCEDURE — 97112 NEUROMUSCULAR REEDUCATION: CPT | Mod: CQ

## 2024-05-02 PROCEDURE — 97110 THERAPEUTIC EXERCISES: CPT | Mod: CQ

## 2024-05-02 NOTE — PROGRESS NOTES
"[facility]  Physical Therapy Treatment Note     Name: Omar Pacheco  Clinic Number: [id number]    Therapy Diagnosis:   Encounter Diagnoses   Name Primary?    Decreased range of motion of trunk and back Yes    Decreased strength of trunk and back        Physician: Donya Hickey MD    Visit Date: 5/2/2024    Physician Orders: PT Eval and Treat  Medical Diagnosis from Referral: M48.07 (ICD-10-CM) - Spinal stenosis, lumbosacral region   Evaluation Date: 4/15/2024  Authorization Period Expiration: 2/27/2025  Plan of Care Expiration: 6/25/2024  Reassessment Due: 5/16/2024  Visit # / Visits authorized: 5/20  MedX testing visit 2     Time In: 10:00 AM  Time Out: 11:15 AM  Total Billable Time: 60 minutes  INSURANCE and OUTCOMES: Fee for Service with FOTO Outcomes 1/3     Precautions: standard, HTN, fall, and history of cancer     Pattern of pain determined: 1 PEN    PTA Visit #: 1/5     Subjective     Omar Pacheco reports 4/10 lumbar discomfort upon entry.     Patient reports tolerating previous visit well with mild soreness  Patient reports their pain to be 4/10 on a 0-10 scale with 0 being no pain and 10 being the worst pain imaginable.  Pain Location: central LB     Occupation: Retired  Leisure: [employer], [employer], dancing, travelling       Pts goals: "Be able to stand and walk for longer so that I can do the things I need to without pain"    Objective      Lumbar  Isometric Testing on Med X equipment: Testing administered by PT    Test Initial Baseline Midpoint Final   Date 4/17/2024     ROM 0-42 deg     Max Peak Torque 142      Min Peak Torque 34      Flex/Ext Ratio 4.17:1     % variance  normative data 46%     % change from initial test N/A visit 1         Outcomes:  Intake Score: 41%  Visit 6 Score: 48%  Visit 10 Score:   Discharge Score:  Goal Score: 57%     Treatment     Omar received the treatments listed below:      Medical MedX Treatment as " "follows:  Patient received neuromuscular education for 10 minutes via participation on the To8to Machine. Therapist assisted patient in isolating and engaging spinal stabilization musculature in order to improve functional ability and postural control. Patient performed exercise with therapist guidance in order to accurately use pacer function, avoid valsalva, and optimally exert effort within a safe and effective range via the Cedrick Exertion Rating Scale. Patient instructed to perform at a midrange of exertion and to complete 15-20 repetitions within appropriate split time, with proper technique, and while maintaining safety.            5/2/2024    11:20 AM   [facility]   Visit Number 6   VAS Pain Rating 4   Time 5   Lumbar Stretches - Slouch Overcorrection 10   Flexion in Lying 10   Lumbar Weight 65 lbs   Repetitions 15   Rating of Perceived Exertion 2   Ice - Z Lie (in min.) 5               Omar participated in neuromuscular re-education activities to improve balance, coordination, proprioception, motor control and/or posture for - minutes. The following activities were included:       Omar received therapeutic exercises to develop/improve posture, lumbar ROM, strength, and muscular endurance for 45 minutes including the following exercises:      LTR on ball 10x5"  DKTC x10"x5  PPT 10x5"  +Posterior pelvic tilt 10x + march  +Posterior pelvic tilt 10x + BKFO GTB  +TrA brace isometric x10 x 5"  SOC 15x5"  Bridge  2x10 GTB        Peripheral muscle strengthening which included one set of 15-20 repetitions at a slow and controlled 10-13 second per rep pace focused on strengthening supporting musculature in order to improve body mechanics and functional mobility. Patient and therapist focused on proper form during treatment to ensure optimal strengthening of each targeted muscle group.  Machines utilized included:Torso rotation, Leg Ext, Leg Curl, Chest Press, and Rowing,Torso rotation, Leg Ext, " Leg Curl, Chest Press, and Rowing      Omar participated in dynamic functional therapeutic activities to improve functional performance and simulate household and community activities for -  minutes. The following activities were included:    -    Omar received manual therapy techniques for -  minutes. The following activities were included:      Pt given cold pack for 5 minutes to low back  in z-lie.    Patient Education and Home Exercises     Home exercises include:  LTR on ball   DKTC  PPT   SOC   Bridge   Cardio program (V5): - Stationary bike 30 min and 30 min on elliptical 2x/week  Lifting education (V11): -  Posture/Lumbar roll: yes  Fridge Magnet Discharge handout (date given): -  Equipment at home/gym membership: no - Has stationary bike at home    Education provided:   - PT role and POC  - HEP    Written Home Exercises Provided: Patient instructed to cont prior HEP.  Exercises were reviewed and Omar was able to demonstrate them prior to the end of the session.  Omar demonstrated good  understanding of the education provided.     See EMR under Patient Instructions for exercises provided prior visit.    Assessment     Omar presents to session with lumbar discomfort upon entry. He tolerated Rx well requiring cues for proper performance of PPT exercises for max core recruitment. Lumbar MedX resistance increased to 65 lbs with completion of 15 reps at 2/10 RPE.    Patient is making good progress towards established goals.  Pt will continue to benefit from skilled outpatient physical therapy to address the deficits stated in the impairment chart, provide pt/family education and to maximize pt's level of independence in the home and community environment.     Anticipated Barriers for therapy: chronicity of condition, herniated lumbar disc, stenosis     Pt's spiritual, cultural and educational needs considered and pt agreeable to plan of care and goals as stated below:     GOALS: Pt is in  agreement with the following goals.     Short term goals:  6 weeks or 10 visits   - Pt will demonstrate increased lumbar MedX ROM by at least 6 degrees from the initial ROM value with improvements noted in functional ROM and ability to perform ADLs. Appropriate and Ongoing  - Pt will demonstrate increased MedX average isometric strength value by 15% from initial test resulting in improved ability to perform bending, lifting, and carrying activities safely, confidently. Appropriate and Ongoing  - Pt will report a reduction in worst pain score by 1-2 points for improved tolerance for standing. Appropriate and Ongoing  - Pt able to perform HEP correctly with minimal cueing or supervision from therapist to encourage independent management of symptoms. Appropriate and Ongoing     Long term goals: 10 weeks or 20 visits   - Pt will demonstrate increased lumbar MedX ROM by at least 9 degrees from initial ROM value, resulting in improved ability to perform functional forward bending while standing and sitting. Appropriate and Ongoing  - Pt will demonstrate increased MedX average isometric strength value by 30% from initial test resulting in improved ability to perform bending, lifting, and carrying activities safely and confidently. Appropriate and Ongoing  - Pt to demonstrate ability to independently control and reduce their pain through posture positioning and mechanical movements throughout a typical day. Appropriate and Ongoing  - Pt will demonstrate reduced pain and improved functional outcomes as reported on the FOTO by reaching an intake score of >/= 57% functional ability in order to demonstrate subjective improvement in patient's condition. . Appropriate and Ongoing  - Pt will demonstrate independence with the HEP at discharge. Appropriate and Ongoing  - Pt will be able to stand and walk for 1 hour in order to have improved tolerance to ADL's and leisure activities. Appropriate and Ongoing       Plan     Continue  with established Plan of Care towards established PT goals.     Therapist: Romelia Ceballos, PTA  5/2/2024

## 2024-05-02 NOTE — TELEPHONE ENCOUNTER
R/s pt's appointment to a virtual appt for Dr. Johnson to discuss all conservative and surgical options. Pt verbalized understanding and has no further questions.       ----- Message from Alonzo Johnson MD sent at 5/1/2024  1:26 PM CDT -----  I need to schedule a virtual with him.  If he is having significant leg pain and weakness he is not going to be able to rehab from the total knee.  It is kind of a catch 22.  ----- Message -----  From: Virgie Acevedo PA-C  Sent: 5/1/2024  10:36 AM CDT  To: Alonzo Johnson MD    Hey Dr. Johnson,    I just saw this patient. We talked about everything and he definitely has severe stenosis that is contributing to his right leg pain. I offered another MARIAMA and/or considering lumbar spine surgery but he said he's not ready for that. I think it is fine to proceed with knee replacement, I just told him not to expect that to help with his radiating leg pain and numbness and he understands. He's going to follow up with me if he is ready to consider another injection/surgery. Just wanted to let you know!    Lynn

## 2024-05-07 ENCOUNTER — CLINICAL SUPPORT (OUTPATIENT)
Dept: REHABILITATION | Facility: OTHER | Age: 74
End: 2024-05-07
Payer: MEDICARE

## 2024-05-07 DIAGNOSIS — R29.898 DECREASED STRENGTH OF TRUNK AND BACK: ICD-10-CM

## 2024-05-07 DIAGNOSIS — M25.69 DECREASED RANGE OF MOTION OF TRUNK AND BACK: Primary | ICD-10-CM

## 2024-05-07 PROCEDURE — 97110 THERAPEUTIC EXERCISES: CPT | Mod: CQ

## 2024-05-07 PROCEDURE — 97112 NEUROMUSCULAR REEDUCATION: CPT | Mod: CQ

## 2024-05-07 NOTE — PROGRESS NOTES
"[facility]  Physical Therapy Treatment Note     Name: Omar Pacheco  Clinic Number: [id number]    Therapy Diagnosis:   Encounter Diagnoses   Name Primary?    Decreased range of motion of trunk and back Yes    Decreased strength of trunk and back        Physician: Donya Hickey MD    Visit Date: 5/7/2024    Physician Orders: PT Eval and Treat  Medical Diagnosis from Referral: M48.07 (ICD-10-CM) - Spinal stenosis, lumbosacral region   Evaluation Date: 4/15/2024  Authorization Period Expiration: 2/27/2025  Plan of Care Expiration: 6/25/2024  Reassessment Due: 5/16/2024  Visit # / Visits authorized: 5/20  MedX testing visit 2     Time In: 10:00 AM  Time Out: 11:00 AM  Total Billable Time: 60 minutes  INSURANCE and OUTCOMES: Fee for Service with FOTO Outcomes 1/3     Precautions: standard, HTN, fall, and history of cancer     Pattern of pain determined: 1 PEN    PTA Visit #: 2/5     Subjective     Omar Pacheco reports improved lumbar mobility and strength with therapy. He feels he has less lx stiffness in the morning and is more stable during ambulation w/o standard cane.     Patient reports tolerating previous visit well with mild soreness  Patient reports their pain to be 3/10 on a 0-10 scale with 0 being no pain and 10 being the worst pain imaginable.  Pain Location: central LB     Occupation: Retired  Leisure: [employer], [employer], dancing, travelling       Pts goals: "Be able to stand and walk for longer so that I can do the things I need to without pain"    Objective      Lumbar  Isometric Testing on Med X equipment: Testing administered by PT    Test Initial Baseline Midpoint Final   Date 4/17/2024     ROM 0-42 deg     Max Peak Torque 142      Min Peak Torque 34      Flex/Ext Ratio 4.17:1     % variance  normative data 46%     % change from initial test N/A visit 1         Outcomes:  Intake Score: 41%  Visit 6 Score: 48%  Visit 10 Score:   Discharge " "Score:  Goal Score: 57%     Treatment     Omar received the treatments listed below:      Medical MedX Treatment as follows:  Patient received neuromuscular education for 10 minutes via participation on the Medical MedX Machine. Therapist assisted patient in isolating and engaging spinal stabilization musculature in order to improve functional ability and postural control. Patient performed exercise with therapist guidance in order to accurately use pacer function, avoid valsalva, and optimally exert effort within a safe and effective range via the Cedrick Exertion Rating Scale. Patient instructed to perform at a midrange of exertion and to complete 15-20 repetitions within appropriate split time, with proper technique, and while maintaining safety.                  5/7/2024    11:02 AM   [facility]   Visit Number 7   VAS Pain Rating 3   Time 5   Lumbar Stretches - Slouch Overcorrection 10   Flexion in Lying 10   Lumbar Weight 65 lbs   Repetitions 20   Rating of Perceived Exertion 2   Ice - Z Lie (in min.) 5           Omar participated in neuromuscular re-education activities to improve balance, coordination, proprioception, motor control and/or posture for - minutes. The following activities were included:       Omar received therapeutic exercises to develop/improve posture, lumbar ROM, strength, and muscular endurance for 45 minutes including the following exercises:      LTR  10x5"  DKTC x10"x5  PPT 10x5"  +Posterior pelvic tilt 10x + march  +Posterior pelvic tilt 10x + BKFO GTB  +TrA brace isometric x10 x 5"  SOC 15x5"  Bridge  2x10 GTB        Peripheral muscle strengthening which included one set of 15-20 repetitions at a slow and controlled 10-13 second per rep pace focused on strengthening supporting musculature in order to improve body mechanics and functional mobility. Patient and therapist focused on proper form during treatment to ensure optimal strengthening of each targeted muscle " group.  Machines utilized included:Torso rotation, Leg Ext, Leg Curl, Chest Press, and Rowing,Torso rotation, Leg Ext, Leg Curl, Chest Press, and Rowing      Omar participated in dynamic functional therapeutic activities to improve functional performance and simulate household and community activities for -  minutes. The following activities were included:    -    Omar received manual therapy techniques for -  minutes. The following activities were included:      Pt given cold pack for 5 minutes to low back  in z-lie.    Patient Education and Home Exercises     Home exercises include:  LTR on ball   DKTC  PPT   SOC   Bridge   Cardio program (V5): - Stationary bike 30 min and 30 min on elliptical 2x/week  Lifting education (V11): -  Posture/Lumbar roll: yes  Fridge Magnet Discharge handout (date given): -  Equipment at home/gym membership: no - Has stationary bike at home    Education provided:   - PT role and POC  - HEP    Written Home Exercises Provided: Patient instructed to cont prior HEP.  Exercises were reviewed and Omar was able to demonstrate them prior to the end of the session.  Omar demonstrated good  understanding of the education provided.     See EMR under Patient Instructions for exercises provided prior visit.    Assessment     Omar presents to therapy with min lumbar discomfort upon entry. He demonstrates increase technique and control with skilled therex, signifying improved strength. Jason cream was applied to patient's knees pre session due to discomfort and for tolerance with  LE peripheral muscle strengthening.  Lumbar MedX resistance maintained at 65 lbs with completion of 20 reps at 2/10 RPE. Peripheral muscle strength training completed without any adverse effects.    Patient is making good progress towards established goals.  Pt will continue to benefit from skilled outpatient physical therapy to address the deficits stated in the impairment chart, provide pt/family  education and to maximize pt's level of independence in the home and community environment.     Anticipated Barriers for therapy: chronicity of condition, herniated lumbar disc, stenosis     Pt's spiritual, cultural and educational needs considered and pt agreeable to plan of care and goals as stated below:     GOALS: Pt is in agreement with the following goals.     Short term goals:  6 weeks or 10 visits   - Pt will demonstrate increased lumbar MedX ROM by at least 6 degrees from the initial ROM value with improvements noted in functional ROM and ability to perform ADLs. Appropriate and Ongoing  - Pt will demonstrate increased MedX average isometric strength value by 15% from initial test resulting in improved ability to perform bending, lifting, and carrying activities safely, confidently. Appropriate and Ongoing  - Pt will report a reduction in worst pain score by 1-2 points for improved tolerance for standing. Appropriate and Ongoing  - Pt able to perform HEP correctly with minimal cueing or supervision from therapist to encourage independent management of symptoms. Appropriate and Ongoing     Long term goals: 10 weeks or 20 visits   - Pt will demonstrate increased lumbar MedX ROM by at least 9 degrees from initial ROM value, resulting in improved ability to perform functional forward bending while standing and sitting. Appropriate and Ongoing  - Pt will demonstrate increased MedX average isometric strength value by 30% from initial test resulting in improved ability to perform bending, lifting, and carrying activities safely and confidently. Appropriate and Ongoing  - Pt to demonstrate ability to independently control and reduce their pain through posture positioning and mechanical movements throughout a typical day. Appropriate and Ongoing  - Pt will demonstrate reduced pain and improved functional outcomes as reported on the FOTO by reaching an intake score of >/= 57% functional ability in order to  demonstrate subjective improvement in patient's condition. . Appropriate and Ongoing  - Pt will demonstrate independence with the HEP at discharge. Appropriate and Ongoing  - Pt will be able to stand and walk for 1 hour in order to have improved tolerance to ADL's and leisure activities. Appropriate and Ongoing       Plan     Continue with established Plan of Care towards established PT goals.     Therapist: Romelia Ceballos, PTA  5/7/2024

## 2024-05-09 ENCOUNTER — CLINICAL SUPPORT (OUTPATIENT)
Dept: REHABILITATION | Facility: OTHER | Age: 74
End: 2024-05-09
Payer: MEDICARE

## 2024-05-09 DIAGNOSIS — R29.898 DECREASED STRENGTH OF TRUNK AND BACK: ICD-10-CM

## 2024-05-09 DIAGNOSIS — M25.69 DECREASED RANGE OF MOTION OF TRUNK AND BACK: Primary | ICD-10-CM

## 2024-05-09 PROCEDURE — 97110 THERAPEUTIC EXERCISES: CPT | Mod: CQ

## 2024-05-09 NOTE — PROGRESS NOTES
"[facility]  Physical Therapy Treatment Note     Name: Omar Pacheco  Clinic Number: [id number]    Therapy Diagnosis:   Encounter Diagnoses   Name Primary?    Decreased range of motion of trunk and back Yes    Decreased strength of trunk and back        Physician: Donya Hickey MD    Visit Date: 5/9/2024    Physician Orders: PT Eval and Treat  Medical Diagnosis from Referral: M48.07 (ICD-10-CM) - Spinal stenosis, lumbosacral region   Evaluation Date: 4/15/2024  Authorization Period Expiration: 2/27/2025  Plan of Care Expiration: 6/25/2024  Reassessment Due: 5/16/2024  Visit # / Visits authorized: 5/20  MedX testing visit 2     Time In: 10:00 AM  Time Out: 11:00 AM  Total Billable Time: 60 minutes  INSURANCE and OUTCOMES: Fee for Service with FOTO Outcomes 1/3     Precautions: standard, HTN, fall, and history of cancer     Pattern of pain determined: 1 PEN    PTA Visit #: 2/5     Subjective     Omar Pacheco c/o increase lumbar pain after yard work and climbing a ladder yesterday. He reports managing pain with meds and rest and has reduced symptoms this morning.      Patient reports tolerating previous visit well with mild soreness  Patient reports their pain to be 3/10 on a 0-10 scale with 0 being no pain and 10 being the worst pain imaginable.  Pain Location: central LB     Occupation: Retired  Leisure: [employer], Taoist, dancing, travelling       Pts goals: "Be able to stand and walk for longer so that I can do the things I need to without pain"    Objective      Lumbar  Isometric Testing on Med X equipment: Testing administered by PT    Test Initial Baseline Midpoint Final   Date 4/17/2024     ROM 0-42 deg     Max Peak Torque 142      Min Peak Torque 34      Flex/Ext Ratio 4.17:1     % variance  normative data 46%     % change from initial test N/A visit 1         Outcomes:  Intake Score: 41%  Visit 6 Score: 48%  Visit 10 Score:   Discharge Score:  Goal " "Score: 57%     Treatment     Omar received the treatments listed below:            Omar participated in neuromuscular re-education activities to improve balance, coordination, proprioception, motor control and/or posture for - minutes. The following activities were included:       Omar received therapeutic exercises to develop/improve posture, lumbar ROM, strength, and muscular endurance for 45 minutes including the following exercises:      Medical MedX Treatment as follows:  Patient received  10 minutes via participation on the Medical MedX Machine. Therapist assisted patient in isolating and engaging spinal stabilization musculature in order to improve functional ability and postural control. Patient performed exercise with therapist guidance in order to accurately use pacer function, avoid valsalva, and optimally exert effort within a safe and effective range via the Cedrick Exertion Rating Scale. Patient instructed to perform at a midrange of exertion and to complete 15-20 repetitions within appropriate split time, with proper technique, and while maintaining safety.                  5/9/2024    11:18 AM   [facility]   Visit Number 8   VAS Pain Rating 3   Time 5   Lumbar Stretches - Slouch Overcorrection 10   Extension in Lying 10   Flexion in Lying 10   Lumbar Weight 67 lbs   Repetitions 15   Rating of Perceived Exertion 2   Ice - Z Lie (in min.) 5     LTR  10x5"  DKTC x10"x5  Open book str 10"x 5  PPT 10x5"  +Posterior pelvic tilt 10x + march  +Posterior pelvic tilt 10x + BKFO GTB  +TrA brace isometric x10 x 5"  SOC 15x5"  Bridge  2x10 GTB  + BRIAN with pillow 2"  + R hip         Peripheral muscle strengthening which included one set of 15-20 repetitions at a slow and controlled 10-13 second per rep pace focused on strengthening supporting musculature in order to improve body mechanics and functional mobility. Patient and therapist focused on proper form during treatment to ensure optimal " strengthening of each targeted muscle group.  Machines utilized included:Torso rotation, Leg Ext, Leg Curl, Chest Press, and Rowing,Torso rotation, Leg Ext, Leg Curl, Chest Press, and Rowing      Omar participated in dynamic functional therapeutic activities to improve functional performance and simulate household and community activities for -  minutes. The following activities were included:    -    Omar received manual therapy techniques for -  minutes. The following activities were included:      Pt given cold pack for 5 minutes to low back  in z-lie.    Patient Education and Home Exercises     Home exercises include:  LTR on ball   DKTC  PPT   SOC   Bridge   Cardio program (V5): - Stationary bike 30 min and 30 min on elliptical 2x/week  Lifting education (V11): -  Posture/Lumbar roll: yes  Fridge Magnet Discharge handout (date given): -  Equipment at home/gym membership: no - Has stationary bike at home    Education provided:   - PT role and POC  - HEP    Written Home Exercises Provided: Patient instructed to cont prior HEP.  Exercises were reviewed and Omar was able to demonstrate them prior to the end of the session.  Omar demonstrated good  understanding of the education provided.     See EMR under Patient Instructions for exercises provided prior visit.    Assessment     Omar presents to therapy with min lumbar discomfort upon entry. Jason cream was applied to patient's knees pre session due to discomfort and for tolerance with LE peripheral muscle strengthening. He was able to perform skilled therex w/o any adverse effects. Lumbar MedX resistance increased at 67 lbs with completion of 15 reps at 2/10 RPE. Peripheral muscle strength training completed without any discomfort. .    Patient is making good progress towards established goals.  Pt will continue to benefit from skilled outpatient physical therapy to address the deficits stated in the impairment chart, provide pt/family  education and to maximize pt's level of independence in the home and community environment.     Anticipated Barriers for therapy: chronicity of condition, herniated lumbar disc, stenosis     Pt's spiritual, cultural and educational needs considered and pt agreeable to plan of care and goals as stated below:     GOALS: Pt is in agreement with the following goals.     Short term goals:  6 weeks or 10 visits   - Pt will demonstrate increased lumbar MedX ROM by at least 6 degrees from the initial ROM value with improvements noted in functional ROM and ability to perform ADLs. Appropriate and Ongoing  - Pt will demonstrate increased MedX average isometric strength value by 15% from initial test resulting in improved ability to perform bending, lifting, and carrying activities safely, confidently. Appropriate and Ongoing  - Pt will report a reduction in worst pain score by 1-2 points for improved tolerance for standing. Appropriate and Ongoing  - Pt able to perform HEP correctly with minimal cueing or supervision from therapist to encourage independent management of symptoms. Appropriate and Ongoing     Long term goals: 10 weeks or 20 visits   - Pt will demonstrate increased lumbar MedX ROM by at least 9 degrees from initial ROM value, resulting in improved ability to perform functional forward bending while standing and sitting. Appropriate and Ongoing  - Pt will demonstrate increased MedX average isometric strength value by 30% from initial test resulting in improved ability to perform bending, lifting, and carrying activities safely and confidently. Appropriate and Ongoing  - Pt to demonstrate ability to independently control and reduce their pain through posture positioning and mechanical movements throughout a typical day. Appropriate and Ongoing  - Pt will demonstrate reduced pain and improved functional outcomes as reported on the FOTO by reaching an intake score of >/= 57% functional ability in order to  demonstrate subjective improvement in patient's condition. . Appropriate and Ongoing  - Pt will demonstrate independence with the HEP at discharge. Appropriate and Ongoing  - Pt will be able to stand and walk for 1 hour in order to have improved tolerance to ADL's and leisure activities. Appropriate and Ongoing       Plan     Continue with established Plan of Care towards established PT goals.     Therapist: Romelia Ceballos, PTA  5/9/2024

## 2024-05-09 NOTE — PROGRESS NOTES
"[facility]  Physical Therapy Treatment Note     Name: Omar Pacheco  Clinic Number: [id number]    Therapy Diagnosis:   Encounter Diagnoses   Name Primary?    Decreased range of motion of trunk and back Yes    Decreased strength of trunk and back        Physician: Donya Hickey MD    Visit Date: 5/9/2024    Physician Orders: PT Eval and Treat  Medical Diagnosis from Referral: M48.07 (ICD-10-CM) - Spinal stenosis, lumbosacral region   Evaluation Date: 4/15/2024  Authorization Period Expiration: 2/27/2025  Plan of Care Expiration: 6/25/2024  Reassessment Due: 5/16/2024  Visit # / Visits authorized: 5/20  MedX testing visit 2     Time In: 10:00 AM  Time Out: 11:00 AM  Total Billable Time: 60 minutes  INSURANCE and OUTCOMES: Fee for Service with FOTO Outcomes 1/3     Precautions: standard, HTN, fall, and history of cancer     Pattern of pain determined: 1 PEN    PTA Visit #: 2/5     Subjective     Omar Pacheco reports improved lumbar mobility and strength with therapy. He feels he has less lx stiffness in the morning and is more stable during ambulation w/o standard cane.     Patient reports tolerating previous visit well with mild soreness  Patient reports their pain to be 3/10 on a 0-10 scale with 0 being no pain and 10 being the worst pain imaginable.  Pain Location: central LB     Occupation: Retired  Leisure: [employer], [employer], dancing, travelling       Pts goals: "Be able to stand and walk for longer so that I can do the things I need to without pain"    Objective      Lumbar  Isometric Testing on Med X equipment: Testing administered by PT    Test Initial Baseline Midpoint Final   Date 4/17/2024     ROM 0-42 deg     Max Peak Torque 142      Min Peak Torque 34      Flex/Ext Ratio 4.17:1     % variance  normative data 46%     % change from initial test N/A visit 1         Outcomes:  Intake Score: 41%  Visit 6 Score: 48%  Visit 10 Score:   Discharge " "Score:  Goal Score: 57%     Treatment     Omar received the treatments listed below:      Medical MedX Treatment as follows:  Patient received neuromuscular education for 10 minutes via participation on the Medical MedX Machine. Therapist assisted patient in isolating and engaging spinal stabilization musculature in order to improve functional ability and postural control. Patient performed exercise with therapist guidance in order to accurately use pacer function, avoid valsalva, and optimally exert effort within a safe and effective range via the Cedrick Exertion Rating Scale. Patient instructed to perform at a midrange of exertion and to complete 15-20 repetitions within appropriate split time, with proper technique, and while maintaining safety.                  5/7/2024    11:02 AM   [facility]   Visit Number 7   VAS Pain Rating 3   Time 5   Lumbar Stretches - Slouch Overcorrection 10   Flexion in Lying 10   Lumbar Weight 65 lbs   Repetitions 20   Rating of Perceived Exertion 2   Ice - Z Lie (in min.) 5           Omar participated in neuromuscular re-education activities to improve balance, coordination, proprioception, motor control and/or posture for - minutes. The following activities were included:       Omar received therapeutic exercises to develop/improve posture, lumbar ROM, strength, and muscular endurance for 45 minutes including the following exercises:      LTR  10x5"  DKTC x10"x5  PPT 10x5"  +Posterior pelvic tilt 10x + march  +Posterior pelvic tilt 10x + BKFO GTB  +TrA brace isometric x10 x 5"  SOC 15x5"  Bridge  2x10 GTB        Peripheral muscle strengthening which included one set of 15-20 repetitions at a slow and controlled 10-13 second per rep pace focused on strengthening supporting musculature in order to improve body mechanics and functional mobility. Patient and therapist focused on proper form during treatment to ensure optimal strengthening of each targeted muscle " group.  Machines utilized included:Torso rotation, Leg Ext, Leg Curl, Chest Press, and Rowing,Torso rotation, Leg Ext, Leg Curl, Chest Press, and Rowing      Omar participated in dynamic functional therapeutic activities to improve functional performance and simulate household and community activities for -  minutes. The following activities were included:    -    Omar received manual therapy techniques for -  minutes. The following activities were included:      Pt given cold pack for 5 minutes to low back  in z-lie.    Patient Education and Home Exercises     Home exercises include:  LTR on ball   DKTC  PPT   SOC   Bridge   Cardio program (V5): - Stationary bike 30 min and 30 min on elliptical 2x/week  Lifting education (V11): -  Posture/Lumbar roll: yes  Fridge Magnet Discharge handout (date given): -  Equipment at home/gym membership: no - Has stationary bike at home    Education provided:   - PT role and POC  - HEP    Written Home Exercises Provided: Patient instructed to cont prior HEP.  Exercises were reviewed and Omar was able to demonstrate them prior to the end of the session.  Omar demonstrated good  understanding of the education provided.     See EMR under Patient Instructions for exercises provided prior visit.    Assessment     Omar presents to therapy with min lumbar discomfort upon entry. He demonstrates increase technique and control with skilled therex, signifying improved strength. Jason cream was applied to patient's knees pre session due to discomfort and for tolerance with  LE peripheral muscle strengthening.  Lumbar MedX resistance maintained at 65 lbs with completion of 20 reps at 2/10 RPE. Peripheral muscle strength training completed without any adverse effects.    Patient is making good progress towards established goals.  Pt will continue to benefit from skilled outpatient physical therapy to address the deficits stated in the impairment chart, provide pt/family  education and to maximize pt's level of independence in the home and community environment.     Anticipated Barriers for therapy: chronicity of condition, herniated lumbar disc, stenosis     Pt's spiritual, cultural and educational needs considered and pt agreeable to plan of care and goals as stated below:     GOALS: Pt is in agreement with the following goals.     Short term goals:  6 weeks or 10 visits   - Pt will demonstrate increased lumbar MedX ROM by at least 6 degrees from the initial ROM value with improvements noted in functional ROM and ability to perform ADLs. Appropriate and Ongoing  - Pt will demonstrate increased MedX average isometric strength value by 15% from initial test resulting in improved ability to perform bending, lifting, and carrying activities safely, confidently. Appropriate and Ongoing  - Pt will report a reduction in worst pain score by 1-2 points for improved tolerance for standing. Appropriate and Ongoing  - Pt able to perform HEP correctly with minimal cueing or supervision from therapist to encourage independent management of symptoms. Appropriate and Ongoing     Long term goals: 10 weeks or 20 visits   - Pt will demonstrate increased lumbar MedX ROM by at least 9 degrees from initial ROM value, resulting in improved ability to perform functional forward bending while standing and sitting. Appropriate and Ongoing  - Pt will demonstrate increased MedX average isometric strength value by 30% from initial test resulting in improved ability to perform bending, lifting, and carrying activities safely and confidently. Appropriate and Ongoing  - Pt to demonstrate ability to independently control and reduce their pain through posture positioning and mechanical movements throughout a typical day. Appropriate and Ongoing  - Pt will demonstrate reduced pain and improved functional outcomes as reported on the FOTO by reaching an intake score of >/= 57% functional ability in order to  demonstrate subjective improvement in patient's condition. . Appropriate and Ongoing  - Pt will demonstrate independence with the HEP at discharge. Appropriate and Ongoing  - Pt will be able to stand and walk for 1 hour in order to have improved tolerance to ADL's and leisure activities. Appropriate and Ongoing       Plan     Continue with established Plan of Care towards established PT goals.     Therapist: Kylie Portillo, PT  5/9/2024

## 2024-05-13 DIAGNOSIS — M48.07 SPINAL STENOSIS, LUMBOSACRAL REGION: ICD-10-CM

## 2024-05-14 ENCOUNTER — CLINICAL SUPPORT (OUTPATIENT)
Dept: REHABILITATION | Facility: OTHER | Age: 74
End: 2024-05-14
Payer: MEDICARE

## 2024-05-14 DIAGNOSIS — R29.898 DECREASED STRENGTH OF TRUNK AND BACK: ICD-10-CM

## 2024-05-14 DIAGNOSIS — M25.69 DECREASED RANGE OF MOTION OF TRUNK AND BACK: Primary | ICD-10-CM

## 2024-05-14 PROCEDURE — 97110 THERAPEUTIC EXERCISES: CPT

## 2024-05-14 PROCEDURE — 97112 NEUROMUSCULAR REEDUCATION: CPT

## 2024-05-14 RX ORDER — MELOXICAM 15 MG/1
15 TABLET ORAL DAILY
Qty: 30 TABLET | Refills: 2 | Status: SHIPPED | OUTPATIENT
Start: 2024-05-14

## 2024-05-14 RX ORDER — TRAMADOL HYDROCHLORIDE 50 MG/1
50 TABLET ORAL
Qty: 30 TABLET | Refills: 0 | Status: SHIPPED | OUTPATIENT
Start: 2024-05-14

## 2024-05-14 NOTE — TELEPHONE ENCOUNTER
Patient requesting refill on MOBIC, tramadol   Last office visit 2/28/24   shows last refill on n/a  Patient does not have a pain contract on file with [facility] department  Patient last UDS 2/28/24 was not consistent with current therapy    CODEINE  Not Detected   Comment: INTERPRETIVE INFORMATION: Codeine, U  Positive Cutoff: 40 ng/mL  Methodology: Mass Spectrometry   MORPHINE  Not Detected   Comment: INTERPRETIVE INFORMATION:Morphine, U  Positive Cutoff: 20 ng/mL  Methodology: Mass Spectrometry   6-ACETYLMORPHINE  Not Detected   Comment: INTERPRETIVE INFORMATION:6-acetylmorphine, U  Positive Cutoff: 20 ng/mL  Methodology: Mass Spectrometry   OXYCODONE  Not Detected   Comment: INTERPRETIVE INFORMATION:Oxycodone, U  Positive Cutoff: 40 ng/mL  Methodology: Mass Spectrometry   NOROYXCODONE  Not Detected   Comment: INTERPRETIVE INFORMATION:Noroxycodone, U  Positive Cutoff: 100 ng/mL  Methodology: Mass Spectrometry   OXYMORPHONE  Not Detected   Comment: INTERPRETIVE INFORMATION:Oxymorphone, U  Positive Cutoff: 40 ng/mL  Methodology: Mass Spectrometry   NOROXYMORPHONE  Not Detected   Comment: INTERPRETIVE INFORMATION:Noroxymorphone, U  Positive Cutoff: 100 ng/mL  Methodology: Mass Spectrometry   HYDROCODONE  Not Detected   Comment: INTERPRETIVE INFORMATION:Hydrocodone, U  Positive Cutoff: 40 ng/mL  Methodology: Mass Spectrometry   NORHYDROCODONE  Not Detected   Comment: INTERPRETIVE INFORMATION:Norhydrocodone, U  Positive Cutoff: 100 ng/mL  Methodology: Mass Spectrometry   HYDROMORPHONE  Not Detected   Comment: INTERPRETIVE INFORMATION:Hydromorphone, U  Positive Cutoff: 20 ng/mL  Methodology: Mass Spectrometry   BUPRENORPHINE  Not Detected   Comment: INTERPRETIVE INFORMATION:Buprenorphine, U  Positive Cutoff: 5 ng/mL  Methodology: Mass Spectrometry   NORUBPRENORPHINE  Not Detected   Comment: INTERPRETIVE INFORMATION:Norbuprenorphine, U  Positive Cutoff: 20 ng/mL  Methodology: Mass Spectrometry    FENTANYL  Not Detected   Comment: INTERPRETIVE INFORMATION:Fentanyl, U  Positive Cutoff: 2 ng/mL  Methodology: Mass Spectrometry   NORFENTANYL  Not Detected   Comment: INTERPRETIVE INFORMATION:Norfentanyl, U  Positive Cutoff: 2 ng/mL  Methodology: Mass Spectrometry   MEPERIDINE METABOLITE  Not Detected   Comment: INTERPRETIVE INFORMATION:Meperidine metabolite, U  Positive Cutoff: 50 ng/mL  Methodology: Mass Spectrometry   TAPENTADOL  Not Detected   Comment: INTERPRETIVE INFORMATION:Tapentadol, U  Positive Cutoff: 100 ng/mL  Methodology: Mass Spectrometry   TAPENTADOL-O-SULF  Not Detected   Comment: INTERPRETIVE INFORMATION:Tapentadol-o-Sulf, U  Positive Cutoff: 200 ng/mL  Methodology: Mass Spectrometry   METHADONE  Negative   Comment: Presumptive negative by immunoassay. Testing by mass  spectrometry is available on request.  INTERPRETIVE INFORMATION: Methadone Screen, U  Positive Cutoff: 150 ng/mL  Methodology: Immunoassay   TRAMADOL  Negative   Comment: Presumptive negative by immunoassay. Testing by mass  spectrometry is available on request.  INTERPRETIVE INFORMATION:Tramadol Screen, U  Positive Cutoff: 100 ng/mL  Methodology: Immunoassay   AMPHETAMINE  Not Detected   Comment: INTERPRETIVE INFORMATION:Amphetamine, U  Positive Cutoff: 50 ng/mL  Methodology: Mass Spectrometry   METHAMPHETAMINE  Not Detected   Comment: INTERPRETIVE INFORMATION:Methamphetamine, U  Positive Cutoff: 200 ng/mL  Methodology: Mass Spectrometry   MDMA- ECSTASY  Not Detected   Comment: INTERPRETIVE INFORMATION:MDMA, U  Positive Cutoff: 200 ng/mL  Methodology: Mass Spectrometry   MDA  Not Detected   Comment: INTERPRETIVE INFORMATION:MDA, U  Positive Cutoff: 200 ng/mL  Methodology: Mass Spectrometry   MDEA- Angelia  Not Detected   Comment: INTERPRETIVE INFORMATION:MDEA, U  Positive Cutoff: 200 ng/mL  Methodology: Mass Spectrometry   METHYLPHENIDATE  Not Detected   Comment: INTERPRETIVE INFORMATION:Methylphenidate, U  Positive Cutoff: 100  ng/mL  Methodology: Mass Spectrometry   PHENTERMINE  Not Detected   Comment: INTERPRETIVE INFORMATION:Phentermine, U  Positive Cutoff: 100 ng/mL  Methodology: Mass Spectrometry   BENZOYLECGONINE  Negative   Comment: Presumptive negative by immunoassay. Testing by mass  spectrometry is available on request.  INTERPRETIVE INFORMATION:Cocaine Screen, U  Positive Cutoff: 150 ng/mL  Methodology: Immunoassay   ALPRAZOLAM  Not Detected   Comment: INTERPRETIVE INFORMATION:Alprazolam, U  Positive Cutoff: 40 ng/mL  Methodology: Mass Spectrometry   ALPHA-OH-ALPRAZOLAM  Not Detected   Comment: INTERPRETIVE INFORMATION:Alpha-OH-Alprazolam, U  Positive Cutoff: 20 ng/mL  Methodology: Mass Spectrometry   CLONAZEPAM  Not Detected   Comment: INTERPRETIVE INFORMATION:Clonazepam, U  Positive Cutoff: 20 ng/mL  Methodology: Mass Spectrometry   7-AMINOCLONAZEPAM  Not Detected   Comment: INTERPRETIVE INFORMATION:7-Aminoclonazepam, U  Positive Cutoff: 40 ng/mL  Methodology: Mass Spectrometry   DIAZEPAM  Not Detected   Comment: INTERPRETIVE INFORMATION:Diazepam, U  Positive Cutoff: 50 ng/mL  Methodology: Mass Spectrometry   NORDIAZEPAM  Not Detected   Comment: INTERPRETIVE INFORMATION:Nordiazepam, U  Positive Cutoff: 50 ng/mL  Methodology: Mass Spectrometry   OXAZEPAM  Not Detected   Comment: INTERPRETIVE INFORMATION:Oxazepam, U  Positive Cutoff: 50 ng/mL  Methodology: Mass Spectrometry   TEMAZEPAM  Not Detected   Comment: INTERPRETIVE INFORMATION:Temazepam, U  Positive Cutoff: 50 ng/mL  Methodology: Mass Spectrometry   Lorazepam  Not Detected   Comment: INTERPRETIVE INFORMATION:Lorazepam, U  Positive Cutoff: 60 ng/mL  Methodology: Mass Spectrometry   MIDAZOLAM  Not Detected   Comment: INTERPRETIVE INFORMATION:Midazolam, U  Positive Cutoff: 20 ng/mL  Methodology: Mass Spectrometry   ZOLPIDEM  Not Detected   Comment: INTERPRETIVE INFORMATION:Zolpidem, U  Positive Cutoff: 20 ng/mL  Methodology: Mass Spectrometry   BARBITURATES  Negative    Comment: Presumptive negative by immunoassay. Testing by mass  spectrometry is available on request.  INTERPRETIVE INFORMATION:Barbiturates Screen, U  Positive Cutoff: 200 ng/mL  Methodology: Immunoassay   Creatinine, Urine 20.0 - 400.0 mg/dL 115.2   ETHYL GLUCURONIDE  PresumptivePOS   Comment: Presumptive positive by immunoassay. Testing by mass  spectrometry is available on request.  INTERPRETIVE INFORMATION:Ethyl Glucuronide Screen, U  Positive Cutoff: 500 ng/mL  Methodology: Immunoassay   MARIJUANA METABOLITE  Negative   Comment: Presumptive negative by immunoassay. Testing by mass  spectrometry is available on request.  INTERPRETIVE INFORMATION: THC (Cannabinoids) Screen, U  Positive Cutoff: 50 ng/mL  Methodology: Immunoassay   PCP  Negative   Comment: Presumptive negative by immunoassay. Testing by mass  spectrometry is available on request.  INTERPRETIVE INFORMATION:Phencyclidine Screen, U  Positive Cutoff: 25 ng/mL  Methodology: Immunoassay   CARISOPRODOL  Negative   Comment: Presumptive negative by immunoassay. Testing by mass  spectrometry is available on request.  INTERPRETIVE INFORMATION: Carisoprodol Screen, U  Positive Cutoff: 100 ng/mL  Methodology: Immunoassay  The carisoprodol immunoassay has cross-reactivity to  carisoprodol and meprobamate.   Naloxone  Not Detected   Comment: INTERPRETIVE INFORMATION:Naloxone, U  Positive Cutoff: 100 ng/mL  Methodology: Mass Spectrometry   Gabapentin  Present   Comment: INTERPRETIVE INFORMATION:Gabapentin, U  Positive Cutoff: 3,000 ng/mL  Methodology: Mass Spectrometry   Pregabalin  Not Detected   Comment: INTERPRETIVE INFORMATION:Pregabalin, U  Positive Cutoff: 3,000 ng/mL  Methodology: Mass Spectrometry   Alpha-OH-Midazolam  Not Detected   Comment: INTERPRETIVE INFORMATION:Alpha-OH-Midazolam, U  Positive Cutoff: 20 ng/mL  Methodology: Mass Spectrometry   Zolpidem Metabolite  Not Detected   Comment: INTERPRETIVE INFORMATION:Zolpidem Metabolite, U  Positive  Cutoff: 100 ng/mL  Methodology: Mass Spectrometry

## 2024-05-14 NOTE — PROGRESS NOTES
"[facility]  Physical Therapy Treatment Note     Name: Omar Pacheco  Clinic Number: [id number]    Therapy Diagnosis:   Encounter Diagnoses   Name Primary?    Decreased range of motion of trunk and back Yes    Decreased strength of trunk and back        Physician: Donya Hickey MD    Visit Date: 5/14/2024    Physician Orders: PT Eval and Treat  Medical Diagnosis from Referral: M48.07 (ICD-10-CM) - Spinal stenosis, lumbosacral region   Evaluation Date: 4/15/2024  Authorization Period Expiration: 2/27/2025  Plan of Care Expiration: 6/25/2024  Reassessment Due: 5/16/2024  Visit # / Visits authorized: 9/20  MedX testing visit 2     Time In: 10:00 AM  Time Out: 11:00 AM  Total Billable Time: 60 minutes  INSURANCE and OUTCOMES: Fee for Service with FOTO Outcomes 1/3     Precautions: standard, HTN, fall, and history of cancer     Pattern of pain determined: 1 PEN    PTA Visit #: 0/5     Subjective     Omar Pacheco reports low back pain but notices it's not as bad as it used to be.     Patient reports tolerating previous visit well with mild soreness  Patient reports their pain to be 4/10 on a 0-10 scale with 0 being no pain and 10 being the worst pain imaginable.  Pain Location: central LB     Occupation: Retired  Leisure: Inside, [employer], dancing, travelling       Pts goals: "Be able to stand and walk for longer so that I can do the things I need to without pain"    Objective      Lumbar  Isometric Testing on Med X equipment: Testing administered by PT    Test Initial Baseline Midpoint Final   Date 4/17/2024     ROM 0-42 deg     Max Peak Torque 142      Min Peak Torque 34      Flex/Ext Ratio 4.17:1     % variance  normative data 46%     % change from initial test N/A visit 1         Outcomes:  Intake Score: 41%  Visit 6 Score: 48%  Visit 10 Score:   Discharge Score:  Goal Score: 57%     Treatment     Omar received the treatments listed below:      " "      Omar participated in neuromuscular re-education activities to improve balance, coordination, proprioception, motor control and/or posture for - minutes. The following activities were included:       Omar received therapeutic exercises to develop/improve posture, lumbar ROM, strength, and muscular endurance for 45 minutes including the following exercises:     LTR  10x5"  DKTC c/ PB x10"x5  Open book str 10"x 5  PPT 10x5"  +Posterior pelvic tilt 10x + march  +Posterior pelvic tilt 10x + BKFO GTB  +TrA brace isometric x10 x 5"  SOC 15x5"  Bridge  2x10 GTB  + BRIAN with pillow 2"  + R hip      Medical MedX Treatment as follows:  Patient received  10 minutes via participation on the Medical MedX Machine. Therapist assisted patient in isolating and engaging spinal stabilization musculature in order to improve functional ability and postural control. Patient performed exercise with therapist guidance in order to accurately use pacer function, avoid valsalva, and optimally exert effort within a safe and effective range via the Cedrick Exertion Rating Scale. Patient instructed to perform at a midrange of exertion and to complete 15-20 repetitions within appropriate split time, with proper technique, and while maintaining safety.            5/14/2024    10:04 AM   [facility]   Visit Number 9   VAS Pain Rating 4   Treadmill Time (in min.) 5 min   Lumbar Stretches - Slouch Overcorrection 10   Extension in Lying 10   Flexion in Lying 10   Lumbar Weight 67 lbs   Repetitions 20   Rating of Perceived Exertion 2   Ice - Z Lie (in min.) 5           Peripheral muscle strengthening which included one set of 15-20 repetitions at a slow and controlled 10-13 second per rep pace focused on strengthening supporting musculature in order to improve body mechanics and functional mobility. Patient and therapist focused on proper form during treatment to ensure optimal strengthening of each targeted muscle group.  Machines " utilized included:Torso rotation, Leg Ext, Leg Curl, Chest Press, and Rowing,Torso rotation, Leg Ext, Leg Curl, Chest Press, and Rowing      Omar participated in dynamic functional therapeutic activities to improve functional performance and simulate household and community activities for -  minutes. The following activities were included:    -    Omar received manual therapy techniques for -  minutes. The following activities were included:      Pt given cold pack for 5 minutes to low back  in z-lie.    Patient Education and Home Exercises     Home exercises include:  LTR on ball   DKTC  PPT   SOC   Bridge   Cardio program (V5): - Stationary bike 30 min and 30 min on elliptical 2x/week  Lifting education (V11): -  Posture/Lumbar roll: yes  Fridge Magnet Discharge handout (date given): -  Equipment at home/gym membership: no - Has stationary bike at home    Education provided:   - PT role and POC  - HEP    Written Home Exercises Provided: Patient instructed to cont prior HEP.  Exercises were reviewed and Omar was able to demonstrate them prior to the end of the session.  Omar demonstrated good  understanding of the education provided.     See EMR under Patient Instructions for exercises provided prior visit.    Assessment     Omar continues to present to therapy with min lumbar discomfort upon entry. Jason cream was applied to patient's knees pre session due to discomfort and for tolerance with LE peripheral muscle strengthening. He continues to perform skilled therex w/o any adverse effects. Lumbar MedX resistance maintained at 67 lbs with progression from 15 to 20 reps at 2/10 RPE. Also slightly increased reps on machines with peripheral circuit and good patient feedback. Peripheral muscle strength training completed without any discomfort. Modified DKTC with addition of PB to avoid exacerbation of tailbone symptoms with WB with good patient feedback to modification.     Patient is making good  progress towards established goals.  Pt will continue to benefit from skilled outpatient physical therapy to address the deficits stated in the impairment chart, provide pt/family education and to maximize pt's level of independence in the home and community environment.     Anticipated Barriers for therapy: chronicity of condition, herniated lumbar disc, stenosis     Pt's spiritual, cultural and educational needs considered and pt agreeable to plan of care and goals as stated below:     GOALS: Pt is in agreement with the following goals.     Short term goals:  6 weeks or 10 visits   - Pt will demonstrate increased lumbar MedX ROM by at least 6 degrees from the initial ROM value with improvements noted in functional ROM and ability to perform ADLs. Appropriate and Ongoing  - Pt will demonstrate increased MedX average isometric strength value by 15% from initial test resulting in improved ability to perform bending, lifting, and carrying activities safely, confidently. Appropriate and Ongoing  - Pt will report a reduction in worst pain score by 1-2 points for improved tolerance for standing. Appropriate and Ongoing  - Pt able to perform HEP correctly with minimal cueing or supervision from therapist to encourage independent management of symptoms. Appropriate and Ongoing     Long term goals: 10 weeks or 20 visits   - Pt will demonstrate increased lumbar MedX ROM by at least 9 degrees from initial ROM value, resulting in improved ability to perform functional forward bending while standing and sitting. Appropriate and Ongoing  - Pt will demonstrate increased MedX average isometric strength value by 30% from initial test resulting in improved ability to perform bending, lifting, and carrying activities safely and confidently. Appropriate and Ongoing  - Pt to demonstrate ability to independently control and reduce their pain through posture positioning and mechanical movements throughout a typical day. Appropriate and  Ongoing  - Pt will demonstrate reduced pain and improved functional outcomes as reported on the FOTO by reaching an intake score of >/= 57% functional ability in order to demonstrate subjective improvement in patient's condition. . Appropriate and Ongoing  - Pt will demonstrate independence with the HEP at discharge. Appropriate and Ongoing  - Pt will be able to stand and walk for 1 hour in order to have improved tolerance to ADL's and leisure activities. Appropriate and Ongoing       Plan     Continue with established Plan of Care towards established PT goals.     Therapist: Kylie Portillo, PT  5/14/2024

## 2024-05-16 ENCOUNTER — CLINICAL SUPPORT (OUTPATIENT)
Dept: REHABILITATION | Facility: OTHER | Age: 74
End: 2024-05-16
Payer: MEDICARE

## 2024-05-16 DIAGNOSIS — M25.69 DECREASED RANGE OF MOTION OF TRUNK AND BACK: Primary | ICD-10-CM

## 2024-05-16 DIAGNOSIS — R29.898 DECREASED STRENGTH OF TRUNK AND BACK: ICD-10-CM

## 2024-05-16 PROCEDURE — 97110 THERAPEUTIC EXERCISES: CPT

## 2024-05-16 PROCEDURE — 97112 NEUROMUSCULAR REEDUCATION: CPT

## 2024-05-16 NOTE — PROGRESS NOTES
"[facility]  Physical Therapy Treatment Note     Name: Omar Pacheco  Clinic Number: [id number]    Therapy Diagnosis:   Encounter Diagnoses   Name Primary?    Decreased range of motion of trunk and back Yes    Decreased strength of trunk and back          Physician: Donya Hickey MD    Visit Date: 5/16/2024    Physician Orders: PT Eval and Treat  Medical Diagnosis from Referral: M48.07 (ICD-10-CM) - Spinal stenosis, lumbosacral region   Evaluation Date: 4/15/2024  Authorization Period Expiration: 2/27/2025  Plan of Care Expiration: 6/25/2024  Reassessment Due: 5/16/2024  Visit # / Visits authorized: 10/20  MedX testing visit 2     Time In: 10:00 AM  Time Out: 11:00 AM  Total Billable Time: 60 minutes  INSURANCE and OUTCOMES: Fee for Service with FOTO Outcomes 1/3     Precautions: standard, HTN, fall, and history of cancer     Pattern of pain determined: 1 PEN    PTA Visit #: 0/5     Subjective     Omar Pacheco reports no low back. However, he has soreness which he believes is due to going to gym yesterday despite not doing an extraneous exercises.     Patient reports tolerating previous visit well with mild soreness  Patient reports their pain to be 0/10 on a 0-10 scale with 0 being no pain and 10 being the worst pain imaginable.  Pain Location: central LB     Occupation: Retired  Leisure: [employer], [employer], dancing, travelling       Pts goals: "Be able to stand and walk for longer so that I can do the things I need to without pain"    Objective      Lumbar  Isometric Testing on Med X equipment: Testing administered by PT    Test Initial Baseline Midpoint Final   Date 4/17/2024 5/16/2024    ROM 0-42 deg 0-60 deg    Max Peak Torque 142  39    Min Peak Torque 34  25    Flex/Ext Ratio 4.17:1 1.56:1    % variance  normative data 46% 63%    % change from initial test N/A visit 1 350        Outcomes:  Intake Score: 41%  Visit 6 Score: 48%  Visit 10 Score:  " "43%  Discharge Score:  Goal Score: 57%     Treatment   (Bold = completed)   Omar received the treatments listed below:            Omar participated in neuromuscular re-education activities to improve balance, coordination, proprioception, motor control and/or posture for - minutes. The following activities were included:       Omar received therapeutic exercises to develop/improve posture, lumbar ROM, strength, and muscular endurance for 45 minutes including the following exercises:     LTR  2x10x5"  DKTC c/ PB x10"x5  Open book str 10"x 5  PPT 10x5"  Posterior pelvic tilt 10x + march  Posterior pelvic tilt 10x + BKFO GTB  TrA brace isometric x10 x 5"  SOC 15x5"  Bridge  2x10 GTB  BRIAN with pillow 2"   R hip      Medical MedX Treatment as follows:  Patient received  10 minutes via participation on the [employer] Machine. Therapist assisted patient in isolating and engaging spinal stabilization musculature in order to improve functional ability and postural control. Patient performed exercise with therapist guidance in order to accurately use pacer function, avoid valsalva, and optimally exert effort within a safe and effective range via the Cedrick Exertion Rating Scale. Patient instructed to perform at a midrange of exertion and to complete 15-20 repetitions within appropriate split time, with proper technique, and while maintaining safety.          5/16/2024    10:03 AM   [facility]   Visit Number 10   VAS Pain Rating 0   Time 5   Lumbar Stretches - Slouch Overcorrection 10   Extension in Lying 10   Flexion in Lying 10   Lumbar Weight 70 lbs   Repetitions 5   Rating of Perceived Exertion 3   Ice - Z Lie (in min.) 5         Peripheral muscle strengthening which included one set of 15-20 repetitions at a slow and controlled 10-13 second per rep pace focused on strengthening supporting musculature in order to improve body mechanics and functional mobility. Patient and therapist focused on proper " form during treatment to ensure optimal strengthening of each targeted muscle group.  Machines utilized included:Torso rotation, Leg Ext, Leg Curl, Chest Press, and Rowing,Torso rotation, Leg Ext, Leg Curl, Chest Press, and Rowing      Omar participated in dynamic functional therapeutic activities to improve functional performance and simulate household and community activities for -  minutes. The following activities were included:    -    Omar received manual therapy techniques for -  minutes. The following activities were included:      Pt given cold pack for 5 minutes to low back  in z-lie.    Patient Education and Home Exercises     Home exercises include:  LTR on ball   DKTC  PPT   SOC   Bridge   Cardio program (V5): - Stationary bike 30 min and 30 min on elliptical 2x/week  Lifting education (V11): -  Posture/Lumbar roll: yes  Fridge Magnet Discharge handout (date given): -  Equipment at home/gym membership: no - Has stationary bike at home    Education provided:   - PT role and POC  - HEP    Written Home Exercises Provided: Patient instructed to cont prior HEP.  Exercises were reviewed and Omar was able to demonstrate them prior to the end of the session.  Omar demonstrated good  understanding of the education provided.     See EMR under Patient Instructions for exercises provided prior visit.    Assessment     Omar presents to therapy with on adverse lumbar symptoms today. Today's session consisted of retest on MedX machine. Noted slight reduction with strength gain during MedX isometric testing. However, patient required max VC to avoid LE compensation during testing. Therefore, reduction in strength may possibly be due to overcompensation of LE during 1st testing on IE. Jason cream was applied to patient's knees pre session due to discomfort and for tolerance with LE peripheral muscle strengthening. He continues to perform skilled therex w/o any adverse effects. Lumbar MedX resistance  increased from 67 to 70 lbs  at 3/10 RPE. Also slightly increased resistance with appropriate modification to reps on machines with peripheral circuit. Peripheral muscle strength training completed without any discomfort.     Patient is making good progress towards established goals.  Pt will continue to benefit from skilled outpatient physical therapy to address the deficits stated in the impairment chart, provide pt/family education and to maximize pt's level of independence in the home and community environment.     Anticipated Barriers for therapy: chronicity of condition, herniated lumbar disc, stenosis     Pt's spiritual, cultural and educational needs considered and pt agreeable to plan of care and goals as stated below:     GOALS: Pt is in agreement with the following goals.     Short term goals:  6 weeks or 10 visits   - Pt will demonstrate increased lumbar MedX ROM by at least 6 degrees from the initial ROM value with improvements noted in functional ROM and ability to perform ADLs. Appropriate and Ongoing  - Pt will demonstrate increased MedX average isometric strength value by 15% from initial test resulting in improved ability to perform bending, lifting, and carrying activities safely, confidently. Appropriate and Ongoing  - Pt will report a reduction in worst pain score by 1-2 points for improved tolerance for standing. Appropriate and Ongoing  - Pt able to perform HEP correctly with minimal cueing or supervision from therapist to encourage independent management of symptoms. Appropriate and Ongoing     Long term goals: 10 weeks or 20 visits   - Pt will demonstrate increased lumbar MedX ROM by at least 9 degrees from initial ROM value, resulting in improved ability to perform functional forward bending while standing and sitting. Appropriate and Ongoing  - Pt will demonstrate increased MedX average isometric strength value by 30% from initial test resulting in improved ability to perform bending,  lifting, and carrying activities safely and confidently. Appropriate and Ongoing  - Pt to demonstrate ability to independently control and reduce their pain through posture positioning and mechanical movements throughout a typical day. Appropriate and Ongoing  - Pt will demonstrate reduced pain and improved functional outcomes as reported on the FOTO by reaching an intake score of >/= 57% functional ability in order to demonstrate subjective improvement in patient's condition. . Appropriate and Ongoing  - Pt will demonstrate independence with the HEP at discharge. Appropriate and Ongoing  - Pt will be able to stand and walk for 1 hour in order to have improved tolerance to ADL's and leisure activities. Appropriate and Ongoing       Plan     Continue with established Plan of Care towards established PT goals.     Therapist: Kylie Portillo, PT  5/16/2024

## 2024-05-19 DIAGNOSIS — I10 ESSENTIAL HYPERTENSION: ICD-10-CM

## 2024-05-20 NOTE — TELEPHONE ENCOUNTER
No care due was identified.  [facility] Embedded Care Due Messages. Reference number: [id number].   5/19/2024 11:12:01 PM CDT

## 2024-05-20 NOTE — TELEPHONE ENCOUNTER
"Last Office Visit Info:   The patient's last visit with Husam Chong MD was on 4/16/2024.    The patient's last visit in current department was on 4/16/2024.        Last CBC Results:   Lab Results   Component Value Date    WBC 5.91 01/08/2024    HGB 11.9 (L) 01/08/2024    HCT 36.8 (L) 01/08/2024     01/08/2024       Last CMP Results  Lab Results   Component Value Date     01/08/2024    K 4.0 01/08/2024     01/08/2024    CO2 25 01/08/2024    BUN 9 01/08/2024    CREATININE 0.9 01/08/2024    CALCIUM 8.7 01/08/2024    ALBUMIN 3.5 01/08/2024    AST 29 01/08/2024    ALT 24 01/08/2024       Last Lipids  Lab Results   Component Value Date    CHOL 151 01/08/2024    TRIG 65 01/08/2024    HDL 56 01/08/2024    LDLCALC 82.0 01/08/2024       Last A1C  Lab Results   Component Value Date    HGBA1C 5.6 01/08/2024       Last TSH  Lab Results   Component Value Date    TSH 0.999 01/08/2024             Current Med Refills  Medication List with Changes/Refills   Current Medications    ACETAMINOPHEN (TYLENOL ORAL)    Take 500 mg by mouth as needed.       Start Date: --        End Date: --    AMLODIPINE (NORVASC) 10 MG TABLET    Take 1 tablet (10 mg total) by mouth once daily.       Start Date: 4/16/2024 End Date: --    AMMONIUM LACTATE (AMLACTIN) 12 % LOTION    Apply topically daily as needed (Dry Skin).       Start Date: 1/17/2024 End Date: --    DABIGATRAN ETEXILATE (PRADAXA) 150 MG CAP    Take 1 capsule (150 mg total) by mouth 2 (two) times daily. "Do NOT break, chew, or open capsules."       Start Date: 1/26/2024 End Date: --    DICLOFENAC SODIUM (VOLTAREN) 1 % GEL    Apply 2 g topically 4 (four) times daily.       Start Date: 4/16/2024 End Date: --    FLECAINIDE (TAMBOCOR) 100 MG TAB    Take 1 tablet (100 mg total) by mouth every 12 (twelve) hours.       Start Date: 1/26/2024 End Date: --    GABAPENTIN (NEURONTIN) 100 MG CAPSULE    Take 2 capsules (200 mg total) by mouth 3 (three) times daily as needed " (back pain).       Start Date: 1/30/2024 End Date: --    GABAPENTIN (NEURONTIN) 400 MG CAPSULE    Take 1 capsule (400 mg total) by mouth 3 (three) times daily.       Start Date: 5/1/2024  End Date: 5/1/2025    LEVOCETIRIZINE (XYZAL) 5 MG TABLET    Take 1 tablet (5 mg total) by mouth every evening.       Start Date: 3/3/2022  End Date: 5/1/2024    LIDOCAINE (LIDODERM) 5 %    Place 1 patch onto the skin once daily. Remove & Discard patch within 12 hours or as directed by MD       Start Date: 4/4/2024  End Date: --    LOSARTAN (COZAAR) 50 MG TABLET    Take 0.5 tablets (25 mg total) by mouth once daily.       Start Date: 5/9/2023  End Date: 5/8/2024    MELOXICAM (MOBIC) 15 MG TABLET    Take 1 tablet (15 mg total) by mouth once daily.       Start Date: 5/14/2024 End Date: --    METHOCARBAMOL (ROBAXIN) 500 MG TAB    Take 1 tablet (500 mg total) by mouth 4 (four) times daily as needed.       Start Date: 7/11/2023 End Date: --    METHYLPREDNISOLONE (MEDROL DOSEPACK) 4 MG TABLET    use as directed       Start Date: 5/1/2024  End Date: 5/22/2024    METOPROLOL SUCCINATE (TOPROL-XL) 25 MG 24 HR TABLET    Take 1 tablet (25 mg total) by mouth once daily.       Start Date: 1/26/2024 End Date: 1/24/2025    METOPROLOL SUCCINATE (TOPROL-XL) 50 MG 24 HR TABLET    Take 0.5 tablets by mouth once daily.       Start Date: 4/2/2024  End Date: --    MIRTAZAPINE (REMERON) 30 MG TABLET    Take 30 mg by mouth every evening.       Start Date: --        End Date: --    MULTIVITAMIN (THERAGRAN) PER TABLET    Take 1 tablet by mouth once daily.       Start Date: --        End Date: --    NALOXONE (NARCAN) 4 MG/ACTUATION SPRY    1 spray by Nasal route once.       Start Date: --        End Date: --    OMEPRAZOLE (PRILOSEC) 20 MG CAPSULE    Take 1 capsule by mouth Daily.       Start Date: --        End Date: --    SILDENAFIL (VIAGRA) 100 MG TABLET    Take 1 tablet (100 mg total) by mouth as needed for Erectile Dysfunction (1 tablet 1 hr prior to  intercourse.).       Start Date: 5/9/2023  End Date: --    TADALAFIL (CIALIS) 20 MG TAB    Take 1 tablet (20 mg total) by mouth once daily.       Start Date: 10/11/2022End Date: --    TERAZOSIN (HYTRIN) 10 MG CAPSULE    Take 1 capsule (10 mg total) by mouth every evening.       Start Date: 7/11/2023 End Date: 7/22/2024    TRAMADOL (ULTRAM) 50 MG TABLET    Take 1 tablet (50 mg total) by mouth every 24 hours as needed for Pain.       Start Date: 5/14/2024 End Date: --    TRIAMCINOLONE ACETONIDE 0.1% (KENALOG) 0.1 % CREAM    Apply topically 2 (two) times daily.       Start Date: 6/28/2023 End Date: 6/26/2024    VITAMIN B COMPLEX (B COMPLETE ORAL)    Take by mouth once daily.       Start Date: --        End Date: --    VITAMIN E 100 UNIT CAPSULE    Take 100 Units by mouth once daily.       Start Date: --        End Date: --       Order(s) placed per written order guidelines: none    Please advise.

## 2024-05-21 ENCOUNTER — CLINICAL SUPPORT (OUTPATIENT)
Dept: REHABILITATION | Facility: OTHER | Age: 74
End: 2024-05-21
Payer: MEDICARE

## 2024-05-21 DIAGNOSIS — M25.69 DECREASED RANGE OF MOTION OF TRUNK AND BACK: Primary | ICD-10-CM

## 2024-05-21 DIAGNOSIS — R29.898 DECREASED STRENGTH OF TRUNK AND BACK: ICD-10-CM

## 2024-05-21 PROCEDURE — 97110 THERAPEUTIC EXERCISES: CPT

## 2024-05-21 PROCEDURE — 97112 NEUROMUSCULAR REEDUCATION: CPT

## 2024-05-21 NOTE — PROGRESS NOTES
"[facility]  Physical Therapy Treatment Note     Name: Omar Pacheco  Clinic Number: [id number]    Therapy Diagnosis:   Encounter Diagnoses   Name Primary?    Decreased range of motion of trunk and back Yes    Decreased strength of trunk and back          Physician: Donya Hickey MD    Visit Date: 5/21/2024    Physician Orders: PT Eval and Treat  Medical Diagnosis from Referral: M48.07 (ICD-10-CM) - Spinal stenosis, lumbosacral region   Evaluation Date: 4/15/2024  Authorization Period Expiration: 2/27/2025  Plan of Care Expiration: 6/25/2024  Reassessment Due: 5/16/2024  Visit # / Visits authorized: 11/20  MedX testing visit 2     Time In: 10:30 AM  Time Out: 11:30 AM  Total Billable Time: 60 minutes  INSURANCE and OUTCOMES: Fee for Service with FOTO Outcomes 1/3     Precautions: standard, HTN, fall, and history of cancer     Pattern of pain determined: 1 PEN    PTA Visit #: 0/5     Subjective     Omar Pacheco reports increased pain along sacral region with numbness down his (R) leg that he has been dealing it. Symptoms are exacerbated with weightbearing such as applying more pressure onto that side in seated position or leaning backwards.     Patient reports tolerating previous visit well with mild soreness  Patient reports their pain to be 5/10 on a 0-10 scale with 0 being no pain and 10 being the worst pain imaginable.  Pain Location: central LB     Occupation: Retired  Leisure: [employer] meetings, [employer], dancing, travelling       Pts goals: "Be able to stand and walk for longer so that I can do the things I need to without pain"    Objective      Lumbar  Isometric Testing on Med X equipment: Testing administered by PT    Test Initial Baseline Midpoint Final   Date 4/17/2024 5/21/2024    ROM 0-42 deg 0-60 deg    Max Peak Torque 142  39    Min Peak Torque 34  25    Flex/Ext Ratio 4.17:1 1.56:1    % variance  normative data 46% 63%    % change from initial test " "N/A visit 1 350        Outcomes:  Intake Score: 41%  Visit 6 Score: 48%  Visit 10 Score:  43%  Discharge Score:  Goal Score: 57%     Treatment   (Bold = completed)   Omar received the treatments listed below:            Omar participated in neuromuscular re-education activities to improve balance, coordination, proprioception, motor control and/or posture for - minutes. The following activities were included:       Omar received therapeutic exercises to develop/improve posture, lumbar ROM, strength, and muscular endurance for 40 minutes including the following exercises:     LTR  2x10x5"  DKTC c/ PB x10"x5  Open book str 10"x 5  PPT 10x5"  Posterior pelvic tilt 10x + march  Posterior pelvic tilt 10x + BKFO GTB  TrA brace isometric x10 x 5"  SOC 15x5" - NP  Bridge  2x10 GTB  BRIAN with pillow 2" - NP   R hip   +seated piriformis  stretch x2x30"  +supine SLR x10   +seated ball roll outs x5x10"  +seated nerve glides x10     [email or web address] Treatment as follows:  Patient received  10 minutes via participation on the [employer] Machine. Therapist assisted patient in isolating and engaging spinal stabilization musculature in order to improve functional ability and postural control. Patient performed exercise with therapist guidance in order to accurately use pacer function, avoid valsalva, and optimally exert effort within a safe and effective range via the Cedrick Exertion Rating Scale. Patient instructed to perform at a midrange of exertion and to complete 15-20 repetitions within appropriate split time, with proper technique, and while maintaining safety.          5/21/2024    10:39 AM   [facility]   Visit Number 11   VAS Pain Rating 5   Treadmill Time (in min.) 5 min   Flexion in Lying 10   Flexion in Sitting 10   Manual Therapy 5 mins.   Lumbar Weight 74 lbs   Repetitions 18   Rating of Perceived Exertion 2   Ice - Z Lie (in min.) 5         Peripheral muscle strengthening which included one set of " 15-20 repetitions at a slow and controlled 10-13 second per rep pace focused on strengthening supporting musculature in order to improve body mechanics and functional mobility. Patient and therapist focused on proper form during treatment to ensure optimal strengthening of each targeted muscle group.  Machines utilized included:Torso rotation, Leg Ext, Leg Curl, Chest Press, and Rowing,Torso rotation, Leg Ext, Leg Curl, Chest Press, and Rowing      Omar participated in dynamic functional therapeutic activities to improve functional performance and simulate household and community activities for -  minutes. The following activities were included:    -    Omar received manual therapy techniques for 5 minutes. The following activities were included:  Prone sacral joint mobs/distractions, grade I/II for pain relief     Pt given cold pack for 5 minutes to low back  in z-lie.    Patient Education and Home Exercises     Home exercises include:  LTR on ball   DKTC  PPT   Bridge   Seated ball rollouts   Seated piriformis stretch   SLR  Supine nerve glides     Cardio program (V5): - Stationary bike 30 min and 30 min on elliptical 2x/week  Lifting education (V11): -  Posture/Lumbar roll: yes  Fridge Magnet Discharge handout (date given): -  Equipment at home/gym membership: no - Has stationary bike at home    Education provided:   - PT role and POC  - HEP    Written Home Exercises Provided: Patient instructed to cont prior HEP.  Exercises were reviewed and Omar was able to demonstrate them prior to the end of the session.  Omar demonstrated good  understanding of the education provided.     See EMR under Patient Instructions for exercises provided prior visit.    Assessment     Omar presents to therapy with moderate sacral pain and numbness into RLE. Therefore, modify activity log with hold on extension based exercises to avoid further exacerbation of symptoms. Added flexion based exercises and exercises to  address sciatic nerve symptoms. Patient responded well to changes; therefore, issued/reviewed updated HEP with patient understanding.  Performed manual therapy in which patient initially had sharp pain radiating into RLE. However, after few minutes symptoms subsided and patient had relief. Jason cream was applied to patient's knees pre session due to discomfort and for tolerance with LE peripheral muscle strengthening. Lumbar MedX resistance increased from 70 to 74 lbs at 2/10 RPE. Also slightly increased resistance and reps with peripheral circuit. Peripheral muscle strength training completed without any discomfort.     Patient is making good progress towards established goals.  Pt will continue to benefit from skilled outpatient physical therapy to address the deficits stated in the impairment chart, provide pt/family education and to maximize pt's level of independence in the home and community environment.     Anticipated Barriers for therapy: chronicity of condition, herniated lumbar disc, stenosis     Pt's spiritual, cultural and educational needs considered and pt agreeable to plan of care and goals as stated below:     GOALS: Pt is in agreement with the following goals.     Short term goals:  6 weeks or 10 visits   - Pt will demonstrate increased lumbar MedX ROM by at least 6 degrees from the initial ROM value with improvements noted in functional ROM and ability to perform ADLs. Appropriate and Ongoing  - Pt will demonstrate increased MedX average isometric strength value by 15% from initial test resulting in improved ability to perform bending, lifting, and carrying activities safely, confidently. Appropriate and Ongoing  - Pt will report a reduction in worst pain score by 1-2 points for improved tolerance for standing. Appropriate and Ongoing  - Pt able to perform HEP correctly with minimal cueing or supervision from therapist to encourage independent management of symptoms. Appropriate and Ongoing      Long term goals: 10 weeks or 20 visits   - Pt will demonstrate increased lumbar MedX ROM by at least 9 degrees from initial ROM value, resulting in improved ability to perform functional forward bending while standing and sitting. Appropriate and Ongoing  - Pt will demonstrate increased MedX average isometric strength value by 30% from initial test resulting in improved ability to perform bending, lifting, and carrying activities safely and confidently. Appropriate and Ongoing  - Pt to demonstrate ability to independently control and reduce their pain through posture positioning and mechanical movements throughout a typical day. Appropriate and Ongoing  - Pt will demonstrate reduced pain and improved functional outcomes as reported on the FOTO by reaching an intake score of >/= 57% functional ability in order to demonstrate subjective improvement in patient's condition. . Appropriate and Ongoing  - Pt will demonstrate independence with the HEP at discharge. Appropriate and Ongoing  - Pt will be able to stand and walk for 1 hour in order to have improved tolerance to ADL's and leisure activities. Appropriate and Ongoing       Plan     Continue with established Plan of Care towards established PT goals.     Therapist: Kylie Portillo, PT  5/21/2024

## 2024-05-22 RX ORDER — LOSARTAN POTASSIUM 50 MG/1
25 TABLET ORAL DAILY
Qty: 45 TABLET | Refills: 3 | Status: SHIPPED | OUTPATIENT
Start: 2024-05-22 | End: 2025-05-22

## 2024-05-22 RX ORDER — AMLODIPINE BESYLATE 10 MG/1
10 TABLET ORAL DAILY
Qty: 90 TABLET | Refills: 3 | Status: SHIPPED | OUTPATIENT
Start: 2024-05-22

## 2024-05-23 ENCOUNTER — CLINICAL SUPPORT (OUTPATIENT)
Dept: REHABILITATION | Facility: OTHER | Age: 74
End: 2024-05-23
Payer: MEDICARE

## 2024-05-23 DIAGNOSIS — M25.69 DECREASED RANGE OF MOTION OF TRUNK AND BACK: Primary | ICD-10-CM

## 2024-05-23 DIAGNOSIS — R29.898 DECREASED STRENGTH OF TRUNK AND BACK: ICD-10-CM

## 2024-05-23 PROCEDURE — 97110 THERAPEUTIC EXERCISES: CPT

## 2024-05-23 NOTE — PROGRESS NOTES
"[facility]  Physical Therapy Treatment Note     Name: Omar Pacheco  Clinic Number: [id number]    Therapy Diagnosis:   Encounter Diagnoses   Name Primary?    Decreased range of motion of trunk and back Yes    Decreased strength of trunk and back            Physician: Donya Hickey MD    Visit Date: 5/23/2024    Physician Orders: PT Eval and Treat  Medical Diagnosis from Referral: M48.07 (ICD-10-CM) - Spinal stenosis, lumbosacral region   Evaluation Date: 4/15/2024  Authorization Period Expiration: 2/27/2025  Plan of Care Expiration: 6/25/2024  Reassessment Due: 5/16/2024  Visit # / Visits authorized: 12/20  MedX testing visit 2     Time In: 10:00 AM  Time Out: 11:58 AM  Total Billable Time: 58 minutes  INSURANCE and OUTCOMES: Fee for Service with FOTO Outcomes 1/3     Precautions: standard, HTN, fall, and history of cancer     Pattern of pain determined: 1 PEN    PTA Visit #: 0/5     Subjective     Omar Pacheco reports feeling much better after last session and new set of HEP as he no longer has exacerbation of pain in sacrum with weightbearing. He feels much better after new completing new HEP. He now has minimal low back pain.      Patient reports tolerating previous visit well with mild soreness  Patient reports their pain to be 3/10 on a 0-10 scale with 0 being no pain and 10 being the worst pain imaginable.  Pain Location: central LB     Occupation: Retired  Leisure: [employer] meetings, Jew, dancing, travelling       Pts goals: "Be able to stand and walk for longer so that I can do the things I need to without pain"    Objective      Lumbar  Isometric Testing on Med X equipment: Testing administered by PT    Test Initial Baseline Midpoint Final   Date 4/17/2024 5/23/2024    ROM 0-42 deg 0-60 deg    Max Peak Torque 142  39    Min Peak Torque 34  25    Flex/Ext Ratio 4.17:1 1.56:1    % variance  normative data 46% 63%    % change from initial test N/A visit " "1 350        Outcomes:  Intake Score: 41%  Visit 6 Score: 48%  Visit 10 Score:  43%  Discharge Score:  Goal Score: 57%     Treatment   (Bold = completed)   Omar received the treatments listed below:            Omar participated in neuromuscular re-education activities to improve balance, coordination, proprioception, motor control and/or posture for - minutes. The following activities were included:       Omar received therapeutic exercises to develop/improve posture, lumbar ROM, strength, and muscular endurance for 53 minutes including the following exercises:     LTR  2x10x5"  DKTC c/ PB x10"x5  Open book str 10"x 5  PPT 10x5"  Posterior pelvic tilt 10x + march  Posterior pelvic tilt 10x + BKFO GTB  TrA brace isometric x10 x 5"  SOC 15x5" - NP  Bridge  2x10 GTB  BRIAN with pillow 2" - NP  seated piriformis  stretch x2x30"  supine SLR x10   seated ball roll outs x5x10"  seated nerve glides x10     Medical MedX Treatment as follows:  Patient received 00 minutes via participation on the [employer] Machine. Therapist assisted patient in isolating and engaging spinal stabilization musculature in order to improve functional ability and postural control. Patient performed exercise with therapist guidance in order to accurately use pacer function, avoid valsalva, and optimally exert effort within a safe and effective range via the Cedrick Exertion Rating Scale. Patient instructed to perform at a midrange of exertion and to complete 15-20 repetitions within appropriate split time, with proper technique, and while maintaining safety.          5/23/2024    10:28 AM   [facility]   Visit Number 12   VAS Pain Rating 3   Time 5   Flexion in Lying 10   Flexion in Sitting 10   Manual Therapy 5 mins.   Lumbar Weight 78 lbs   Repetitions 20   Rating of Perceived Exertion 2   Ice - Z Lie (in min.) 5   *INCREASE RESISTANCE NV*    Peripheral muscle strengthening which included one set of 15-20 repetitions at a slow " and controlled 10-13 second per rep pace focused on strengthening supporting musculature in order to improve body mechanics and functional mobility. Patient and therapist focused on proper form during treatment to ensure optimal strengthening of each targeted muscle group.  Machines utilized included:Torso rotation, Leg Ext, Leg Curl, Chest Press, and Rowing,Torso rotation, Leg Ext, Leg Curl, Chest Press, and Rowing      Omar participated in dynamic functional therapeutic activities to improve functional performance and simulate household and community activities for -  minutes. The following activities were included:    -    Omar received manual therapy techniques for 5 minutes. The following activities were included:  Prone sacral joint mobs/distractions, grade I/II for pain relief     Pt given cold pack for 5 minutes to low back  in z-lie.    Patient Education and Home Exercises     Home exercises include:  LTR on ball   DKTC  PPT   Bridge   Seated ball rollouts   Seated piriformis stretch   SLR  Supine nerve glides     Cardio program (V5): - Stationary bike 30 min and 30 min on elliptical 2x/week  Lifting education (V11): -  Posture/Lumbar roll: yes  Fridge Magnet Discharge handout (date given): -  Equipment at home/gym membership: no - Has stationary bike at home    Education provided:   - PT role and POC  - HEP    Written Home Exercises Provided: Patient instructed to cont prior HEP.  Exercises were reviewed and Omar was able to demonstrate them prior to the end of the session.  Omar demonstrated good  understanding of the education provided.     See EMR under Patient Instructions for exercises provided prior visit.    Assessment     Omar presents to therapy with significant reduction in sacral pain and numbness into RLE since exercise modifications and manual therapy from last session. Therefore, continued current treatment log. Jason cream was applied to patient's knees pre session due  to discomfort and for tolerance with LE peripheral muscle strengthening. He continues to respond well to manual therapy with relief of symptoms afterwards. Lumbar MedX resistance increased from 74 to 78 lbs with increase in reps from 15 to 20 at 2/10 RPE. Plan to increase resistance next session as tolerated by patient. Also slightly increased resistance and reps with peripheral circuit. Peripheral muscle strength training completed without any discomfort.     Patient is making good progress towards established goals.  Pt will continue to benefit from skilled outpatient physical therapy to address the deficits stated in the impairment chart, provide pt/family education and to maximize pt's level of independence in the home and community environment.     Anticipated Barriers for therapy: chronicity of condition, herniated lumbar disc, stenosis     Pt's spiritual, cultural and educational needs considered and pt agreeable to plan of care and goals as stated below:     GOALS: Pt is in agreement with the following goals.     Short term goals:  6 weeks or 10 visits   - Pt will demonstrate increased lumbar MedX ROM by at least 6 degrees from the initial ROM value with improvements noted in functional ROM and ability to perform ADLs. Appropriate and Ongoing  - Pt will demonstrate increased MedX average isometric strength value by 15% from initial test resulting in improved ability to perform bending, lifting, and carrying activities safely, confidently. Appropriate and Ongoing  - Pt will report a reduction in worst pain score by 1-2 points for improved tolerance for standing. Appropriate and Ongoing  - Pt able to perform HEP correctly with minimal cueing or supervision from therapist to encourage independent management of symptoms. Appropriate and Ongoing     Long term goals: 10 weeks or 20 visits   - Pt will demonstrate increased lumbar MedX ROM by at least 9 degrees from initial ROM value, resulting in improved ability  to perform functional forward bending while standing and sitting. Appropriate and Ongoing  - Pt will demonstrate increased MedX average isometric strength value by 30% from initial test resulting in improved ability to perform bending, lifting, and carrying activities safely and confidently. Appropriate and Ongoing  - Pt to demonstrate ability to independently control and reduce their pain through posture positioning and mechanical movements throughout a typical day. Appropriate and Ongoing  - Pt will demonstrate reduced pain and improved functional outcomes as reported on the FOTO by reaching an intake score of >/= 57% functional ability in order to demonstrate subjective improvement in patient's condition. . Appropriate and Ongoing  - Pt will demonstrate independence with the HEP at discharge. Appropriate and Ongoing  - Pt will be able to stand and walk for 1 hour in order to have improved tolerance to ADL's and leisure activities. Appropriate and Ongoing       Plan     Continue with established Plan of Care towards established PT goals.     Therapist: Kylie Portillo, PT  5/23/2024

## 2024-05-27 ENCOUNTER — OFFICE VISIT (OUTPATIENT)
Dept: ORTHOPEDICS | Facility: CLINIC | Age: 74
End: 2024-05-27
Payer: MEDICARE

## 2024-05-27 DIAGNOSIS — I10 ESSENTIAL HYPERTENSION: ICD-10-CM

## 2024-05-27 DIAGNOSIS — M17.11 PRIMARY OSTEOARTHRITIS OF RIGHT KNEE: Primary | ICD-10-CM

## 2024-05-27 PROCEDURE — 99213 OFFICE O/P EST LOW 20 MIN: CPT | Mod: 95,,, | Performed by: ORTHOPAEDIC SURGERY

## 2024-05-27 RX ORDER — LOSARTAN POTASSIUM 50 MG/1
25 TABLET ORAL DAILY
Qty: 45 TABLET | Refills: 3 | Status: CANCELLED | OUTPATIENT
Start: 2024-05-27 | End: 2025-05-27

## 2024-05-27 RX ORDER — AMLODIPINE BESYLATE 10 MG/1
10 TABLET ORAL DAILY
Qty: 90 TABLET | Refills: 3 | Status: CANCELLED | OUTPATIENT
Start: 2024-05-27

## 2024-05-27 NOTE — TELEPHONE ENCOUNTER
No care due was identified.  [facility] Embedded Care Due Messages. Reference number: [id number].   5/27/2024 10:45:18 AM CDT

## 2024-05-27 NOTE — PROGRESS NOTES
Subjective:      Patient ID: Omar Pacheco is a 73 y.o. male.    Chief Complaint: Pain of the Right Knee    HPI  Omar Pacheco has right knee pain.  He saw JOSE M Acevedo.  He is in the [facility] program and he believes this is helping.  The numbness and tingling in the leg has gotten much better.  He feels the knee has also slightly improved.        Objective:      There is no height or weight on file to calculate BMI.  There were no vitals filed for this visit.        General    Constitutional: He is oriented to person, place, and time. He appears well-developed and well-nourished.   HENT:   Head: Normocephalic and atraumatic.   Eyes: EOM are normal.   Pulmonary/Chest: Effort normal.   Neurological: He is alert and oriented to person, place, and time.   Psychiatric: He has a normal mood and affect. His behavior is normal.                       Assessment:       Encounter Diagnosis   Name Primary?    Primary osteoarthritis of right knee Yes          Plan:       Omar was seen today for pain.    Diagnoses and all orders for this visit:    Primary osteoarthritis of right knee        He would like to try a knee therapy once he completes the [facility] program.  We will get this set up.  I will see him back following his knee therapy.

## 2024-05-28 ENCOUNTER — CLINICAL SUPPORT (OUTPATIENT)
Dept: REHABILITATION | Facility: OTHER | Age: 74
End: 2024-05-28
Payer: MEDICARE

## 2024-05-28 DIAGNOSIS — M25.69 DECREASED RANGE OF MOTION OF TRUNK AND BACK: Primary | ICD-10-CM

## 2024-05-28 DIAGNOSIS — R29.898 DECREASED STRENGTH OF TRUNK AND BACK: ICD-10-CM

## 2024-05-28 PROCEDURE — 97110 THERAPEUTIC EXERCISES: CPT | Mod: CQ

## 2024-06-18 ENCOUNTER — CLINICAL SUPPORT (OUTPATIENT)
Dept: REHABILITATION | Facility: OTHER | Age: 74
End: 2024-06-18
Payer: MEDICARE

## 2024-06-18 DIAGNOSIS — R29.898 DECREASED STRENGTH OF TRUNK AND BACK: ICD-10-CM

## 2024-06-18 DIAGNOSIS — M25.69 DECREASED RANGE OF MOTION OF TRUNK AND BACK: Primary | ICD-10-CM

## 2024-06-18 PROCEDURE — 97110 THERAPEUTIC EXERCISES: CPT | Mod: CQ

## 2024-06-18 NOTE — PROGRESS NOTES
"[facility]  Physical Therapy Treatment Note     Name: Omar Pacheco  Clinic Number: [id number]    Therapy Diagnosis:   Encounter Diagnoses   Name Primary?    Decreased range of motion of trunk and back Yes    Decreased strength of trunk and back            Physician: Donya Hickey MD    Visit Date: 6/18/2024    Physician Orders: PT Eval and Treat  Medical Diagnosis from Referral: M48.07 (ICD-10-CM) - Spinal stenosis, lumbosacral region   Evaluation Date: 4/15/2024  Authorization Period Expiration: 2/27/2025  Plan of Care Expiration: 6/25/2024  Reassessment Due: 5/16/2024  Visit # / Visits authorized: 12/20  MedX testing visit 2     Time In: 10:00 AM  Time Out: 11:00 AM  Total Billable Time: 30 minutes (1:1)  INSURANCE and OUTCOMES: Fee for Service with FOTO Outcomes 1/3     Precautions: standard, HTN, fall, and history of cancer     Pattern of pain determined: 1 PEN    PTA Visit #: 2/5     Subjective     Omar Pacheco reports increase soreness at mid lumbar with radicular symptoms into R hip, groin, and down anterior leg into foot today due to swimming while on vacation.      Patient reports tolerating previous visit well with mild soreness  Patient reports their pain to be 6/10 on a 0-10 scale with 0 being no pain and 10 being the worst pain imaginable.  Pain Location: central LB and R leg     Occupation: Retired  Leisure: [employer] meetings, [employer], dancing, travelling       Pts goals: "Be able to stand and walk for longer so that I can do the things I need to without pain"    Objective      Lumbar  Isometric Testing on Med X equipment: Testing administered by PT    Test Initial Baseline Midpoint Final   Date 4/17/2024 6/18/2024    ROM 0-42 deg 0-60 deg    Max Peak Torque 142  39    Min Peak Torque 34  25    Flex/Ext Ratio 4.17:1 1.56:1    % variance  normative data 46% 63%    % change from initial test N/A visit 1 350        Outcomes:  Intake Score: 41%  Visit 6 " "Score: 48%  Visit 10 Score:  43%  Discharge Score:  Goal Score: 57%     Treatment   (Bold = completed)   Omar received the treatments listed below:            Omar participated in neuromuscular re-education activities to improve balance, coordination, proprioception, motor control and/or posture for - minutes. The following activities were included:       Omar received therapeutic exercises to develop/improve posture, lumbar ROM, strength, and muscular endurance for 55 minutes including the following exercises:     LTR  10x5"  DKTC c/ PB x10"x5-NP  Open book str 10"x 5-NP  PPT 5x5"-NP  Posterior pelvic tilt 10x + march-NP  Posterior pelvic tilt 10x + BKFO GTB  TrA brace isometric x10 x 5"-NP  SOC 15x5" - NP  Bridge  1x10 GTB  BRIAN with pillow 2" - NP  seated piriformis  stretch x2x30"  supine SLR x10 -NP  seated ball roll outs x5x10"-NP  seated nerve glides x10-NP     Medical MedX Treatment as follows:  Patient received 00 minutes via participation on the Medical MedX Machine. Therapist assisted patient in isolating and engaging spinal stabilization musculature in order to improve functional ability and postural control. Patient performed exercise with therapist guidance in order to accurately use pacer function, avoid valsalva, and optimally exert effort within a safe and effective range via the Cedrick Exertion Rating Scale. Patient instructed to perform at a midrange of exertion and to complete 15-20 repetitions within appropriate split time, with proper technique, and while maintaining safety.               6/18/2024    10:29 AM   [facility]   Visit Number 14   VAS Pain Rating 6   Treadmill Time (in min.) 5 min   Flexion in Lying 10   Lumbar Weight 80 lbs   Repetitions 18       Peripheral muscle strengthening which included one set of 15-20 repetitions at a slow and controlled 10-13 second per rep pace focused on strengthening supporting musculature in order to improve body mechanics and " functional mobility. Patient and therapist focused on proper form during treatment to ensure optimal strengthening of each targeted muscle group.  Machines utilized included:Torso rotation, Leg Ext, Leg Curl, Chest Press, and Rowing,Torso rotation, Leg Ext, Leg Curl, Chest Press, and Rowing      Omar participated in dynamic functional therapeutic activities to improve functional performance and simulate household and community activities for -  minutes. The following activities were included:    -    Omar received manual therapy techniques for 5 minutes. The following activities were included:  Prone sacral joint mobs/distractions, grade I/II for pain relief     Pt given cold pack for 5 minutes to low back  in z-lie.    Patient Education and Home Exercises     Home exercises include:  LTR on ball   DKTC  PPT   Bridge   Seated ball rollouts   Seated piriformis stretch   SLR  Supine nerve glides     Cardio program (V5): - Stationary bike 30 min and 30 min on elliptical 2x/week  Lifting education (V11): -  Posture/Lumbar roll: yes  Fridge Magnet Discharge handout (date given): -  Equipment at home/gym membership: no - Has stationary bike at home    Education provided:   - PT role and POC  - HEP    Written Home Exercises Provided: Patient instructed to cont prior HEP.  Exercises were reviewed and Omar was able to demonstrate them prior to the end of the session.  Omar demonstrated good  understanding of the education provided.     See EMR under Patient Instructions for exercises provided prior visit.    Assessment     Omar presents to therapy with reports of increase lumbar pain with radicular symptoms into R foot due to swimming activity. He entered gym with standard cane for long distances ambulation only. He was able to perform therex within tolerance w/o adverse effects, responding well to prone press ups and manual hip flexion stretches. Centralized radicular symptoms into knee region vs foot  reported after mat exercises, ending with prone Rx. He was able to perform Lumbar MedX with  resistance maintained at  80 lbs, completing 18 reps at 4/10 RPE.  Peripheral muscle strength training completed without any discomfort with slight increase resistance and reps. Continue program per pt tolerance.     Patient is making good progress towards established goals.  Pt will continue to benefit from skilled outpatient physical therapy to address the deficits stated in the impairment chart, provide pt/family education and to maximize pt's level of independence in the home and community environment.     Anticipated Barriers for therapy: chronicity of condition, herniated lumbar disc, stenosis     Pt's spiritual, cultural and educational needs considered and pt agreeable to plan of care and goals as stated below:     GOALS: Pt is in agreement with the following goals.     Short term goals:  6 weeks or 10 visits   - Pt will demonstrate increased lumbar MedX ROM by at least 6 degrees from the initial ROM value with improvements noted in functional ROM and ability to perform ADLs. Appropriate and Ongoing  - Pt will demonstrate increased MedX average isometric strength value by 15% from initial test resulting in improved ability to perform bending, lifting, and carrying activities safely, confidently. Appropriate and Ongoing  - Pt will report a reduction in worst pain score by 1-2 points for improved tolerance for standing. Appropriate and Ongoing  - Pt able to perform HEP correctly with minimal cueing or supervision from therapist to encourage independent management of symptoms. Appropriate and Ongoing     Long term goals: 10 weeks or 20 visits   - Pt will demonstrate increased lumbar MedX ROM by at least 9 degrees from initial ROM value, resulting in improved ability to perform functional forward bending while standing and sitting. Appropriate and Ongoing  - Pt will demonstrate increased MedX average isometric strength  value by 30% from initial test resulting in improved ability to perform bending, lifting, and carrying activities safely and confidently. Appropriate and Ongoing  - Pt to demonstrate ability to independently control and reduce their pain through posture positioning and mechanical movements throughout a typical day. Appropriate and Ongoing  - Pt will demonstrate reduced pain and improved functional outcomes as reported on the FOTO by reaching an intake score of >/= 57% functional ability in order to demonstrate subjective improvement in patient's condition. . Appropriate and Ongoing  - Pt will demonstrate independence with the HEP at discharge. Appropriate and Ongoing  - Pt will be able to stand and walk for 1 hour in order to have improved tolerance to ADL's and leisure activities. Appropriate and Ongoing       Plan     Continue with established Plan of Care towards established PT goals.     Therapist: Romelia Ceballos, PTA  6/18/2024

## 2024-06-20 ENCOUNTER — CLINICAL SUPPORT (OUTPATIENT)
Dept: REHABILITATION | Facility: OTHER | Age: 74
End: 2024-06-20
Payer: MEDICARE

## 2024-06-20 DIAGNOSIS — R29.898 DECREASED STRENGTH OF TRUNK AND BACK: ICD-10-CM

## 2024-06-20 DIAGNOSIS — M25.69 DECREASED RANGE OF MOTION OF TRUNK AND BACK: Primary | ICD-10-CM

## 2024-06-20 PROCEDURE — 97110 THERAPEUTIC EXERCISES: CPT | Mod: CQ

## 2024-06-20 NOTE — PROGRESS NOTES
"[facility]  Physical Therapy Treatment Note     Name: Omar Pacheco  Clinic Number: [id number]    Therapy Diagnosis:   Encounter Diagnoses   Name Primary?    Decreased range of motion of trunk and back Yes    Decreased strength of trunk and back        Physician: Donya Hickey MD    Visit Date: 6/20/2024    Physician Orders: PT Eval and Treat  Medical Diagnosis from Referral: M48.07 (ICD-10-CM) - Spinal stenosis, lumbosacral region   Evaluation Date: 4/15/2024  Authorization Period Expiration: 2/27/2025  Plan of Care Expiration: 6/25/2024  Reassessment Due: 5/16/2024  Visit # / Visits authorized: 15/20  MedX testing visit 2     Time In: 10:09 AM (late arrival)  Time Out: 11:00 AM  Total Billable Time: 30 minutes (1:1)  INSURANCE and OUTCOMES: Fee for Service with FOTO Outcomes 1/3     Precautions: standard, HTN, fall, and history of cancer     Pattern of pain determined: 1 PEN    PTA Visit #: 3/5     Subjective     Omar Pacheco reports increase soreness at mid lumbar with radicular symptoms into R hip, groin, and down anterior leg into foot today due to swimming while on vacation.      Patient reports tolerating previous visit well with mild soreness  Patient reports their pain to be 6/10 on a 0-10 scale with 0 being no pain and 10 being the worst pain imaginable.  Pain Location: central LB and R leg     Occupation: Retired  Leisure: [employer] meetings, [employer], dancing, travelling       Pts goals: "Be able to stand and walk for longer so that I can do the things I need to without pain"    Objective      Lumbar  Isometric Testing on Med X equipment: Testing administered by PT    Test Initial Baseline Midpoint Final   Date 4/17/2024 6/20/2024    ROM 0-42 deg 0-60 deg    Max Peak Torque 142  39    Min Peak Torque 34  25    Flex/Ext Ratio 4.17:1 1.56:1    % variance  normative data 46% 63%    % change from initial test N/A visit 1 350        Outcomes:  Intake Score: " "41%  Visit 6 Score: 48%  Visit 10 Score:  43%  Discharge Score:  Goal Score: 57%     Treatment   (Bold = completed)   Omar received the treatments listed below:            Omar participated in neuromuscular re-education activities to improve balance, coordination, proprioception, motor control and/or posture for 0 minutes. The following activities were included:    Medical MedX Treatment as follows:  Patient received 10 minutes via participation on the Medical MedX Machine. Therapist assisted patient in isolating and engaging spinal stabilization musculature in order to improve functional ability and postural control. Patient performed exercise with therapist guidance in order to accurately use pacer function, avoid valsalva, and optimally exert effort within a safe and effective range via the Cedrick Exertion Rating Scale. Patient instructed to perform at a midrange of exertion and to complete 15-20 repetitions within appropriate split time, with proper technique, and while maintaining safety.          Omar received therapeutic exercises to develop/improve posture, lumbar ROM, strength, and muscular endurance for 45 minutes including the following exercises:     LTR  10x5"  DKTC c/ PB x15"x5  Open book str 10"x 5-NP  PPT 5x5"-NP  Posterior pelvic tilt 10x + march-NP  Posterior pelvic tilt 10x + BKFO GTB  TrA brace isometric x10 x 5"-NP  SOC 15x5" - NP  Bridge  1x10 GTB  BRIAN with pillow 2" - NP  seated piriformis  stretch x2x30"  supine SLR x10 -NP  seated ball roll outs x5x10"-NP  seated nerve glides x10-NP            6/20/2024    10:31 AM   [facility]   Visit Number 15   VAS Pain Rating 5   Extension in Lying 10   Flexion in Lying 10   Lumbar Weight 80 lbs   Repetitions 20   Rating of Perceived Exertion 4   Ice - Z Lie (in min.) 5           Peripheral muscle strengthening which included one set of 15-20 repetitions at a slow and controlled 10-13 second per rep pace focused on strengthening " supporting musculature in order to improve body mechanics and functional mobility. Patient and therapist focused on proper form during treatment to ensure optimal strengthening of each targeted muscle group.  Machines utilized included:Torso rotation, Leg Ext, Leg Curl, Chest Press, and Rowing,Torso rotation, Leg Ext, Leg Curl, Chest Press, and Rowing      Omar participated in dynamic functional therapeutic activities to improve functional performance and simulate household and community activities for 0 minutes. The following activities were included:      mOar received manual therapy techniques for 0 minutes. The following activities were included:  Prone sacral joint mobs/distractions, grade I/II for pain relief     Pt given cold pack for 5 minutes to low back  in z-lie.    Patient Education and Home Exercises     Home exercises include:  LTR on ball   DKTC  PPT   Bridge   Seated ball rollouts   Seated piriformis stretch   SLR  Supine nerve glides     Cardio program (V5): - Stationary bike 30 min and 30 min on elliptical 2x/week  Lifting education (V11): -  Posture/Lumbar roll: yes  Fridge Magnet Discharge handout (date given): -  Equipment at home/gym membership: no - Has stationary bike at home    Education provided:   - PT role and POC  - HEP    Written Home Exercises Provided: Patient instructed to cont prior HEP.  Exercises were reviewed and Omar was able to demonstrate them prior to the end of the session.  Omar demonstrated good  understanding of the education provided.     See EMR under Patient Instructions for exercises provided prior visit.    Assessment     Omar presents to therapy with reports of increase lumbar pain with radicular symptoms into R foot due to swimming activity. He entered gym with standard cane for long distances ambulation only. He was able to perform therex within tolerance w/o adverse effects, responding well to prone press ups and manual hip flexion stretches.  Centralized radicular symptoms into knee region vs foot reported after mat exercises, ending with prone Rx. He was able to perform Lumbar MedX with  resistance maintained at  80 lbs, completing 18 reps at 4/10 RPE.  Peripheral muscle strength training completed without any discomfort with slight increase resistance and reps. Continue program per pt tolerance.     Patient is making good progress towards established goals.  Pt will continue to benefit from skilled outpatient physical therapy to address the deficits stated in the impairment chart, provide pt/family education and to maximize pt's level of independence in the home and community environment.     Anticipated Barriers for therapy: chronicity of condition, herniated lumbar disc, stenosis     Pt's spiritual, cultural and educational needs considered and pt agreeable to plan of care and goals as stated below:     GOALS: Pt is in agreement with the following goals.     Short term goals:  6 weeks or 10 visits   - Pt will demonstrate increased lumbar MedX ROM by at least 6 degrees from the initial ROM value with improvements noted in functional ROM and ability to perform ADLs. Appropriate and Ongoing  - Pt will demonstrate increased MedX average isometric strength value by 15% from initial test resulting in improved ability to perform bending, lifting, and carrying activities safely, confidently. Appropriate and Ongoing  - Pt will report a reduction in worst pain score by 1-2 points for improved tolerance for standing. Appropriate and Ongoing  - Pt able to perform HEP correctly with minimal cueing or supervision from therapist to encourage independent management of symptoms. Appropriate and Ongoing     Long term goals: 10 weeks or 20 visits   - Pt will demonstrate increased lumbar MedX ROM by at least 9 degrees from initial ROM value, resulting in improved ability to perform functional forward bending while standing and sitting. Appropriate and Ongoing  - Pt will  demonstrate increased MedX average isometric strength value by 30% from initial test resulting in improved ability to perform bending, lifting, and carrying activities safely and confidently. Appropriate and Ongoing  - Pt to demonstrate ability to independently control and reduce their pain through posture positioning and mechanical movements throughout a typical day. Appropriate and Ongoing  - Pt will demonstrate reduced pain and improved functional outcomes as reported on the FOTO by reaching an intake score of >/= 57% functional ability in order to demonstrate subjective improvement in patient's condition. . Appropriate and Ongoing  - Pt will demonstrate independence with the HEP at discharge. Appropriate and Ongoing  - Pt will be able to stand and walk for 1 hour in order to have improved tolerance to ADL's and leisure activities. Appropriate and Ongoing       Plan     Continue with established Plan of Care towards established PT goals.     Therapist: Hellen Phillips, PTA  6/20/2024

## 2024-06-24 DIAGNOSIS — M48.07 SPINAL STENOSIS, LUMBOSACRAL REGION: ICD-10-CM

## 2024-06-25 ENCOUNTER — CLINICAL SUPPORT (OUTPATIENT)
Dept: REHABILITATION | Facility: OTHER | Age: 74
End: 2024-06-25
Payer: MEDICARE

## 2024-06-25 DIAGNOSIS — R29.898 DECREASED STRENGTH OF TRUNK AND BACK: ICD-10-CM

## 2024-06-25 DIAGNOSIS — M25.69 DECREASED RANGE OF MOTION OF TRUNK AND BACK: Primary | ICD-10-CM

## 2024-06-25 PROCEDURE — 97110 THERAPEUTIC EXERCISES: CPT | Mod: CQ

## 2024-06-25 RX ORDER — TRAMADOL HYDROCHLORIDE 50 MG/1
50 TABLET ORAL
Qty: 30 TABLET | Refills: 0 | Status: SHIPPED | OUTPATIENT
Start: 2024-06-25

## 2024-06-25 NOTE — PROGRESS NOTES
"[facility]  Physical Therapy Treatment Note     Name: Omar Pacheco  Clinic Number: [id number]    Therapy Diagnosis:   No diagnosis found.      Physician: Donya Hickey MD    Visit Date: 6/25/2024    Physician Orders: PT Eval and Treat  Medical Diagnosis from Referral: M48.07 (ICD-10-CM) - Spinal stenosis, lumbosacral region   Evaluation Date: 4/15/2024  Authorization Period Expiration: 2/27/2025  Plan of Care Expiration: 6/25/2024  Reassessment Due: 5/16/2024  Visit # / Visits authorized: ***/20  MedX testing visit 2     Time In: 10:00 AM   Time Out: 11:00 AM  Total Billable Time: 30 minutes (1:1)  INSURANCE and OUTCOMES: Fee for Service with FOTO Outcomes 1/3     Precautions: standard, HTN, fall, and history of cancer     Pattern of pain determined: 1 PEN    PTA Visit #: 0/5     Subjective     Omar Pacheco reports ***     Patient reports tolerating previous visit well with mild soreness  Patient reports their pain to be ***/10 on a 0-10 scale with 0 being no pain and 10 being the worst pain imaginable.  Pain Location: central LB and R leg     Occupation: Retired  Leisure: [employer], [employer], dancing, travelling       Pts goals: "Be able to stand and walk for longer so that I can do the things I need to without pain"    Objective      Lumbar  Isometric Testing on Med X equipment: Testing administered by PT    Test Initial Baseline Midpoint Final   Date 4/17/2024 6/25/2024    ROM 0-42 deg 0-60 deg    Max Peak Torque 142  39    Min Peak Torque 34  25    Flex/Ext Ratio 4.17:1 1.56:1    % variance  normative data 46% 63%    % change from initial test N/A visit 1 350        Outcomes:  Intake Score: 41%  Visit 6 Score: 48%  Visit 10 Score:  43%  Discharge Score:  Goal Score: 57%     Treatment   (Bold = completed)   Omar received the treatments listed below:            Omar participated in neuromuscular re-education activities to improve balance, " "coordination, proprioception, motor control and/or posture for 0 minutes. The following activities were included:    Medical MedX Treatment as follows:  Patient received 10 minutes via participation on the Medical MedX Machine. Therapist assisted patient in isolating and engaging spinal stabilization musculature in order to improve functional ability and postural control. Patient performed exercise with therapist guidance in order to accurately use pacer function, avoid valsalva, and optimally exert effort within a safe and effective range via the Cedrick Exertion Rating Scale. Patient instructed to perform at a midrange of exertion and to complete 15-20 repetitions within appropriate split time, with proper technique, and while maintaining safety.          Omar received therapeutic exercises to develop/improve posture, lumbar ROM, strength, and muscular endurance for 45 minutes including the following exercises:     LTR  10x5"  DKTC c/ PB x15"x5  Open book str 10"x 5-NP  PPT 5x5"-NP  Posterior pelvic tilt 10x + march-NP  Posterior pelvic tilt 10x + BKFO GTB  TrA brace isometric x10 x 5"-NP  SOC 15x5" - NP  Bridge  1x10 GTB  BRIAN with pillow 2" - NP  seated piriformis  stretch x2x30"  supine SLR x10 -NP  seated ball roll outs x5x10"-NP  seated nerve glides x10-NP        ***.hbt        Peripheral muscle strengthening which included one set of 15-20 repetitions at a slow and controlled 10-13 second per rep pace focused on strengthening supporting musculature in order to improve body mechanics and functional mobility. Patient and therapist focused on proper form during treatment to ensure optimal strengthening of each targeted muscle group.  Machines utilized included:Torso rotation, Leg Ext, Leg Curl, Chest Press, and Rowing,Torso rotation, Leg Ext, Leg Curl, Chest Press, and Rowing      Omar participated in dynamic functional therapeutic activities to improve functional performance and simulate household and " community activities for 0 minutes. The following activities were included:      Omar received manual therapy techniques for 0 minutes. The following activities were included:  Prone sacral joint mobs/distractions, grade I/II for pain relief     Pt given cold pack for 5 minutes to low back  in z-lie.    Patient Education and Home Exercises     Home exercises include:  LTR on ball   DKTC  PPT   Bridge   Seated ball rollouts   Seated piriformis stretch   SLR  Supine nerve glides     Cardio program (V5): - Stationary bike 30 min and 30 min on elliptical 2x/week  Lifting education (V11): -  Posture/Lumbar roll: yes  Fridge Magnet Discharge handout (date given): -  Equipment at home/gym membership: no - Has stationary bike at home    Education provided:   - PT role and POC  - HEP    Written Home Exercises Provided: Patient instructed to cont prior HEP.  Exercises were reviewed and Omar was able to demonstrate them prior to the end of the session.  Omar demonstrated good  understanding of the education provided.     See EMR under Patient Instructions for exercises provided prior visit.    Assessment   ***  Omar presents to therapy with reports of increase lumbar pain with radicular symptoms into R foot due to swimming activity. He entered gym with standard cane for long distances ambulation only. He was able to perform therex within tolerance w/o adverse effects, responding well to prone press ups and manual hip flexion stretches. Centralized radicular symptoms into knee region vs foot reported after mat exercises, ending with prone Rx. He was able to perform Lumbar MedX with  resistance maintained at  80 lbs, progressing from 18 to reps at ***/10 RPE.  Peripheral muscle strength training completed without any discomfort with slight increase resistance and reps. Continue program per pt tolerance.     Patient is making good progress towards established goals.  Pt will continue to benefit from skilled  outpatient physical therapy to address the deficits stated in the impairment chart, provide pt/family education and to maximize pt's level of independence in the home and community environment.     Anticipated Barriers for therapy: chronicity of condition, herniated lumbar disc, stenosis     Pt's spiritual, cultural and educational needs considered and pt agreeable to plan of care and goals as stated below:     GOALS: Pt is in agreement with the following goals.     Short term goals:  6 weeks or 10 visits   - Pt will demonstrate increased lumbar MedX ROM by at least 6 degrees from the initial ROM value with improvements noted in functional ROM and ability to perform ADLs. Appropriate and Ongoing  - Pt will demonstrate increased MedX average isometric strength value by 15% from initial test resulting in improved ability to perform bending, lifting, and carrying activities safely, confidently. Appropriate and Ongoing  - Pt will report a reduction in worst pain score by 1-2 points for improved tolerance for standing. Appropriate and Ongoing  - Pt able to perform HEP correctly with minimal cueing or supervision from therapist to encourage independent management of symptoms. Appropriate and Ongoing     Long term goals: 10 weeks or 20 visits   - Pt will demonstrate increased lumbar MedX ROM by at least 9 degrees from initial ROM value, resulting in improved ability to perform functional forward bending while standing and sitting. Appropriate and Ongoing  - Pt will demonstrate increased MedX average isometric strength value by 30% from initial test resulting in improved ability to perform bending, lifting, and carrying activities safely and confidently. Appropriate and Ongoing  - Pt to demonstrate ability to independently control and reduce their pain through posture positioning and mechanical movements throughout a typical day. Appropriate and Ongoing  - Pt will demonstrate reduced pain and improved functional outcomes as  reported on the FOTO by reaching an intake score of >/= 57% functional ability in order to demonstrate subjective improvement in patient's condition. . Appropriate and Ongoing  - Pt will demonstrate independence with the HEP at discharge. Appropriate and Ongoing  - Pt will be able to stand and walk for 1 hour in order to have improved tolerance to ADL's and leisure activities. Appropriate and Ongoing       Plan     Continue with established Plan of Care towards established PT goals.     Therapist: Kylie Portillo, PT  6/25/2024

## 2024-06-25 NOTE — PROGRESS NOTES
"[facility]  Physical Therapy Treatment Note     Name: Omar Pacheco  Clinic Number: [id number]    Therapy Diagnosis:   Encounter Diagnoses   Name Primary?    Decreased range of motion of trunk and back Yes    Decreased strength of trunk and back        Physician: Donya Hickey MD    Visit Date: 6/25/2024    Physician Orders: PT Eval and Treat  Medical Diagnosis from Referral: M48.07 (ICD-10-CM) - Spinal stenosis, lumbosacral region   Evaluation Date: 4/15/2024  Authorization Period Expiration: 2/27/2025  Plan of Care Expiration: 6/25/2024  Reassessment Due: 5/16/2024  Visit # / Visits authorized: 15/20  MedX testing visit 2     Time In: 10:09 AM (late arrival)  Time Out: 11:00 AM  Total Billable Time: 30 minutes (1:1)  INSURANCE and OUTCOMES: Fee for Service with FOTO Outcomes 1/3     Precautions: standard, HTN, fall, and history of cancer     Pattern of pain determined: 1 PEN    PTA Visit #: 4/5     Subjective     Omar Pacheco reports increase soreness at mid lumbar with radicular symptoms into R hip, groin, anterior thigh, and anterior knee but less intense overall.     Patient reports tolerating previous visit well with mild soreness  Patient reports their pain to be 3/10 on a 0-10 scale with 0 being no pain and 10 being the worst pain imaginable.  Pain Location: central LB and R leg     Occupation: Retired  Leisure: [employer] meetings, [employer], dancing, travelling       Pts goals: "Be able to stand and walk for longer so that I can do the things I need to without pain"    Objective      Lumbar  Isometric Testing on Med X equipment: Testing administered by PT    Test Initial Baseline Midpoint Final   Date 4/17/2024 6/25/2024    ROM 0-42 deg 0-60 deg    Max Peak Torque 142  39    Min Peak Torque 34  25    Flex/Ext Ratio 4.17:1 1.56:1    % variance  normative data 46% 63%    % change from initial test N/A visit 1 350        Outcomes:  Intake Score: 41%  Visit 6 " "Score: 48%  Visit 10 Score:  43%  Discharge Score:  Goal Score: 57%     Treatment   (Bold = completed)   Omar received the treatments listed below:            Omar participated in neuromuscular re-education activities to improve balance, coordination, proprioception, motor control and/or posture for 0 minutes. The following activities were included:    Medical MedX Treatment as follows:  Patient received 10 minutes via participation on the Medical MedX Machine. Therapist assisted patient in isolating and engaging spinal stabilization musculature in order to improve functional ability and postural control. Patient performed exercise with therapist guidance in order to accurately use pacer function, avoid valsalva, and optimally exert effort within a safe and effective range via the Cedrick Exertion Rating Scale. Patient instructed to perform at a midrange of exertion and to complete 15-20 repetitions within appropriate split time, with proper technique, and while maintaining safety.          Omar received therapeutic exercises to develop/improve posture, lumbar ROM, strength, and muscular endurance for 45 minutes including the following exercises:     LTR  10x5"  DKTC c/ PB x15"x5-  Open book str 10"x 5-NP  PPT 5x5"NP  Posterior pelvic tilt 10x + march-NP  Posterior pelvic tilt 10x + BKFO GTB  TrA brace isometric x10 x 5"-NP  SOC 15x5" - NP  Bridge  1x10 GTB  BRIAN with pillow 2" - NP  seated piriformis  stretch x2x30"-NP  supine SLR x10 -NP  seated ball roll outs x5x10"-NP  seated nerve glides x10-NP               6/25/2024    10:36 AM   [facility]   Visit Number 16   VAS Pain Rating 3   Recumbent Bike Seat Pos. 5   Lumbar Weight 88 lbs   Repetitions 15   Rating of Perceived Exertion 4   Ice - Z Lie (in min.) 5           Peripheral muscle strengthening which included one set of 15-20 repetitions at a slow and controlled 10-13 second per rep pace focused on strengthening supporting musculature in " order to improve body mechanics and functional mobility. Patient and therapist focused on proper form during treatment to ensure optimal strengthening of each targeted muscle group.  Machines utilized included:Torso rotation, Leg Ext, Leg Curl, Chest Press, and Rowing,Torso rotation, Leg Ext, Leg Curl, Chest Press, and Rowing      Omar participated in dynamic functional therapeutic activities to improve functional performance and simulate household and community activities for 0 minutes. The following activities were included:      Omar received manual therapy techniques for 0 minutes. The following activities were included:  Prone sacral joint mobs/distractions, grade I/II for pain relief     Pt given cold pack for 5 minutes to low back  in z-lie.    Patient Education and Home Exercises     Home exercises include:  LTR on ball   DKTC  PPT   Bridge   Seated ball rollouts   Seated piriformis stretch   SLR  Supine nerve glides     Cardio program (V5): - Stationary bike 30 min and 30 min on elliptical 2x/week  Lifting education (V11): -  Posture/Lumbar roll: yes  Fr[employer] Discharge handout (date given): -  Equipment at home/gym membership: no - Has stationary bike at home    Education provided:   - PT role and POC  - HEP    Written Home Exercises Provided: Patient instructed to cont prior HEP.  Exercises were reviewed and Omar was able to demonstrate them prior to the end of the session.  Omar demonstrated good  understanding of the education provided.     See EMR under Patient Instructions for exercises provided prior visit.    Assessment     Omar presents to therapy with reports of reduced lumbar pain with centralized radicular symptoms into R knee vs foot. He entered gym holding standard cane for long distances ambulation only. He was able to perform therex w/o adverse effects, demonstrating improve tolerance . Lumbar MedX completed with resistance increased to  88 lbs, achieving  15 reps at  4/10 RPE.  Peripheral muscle strength training completed without any discomfort with slight increase resistance and reps. Continue program per pt tolerance.     Patient is making good progress towards established goals.  Pt will continue to benefit from skilled outpatient physical therapy to address the deficits stated in the impairment chart, provide pt/family education and to maximize pt's level of independence in the home and community environment.     Anticipated Barriers for therapy: chronicity of condition, herniated lumbar disc, stenosis     Pt's spiritual, cultural and educational needs considered and pt agreeable to plan of care and goals as stated below:     GOALS: Pt is in agreement with the following goals.     Short term goals:  6 weeks or 10 visits   - Pt will demonstrate increased lumbar MedX ROM by at least 6 degrees from the initial ROM value with improvements noted in functional ROM and ability to perform ADLs. Appropriate and Ongoing  - Pt will demonstrate increased MedX average isometric strength value by 15% from initial test resulting in improved ability to perform bending, lifting, and carrying activities safely, confidently. Appropriate and Ongoing  - Pt will report a reduction in worst pain score by 1-2 points for improved tolerance for standing. Appropriate and Ongoing  - Pt able to perform HEP correctly with minimal cueing or supervision from therapist to encourage independent management of symptoms. Appropriate and Ongoing     Long term goals: 10 weeks or 20 visits   - Pt will demonstrate increased lumbar MedX ROM by at least 9 degrees from initial ROM value, resulting in improved ability to perform functional forward bending while standing and sitting. Appropriate and Ongoing  - Pt will demonstrate increased MedX average isometric strength value by 30% from initial test resulting in improved ability to perform bending, lifting, and carrying activities safely and confidently. Appropriate  and Ongoing  - Pt to demonstrate ability to independently control and reduce their pain through posture positioning and mechanical movements throughout a typical day. Appropriate and Ongoing  - Pt will demonstrate reduced pain and improved functional outcomes as reported on the FOTO by reaching an intake score of >/= 57% functional ability in order to demonstrate subjective improvement in patient's condition. . Appropriate and Ongoing  - Pt will demonstrate independence with the HEP at discharge. Appropriate and Ongoing  - Pt will be able to stand and walk for 1 hour in order to have improved tolerance to ADL's and leisure activities. Appropriate and Ongoing       Plan     Continue with established Plan of Care towards established PT goals.     Therapist: Romelia Ceballos, PTA  6/25/2024

## 2024-06-25 NOTE — TELEPHONE ENCOUNTER
Patient requesting refill on ramadol   Last office visit 2/28/24   shows last refill on 5/20/2024  Patient does not have a pain contract on file with [facility] department  Patient last UDS 2/28/24 was not consistent with current therapy     CODEINE   Not Detected   Comment: INTERPRETIVE INFORMATION: Codeine, U  Positive Cutoff: 40 ng/mL  Methodology: Mass Spectrometry   MORPHINE   Not Detected   Comment: INTERPRETIVE INFORMATION:Morphine, U  Positive Cutoff: 20 ng/mL  Methodology: Mass Spectrometry   6-ACETYLMORPHINE   Not Detected   Comment: INTERPRETIVE INFORMATION:6-acetylmorphine, U  Positive Cutoff: 20 ng/mL  Methodology: Mass Spectrometry   OXYCODONE   Not Detected   Comment: INTERPRETIVE INFORMATION:Oxycodone, U  Positive Cutoff: 40 ng/mL  Methodology: Mass Spectrometry   NOROYXCODONE   Not Detected   Comment: INTERPRETIVE INFORMATION:Noroxycodone, U  Positive Cutoff: 100 ng/mL  Methodology: Mass Spectrometry   OXYMORPHONE   Not Detected   Comment: INTERPRETIVE INFORMATION:Oxymorphone, U  Positive Cutoff: 40 ng/mL  Methodology: Mass Spectrometry   NOROXYMORPHONE   Not Detected   Comment: INTERPRETIVE INFORMATION:Noroxymorphone, U  Positive Cutoff: 100 ng/mL  Methodology: Mass Spectrometry   HYDROCODONE   Not Detected   Comment: INTERPRETIVE INFORMATION:Hydrocodone, U  Positive Cutoff: 40 ng/mL  Methodology: Mass Spectrometry   NORHYDROCODONE   Not Detected   Comment: INTERPRETIVE INFORMATION:Norhydrocodone, U  Positive Cutoff: 100 ng/mL  Methodology: Mass Spectrometry   HYDROMORPHONE   Not Detected   Comment: INTERPRETIVE INFORMATION:Hydromorphone, U  Positive Cutoff: 20 ng/mL  Methodology: Mass Spectrometry   BUPRENORPHINE   Not Detected   Comment: INTERPRETIVE INFORMATION:Buprenorphine, U  Positive Cutoff: 5 ng/mL  Methodology: Mass Spectrometry   NORUBPRENORPHINE   Not Detected   Comment: INTERPRETIVE INFORMATION:Norbuprenorphine, U  Positive Cutoff: 20 ng/mL  Methodology: Mass  Spectrometry   FENTANYL   Not Detected   Comment: INTERPRETIVE INFORMATION:Fentanyl, U  Positive Cutoff: 2 ng/mL  Methodology: Mass Spectrometry   NORFENTANYL   Not Detected   Comment: INTERPRETIVE INFORMATION:Norfentanyl, U  Positive Cutoff: 2 ng/mL  Methodology: Mass Spectrometry   MEPERIDINE METABOLITE   Not Detected   Comment: INTERPRETIVE INFORMATION:Meperidine metabolite, U  Positive Cutoff: 50 ng/mL  Methodology: Mass Spectrometry   TAPENTADOL   Not Detected   Comment: INTERPRETIVE INFORMATION:Tapentadol, U  Positive Cutoff: 100 ng/mL  Methodology: Mass Spectrometry   TAPENTADOL-O-SULF   Not Detected   Comment: INTERPRETIVE INFORMATION:Tapentadol-o-Sulf, U  Positive Cutoff: 200 ng/mL  Methodology: Mass Spectrometry   METHADONE   Negative   Comment: Presumptive negative by immunoassay. Testing by mass  spectrometry is available on request.  INTERPRETIVE INFORMATION: Methadone Screen, U  Positive Cutoff: 150 ng/mL  Methodology: Immunoassay   TRAMADOL   Negative   Comment: Presumptive negative by immunoassay. Testing by mass  spectrometry is available on request.  INTERPRETIVE INFORMATION:Tramadol Screen, U  Positive Cutoff: 100 ng/mL  Methodology: Immunoassay   AMPHETAMINE   Not Detected   Comment: INTERPRETIVE INFORMATION:Amphetamine, U  Positive Cutoff: 50 ng/mL  Methodology: Mass Spectrometry   METHAMPHETAMINE   Not Detected   Comment: INTERPRETIVE INFORMATION:Methamphetamine, U  Positive Cutoff: 200 ng/mL  Methodology: Mass Spectrometry   MDMA- ECSTASY   Not Detected   Comment: INTERPRETIVE INFORMATION:MDMA, U  Positive Cutoff: 200 ng/mL  Methodology: Mass Spectrometry   MDA   Not Detected   Comment: INTERPRETIVE INFORMATION:MDA, U  Positive Cutoff: 200 ng/mL  Methodology: Mass Spectrometry   MDEA- Angelia   Not Detected   Comment: INTERPRETIVE INFORMATION:MDEA, U  Positive Cutoff: 200 ng/mL  Methodology: Mass Spectrometry   METHYLPHENIDATE   Not Detected   Comment: INTERPRETIVE INFORMATION:Methylphenidate,  U  Positive Cutoff: 100 ng/mL  Methodology: Mass Spectrometry   PHENTERMINE   Not Detected   Comment: INTERPRETIVE INFORMATION:Phentermine, U  Positive Cutoff: 100 ng/mL  Methodology: Mass Spectrometry   BENZOYLECGONINE   Negative   Comment: Presumptive negative by immunoassay. Testing by mass  spectrometry is available on request.  INTERPRETIVE INFORMATION:Cocaine Screen, U  Positive Cutoff: 150 ng/mL  Methodology: Immunoassay   ALPRAZOLAM   Not Detected   Comment: INTERPRETIVE INFORMATION:Alprazolam, U  Positive Cutoff: 40 ng/mL  Methodology: Mass Spectrometry   ALPHA-OH-ALPRAZOLAM   Not Detected   Comment: INTERPRETIVE INFORMATION:Alpha-OH-Alprazolam, U  Positive Cutoff: 20 ng/mL  Methodology: Mass Spectrometry   CLONAZEPAM   Not Detected   Comment: INTERPRETIVE INFORMATION:Clonazepam, U  Positive Cutoff: 20 ng/mL  Methodology: Mass Spectrometry   7-AMINOCLONAZEPAM   Not Detected   Comment: INTERPRETIVE INFORMATION:7-Aminoclonazepam, U  Positive Cutoff: 40 ng/mL  Methodology: Mass Spectrometry   DIAZEPAM   Not Detected   Comment: INTERPRETIVE INFORMATION:Diazepam, U  Positive Cutoff: 50 ng/mL  Methodology: Mass Spectrometry   NORDIAZEPAM   Not Detected   Comment: INTERPRETIVE INFORMATION:Nordiazepam, U  Positive Cutoff: 50 ng/mL  Methodology: Mass Spectrometry   OXAZEPAM   Not Detected   Comment: INTERPRETIVE INFORMATION:Oxazepam, U  Positive Cutoff: 50 ng/mL  Methodology: Mass Spectrometry   TEMAZEPAM   Not Detected   Comment: INTERPRETIVE INFORMATION:Temazepam, U  Positive Cutoff: 50 ng/mL  Methodology: Mass Spectrometry   Lorazepam   Not Detected   Comment: INTERPRETIVE INFORMATION:Lorazepam, U  Positive Cutoff: 60 ng/mL  Methodology: Mass Spectrometry   MIDAZOLAM   Not Detected   Comment: INTERPRETIVE INFORMATION:Midazolam, U  Positive Cutoff: 20 ng/mL  Methodology: Mass Spectrometry   ZOLPIDEM   Not Detected   Comment: INTERPRETIVE INFORMATION:Zolpidem, U  Positive Cutoff: 20 ng/mL  Methodology: Mass  Spectrometry   BARBITURATES   Negative   Comment: Presumptive negative by immunoassay. Testing by mass  spectrometry is available on request.  INTERPRETIVE INFORMATION:Barbiturates Screen, U  Positive Cutoff: 200 ng/mL  Methodology: Immunoassay   Creatinine, Urine 20.0 - 400.0 mg/dL 115.2   ETHYL GLUCURONIDE   PresumptivePOS   Comment: Presumptive positive by immunoassay. Testing by mass  spectrometry is available on request.  INTERPRETIVE INFORMATION:Ethyl Glucuronide Screen, U  Positive Cutoff: 500 ng/mL  Methodology: Immunoassay   MARIJUANA METABOLITE   Negative   Comment: Presumptive negative by immunoassay. Testing by mass  spectrometry is available on request.  INTERPRETIVE INFORMATION: THC (Cannabinoids) Screen, U  Positive Cutoff: 50 ng/mL  Methodology: Immunoassay   PCP   Negative   Comment: Presumptive negative by immunoassay. Testing by mass  spectrometry is available on request.  INTERPRETIVE INFORMATION:Phencyclidine Screen, U  Positive Cutoff: 25 ng/mL  Methodology: Immunoassay   CARISOPRODOL   Negative   Comment: Presumptive negative by immunoassay. Testing by mass  spectrometry is available on request.  INTERPRETIVE INFORMATION: Carisoprodol Screen, U  Positive Cutoff: 100 ng/mL  Methodology: Immunoassay  The carisoprodol immunoassay has cross-reactivity to  carisoprodol and meprobamate.   Naloxone   Not Detected   Comment: INTERPRETIVE INFORMATION:Naloxone, U  Positive Cutoff: 100 ng/mL  Methodology: Mass Spectrometry   Gabapentin   Present   Comment: INTERPRETIVE INFORMATION:Gabapentin, U  Positive Cutoff: 3,000 ng/mL  Methodology: Mass Spectrometry   Pregabalin   Not Detected   Comment: INTERPRETIVE INFORMATION:Pregabalin, U  Positive Cutoff: 3,000 ng/mL  Methodology: Mass Spectrometry   Alpha-OH-Midazolam   Not Detected   Comment: INTERPRETIVE INFORMATION:Alpha-OH-Midazolam, U  Positive Cutoff: 20 ng/mL  Methodology: Mass Spectrometry   Zolpidem Metabolite   Not Detected   Comment: INTERPRETIVE  INFORMATION:Zolpidem Metabolite, U  Positive Cutoff: 100 ng/mL  Methodology: Mass Spectrometry

## 2024-06-27 ENCOUNTER — CLINICAL SUPPORT (OUTPATIENT)
Dept: REHABILITATION | Facility: OTHER | Age: 74
End: 2024-06-27
Payer: MEDICARE

## 2024-06-27 DIAGNOSIS — R29.898 DECREASED STRENGTH OF TRUNK AND BACK: ICD-10-CM

## 2024-06-27 DIAGNOSIS — M25.69 DECREASED RANGE OF MOTION OF TRUNK AND BACK: Primary | ICD-10-CM

## 2024-06-27 PROCEDURE — 97110 THERAPEUTIC EXERCISES: CPT | Mod: CQ

## 2024-06-27 NOTE — PROGRESS NOTES
"[facility]  Physical Therapy Treatment Note     Name: Omar Pacheco  Clinic Number: [id number]    Therapy Diagnosis:   Encounter Diagnoses   Name Primary?    Decreased range of motion of trunk and back Yes    Decreased strength of trunk and back        Physician: Donya Hickey MD    Visit Date: 6/27/2024    Physician Orders: PT Eval and Treat  Medical Diagnosis from Referral: M48.07 (ICD-10-CM) - Spinal stenosis, lumbosacral region   Evaluation Date: 4/15/2024  Authorization Period Expiration: 2/27/2025  Plan of Care Expiration: 6/25/2024  Reassessment Due: 5/16/2024  Visit # / Visits authorized: 15/20  MedX testing visit 2     Time In: 10:13 AM (late arrival)  Time Out: 11:00 AM  Total Billable Time: 45 minutes   INSURANCE and OUTCOMES: Fee for Service with FOTO Outcomes 1/3     Precautions: standard, HTN, fall, and history of cancer     Pattern of pain determined: 1 PEN    PTA Visit #: 5/5     Subjective     Omar Pacheco reports centralized radicular symptoms to lumbar this date, demonstrating improved gait quality w/o standard cane. He also reports experiencing R leg into foot numbness yesterday with ambulation from car into [employer] that subsided with OTC meds management and RICE.         Patient reports tolerating previous visit well with mild soreness  Patient reports their pain to be 3/10 on a 0-10 scale with 0 being no pain and 10 being the worst pain imaginable.  Pain Location: central LB and R leg     Occupation: Retired  Leisure: [employer] meetings, Sabianism, dancing, travelling       Pts goals: "Be able to stand and walk for longer so that I can do the things I need to without pain"    Objective      Lumbar  Isometric Testing on Med X equipment: Testing administered by PT    Test Initial Baseline Midpoint Final   Date 4/17/2024 6/27/2024    ROM 0-42 deg 0-60 deg    Max Peak Torque 142  39    Min Peak Torque 34  25    Flex/Ext Ratio 4.17:1 1.56:1    % " "variance  normative data 46% 63%    % change from initial test N/A visit 1 350        Outcomes:  Intake Score: 41%  Visit 6 Score: 48%  Visit 10 Score:  43%  Discharge Score:  Goal Score: 57%     Treatment   (Bold = completed)   Omar received the treatments listed below:            Omar participated in neuromuscular re-education activities to improve balance, coordination, proprioception, motor control and/or posture for 0 minutes. The following activities were included:    Medical MedX Treatment as follows:  Patient received 10 minutes via participation on the Medical MedX Machine. Therapist assisted patient in isolating and engaging spinal stabilization musculature in order to improve functional ability and postural control. Patient performed exercise with therapist guidance in order to accurately use pacer function, avoid valsalva, and optimally exert effort within a safe and effective range via the Cedrick Exertion Rating Scale. Patient instructed to perform at a midrange of exertion and to complete 15-20 repetitions within appropriate split time, with proper technique, and while maintaining safety.            6/27/2024    10:49 AM   [facility]   Visit Number 17   VAS Pain Rating 3   Recumbent Bike Seat Pos. 5   Extension in Standing 10   Lumbar Weight 88 lbs   Repetitions 18   Rating of Perceived Exertion 3   Ice - Z Lie (in min.) 5        Omar received therapeutic exercises to develop/improve posture, lumbar ROM, strength, and muscular endurance for 45 minutes including the following exercises:     LTR  10x5"  DKTC c/ PB x15"x5-  Open book str 10"x 5  PPT 5x5"NP  Posterior pelvic tilt 10x + march-  Posterior pelvic tilt 10x + BKFO GTB  TrA brace isometric  with ball x10 x 5"  SOC 15x5" - NP  Bridge  1x10 GTB  BRIAN with pillow 2" - NP  seated piriformis  stretch x2x30"-NP  supine SLR x10 -NP  seated ball roll outs x5x10"-NP  seated nerve glides x10-NP                 Peripheral muscle " strengthening which included one set of 15-20 repetitions at a slow and controlled 10-13 second per rep pace focused on strengthening supporting musculature in order to improve body mechanics and functional mobility. Patient and therapist focused on proper form during treatment to ensure optimal strengthening of each targeted muscle group.  Machines utilized included:Torso rotation, Leg Ext, Leg Curl, Chest Press, and Rowing,Torso rotation, Leg Ext, Leg Curl, Chest Press, and Rowing      Omar participated in dynamic functional therapeutic activities to improve functional performance and simulate household and community activities for 0 minutes. The following activities were included:      Omar received manual therapy techniques for 0 minutes. The following activities were included:  Prone sacral joint mobs/distractions, grade I/II for pain relief     Pt given cold pack for 5 minutes to low back  in z-lie.    Patient Education and Home Exercises     Home exercises include:  LTR on ball   DKTC  PPT   Bridge   Seated ball rollouts   Seated piriformis stretch   SLR  Supine nerve glides     Cardio program (V5): - Stationary bike 30 min and 30 min on elliptical 2x/week  Lifting education (V11): -  Posture/Lumbar roll: yes  [employer] Discharge handout (date given): -  Equipment at home/gym membership: no - Has stationary bike at home    Education provided:   - PT role and POC  - HEP    Written Home Exercises Provided: Patient instructed to cont prior HEP.  Exercises were reviewed and Omar was able to demonstrate them prior to the end of the session.  Omar demonstrated good  understanding of the education provided.     See EMR under Patient Instructions for exercises provided prior visit.    Assessment     Omar presents to therapy with reports of centralized radicular symptoms into lumbar, entering gym with improved gait and posture quality w/o standard cane. He completed therex w/o adverse effects  with min cues provided for technique during open book stretches and PPT BKFO. He responded well to modified standing trunk extension with neutral positioning achieved. Lumbar MedX performed with resistance maintained at 88 lbs, completing 18 reps at 3/10 RPE. Peripheral muscle strength training completed without any discomfort with slight increase in resistance/reps. Continue program per pt tolerance.     Patient is making good progress towards established goals.  Pt will continue to benefit from skilled outpatient physical therapy to address the deficits stated in the impairment chart, provide pt/family education and to maximize pt's level of independence in the home and community environment.     Anticipated Barriers for therapy: chronicity of condition, herniated lumbar disc, stenosis     Pt's spiritual, cultural and educational needs considered and pt agreeable to plan of care and goals as stated below:     GOALS: Pt is in agreement with the following goals.     Short term goals:  6 weeks or 10 visits   - Pt will demonstrate increased lumbar MedX ROM by at least 6 degrees from the initial ROM value with improvements noted in functional ROM and ability to perform ADLs. Appropriate and Ongoing  - Pt will demonstrate increased MedX average isometric strength value by 15% from initial test resulting in improved ability to perform bending, lifting, and carrying activities safely, confidently. Appropriate and Ongoing  - Pt will report a reduction in worst pain score by 1-2 points for improved tolerance for standing. Appropriate and Ongoing  - Pt able to perform HEP correctly with minimal cueing or supervision from therapist to encourage independent management of symptoms. Appropriate and Ongoing     Long term goals: 10 weeks or 20 visits   - Pt will demonstrate increased lumbar MedX ROM by at least 9 degrees from initial ROM value, resulting in improved ability to perform functional forward bending while standing and  sitting. Appropriate and Ongoing  - Pt will demonstrate increased MedX average isometric strength value by 30% from initial test resulting in improved ability to perform bending, lifting, and carrying activities safely and confidently. Appropriate and Ongoing  - Pt to demonstrate ability to independently control and reduce their pain through posture positioning and mechanical movements throughout a typical day. Appropriate and Ongoing  - Pt will demonstrate reduced pain and improved functional outcomes as reported on the FOTO by reaching an intake score of >/= 57% functional ability in order to demonstrate subjective improvement in patient's condition. . Appropriate and Ongoing  - Pt will demonstrate independence with the HEP at discharge. Appropriate and Ongoing  - Pt will be able to stand and walk for 1 hour in order to have improved tolerance to ADL's and leisure activities. Appropriate and Ongoing       Plan     Continue with established Plan of Care towards established PT goals.     Therapist: Romelia Ceballos, MISAEL  6/27/2024

## 2024-07-02 ENCOUNTER — CLINICAL SUPPORT (OUTPATIENT)
Dept: REHABILITATION | Facility: OTHER | Age: 74
End: 2024-07-02
Payer: MEDICARE

## 2024-07-02 DIAGNOSIS — M25.69 DECREASED RANGE OF MOTION OF TRUNK AND BACK: Primary | ICD-10-CM

## 2024-07-02 DIAGNOSIS — R29.898 DECREASED STRENGTH OF TRUNK AND BACK: ICD-10-CM

## 2024-07-02 PROCEDURE — 97110 THERAPEUTIC EXERCISES: CPT | Mod: CQ

## 2024-07-02 NOTE — PROGRESS NOTES
"[facility]  Physical Therapy Treatment Note     Name: Omar Pacheco  Clinic Number: [id number]    Therapy Diagnosis:   Encounter Diagnoses   Name Primary?    Decreased range of motion of trunk and back Yes    Decreased strength of trunk and back          Physician: Donya Hickey MD    Visit Date: 7/2/2024    Physician Orders: PT Eval and Treat  Medical Diagnosis from Referral: M48.07 (ICD-10-CM) - Spinal stenosis, lumbosacral region   Evaluation Date: 4/15/2024  Authorization Period Expiration: 2/27/2025  Plan of Care Expiration: 6/25/2024  Reassessment Due: 5/16/2024  Visit # / Visits authorized: 18/20  MedX testing visit 2     Time In: 10:27 AM (late arrival)  Time Out: 11:27 AM  Total Billable Time: 38 minutes   INSURANCE and OUTCOMES: Fee for Service with FOTO Outcomes 1/3     Precautions: standard, HTN, fall, and history of cancer     Pattern of pain determined: 1 PEN    PTA Visit #: 6/5     Subjective     Omar Pacheco reports radiating pain and numbness into R LE extending into the foot occurring more often and with increased intensity.  LBP, however has decreased in intensity and occurrence. Pt states he has not been using his SC as much, only for longer walks.        Patient reports tolerating previous visit well with mild soreness  Patient reports their pain to be 4/10 on a 0-10 scale with 0 being no pain and 10 being the worst pain imaginable.  Pain Location: central LB and R leg     Occupation: Retired  Leisure: [employer] meetings, Voodoo, dancing, travelling       Pts goals: "Be able to stand and walk for longer so that I can do the things I need to without pain"    Objective      Lumbar  Isometric Testing on Med X equipment: Testing administered by PT    Test Initial Baseline Midpoint Final   Date 4/17/2024 7/2/2024    ROM 0-42 deg 0-60 deg    Max Peak Torque 142  39    Min Peak Torque 34  25    Flex/Ext Ratio 4.17:1 1.56:1    % variance  normative " "data 46% 63%    % change from initial test N/A visit 1 350        Outcomes:  Intake Score: 41%  Visit 6 Score: 48%  Visit 10 Score:  43%  Discharge Score:  Goal Score: 57%     Treatment   (Bold = completed)   Omar received the treatments listed below:            Omar participated in neuromuscular re-education activities to improve balance, coordination, proprioception, motor control and/or posture for 0 minutes. The following activities were included:      Omar received therapeutic exercises:  Medical MedX Treatment as follows:  Patient received 10 minutes via participation on the Medical MedX Machine. Therapist assisted patient in isolating and engaging spinal stabilization musculature in order to improve functional ability and postural control. Patient performed exercise with therapist guidance in order to accurately use pacer function, avoid valsalva, and optimally exert effort within a safe and effective range via the Cedrick Exertion Rating Scale. Patient instructed to perform at a midrange of exertion and to complete 15-20 repetitions within appropriate split time, with proper technique, and while maintaining safety.          7/2/2024    11:01 AM   [facility]   Flexion in Lying 10   Lumbar Weight 88 lbs   Repetitions 20   Rating of Perceived Exertion 4   Ice - Z Lie (in min.) 5       Omar received therapeutic exercises to develop/improve posture, lumbar ROM, strength, and muscular endurance for 45 minutes including the following exercises:     LTR  15x5"  DKTC c/ PB x15"x5  Open book str x 10  PPT 5x5"  Posterior pelvic tilt 10x + march-  Posterior pelvic tilt 10x + BKFO GTB  TrA brace isometric  with ball + SLR x10  SOC 15x5" - NP  Bridge  x10 GTB  BRIAN with pillow 2" - NP  seated piriformis  stretch x2x30"-NP  supine SLR x10 -NP  seated ball roll outs x5x10"-NP  Supine nerve glides x10 R only      Peripheral muscle strengthening which included one set of 15-20 repetitions at a slow and " controlled 10-13 second per rep pace focused on strengthening supporting musculature in order to improve body mechanics and functional mobility. Patient and therapist focused on proper form during treatment to ensure optimal strengthening of each targeted muscle group.  Machines utilized included:Torso rotation, Leg Ext, Leg Curl, Chest Press, and Rowing,Torso rotation, Leg Ext, Leg Curl, Chest Press, and Rowing      Omar participated in dynamic functional therapeutic activities to improve functional performance and simulate household and community activities for 0 minutes. The following activities were included:      Omar received manual therapy techniques for 0 minutes. The following activities were included:  Prone sacral joint mobs/distractions, grade I/II for pain relief     Pt given cold pack for 5 minutes to low back  in z-lie.    Patient Education and Home Exercises     Home exercises include:  LTR on ball   DKTC  PPT   Bridge   Seated ball rollouts   Seated piriformis stretch   SLR  Supine nerve glides     Cardio program (V5): - Stationary bike 30 min and 30 min on elliptical 2x/week  Lifting education (V11): -  Posture/Lumbar roll: yes  [employer] Discharge handout (date given): -  Equipment at home/gym membership: no - Has stationary bike at home    Education provided:   - PT role and POC  - HEP    Written Home Exercises Provided: Patient instructed to cont prior HEP.  Exercises were reviewed and Omar was able to demonstrate them prior to the end of the session.  Omar demonstrated good  understanding of the education provided.     See EMR under Patient Instructions for exercises provided prior visit.    Assessment     Omar presents to therapy with return of radiating symptoms correlating to times not using cane for ambulation assistance.    He completed therex w/o adverse effects with min cues provided for technique during open book stretches. Reintroduced R LE nerve glide with  positive results with decreased numbness in foot. Lumbar MedX performed with resistance maintained at 88 ft/lbs, completing 20 reps at 3/10 RPE. Peripheral muscle strength training completed without any discomfort with slight increase in resistance/reps. Continue program per pt tolerance.     Patient is making good progress towards established goals.  Pt will continue to benefit from skilled outpatient physical therapy to address the deficits stated in the impairment chart, provide pt/family education and to maximize pt's level of independence in the home and community environment.     Anticipated Barriers for therapy: chronicity of condition, herniated lumbar disc, stenosis     Pt's spiritual, cultural and educational needs considered and pt agreeable to plan of care and goals as stated below:     GOALS: Pt is in agreement with the following goals.     Short term goals:  6 weeks or 10 visits   - Pt will demonstrate increased lumbar MedX ROM by at least 6 degrees from the initial ROM value with improvements noted in functional ROM and ability to perform ADLs. Appropriate and Ongoing  - Pt will demonstrate increased MedX average isometric strength value by 15% from initial test resulting in improved ability to perform bending, lifting, and carrying activities safely, confidently. Appropriate and Ongoing  - Pt will report a reduction in worst pain score by 1-2 points for improved tolerance for standing. Appropriate and Ongoing  - Pt able to perform HEP correctly with minimal cueing or supervision from therapist to encourage independent management of symptoms. Appropriate and Ongoing     Long term goals: 10 weeks or 20 visits   - Pt will demonstrate increased lumbar MedX ROM by at least 9 degrees from initial ROM value, resulting in improved ability to perform functional forward bending while standing and sitting. Appropriate and Ongoing  - Pt will demonstrate increased MedX average isometric strength value by 30% from  initial test resulting in improved ability to perform bending, lifting, and carrying activities safely and confidently. Appropriate and Ongoing  - Pt to demonstrate ability to independently control and reduce their pain through posture positioning and mechanical movements throughout a typical day. Appropriate and Ongoing  - Pt will demonstrate reduced pain and improved functional outcomes as reported on the FOTO by reaching an intake score of >/= 57% functional ability in order to demonstrate subjective improvement in patient's condition. . Appropriate and Ongoing  - Pt will demonstrate independence with the HEP at discharge. Appropriate and Ongoing  - Pt will be able to stand and walk for 1 hour in order to have improved tolerance to ADL's and leisure activities. Appropriate and Ongoing       Plan     Continue with established Plan of Care towards established PT goals.     Therapist: Hellen Phillips, PTA  7/2/2024

## 2024-07-05 ENCOUNTER — CLINICAL SUPPORT (OUTPATIENT)
Dept: REHABILITATION | Facility: OTHER | Age: 74
End: 2024-07-05
Payer: MEDICARE

## 2024-07-05 DIAGNOSIS — R29.898 DECREASED STRENGTH OF TRUNK AND BACK: ICD-10-CM

## 2024-07-05 DIAGNOSIS — M25.69 DECREASED RANGE OF MOTION OF TRUNK AND BACK: Primary | ICD-10-CM

## 2024-07-05 PROCEDURE — 97112 NEUROMUSCULAR REEDUCATION: CPT

## 2024-07-05 PROCEDURE — 97110 THERAPEUTIC EXERCISES: CPT

## 2024-07-05 NOTE — PROGRESS NOTES
"[facility]  Physical Therapy Treatment Note     Name: Omar Pahceco  Clinic Number: [id number]    Therapy Diagnosis:   Encounter Diagnoses   Name Primary?    Decreased range of motion of trunk and back Yes    Decreased strength of trunk and back            Physician: Donya Hickey MD    Visit Date: 7/5/2024    Physician Orders: PT Eval and Treat  Medical Diagnosis from Referral: M48.07 (ICD-10-CM) - Spinal stenosis, lumbosacral region   Evaluation Date: 4/15/2024  Authorization Period Expiration: 2/27/2025  Plan of Care Expiration: 6/25/2024  Reassessment Due: 5/16/2024  Visit # / Visits authorized: 19/20  MedX testing visit 2     Time In: 11:00 AM  Time Out: 11:55 AM  Total Billable Time: 55 minutes   INSURANCE and OUTCOMES: Fee for Service with FOTO Outcomes 1/3     Precautions: standard, HTN, fall, and history of cancer     Pattern of pain determined: 1 PEN    PTA Visit #: 0/5     Subjective     Omar Pacheco reports experiencing increased pain this morning; however, symptoms subsided to minimal pain.        Patient reports tolerating previous visit well with mild soreness  Patient reports their pain to be 3/10 on a 0-10 scale with 0 being no pain and 10 being the worst pain imaginable.  Pain Location: central LB and R leg     Occupation: Retired  Leisure: [employer], [employer], dancing, travelling       Pts goals: "Be able to stand and walk for longer so that I can do the things I need to without pain"    Objective      Lumbar  Isometric Testing on Med X equipment: Testing administered by PT    Test Initial Baseline Midpoint Final   Date 4/17/2024 7/5/2024    ROM 0-42 deg 0-60 deg    Max Peak Torque 142  39    Min Peak Torque 34  25    Flex/Ext Ratio 4.17:1 1.56:1    % variance  normative data 46% 63%    % change from initial test N/A visit 1 350        Outcomes:  Intake Score: 41%  Visit 6 Score: 48%  Visit 10 Score:  43%  Discharge Score:  Goal Score: " "57%     Treatment   (Bold = completed)   Omar received the treatments listed below:            Omar participated in neuromuscular re-education activities to improve balance, coordination, proprioception, motor control and/or posture for 0 minutes. The following activities were included:      Omar received therapeutic exercises:  Medical MedX Treatment as follows:  Patient received 10 minutes via participation on the Medical MedX Machine. Therapist assisted patient in isolating and engaging spinal stabilization musculature in order to improve functional ability and postural control. Patient performed exercise with therapist guidance in order to accurately use pacer function, avoid valsalva, and optimally exert effort within a safe and effective range via the Cedrick Exertion Rating Scale. Patient instructed to perform at a midrange of exertion and to complete 15-20 repetitions within appropriate split time, with proper technique, and while maintaining safety.          7/5/2024    11:22 AM   [facility]   Visit Number 19   VAS Pain Rating 3   Recumbent Bike Seat Pos. 5   Extension in Lying 10   Extension in Standing 10   Flexion in Lying 10   Lumbar Weight 97 lbs   Repetitions 18   Rating of Perceived Exertion 3   Ice - Z Lie (in min.) 5         Omar received therapeutic exercises to develop/improve posture, lumbar ROM, strength, and muscular endurance for 45 minutes including the following exercises:     LTR  15x5"  DKTC c/ PB x15"x5  Open book str x 10  PPT 10x5"  Posterior pelvic tilt 10x + march-NP   Posterior pelvic tilt 10x + BKFO GTB  TrA brace isometric  with ball + SLR x10  SOC 15x5" - NP  Bridge  x10 GTB  BRIAN with pillow 2" - NP  seated piriformis  stretch x2x30"-NP  supine SLR x10 -NP  seated ball roll outs x5x10"-NP  Supine nerve glides x10 R only      Peripheral muscle strengthening which included one set of 15-20 repetitions at a slow and controlled 10-13 second per rep pace focused " on strengthening supporting musculature in order to improve body mechanics and functional mobility. Patient and therapist focused on proper form during treatment to ensure optimal strengthening of each targeted muscle group.  Machines utilized included:Torso rotation, Leg Ext, Leg Curl, Chest Press, and Rowing,Torso rotation, Leg Ext, Leg Curl, Chest Press, and Rowing      Omar participated in dynamic functional therapeutic activities to improve functional performance and simulate household and community activities for 0 minutes. The following activities were included:      Omar received manual therapy techniques for 0 minutes. The following activities were included:  Prone sacral joint mobs/distractions, grade I/II for pain relief     Pt given cold pack for 5 minutes to low back  in z-lie.    Patient Education and Home Exercises     Home exercises include:  LTR on ball   DKTC  PPT   Bridge   Seated ball rollouts   Seated piriformis stretch   SLR  Supine nerve glides     Cardio program (V5): - Stationary bike 30 min and 30 min on elliptical 2x/week  Lifting education (V11): -  Posture/Lumbar roll: yes  Fridge Magnet Discharge handout (date given): - 7/5/2024  Equipment at home/gym membership: no - Has stationary bike at home    Education provided:   - PT role and POC  - HEP    Written Home Exercises Provided: Patient instructed to cont prior HEP.  Exercises were reviewed and Omar was able to demonstrate them prior to the end of the session.  Omar demonstrated good  understanding of the education provided.     See EMR under Patient Instructions for exercises provided prior visit.    Assessment     Omar presents to therapy with minimal low back symptoms. Issued/reviewed fridge magnet with patient understanding. Lumbar MedX performed with resistance increased to 97 ft/lbs, completing 18 reps at 3/10 RPE. Slight increase in resistance and reps during peripheral circuits. Peripheral muscle strength  training completed without any discomfort with slight increase in resistance/reps. Continue program per pt tolerance. Patient agrees to upcoming discharge plans at [facility] due to completion of required 20 visits for HB program.     Patient is making good progress towards established goals.  Pt will continue to benefit from skilled outpatient physical therapy to address the deficits stated in the impairment chart, provide pt/family education and to maximize pt's level of independence in the home and community environment.     Anticipated Barriers for therapy: chronicity of condition, herniated lumbar disc, stenosis     Pt's spiritual, cultural and educational needs considered and pt agreeable to plan of care and goals as stated below:     GOALS: Pt is in agreement with the following goals.     Short term goals:  6 weeks or 10 visits   - Pt will demonstrate increased lumbar MedX ROM by at least 6 degrees from the initial ROM value with improvements noted in functional ROM and ability to perform ADLs. Appropriate and Ongoing  - Pt will demonstrate increased MedX average isometric strength value by 15% from initial test resulting in improved ability to perform bending, lifting, and carrying activities safely, confidently. Appropriate and Ongoing  - Pt will report a reduction in worst pain score by 1-2 points for improved tolerance for standing. Appropriate and Ongoing  - Pt able to perform HEP correctly with minimal cueing or supervision from therapist to encourage independent management of symptoms. Appropriate and Ongoing     Long term goals: 10 weeks or 20 visits   - Pt will demonstrate increased lumbar MedX ROM by at least 9 degrees from initial ROM value, resulting in improved ability to perform functional forward bending while standing and sitting. Appropriate and Ongoing  - Pt will demonstrate increased MedX average isometric strength value by 30% from initial test resulting in improved ability to perform bending,  lifting, and carrying activities safely and confidently. Appropriate and Ongoing  - Pt to demonstrate ability to independently control and reduce their pain through posture positioning and mechanical movements throughout a typical day. Appropriate and Ongoing  - Pt will demonstrate reduced pain and improved functional outcomes as reported on the FOTO by reaching an intake score of >/= 57% functional ability in order to demonstrate subjective improvement in patient's condition. . Appropriate and Ongoing  - Pt will demonstrate independence with the HEP at discharge. Appropriate and Ongoing  - Pt will be able to stand and walk for 1 hour in order to have improved tolerance to ADL's and leisure activities. Appropriate and Ongoing       Plan     Continue with established Plan of Care towards established PT goals.     Therapist: Kylie Portillo, PT  7/5/2024

## 2024-07-09 ENCOUNTER — CLINICAL SUPPORT (OUTPATIENT)
Dept: REHABILITATION | Facility: OTHER | Age: 74
End: 2024-07-09
Payer: MEDICARE

## 2024-07-09 DIAGNOSIS — R29.898 DECREASED STRENGTH OF TRUNK AND BACK: ICD-10-CM

## 2024-07-09 DIAGNOSIS — M25.69 DECREASED RANGE OF MOTION OF TRUNK AND BACK: Primary | ICD-10-CM

## 2024-07-09 PROCEDURE — 97110 THERAPEUTIC EXERCISES: CPT

## 2024-07-09 PROCEDURE — 97112 NEUROMUSCULAR REEDUCATION: CPT

## 2024-07-09 NOTE — PROGRESS NOTES
"[facility]  Physical Therapy Treatment Note     Name: Omar Pacheoc  Clinic Number: [id number]    Therapy Diagnosis:   Encounter Diagnoses   Name Primary?    Decreased range of motion of trunk and back Yes    Decreased strength of trunk and back              Physician: Donya Hickey MD    Visit Date: 7/9/2024    Physician Orders: PT Eval and Treat  Medical Diagnosis from Referral: M48.07 (ICD-10-CM) - Spinal stenosis, lumbosacral region   Evaluation Date: 4/15/2024  Authorization Period Expiration: 2/27/2025  Plan of Care Expiration: 6/25/2024  Reassessment Due: 5/16/2024  Visit # / Visits authorized: 20/20  MedX testing visit 2     Time In: 10:00 AM  Time Out: 10:55 AM  Total Billable Time: 55 minutes   INSURANCE and OUTCOMES: Fee for Service with FOTO Outcomes 1/3     Precautions: standard, HTN, fall, and history of cancer     Pattern of pain determined: 1 PEN    PTA Visit #: 0/5     Subjective     Omar Pacheco reports minimal low back pain. He was able to attend the gym yesterday with only slight pain during the night which subsided this morning. Patient agrees to discharge plans today. He has plan to attend gym 3 days a week and do HEP at home gym the other 2 days a week.        Patient reports tolerating previous visit well with mild soreness  Patient reports their pain to be 3/10 on a 0-10 scale with 0 being no pain and 10 being the worst pain imaginable.  Pain Location: central LB and R leg     Occupation: Retired  Leisure: [employer] meetings, [employer], dancing, travelling       Pts goals: "Be able to stand and walk for longer so that I can do the things I need to without pain"    Objective      Lumbar  Isometric Testing on Med X equipment: Testing administered by PT    Test Initial Baseline Midpoint Final   Date 4/17/2024 7/9/2024 7/9/2024   ROM 0-42 deg 0-60 deg 0-66 deg   Max Peak Torque 142  39 143    Min Peak Torque 34  25 53    Flex/Ext Ratio 4.17:1 " "1.56:1 2.70:1    % variance  normative data 46% 63% 148%    % change from initial test N/A visit 1 350 593        Outcomes:  Intake Score: 41%  Visit 6 Score: 48%  Visit 10 Score:  43%  Discharge Score: 46%   Goal Score: 57%     Treatment   (Bold = completed)   Omar received the treatments listed below:            Omar participated in neuromuscular re-education activities to improve balance, coordination, proprioception, motor control and/or posture for 0 minutes. The following activities were included:      Omar received therapeutic exercises:  Medical MedX Treatment as follows:  Patient received 10 minutes via participation on the Medical MedX Machine. Therapist assisted patient in isolating and engaging spinal stabilization musculature in order to improve functional ability and postural control. Patient performed exercise with therapist guidance in order to accurately use pacer function, avoid valsalva, and optimally exert effort within a safe and effective range via the Cedrick Exertion Rating Scale. Patient instructed to perform at a midrange of exertion and to complete 15-20 repetitions within appropriate split time, with proper technique, and while maintaining safety.          7/9/2024     9:58 AM   [facility]   Visit Number 20   Recumbent Bike Seat Pos. 5   Extension in Lying 10   Extension in Standing 10   Flexion in Lying 10   Lumbar Flexion 66   Lumbar Extension 0   Lumbar Peak Torque 143 ft. lbs.   Min Torque 53   Test Percent Below Normative Data 18 %   Test Percent Gain in Strength from Initial  593 %   Ice - Z Lie (in min.) 5           Omar received therapeutic exercises to develop/improve posture, lumbar ROM, strength, and muscular endurance for 45 minutes including the following exercises:     LTR  15x5"  DKTC c/ PB x15"x5  Open book str x 10  PPT 10x5"  Posterior pelvic tilt 10x + march-NP   Posterior pelvic tilt 10x + BKFO GTB  TrA brace isometric  with ball + SLR x10  SOC " "15x5" - NP  Bridge  x10 GTB  BRIAN with pillow 2" - NP  seated piriformis  stretch x2x30"-NP  supine SLR x10 -NP  seated ball roll outs x5x10"-NP  Supine nerve glides x10 R only      Peripheral muscle strengthening which included one set of 15-20 repetitions at a slow and controlled 10-13 second per rep pace focused on strengthening supporting musculature in order to improve body mechanics and functional mobility. Patient and therapist focused on proper form during treatment to ensure optimal strengthening of each targeted muscle group.  Machines utilized included:Torso rotation, Leg Ext, Leg Curl, Chest Press, and Rowing,Torso rotation, Leg Ext, Leg Curl, Chest Press, and Rowing      Omar participated in dynamic functional therapeutic activities to improve functional performance and simulate household and community activities for 0 minutes. The following activities were included:      Omar received manual therapy techniques for 0 minutes. The following activities were included:  Prone sacral joint mobs/distractions, grade I/II for pain relief     Pt given cold pack for 5 minutes to low back  in z-lie.    Patient Education and Home Exercises     Home exercises include:  LTR on ball   DKTC  PPT   Bridge   Seated ball rollouts   Seated piriformis stretch   SLR  Supine nerve glides     Cardio program (V5): - Stationary bike 30 min and 30 min on elliptical 2x/week  Lifting education (V11): -  Posture/Lumbar roll: yes  Fridge Magnet Discharge handout (date given): - 7/9/2024  Equipment at home/gym membership: no - Has stationary bike at home    Education provided:   - PT role and POC  - HEP    Written Home Exercises Provided: Patient instructed to cont prior HEP.  Exercises were reviewed and Omar was able to demonstrate them prior to the end of the session.  Omar demonstrated good  understanding of the education provided.     See EMR under Patient Instructions for exercises provided prior " visit.    Assessment     Omar presents to therapy with minimal low back symptoms. Noted increased in strength, endurance and stability in lumbar paraspinals based on progressions with objective measurements in Lumbar MedX testing.  Slight increase in resistance and reps during peripheral circuits. Peripheral muscle strength training completed without any discomfort with slight increase in resistance/reps. Continue program per pt tolerance. Patient agrees to upcoming discharge plans at [facility] due to completion of required 20 visits for HB program.     Patient is making good progress towards established goals.  Pt will continue to benefit from skilled outpatient physical therapy to address the deficits stated in the impairment chart, provide pt/family education and to maximize pt's level of independence in the home and community environment.     Anticipated Barriers for therapy: chronicity of condition, herniated lumbar disc, stenosis     Pt's spiritual, cultural and educational needs considered and pt agreeable to plan of care and goals as stated below:     GOALS: Pt is in agreement with the following goals.     Short term goals:  6 weeks or 10 visits   - Pt will demonstrate increased lumbar MedX ROM by at least 6 degrees from the initial ROM value with improvements noted in functional ROM and ability to perform ADLs. Appropriate and Ongoing  - Pt will demonstrate increased MedX average isometric strength value by 15% from initial test resulting in improved ability to perform bending, lifting, and carrying activities safely, confidently. Appropriate and Ongoing  - Pt will report a reduction in worst pain score by 1-2 points for improved tolerance for standing. Appropriate and Ongoing  - Pt able to perform HEP correctly with minimal cueing or supervision from therapist to encourage independent management of symptoms. Appropriate and Ongoing     Long term goals: 10 weeks or 20 visits   - Pt will demonstrate increased  lumbar MedX ROM by at least 9 degrees from initial ROM value, resulting in improved ability to perform functional forward bending while standing and sitting. Appropriate and Ongoing  - Pt will demonstrate increased MedX average isometric strength value by 30% from initial test resulting in improved ability to perform bending, lifting, and carrying activities safely and confidently. Appropriate and Ongoing  - Pt to demonstrate ability to independently control and reduce their pain through posture positioning and mechanical movements throughout a typical day. Appropriate and Ongoing  - Pt will demonstrate reduced pain and improved functional outcomes as reported on the FOTO by reaching an intake score of >/= 57% functional ability in order to demonstrate subjective improvement in patient's condition. . Appropriate and Ongoing  - Pt will demonstrate independence with the HEP at discharge. Appropriate and Ongoing  - Pt will be able to stand and walk for 1 hour in order to have improved tolerance to ADL's and leisure activities. Appropriate and Ongoing       Plan     Continue with established Plan of Care towards established PT goals.     Therapist: Kylie Portillo, PT  7/9/2024

## 2024-07-12 ENCOUNTER — TELEPHONE (OUTPATIENT)
Dept: PAIN MEDICINE | Facility: CLINIC | Age: 74
End: 2024-07-12
Payer: MEDICARE

## 2024-07-12 NOTE — TELEPHONE ENCOUNTER
----- Message from Candida Sneed sent at 7/12/2024  2:04 PM CDT -----  Regarding: Call Back  Name of Who is Calling:  Patient          What is the request in detail:  This is the patient second time calling to have Dr. Hickey call him            Can the clinic reply by [facility]: No            What Number to Call Back if not in [facility]: [phone]

## 2024-07-12 NOTE — TELEPHONE ENCOUNTER
----- Message from Candida Sneed sent at 7/12/2024 11:45 AM CDT -----  Regarding: Telephone Call  Name of Who is Calling:  Patient          What is the request in detail:  Patient stated he would like to have a phone call from Dr. Hickey nurse            Can the clinic reply by [facility]: Yes            What Number to Call Back if not in [facility]: [phone]

## 2024-07-16 ENCOUNTER — HOSPITAL ENCOUNTER (OUTPATIENT)
Dept: CARDIOLOGY | Facility: HOSPITAL | Age: 74
Discharge: HOME | End: 2024-07-16
Attending: NURSE PRACTITIONER
Payer: MEDICARE

## 2024-07-16 VITALS
WEIGHT: 223 LBS | DIASTOLIC BLOOD PRESSURE: 80 MMHG | HEART RATE: 62 BPM | BODY MASS INDEX: 33.03 KG/M2 | SYSTOLIC BLOOD PRESSURE: 125 MMHG | HEIGHT: 69 IN

## 2024-07-16 DIAGNOSIS — Z51.81 ENCOUNTER FOR MONITORING FLECAINIDE THERAPY: ICD-10-CM

## 2024-07-16 DIAGNOSIS — Z79.899 ENCOUNTER FOR MONITORING FLECAINIDE THERAPY: ICD-10-CM

## 2024-07-16 DIAGNOSIS — I48.0 PAROXYSMAL ATRIAL FIBRILLATION: Chronic | ICD-10-CM

## 2024-07-16 LAB
ASCENDING AORTA: 2.77 CM
AV INDEX (PROSTH): 0.66
AV MEAN GRADIENT: 4 MMHG
AV PEAK GRADIENT: 9 MMHG
AV VALVE AREA BY VELOCITY RATIO: 2.3 CM²
AV VALVE AREA: 2.26 CM²
AV VELOCITY RATIO: 0.68
BSA FOR ECHO PROCEDURE: 2.21 M2
CV ECHO LV RWT: 0.26 CM
DOP CALC AO PEAK VEL: 1.46 M/S
DOP CALC AO VTI: 37.9 CM
DOP CALC LVOT AREA: 3.4 CM2
DOP CALC LVOT DIAMETER: 2.08 CM
DOP CALC LVOT PEAK VEL: 0.99 M/S
DOP CALC LVOT STROKE VOLUME: 85.55 CM3
DOP CALCLVOT PEAK VEL VTI: 25.19 CM
E WAVE DECELERATION TIME: 223.04 MSEC
E/A RATIO: 1.43
E/E' RATIO: 11.58 M/S
ECHO LV POSTERIOR WALL: 0.73 CM (ref 0.6–1.1)
FRACTIONAL SHORTENING: 35 % (ref 28–44)
INTERVENTRICULAR SEPTUM: 0.69 CM (ref 0.6–1.1)
LA MAJOR: 4.33 CM
LA MINOR: 4.74 CM
LA WIDTH: 4.02 CM
LEFT ATRIUM SIZE: 3.48 CM
LEFT ATRIUM VOLUME INDEX MOD: 26.3 ML/M2
LEFT ATRIUM VOLUME INDEX: 24.9 ML/M2
LEFT ATRIUM VOLUME MOD: 56.88 CM3
LEFT ATRIUM VOLUME: 53.82 CM3
LEFT INTERNAL DIMENSION IN SYSTOLE: 3.7 CM (ref 2.1–4)
LEFT VENTRICLE DIASTOLIC VOLUME INDEX: 73.08 ML/M2
LEFT VENTRICLE DIASTOLIC VOLUME: 157.86 ML
LEFT VENTRICLE MASS INDEX: 67 G/M2
LEFT VENTRICLE SYSTOLIC VOLUME INDEX: 26.9 ML/M2
LEFT VENTRICLE SYSTOLIC VOLUME: 58.02 ML
LEFT VENTRICULAR INTERNAL DIMENSION IN DIASTOLE: 5.67 CM (ref 3.5–6)
LEFT VENTRICULAR MASS: 145.46 G
LV LATERAL E/E' RATIO: 11 M/S
LV SEPTAL E/E' RATIO: 12.22 M/S
MV PEAK A VEL: 0.77 M/S
MV PEAK E VEL: 1.1 M/S
MV STENOSIS PRESSURE HALF TIME: 64.68 MS
MV VALVE AREA P 1/2 METHOD: 3.4 CM2
PISA TR MAX VEL: 2.37 M/S
RA MAJOR: 4.72 CM
RA PRESSURE ESTIMATED: 3 MMHG
RA WIDTH: 3.8 CM
RIGHT VENTRICLE DIASTOLIC BASEL DIMENSION: 3.5 CM
RV TB RVSP: 5 MMHG
SINUS: 2.95 CM
STJ: 2.82 CM
TDI LATERAL: 0.1 M/S
TDI SEPTAL: 0.09 M/S
TDI: 0.1 M/S
TR MAX PG: 22 MMHG
TRICUSPID ANNULAR PLANE SYSTOLIC EXCURSION: 2.92 CM
TV REST PULMONARY ARTERY PRESSURE: 25 MMHG
Z-SCORE OF LEFT VENTRICULAR DIMENSION IN END DIASTOLE: -2.22
Z-SCORE OF LEFT VENTRICULAR DIMENSION IN END SYSTOLE: -1.21

## 2024-07-16 PROCEDURE — 93306 TTE W/DOPPLER COMPLETE: CPT

## 2024-07-16 PROCEDURE — 93306 TTE W/DOPPLER COMPLETE: CPT | Mod: 26,,, | Performed by: INTERNAL MEDICINE

## 2024-07-24 ENCOUNTER — LAB VISIT (OUTPATIENT)
Dept: LAB | Facility: HOSPITAL | Age: 74
End: 2024-07-24
Payer: MEDICARE

## 2024-07-24 ENCOUNTER — OFFICE VISIT (OUTPATIENT)
Dept: FAMILY MEDICINE | Facility: CLINIC | Age: 74
End: 2024-07-24
Payer: MEDICARE

## 2024-07-24 VITALS
WEIGHT: 221.81 LBS | BODY MASS INDEX: 32.85 KG/M2 | DIASTOLIC BLOOD PRESSURE: 82 MMHG | HEIGHT: 69 IN | HEART RATE: 54 BPM | SYSTOLIC BLOOD PRESSURE: 128 MMHG | OXYGEN SATURATION: 98 % | TEMPERATURE: 98 F

## 2024-07-24 DIAGNOSIS — Z08 ENCOUNTER FOR FOLLOW-UP SURVEILLANCE OF PROSTATE CANCER: ICD-10-CM

## 2024-07-24 DIAGNOSIS — I48.0 PAROXYSMAL ATRIAL FIBRILLATION: Chronic | ICD-10-CM

## 2024-07-24 DIAGNOSIS — I10 ESSENTIAL HYPERTENSION: ICD-10-CM

## 2024-07-24 DIAGNOSIS — C61 PROSTATE CANCER: ICD-10-CM

## 2024-07-24 DIAGNOSIS — M48.062 SPINAL STENOSIS OF LUMBAR REGION WITH NEUROGENIC CLAUDICATION: Primary | ICD-10-CM

## 2024-07-24 DIAGNOSIS — N13.8 BPH WITH URINARY OBSTRUCTION: ICD-10-CM

## 2024-07-24 DIAGNOSIS — N40.1 BPH WITH URINARY OBSTRUCTION: ICD-10-CM

## 2024-07-24 DIAGNOSIS — Z85.46 ENCOUNTER FOR FOLLOW-UP SURVEILLANCE OF PROSTATE CANCER: ICD-10-CM

## 2024-07-24 LAB — COMPLEXED PSA SERPL-MCNC: 5.8 NG/ML (ref 0–4)

## 2024-07-24 PROCEDURE — 36415 COLL VENOUS BLD VENIPUNCTURE: CPT | Mod: PO | Performed by: NURSE PRACTITIONER

## 2024-07-24 PROCEDURE — 99214 OFFICE O/P EST MOD 30 MIN: CPT | Mod: S$PBB,,, | Performed by: INTERNAL MEDICINE

## 2024-07-24 PROCEDURE — 99215 OFFICE O/P EST HI 40 MIN: CPT | Mod: PBBFAC,PO | Performed by: INTERNAL MEDICINE

## 2024-07-24 PROCEDURE — 99999 PR PBB SHADOW E&M-EST. PATIENT-LVL V: CPT | Mod: PBBFAC,,, | Performed by: INTERNAL MEDICINE

## 2024-07-24 PROCEDURE — 96372 THER/PROPH/DIAG INJ SC/IM: CPT | Mod: PBBFAC,PO

## 2024-07-24 PROCEDURE — 84153 ASSAY OF PSA TOTAL: CPT | Performed by: NURSE PRACTITIONER

## 2024-07-24 PROCEDURE — 99999PBSHW PR PBB SHADOW TECHNICAL ONLY FILED TO HB: Mod: PBBFAC,,,

## 2024-07-24 RX ORDER — CYANOCOBALAMIN 1000 UG/ML
1000 INJECTION, SOLUTION INTRAMUSCULAR; SUBCUTANEOUS ONCE
Status: COMPLETED | OUTPATIENT
Start: 2024-07-24 | End: 2024-07-24

## 2024-07-24 RX ADMIN — CYANOCOBALAMIN 1000 MCG: 1000 INJECTION INTRAMUSCULAR; SUBCUTANEOUS at 10:07

## 2024-07-24 NOTE — ASSESSMENT & PLAN NOTE
Chronic, improving slightly.   Patient does not want surgery for now   Reports an epidural inj made his sx worse  PT is helping     - patient requested referral to pain mgmt (wants diff provider) and neurosurg

## 2024-07-24 NOTE — PROGRESS NOTES
Health Maintenance Due   Topic     RSV Vaccine (Age 60+ and Pregnant patients) (1 - 1-dose 60+ series) Not offered at this office    COVID-19 Vaccine (7 - 2023-24 season) Not offered at this office

## 2024-07-24 NOTE — ASSESSMENT & PLAN NOTE
HR controlled. ECHO normal     - cont metoprolol, flecainide and pradaxa   - cont f/up with cards

## 2024-07-24 NOTE — PROGRESS NOTES
Chief Complaint: Establish Care (Pt states they would like to discuss some medication for back and nerve pain and pt would like b12 injections ) and Referral (Neurosurgery and Pain Management)      Omar Pacheco  is a 73 y.o. year old patient who presents today for est care    Patient has chronic back pain from lumbar spinal stenosis and degenerative disease. Has been going to PT which has helped with the tingling and now he is able to walk without a cane. Still has occasional pain and numbness that shoots down his right leg. He has been trying to go to the gym and stretch at home. Reports relief with gabapentin and mobic.      Past Medical History:   Diagnosis Date    *Atrial fibrillation     Back pain     Benign hypertrophy of prostate     Degenerative disc disease     GERD (gastroesophageal reflux disease)     Hx of colonic polyps     Hypertension     Pilonidal cyst 1971    Prostate cancer     Sleep apnea     Trouble in sleeping        Past Surgical History:   Procedure Laterality Date    COLONOSCOPY N/A 12/07/2016    Procedure: COLONOSCOPY;  Surgeon: Sumeet Lundy MD;  Location: [facility] ([address] [place] [address]);  Service: Endoscopy;  Laterality: N/A;  OK to hold Pradaxa 2 days prior to procedure per Dr Jones/see note dated 11/15/16/svn    COLONOSCOPY N/A 02/24/2022    Procedure: COLONOSCOPY;  Surgeon: Italo Roy MD;  Location: [facility];  Service: Endoscopy;  Laterality: N/A;  ok to hold Pradaxa 2 days per EBONI Bentley RN/arrhythmia clinic      COVID test at [facility] on 2/21-GT  2/10/22 Changed Kirill to edward BARBER-ml    CYST REMOVAL  1971    coccyx    EPIDURAL STEROID INJECTION Right 09/07/2022    Procedure: Right Genicular Nerve Radiofrequency Ablations;  Surgeon: Luca Galan Jr., MD;  Location: [facility];  Service: Pain Management;  Laterality: Right;  @1100  Pradaxa not held  No DM  Prostate BX 8/2    EPIDURAL STEROID INJECTION Left 09/21/2022    Procedure: Left Genicular Radiofrequency Ablations;  Surgeon: Luca  ROHINI Galan Jr., MD;  Location: [facility] ENDO;  Service: Pain Management;  Laterality: Left;  @1130  Pradaxa not held  No DM  Last 1&100    KNEE SURGERY      right    PROSTATE BIOPSY  2022    TRANSFORAMINAL EPIDURAL INJECTION OF STEROID Bilateral 2024    Procedure: LUMBAR TRANSFORAMINAL BILATERAL L4/5 *PRADAXA CLEARANCE IN CHART*;  Surgeon: Donya Hickey MD;  Location: [facility] PAIN MGT;  Service: Pain Management;  Laterality: Bilateral;  [phone]  2 WK F/U JERONIMO  * COVID BOOSTER        Family History   Problem Relation Name Age of Onset    Breast cancer Mother      Cancer Mother      Alcohol abuse Father      Cancer Father      Heart disease Father      Hypertension Father      Breast cancer Sister      Cancer Sister      Early death Sister      Depression Brother      Early death Brother      Mental illness Brother      No Known Problems Daughter      No Known Problems Daughter      No Known Problems Daughter      No Known Problems Daughter      No Known Problems Daughter      Ovarian cancer Other niece     Melanoma Neg Hx          Social History     Socioeconomic History    Marital status:     Number of children: 5    Highest education level: Associate degree: academic program   Tobacco Use    Smoking status: Former     Current packs/day: 0.00     Average packs/day: 0.3 packs/day for 2.0 years (0.5 ttl pk-yrs)     Types: Cigarettes     Start date: 1973     Quit date: 1975     Years since quittin.8    Smokeless tobacco: Never   Substance and Sexual Activity    Alcohol use: Yes     Alcohol/week: 6.0 standard drinks of alcohol     Types: 6 Glasses of wine per week     Comment: weekly    Drug use: No    Sexual activity: Yes     Partners: Female     Social Determinants of Health     Financial Resource Strain: Low Risk  (2023)    Overall Financial Resource Strain (CARDIA)     Difficulty of Paying Living Expenses: Not hard at all   Food Insecurity: No Food Insecurity  "(11/14/2023)    Hunger Vital Sign     Worried About Running Out of Food in the Last Year: Never true     Ran Out of Food in the Last Year: Never true   Transportation Needs: No Transportation Needs (11/14/2023)    PRAPARE - Transportation     Lack of Transportation (Medical): No     Lack of Transportation (Non-Medical): No   Physical Activity: Sufficiently Active (11/14/2023)    Exercise Vital Sign     Days of Exercise per Week: 4 days     Minutes of Exercise per Session: 90 min   Stress: Stress Concern Present (11/14/2023)    [facility] [place] [facility] - Occupational Stress Questionnaire     Feeling of Stress : Very much   Housing Stability: Low Risk  (11/14/2023)    Housing Stability Vital Sign     Unable to Pay for Housing in the Last Year: No     Number of Places Lived in the Last Year: 1     Unstable Housing in the Last Year: No         Current Outpatient Medications:     acetaminophen (TYLENOL ORAL), Take 500 mg by mouth as needed., Disp: , Rfl:     amLODIPine (NORVASC) 10 MG tablet, Take 1 tablet (10 mg total) by mouth once daily., Disp: 90 tablet, Rfl: 3    ammonium lactate (AMLACTIN) 12 % lotion, Apply topically daily as needed (Dry Skin)., Disp: 400 g, Rfl: 11    dabigatran etexilate (PRADAXA) 150 mg Cap, Take 1 capsule (150 mg total) by mouth 2 (two) times daily. "Do NOT break, chew, or open capsules.", Disp: 180 capsule, Rfl: 3    diclofenac sodium (VOLTAREN) 1 % Gel, Apply 2 g topically 4 (four) times daily., Disp: 200 g, Rfl: 11    flecainide (TAMBOCOR) 100 MG Tab, Take 1 tablet (100 mg total) by mouth every 12 (twelve) hours., Disp: 180 tablet, Rfl: 3    gabapentin (NEURONTIN) 400 MG capsule, Take 1 capsule (400 mg total) by mouth 3 (three) times daily., Disp: 90 capsule, Rfl: 11    levocetirizine (XYZAL) 5 MG tablet, Take 1 tablet (5 mg total) by mouth every evening., Disp: 90 tablet, Rfl: 3    LIDOcaine (LIDODERM) 5 %, Place 1 patch onto the skin once daily. Remove & Discard patch " within 12 hours or as directed by MD, Disp: 30 patch, Rfl: 0    losartan (COZAAR) 50 MG tablet, Take 0.5 tablets (25 mg total) by mouth once daily., Disp: 45 tablet, Rfl: 3    meloxicam (MOBIC) 15 MG tablet, Take 1 tablet (15 mg total) by mouth once daily., Disp: 30 tablet, Rfl: 2    methocarbamoL (ROBAXIN) 500 MG Tab, Take 1 tablet (500 mg total) by mouth 4 (four) times daily as needed., Disp: 60 tablet, Rfl: 2    metoprolol succinate (TOPROL-XL) 25 MG 24 hr tablet, Take 1 tablet (25 mg total) by mouth once daily., Disp: 90 tablet, Rfl: 3    mirtazapine (REMERON) 30 MG tablet, Take 30 mg by mouth every evening., Disp: , Rfl:     multivitamin (THERAGRAN) per tablet, Take 1 tablet by mouth once daily., Disp: , Rfl:     naloxone (NARCAN) 4 mg/actuation [facility], 1 spray by Nasal route once., Disp: , Rfl:     omeprazole (PRILOSEC) 20 MG capsule, Take 1 capsule by mouth Daily., Disp: , Rfl:     sildenafiL (VIAGRA) 100 MG tablet, Take 1 tablet (100 mg total) by mouth as needed for Erectile Dysfunction (1 tablet 1 hr prior to intercourse.)., Disp: 10 tablet, Rfl: 11    terazosin (HYTRIN) 10 MG capsule, Take 1 capsule (10 mg total) by mouth every evening., Disp: 90 capsule, Rfl: 3    traMADoL (ULTRAM) 50 mg tablet, Take 1 tablet (50 mg total) by mouth every 24 hours as needed for Pain., Disp: 30 tablet, Rfl: 0    triamcinolone acetonide 0.1% (KENALOG) 0.1 % cream, Apply topically 2 (two) times daily., Disp: 80 g, Rfl: 0    VITAMIN B COMPLEX (B COMPLETE ORAL), Take by mouth once daily., Disp: , Rfl:     vitamin E 100 UNIT capsule, Take 100 Units by mouth once daily., Disp: , Rfl:   No current facility-administered medications for this visit.     Review of Systems   Musculoskeletal:  Positive for back pain.        Objective:      Vitals:    07/24/24 1009   BP: 128/82   Pulse: (!) 54   Temp: 97.7 °F (36.5 °C)       Physical Exam  Constitutional:       Appearance: Normal appearance.   HENT:      Head: Normocephalic and atraumatic.    Cardiovascular:      Rate and Rhythm: Normal rate.   Musculoskeletal:         General: Normal range of motion.   Skin:     General: Skin is warm and dry.   Neurological:      General: No focal deficit present.      Mental Status: He is alert and oriented to person, place, and time.          Assessment:       1. Spinal stenosis of lumbar region with neurogenic claudication    2. Paroxysmal atrial fibrillation    3. Essential hypertension    4. History of prostate cancer          Plan:   1. Spinal stenosis of lumbar region with neurogenic claudication  Assessment & Plan:  Chronic, improving slightly.   Patient does not want surgery for now   Reports an epidural inj made his sx worse  PT is helping     - patient requested referral to pain mgmt (wants diff provider) and neurosurg     Orders:  -     Ambulatory referral/consult to Pain Clinic; Future; Expected date: 07/31/2024  -     Ambulatory referral/consult to Neurosurgery; Future; Expected date: 07/31/2024  -     cyanocobalamin injection 1,000 mcg    2. Paroxysmal atrial fibrillation  Assessment & Plan:  HR controlled. ECHO normal     - cont metoprolol, flecainide and pradaxa   - cont f/up with cards       3. Essential hypertension  Assessment & Plan:  Chronic, controlled     - continue losartan, metoprolol and amlodipine        4. History of prostate cancer  Assessment & Plan:  Patient due for repeat of PSA     - depending on results will f/up with urology            Follow up in about 6 months (around 1/24/2025) for annual.

## 2024-07-24 NOTE — PROGRESS NOTES
Administered B-12 1000 mcg IM to left deltoid.  Patient tolerated injection well, no adverse reactions noted.

## 2024-07-30 ENCOUNTER — PATIENT MESSAGE (OUTPATIENT)
Dept: UROLOGY | Facility: CLINIC | Age: 74
End: 2024-07-30
Payer: MEDICARE

## 2024-07-30 DIAGNOSIS — Z08 ENCOUNTER FOR FOLLOW-UP SURVEILLANCE OF PROSTATE CANCER: ICD-10-CM

## 2024-07-30 DIAGNOSIS — C61 PROSTATE CANCER: Primary | ICD-10-CM

## 2024-07-30 DIAGNOSIS — Z85.46 ENCOUNTER FOR FOLLOW-UP SURVEILLANCE OF PROSTATE CANCER: ICD-10-CM

## 2024-08-01 DIAGNOSIS — M48.07 SPINAL STENOSIS, LUMBOSACRAL REGION: ICD-10-CM

## 2024-08-01 RX ORDER — TRAMADOL HYDROCHLORIDE 50 MG/1
50 TABLET ORAL
Qty: 30 TABLET | Refills: 0 | Status: SHIPPED | OUTPATIENT
Start: 2024-08-01

## 2024-08-01 RX ORDER — MELOXICAM 15 MG/1
15 TABLET ORAL DAILY
Qty: 30 TABLET | Refills: 2 | Status: SHIPPED | OUTPATIENT
Start: 2024-08-01

## 2024-08-01 NOTE — TELEPHONE ENCOUNTER
Patient requesting refill on ramadol   Last office visit 5/28/24   shows last refill on 07/01/2024  Patient does not have a pain contract on file with [facility] department  Patient last UDS 2/28/24 was not consistent with current therapy     CODEINE   Not Detected   Comment: INTERPRETIVE INFORMATION: Codeine, U  Positive Cutoff: 40 ng/mL  Methodology: Mass Spectrometry   MORPHINE   Not Detected   Comment: INTERPRETIVE INFORMATION:Morphine, U  Positive Cutoff: 20 ng/mL  Methodology: Mass Spectrometry   6-ACETYLMORPHINE   Not Detected   Comment: INTERPRETIVE INFORMATION:6-acetylmorphine, U  Positive Cutoff: 20 ng/mL  Methodology: Mass Spectrometry   OXYCODONE   Not Detected   Comment: INTERPRETIVE INFORMATION:Oxycodone, U  Positive Cutoff: 40 ng/mL  Methodology: Mass Spectrometry   NOROYXCODONE   Not Detected   Comment: INTERPRETIVE INFORMATION:Noroxycodone, U  Positive Cutoff: 100 ng/mL  Methodology: Mass Spectrometry   OXYMORPHONE   Not Detected   Comment: INTERPRETIVE INFORMATION:Oxymorphone, U  Positive Cutoff: 40 ng/mL  Methodology: Mass Spectrometry   NOROXYMORPHONE   Not Detected   Comment: INTERPRETIVE INFORMATION:Noroxymorphone, U  Positive Cutoff: 100 ng/mL  Methodology: Mass Spectrometry   HYDROCODONE   Not Detected   Comment: INTERPRETIVE INFORMATION:Hydrocodone, U  Positive Cutoff: 40 ng/mL  Methodology: Mass Spectrometry   NORHYDROCODONE   Not Detected   Comment: INTERPRETIVE INFORMATION:Norhydrocodone, U  Positive Cutoff: 100 ng/mL  Methodology: Mass Spectrometry   HYDROMORPHONE   Not Detected   Comment: INTERPRETIVE INFORMATION:Hydromorphone, U  Positive Cutoff: 20 ng/mL  Methodology: Mass Spectrometry   BUPRENORPHINE   Not Detected   Comment: INTERPRETIVE INFORMATION:Buprenorphine, U  Positive Cutoff: 5 ng/mL  Methodology: Mass Spectrometry   NORUBPRENORPHINE   Not Detected   Comment: INTERPRETIVE INFORMATION:Norbuprenorphine, U  Positive Cutoff: 20 ng/mL  Methodology: Mass  Spectrometry   FENTANYL   Not Detected   Comment: INTERPRETIVE INFORMATION:Fentanyl, U  Positive Cutoff: 2 ng/mL  Methodology: Mass Spectrometry   NORFENTANYL   Not Detected   Comment: INTERPRETIVE INFORMATION:Norfentanyl, U  Positive Cutoff: 2 ng/mL  Methodology: Mass Spectrometry   MEPERIDINE METABOLITE   Not Detected   Comment: INTERPRETIVE INFORMATION:Meperidine metabolite, U  Positive Cutoff: 50 ng/mL  Methodology: Mass Spectrometry   TAPENTADOL   Not Detected   Comment: INTERPRETIVE INFORMATION:Tapentadol, U  Positive Cutoff: 100 ng/mL  Methodology: Mass Spectrometry   TAPENTADOL-O-SULF   Not Detected   Comment: INTERPRETIVE INFORMATION:Tapentadol-o-Sulf, U  Positive Cutoff: 200 ng/mL  Methodology: Mass Spectrometry   METHADONE   Negative   Comment: Presumptive negative by immunoassay. Testing by mass  spectrometry is available on request.  INTERPRETIVE INFORMATION: Methadone Screen, U  Positive Cutoff: 150 ng/mL  Methodology: Immunoassay   TRAMADOL   Negative   Comment: Presumptive negative by immunoassay. Testing by mass  spectrometry is available on request.  INTERPRETIVE INFORMATION:Tramadol Screen, U  Positive Cutoff: 100 ng/mL  Methodology: Immunoassay   AMPHETAMINE   Not Detected   Comment: INTERPRETIVE INFORMATION:Amphetamine, U  Positive Cutoff: 50 ng/mL  Methodology: Mass Spectrometry   METHAMPHETAMINE   Not Detected   Comment: INTERPRETIVE INFORMATION:Methamphetamine, U  Positive Cutoff: 200 ng/mL  Methodology: Mass Spectrometry   MDMA- ECSTASY   Not Detected   Comment: INTERPRETIVE INFORMATION:MDMA, U  Positive Cutoff: 200 ng/mL  Methodology: Mass Spectrometry   MDA   Not Detected   Comment: INTERPRETIVE INFORMATION:MDA, U  Positive Cutoff: 200 ng/mL  Methodology: Mass Spectrometry   MDEA- Angelia   Not Detected   Comment: INTERPRETIVE INFORMATION:MDEA, U  Positive Cutoff: 200 ng/mL  Methodology: Mass Spectrometry   METHYLPHENIDATE   Not Detected   Comment: INTERPRETIVE INFORMATION:Methylphenidate,  U  Positive Cutoff: 100 ng/mL  Methodology: Mass Spectrometry   PHENTERMINE   Not Detected   Comment: INTERPRETIVE INFORMATION:Phentermine, U  Positive Cutoff: 100 ng/mL  Methodology: Mass Spectrometry   BENZOYLECGONINE   Negative   Comment: Presumptive negative by immunoassay. Testing by mass  spectrometry is available on request.  INTERPRETIVE INFORMATION:Cocaine Screen, U  Positive Cutoff: 150 ng/mL  Methodology: Immunoassay   ALPRAZOLAM   Not Detected   Comment: INTERPRETIVE INFORMATION:Alprazolam, U  Positive Cutoff: 40 ng/mL  Methodology: Mass Spectrometry   ALPHA-OH-ALPRAZOLAM   Not Detected   Comment: INTERPRETIVE INFORMATION:Alpha-OH-Alprazolam, U  Positive Cutoff: 20 ng/mL  Methodology: Mass Spectrometry   CLONAZEPAM   Not Detected   Comment: INTERPRETIVE INFORMATION:Clonazepam, U  Positive Cutoff: 20 ng/mL  Methodology: Mass Spectrometry   7-AMINOCLONAZEPAM   Not Detected   Comment: INTERPRETIVE INFORMATION:7-Aminoclonazepam, U  Positive Cutoff: 40 ng/mL  Methodology: Mass Spectrometry   DIAZEPAM   Not Detected   Comment: INTERPRETIVE INFORMATION:Diazepam, U  Positive Cutoff: 50 ng/mL  Methodology: Mass Spectrometry   NORDIAZEPAM   Not Detected   Comment: INTERPRETIVE INFORMATION:Nordiazepam, U  Positive Cutoff: 50 ng/mL  Methodology: Mass Spectrometry   OXAZEPAM   Not Detected   Comment: INTERPRETIVE INFORMATION:Oxazepam, U  Positive Cutoff: 50 ng/mL  Methodology: Mass Spectrometry   TEMAZEPAM   Not Detected   Comment: INTERPRETIVE INFORMATION:Temazepam, U  Positive Cutoff: 50 ng/mL  Methodology: Mass Spectrometry   Lorazepam   Not Detected   Comment: INTERPRETIVE INFORMATION:Lorazepam, U  Positive Cutoff: 60 ng/mL  Methodology: Mass Spectrometry   MIDAZOLAM   Not Detected   Comment: INTERPRETIVE INFORMATION:Midazolam, U  Positive Cutoff: 20 ng/mL  Methodology: Mass Spectrometry   ZOLPIDEM   Not Detected   Comment: INTERPRETIVE INFORMATION:Zolpidem, U  Positive Cutoff: 20 ng/mL  Methodology: Mass  Spectrometry   BARBITURATES   Negative   Comment: Presumptive negative by immunoassay. Testing by mass  spectrometry is available on request.  INTERPRETIVE INFORMATION:Barbiturates Screen, U  Positive Cutoff: 200 ng/mL  Methodology: Immunoassay   Creatinine, Urine 20.0 - 400.0 mg/dL 115.2   ETHYL GLUCURONIDE   PresumptivePOS   Comment: Presumptive positive by immunoassay. Testing by mass  spectrometry is available on request.  INTERPRETIVE INFORMATION:Ethyl Glucuronide Screen, U  Positive Cutoff: 500 ng/mL  Methodology: Immunoassay   MARIJUANA METABOLITE   Negative   Comment: Presumptive negative by immunoassay. Testing by mass  spectrometry is available on request.  INTERPRETIVE INFORMATION: THC (Cannabinoids) Screen, U  Positive Cutoff: 50 ng/mL  Methodology: Immunoassay   PCP   Negative   Comment: Presumptive negative by immunoassay. Testing by mass  spectrometry is available on request.  INTERPRETIVE INFORMATION:Phencyclidine Screen, U  Positive Cutoff: 25 ng/mL  Methodology: Immunoassay   CARISOPRODOL   Negative   Comment: Presumptive negative by immunoassay. Testing by mass  spectrometry is available on request.  INTERPRETIVE INFORMATION: Carisoprodol Screen, U  Positive Cutoff: 100 ng/mL  Methodology: Immunoassay  The carisoprodol immunoassay has cross-reactivity to  carisoprodol and meprobamate.   Naloxone   Not Detected   Comment: INTERPRETIVE INFORMATION:Naloxone, U  Positive Cutoff: 100 ng/mL  Methodology: Mass Spectrometry   Gabapentin   Present   Comment: INTERPRETIVE INFORMATION:Gabapentin, U  Positive Cutoff: 3,000 ng/mL  Methodology: Mass Spectrometry   Pregabalin   Not Detected   Comment: INTERPRETIVE INFORMATION:Pregabalin, U  Positive Cutoff: 3,000 ng/mL  Methodology: Mass Spectrometry   Alpha-OH-Midazolam   Not Detected   Comment: INTERPRETIVE INFORMATION:Alpha-OH-Midazolam, U  Positive Cutoff: 20 ng/mL  Methodology: Mass Spectrometry   Zolpidem Metabolite   Not Detected   Comment: INTERPRETIVE  INFORMATION:Zolpidem Metabolite, U  Positive Cutoff: 100 ng/mL  Methodology: Mass Spectrometry

## 2024-08-06 ENCOUNTER — OFFICE VISIT (OUTPATIENT)
Dept: NEUROSURGERY | Facility: CLINIC | Age: 74
End: 2024-08-06
Attending: STUDENT IN AN ORGANIZED HEALTH CARE EDUCATION/TRAINING PROGRAM
Payer: MEDICARE

## 2024-08-06 ENCOUNTER — TELEPHONE (OUTPATIENT)
Dept: FAMILY MEDICINE | Facility: CLINIC | Age: 74
End: 2024-08-06
Payer: MEDICARE

## 2024-08-06 VITALS
HEIGHT: 69 IN | SYSTOLIC BLOOD PRESSURE: 140 MMHG | OXYGEN SATURATION: 96 % | WEIGHT: 218.06 LBS | BODY MASS INDEX: 32.3 KG/M2 | HEART RATE: 60 BPM | DIASTOLIC BLOOD PRESSURE: 67 MMHG

## 2024-08-06 DIAGNOSIS — M48.062 SPINAL STENOSIS OF LUMBAR REGION WITH NEUROGENIC CLAUDICATION: ICD-10-CM

## 2024-08-06 PROCEDURE — 99203 OFFICE O/P NEW LOW 30 MIN: CPT | Mod: ,,, | Performed by: STUDENT IN AN ORGANIZED HEALTH CARE EDUCATION/TRAINING PROGRAM

## 2024-08-07 DIAGNOSIS — M48.07 SPINAL STENOSIS, LUMBOSACRAL REGION: ICD-10-CM

## 2024-08-07 RX ORDER — MELOXICAM 15 MG/1
15 TABLET ORAL DAILY
Qty: 30 TABLET | Refills: 2 | Status: CANCELLED | OUTPATIENT
Start: 2024-08-07

## 2024-08-07 RX ORDER — TRAMADOL HYDROCHLORIDE 50 MG/1
50 TABLET ORAL
Qty: 30 TABLET | Refills: 0 | Status: CANCELLED | OUTPATIENT
Start: 2024-08-07

## 2024-08-09 ENCOUNTER — OFFICE VISIT (OUTPATIENT)
Dept: FAMILY MEDICINE | Facility: CLINIC | Age: 74
End: 2024-08-09
Payer: MEDICARE

## 2024-08-09 ENCOUNTER — LAB VISIT (OUTPATIENT)
Dept: LAB | Facility: HOSPITAL | Age: 74
End: 2024-08-09
Payer: MEDICARE

## 2024-08-09 VITALS
OXYGEN SATURATION: 96 % | BODY MASS INDEX: 33.08 KG/M2 | TEMPERATURE: 98 F | RESPIRATION RATE: 16 BRPM | WEIGHT: 218.25 LBS | SYSTOLIC BLOOD PRESSURE: 122 MMHG | HEART RATE: 60 BPM | DIASTOLIC BLOOD PRESSURE: 70 MMHG | HEIGHT: 68 IN

## 2024-08-09 DIAGNOSIS — M54.9 DORSALGIA, UNSPECIFIED: ICD-10-CM

## 2024-08-09 DIAGNOSIS — E66.9 OBESITY (BMI 30.0-34.9): ICD-10-CM

## 2024-08-09 DIAGNOSIS — Z11.4 ENCOUNTER FOR SCREENING FOR HIV: ICD-10-CM

## 2024-08-09 DIAGNOSIS — Z01.818 PRE-OP EXAMINATION: ICD-10-CM

## 2024-08-09 DIAGNOSIS — I10 ESSENTIAL HYPERTENSION: ICD-10-CM

## 2024-08-09 DIAGNOSIS — Z01.818 PRE-OP EXAMINATION: Primary | ICD-10-CM

## 2024-08-09 DIAGNOSIS — R79.9 ABNORMAL FINDING OF BLOOD CHEMISTRY, UNSPECIFIED: ICD-10-CM

## 2024-08-09 LAB
ALBUMIN SERPL BCP-MCNC: 3.8 G/DL (ref 3.5–5.2)
ALP SERPL-CCNC: 49 U/L (ref 55–135)
ALT SERPL W/O P-5'-P-CCNC: 19 U/L (ref 10–44)
ANION GAP SERPL CALC-SCNC: 4 MMOL/L (ref 8–16)
AST SERPL-CCNC: 25 U/L (ref 10–40)
BASOPHILS # BLD AUTO: 0.03 K/UL (ref 0–0.2)
BASOPHILS NFR BLD: 0.8 % (ref 0–1.9)
BILIRUB SERPL-MCNC: 0.4 MG/DL (ref 0.1–1)
BILIRUB UR QL STRIP: NEGATIVE
BUN SERPL-MCNC: 12 MG/DL (ref 8–23)
CALCIUM SERPL-MCNC: 8.9 MG/DL (ref 8.7–10.5)
CHLORIDE SERPL-SCNC: 108 MMOL/L (ref 95–110)
CLARITY UR REFRACT.AUTO: CLEAR
CO2 SERPL-SCNC: 28 MMOL/L (ref 23–29)
COLOR UR AUTO: YELLOW
CREAT SERPL-MCNC: 1 MG/DL (ref 0.5–1.4)
DIFFERENTIAL METHOD BLD: ABNORMAL
EOSINOPHIL # BLD AUTO: 0.1 K/UL (ref 0–0.5)
EOSINOPHIL NFR BLD: 3.7 % (ref 0–8)
ERYTHROCYTE [DISTWIDTH] IN BLOOD BY AUTOMATED COUNT: 14.5 % (ref 11.5–14.5)
EST. GFR  (NO RACE VARIABLE): >60 ML/MIN/1.73 M^2
ESTIMATED AVG GLUCOSE: 108 MG/DL (ref 68–131)
GLUCOSE SERPL-MCNC: 90 MG/DL (ref 70–110)
GLUCOSE UR QL STRIP: NEGATIVE
HBA1C MFR BLD: 5.4 % (ref 4–5.6)
HCT VFR BLD AUTO: 38.5 % (ref 40–54)
HCV AB SERPL QL IA: NORMAL
HGB BLD-MCNC: 12.5 G/DL (ref 14–18)
HGB UR QL STRIP: NEGATIVE
HIV 1+2 AB+HIV1 P24 AG SERPL QL IA: NORMAL
IMM GRANULOCYTES # BLD AUTO: 0 K/UL (ref 0–0.04)
IMM GRANULOCYTES NFR BLD AUTO: 0 % (ref 0–0.5)
KETONES UR QL STRIP: NEGATIVE
LEUKOCYTE ESTERASE UR QL STRIP: NEGATIVE
LYMPHOCYTES # BLD AUTO: 1.7 K/UL (ref 1–4.8)
LYMPHOCYTES NFR BLD: 45.7 % (ref 18–48)
MCH RBC QN AUTO: 30.6 PG (ref 27–31)
MCHC RBC AUTO-ENTMCNC: 32.5 G/DL (ref 32–36)
MCV RBC AUTO: 94 FL (ref 82–98)
MONOCYTES # BLD AUTO: 0.5 K/UL (ref 0.3–1)
MONOCYTES NFR BLD: 12 % (ref 4–15)
NEUTROPHILS # BLD AUTO: 1.4 K/UL (ref 1.8–7.7)
NEUTROPHILS NFR BLD: 37.8 % (ref 38–73)
NITRITE UR QL STRIP: NEGATIVE
NRBC BLD-RTO: 0 /100 WBC
OHS QRS DURATION: 90 MS
OHS QTC CALCULATION: 392 MS
PH UR STRIP: 6 [PH] (ref 5–8)
PLATELET # BLD AUTO: 230 K/UL (ref 150–450)
PMV BLD AUTO: 10.8 FL (ref 9.2–12.9)
POTASSIUM SERPL-SCNC: 4.3 MMOL/L (ref 3.5–5.1)
PROT SERPL-MCNC: 6.6 G/DL (ref 6–8.4)
PROT UR QL STRIP: NEGATIVE
RBC # BLD AUTO: 4.09 M/UL (ref 4.6–6.2)
SODIUM SERPL-SCNC: 140 MMOL/L (ref 136–145)
SP GR UR STRIP: 1.02 (ref 1–1.03)
URN SPEC COLLECT METH UR: NORMAL
WBC # BLD AUTO: 3.76 K/UL (ref 3.9–12.7)

## 2024-08-09 PROCEDURE — 86803 HEPATITIS C AB TEST: CPT

## 2024-08-09 PROCEDURE — 80053 COMPREHEN METABOLIC PANEL: CPT

## 2024-08-09 PROCEDURE — 87389 HIV-1 AG W/HIV-1&-2 AB AG IA: CPT

## 2024-08-09 PROCEDURE — 83036 HEMOGLOBIN GLYCOSYLATED A1C: CPT

## 2024-08-09 PROCEDURE — 85025 COMPLETE CBC W/AUTO DIFF WBC: CPT

## 2024-08-09 PROCEDURE — 36415 COLL VENOUS BLD VENIPUNCTURE: CPT | Mod: PO

## 2024-08-09 PROCEDURE — 99214 OFFICE O/P EST MOD 30 MIN: CPT | Mod: PBBFAC,PO

## 2024-08-09 PROCEDURE — 81003 URINALYSIS AUTO W/O SCOPE: CPT

## 2024-08-09 PROCEDURE — 99999 PR PBB SHADOW E&M-EST. PATIENT-LVL IV: CPT | Mod: PBBFAC,,,

## 2024-08-09 RX ORDER — METOPROLOL SUCCINATE 50 MG/1
25 TABLET, EXTENDED RELEASE ORAL
COMMUNITY
Start: 2024-04-02

## 2024-08-11 ENCOUNTER — PATIENT MESSAGE (OUTPATIENT)
Dept: NEUROSURGERY | Facility: CLINIC | Age: 74
End: 2024-08-11
Payer: MEDICARE

## 2024-08-12 ENCOUNTER — TELEPHONE (OUTPATIENT)
Dept: PREADMISSION TESTING | Facility: HOSPITAL | Age: 74
End: 2024-08-12
Payer: MEDICARE

## 2024-08-19 DIAGNOSIS — I10 ESSENTIAL HYPERTENSION: ICD-10-CM

## 2024-08-19 RX ORDER — GABAPENTIN 400 MG/1
400 CAPSULE ORAL 3 TIMES DAILY
Qty: 90 CAPSULE | Refills: 11 | Status: SHIPPED | OUTPATIENT
Start: 2024-08-19 | End: 2025-08-19

## 2024-08-19 RX ORDER — TERAZOSIN 10 MG/1
10 CAPSULE ORAL NIGHTLY
Qty: 90 CAPSULE | Refills: 3 | Status: SHIPPED | OUTPATIENT
Start: 2024-08-19 | End: 2025-08-31

## 2024-08-19 NOTE — TELEPHONE ENCOUNTER
No care due was identified.  [facility] Embedded Care Due Messages. Reference number: [id number].   8/19/2024 10:30:07 AM CDT

## 2024-08-27 DIAGNOSIS — Z00.00 ENCOUNTER FOR MEDICARE ANNUAL WELLNESS EXAM: ICD-10-CM

## 2024-09-26 DIAGNOSIS — M48.07 SPINAL STENOSIS, LUMBOSACRAL REGION: ICD-10-CM

## 2024-09-26 NOTE — TELEPHONE ENCOUNTER
Patient requesting refill on ramadol   Last office visit 5/28/24   shows last refill on 08/01/2024  Patient does not have a pain contract on file with [facility] department  Patient last UDS 2/28/24 was not consistent with current therapy     CODEINE   Not Detected   Comment: INTERPRETIVE INFORMATION: Codeine, U  Positive Cutoff: 40 ng/mL  Methodology: Mass Spectrometry   MORPHINE   Not Detected   Comment: INTERPRETIVE INFORMATION:Morphine, U  Positive Cutoff: 20 ng/mL  Methodology: Mass Spectrometry   6-ACETYLMORPHINE   Not Detected   Comment: INTERPRETIVE INFORMATION:6-acetylmorphine, U  Positive Cutoff: 20 ng/mL  Methodology: Mass Spectrometry   OXYCODONE   Not Detected   Comment: INTERPRETIVE INFORMATION:Oxycodone, U  Positive Cutoff: 40 ng/mL  Methodology: Mass Spectrometry   NOROYXCODONE   Not Detected   Comment: INTERPRETIVE INFORMATION:Noroxycodone, U  Positive Cutoff: 100 ng/mL  Methodology: Mass Spectrometry   OXYMORPHONE   Not Detected   Comment: INTERPRETIVE INFORMATION:Oxymorphone, U  Positive Cutoff: 40 ng/mL  Methodology: Mass Spectrometry   NOROXYMORPHONE   Not Detected   Comment: INTERPRETIVE INFORMATION:Noroxymorphone, U  Positive Cutoff: 100 ng/mL  Methodology: Mass Spectrometry   HYDROCODONE   Not Detected   Comment: INTERPRETIVE INFORMATION:Hydrocodone, U  Positive Cutoff: 40 ng/mL  Methodology: Mass Spectrometry   NORHYDROCODONE   Not Detected   Comment: INTERPRETIVE INFORMATION:Norhydrocodone, U  Positive Cutoff: 100 ng/mL  Methodology: Mass Spectrometry   HYDROMORPHONE   Not Detected   Comment: INTERPRETIVE INFORMATION:Hydromorphone, U  Positive Cutoff: 20 ng/mL  Methodology: Mass Spectrometry   BUPRENORPHINE   Not Detected   Comment: INTERPRETIVE INFORMATION:Buprenorphine, U  Positive Cutoff: 5 ng/mL  Methodology: Mass Spectrometry   NORUBPRENORPHINE   Not Detected   Comment: INTERPRETIVE INFORMATION:Norbuprenorphine, U  Positive Cutoff: 20 ng/mL  Methodology: Mass  Spectrometry   FENTANYL   Not Detected   Comment: INTERPRETIVE INFORMATION:Fentanyl, U  Positive Cutoff: 2 ng/mL  Methodology: Mass Spectrometry   NORFENTANYL   Not Detected   Comment: INTERPRETIVE INFORMATION:Norfentanyl, U  Positive Cutoff: 2 ng/mL  Methodology: Mass Spectrometry   MEPERIDINE METABOLITE   Not Detected   Comment: INTERPRETIVE INFORMATION:Meperidine metabolite, U  Positive Cutoff: 50 ng/mL  Methodology: Mass Spectrometry   TAPENTADOL   Not Detected   Comment: INTERPRETIVE INFORMATION:Tapentadol, U  Positive Cutoff: 100 ng/mL  Methodology: Mass Spectrometry   TAPENTADOL-O-SULF   Not Detected   Comment: INTERPRETIVE INFORMATION:Tapentadol-o-Sulf, U  Positive Cutoff: 200 ng/mL  Methodology: Mass Spectrometry   METHADONE   Negative   Comment: Presumptive negative by immunoassay. Testing by mass  spectrometry is available on request.  INTERPRETIVE INFORMATION: Methadone Screen, U  Positive Cutoff: 150 ng/mL  Methodology: Immunoassay   TRAMADOL   Negative   Comment: Presumptive negative by immunoassay. Testing by mass  spectrometry is available on request.  INTERPRETIVE INFORMATION:Tramadol Screen, U  Positive Cutoff: 100 ng/mL  Methodology: Immunoassay   AMPHETAMINE   Not Detected   Comment: INTERPRETIVE INFORMATION:Amphetamine, U  Positive Cutoff: 50 ng/mL  Methodology: Mass Spectrometry   METHAMPHETAMINE   Not Detected   Comment: INTERPRETIVE INFORMATION:Methamphetamine, U  Positive Cutoff: 200 ng/mL  Methodology: Mass Spectrometry   MDMA- ECSTASY   Not Detected   Comment: INTERPRETIVE INFORMATION:MDMA, U  Positive Cutoff: 200 ng/mL  Methodology: Mass Spectrometry   MDA   Not Detected   Comment: INTERPRETIVE INFORMATION:MDA, U  Positive Cutoff: 200 ng/mL  Methodology: Mass Spectrometry   MDEA- [place]neglia   Not Detected   Comment: INTERPRETIVE INFORMATION:MDEA, U  Positive Cutoff: 200 ng/mL  Methodology: Mass Spectrometry   METHYLPHENIDATE   Not Detected   Comment: INTERPRETIVE INFORMATION:Methylphenidate,  U  Positive Cutoff: 100 ng/mL  Methodology: Mass Spectrometry   PHENTERMINE   Not Detected   Comment: INTERPRETIVE INFORMATION:Phentermine, U  Positive Cutoff: 100 ng/mL  Methodology: Mass Spectrometry   BENZOYLECGONINE   Negative   Comment: Presumptive negative by immunoassay. Testing by mass  spectrometry is available on request.  INTERPRETIVE INFORMATION:Cocaine Screen, U  Positive Cutoff: 150 ng/mL  Methodology: Immunoassay   ALPRAZOLAM   Not Detected   Comment: INTERPRETIVE INFORMATION:Alprazolam, U  Positive Cutoff: 40 ng/mL  Methodology: Mass Spectrometry   ALPHA-OH-ALPRAZOLAM   Not Detected   Comment: INTERPRETIVE INFORMATION:Alpha-OH-Alprazolam, U  Positive Cutoff: 20 ng/mL  Methodology: Mass Spectrometry   CLONAZEPAM   Not Detected   Comment: INTERPRETIVE INFORMATION:Clonazepam, U  Positive Cutoff: 20 ng/mL  Methodology: Mass Spectrometry   7-AMINOCLONAZEPAM   Not Detected   Comment: INTERPRETIVE INFORMATION:7-Aminoclonazepam, U  Positive Cutoff: 40 ng/mL  Methodology: Mass Spectrometry   DIAZEPAM   Not Detected   Comment: INTERPRETIVE INFORMATION:Diazepam, U  Positive Cutoff: 50 ng/mL  Methodology: Mass Spectrometry   NORDIAZEPAM   Not Detected   Comment: INTERPRETIVE INFORMATION:Nordiazepam, U  Positive Cutoff: 50 ng/mL  Methodology: Mass Spectrometry   OXAZEPAM   Not Detected   Comment: INTERPRETIVE INFORMATION:Oxazepam, U  Positive Cutoff: 50 ng/mL  Methodology: Mass Spectrometry   TEMAZEPAM   Not Detected   Comment: INTERPRETIVE INFORMATION:Temazepam, U  Positive Cutoff: 50 ng/mL  Methodology: Mass Spectrometry   Lorazepam   Not Detected   Comment: INTERPRETIVE INFORMATION:Lorazepam, U  Positive Cutoff: 60 ng/mL  Methodology: Mass Spectrometry   MIDAZOLAM   Not Detected   Comment: INTERPRETIVE INFORMATION:Midazolam, U  Positive Cutoff: 20 ng/mL  Methodology: Mass Spectrometry   ZOLPIDEM   Not Detected   Comment: INTERPRETIVE INFORMATION:Zolpidem, U  Positive Cutoff: 20 ng/mL  Methodology: Mass  Spectrometry   BARBITURATES   Negative   Comment: Presumptive negative by immunoassay. Testing by mass  spectrometry is available on request.  INTERPRETIVE INFORMATION:Barbiturates Screen, U  Positive Cutoff: 200 ng/mL  Methodology: Immunoassay   Creatinine, Urine 20.0 - 400.0 mg/dL 115.2   ETHYL GLUCURONIDE   PresumptivePOS   Comment: Presumptive positive by immunoassay. Testing by mass  spectrometry is available on request.  INTERPRETIVE INFORMATION:Ethyl Glucuronide Screen, U  Positive Cutoff: 500 ng/mL  Methodology: Immunoassay   MARIJUANA METABOLITE   Negative   Comment: Presumptive negative by immunoassay. Testing by mass  spectrometry is available on request.  INTERPRETIVE INFORMATION: THC (Cannabinoids) Screen, U  Positive Cutoff: 50 ng/mL  Methodology: Immunoassay   PCP   Negative   Comment: Presumptive negative by immunoassay. Testing by mass  spectrometry is available on request.  INTERPRETIVE INFORMATION:Phencyclidine Screen, U  Positive Cutoff: 25 ng/mL  Methodology: Immunoassay   CARISOPRODOL   Negative   Comment: Presumptive negative by immunoassay. Testing by mass  spectrometry is available on request.  INTERPRETIVE INFORMATION: Carisoprodol Screen, U  Positive Cutoff: 100 ng/mL  Methodology: Immunoassay  The carisoprodol immunoassay has cross-reactivity to  carisoprodol and meprobamate.   Naloxone   Not Detected   Comment: INTERPRETIVE INFORMATION:Naloxone, U  Positive Cutoff: 100 ng/mL  Methodology: Mass Spectrometry   Gabapentin   Present   Comment: INTERPRETIVE INFORMATION:Gabapentin, U  Positive Cutoff: 3,000 ng/mL  Methodology: Mass Spectrometry   Pregabalin   Not Detected   Comment: INTERPRETIVE INFORMATION:Pregabalin, U  Positive Cutoff: 3,000 ng/mL  Methodology: Mass Spectrometry   Alpha-OH-Midazolam   Not Detected   Comment: INTERPRETIVE INFORMATION:Alpha-OH-Midazolam, U  Positive Cutoff: 20 ng/mL  Methodology: Mass Spectrometry   Zolpidem Metabolite   Not Detected   Comment: INTERPRETIVE  INFORMATION:Zolpidem Metabolite, U  Positive Cutoff: 100 ng/mL  Methodology: Mass Spectrometry

## 2024-09-27 RX ORDER — TRAMADOL HYDROCHLORIDE 50 MG/1
50 TABLET ORAL
Qty: 30 TABLET | Refills: 0 | Status: SHIPPED | OUTPATIENT
Start: 2024-09-27

## 2024-10-10 ENCOUNTER — PATIENT MESSAGE (OUTPATIENT)
Dept: ADMINISTRATIVE | Facility: CLINIC | Age: 74
End: 2024-10-10
Payer: MEDICARE

## 2024-10-14 ENCOUNTER — TELEPHONE (OUTPATIENT)
Dept: ADMINISTRATIVE | Facility: CLINIC | Age: 74
End: 2024-10-14
Payer: MEDICARE

## 2024-10-16 DIAGNOSIS — M48.07 SPINAL STENOSIS, LUMBOSACRAL REGION: ICD-10-CM

## 2024-10-17 RX ORDER — MELOXICAM 15 MG/1
15 TABLET ORAL DAILY
Qty: 30 TABLET | Refills: 2 | Status: SHIPPED | OUTPATIENT
Start: 2024-10-17

## 2024-10-27 DIAGNOSIS — I10 ESSENTIAL HYPERTENSION: ICD-10-CM

## 2024-10-28 RX ORDER — AMLODIPINE BESYLATE 10 MG/1
10 TABLET ORAL DAILY
Qty: 90 TABLET | Refills: 3 | Status: SHIPPED | OUTPATIENT
Start: 2024-10-28

## 2025-01-06 DIAGNOSIS — I48.0 PAROXYSMAL ATRIAL FIBRILLATION: Primary | ICD-10-CM

## 2025-01-06 DIAGNOSIS — I48.0 PAROXYSMAL ATRIAL FIBRILLATION: Chronic | ICD-10-CM

## 2025-01-06 RX ORDER — DABIGATRAN ETEXILATE 150 MG/1
150 CAPSULE ORAL 2 TIMES DAILY
Qty: 180 CAPSULE | Refills: 1 | Status: SHIPPED | OUTPATIENT
Start: 2025-01-06

## 2025-01-25 ENCOUNTER — LAB VISIT (OUTPATIENT)
Dept: LAB | Facility: HOSPITAL | Age: 75
End: 2025-01-25
Attending: NURSE PRACTITIONER
Payer: MEDICARE

## 2025-01-25 DIAGNOSIS — Z08 ENCOUNTER FOR FOLLOW-UP SURVEILLANCE OF PROSTATE CANCER: ICD-10-CM

## 2025-01-25 DIAGNOSIS — Z85.46 ENCOUNTER FOR FOLLOW-UP SURVEILLANCE OF PROSTATE CANCER: ICD-10-CM

## 2025-01-25 DIAGNOSIS — C61 PROSTATE CANCER: ICD-10-CM

## 2025-01-25 LAB — COMPLEXED PSA SERPL-MCNC: 6.6 NG/ML (ref 0–4)

## 2025-01-25 PROCEDURE — 84153 ASSAY OF PSA TOTAL: CPT | Performed by: NURSE PRACTITIONER

## 2025-01-25 PROCEDURE — 36415 COLL VENOUS BLD VENIPUNCTURE: CPT | Performed by: NURSE PRACTITIONER

## 2025-01-27 ENCOUNTER — OFFICE VISIT (OUTPATIENT)
Dept: UROLOGY | Facility: CLINIC | Age: 75
End: 2025-01-27
Payer: MEDICARE

## 2025-01-27 VITALS
WEIGHT: 222.69 LBS | HEART RATE: 69 BPM | SYSTOLIC BLOOD PRESSURE: 138 MMHG | BODY MASS INDEX: 33.75 KG/M2 | DIASTOLIC BLOOD PRESSURE: 83 MMHG | HEIGHT: 68 IN

## 2025-01-27 DIAGNOSIS — N40.1 BPH WITH URINARY OBSTRUCTION: ICD-10-CM

## 2025-01-27 DIAGNOSIS — C61 PROSTATE CANCER: Primary | ICD-10-CM

## 2025-01-27 DIAGNOSIS — M48.07 SPINAL STENOSIS, LUMBOSACRAL REGION: ICD-10-CM

## 2025-01-27 DIAGNOSIS — I48.0 PAROXYSMAL ATRIAL FIBRILLATION: Chronic | ICD-10-CM

## 2025-01-27 DIAGNOSIS — Z85.46 ENCOUNTER FOR FOLLOW-UP SURVEILLANCE OF PROSTATE CANCER: ICD-10-CM

## 2025-01-27 DIAGNOSIS — R39.12 WEAK URINARY STREAM: ICD-10-CM

## 2025-01-27 DIAGNOSIS — N13.8 BPH WITH URINARY OBSTRUCTION: ICD-10-CM

## 2025-01-27 DIAGNOSIS — R39.9 LOWER URINARY TRACT SYMPTOMS (LUTS): ICD-10-CM

## 2025-01-27 DIAGNOSIS — R97.20 ELEVATED PSA: ICD-10-CM

## 2025-01-27 DIAGNOSIS — Z08 ENCOUNTER FOR FOLLOW-UP SURVEILLANCE OF PROSTATE CANCER: ICD-10-CM

## 2025-01-27 LAB
BILIRUB SERPL-MCNC: NORMAL MG/DL
BLOOD URINE, POC: NORMAL
CLARITY, POC UA: CLEAR
COLOR, POC UA: YELLOW
GLUCOSE UR QL STRIP: NORMAL
KETONES UR QL STRIP: NORMAL
LEUKOCYTE ESTERASE URINE, POC: NORMAL
NITRITE, POC UA: NORMAL
PH, POC UA: 6
PROTEIN, POC: NORMAL
SPECIFIC GRAVITY, POC UA: 1.03
UROBILINOGEN, POC UA: 0.2

## 2025-01-27 PROCEDURE — 99999 PR PBB SHADOW E&M-EST. PATIENT-LVL V: CPT | Mod: PBBFAC,,, | Performed by: NURSE PRACTITIONER

## 2025-01-27 PROCEDURE — G2211 COMPLEX E/M VISIT ADD ON: HCPCS | Mod: S$PBB,,, | Performed by: NURSE PRACTITIONER

## 2025-01-27 PROCEDURE — 99999PBSHW POCT URINE DIPSTICK WITHOUT MICROSCOPE: Mod: PBBFAC,,,

## 2025-01-27 PROCEDURE — 81002 URINALYSIS NONAUTO W/O SCOPE: CPT | Mod: PBBFAC | Performed by: NURSE PRACTITIONER

## 2025-01-27 PROCEDURE — 99214 OFFICE O/P EST MOD 30 MIN: CPT | Mod: S$PBB,,, | Performed by: NURSE PRACTITIONER

## 2025-01-27 PROCEDURE — 99215 OFFICE O/P EST HI 40 MIN: CPT | Mod: PBBFAC | Performed by: NURSE PRACTITIONER

## 2025-01-27 RX ORDER — DABIGATRAN ETEXILATE 150 MG/1
150 CAPSULE ORAL 2 TIMES DAILY
Qty: 180 CAPSULE | Refills: 3 | Status: SHIPPED | OUTPATIENT
Start: 2025-01-27

## 2025-01-27 RX ORDER — CHOLECALCIFEROL (VITAMIN D3) 25 MCG
25 TABLET ORAL
COMMUNITY
Start: 2024-12-10

## 2025-01-27 RX ORDER — DABIGATRAN ETEXILATE 150 MG/1
150 CAPSULE ORAL 2 TIMES DAILY
Qty: 180 CAPSULE | Refills: 1 | Status: SHIPPED | OUTPATIENT
Start: 2025-01-27 | End: 2025-01-27 | Stop reason: SDUPTHER

## 2025-01-27 RX ORDER — TAMSULOSIN HYDROCHLORIDE 0.4 MG/1
0.4 CAPSULE ORAL NIGHTLY
Qty: 30 CAPSULE | Refills: 11 | Status: SHIPPED | OUTPATIENT
Start: 2025-01-27 | End: 2026-01-27

## 2025-01-27 RX ORDER — MELOXICAM 15 MG/1
15 TABLET ORAL DAILY
Qty: 30 TABLET | Refills: 2 | Status: SHIPPED | OUTPATIENT
Start: 2025-01-27

## 2025-01-27 NOTE — PATIENT INSTRUCTIONS
Lab Results   Component Value Date    PSA 4.09 (H) 01/07/2013    PSA 1.8 02/05/2007    PSADIAG 6.6 (H) 01/25/2025    PSADIAG 5.8 (H) 07/24/2024    PSADIAG 5.4 (H) 01/08/2024    PSADIAG 8.2 (H) 09/13/2023    PSADIAG 5.2 (H) 03/06/2023    PSADIAG 9.1 (H) 01/20/2023    PSADIAG 7.8 (H) 11/02/2022    PSADIAG 8.0 (H) 05/23/2022  (+) biopsy    PSADIAG 9.4 (H) 04/22/2022    PSADIAG 6.5 (H) 01/26/2022    PSATOTAL 14.5 (H) 12/04/2020

## 2025-01-27 NOTE — PROGRESS NOTES
CHIEF COMPLAINT:    Omar Pacheco is a 74 y.o. male presents today for ED    HISTORY OF PRESENTING ILLINESS:    Omar Pacheco is a 72 y.o. male with a history of BPH with an elevated PSA in the past 5 years with significant fluctuations.   His most recent PSA in May of 2022 returned at 8 ng/ml.    MRI on 6/24/22 revealed a 58 cc prostate with no focal abnormalities in the peripheral or transitional zone. The seminal vesicles and neurovascular bundles were unremarkable.  There was no adenopathy.    Biopsies on 8/23/22 revealed Ryan 7 (3+4) adenocarcinoma involving 5% of 1 of 2 cores from the Lt. apex.    The Ryan pattern 4 accounted for 40% of the tumor.    There was Ryan 6 (3+3) adenocarcinoma involving 5% of 1 of 2 cores from the Rt. mid gland.    The remaining 8 cores revealed benign prostate tissue. The patient presnts for discussion of treatment options.      He met with Dr. Grace 09/19/2022  He met with Dr. Pritchett 09/27/2022  Has chosen AS.     Last office visit 05/08/2023 with a PSA of 5.2.     Here today with new PSA of 6.6 on 01/25/2025.   ( He gets PSA's without visits).   Overall doing well.  No new complaints  He has some LUTS. FOS can be weak at times.   Nocturia varies 2-4x.   He is taking Terazosin 10mg at HS.   No abdominal or bone pain.         Going on a family cruise this fall.         REVIEW OF SYSTEMS:  Review of Systems   Constitutional: Negative.  Negative for chills and fever.   Eyes:  Negative for double vision.   Respiratory:  Negative for cough and shortness of breath.    Cardiovascular:  Negative for chest pain and palpitations.   Gastrointestinal:  Negative for abdominal pain, constipation, diarrhea, nausea and vomiting.   Genitourinary:  Positive for frequency. Negative for dysuria, flank pain and hematuria.        See HPI   Musculoskeletal:  Negative for falls.   Neurological:  Negative for dizziness and seizures.   Endo/Heme/Allergies:  Negative for polydipsia.          PATIENT HISTORY:    Past Medical History:   Diagnosis Date    *Atrial fibrillation     Back pain     Benign hypertrophy of prostate     Degenerative disc disease     GERD (gastroesophageal reflux disease)     Hx of colonic polyps     Hypertension     Pilonidal cyst 1971    Prostate cancer     Sleep apnea     Trouble in sleeping        Past Surgical History:   Procedure Laterality Date    COLONOSCOPY N/A 12/07/2016    Procedure: COLONOSCOPY;  Surgeon: Sumeet Lundy MD;  [facility]  Service: Endoscopy;  Laterality: N/A;  OK to hold Pradaxa 2 days prior to procedure per Dr Jones/see note dated 11/15/16/svn    COLONOSCOPY N/A 02/24/2022    Procedure: COLONOSCOPY;  Surgeon: Italo Roy MD;  Location: [facility];  Service: Endoscopy;  Laterality: N/A;  ok to hold Pradaxa 2 days per EBONI Bentley RN/arrhythmia clinic      COVID test at [place] on 2/21-GT  2/10/22 Changed Kirill to scoping MD-ml    CYST REMOVAL  1971    coccyx    EPIDURAL STEROID INJECTION Right 09/07/2022    Procedure: Right Genicular Nerve Radiofrequency Ablations;  Surgeon: Luca Galan Jr., MD;  Location: [facility];  Service: Pain Management;  Laterality: Right;  @1100  Pradaxa not held  No DM  Prostate BX 8/2    EPIDURAL STEROID INJECTION Left 09/21/2022    Procedure: Left Genicular Radiofrequency Ablations;  Surgeon: Luca Galan Jr., MD;  Location: [facility];  Service: Pain Management;  Laterality: Left;  @1130  Pradaxa not held  No DM  Last 1&100    KNEE SURGERY  1989    right    PROSTATE BIOPSY  08/23/2022    TRANSFORAMINAL EPIDURAL INJECTION OF STEROID Bilateral 1/23/2024    Procedure: LUMBAR TRANSFORAMINAL BILATERAL L4/5 *PRADAXA CLEARANCE IN CHART*;  Surgeon: Donya Hickey MD;  Location: [facility];  Service: Pain Management;  Laterality: Bilateral;  [phone]  2 WK F/U JERONIMO  *1/5 COVID BOOSTER       Family History   Problem Relation Name Age of Onset    Breast cancer Mother      Cancer Mother       Alcohol abuse Father      Cancer Father      Heart disease Father      Hypertension Father      Breast cancer Sister      Cancer Sister      Early death Sister      Depression Brother      Early death Brother      Mental illness Brother      No Known Problems Daughter      No Known Problems Daughter      No Known Problems Daughter      No Known Problems Daughter      No Known Problems Daughter      Ovarian cancer Other niece     Melanoma Neg Hx         Social History     Socioeconomic History    Marital status:     Number of children: 5    Highest education level: Associate degree: academic program   Tobacco Use    Smoking status: Former     Current packs/day: 0.00     Average packs/day: 0.3 packs/day for 2.0 years (0.5 ttl pk-yrs)     Types: Cigarettes     Start date: 1973     Quit date: 1975     Years since quittin.3    Smokeless tobacco: Never   Substance and Sexual Activity    Alcohol use: Yes     Alcohol/week: 6.0 standard drinks of alcohol     Types: 6 Glasses of wine per week     Comment: weekly    Drug use: No    Sexual activity: Yes     Partners: Female     Social Drivers of Health     Financial Resource Strain: Low Risk  (2025)    Overall Financial Resource Strain (CARDIA)     Difficulty of Paying Living Expenses: Not hard at all   Food Insecurity: No Food Insecurity (2025)    Hunger Vital Sign     Worried About Running Out of Food in the Last Year: Never true     Ran Out of Food in the Last Year: Never true   Transportation Needs: No Transportation Needs (2023)    PRAPARE - Transportation     Lack of Transportation (Medical): No     Lack of Transportation (Non-Medical): No   Physical Activity: Insufficiently Active (2025)    Exercise Vital Sign     Days of Exercise per Week: 3 days     Minutes of Exercise per Session: 40 min   Stress: No Stress Concern Present (2025)    [employer] - Occupational Stress Questionnaire     Feeling  "of Stress : Only a little   Housing Stability: Low Risk  (11/14/2023)    Housing Stability Vital Sign     Unable to Pay for Housing in the Last Year: No     Number of Places Lived in the Last Year: 1     Unstable Housing in the Last Year: No       Allergies:  Bactrim [sulfamethoxazole-trimethoprim]    Medications:    Current Outpatient Medications:     acetaminophen (TYLENOL ORAL), Take 500 mg by mouth as needed., Disp: , Rfl:     amLODIPine (NORVASC) 10 MG tablet, Take 1 tablet (10 mg total) by mouth once daily., Disp: 90 tablet, Rfl: 3    ammonium lactate (AMLACTIN) 12 % lotion, Apply topically daily as needed (Dry Skin)., Disp: 400 g, Rfl: 11    dabigatran etexilate (PRADAXA) 150 mg Cap, Take 1 capsule (150 mg total) by mouth 2 (two) times daily. "Do NOT break, chew, or open capsules.", Disp: 180 capsule, Rfl: 1    diclofenac sodium (VOLTAREN) 1 % Gel, Apply 2 g topically 4 (four) times daily., Disp: 200 g, Rfl: 11    flecainide (TAMBOCOR) 100 MG Tab, Take 1 tablet (100 mg total) by mouth every 12 (twelve) hours., Disp: 180 tablet, Rfl: 3    gabapentin (NEURONTIN) 400 MG capsule, Take 1 capsule (400 mg total) by mouth 3 (three) times daily., Disp: 90 capsule, Rfl: 11    LIDOcaine (LIDODERM) 5 %, Place 1 patch onto the skin once daily. Remove & Discard patch within 12 hours or as directed by MD, Disp: 30 patch, Rfl: 0    losartan (COZAAR) 50 MG tablet, Take 0.5 tablets (25 mg total) by mouth once daily., Disp: 45 tablet, Rfl: 3    meloxicam (MOBIC) 15 MG tablet, Take 1 tablet (15 mg total) by mouth once daily., Disp: 30 tablet, Rfl: 2    methocarbamoL (ROBAXIN) 500 MG Tab, Take 1 tablet (500 mg total) by mouth 4 (four) times daily as needed., Disp: 60 tablet, Rfl: 2    metoprolol succinate (TOPROL-XL) 50 MG 24 hr tablet, Take 25 mg by mouth., Disp: , Rfl:     mirtazapine (REMERON) 30 MG tablet, Take 30 mg by mouth every evening., Disp: , Rfl:     multivitamin (THERAGRAN) per tablet, Take 1 tablet by mouth once " daily., Disp: , Rfl:     naloxone (NARCAN) 4 mg/actuation Spry, 1 spray by Nasal route once., Disp: , Rfl:     omeprazole (PRILOSEC) 20 MG capsule, Take 1 capsule by mouth Daily., Disp: , Rfl:     sildenafiL (VIAGRA) 100 MG tablet, Take 1 tablet (100 mg total) by mouth as needed for Erectile Dysfunction (1 tablet 1 hr prior to intercourse.)., Disp: 10 tablet, Rfl: 11    traMADoL (ULTRAM) 50 mg tablet, Take 1 tablet (50 mg total) by mouth every 24 hours as needed for Pain., Disp: 30 tablet, Rfl: 0    VITAMIN B COMPLEX (B COMPLETE ORAL), Take by mouth once daily., Disp: , Rfl:     vitamin D (VITAMIN D3) 1000 units Tab, Take 25 mcg by mouth., Disp: , Rfl:     vitamin E 100 UNIT capsule, Take 100 Units by mouth once daily., Disp: , Rfl:     levocetirizine (XYZAL) 5 MG tablet, Take 1 tablet (5 mg total) by mouth every evening., Disp: 90 tablet, Rfl: 3    tamsulosin (FLOMAX) 0.4 mg Cap, Take 1 capsule (0.4 mg total) by mouth every evening. Take 30 minutes after dinner, Disp: 30 capsule, Rfl: 11    triamcinolone acetonide 0.1% (KENALOG) 0.1 % cream, Apply topically 2 (two) times daily., Disp: 80 g, Rfl: 0    PHYSICAL EXAMINATION:  Physical Exam  Vitals and nursing note reviewed.   Constitutional:       General: He is awake.      Appearance: Normal appearance.   HENT:      Head: Normocephalic.      Right Ear: External ear normal.      Left Ear: External ear normal.      Nose: Nose normal.   Cardiovascular:      Rate and Rhythm: Normal rate.   Pulmonary:      Effort: Pulmonary effort is normal. No respiratory distress.   Abdominal:      Tenderness: There is no abdominal tenderness. There is no right CVA tenderness or left CVA tenderness.   Genitourinary:     Penis: Normal.       Testes: Normal.   Musculoskeletal:         General: Normal range of motion.      Cervical back: Normal range of motion.   Skin:     General: Skin is warm and dry.   Neurological:      General: No focal deficit present.      Mental Status: He is alert  and oriented to person, place, and time.   Psychiatric:         Mood and Affect: Mood normal.         Behavior: Behavior is cooperative.           LABS:          Lab Results   Component Value Date    PSA 4.09 (H) 01/07/2013    PSA 1.8 02/05/2007    PSADIAG 6.6 (H) 01/25/2025    PSADIAG 5.8 (H) 07/24/2024    PSADIAG 5.4 (H) 01/08/2024    PSATOTAL 14.5 (H) 12/04/2020       Lab Results   Component Value Date    CREATININE 1.0 08/09/2024    EGFRNORACEVR >60.0 08/09/2024               IMPRESSION:    Encounter Diagnoses   Name Primary?    Prostate cancer Yes    Encounter for follow-up surveillance of prostate cancer     BPH with urinary obstruction     Elevated PSA     Lower urinary tract symptoms (LUTS)     Weak urinary stream          Assessment:       1. Prostate cancer    2. Encounter for follow-up surveillance of prostate cancer    3. BPH with urinary obstruction    4. Elevated PSA    5. Lower urinary tract symptoms (LUTS)    6. Weak urinary stream        Plan:         I spent a total of 30 minutes on the day of the visit. This includes face to face time and non-face to face time preparing to see the patient (eg, review of tests), obtaining and/or reviewing separately obtained history, documenting clinical information in the electronic or other health record, independently interpreting results and communicating results to the patient/family/caregiver, or care coordinator  Reviewed the possible contributory factors his LUTS/PSA  Reassurance with today's in office UA and comparing previous PSA results.   Reviewed management; including possible medical and surgical options; including home care.  Recommendations for PCP follow up visit; any need to go to the ER.    Recommended lifestyle modifications with a proper, healthy diet, good hydration but during the day. Reducing bladder irritants.   Benefits of regular exercise and weight loss.   Discussed alpha blockers and options.   Stop Hytrin; Rx for Flomax 0.4mg at hs  instead. We discussed the reason, benefits, expectations as well as risks, possible side effects.   Any concerns then stop taking and let me know.  Check on him in 4 weeks  PSA in 6 months    The visit today is associated with current or anticipated ongoing medical care related to this patient's single serious condition/complex condition.

## 2025-02-06 ENCOUNTER — HOSPITAL ENCOUNTER (OUTPATIENT)
Dept: CARDIOLOGY | Facility: CLINIC | Age: 75
Discharge: HOME | End: 2025-02-06
Payer: MEDICARE

## 2025-02-06 DIAGNOSIS — I48.0 PAROXYSMAL ATRIAL FIBRILLATION: ICD-10-CM

## 2025-02-06 LAB
OHS QRS DURATION: 96 MS
OHS QTC CALCULATION: 412 MS

## 2025-02-06 PROCEDURE — 93005 ELECTROCARDIOGRAM TRACING: CPT | Mod: PBBFAC | Performed by: INTERNAL MEDICINE

## 2025-02-06 PROCEDURE — 93010 ELECTROCARDIOGRAM REPORT: CPT | Mod: S$PBB,,, | Performed by: INTERNAL MEDICINE

## 2025-02-07 ENCOUNTER — OFFICE VISIT (OUTPATIENT)
Dept: CARDIOLOGY | Facility: CLINIC | Age: 75
End: 2025-02-07
Payer: MEDICARE

## 2025-02-07 VITALS
SYSTOLIC BLOOD PRESSURE: 148 MMHG | BODY MASS INDEX: 33.08 KG/M2 | HEART RATE: 76 BPM | WEIGHT: 218.25 LBS | HEIGHT: 68 IN | RESPIRATION RATE: 18 BRPM | DIASTOLIC BLOOD PRESSURE: 80 MMHG | OXYGEN SATURATION: 96 %

## 2025-02-07 DIAGNOSIS — I48.0 PAROXYSMAL ATRIAL FIBRILLATION: Primary | ICD-10-CM

## 2025-02-07 DIAGNOSIS — I70.0 AORTIC ATHEROSCLEROSIS: ICD-10-CM

## 2025-02-07 DIAGNOSIS — E66.09 CLASS 1 OBESITY DUE TO EXCESS CALORIES WITH SERIOUS COMORBIDITY AND BODY MASS INDEX (BMI) OF 33.0 TO 33.9 IN ADULT: ICD-10-CM

## 2025-02-07 DIAGNOSIS — E66.811 CLASS 1 OBESITY DUE TO EXCESS CALORIES WITH SERIOUS COMORBIDITY AND BODY MASS INDEX (BMI) OF 33.0 TO 33.9 IN ADULT: ICD-10-CM

## 2025-02-07 DIAGNOSIS — I10 ESSENTIAL HYPERTENSION: ICD-10-CM

## 2025-02-07 PROCEDURE — 99215 OFFICE O/P EST HI 40 MIN: CPT | Mod: PBBFAC | Performed by: INTERNAL MEDICINE

## 2025-02-07 PROCEDURE — 99999 PR PBB SHADOW E&M-EST. PATIENT-LVL V: CPT | Mod: PBBFAC,,, | Performed by: INTERNAL MEDICINE

## 2025-02-07 PROCEDURE — 99203 OFFICE O/P NEW LOW 30 MIN: CPT | Mod: S$PBB,,, | Performed by: INTERNAL MEDICINE

## 2025-02-07 PROCEDURE — 93005 ELECTROCARDIOGRAM TRACING: CPT | Mod: PBBFAC | Performed by: INTERNAL MEDICINE

## 2025-02-07 PROCEDURE — 93010 ELECTROCARDIOGRAM REPORT: CPT | Mod: S$PBB,,, | Performed by: INTERNAL MEDICINE

## 2025-02-07 NOTE — PROGRESS NOTES
CARDIOLOGY CLINIC VISIT        HISTORY OF PRESENT ILLNESS:     02/07/2025: Omar Pacheco presents for establishment of care. History of paroxysmal atrial fibrillation.  On flecainide and Pradaxa.  Last echocardiogram showed ejection fraction of 60-65%.  Mild mitral as well as tricuspid regurgitation. EKG today sinus rhythm with first degree AVB. LAFB.  Feels good.  No complaints.  No chest pain or shortness of breath.  No PND or orthopnea.  Denies palpitations.    CARDIOVASCULAR HISTORY:     Paroxysmal atrial fibrillation  Aortic atherosclerosis    PAST MEDICAL HISTORY:     Past Medical History:   Diagnosis Date    *Atrial fibrillation     Back pain     Benign hypertrophy of prostate     Degenerative disc disease     GERD (gastroesophageal reflux disease)     Hx of colonic polyps     Hypertension     Pilonidal cyst 1971    Prostate cancer     Sleep apnea     Trouble in sleeping        PAST SURGICAL HISTORY:     Past Surgical History:   Procedure Laterality Date    COLONOSCOPY N/A 12/07/2016    Procedure: COLONOSCOPY;  Surgeon: Sumeet Lundy MD;  Location: [facility] ([address] [place] [address]);  Service: Endoscopy;  Laterality: N/A;  OK to hold Pradaxa 2 days prior to procedure per Dr Jones/see note dated 11/15/16/svn    COLONOSCOPY N/A 02/24/2022    Procedure: COLONOSCOPY;  Surgeon: Italo Roy MD;  Location: [facility];  Service: Endoscopy;  Laterality: N/A;  ok to hold Pradaxa 2 days per EBONI Bentley RN/arrhythmia clinic      COVID test at [place] on 2/21-GT  2/10/22 Changed Kirill to edward MD-ml    CYST REMOVAL  1971    coccyx    EPIDURAL STEROID INJECTION Right 09/07/2022    Procedure: Right Genicular Nerve Radiofrequency Ablations;  Surgeon: Luca Galan Jr., MD;  Location: [facility];  Service: Pain Management;  Laterality: Right;  @1100  Pradaxa not held  No DM  Prostate BX 8/2    EPIDURAL STEROID INJECTION Left 09/21/2022    Procedure: Left Genicular Radiofrequency Ablations;  Surgeon: Luca Galan Jr., MD;   "Location: [facility] ENDO;  Service: Pain Management;  Laterality: Left;  @1130  Pradaxa not held  No DM  Last 1&100    KNEE SURGERY  1989    right    PROSTATE BIOPSY  08/23/2022    TRANSFORAMINAL EPIDURAL INJECTION OF STEROID Bilateral 1/23/2024    Procedure: LUMBAR TRANSFORAMINAL BILATERAL L4/5 *PRADAXA CLEARANCE IN CHART*;  Surgeon: Donya Hickey MD;  Location: [facility] PAIN MGT;  Service: Pain Management;  Laterality: Bilateral;  [phone]  2 WK F/U JERONIMO  *1/5 COVID BOOSTER       ALLERGIES AND MEDICATION:     Review of patient's allergies indicates:   Allergen Reactions    Bactrim [sulfamethoxazole-trimethoprim] Swelling     Swelling of lips/blisters in mouth          Medication List            Accurate as of February 7, 2025 11:07 AM. If you have any questions, ask your nurse or doctor.                CONTINUE taking these medications      amLODIPine 10 MG tablet  Commonly known as: NORVASC  Take 1 tablet (10 mg total) by mouth once daily.     ammonium lactate 12 % lotion  Commonly known as: AMLACTIN  Apply topically daily as needed (Dry Skin).     B COMPLETE ORAL     dabigatran etexilate 150 mg Cap  Commonly known as: PRADAXA  Take 1 capsule (150 mg total) by mouth 2 (two) times daily. "Do NOT break, chew, or open capsules."     diclofenac sodium 1 % Gel  Commonly known as: VOLTAREN  Apply 2 g topically 4 (four) times daily.     flecainide 100 MG Tab  Commonly known as: TAMBOCOR  Take 1 tablet (100 mg total) by mouth every 12 (twelve) hours.     gabapentin 400 MG capsule  Commonly known as: NEURONTIN  Take 1 capsule (400 mg total) by mouth 3 (three) times daily.     levocetirizine 5 MG tablet  Commonly known as: XYZAL  Take 1 tablet (5 mg total) by mouth every evening.     LIDOcaine 5 %  Commonly known as: LIDODERM  Place 1 patch onto the skin once daily. Remove & Discard patch within 12 hours or as directed by MD     losartan 50 MG tablet  Commonly known as: COZAAR  Take 0.5 tablets (25 mg total) by mouth once " daily.     meloxicam 15 MG tablet  Commonly known as: MOBIC  Take 1 tablet (15 mg total) by mouth once daily.     methocarbamoL 500 MG Tab  Commonly known as: Robaxin  Take 1 tablet (500 mg total) by mouth 4 (four) times daily as needed.     metoprolol succinate 50 MG 24 hr tablet  Commonly known as: TOPROL-XL     mirtazapine 30 MG tablet  Commonly known as: REMERON     multivitamin per tablet  Commonly known as: THERAGRAN     naloxone 4 mg/actuation Spry  Commonly known as: NARCAN     omeprazole 20 MG capsule  Commonly known as: PRILOSEC     sildenafiL 100 MG tablet  Commonly known as: VIAGRA  Take 1 tablet (100 mg total) by mouth as needed for Erectile Dysfunction (1 tablet 1 hr prior to intercourse.).     tamsulosin 0.4 mg Cap  Commonly known as: FLOMAX  Take 1 capsule (0.4 mg total) by mouth every evening. Take 30 minutes after dinner     traMADoL 50 mg tablet  Commonly known as: ULTRAM  Take 1 tablet (50 mg total) by mouth every 24 hours as needed for Pain.     triamcinolone acetonide 0.1% 0.1 % cream  Commonly known as: KENALOG  Apply topically 2 (two) times daily.     TYLENOL ORAL     vitamin D 1000 units Tab  Commonly known as: VITAMIN D3     vitamin E 100 UNIT capsule              SOCIAL HISTORY:     Social History     Socioeconomic History    Marital status:     Number of children: 5    Highest education level: Associate degree: academic program   Tobacco Use    Smoking status: Former     Current packs/day: 0.00     Average packs/day: 0.3 packs/day for 2.0 years (0.5 ttl pk-yrs)     Types: Cigarettes     Start date: 1973     Quit date: 1975     Years since quittin.4    Smokeless tobacco: Never   Substance and Sexual Activity    Alcohol use: Yes     Alcohol/week: 6.0 standard drinks of alcohol     Types: 6 Glasses of wine per week     Comment: weekly    Drug use: No    Sexual activity: Yes     Partners: Female     Social Drivers of Health     Financial Resource Strain: Low Risk   (1/26/2025)    Overall Financial Resource Strain (CARDIA)     Difficulty of Paying Living Expenses: Not hard at all   Food Insecurity: No Food Insecurity (1/26/2025)    Hunger Vital Sign     Worried About Running Out of Food in the Last Year: Never true     Ran Out of Food in the Last Year: Never true   Transportation Needs: No Transportation Needs (11/14/2023)    PRAPARE - Transportation     Lack of Transportation (Medical): No     Lack of Transportation (Non-Medical): No   Physical Activity: Insufficiently Active (1/26/2025)    Exercise Vital Sign     Days of Exercise per Week: 3 days     Minutes of Exercise per Session: 40 min   Stress: No Stress Concern Present (1/26/2025)    [employer] - Occupational Stress Questionnaire     Feeling of Stress : Only a little   Housing Stability: Low Risk  (11/14/2023)    Housing Stability Vital Sign     Unable to Pay for Housing in the Last Year: No     Number of Places Lived in the Last Year: 1     Unstable Housing in the Last Year: No       FAMILY HISTORY:     Family History   Problem Relation Name Age of Onset    Breast cancer Mother      Cancer Mother      Alcohol abuse Father      Cancer Father      Heart disease Father      Hypertension Father      Breast cancer Sister      Cancer Sister      Early death Sister      Depression Brother      Early death Brother      Mental illness Brother      No Known Problems Daughter      No Known Problems Daughter      No Known Problems Daughter      No Known Problems Daughter      No Known Problems Daughter      Ovarian cancer Other niece     Melanoma Neg Hx         REVIEW OF SYSTEMS:   Review of Systems   Constitutional:  Negative for chills, diaphoresis, fever, malaise/fatigue and weight loss.   HENT:  Negative for congestion, hearing loss, sinus pain, sore throat and tinnitus.    Eyes:  Negative for blurred vision, double vision, photophobia and pain.   Respiratory:  Negative for cough, hemoptysis,  "sputum production, shortness of breath, wheezing and stridor.    Cardiovascular:  Negative for chest pain, palpitations, orthopnea, claudication, leg swelling and PND.   Gastrointestinal:  Negative for abdominal pain, blood in stool, heartburn, melena, nausea and vomiting.   Musculoskeletal:  Negative for back pain, falls, joint pain, myalgias and neck pain.   Neurological:  Negative for dizziness, tingling, tremors, sensory change, speech change, focal weakness, seizures, loss of consciousness, weakness and headaches.   Endo/Heme/Allergies:  Does not bruise/bleed easily.   Psychiatric/Behavioral:  Negative for depression, memory loss and substance abuse. The patient is not nervous/anxious.        PHYSICAL EXAM:     Vitals:    02/07/25 1051   BP: (!) 148/80   Pulse: 76   Resp: 18    Body mass index is 33.19 kg/m².  Weight: 99 kg (218 lb 4.1 oz)   Height: 5' 8" (172.7 cm)     Physical Exam  Vitals reviewed.   Constitutional:       General: He is not in acute distress.     Appearance: Normal appearance. He is well-developed. He is not diaphoretic.   HENT:      Head: Normocephalic.   Eyes:      Extraocular Movements: Extraocular movements intact.   Neck:      Vascular: No carotid bruit or JVD.   Cardiovascular:      Rate and Rhythm: Normal rate and regular rhythm.      Pulses: Normal pulses.      Heart sounds: Normal heart sounds.   Pulmonary:      Effort: Pulmonary effort is normal.      Breath sounds: Normal breath sounds. No wheezing, rhonchi or rales.   Chest:      Chest wall: No tenderness.   Abdominal:      General: Bowel sounds are normal. There is no distension.      Palpations: Abdomen is soft.      Tenderness: There is no abdominal tenderness.   Musculoskeletal:      Right lower leg: No edema.      Left lower leg: No edema.   Skin:     General: Skin is warm and dry.      Coloration: Skin is not jaundiced or pale.      Findings: No erythema.   Neurological:      General: No focal deficit present.      Mental " Status: He is alert and oriented to person, place, and time.      Motor: No weakness.   Psychiatric:         Speech: Speech normal.         Behavior: Behavior normal.         Thought Content: Thought content normal.         DATA:   EKG: (personally reviewed tracing)  02/07/2025-sinus rhythm with first degree AVB. LAFB  Results for orders placed or performed in visit on 08/09/24   IN OFFICE EKG 12-LEAD (to [place])    Collection Time: 08/09/24  9:55 AM   Result Value Ref Range    QRS Duration 90 ms    [facility] QTC Calculation 392 ms    Narrative    Test Reason : Z01.818,    Vent. Rate : 049 BPM     Atrial Rate : 049 BPM     P-R Int : 206 ms          QRS Dur : 090 ms      QT Int : 434 ms       P-R-T Axes : 057 -47 030 degrees     QTc Int : 392 ms    Sinus bradycardia  Left anterior fascicular block  Cannot rule out Inferior infarct (masked by fascicular block?) ,age  undetermined  Possible Anterior infarct (cited on or before 09-AUG-2024)  Abnormal ECG  When compared with ECG of 01-MAY-2024 06:36,  T wave inversion now evident in Anterior leads  Confirmed by Luca Parmar MD ([id number]) on 8/9/2024 5:48:33 PM    Referred By:  cata SALGUERO           Confirmed By:Luca Parmar MD        Laboratory:  CBC:  Recent Labs   Lab 01/11/23  0945 01/08/24  0933 08/09/24  1033   WBC 5.16 5.91 3.76 L   Hemoglobin 12.8 L 11.9 L 12.5 L   Hematocrit 39.1 L 36.8 L 38.5 L   Platelets 251 227 230       CHEMISTRIES:  Recent Labs   Lab 01/11/23 0945 01/08/24  0933 08/09/24  1033   Glucose 95 110 90   Sodium 140 142 140   Potassium 4.1 4.0 4.3   BUN 13 9 12   Creatinine 1.0 0.9 1.0   Calcium 9.2 8.7 8.9       CARDIAC BIOMARKERS:        COAGS:        LIPIDS/LFTS:  Recent Labs   Lab 01/11/23  0945 01/08/24  0933 08/09/24  1033   Cholesterol 170 151  --    Triglycerides 69 65  --    HDL 55 56  --    LDL Cholesterol 101.2 82.0  --    Non-HDL Cholesterol 115 95  --    AST 28 29 25   ALT 28 24 19       Hemoglobin A1C   Date Value Ref Range Status    08/09/2024 5.4 4.0 - 5.6 % Final     Comment:     ADA Screening Guidelines:  5.7-6.4%  Consistent with prediabetes  >or=6.5%  Consistent with diabetes    High levels of fetal hemoglobin interfere with the HbA1C  assay. Heterozygous hemoglobin variants (HbS, HgC, etc)do  not significantly interfere with this assay.   However, presence of multiple variants may affect accuracy.     01/08/2024 5.6 4.0 - 5.6 % Final     Comment:     ADA Screening Guidelines:  5.7-6.4%  Consistent with prediabetes  >or=6.5%  Consistent with diabetes    High levels of fetal hemoglobin interfere with the HbA1C  assay. Heterozygous hemoglobin variants (HbS, HgC, etc)do  not significantly interfere with this assay.   However, presence of multiple variants may affect accuracy.     01/11/2023 5.3 4.0 - 5.6 % Final     Comment:     ADA Screening Guidelines:  5.7-6.4%  Consistent with prediabetes  >or=6.5%  Consistent with diabetes    High levels of fetal hemoglobin interfere with the HbA1C  assay. Heterozygous hemoglobin variants (HbS, HgC, etc)do  not significantly interfere with this assay.   However, presence of multiple variants may affect accuracy.          The 10-year ASCVD risk score (Petra SCHMITZ, et al., 2019) is: 26.1%    Values used to calculate the score:      Age: 74 years      Sex: Male      Is Non- : Yes      Diabetic: No      Tobacco smoker: No      Systolic Blood Pressure: 148 mmHg      Is BP treated: Yes      HDL Cholesterol: 56 mg/dL      Total Cholesterol: 151 mg/dL      Cardiovascular Testing:      Echo    Result Date: 7/16/2024    Left Ventricle: The left ventricle is normal in size. Normal wall   thickness. There is normal systolic function with a visually estimated   ejection fraction of 60 - 65%. There is normal diastolic function.    Right Ventricle: Normal right ventricular cavity size. Wall thickness   is normal. Systolic function is normal.    Mitral Valve: There is mild regurgitation.     Tricuspid Valve: There is mild regurgitation.    Pulmonary Artery: The estimated pulmonary artery systolic pressure is   25 mmHg.    IVC/SVC: Normal venous pressure at 3 mmHg.          No cardiac monitor results found for the past 12 months     Echocardiogram 12/09/2020:    Moderate left atrial enlargement.  The left ventricle is normal in size with normal systolic function. The estimated ejection fraction is 60%.  Indeterminate diastolic function.  Normal right ventricular size with normal right ventricular systolic function.  Mild mitral regurgitation.  Mild pulmonic regurgitation.  The estimated PA systolic pressure is 29 mmHg.  Normal central venous pressure (3 mmHg).       ASSESSMENT:     Paroxysmal atrial fibrillation  Hypertension  Aortic atherosclerosis  Class 1 obesity    PLAN:     Paroxysmal atrial fibrillation: Maintaining sinus.  Continue current management.  Hypertension: Monitor.  Return to clinic 6 months.           Casimiro Moore MD, MPH, FACC, [place] [facility]

## 2025-02-11 LAB
OHS QRS DURATION: 94 MS
OHS QTC CALCULATION: 413 MS

## 2025-02-24 DIAGNOSIS — Z00.00 ENCOUNTER FOR MEDICARE ANNUAL WELLNESS EXAM: ICD-10-CM

## 2025-02-25 ENCOUNTER — E-VISIT (OUTPATIENT)
Dept: FAMILY MEDICINE | Facility: CLINIC | Age: 75
End: 2025-02-25
Payer: MEDICARE

## 2025-02-25 DIAGNOSIS — I48.0 PAROXYSMAL ATRIAL FIBRILLATION: Chronic | ICD-10-CM

## 2025-02-25 RX ORDER — FLECAINIDE ACETATE 100 MG/1
100 TABLET ORAL EVERY 12 HOURS
Qty: 180 TABLET | Refills: 3 | Status: SHIPPED | OUTPATIENT
Start: 2025-02-25 | End: 2026-02-25

## 2025-02-25 NOTE — PROGRESS NOTES
Patient requesting refill of flecainide for A fib. Read note from cardiology this year. Sent refill to [facility].

## 2025-03-03 DIAGNOSIS — L85.3 DRY SKIN DERMATITIS: ICD-10-CM

## 2025-03-05 RX ORDER — AMMONIUM LACTATE 12 G/100G
LOTION TOPICAL DAILY PRN
Qty: 400 G | Refills: 11 | Status: SHIPPED | OUTPATIENT
Start: 2025-03-05

## 2025-03-05 NOTE — TELEPHONE ENCOUNTER
Refill Routing Note   Medication(s) are not appropriate for processing by [facility] for the following reason(s):        Outside of protocol    [facility] action(s):  Route             Appointments  past 12m or future 3m with PCP    Date Provider   Last Visit   2/25/2025 Nelly Us MD   Next Visit   Visit date not found Nelly Us MD   ED visits in past 90 days: 0        Note composed:9:58 AM 03/05/2025

## 2025-03-19 ENCOUNTER — PATIENT MESSAGE (OUTPATIENT)
Dept: FAMILY MEDICINE | Facility: CLINIC | Age: 75
End: 2025-03-19
Payer: MEDICARE

## 2025-04-16 ENCOUNTER — PATIENT MESSAGE (OUTPATIENT)
Dept: UROLOGY | Facility: CLINIC | Age: 75
End: 2025-04-16
Payer: MEDICARE

## 2025-04-30 ENCOUNTER — E-VISIT (OUTPATIENT)
Dept: FAMILY MEDICINE | Facility: CLINIC | Age: 75
End: 2025-04-30
Payer: MEDICARE

## 2025-04-30 DIAGNOSIS — I10 ESSENTIAL HYPERTENSION: ICD-10-CM

## 2025-04-30 DIAGNOSIS — I48.0 PAROXYSMAL ATRIAL FIBRILLATION: Primary | ICD-10-CM

## 2025-05-02 RX ORDER — METOPROLOL SUCCINATE 25 MG/1
25 TABLET, EXTENDED RELEASE ORAL DAILY
Qty: 90 TABLET | Refills: 3 | Status: SHIPPED | OUTPATIENT
Start: 2025-05-02 | End: 2026-05-02

## 2025-05-06 ENCOUNTER — PATIENT MESSAGE (OUTPATIENT)
Dept: ADMINISTRATIVE | Facility: OTHER | Age: 75
End: 2025-05-06
Payer: MEDICARE

## 2025-05-19 DIAGNOSIS — I10 ESSENTIAL HYPERTENSION: ICD-10-CM

## 2025-05-19 RX ORDER — LOSARTAN POTASSIUM 50 MG/1
25 TABLET ORAL DAILY
Qty: 45 TABLET | Refills: 3 | Status: SHIPPED | OUTPATIENT
Start: 2025-05-19 | End: 2026-05-19

## 2025-05-19 NOTE — TELEPHONE ENCOUNTER
Care Due:                  Date            Visit Type   Department     Provider  --------------------------------------------------------------------------------                                EP -                              PRIMARY      [facility]  Last Visit: 07-      CARE ([facility])   MEDICINE       Nelly Us  Next Visit: None Scheduled  None         None Found                                                            Last  Test          Frequency    Reason                     Performed    Due Date  --------------------------------------------------------------------------------    Office Visit  12 months..  diclofenac...............  07- 07-    CBC.........  12 months..  diclofenac...............  08- 08-    CMP.........  12 months..  diclofenac, losartan.....  08- 08-    Health [facility] Embedded Care Due Messages. Reference number: [id number].   5/19/2025 12:50:49 AM CDT

## 2025-05-20 ENCOUNTER — PATIENT OUTREACH (OUTPATIENT)
Dept: ADMINISTRATIVE | Facility: HOSPITAL | Age: 75
End: 2025-05-20
Payer: MEDICARE

## 2025-05-20 ENCOUNTER — TELEPHONE (OUTPATIENT)
Dept: ADMINISTRATIVE | Facility: HOSPITAL | Age: 75
End: 2025-05-20
Payer: MEDICARE

## 2025-05-20 VITALS — DIASTOLIC BLOOD PRESSURE: 73 MMHG | SYSTOLIC BLOOD PRESSURE: 129 MMHG

## 2025-05-20 NOTE — TELEPHONE ENCOUNTER
----- Message from Nelly Us MD sent at 5/19/2025  1:56 PM CDT -----  Update BP with [facility] readings. thanks

## 2025-05-20 NOTE — PROGRESS NOTES
HM and immunization's reviewed and updated.    Health Maintenance Due   Topic Date Due    RSV Vaccine (Age 60+ and Pregnant patients) (1 - 1-dose 75+ series) Never done     Remote blood pressure 129/73 updated to chart.

## 2025-07-28 DIAGNOSIS — I48.0 PAROXYSMAL ATRIAL FIBRILLATION: Chronic | ICD-10-CM

## 2025-07-28 DIAGNOSIS — I48.0 PAROXYSMAL ATRIAL FIBRILLATION: Primary | ICD-10-CM

## 2025-07-28 RX ORDER — DABIGATRAN ETEXILATE 150 MG/1
150 CAPSULE ORAL 2 TIMES DAILY
Qty: 180 CAPSULE | Refills: 3 | Status: SHIPPED | OUTPATIENT
Start: 2025-07-28

## 2025-08-12 ENCOUNTER — OFFICE VISIT (OUTPATIENT)
Dept: CARDIOLOGY | Facility: CLINIC | Age: 75
End: 2025-08-12
Payer: MEDICARE

## 2025-08-12 VITALS
WEIGHT: 193.31 LBS | OXYGEN SATURATION: 97 % | RESPIRATION RATE: 19 BRPM | BODY MASS INDEX: 28.63 KG/M2 | HEIGHT: 69 IN | DIASTOLIC BLOOD PRESSURE: 68 MMHG | SYSTOLIC BLOOD PRESSURE: 140 MMHG | HEART RATE: 68 BPM

## 2025-08-12 DIAGNOSIS — I48.0 PAROXYSMAL ATRIAL FIBRILLATION: Primary | Chronic | ICD-10-CM

## 2025-08-12 DIAGNOSIS — G47.33 OBSTRUCTIVE SLEEP APNEA: ICD-10-CM

## 2025-08-12 DIAGNOSIS — Z79.01 ON CONTINUOUS ORAL ANTICOAGULATION: ICD-10-CM

## 2025-08-12 DIAGNOSIS — Z79.899 LONG TERM CURRENT USE OF ANTIARRHYTHMIC DRUG: ICD-10-CM

## 2025-08-12 DIAGNOSIS — I10 ESSENTIAL HYPERTENSION: ICD-10-CM

## 2025-08-12 DIAGNOSIS — I70.0 AORTIC ATHEROSCLEROSIS: ICD-10-CM

## 2025-08-12 DIAGNOSIS — E66.3 OVERWEIGHT (BMI 25.0-29.9): ICD-10-CM

## 2025-08-12 LAB
OHS QRS DURATION: 90 MS
OHS QTC CALCULATION: 409 MS

## 2025-08-12 PROCEDURE — 93005 ELECTROCARDIOGRAM TRACING: CPT | Mod: PBBFAC,PO | Performed by: INTERNAL MEDICINE

## 2025-08-12 PROCEDURE — 99215 OFFICE O/P EST HI 40 MIN: CPT | Mod: PBBFAC,PO | Performed by: INTERNAL MEDICINE

## 2025-08-12 PROCEDURE — 99213 OFFICE O/P EST LOW 20 MIN: CPT | Mod: S$PBB,,, | Performed by: INTERNAL MEDICINE

## 2025-08-12 PROCEDURE — 93010 ELECTROCARDIOGRAM REPORT: CPT | Mod: S$PBB,,, | Performed by: INTERNAL MEDICINE

## 2025-08-12 PROCEDURE — 99999 PR PBB SHADOW E&M-EST. PATIENT-LVL V: CPT | Mod: PBBFAC,,, | Performed by: INTERNAL MEDICINE

## 2025-08-12 RX ORDER — DABIGATRAN ETEXILATE 150 MG/1
150 CAPSULE ORAL 2 TIMES DAILY
Qty: 180 CAPSULE | Refills: 3 | Status: SHIPPED | OUTPATIENT
Start: 2025-08-12 | End: 2026-08-12

## 2025-08-18 PROBLEM — Z79.899 LONG TERM CURRENT USE OF ANTIARRHYTHMIC DRUG: Status: RESOLVED | Noted: 2025-08-12 | Resolved: 2025-08-18

## 2025-09-02 ENCOUNTER — OFFICE VISIT (OUTPATIENT)
Dept: UROLOGY | Facility: CLINIC | Age: 75
End: 2025-09-02
Payer: MEDICARE

## 2025-09-02 ENCOUNTER — LAB VISIT (OUTPATIENT)
Dept: LAB | Facility: HOSPITAL | Age: 75
End: 2025-09-02
Attending: THORACIC SURGERY (CARDIOTHORACIC VASCULAR SURGERY)
Payer: MEDICARE

## 2025-09-02 VITALS
SYSTOLIC BLOOD PRESSURE: 131 MMHG | DIASTOLIC BLOOD PRESSURE: 75 MMHG | BODY MASS INDEX: 28.65 KG/M2 | HEART RATE: 64 BPM | WEIGHT: 194 LBS

## 2025-09-02 DIAGNOSIS — R97.20 ELEVATED PSA: ICD-10-CM

## 2025-09-02 DIAGNOSIS — Z08 ENCOUNTER FOR FOLLOW-UP SURVEILLANCE OF PROSTATE CANCER: ICD-10-CM

## 2025-09-02 DIAGNOSIS — R39.12 WEAK URINARY STREAM: ICD-10-CM

## 2025-09-02 DIAGNOSIS — R39.9 LOWER URINARY TRACT SYMPTOMS (LUTS): ICD-10-CM

## 2025-09-02 DIAGNOSIS — C61 PROSTATE CANCER: ICD-10-CM

## 2025-09-02 DIAGNOSIS — N40.1 BPH WITH URINARY OBSTRUCTION: ICD-10-CM

## 2025-09-02 DIAGNOSIS — N39.43 POST-VOID DRIBBLING: ICD-10-CM

## 2025-09-02 DIAGNOSIS — Z85.46 ENCOUNTER FOR FOLLOW-UP SURVEILLANCE OF PROSTATE CANCER: ICD-10-CM

## 2025-09-02 DIAGNOSIS — C61 PROSTATE CANCER: Primary | ICD-10-CM

## 2025-09-02 DIAGNOSIS — N13.8 BPH WITH URINARY OBSTRUCTION: ICD-10-CM

## 2025-09-02 LAB
BILIRUBIN, UA POC OHS: NEGATIVE
BLOOD, UA POC OHS: NEGATIVE
CLARITY, UA POC OHS: CLEAR
COLOR, UA POC OHS: YELLOW
GLUCOSE, UA POC OHS: NEGATIVE
KETONES, UA POC OHS: NEGATIVE
LEUKOCYTES, UA POC OHS: NEGATIVE
NITRITE, UA POC OHS: NEGATIVE
PH, UA POC OHS: 6.5
POC RESIDUAL URINE VOLUME: 67 ML (ref 0–100)
PROTEIN, UA POC OHS: NEGATIVE
PSA SERPL-MCNC: 7.23 NG/ML
SPECIFIC GRAVITY, UA POC OHS: >=1.03
UROBILINOGEN, UA POC OHS: 0.2

## 2025-09-02 PROCEDURE — 99214 OFFICE O/P EST MOD 30 MIN: CPT | Mod: PBBFAC,25 | Performed by: NURSE PRACTITIONER

## 2025-09-02 PROCEDURE — 36415 COLL VENOUS BLD VENIPUNCTURE: CPT

## 2025-09-02 PROCEDURE — 99999 PR PBB SHADOW E&M-EST. PATIENT-LVL IV: CPT | Mod: PBBFAC,,, | Performed by: NURSE PRACTITIONER

## 2025-09-02 PROCEDURE — 99999PBSHW POCT BLADDER SCAN: Mod: PBBFAC,,,

## 2025-09-02 PROCEDURE — 81003 URINALYSIS AUTO W/O SCOPE: CPT | Mod: PBBFAC | Performed by: NURSE PRACTITIONER

## 2025-09-02 PROCEDURE — G2211 COMPLEX E/M VISIT ADD ON: HCPCS | Mod: ,,, | Performed by: NURSE PRACTITIONER

## 2025-09-02 PROCEDURE — 51798 US URINE CAPACITY MEASURE: CPT | Mod: PBBFAC | Performed by: NURSE PRACTITIONER

## 2025-09-02 PROCEDURE — 84153 ASSAY OF PSA TOTAL: CPT

## 2025-09-02 PROCEDURE — 99215 OFFICE O/P EST HI 40 MIN: CPT | Mod: S$PBB,,, | Performed by: NURSE PRACTITIONER

## 2025-09-02 PROCEDURE — 99999PBSHW POCT URINALYSIS(INSTRUMENT): Mod: PBBFAC,,,

## 2025-09-02 RX ORDER — TAMSULOSIN HYDROCHLORIDE 0.4 MG/1
0.4 CAPSULE ORAL NIGHTLY
Qty: 90 CAPSULE | Refills: 3 | Status: SHIPPED | OUTPATIENT
Start: 2025-09-02 | End: 2026-09-02

## 2025-09-02 RX ORDER — SEMAGLUTIDE 2.4 MG/.75ML
INJECTION, SOLUTION SUBCUTANEOUS
COMMUNITY
Start: 2025-08-07

## 2025-09-03 ENCOUNTER — OFFICE VISIT (OUTPATIENT)
Dept: FAMILY MEDICINE | Facility: CLINIC | Age: 75
End: 2025-09-03
Payer: MEDICARE

## 2025-09-03 VITALS
HEIGHT: 69 IN | RESPIRATION RATE: 16 BRPM | DIASTOLIC BLOOD PRESSURE: 70 MMHG | BODY MASS INDEX: 28.8 KG/M2 | WEIGHT: 194.44 LBS | SYSTOLIC BLOOD PRESSURE: 118 MMHG | OXYGEN SATURATION: 97 % | HEART RATE: 74 BPM | TEMPERATURE: 98 F

## 2025-09-03 DIAGNOSIS — I10 ESSENTIAL HYPERTENSION: ICD-10-CM

## 2025-09-03 DIAGNOSIS — M51.16 LUMBAR DISC DISEASE WITH RADICULOPATHY: ICD-10-CM

## 2025-09-03 DIAGNOSIS — Z79.01 ON CONTINUOUS ORAL ANTICOAGULATION: ICD-10-CM

## 2025-09-03 DIAGNOSIS — M48.07 SPINAL STENOSIS, LUMBOSACRAL REGION: ICD-10-CM

## 2025-09-03 DIAGNOSIS — M17.0 PRIMARY OSTEOARTHRITIS OF BOTH KNEES: ICD-10-CM

## 2025-09-03 DIAGNOSIS — I48.0 PAROXYSMAL ATRIAL FIBRILLATION: Chronic | ICD-10-CM

## 2025-09-03 DIAGNOSIS — Z09 FOLLOW-UP EXAM: Primary | ICD-10-CM

## 2025-09-03 PROCEDURE — 99215 OFFICE O/P EST HI 40 MIN: CPT | Mod: PBBFAC,PO

## 2025-09-03 PROCEDURE — 99999 PR PBB SHADOW E&M-EST. PATIENT-LVL V: CPT | Mod: PBBFAC,,,

## 2025-09-03 PROCEDURE — 99214 OFFICE O/P EST MOD 30 MIN: CPT | Mod: S$PBB,,,

## 2025-09-03 PROCEDURE — G2211 COMPLEX E/M VISIT ADD ON: HCPCS | Mod: ,,,

## 2025-09-03 RX ORDER — TRAMADOL HYDROCHLORIDE 50 MG/1
50 TABLET, FILM COATED ORAL
Qty: 30 EACH | Refills: 0 | Status: SHIPPED | OUTPATIENT
Start: 2025-09-03

## 2025-09-03 RX ORDER — LOSARTAN POTASSIUM 25 MG/1
25 TABLET ORAL
COMMUNITY

## 2025-09-03 RX ORDER — TERAZOSIN 10 MG/1
CAPSULE ORAL
COMMUNITY

## 2025-09-03 RX ORDER — AMLODIPINE BESYLATE 5 MG/1
5 TABLET ORAL
COMMUNITY
End: 2025-09-03

## (undated) DEVICE — PACK CYSTO

## (undated) DEVICE — TRAY CYSTO BASIN

## (undated) DEVICE — SOL IRR NACL .9% 3000ML

## (undated) DEVICE — Device